# Patient Record
Sex: MALE | Race: WHITE | NOT HISPANIC OR LATINO | Employment: UNEMPLOYED | URBAN - METROPOLITAN AREA
[De-identification: names, ages, dates, MRNs, and addresses within clinical notes are randomized per-mention and may not be internally consistent; named-entity substitution may affect disease eponyms.]

---

## 2016-10-03 LAB — HBA1C MFR BLD HPLC: 7 %

## 2017-09-12 LAB — HBA1C MFR BLD HPLC: 5.7 %

## 2018-04-25 LAB
CREAT ?TM UR-SCNC: 78.3 UMOL/L
HCV AB SER-ACNC: NEGATIVE

## 2019-03-19 ENCOUNTER — APPOINTMENT (EMERGENCY)
Dept: RADIOLOGY | Facility: HOSPITAL | Age: 64
End: 2019-03-19
Payer: MEDICARE

## 2019-03-19 ENCOUNTER — HOSPITAL ENCOUNTER (OUTPATIENT)
Facility: HOSPITAL | Age: 64
Setting detail: OBSERVATION
Discharge: HOME HEALTH | End: 2019-03-20
Attending: EMERGENCY MEDICINE | Admitting: INTERNAL MEDICINE
Payer: MEDICARE

## 2019-03-19 DIAGNOSIS — I87.2 CHRONIC VENOUS STASIS DERMATITIS OF LEFT LOWER EXTREMITY: ICD-10-CM

## 2019-03-19 DIAGNOSIS — M79.89 LEG SWELLING: ICD-10-CM

## 2019-03-19 DIAGNOSIS — L03.90 CELLULITIS: Primary | ICD-10-CM

## 2019-03-19 DIAGNOSIS — E11.9 DIABETES (HCC): ICD-10-CM

## 2019-03-19 PROBLEM — IMO0001 LIVING WILL ON FILE: Status: ACTIVE | Noted: 2018-04-29

## 2019-03-19 PROBLEM — I10 HTN (HYPERTENSION): Status: ACTIVE | Noted: 2019-03-19

## 2019-03-19 PROBLEM — E03.9 HYPOTHYROIDISM: Status: ACTIVE | Noted: 2019-03-19

## 2019-03-19 PROBLEM — Z89.511 S/P BKA (BELOW KNEE AMPUTATION) UNILATERAL, RIGHT (HCC): Status: ACTIVE | Noted: 2017-05-25

## 2019-03-19 PROBLEM — E66.01 MORBID OBESITY WITH BMI OF 40.0-44.9, ADULT (HCC): Status: ACTIVE | Noted: 2019-03-19

## 2019-03-19 PROBLEM — Z87.898 LIVING WILL ON FILE: Status: ACTIVE | Noted: 2018-04-29

## 2019-03-19 PROBLEM — E78.5 HLD (HYPERLIPIDEMIA): Status: ACTIVE | Noted: 2019-03-19

## 2019-03-19 LAB
ALBUMIN SERPL BCP-MCNC: 2.8 G/DL (ref 3.5–5)
ALP SERPL-CCNC: 66 U/L (ref 46–116)
ALT SERPL W P-5'-P-CCNC: 23 U/L (ref 12–78)
ANION GAP SERPL CALCULATED.3IONS-SCNC: 3 MMOL/L (ref 4–13)
AST SERPL W P-5'-P-CCNC: 25 U/L (ref 5–45)
BACTERIA UR QL AUTO: ABNORMAL /HPF
BASOPHILS # BLD AUTO: 0.02 THOUSANDS/ΜL (ref 0–0.1)
BASOPHILS NFR BLD AUTO: 0 % (ref 0–1)
BILIRUB SERPL-MCNC: 0.45 MG/DL (ref 0.2–1)
BILIRUB UR QL STRIP: ABNORMAL
BUN SERPL-MCNC: 15 MG/DL (ref 5–25)
CALCIUM SERPL-MCNC: 8.8 MG/DL (ref 8.3–10.1)
CHLORIDE SERPL-SCNC: 107 MMOL/L (ref 100–108)
CLARITY UR: ABNORMAL
CO2 SERPL-SCNC: 27 MMOL/L (ref 21–32)
COLOR UR: ABNORMAL
CREAT SERPL-MCNC: 0.9 MG/DL (ref 0.6–1.3)
CRP SERPL QL: 73.3 MG/L
EOSINOPHIL # BLD AUTO: 0.07 THOUSAND/ΜL (ref 0–0.61)
EOSINOPHIL NFR BLD AUTO: 1 % (ref 0–6)
ERYTHROCYTE [DISTWIDTH] IN BLOOD BY AUTOMATED COUNT: 17.1 % (ref 11.6–15.1)
ERYTHROCYTE [SEDIMENTATION RATE] IN BLOOD: 81 MM/HOUR (ref 0–10)
GFR SERPL CREATININE-BSD FRML MDRD: 89 ML/MIN/1.73SQ M
GLUCOSE SERPL-MCNC: 71 MG/DL (ref 65–140)
GLUCOSE SERPL-MCNC: 86 MG/DL (ref 65–140)
GLUCOSE UR STRIP-MCNC: NEGATIVE MG/DL
HCT VFR BLD AUTO: 37 % (ref 36.5–49.3)
HGB BLD-MCNC: 11.3 G/DL (ref 12–17)
HGB UR QL STRIP.AUTO: ABNORMAL
IMM GRANULOCYTES # BLD AUTO: 0.02 THOUSAND/UL (ref 0–0.2)
IMM GRANULOCYTES NFR BLD AUTO: 0 % (ref 0–2)
KETONES UR STRIP-MCNC: NEGATIVE MG/DL
LEUKOCYTE ESTERASE UR QL STRIP: ABNORMAL
LYMPHOCYTES # BLD AUTO: 0.81 THOUSANDS/ΜL (ref 0.6–4.47)
LYMPHOCYTES NFR BLD AUTO: 13 % (ref 14–44)
MCH RBC QN AUTO: 25 PG (ref 26.8–34.3)
MCHC RBC AUTO-ENTMCNC: 30.5 G/DL (ref 31.4–37.4)
MCV RBC AUTO: 82 FL (ref 82–98)
MONOCYTES # BLD AUTO: 0.5 THOUSAND/ΜL (ref 0.17–1.22)
MONOCYTES NFR BLD AUTO: 8 % (ref 4–12)
NEUTROPHILS # BLD AUTO: 5.05 THOUSANDS/ΜL (ref 1.85–7.62)
NEUTS SEG NFR BLD AUTO: 78 % (ref 43–75)
NITRITE UR QL STRIP: NEGATIVE
NON-SQ EPI CELLS URNS QL MICRO: ABNORMAL /HPF
NRBC BLD AUTO-RTO: 0 /100 WBCS
PH UR STRIP.AUTO: 6 [PH] (ref 4.5–8)
PLATELET # BLD AUTO: 236 THOUSANDS/UL (ref 149–390)
PLATELET # BLD AUTO: 259 THOUSANDS/UL (ref 149–390)
PMV BLD AUTO: 12.6 FL (ref 8.9–12.7)
PMV BLD AUTO: 12.7 FL (ref 8.9–12.7)
POTASSIUM SERPL-SCNC: 4.3 MMOL/L (ref 3.5–5.3)
PROT SERPL-MCNC: 7.4 G/DL (ref 6.4–8.2)
PROT UR STRIP-MCNC: ABNORMAL MG/DL
RBC # BLD AUTO: 4.52 MILLION/UL (ref 3.88–5.62)
RBC #/AREA URNS AUTO: ABNORMAL /HPF
SODIUM SERPL-SCNC: 137 MMOL/L (ref 136–145)
SP GR UR STRIP.AUTO: 1.02 (ref 1–1.03)
UROBILINOGEN UR QL STRIP.AUTO: 1 E.U./DL
WBC # BLD AUTO: 6.47 THOUSAND/UL (ref 4.31–10.16)
WBC #/AREA URNS AUTO: ABNORMAL /HPF

## 2019-03-19 PROCEDURE — 81003 URINALYSIS AUTO W/O SCOPE: CPT

## 2019-03-19 PROCEDURE — 82948 REAGENT STRIP/BLOOD GLUCOSE: CPT

## 2019-03-19 PROCEDURE — 80053 COMPREHEN METABOLIC PANEL: CPT | Performed by: EMERGENCY MEDICINE

## 2019-03-19 PROCEDURE — 85025 COMPLETE CBC W/AUTO DIFF WBC: CPT | Performed by: EMERGENCY MEDICINE

## 2019-03-19 PROCEDURE — 96365 THER/PROPH/DIAG IV INF INIT: CPT

## 2019-03-19 PROCEDURE — 73630 X-RAY EXAM OF FOOT: CPT

## 2019-03-19 PROCEDURE — 99219 PR INITIAL OBSERVATION CARE/DAY 50 MINUTES: CPT | Performed by: INTERNAL MEDICINE

## 2019-03-19 PROCEDURE — 85049 AUTOMATED PLATELET COUNT: CPT | Performed by: INTERNAL MEDICINE

## 2019-03-19 PROCEDURE — 87040 BLOOD CULTURE FOR BACTERIA: CPT | Performed by: INTERNAL MEDICINE

## 2019-03-19 PROCEDURE — 36415 COLL VENOUS BLD VENIPUNCTURE: CPT | Performed by: EMERGENCY MEDICINE

## 2019-03-19 PROCEDURE — 99285 EMERGENCY DEPT VISIT HI MDM: CPT

## 2019-03-19 PROCEDURE — 86140 C-REACTIVE PROTEIN: CPT | Performed by: EMERGENCY MEDICINE

## 2019-03-19 PROCEDURE — 81001 URINALYSIS AUTO W/SCOPE: CPT

## 2019-03-19 PROCEDURE — 1123F ACP DISCUSS/DSCN MKR DOCD: CPT | Performed by: INTERNAL MEDICINE

## 2019-03-19 PROCEDURE — 85652 RBC SED RATE AUTOMATED: CPT | Performed by: EMERGENCY MEDICINE

## 2019-03-19 RX ORDER — CEFAZOLIN SODIUM 1 G/50ML
1000 SOLUTION INTRAVENOUS EVERY 8 HOURS
Status: DISCONTINUED | OUTPATIENT
Start: 2019-03-19 | End: 2019-03-19

## 2019-03-19 RX ORDER — PANTOPRAZOLE SODIUM 40 MG/1
40 TABLET, DELAYED RELEASE ORAL DAILY
COMMUNITY
End: 2019-04-12 | Stop reason: SDUPTHER

## 2019-03-19 RX ORDER — HEPARIN SODIUM 5000 [USP'U]/ML
5000 INJECTION, SOLUTION INTRAVENOUS; SUBCUTANEOUS EVERY 8 HOURS SCHEDULED
Status: DISCONTINUED | OUTPATIENT
Start: 2019-03-19 | End: 2019-03-19

## 2019-03-19 RX ORDER — HEPARIN SODIUM 5000 [USP'U]/ML
7500 INJECTION, SOLUTION INTRAVENOUS; SUBCUTANEOUS EVERY 8 HOURS SCHEDULED
Status: DISCONTINUED | OUTPATIENT
Start: 2019-03-19 | End: 2019-03-20 | Stop reason: HOSPADM

## 2019-03-19 RX ORDER — LEVOTHYROXINE SODIUM 300 UG/1
300 TABLET ORAL DAILY
COMMUNITY
End: 2019-04-12 | Stop reason: SDUPTHER

## 2019-03-19 RX ORDER — LEVOTHYROXINE SODIUM 0.1 MG/1
300 TABLET ORAL DAILY
Status: DISCONTINUED | OUTPATIENT
Start: 2019-03-20 | End: 2019-03-19

## 2019-03-19 RX ORDER — METOPROLOL TARTRATE 50 MG/1
50 TABLET, FILM COATED ORAL EVERY 12 HOURS SCHEDULED
COMMUNITY
End: 2019-04-09 | Stop reason: SDUPTHER

## 2019-03-19 RX ORDER — LEVOTHYROXINE SODIUM 0.12 MG/1
375 TABLET ORAL
Status: DISCONTINUED | OUTPATIENT
Start: 2019-03-20 | End: 2019-03-20 | Stop reason: HOSPADM

## 2019-03-19 RX ORDER — ASPIRIN 81 MG/1
81 TABLET, CHEWABLE ORAL DAILY
COMMUNITY
End: 2019-04-09 | Stop reason: SDUPTHER

## 2019-03-19 RX ORDER — METOPROLOL TARTRATE 50 MG/1
50 TABLET, FILM COATED ORAL EVERY 12 HOURS SCHEDULED
Status: DISCONTINUED | OUTPATIENT
Start: 2019-03-19 | End: 2019-03-20 | Stop reason: HOSPADM

## 2019-03-19 RX ORDER — LISINOPRIL 10 MG/1
10 TABLET ORAL DAILY
Status: DISCONTINUED | OUTPATIENT
Start: 2019-03-20 | End: 2019-03-20 | Stop reason: HOSPADM

## 2019-03-19 RX ORDER — GABAPENTIN 100 MG/1
100 CAPSULE ORAL
Status: DISCONTINUED | OUTPATIENT
Start: 2019-03-19 | End: 2019-03-20 | Stop reason: HOSPADM

## 2019-03-19 RX ORDER — METFORMIN HYDROCHLORIDE 750 MG/1
750 TABLET, EXTENDED RELEASE ORAL
COMMUNITY
End: 2019-04-12 | Stop reason: SDUPTHER

## 2019-03-19 RX ORDER — LISINOPRIL 10 MG/1
10 TABLET ORAL DAILY
COMMUNITY
End: 2019-04-12 | Stop reason: SDUPTHER

## 2019-03-19 RX ORDER — LEVOTHYROXINE SODIUM 0.07 MG/1
75 TABLET ORAL DAILY
COMMUNITY
End: 2019-04-12 | Stop reason: SDUPTHER

## 2019-03-19 RX ORDER — LEVOTHYROXINE SODIUM 0.07 MG/1
75 TABLET ORAL DAILY
Status: DISCONTINUED | OUTPATIENT
Start: 2019-03-20 | End: 2019-03-19

## 2019-03-19 RX ORDER — PRAVASTATIN SODIUM 10 MG
10 TABLET ORAL
Status: DISCONTINUED | OUTPATIENT
Start: 2019-03-19 | End: 2019-03-20 | Stop reason: HOSPADM

## 2019-03-19 RX ORDER — SIMVASTATIN 10 MG
10 TABLET ORAL
COMMUNITY
End: 2019-04-12 | Stop reason: SDUPTHER

## 2019-03-19 RX ORDER — PANTOPRAZOLE SODIUM 40 MG/1
40 TABLET, DELAYED RELEASE ORAL
Status: DISCONTINUED | OUTPATIENT
Start: 2019-03-20 | End: 2019-03-20 | Stop reason: HOSPADM

## 2019-03-19 RX ORDER — CEFAZOLIN SODIUM 2 G/50ML
2000 SOLUTION INTRAVENOUS EVERY 8 HOURS
Status: DISCONTINUED | OUTPATIENT
Start: 2019-03-19 | End: 2019-03-20 | Stop reason: HOSPADM

## 2019-03-19 RX ORDER — ASPIRIN 81 MG/1
81 TABLET, CHEWABLE ORAL DAILY
Status: DISCONTINUED | OUTPATIENT
Start: 2019-03-20 | End: 2019-03-20 | Stop reason: HOSPADM

## 2019-03-19 RX ADMIN — HEPARIN SODIUM 7500 UNITS: 5000 INJECTION INTRAVENOUS; SUBCUTANEOUS at 22:25

## 2019-03-19 RX ADMIN — METOPROLOL TARTRATE 50 MG: 50 TABLET, FILM COATED ORAL at 22:24

## 2019-03-19 RX ADMIN — PRAVASTATIN SODIUM 10 MG: 10 TABLET ORAL at 22:15

## 2019-03-19 RX ADMIN — METRONIDAZOLE 500 MG: 500 INJECTION, SOLUTION INTRAVENOUS at 23:31

## 2019-03-19 RX ADMIN — Medication 1000 MG: at 15:58

## 2019-03-19 RX ADMIN — GABAPENTIN 100 MG: 100 CAPSULE ORAL at 22:24

## 2019-03-19 RX ADMIN — CEFAZOLIN SODIUM 2000 MG: 2 SOLUTION INTRAVENOUS at 22:24

## 2019-03-19 NOTE — ED ATTENDING ATTESTATION
Traci Rodriguez MD, saw and evaluated the patient  I have discussed the patient with the resident/non-physician practitioner and agree with the resident's/non-physician practitioner's findings, Plan of Care, and MDM as documented in the resident's/non-physician practitioner's note, except where noted  All available labs and Radiology studies were reviewed  I was present for key portions of any procedure(s) performed by the resident/non-physician practitioner and I was immediately available to provide assistance  At this point I agree with the current assessment done in the Emergency Department  I have conducted an independent evaluation of this patient a history and physical is as follows:      Critical Care Time  Procedures     71 yo male with lle edema and redness worsening over the last few days  Pt with more trouble walking due to pain  No fever, no n/v  Pt with hx of right aka  pmh dm, htn, hld  Vss, afebrile, lungs cta, rrr, abodmen soft nontender, left leg with cellulitis   Labs, abx, admission for iv abx, podiatry and vascular consult

## 2019-03-19 NOTE — ED PROVIDER NOTES
History  Chief Complaint   Patient presents with    Leg Swelling     pt presents to ED with LLE edema and redness  pt reports "its been swollen and red for the past few weeks but i thought it would get better on its own"     HPI   47-year-old man with peripheral vascular disease presents for evaluation of left lower extremity swelling and redness  Patient states he has redness at baseline, but is left foot became acutely swollen about a week ago  Patient does have diabetes, thinks he may have neuropathy at baseline  Patient did have right-sided lower leg amputation 2 years ago  Patient is concerned as he only has 1 like  He otherwise denies fevers chills sweats chest pain shortness of breath abdominal pain nausea vomiting  Prior to Admission Medications   Prescriptions Last Dose Informant Patient Reported?  Taking?   aspirin 81 mg chewable tablet 3/19/2019 at Unknown time  Yes Yes   Sig: Chew 81 mg daily   levothyroxine 300 MCG tablet 3/19/2019 at Unknown time  Yes Yes   Sig: Take 300 mcg by mouth daily   levothyroxine 75 mcg tablet 3/19/2019 at Unknown time  Yes Yes   Sig: Take 75 mcg by mouth daily   lisinopril (ZESTRIL) 10 mg tablet 3/19/2019 at Unknown time  Yes Yes   Sig: Take 10 mg by mouth daily   metFORMIN (GLUCOPHAGE-XR) 750 mg 24 hr tablet 3/19/2019 at Unknown time  Yes Yes   Sig: Take 750 mg by mouth daily with breakfast   metoprolol tartrate (LOPRESSOR) 50 mg tablet 3/19/2019 at Unknown time  Yes Yes   Sig: Take 50 mg by mouth every 12 (twelve) hours   pantoprazole (PROTONIX) 40 mg tablet 3/19/2019 at Unknown time  Yes Yes   Sig: Take 40 mg by mouth daily   simvastatin (ZOCOR) 10 mg tablet 3/18/2019 at Unknown time  Yes Yes   Sig: Take 10 mg by mouth daily at bedtime      Facility-Administered Medications: None       Past Medical History:   Diagnosis Date    Diabetes mellitus ([address] )     Disease of thyroid gland     Hyperlipidemia     Hypertension        Past Surgical History:   Procedure Laterality Date    BELOW KNEE LEG AMPUTATION Right        No family history on file  I have reviewed and agree with the history as documented  Social History     Tobacco Use    Smoking status: Not on file   Substance Use Topics    Alcohol use: Not on file    Drug use: Not on file        Review of Systems   Musculoskeletal:        Left lower extremity swelling   Skin: Positive for color change  Physical Exam  ED Triage Vitals   Temperature Pulse Respirations Blood Pressure SpO2   03/19/19 1119 03/19/19 1119 03/19/19 1119 03/19/19 1119 03/19/19 1119   97 9 °F (36 6 °C) 89 16 141/62 98 %      Temp Source Heart Rate Source Patient Position - Orthostatic VS BP Location FiO2 (%)   03/19/19 1119 03/19/19 1119 03/19/19 1543 03/19/19 1543 --   Oral Monitor Lying Right arm       Pain Score       --                    Orthostatic Vital Signs  Vitals:    03/19/19 1119 03/19/19 1543   BP: 141/62 120/61   Pulse: 89 72   Patient Position - Orthostatic VS:  Lying       Physical Exam   Constitutional: He is oriented to person, place, and time  He appears well-developed and well-nourished  No distress  HENT:   Head: Normocephalic and atraumatic  Right Ear: External ear normal    Left Ear: External ear normal    Mouth/Throat: Oropharynx is clear and moist    Eyes: Pupils are equal, round, and reactive to light  Conjunctivae and EOM are normal  Right eye exhibits no discharge  Left eye exhibits no discharge  No scleral icterus  Neck: Normal range of motion  Neck supple  No tracheal deviation present  No thyromegaly present  Cardiovascular: Normal rate, regular rhythm and intact distal pulses  Exam reveals no gallop and no friction rub  No murmur heard  Pulmonary/Chest: Effort normal and breath sounds normal  No stridor  No respiratory distress  He has no wheezes  He has no rales  Abdominal: Soft  Bowel sounds are normal  He exhibits no distension  There is no tenderness  There is no rebound and no guarding  Musculoskeletal: He exhibits edema  He exhibits no tenderness or deformity  Left foot is warm, swollen  Neurological: He is alert and oriented to person, place, and time  No cranial nerve deficit  Skin: Skin is warm and dry  No rash noted  He is not diaphoretic  There is erythema  Psychiatric: He has a normal mood and affect  His behavior is normal  Thought content normal    Nursing note and vitals reviewed  ED Medications  Medications   heparin (porcine) subcutaneous injection 5,000 Units (has no administration in time range)   aspirin chewable tablet 81 mg (has no administration in time range)   lisinopril (ZESTRIL) tablet 10 mg (has no administration in time range)   metoprolol tartrate (LOPRESSOR) tablet 50 mg (has no administration in time range)   pantoprazole (PROTONIX) EC tablet 40 mg (has no administration in time range)   pravastatin (PRAVACHOL) tablet 10 mg (has no administration in time range)   ceFAZolin (ANCEF) IVPB (premix) 2,000 mg (has no administration in time range)   metroNIDAZOLE (FLAGYL) IVPB (premix) 500 mg (has no administration in time range)   levothyroxine tablet 375 mcg (has no administration in time range)   insulin lispro (HumaLOG) 100 units/mL subcutaneous injection 1-6 Units (has no administration in time range)   insulin lispro (HumaLOG) 100 units/mL subcutaneous injection 1-6 Units (has no administration in time range)   ceftriaxone (ROCEPHIN) 1 g/50 mL in dextrose IVPB (0 mg Intravenous Stopped 3/19/19 1709)       Diagnostic Studies  Results Reviewed     Procedure Component Value Units Date/Time    Platelet count [[id number]] Collected:  03/19/19 1758    Lab Status: In process Specimen:  Blood from Arm, Right Updated:  03/19/19 1803    Blood culture [[id number]] Collected:  03/19/19 1758    Lab Status: In process Specimen:  Blood from Arm, Left Updated:  03/19/19 1803    Blood culture [[id number]] Collected:  03/19/19 1758    Lab Status:   In process Specimen: Blood from Arm, Right Updated:  03/19/19 1803    POCT urinalysis dipstick [[id number]]     Lab Status:  No result Specimen:  Urine     Sedimentation rate, automated [[id number]]  (Abnormal) Collected:  03/19/19 1403    Lab Status:  Final result Specimen:  Blood from Arm, Left Updated:  03/19/19 1615     Sed Rate 81 mm/hour     Comprehensive metabolic panel [[id number]]  (Abnormal) Collected:  03/19/19 1403    Lab Status:  Final result Specimen:  Blood from Arm, Left Updated:  03/19/19 1557     Sodium 137 mmol/L      Potassium 4 3 mmol/L      Chloride 107 mmol/L      CO2 27 mmol/L      ANION GAP 3 mmol/L      BUN 15 mg/dL      Creatinine 0 90 mg/dL      Glucose 86 mg/dL      Calcium 8 8 mg/dL      AST 25 U/L      ALT 23 U/L      Alkaline Phosphatase 66 U/L      Total Protein 7 4 g/dL      Albumin 2 8 g/dL      Total Bilirubin 0 45 mg/dL      eGFR 89 ml/min/1 73sq m     Narrative:       National Kidney Disease Education Program recommendations are as follows:  GFR calculation is accurate only with a steady state creatinine  Chronic Kidney disease less than 60 ml/min/1 73 sq  meters  Kidney failure less than 15 ml/min/1 73 sq  meters      C-reactive protein [[id number]]  (Abnormal) Collected:  03/19/19 1403    Lab Status:  Final result Specimen:  Blood from Arm, Left Updated:  03/19/19 1557     CRP 73 3 mg/L     CBC and differential [[id number]]  (Abnormal) Collected:  03/19/19 1403    Lab Status:  Final result Specimen:  Blood from Arm, Left Updated:  03/19/19 1520     WBC 6 47 Thousand/uL      RBC 4 52 Million/uL      Hemoglobin 11 3 g/dL      Hematocrit 37 0 %      MCV 82 fL      MCH 25 0 pg      MCHC 30 5 g/dL      RDW 17 1 %      MPV 12 7 fL      Platelets 240 Thousands/uL      nRBC 0 /100 WBCs      Neutrophils Relative 78 %      Immat GRANS % 0 %      Lymphocytes Relative 13 %      Monocytes Relative 8 %      Eosinophils Relative 1 %      Basophils Relative 0 %      Neutrophils Absolute 5 05 Thousands/µL      Immature Grans Absolute 0 02 Thousand/uL      Lymphocytes Absolute 0 81 Thousands/µL      Monocytes Absolute 0 50 Thousand/µL      Eosinophils Absolute 0 07 Thousand/µL      Basophils Absolute 0 02 Thousands/µL                  XR foot 3+ views LEFT   ED Interpretation by Andrés Horner MD (03/19 9465)            Procedures  Procedures      Phone Consults  ED Phone Contact    ED Course                               MDM  Number of Diagnoses or Management Options  Cellulitis:   Leg swelling:   Diagnosis management comments: 13-year-old man presents with left lower extremity cellulitis  Will get x-ray to look for air in the tissues, basic labs, will start antibiotics and reassess  Likely admission to the hospital       Disposition  Final diagnoses:   Cellulitis   Leg swelling     Time reflects when diagnosis was documented in both MDM as applicable and the Disposition within this note     Time User Action Codes Description Comment    3/19/2019  4:21 PM Delisa Coreas Add [L03 90] Cellulitis     3/19/2019  4:21 PM Delisa Coreas Add [M79 89] Leg swelling       ED Disposition     ED Disposition Condition Date/Time Comment    Admit Stable Tue Mar 19, 2019  4:22 PM SOD obs      Follow-up Information    None         Patient's Medications   Discharge Prescriptions    No medications on file     No discharge procedures on file  ED Provider  Attending physically available and evaluated Gloelizabeth Tiwari I managed the patient along with the ED Attending      Electronically Signed by         Andrés Horner MD  03/19/19 9383

## 2019-03-19 NOTE — PROGRESS NOTES
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  Patient seen and examined  Reviewed H&P per Dr Cisco Warren  Agree with the assessment and plan except unless otherwise noted  Assessment/Plan:   Principal Problem:    Cellulitis  Active Problems:    Controlled type 2 diabetes mellitus with complication, without long-term current use of insulin (HCC)    HTN (hypertension)    HLD (hyperlipidemia)    Hypothyroidism    Morbid obesity with BMI of 50 0-59 9, adult (HCC)    Atrial fibrillation (HCC)     Left lower extremity cellulitis  Differential also includes venous stasis dermatitis  He has pitting edema up to the knee in that leg  Patient does have a history of chronic venous stasis and left lower extremity ulcerations  Has been noncompliance with wound care in the past   Patient reports redness in his left foot is worse  Also, as he has diminished sensation in that leg, has a difficult time feeling pain in that extremity  · Has been noncompliant with wound care in the past  · Will start on Ancef and Flagyl  · Elevate extremity  · Will consult Podiatry  · Glucose control with sliding scale insulin    AFib  · Patient is on a beta-blocker  However, not on a blood thinner due to history of chronic blood loss and iron deficiency anemia  Uncertain etiology, however, does have a documented history of a duodenal ulcer    · Continue beta-blocker  · Monitor hemoglobin and monitor for sign of bleed    Diabetes mellitus  · Correctional insulin  · Takes metformin as an outpatient  · Follow-up A1c    Disposition:  OBSERVATION    Expected LOS: <2 Midnights      [facility] Pierre DO

## 2019-03-19 NOTE — H&P
INTERNAL MEDICINE HISTORY AND PHYSICAL  [address] Team A    NAME: Marietta Haider  AGE: 70 y o  SEX: male  : 1955   MRN: [id number]  ENCOUNTER: [id number]    DATE: 3/19/2019  TIME: 5:55 PM    Primary Care Physician: Eitan Merchant MD  Admitting Provider: Patric Flanagan MD    Chief complaint: My left foot looks more red    History of Present Illness     Marietta Haider is a 70 y o  male with longstanding history of diabetes (A1c 5 9 in 2019), hypertension, hyperlipidemia who presents with erythema and swelling of his left foot  The patient is unsure of the exact time course but states that this has been getting worse for at least the past 2 weeks  He states that his left lower leg is usually always swollen and red (from venostasis dermatitis) but his feet appear more inflamed than usual  Several years ago, the patient had a right BKA for a foot infection but cannot remember the exact details  He denies fevers, chills, or foot pain  Also denies CP, SOB, N/V, diarrhea, dysuria, hematuria, melena  However, patient does complain of tingling and numbness of his left leg from knee down and also of his bilateral fingers  In the ED, the patient was afebrile, without leukocytosis, but had elevated CRP and ESR of 73 and 81, respectively  Xray of the foot was done and pending at the time of my patient evaluation  He was admitted for observation and IV antibiotics  Review of Systems   Review of Systems   Constitutional: Negative for appetite change, chills, fever and unexpected weight change  HENT: Negative for rhinorrhea and sore throat  Respiratory: Negative for cough, shortness of breath and wheezing  Cardiovascular: Positive for leg swelling  Negative for chest pain and palpitations  Gastrointestinal: Negative for abdominal pain, blood in stool, constipation, diarrhea, nausea and vomiting  Genitourinary: Negative for dysuria, frequency, hematuria and urgency     Musculoskeletal: Negative for arthralgias and myalgias  Skin: Positive for color change and wound  Negative for rash  Neurological: Positive for numbness  Negative for dizziness, weakness, light-headedness and headaches  Past Medical History     Past Medical History:   Diagnosis Date    Diabetes mellitus ([address] )     Disease of thyroid gland     Hyperlipidemia     Hypertension        Past Surgical History     Past Surgical History:   Procedure Laterality Date    BELOW KNEE LEG AMPUTATION Right        Social History     Social History     Substance and Sexual Activity   Alcohol Use Not on file     Social History     Substance and Sexual Activity   Drug Use Not on file     Social History     Tobacco Use   Smoking Status Not on file       Family History   No family history on file  Medications Prior to Admission     Prior to Admission medications    Medication Sig Start Date End Date Taking?  Authorizing Provider   aspirin 81 mg chewable tablet Chew 81 mg daily   Yes Historical Provider, MD   levothyroxine 300 MCG tablet Take 300 mcg by mouth daily   Yes Historical Provider, MD   levothyroxine 75 mcg tablet Take 75 mcg by mouth daily   Yes Historical Provider, MD   lisinopril (ZESTRIL) 10 mg tablet Take 10 mg by mouth daily   Yes Historical Provider, MD   metFORMIN (GLUCOPHAGE-XR) 750 mg 24 hr tablet Take 750 mg by mouth daily with breakfast   Yes Historical Provider, MD   metoprolol tartrate (LOPRESSOR) 50 mg tablet Take 50 mg by mouth every 12 (twelve) hours   Yes Historical Provider, MD   pantoprazole (PROTONIX) 40 mg tablet Take 40 mg by mouth daily   Yes Historical Provider, MD   simvastatin (ZOCOR) 10 mg tablet Take 10 mg by mouth daily at bedtime   Yes Historical Provider, MD       Allergies   Allergies no known allergies    Objective     Vitals:    03/19/19 1119 03/19/19 1122 03/19/19 1543   BP: 141/62  120/61   BP Location:   Right arm   Pulse: 89  72   Resp: 16  18   Temp: 97 9 °F (36 6 °C)     TempSrc: Oral     SpO2: 98% 99%   Weight:  (!) 213 kg (470 lb)      Body mass index is 51 63 kg/m²  Intake/Output Summary (Last 24 hours) at 3/19/2019 1755  Last data filed at 3/19/2019 1709  Gross per 24 hour   Intake 50 ml   Output    Net 50 ml     Invasive Devices     Peripheral Intravenous Line            Peripheral IV 03/19/19 Left Forearm less than 1 day                Physical Exam  General Appearance:    Alert, cooperative, no distress, appears stated age  Head:    Normocephalic, without obvious abnormality, atraumatic  Neck:   Supple, symmetrical, trachea midline, no adenopathy  Lungs:     Clear to auscultation bilaterally, respirations unlabored  Heart:    Regular rate and rhythm, S1 and S2 normal, no murmur, rub   or gallop  Abdomen:     Soft, non-tender, bowel sounds active all four quadrants,   MSK: LLE erythema up to left knee, 2+ pitting edema up to left knee, cracked and fissured skin on posterior and medial surfaces of ankle  Neuro: Patient only has sensation to deep pressure in LLE, 5/5 motor    Lab Results: I have personally reviewed pertinent reports      CBC:   Results from last 7 days   Lab Units 03/19/19  1758 03/19/19  1403   WBC Thousand/uL  --  6 47   RBC Million/uL  --  4 52   HEMOGLOBIN g/dL  --  11 3*   HEMATOCRIT %  --  37 0   MCV fL  --  82   MCH pg  --  25 0*   MCHC g/dL  --  30 5*   RDW %  --  17 1*   MPV fL 12 6 12 7   PLATELETS Thousands/uL 259 236   NRBC AUTO /100 WBCs  --  0   NEUTROS PCT %  --  78*   LYMPHS PCT %  --  13*   MONOS PCT %  --  8   EOS PCT %  --  1   BASOS PCT %  --  0   NEUTROS ABS Thousands/µL  --  5 05   LYMPHS ABS Thousands/µL  --  0 81   MONOS ABS Thousand/µL  --  0 50   EOS ABS Thousand/µL  --  0 07   , Chemistry Profile:   Results from last 7 days   Lab Units 03/19/19  1403   POTASSIUM mmol/L 4 3   CHLORIDE mmol/L 107   CO2 mmol/L 27   BUN mg/dL 15   CREATININE mg/dL 0 90   CALCIUM mg/dL 8 8   AST U/L 25   ALT U/L 23   ALK PHOS U/L 66   EGFR ml/min/1 73sq m 89   , Coagulation Studies:   , Cardiac Studies:   , Additional Labs:   , iSTAT CHEM 8:   Results from last 7 days   Lab Units 03/19/19  1403   ANION GAP mmol/L 3*   EGFR ml/min/1 73sq m 89   HEMOGLOBIN g/dL 11 3*   , ABG:   , Toxicology:   , Last A1C/Lipid Panel/Thyroid Panel: No results found for: HGBA1C, TRIG, CHOL, HDL, LDLCALC, [id number], T3FREE, [id number], FREET4    Imaging: I have personally reviewed pertinent films in PACS  No results found  Microbiology: cultures obtained in emergency department include blood cultures x2    Urinalysis:       Invalid input(s): URIBILINOGEN     Urine Micro:        EKG, Pathology, and Other Studies: I have personally reviewed pertinent reports  Medications given in Emergency Department     Medication Administration - last 24 hours from 03/18/2019 1755 to 03/19/2019 1755       Date/Time Order Dose Route Action Action by     03/19/2019 1542 ceftriaxone (ROCEPHIN) 1 g/50 mL in dextrose IVPB 1,000 mg Intravenous Not Given Nilda Wharton RN     03/19/2019 1709 ceftriaxone (ROCEPHIN) 1 g/50 mL in dextrose IVPB 0 mg Intravenous Stopped Josselin Flores RN     03/19/2019 1558 ceftriaxone (ROCEPHIN) 1 g/50 mL in dextrose IVPB 1,000 mg Intravenous New Bag Nilda Whraton RN          Assessment and Plan     1) Left foot cellulitis - Patient has had at least 2 weeks of worsening erythema and swelling of left foot  Has chronic venostasis dermatitis but distal aspect of left foot does appear more erythematous compared to the rest of his LLE   Additionally, patient denies pain in left foot but that is likely due to numbness diabetic neuropathy    - Received 1 dose of Rocephin in ED, continue Ancef + Flagyl IV   - Follow-up blood cultures   - CRP and ESR elevated, follow-up foot x-ray to f/o osteo, may need MRI   - Elevate foot   - Monitor fever curve and WBCs   - Appreciate podiatry recommendations    2) Diabetes Mellitus, type 2 - Patient suprisingly has A1c of 5 9 as of 01/2019 despite severe diabetic neuropathy and history of right BKA for diabetic foot infection  Takes only metformin 750mg QD at home  - Follow-up A1c   - Continue SSI #3   - Monitor blood sugars TIDAC    3) HTN - Controlled,  on admission   - Continue lisinopril 10mg QD, metroprolol 50mg BID    4) HLD - Last lipid panel in 01/2019: , , HDL 35, LDL 68   - Continue pravastatin 10mg QD    5) Hypothyroidism - Last TSH 3 3 in 01/2019   - Continue Synthroid 375mcg QD    6) Paroxysmal Afib - Patient has a history of GI bleed so not on AC   - Continue metroprolol 50mg BID    Code Status: Level 1 - Full Code  VTE Pharmacologic Prophylaxis: Heparin   VTE Mechanical Prophylaxis: sequential compression device  Admission Status: OBSERVATION    Admission Time  I spent 30 minutes admitting the patient  This involved direct patient contact where I performed a full history and physical, reviewing previous records, and reviewing laboratory and other diagnostic studies      Nury Cooley MD  Internal Medicine  PGY-1

## 2019-03-20 VITALS
TEMPERATURE: 98.6 F | BODY MASS INDEX: 51.63 KG/M2 | RESPIRATION RATE: 18 BRPM | WEIGHT: 315 LBS | SYSTOLIC BLOOD PRESSURE: 120 MMHG | HEART RATE: 68 BPM | DIASTOLIC BLOOD PRESSURE: 65 MMHG | OXYGEN SATURATION: 98 %

## 2019-03-20 PROBLEM — I87.2 CHRONIC VENOUS STASIS DERMATITIS OF LEFT LOWER EXTREMITY: Status: ACTIVE | Noted: 2019-03-20

## 2019-03-20 LAB
ANION GAP SERPL CALCULATED.3IONS-SCNC: 5 MMOL/L (ref 4–13)
BASOPHILS # BLD AUTO: 0.02 THOUSANDS/ΜL (ref 0–0.1)
BASOPHILS NFR BLD AUTO: 0 % (ref 0–1)
BUN SERPL-MCNC: 15 MG/DL (ref 5–25)
CALCIUM SERPL-MCNC: 8.8 MG/DL (ref 8.3–10.1)
CHLORIDE SERPL-SCNC: 105 MMOL/L (ref 100–108)
CO2 SERPL-SCNC: 28 MMOL/L (ref 21–32)
CREAT SERPL-MCNC: 0.87 MG/DL (ref 0.6–1.3)
EOSINOPHIL # BLD AUTO: 0.1 THOUSAND/ΜL (ref 0–0.61)
EOSINOPHIL NFR BLD AUTO: 2 % (ref 0–6)
ERYTHROCYTE [DISTWIDTH] IN BLOOD BY AUTOMATED COUNT: 16.9 % (ref 11.6–15.1)
EST. AVERAGE GLUCOSE BLD GHB EST-MCNC: 114 MG/DL
GFR SERPL CREATININE-BSD FRML MDRD: 91 ML/MIN/1.73SQ M
GLUCOSE SERPL-MCNC: 108 MG/DL (ref 65–140)
GLUCOSE SERPL-MCNC: 110 MG/DL (ref 65–140)
GLUCOSE SERPL-MCNC: 99 MG/DL (ref 65–140)
HBA1C MFR BLD: 5.6 % (ref 4.2–6.3)
HCT VFR BLD AUTO: 38.9 % (ref 36.5–49.3)
HGB BLD-MCNC: 11.7 G/DL (ref 12–17)
IMM GRANULOCYTES # BLD AUTO: 0.03 THOUSAND/UL (ref 0–0.2)
IMM GRANULOCYTES NFR BLD AUTO: 1 % (ref 0–2)
LYMPHOCYTES # BLD AUTO: 1.07 THOUSANDS/ΜL (ref 0.6–4.47)
LYMPHOCYTES NFR BLD AUTO: 17 % (ref 14–44)
MAGNESIUM SERPL-MCNC: 2.2 MG/DL (ref 1.6–2.6)
MCH RBC QN AUTO: 24.8 PG (ref 26.8–34.3)
MCHC RBC AUTO-ENTMCNC: 30.1 G/DL (ref 31.4–37.4)
MCV RBC AUTO: 82 FL (ref 82–98)
MONOCYTES # BLD AUTO: 0.48 THOUSAND/ΜL (ref 0.17–1.22)
MONOCYTES NFR BLD AUTO: 8 % (ref 4–12)
NEUTROPHILS # BLD AUTO: 4.6 THOUSANDS/ΜL (ref 1.85–7.62)
NEUTS SEG NFR BLD AUTO: 72 % (ref 43–75)
NRBC BLD AUTO-RTO: 0 /100 WBCS
PHOSPHATE SERPL-MCNC: 3.5 MG/DL (ref 2.3–4.1)
PLATELET # BLD AUTO: 247 THOUSANDS/UL (ref 149–390)
PMV BLD AUTO: 12.7 FL (ref 8.9–12.7)
POTASSIUM SERPL-SCNC: 3.8 MMOL/L (ref 3.5–5.3)
RBC # BLD AUTO: 4.72 MILLION/UL (ref 3.88–5.62)
SODIUM SERPL-SCNC: 138 MMOL/L (ref 136–145)
WBC # BLD AUTO: 6.3 THOUSAND/UL (ref 4.31–10.16)

## 2019-03-20 PROCEDURE — 83036 HEMOGLOBIN GLYCOSYLATED A1C: CPT | Performed by: INTERNAL MEDICINE

## 2019-03-20 PROCEDURE — 83735 ASSAY OF MAGNESIUM: CPT | Performed by: INTERNAL MEDICINE

## 2019-03-20 PROCEDURE — 85025 COMPLETE CBC W/AUTO DIFF WBC: CPT | Performed by: INTERNAL MEDICINE

## 2019-03-20 PROCEDURE — 84100 ASSAY OF PHOSPHORUS: CPT | Performed by: INTERNAL MEDICINE

## 2019-03-20 PROCEDURE — 80048 BASIC METABOLIC PNL TOTAL CA: CPT | Performed by: INTERNAL MEDICINE

## 2019-03-20 PROCEDURE — 99217 PR OBSERVATION CARE DISCHARGE MANAGEMENT: CPT | Performed by: INTERNAL MEDICINE

## 2019-03-20 PROCEDURE — 82948 REAGENT STRIP/BLOOD GLUCOSE: CPT

## 2019-03-20 RX ORDER — AMMONIUM LACTATE 12 G/100G
CREAM TOPICAL 2 TIMES DAILY
Qty: 385 G | Refills: 0 | Status: SHIPPED | OUTPATIENT
Start: 2019-03-20 | End: 2019-04-12 | Stop reason: SDUPTHER

## 2019-03-20 RX ORDER — CEPHALEXIN 500 MG/1
500 CAPSULE ORAL EVERY 6 HOURS SCHEDULED
Qty: 20 CAPSULE | Refills: 0 | Status: SHIPPED | OUTPATIENT
Start: 2019-03-20 | End: 2019-03-25

## 2019-03-20 RX ORDER — AMMONIUM LACTATE 12 G/100G
CREAM TOPICAL 2 TIMES DAILY
Status: DISCONTINUED | OUTPATIENT
Start: 2019-03-20 | End: 2019-03-20 | Stop reason: HOSPADM

## 2019-03-20 RX ADMIN — HEPARIN SODIUM 7500 UNITS: 5000 INJECTION INTRAVENOUS; SUBCUTANEOUS at 05:21

## 2019-03-20 RX ADMIN — ASPIRIN 81 MG 81 MG: 81 TABLET ORAL at 08:39

## 2019-03-20 RX ADMIN — CEFAZOLIN SODIUM 2000 MG: 2 SOLUTION INTRAVENOUS at 13:40

## 2019-03-20 RX ADMIN — LISINOPRIL 10 MG: 10 TABLET ORAL at 08:42

## 2019-03-20 RX ADMIN — METRONIDAZOLE 500 MG: 500 INJECTION, SOLUTION INTRAVENOUS at 06:15

## 2019-03-20 RX ADMIN — CEFAZOLIN SODIUM 2000 MG: 2 SOLUTION INTRAVENOUS at 05:18

## 2019-03-20 RX ADMIN — METRONIDAZOLE 500 MG: 500 INJECTION, SOLUTION INTRAVENOUS at 14:51

## 2019-03-20 RX ADMIN — HEPARIN SODIUM 7500 UNITS: 5000 INJECTION INTRAVENOUS; SUBCUTANEOUS at 13:40

## 2019-03-20 RX ADMIN — LEVOTHYROXINE SODIUM 375 MCG: 125 TABLET ORAL at 05:17

## 2019-03-20 RX ADMIN — METOPROLOL TARTRATE 50 MG: 50 TABLET, FILM COATED ORAL at 08:43

## 2019-03-20 RX ADMIN — PANTOPRAZOLE SODIUM 40 MG: 40 TABLET, DELAYED RELEASE ORAL at 05:17

## 2019-03-20 NOTE — DISCHARGE SUMMARY
[address] Discharge Summary - Medical Grisel Aguirre 70 y o  male MRN: [id number]    [address] [facility] [address] [facility] 6 Room / Bed: [facility] 615/[facility] 013-65 Encounter: [id number]    BRIEF OVERVIEW    Admitting Provider: [place] MD  Discharge Provider: [place] P[place] MD  Primary Care Physician at Discharge: Nereyda Marquis    Discharge To: Home  Facility / Family Member Name: Self  Phone Number: [phone]     Admission Date: 3/19/2019     Discharge Date: 3/20/2019  5:08 PM    Primary Discharge Diagnosis  Principal Problem:    Chronic venous stasis dermatitis of left lower extremity  Active Problems:    Controlled type 2 diabetes mellitus with complication, without long-term current use of insulin (HCC)    HTN (hypertension)    HLD (hyperlipidemia)    Hypothyroidism    Morbid obesity with BMI of 50 0-59 9, adult (HCC)    Atrial fibrillation ([facility] [address] )  Resolved Problems:    * No resolved hospital problems  *      Other Problems Addressed: Wound Care    Consulting Providers: None        Therapeutic Operative Procedures Performed: None    Diagnostic Procedures Performed  None    Discharge Disposition: Home with [address] [place] [address] Way  Discharged With Lines: no    Test Results Pending at Discharge: None    Outpatient Follow-Up  none required  Follow up with consulting providers  none required   Active Issues Requiring Follow-up   none    Code Status: Level 1 - Full Code  Advance Directive and Living Will: <no information>  Power of :    POLST:      Medications   See after visit summary for reconciled discharge medications provided to patient and family  Allergies  No Known Allergies  Discharge Diet: regular diet  Activity restrictions: none    [address]   This is a pt with a NIDDM, h/o R BKA bc of severe chronic venous stasis and PVD, and chronic venous stasis in the LLE  He presented with 2 weeks of worsening LLE swelling and redness   He was admitted for obrevation of possible cellulitis  On further examination, his RLE did not really look infected  He never had a leukocytosis or fevers  Pt has decreased sensation in the LLE so he was unable to comment on pain on palpation  It seems he just had worsening of his chronic venous stasis  Since there was mild worsening of erythema and blistering, so he was discharged with a 5-day course of keflex  We also set him up with wound care at home  Review of Systems   Skin: Positive for color change and wound  Physical Exam   Constitutional: He is oriented to person, place, and time  He appears well-developed  No distress  HENT:   Head: Normocephalic and atraumatic  Eyes: Pupils are equal, round, and reactive to light  Conjunctivae and EOM are normal    Cardiovascular: Normal rate and regular rhythm  Exam reveals distant heart sounds  LLE pulse not palpable   Pulmonary/Chest: Effort normal and breath sounds normal    Abdominal: Soft  Bowel sounds are normal  There is no tenderness  Obese abdomen   Musculoskeletal:   R BKA   Feet:   Left Foot:   Skin Integrity: Positive for skin breakdown, warmth, callus and dry skin  Neurological: He is alert and oriented to person, place, and time  A sensory deficit is present  No cranial nerve deficit  No sensation in LLE up to mid-chin   Skin: Skin is warm  He is not diaphoretic  Psychiatric: He has a normal mood and affect  His speech is normal and behavior is normal      Presenting Problem/History of Present Illness  Principal Problem:    Chronic venous stasis dermatitis of left lower extremity  Active Problems:    Controlled type 2 diabetes mellitus with complication, without long-term current use of insulin (HCC)    HTN (hypertension)    HLD (hyperlipidemia)    Hypothyroidism    Morbid obesity with BMI of 50 0-59 9, adult (HCC)    Atrial fibrillation ([facility] [address] )  Resolved Problems:    * No resolved hospital problems   *    Other Pertinent Test Results: None Discharge Condition: good    Discharge  Statement   I spent 20 minutes minutes discharging the patient  This time was spent on the day of discharge  I had direct contact with the patient on the day of discharge  Additional documentation is required if more than 30 minutes were spent on discharge

## 2019-03-20 NOTE — PLAN OF CARE
Problem: Potential for Falls  Goal: Patient will remain free of falls  Description  INTERVENTIONS:  - Assess patient frequently for physical needs  -  Identify cognitive and physical deficits and behaviors that affect risk of falls    -  [place] fall precautions as indicated by assessment   - Educate patient/family on patient safety including physical limitations  - Instruct patient to call for assistance with activity based on assessment  - Modify environment to reduce risk of injury  - Consider OT/PT consult to assist with strengthening/mobility  Outcome: Progressing     Problem: PAIN - ADULT  Goal: Verbalizes/displays adequate comfort level or baseline comfort level  Description  Interventions:  - Encourage patient to monitor pain and request assistance  - Assess pain using appropriate pain scale  - Administer analgesics based on type and severity of pain and evaluate response  - Implement non-pharmacological measures as appropriate and evaluate response  - Consider cultural and social influences on pain and pain management  - Notify physician/advanced practitioner if interventions unsuccessful or patient reports new pain  Outcome: Progressing     Problem: INFECTION - ADULT  Goal: Absence or prevention of progression during hospitalization  Description  INTERVENTIONS:  - Assess and monitor for signs and symptoms of infection  - Monitor lab/diagnostic results  - Monitor all insertion sites, i e  indwelling lines, tubes, and drains  - Monitor endotracheal (as able) and nasal secretions for changes in amount and color  - [place] appropriate cooling/warming therapies per order  - Administer medications as ordered  - Instruct and encourage patient and family to use good hand hygiene technique  - Identify and instruct in appropriate isolation precautions for identified infection/condition  Outcome: Progressing     Problem: SAFETY ADULT  Goal: Patient will remain free of falls  Description  INTERVENTIONS:  - Assess patient frequently for physical needs  -  Identify cognitive and physical deficits and behaviors that affect risk of falls    -  [place] fall precautions as indicated by assessment   - Educate patient/family on patient safety including physical limitations  - Instruct patient to call for assistance with activity based on assessment  - Modify environment to reduce risk of injury  - Consider OT/PT consult to assist with strengthening/mobility  Outcome: Progressing  Goal: Maintain or return to baseline ADL function  Description  INTERVENTIONS:  -  Assess patient's ability to carry out ADLs; assess patient's baseline for ADL function and identify physical deficits which impact ability to perform ADLs (bathing, care of mouth/teeth, toileting, grooming, dressing, etc )  - Assess/evaluate cause of self-care deficits   - Assess range of motion  - Assess patient's mobility; develop plan if impaired  - Assess patient's need for assistive devices and provide as appropriate  - Encourage maximum independence but intervene and supervise when necessary  ¯ Involve family in performance of ADLs  ¯ Assess for home care needs following discharge   ¯ Request OT consult to assist with ADL evaluation and planning for discharge  ¯ Provide patient education as appropriate  Outcome: Progressing  Goal: Maintain or return mobility status to optimal level  Description  INTERVENTIONS:  - Assess patient's baseline mobility status (ambulation, transfers, stairs, etc )    - Identify cognitive and physical deficits and behaviors that affect mobility  - Identify mobility aids required to assist with transfers and/or ambulation (gait belt, sit-to-stand, lift, walker, cane, etc )  - [place] fall precautions as indicated by assessment  - Record patient progress and toleration of activity level on Mobility SBAR; progress patient to next Phase/Stage  - Instruct patient to call for assistance with activity based on assessment  - Request Rehabilitation consult to assist with strengthening/weightbearing, etc   Outcome: Progressing     Problem: DISCHARGE PLANNING  Goal: Discharge to home or other facility with appropriate resources  Description  INTERVENTIONS:  - Identify barriers to discharge w/patient and caregiver  - Arrange for needed discharge resources and transportation as appropriate  - Identify discharge learning needs (meds, wound care, etc )  - Arrange for interpretive services to assist at discharge as needed  - Refer to Case Management Department for coordinating discharge planning if the patient needs post-hospital services based on physician/advanced practitioner order or complex needs related to functional status, cognitive ability, or social support system  Outcome: Progressing     Problem: Knowledge Deficit  Goal: Patient/family/caregiver demonstrates understanding of disease process, treatment plan, medications, and discharge instructions  Description  Complete learning assessment and assess knowledge base    Interventions:  - Provide teaching at level of understanding  - Provide teaching via preferred learning methods  Outcome: Progressing

## 2019-03-20 NOTE — SOCIAL WORK
CM met with the patient at bedside to review the CM role and discuss possible dc needs  At time of interview pt is AAOx4 lives in a 3rd story apartment with and elevator in [facility]  Pt was IPTA  Pt has DME of grab bars and handicap accessible apartment as it is with the [employer] and is ambulatory with walker   Pt has bathroom with a walk in shower  Pt denies any history of drug/etoh abuse, mental illness or inpatient psych admissions  Pt denies any history of SNF/Rehab or VNA  Pt does have a  POA/LW and a 2nd request was made for a copy  Pt does not drive but states he has family in the area that can assist   CM reviewed observation notice  Pt verbalizes understanding and provided signature  CM provided copy to pt  Preferred Pharmacy: [facility]  Pt requests meds to beds if discharge disposition is to home  Contact: Xiao Esposito (daughter) [phone]  PCP: Dr Armando Flores reviewed d/c planning process including the following: identifying help at home, patient preference for d/c planning needs, Discharge Lounge, [facility] Meds to Bed program, availability of treatment team to discuss questions or concerns patient and/or family may have regarding understanding medications and recognizing signs and symptoms once discharged  CM also encouraged patient to follow up with all recommended appointments after discharge  Patient advised of importance for patient and family to participate in managing patients medical well being

## 2019-03-20 NOTE — UTILIZATION REVIEW
Initial Clinical Review    Admission: Date/Time/Statement: Observation 3/19 @ 1623    Orders Placed This Encounter   Procedures    Place in Observation     Standing Status:   Standing     Number of Occurrences:   1     Order Specific Question:   Admitting Physician     Answer:   Jonah Huynh [[id number]]     Order Specific Question:   Level of Care     Answer:   Med Surg [16]     ED: Date/Time/Mode of Arrival:   ED Arrival Information     Expected Arrival Acuity Means of Arrival Escorted By Service Admission Type    - 3/19/2019 11:14 Urgent Ambulance [facility]) General Medicine Urgent    Arrival Complaint    edema        Chief Complaint:   Chief Complaint   Patient presents with    Leg Swelling     pt presents to ED with LLE edema and redness  pt reports "its been swollen and red for the past few weeks but i thought it would get better on its own"     Assessment/Plan:   70 y o  male to ED with longstanding history of diabetes (A1c 5 9 in 01/2019), hypertension, hyperlipidemia who presents with erythema and swelling of his left foot  The patient is unsure of the exact time course but states that this has been getting worse for at least the past 2 weeks  He states that his left lower leg is usually always swollen and red (from venostasis dermatitis) but his feet appear more inflamed than usual  Several years ago, the patient had a right BKA for a foot infection but cannot remember the exact details  However, patient does complain of tingling and numbness of his left leg from knee down and also of his bilateral fingers  LLE erythema up to left knee, 2+ pitting edema up to left knee, cracked and fissured skin on posterior and medial surfaces of ankle    In the ED, the patient was afebrile, without leukocytosis, but had elevated CRP and ESR of 73 and 81, respectively  Xray of the foot was done and pending at the time of my patient evaluation  He was admitted for observation and IV antibiotics      1) Left foot cellulitis - Patient has had at least 2 weeks of worsening erythema and swelling of left foot  Has chronic venostasis dermatitis but distal aspect of left foot does appear more erythematous compared to the rest of his LLE  Additionally, patient denies pain in left foot but that is likely due to numbness diabetic neuropathy               - Received 1 dose of Rocephin in ED, continue Ancef + Flagyl IV              - Follow-up blood cultures              - CRP and ESR elevated, follow-up foot x-ray to f/o osteo, may need MRI              - Elevate foot              - Monitor fever curve and WBCs              - Appreciate podiatry recommendations     2) Diabetes Mellitus, type 2 - Patient suprisingly has A1c of 5 9 as of 01/2019 despite severe diabetic neuropathy and history of right BKA for diabetic foot infection  Takes only metformin 750mg QD at home                - Follow-up A1c              - Continue SSI #3              - Monitor blood sugars TIDAC     3) HTN - Controlled,  on admission              - Continue lisinopril 10mg QD, metroprolol 50mg BID     4) HLD - Last lipid panel in 01/2019: , , HDL 35, LDL 68              - Continue pravastatin 10mg QD     5) Hypothyroidism - Last TSH 3 3 in 01/2019              - Continue Synthroid 375mcg QD     6) Paroxysmal Afib - Patient has a history of GI bleed so not on AC              - Continue metroprolol 50mg BID    ED Vital Signs:   ED Triage Vitals   Temperature Pulse Respirations Blood Pressure SpO2   03/19/19 1119 03/19/19 1119 03/19/19 1119 03/19/19 1119 03/19/19 1119   97 9 °F (36 6 °C) 89 16 141/62 98 %      Temp Source Heart Rate Source Patient Position - Orthostatic VS BP Location FiO2 (%)   03/19/19 1119 03/19/19 1119 03/19/19 1543 03/19/19 1543 --   Oral Monitor Lying Right arm       Pain Score       03/19/19 1935       No Pain        Wt Readings from Last 1 Encounters:   03/19/19 (!) 213 kg (470 lb)     Vital Signs (abnormal): WNL    Pertinent Labs/Diagnostic Test Results:   Xray Left Foot - pending     C-reactive prot = 73 3    ED Treatment:   Medication Administration from 03/19/2019 1114 to 03/19/2019 2006       Date/Time Order Dose Route Action     03/19/2019 1558 ceftriaxone (ROCEPHIN) 1 g/50 mL in dextrose IVPB 1,000 mg Intravenous New Bag     Past Medical/Surgical History:    Active Ambulatory Problems     Diagnosis Date Noted    Celiac disease 08/30/2012    Coronary artery disease 08/30/2012    History of duodenal ulcer 04/04/2014    Living will on file 04/29/2018    S/P BKA (below knee amputation) unilateral, right ([facility] ) 05/25/2017    Iron deficiency anemia due to chronic blood loss 04/28/2014     Resolved Ambulatory Problems     Diagnosis Date Noted    No Resolved Ambulatory Problems     Past Medical History:   Diagnosis Date    Diabetes mellitus ([place] [address] )     Disease of thyroid gland     Hyperlipidemia     Hypertension      Admitting Diagnosis: Cellulitis [L03 90]  Diabetes ([place] [address] ) [E11 9]  Leg swelling [M79 89]     Age/Sex: 70 y o  male     Admission Orders:  Bld culture x2  Podiatry cons    Scheduled Meds:   Current Facility-Administered Medications:  aspirin 81 mg Oral Daily   cefazolin 2,000 mg Intravenous Q8H   gabapentin 100 mg Oral HS   heparin (porcine) 7,500 Units Subcutaneous Q8H [address]   insulin lispro 1-6 Units Subcutaneous TID AC   insulin lispro 1-6 Units Subcutaneous HS   levothyroxine 375 mcg Oral Early Morning   lisinopril 10 mg Oral Daily   metoprolol tartrate 50 mg Oral Q12H CORY   metroNIDAZOLE 500 mg Intravenous Q8H   pantoprazole 40 mg Oral Early Morning   pravastatin 10 mg Oral Daily With Dinner     Continuous Infusions:    PRN Meds:

## 2019-03-20 NOTE — CONSULTS
Consult - Podiatry   Imtiaz Tinsley 70 y o  male MRN: [id number]  Unit/Bed#: [facility] 615-01 Encounter: [id number]    Assessment/Plan     Assessment:  1  Left lower extremity cellulitis  2  LLE Venous insuffiencey with dorsal foot blister secondary to fluid overload  2  DM 2  3  Hx R BKA    Plan:  - Left lower extremity erythema possibly secondary to venous stasis vs  Cellulitis  Blisters presents on the dorsal forefoot secondary to fluid overload  - Left lower ankle xrays shows no soft tissue emphysema, no suspicion for OM however will wait for final radiologist report and suggestions    - Recommended compression and elevation to the Left lower extremity   - Will order amlactin to be applied BID daily   - Continue antibiotics as per primary service   - Will update attending and follow patient while in house   - Rest of care per primary service     History of Present Illness     HPI:  Imtiaz Tinsley is a 70 y o  male who presents with erythema on the Left lower extremity  Patient states his leg from ankle to tibial tuberosity has always been red however his Left dorsal foot redness is new  Patient states its been few days he has the redness on his foot  Patient was seen in ED yesterday and was started on IV antibiotics  Patient states he wears compression stocking to the left lower extremity  Has history of RLE BKA in 2017  Patient states he had bone infection in his foot and therefore ended up with Right BKA  Patient states he used to follow up at the wound care center in [address] [place] [address]  Patient denies N/V/F/SOB  Inpatient consult to Podiatry     Performed by  Nuzhat Philip DPM     Authorized by Constanza Chino MD       Consent: Verbal consent obtained  Review of Systems   Constitutional: Negative  HENT: Negative  Eyes: Negative  Respiratory: Negative  Cardiovascular: Negative  Gastrointestinal: Negative      Musculoskeletal: Left lower extremity edema     Skin: Left lower extremity with erythema and blisters    Neurological: Negative  Psych: negative  Historical Information   Past Medical History:   Diagnosis Date    Diabetes mellitus ([address] )     Disease of thyroid gland     Hyperlipidemia     Hypertension      Past Surgical History:   Procedure Laterality Date    BELOW KNEE LEG AMPUTATION Right      Social History   Social History     Substance and Sexual Activity   Alcohol Use Not Currently     Social History     Substance and Sexual Activity   Drug Use Not on file     Social History     Tobacco Use   Smoking Status Never Smoker   Smokeless Tobacco Never Used     Family History: History reviewed  No pertinent family history      Meds/Allergies   Medications Prior to Admission   Medication    aspirin 81 mg chewable tablet    levothyroxine 300 MCG tablet    levothyroxine 75 mcg tablet    lisinopril (ZESTRIL) 10 mg tablet    metFORMIN (GLUCOPHAGE-XR) 750 mg 24 hr tablet    metoprolol tartrate (LOPRESSOR) 50 mg tablet    pantoprazole (PROTONIX) 40 mg tablet    simvastatin (ZOCOR) 10 mg tablet     No Known Allergies    Objective   First Vitals:   Blood Pressure: 141/62 (03/19/19 1119)  Pulse: 89 (03/19/19 1119)  Temperature: 97 9 °F (36 6 °C) (03/19/19 1119)  Temp Source: Oral (03/19/19 1119)  Respirations: 16 (03/19/19 1119)  Weight - Scale: (!) 213 kg (470 lb) (03/19/19 1122)  SpO2: 98 % (03/19/19 1119)    Current Vitals:   Blood Pressure: 120/65 (03/20/19 0700)  Pulse: 68 (03/20/19 0700)  Temperature: 98 6 °F (37 °C) (03/20/19 0700)  Temp Source: Oral (03/20/19 0700)  Respirations: 18 (03/20/19 0700)  Weight - Scale: (!) 213 kg (470 lb) (03/19/19 1122)  SpO2: 98 % (03/20/19 0031)        /65 (BP Location: Right arm)   Pulse 68   Temp 98 6 °F (37 °C) (Oral)   Resp 18   Wt (!) 213 kg (470 lb)   SpO2 98%   BMI 51 63 kg/m²      General Appearance:    Alert, cooperative, no distress   Head:    Normocephalic, without obvious abnormality, atraumatic Eyes:    PERRL, conjunctiva/corneas clear, EOM's intact        Nose:   Moist mucous membranes   Neck:   Supple, symmetrical, trachea midline   Back:     Symmetric   Lungs:     Respirations unlabored   Heart:    Regular rate and rhythm, S1 and S2 normal, no murmur, rub   or gallop   Abdomen:     Soft, non-tender   Extremities:   Left lower extremity edema  NO tenderness on the leg or foot possibly secondary to neuropathy  Left lower extremity range of motion within normal limits  Pulses:   Non-palpable pedal pulses  Skin:   Skin is warm to touch  There is erythema noted on the dorsal aspect of the left foot extends from tip of toes to ankle joint  ON the left leg erythema extends from proximal aspect of distal 1/3 leg to tibial tuberosity  Severe xerosis with deep skin fissure present medial, lateral and posterior aspect of left ankle  Small diffuse blisters present on the dorsal aspect of left foot  Left interdigital maceration noted  Onychomycosis noted on the 5 nails of left foot with subungual debris  Neurologic:   Gross sensation is intact  Protective sensation is absent       3/20          Lab Results:   Admission on 03/19/2019   Component Date Value    WBC 03/19/2019 6 47     RBC 03/19/2019 4 52     Hemoglobin 03/19/2019 11 3*    Hematocrit 03/19/2019 37 0     MCV 03/19/2019 82     MCH 03/19/2019 25 0*    MCHC 03/19/2019 30 5*    RDW 03/19/2019 17 1*    MPV 03/19/2019 12 7     Platelets 99/98/2085 236     nRBC 03/19/2019 0     Neutrophils Relative 03/19/2019 78*    Immat GRANS % 03/19/2019 0     Lymphocytes Relative 03/19/2019 13*    Monocytes Relative 03/19/2019 8     Eosinophils Relative 03/19/2019 1     Basophils Relative 03/19/2019 0     Neutrophils Absolute 03/19/2019 5 05     Immature Grans Absolute 03/19/2019 0 02     Lymphocytes Absolute 03/19/2019 0 81     Monocytes Absolute 03/19/2019 0 50     Eosinophils Absolute 03/19/2019 0 07     Basophils Absolute 03/19/2019 0 02     Sodium 03/19/2019 137     Potassium 03/19/2019 4 3     Chloride 03/19/2019 107     CO2 03/19/2019 27     ANION GAP 03/19/2019 3*    BUN 03/19/2019 15     Creatinine 03/19/2019 0 90     Glucose 03/19/2019 86     Calcium 03/19/2019 8 8     AST 03/19/2019 25     ALT 03/19/2019 23     Alkaline Phosphatase 03/19/2019 66     Total Protein 03/19/2019 7 4     Albumin 03/19/2019 2 8*    Total Bilirubin 03/19/2019 0 45     eGFR 03/19/2019 89     Sed Rate 03/19/2019 81*    CRP 03/19/2019 73 3*    Platelets 07/99/1708 259     MPV 03/19/2019 12 6     Color, UA 03/19/2019 April     Clarity, UA 03/19/2019 Turbid     pH, UA 03/19/2019 6 0     Leukocytes, UA 03/19/2019 Small*    Nitrite, UA 03/19/2019 Negative     Protein, UA 03/19/2019 30 (1+)*    Glucose, UA 03/19/2019 Negative     Ketones, UA 03/19/2019 Negative     Urobilinogen, UA 03/19/2019 1 0     Bilirubin, UA 03/19/2019 Interference- unable to analyze*    Blood, UA 03/19/2019 Small*    Specific [place], UA 03/19/2019 1 025     RBC, UA 03/19/2019 4-10*    WBC, UA 03/19/2019 4-10*    Epithelial Cells 03/19/2019 Occasional     Bacteria, UA 03/19/2019 Occasional     POC Glucose 03/19/2019 71     Magnesium 03/20/2019 2 2     Phosphorus 03/20/2019 3 5     WBC 03/20/2019 6 30     RBC 03/20/2019 4 72     Hemoglobin 03/20/2019 11 7*    Hematocrit 03/20/2019 38 9     MCV 03/20/2019 82     MCH 03/20/2019 24 8*    MCHC 03/20/2019 30 1*    RDW 03/20/2019 16 9*    MPV 03/20/2019 12 7     Platelets 87/56/6932 247     nRBC 03/20/2019 0     Neutrophils Relative 03/20/2019 72     Immat GRANS % 03/20/2019 1     Lymphocytes Relative 03/20/2019 17     Monocytes Relative 03/20/2019 8     Eosinophils Relative 03/20/2019 2     Basophils Relative 03/20/2019 0     Neutrophils Absolute 03/20/2019 4 60     Immature Grans Absolute 03/20/2019 0 03     Lymphocytes Absolute 03/20/2019 1 07     Monocytes Absolute 03/20/2019 0 48     Eosinophils Absolute 03/20/2019 0 10     Basophils Absolute 03/20/2019 0 02     Hemoglobin A1C 03/20/2019 5 6     EAG 03/20/2019 114     Sodium 03/20/2019 138     Potassium 03/20/2019 3 8     Chloride 03/20/2019 105     CO2 03/20/2019 28     ANION GAP 03/20/2019 5     BUN 03/20/2019 15     Creatinine 03/20/2019 0 87     Glucose 03/20/2019 99     Calcium 03/20/2019 8 8     eGFR 03/20/2019 91     POC Glucose 03/20/2019 110                    Invalid input(s): LABAEARO            Imaging: I have personally reviewed pertinent films in PACS  EKG, Pathology, and Other Studies: I have personally reviewed pertinent reports        Code Status: Level 1 - Full Code  Advance Directive and Living Will:      Power of :    POLST:

## 2019-03-20 NOTE — PLAN OF CARE
Problem: Potential for Falls  Goal: Patient will remain free of falls  Description  INTERVENTIONS:  - Assess patient frequently for physical needs  -  Identify cognitive and physical deficits and behaviors that affect risk of falls    -  [place] fall precautions as indicated by assessment   - Educate patient/family on patient safety including physical limitations  - Instruct patient to call for assistance with activity based on assessment  - Modify environment to reduce risk of injury  - Consider OT/PT consult to assist with strengthening/mobility  Outcome: Progressing     Problem: PAIN - ADULT  Goal: Verbalizes/displays adequate comfort level or baseline comfort level  Description  Interventions:  - Encourage patient to monitor pain and request assistance  - Assess pain using appropriate pain scale  - Administer analgesics based on type and severity of pain and evaluate response  - Implement non-pharmacological measures as appropriate and evaluate response  - Consider cultural and social influences on pain and pain management  - Notify physician/advanced practitioner if interventions unsuccessful or patient reports new pain  Outcome: Progressing     Problem: INFECTION - ADULT  Goal: Absence or prevention of progression during hospitalization  Description  INTERVENTIONS:  - Assess and monitor for signs and symptoms of infection  - Monitor lab/diagnostic results  - Monitor all insertion sites, i e  indwelling lines, tubes, and drains  - Monitor endotracheal (as able) and nasal secretions for changes in amount and color  - [place] appropriate cooling/warming therapies per order  - Administer medications as ordered  - Instruct and encourage patient and family to use good hand hygiene technique  - Identify and instruct in appropriate isolation precautions for identified infection/condition  Outcome: Progressing     Problem: SAFETY ADULT  Goal: Patient will remain free of falls  Description  INTERVENTIONS:  - Assess patient frequently for physical needs  -  Identify cognitive and physical deficits and behaviors that affect risk of falls    -  [place] fall precautions as indicated by assessment   - Educate patient/family on patient safety including physical limitations  - Instruct patient to call for assistance with activity based on assessment  - Modify environment to reduce risk of injury  - Consider OT/PT consult to assist with strengthening/mobility  Outcome: Progressing  Goal: Maintain or return to baseline ADL function  Description  INTERVENTIONS:  -  Assess patient's ability to carry out ADLs; assess patient's baseline for ADL function and identify physical deficits which impact ability to perform ADLs (bathing, care of mouth/teeth, toileting, grooming, dressing, etc )  - Assess/evaluate cause of self-care deficits   - Assess range of motion  - Assess patient's mobility; develop plan if impaired  - Assess patient's need for assistive devices and provide as appropriate  - Encourage maximum independence but intervene and supervise when necessary  ¯ Involve family in performance of ADLs  ¯ Assess for home care needs following discharge   ¯ Request OT consult to assist with ADL evaluation and planning for discharge  ¯ Provide patient education as appropriate  Outcome: Progressing  Goal: Maintain or return mobility status to optimal level  Description  INTERVENTIONS:  - Assess patient's baseline mobility status (ambulation, transfers, stairs, etc )    - Identify cognitive and physical deficits and behaviors that affect mobility  - Identify mobility aids required to assist with transfers and/or ambulation (gait belt, sit-to-stand, lift, walker, cane, etc )  - [place] fall precautions as indicated by assessment  - Record patient progress and toleration of activity level on Mobility SBAR; progress patient to next Phase/Stage  - Instruct patient to call for assistance with activity based on assessment  - Request Rehabilitation consult to assist with strengthening/weightbearing, etc   Outcome: Progressing     Problem: DISCHARGE PLANNING  Goal: Discharge to home or other facility with appropriate resources  Description  INTERVENTIONS:  - Identify barriers to discharge w/patient and caregiver  - Arrange for needed discharge resources and transportation as appropriate  - Identify discharge learning needs (meds, wound care, etc )  - Arrange for interpretive services to assist at discharge as needed  - Refer to Case Management Department for coordinating discharge planning if the patient needs post-hospital services based on physician/advanced practitioner order or complex needs related to functional status, cognitive ability, or social support system  Outcome: Progressing     Problem: Knowledge Deficit  Goal: Patient/family/caregiver demonstrates understanding of disease process, treatment plan, medications, and discharge instructions  Description  Complete learning assessment and assess knowledge base    Interventions:  - Provide teaching at level of understanding  - Provide teaching via preferred learning methods  Outcome: Progressing

## 2019-03-20 NOTE — SOCIAL WORK
CM spoke to pt as he was requesting [facility] services  Pt is amenable to [facility]  CM placed referral  CM spoke to medical team who will sign the orders

## 2019-03-20 NOTE — PLAN OF CARE
Problem: DISCHARGE PLANNING - CARE MANAGEMENT  Goal: Discharge to post-acute care or home with appropriate resources  Description  INTERVENTIONS:  - Conduct assessment to determine patient/family and health care team treatment goals, and need for post-acute services based on payer coverage, community resources, and patient preferences, and barriers to discharge  - Address psychosocial, clinical, and financial barriers to discharge as identified in assessment in conjunction with the patient/family and health care team  - Arrange appropriate level of post-acute services according to patient's   needs and preference and payer coverage in collaboration with the physician and health care team  - Communicate with and update the patient/family, physician, and health care team regarding progress on the discharge plan  - Arrange appropriate transportation to post-acute venues  - Pt to be evaluated for discharge plan and post acute care  -Pt anticipates discharge to home     Outcome: Progressing

## 2019-03-21 ENCOUNTER — TRANSITIONAL CARE MANAGEMENT (OUTPATIENT)
Dept: FAMILY MEDICINE CLINIC | Facility: CLINIC | Age: 64
End: 2019-03-21

## 2019-03-24 LAB
BACTERIA BLD CULT: NORMAL
BACTERIA BLD CULT: NORMAL

## 2019-03-25 NOTE — PROGRESS NOTES
Reached out to patient  He is still working on transportation to the office   Will check in with patient at the end of the week

## 2019-04-09 ENCOUNTER — TELEPHONE (OUTPATIENT)
Dept: FAMILY MEDICINE CLINIC | Facility: CLINIC | Age: 64
End: 2019-04-09

## 2019-04-09 RX ORDER — ATORVASTATIN CALCIUM 40 MG/1
40 TABLET, FILM COATED ORAL DAILY
COMMUNITY
End: 2019-04-12

## 2019-04-09 RX ORDER — DABIGATRAN ETEXILATE 150 MG/1
1 CAPSULE, COATED PELLETS ORAL 2 TIMES DAILY
COMMUNITY
End: 2019-04-12

## 2019-04-09 RX ORDER — METOPROLOL SUCCINATE 100 MG/1
1 TABLET, EXTENDED RELEASE ORAL 2 TIMES DAILY
COMMUNITY
End: 2019-04-12 | Stop reason: SDUPTHER

## 2019-04-09 RX ORDER — FUROSEMIDE 20 MG/1
20 TABLET ORAL DAILY
COMMUNITY
Start: 2018-02-26 | End: 2019-04-12

## 2019-04-09 RX ORDER — ASPIRIN 81 MG/1
81 TABLET ORAL DAILY
COMMUNITY
Start: 2017-05-25

## 2019-04-09 RX ORDER — CLOTRIMAZOLE AND BETAMETHASONE DIPROPIONATE 10; .64 MG/G; MG/G
1 CREAM TOPICAL EVERY OTHER DAY
COMMUNITY
End: 2020-09-22 | Stop reason: SDUPTHER

## 2019-04-12 ENCOUNTER — OFFICE VISIT (OUTPATIENT)
Dept: FAMILY MEDICINE CLINIC | Facility: CLINIC | Age: 64
End: 2019-04-12
Payer: MEDICARE

## 2019-04-12 VITALS
WEIGHT: 315 LBS | SYSTOLIC BLOOD PRESSURE: 136 MMHG | TEMPERATURE: 96.2 F | BODY MASS INDEX: 38.36 KG/M2 | DIASTOLIC BLOOD PRESSURE: 78 MMHG | HEIGHT: 76 IN | HEART RATE: 95 BPM | RESPIRATION RATE: 18 BRPM | OXYGEN SATURATION: 96 %

## 2019-04-12 DIAGNOSIS — G62.9 NEUROPATHY: ICD-10-CM

## 2019-04-12 DIAGNOSIS — K21.9 GASTROESOPHAGEAL REFLUX DISEASE WITHOUT ESOPHAGITIS: ICD-10-CM

## 2019-04-12 DIAGNOSIS — E78.2 MIXED HYPERLIPIDEMIA: ICD-10-CM

## 2019-04-12 DIAGNOSIS — I10 ESSENTIAL HYPERTENSION: ICD-10-CM

## 2019-04-12 DIAGNOSIS — E03.9 ACQUIRED HYPOTHYROIDISM: ICD-10-CM

## 2019-04-12 DIAGNOSIS — I87.2 CHRONIC VENOUS STASIS DERMATITIS OF LEFT LOWER EXTREMITY: ICD-10-CM

## 2019-04-12 DIAGNOSIS — E11.8 CONTROLLED TYPE 2 DIABETES MELLITUS WITH COMPLICATION, WITHOUT LONG-TERM CURRENT USE OF INSULIN (HCC): Primary | ICD-10-CM

## 2019-04-12 PROCEDURE — 1124F ACP DISCUSS-NO DSCNMKR DOCD: CPT | Performed by: FAMILY MEDICINE

## 2019-04-12 PROCEDURE — 99213 OFFICE O/P EST LOW 20 MIN: CPT | Performed by: FAMILY MEDICINE

## 2019-04-12 RX ORDER — LEVOTHYROXINE SODIUM 0.07 MG/1
75 TABLET ORAL DAILY
Qty: 90 TABLET | Refills: 1 | Status: SHIPPED | OUTPATIENT
Start: 2019-04-12 | End: 2019-11-01 | Stop reason: SDUPTHER

## 2019-04-12 RX ORDER — LEVOTHYROXINE SODIUM 300 UG/1
300 TABLET ORAL DAILY
Qty: 90 TABLET | Refills: 1 | Status: SHIPPED | OUTPATIENT
Start: 2019-04-12 | End: 2019-11-01 | Stop reason: SDUPTHER

## 2019-04-12 RX ORDER — PANTOPRAZOLE SODIUM 40 MG/1
40 TABLET, DELAYED RELEASE ORAL DAILY
Qty: 90 TABLET | Refills: 1 | Status: SHIPPED | OUTPATIENT
Start: 2019-04-12 | End: 2019-11-01 | Stop reason: SDUPTHER

## 2019-04-12 RX ORDER — METOPROLOL SUCCINATE 50 MG/1
50 TABLET, EXTENDED RELEASE ORAL 2 TIMES DAILY
Qty: 180 TABLET | Refills: 1 | Status: SHIPPED | OUTPATIENT
Start: 2019-04-12 | End: 2019-04-17

## 2019-04-12 RX ORDER — SIMVASTATIN 10 MG
10 TABLET ORAL
Qty: 90 TABLET | Refills: 1 | Status: SHIPPED | OUTPATIENT
Start: 2019-04-12 | End: 2019-11-01 | Stop reason: SDUPTHER

## 2019-04-12 RX ORDER — GABAPENTIN 100 MG/1
CAPSULE ORAL
Qty: 270 CAPSULE | Refills: 1 | Status: SHIPPED | OUTPATIENT
Start: 2019-04-12 | End: 2019-11-01 | Stop reason: SDUPTHER

## 2019-04-12 RX ORDER — AMMONIUM LACTATE 12 G/100G
CREAM TOPICAL 2 TIMES DAILY
Qty: 385 G | Refills: 0 | Status: SHIPPED | OUTPATIENT
Start: 2019-04-12 | End: 2019-11-06

## 2019-04-12 RX ORDER — LISINOPRIL 10 MG/1
10 TABLET ORAL DAILY
Qty: 90 TABLET | Refills: 1 | Status: SHIPPED | OUTPATIENT
Start: 2019-04-12 | End: 2019-11-01 | Stop reason: SDUPTHER

## 2019-04-12 RX ORDER — METFORMIN HYDROCHLORIDE 750 MG/1
750 TABLET, EXTENDED RELEASE ORAL
Qty: 90 TABLET | Refills: 1 | Status: SHIPPED | OUTPATIENT
Start: 2019-04-12 | End: 2019-11-01 | Stop reason: SDUPTHER

## 2019-04-16 ENCOUNTER — TELEPHONE (OUTPATIENT)
Dept: FAMILY MEDICINE CLINIC | Facility: CLINIC | Age: 64
End: 2019-04-16

## 2019-04-16 DIAGNOSIS — I10 ESSENTIAL HYPERTENSION: ICD-10-CM

## 2019-04-17 RX ORDER — METOPROLOL TARTRATE 50 MG/1
50 TABLET, FILM COATED ORAL EVERY 12 HOURS
Qty: 180 TABLET | Refills: 1 | Status: SHIPPED | OUTPATIENT
Start: 2019-04-17 | End: 2019-11-01 | Stop reason: SDUPTHER

## 2019-10-19 DIAGNOSIS — G62.9 NEUROPATHY: ICD-10-CM

## 2019-10-19 DIAGNOSIS — I10 ESSENTIAL HYPERTENSION: ICD-10-CM

## 2019-10-19 DIAGNOSIS — E78.2 MIXED HYPERLIPIDEMIA: ICD-10-CM

## 2019-10-19 DIAGNOSIS — E11.8 CONTROLLED TYPE 2 DIABETES MELLITUS WITH COMPLICATION, WITHOUT LONG-TERM CURRENT USE OF INSULIN (HCC): ICD-10-CM

## 2019-10-19 DIAGNOSIS — K21.9 GASTROESOPHAGEAL REFLUX DISEASE WITHOUT ESOPHAGITIS: ICD-10-CM

## 2019-10-19 DIAGNOSIS — E03.9 ACQUIRED HYPOTHYROIDISM: ICD-10-CM

## 2019-10-19 RX ORDER — SIMVASTATIN 10 MG
10 TABLET ORAL
Qty: 90 TABLET | Refills: 0 | Status: CANCELLED | OUTPATIENT
Start: 2019-10-19

## 2019-10-19 RX ORDER — PANTOPRAZOLE SODIUM 40 MG/1
40 TABLET, DELAYED RELEASE ORAL DAILY
Qty: 90 TABLET | Refills: 0 | Status: CANCELLED | OUTPATIENT
Start: 2019-10-19

## 2019-10-19 RX ORDER — METOPROLOL TARTRATE 50 MG/1
50 TABLET, FILM COATED ORAL EVERY 12 HOURS
Qty: 180 TABLET | Refills: 0 | Status: CANCELLED | OUTPATIENT
Start: 2019-10-19

## 2019-10-19 RX ORDER — LISINOPRIL 10 MG/1
10 TABLET ORAL DAILY
Qty: 90 TABLET | Refills: 0 | Status: CANCELLED | OUTPATIENT
Start: 2019-10-19

## 2019-10-19 RX ORDER — LEVOTHYROXINE SODIUM 0.07 MG/1
75 TABLET ORAL DAILY
Qty: 90 TABLET | Refills: 0 | Status: CANCELLED | OUTPATIENT
Start: 2019-10-19

## 2019-10-19 RX ORDER — GABAPENTIN 100 MG/1
CAPSULE ORAL
Qty: 270 CAPSULE | Refills: 0 | Status: CANCELLED | OUTPATIENT
Start: 2019-10-19

## 2019-10-19 RX ORDER — METFORMIN HYDROCHLORIDE 750 MG/1
750 TABLET, EXTENDED RELEASE ORAL
Qty: 90 TABLET | Refills: 0 | Status: CANCELLED | OUTPATIENT
Start: 2019-10-19

## 2019-10-19 RX ORDER — LEVOTHYROXINE SODIUM 300 UG/1
300 TABLET ORAL DAILY
Qty: 90 TABLET | Refills: 0 | Status: CANCELLED | OUTPATIENT
Start: 2019-10-19

## 2019-10-21 NOTE — TELEPHONE ENCOUNTER
Patient overdue for 6 month follow up appointment, 10 2019  Portal message sent to schedule an appointment and refills can be addressed at appointment

## 2019-10-31 ENCOUNTER — TELEPHONE (OUTPATIENT)
Dept: FAMILY MEDICINE CLINIC | Facility: CLINIC | Age: 64
End: 2019-10-31

## 2019-10-31 DIAGNOSIS — E11.8 CONTROLLED TYPE 2 DIABETES MELLITUS WITH COMPLICATION, WITHOUT LONG-TERM CURRENT USE OF INSULIN (HCC): Primary | ICD-10-CM

## 2019-10-31 DIAGNOSIS — E03.9 ACQUIRED HYPOTHYROIDISM: ICD-10-CM

## 2019-10-31 DIAGNOSIS — I48.91 ATRIAL FIBRILLATION, UNSPECIFIED TYPE (HCC): ICD-10-CM

## 2019-10-31 NOTE — TELEPHONE ENCOUNTER
Placed call to patient and he stated it is difficult for him to get around due to his leg  He doesn't feel its necessary for him to come in for a visit that is 5 minutes when he feels fine "can't you just take my word for it" he stated  I let him know we can get him in touch with a home draw and see if  My [place] will be able to give him another option for transportation  I did let him know it is important to be seen at least twice a year  Dr Camacho Aviles please advise which blood work to order

## 2019-10-31 NOTE — TELEPHONE ENCOUNTER
Yash Kyle is calling because he wants a refill on his medications  Per Erin's note he needs an appointment in order to get a refill  He does not want to come into the office for an appointment  He would like to talk to whom ever can get his medication refilled without coming into the office    I told him that Alden Eisenberg is following Dr Mckeon Both orders that he needs to have an appointment to review his medications  He still wants to talk to the person who can refill his meds without an apoointment    Please call him

## 2019-10-31 NOTE — TELEPHONE ENCOUNTER
Please let pt know that I require patients to be seen every six months  He's seen me for several years and has been aware of this (his mother would bring him to my office)    I understand it is difficult for him to get out  He lives in [address] [place] [address]-- does he want us to help him find someone closer that would be easier for him to see? What can we do to make this easier for him? Twice a year is really the minimum he needs to be seen in primary care, plus any specialists

## 2019-10-31 NOTE — TELEPHONE ENCOUNTER
Labs ordered  Agree to check w Ravin Carroll pt needs to be able to see me twice a year as well as cardiology and ophtho  I am concerned that he is not getting the care he needs b/c of financial and transportation barriers  He has an amputation and physical limitations as well

## 2019-11-01 DIAGNOSIS — I10 ESSENTIAL HYPERTENSION: ICD-10-CM

## 2019-11-01 DIAGNOSIS — E78.2 MIXED HYPERLIPIDEMIA: ICD-10-CM

## 2019-11-01 DIAGNOSIS — E03.9 ACQUIRED HYPOTHYROIDISM: ICD-10-CM

## 2019-11-01 DIAGNOSIS — Z71.89 COORDINATION OF COMPLEX CARE: Primary | ICD-10-CM

## 2019-11-01 DIAGNOSIS — G62.9 NEUROPATHY: ICD-10-CM

## 2019-11-01 DIAGNOSIS — E11.8 CONTROLLED TYPE 2 DIABETES MELLITUS WITH COMPLICATION, WITHOUT LONG-TERM CURRENT USE OF INSULIN (HCC): ICD-10-CM

## 2019-11-01 DIAGNOSIS — K21.9 GASTROESOPHAGEAL REFLUX DISEASE WITHOUT ESOPHAGITIS: ICD-10-CM

## 2019-11-01 RX ORDER — METOPROLOL TARTRATE 50 MG/1
50 TABLET, FILM COATED ORAL EVERY 12 HOURS
Qty: 180 TABLET | Refills: 0 | Status: SHIPPED | OUTPATIENT
Start: 2019-11-01 | End: 2020-02-03 | Stop reason: SDUPTHER

## 2019-11-01 RX ORDER — LISINOPRIL 10 MG/1
10 TABLET ORAL DAILY
Qty: 90 TABLET | Refills: 0 | Status: SHIPPED | OUTPATIENT
Start: 2019-11-01 | End: 2020-02-03 | Stop reason: SDUPTHER

## 2019-11-01 RX ORDER — SIMVASTATIN 10 MG
10 TABLET ORAL
Qty: 90 TABLET | Refills: 0 | Status: SHIPPED | OUTPATIENT
Start: 2019-11-01 | End: 2020-02-03 | Stop reason: SDUPTHER

## 2019-11-01 RX ORDER — GABAPENTIN 100 MG/1
CAPSULE ORAL
Qty: 270 CAPSULE | Refills: 0 | Status: SHIPPED | OUTPATIENT
Start: 2019-11-01 | End: 2020-02-03 | Stop reason: SDUPTHER

## 2019-11-01 RX ORDER — METFORMIN HYDROCHLORIDE 750 MG/1
750 TABLET, EXTENDED RELEASE ORAL
Qty: 90 TABLET | Refills: 0 | Status: SHIPPED | OUTPATIENT
Start: 2019-11-01 | End: 2020-02-03 | Stop reason: SDUPTHER

## 2019-11-01 RX ORDER — LEVOTHYROXINE SODIUM 0.07 MG/1
75 TABLET ORAL DAILY
Qty: 90 TABLET | Refills: 0 | Status: SHIPPED | OUTPATIENT
Start: 2019-11-01 | End: 2019-11-15

## 2019-11-01 RX ORDER — PANTOPRAZOLE SODIUM 40 MG/1
40 TABLET, DELAYED RELEASE ORAL DAILY
Qty: 90 TABLET | Refills: 0 | Status: SHIPPED | OUTPATIENT
Start: 2019-11-01 | End: 2020-02-03 | Stop reason: SDUPTHER

## 2019-11-01 RX ORDER — LEVOTHYROXINE SODIUM 300 UG/1
300 TABLET ORAL DAILY
Qty: 90 TABLET | Refills: 0 | Status: SHIPPED | OUTPATIENT
Start: 2019-11-01 | End: 2020-02-03 | Stop reason: SDUPTHER

## 2019-11-01 NOTE — TELEPHONE ENCOUNTER
Patient called and asked to schedule an AWV, he is having trouble getting out of the house so he will call back to reschedule  I also spoke to him about his medication and he said send it to the [facility] because he has a few left and he will get it on time

## 2019-11-01 NOTE — PROGRESS NOTES
Patient has historically lived with his mother  He as lived alone in an apartment for the past year  Patient has 1 daughter who lives near [place]  Patient has completed [employer] application and has tickets to ride  Patient agreed to schedule appointment with Dr Luma Palacios and schedule ride with Georget[employer] Craig  Patient is medically complex and has limited medical literacy  Patient consented to outpatient care management and identified goals  All assessments still needed

## 2019-11-01 NOTE — TELEPHONE ENCOUNTER
Placed call to patient and left non-detailed message for patient to return call  Placed call to give patient number for [address] Draw to get blood work done   Phone number is [phone]

## 2019-11-06 ENCOUNTER — OFFICE VISIT (OUTPATIENT)
Dept: FAMILY MEDICINE CLINIC | Facility: CLINIC | Age: 64
End: 2019-11-06
Payer: MEDICARE

## 2019-11-06 ENCOUNTER — PATIENT OUTREACH (OUTPATIENT)
Dept: FAMILY MEDICINE CLINIC | Facility: CLINIC | Age: 64
End: 2019-11-06

## 2019-11-06 ENCOUNTER — TRANSCRIBE ORDERS (OUTPATIENT)
Dept: LAB | Facility: CLINIC | Age: 64
End: 2019-11-06

## 2019-11-06 ENCOUNTER — APPOINTMENT (OUTPATIENT)
Dept: LAB | Facility: CLINIC | Age: 64
End: 2019-11-06
Payer: MEDICARE

## 2019-11-06 VITALS
TEMPERATURE: 99 F | BODY MASS INDEX: 51 KG/M2 | OXYGEN SATURATION: 95 % | HEIGHT: 76 IN | DIASTOLIC BLOOD PRESSURE: 78 MMHG | RESPIRATION RATE: 18 BRPM | SYSTOLIC BLOOD PRESSURE: 130 MMHG | HEART RATE: 99 BPM

## 2019-11-06 DIAGNOSIS — Z00.00 MEDICARE ANNUAL WELLNESS VISIT, SUBSEQUENT: Primary | ICD-10-CM

## 2019-11-06 DIAGNOSIS — E03.9 ACQUIRED HYPOTHYROIDISM: ICD-10-CM

## 2019-11-06 DIAGNOSIS — L03.119 CELLULITIS AND ABSCESS OF FOOT: ICD-10-CM

## 2019-11-06 DIAGNOSIS — E11.8 CONTROLLED TYPE 2 DIABETES MELLITUS WITH COMPLICATION, WITHOUT LONG-TERM CURRENT USE OF INSULIN (HCC): ICD-10-CM

## 2019-11-06 DIAGNOSIS — Z23 NEED FOR VACCINATION: ICD-10-CM

## 2019-11-06 DIAGNOSIS — I48.91 ATRIAL FIBRILLATION, UNSPECIFIED TYPE (HCC): ICD-10-CM

## 2019-11-06 DIAGNOSIS — L02.619 CELLULITIS AND ABSCESS OF FOOT: ICD-10-CM

## 2019-11-06 LAB
ALBUMIN SERPL BCP-MCNC: 3.5 G/DL (ref 3.5–5)
ALP SERPL-CCNC: 88 U/L (ref 46–116)
ALT SERPL W P-5'-P-CCNC: 22 U/L (ref 12–78)
ANION GAP SERPL CALCULATED.3IONS-SCNC: 9 MMOL/L (ref 4–13)
AST SERPL W P-5'-P-CCNC: 16 U/L (ref 5–45)
BASOPHILS # BLD AUTO: 0.02 THOUSANDS/ΜL (ref 0–0.1)
BASOPHILS NFR BLD AUTO: 0 % (ref 0–1)
BILIRUB SERPL-MCNC: 0.5 MG/DL (ref 0.2–1)
BUN SERPL-MCNC: 8 MG/DL (ref 5–25)
CALCIUM SERPL-MCNC: 8.8 MG/DL (ref 8.3–10.1)
CHLORIDE SERPL-SCNC: 104 MMOL/L (ref 100–108)
CHOLEST SERPL-MCNC: 135 MG/DL (ref 50–200)
CO2 SERPL-SCNC: 27 MMOL/L (ref 21–32)
CREAT SERPL-MCNC: 0.99 MG/DL (ref 0.6–1.3)
EOSINOPHIL # BLD AUTO: 0.09 THOUSAND/ΜL (ref 0–0.61)
EOSINOPHIL NFR BLD AUTO: 1 % (ref 0–6)
ERYTHROCYTE [DISTWIDTH] IN BLOOD BY AUTOMATED COUNT: 15.8 % (ref 11.6–15.1)
EST. AVERAGE GLUCOSE BLD GHB EST-MCNC: 114 MG/DL
GFR SERPL CREATININE-BSD FRML MDRD: 79 ML/MIN/1.73SQ M
GLUCOSE SERPL-MCNC: 83 MG/DL (ref 65–140)
HBA1C MFR BLD: 5.6 % (ref 4.2–6.3)
HCT VFR BLD AUTO: 42 % (ref 36.5–49.3)
HDLC SERPL-MCNC: 36 MG/DL
HGB BLD-MCNC: 12.8 G/DL (ref 12–17)
IMM GRANULOCYTES # BLD AUTO: 0.02 THOUSAND/UL (ref 0–0.2)
IMM GRANULOCYTES NFR BLD AUTO: 0 % (ref 0–2)
LDLC SERPL CALC-MCNC: 74 MG/DL (ref 0–100)
LYMPHOCYTES # BLD AUTO: 1.08 THOUSANDS/ΜL (ref 0.6–4.47)
LYMPHOCYTES NFR BLD AUTO: 12 % (ref 14–44)
MCH RBC QN AUTO: 25.5 PG (ref 26.8–34.3)
MCHC RBC AUTO-ENTMCNC: 30.5 G/DL (ref 31.4–37.4)
MCV RBC AUTO: 84 FL (ref 82–98)
MONOCYTES # BLD AUTO: 0.56 THOUSAND/ΜL (ref 0.17–1.22)
MONOCYTES NFR BLD AUTO: 6 % (ref 4–12)
NEUTROPHILS # BLD AUTO: 7.42 THOUSANDS/ΜL (ref 1.85–7.62)
NEUTS SEG NFR BLD AUTO: 81 % (ref 43–75)
NRBC BLD AUTO-RTO: 0 /100 WBCS
PLATELET # BLD AUTO: 253 THOUSANDS/UL (ref 149–390)
PMV BLD AUTO: 11.9 FL (ref 8.9–12.7)
POTASSIUM SERPL-SCNC: 4.4 MMOL/L (ref 3.5–5.3)
PROT SERPL-MCNC: 8.1 G/DL (ref 6.4–8.2)
RBC # BLD AUTO: 5.01 MILLION/UL (ref 3.88–5.62)
SODIUM SERPL-SCNC: 140 MMOL/L (ref 136–145)
T4 FREE SERPL-MCNC: 1.42 NG/DL (ref 0.76–1.46)
TRIGL SERPL-MCNC: 125 MG/DL
TSH SERPL DL<=0.05 MIU/L-ACNC: 0.12 UIU/ML (ref 0.36–3.74)
WBC # BLD AUTO: 9.19 THOUSAND/UL (ref 4.31–10.16)

## 2019-11-06 PROCEDURE — 36415 COLL VENOUS BLD VENIPUNCTURE: CPT

## 2019-11-06 PROCEDURE — 99213 OFFICE O/P EST LOW 20 MIN: CPT | Performed by: FAMILY MEDICINE

## 2019-11-06 PROCEDURE — 90732 PPSV23 VACC 2 YRS+ SUBQ/IM: CPT | Performed by: FAMILY MEDICINE

## 2019-11-06 PROCEDURE — 85025 COMPLETE CBC W/AUTO DIFF WBC: CPT

## 2019-11-06 PROCEDURE — 84443 ASSAY THYROID STIM HORMONE: CPT

## 2019-11-06 PROCEDURE — G0009 ADMIN PNEUMOCOCCAL VACCINE: HCPCS | Performed by: FAMILY MEDICINE

## 2019-11-06 PROCEDURE — 84439 ASSAY OF FREE THYROXINE: CPT

## 2019-11-06 PROCEDURE — 80053 COMPREHEN METABOLIC PANEL: CPT

## 2019-11-06 PROCEDURE — G0439 PPPS, SUBSEQ VISIT: HCPCS | Performed by: FAMILY MEDICINE

## 2019-11-06 PROCEDURE — 90662 IIV NO PRSV INCREASED AG IM: CPT | Performed by: FAMILY MEDICINE

## 2019-11-06 PROCEDURE — 83036 HEMOGLOBIN GLYCOSYLATED A1C: CPT

## 2019-11-06 PROCEDURE — G0008 ADMIN INFLUENZA VIRUS VAC: HCPCS | Performed by: FAMILY MEDICINE

## 2019-11-06 PROCEDURE — 80061 LIPID PANEL: CPT

## 2019-11-06 RX ORDER — CEPHALEXIN 500 MG/1
CAPSULE ORAL
Qty: 30 CAPSULE | Refills: 0 | Status: SHIPPED | OUTPATIENT
Start: 2019-11-06 | End: 2019-11-16

## 2019-11-06 NOTE — PATIENT INSTRUCTIONS

## 2019-11-06 NOTE — PROGRESS NOTES
Assessment/Plan:       Problem List Items Addressed This Visit     None      Visit Diagnoses     Cellulitis and abscess of foot    - pt w/ hx of lymphedema with recurrent cellulitis  Start keflex  Of note, pt had allergy to cephalosporins listed, however he was on keflex and rec'd IV cephalosporins in the hospital during last hospitalization this year and tolerated without difficulty  He is unaware of any allergy and says he did not have problems w these meds  Consult with wound care nurse to asses wounds and for treatment plan  Consult home care for dressing changes/wound care  Care Manager consulted to help w coordination of care  Pt with limited mobility  Relevant Medications    cephalexin (KEFLEX) 500 mg capsule    Other Relevant Orders    Ambulatory Referral to [address] [place] [address] Dano Calderón    Need for vaccination        Relevant Orders    influenza vaccine, 3792-9452, high-dose, PF 0 5 mL (FLUZONE HIGH-DOSE) (Completed)    PNEUMOCOCCAL POLYSACCHARIDE VACCINE 23-VALENT =>3YO SQ IM (Completed)                   Subjective:     Yariel Campbell is a 75 y o  male here today with chief complaint below:  Chief Complaint   Patient presents with    Medicare Wellness Visit     Medicare annual      - CC above per clinical staff and reviewed  HPI:    Here for medicare wellness  Also with L foot weeping, red, yellow slough present  Doesn't have pain in the foot, but doesn't have feeling in the foot  Has been present a few weeks  Has responded well to keflex in the past     No fevers  No chills or sweats  Feeling well otherwise  No other health concerns  Doesn't monitor blood sugars  Is due for labs for his chronic health conditions  he will go to lab after visit for this  Doesn't feel SOB or have CP  Gets a little winded when he walks longer distances  Tolerating gabapentin 200 mg at night which has been helping w neuropathy    Felt more woozy on the 300 mg dose    The following portions of the patient's history were reviewed and updated as appropriate: allergies, current medications, past family history, past medical history, past social history, past surgical history and problem list     ROS:  Review of Systems   No fever, chills, congestion, chest pain, nausea, vomiting, diarrhea, constipation, blood in stool, urinary concerns, mood changes  Rest of ROS neg except as above  Objective:      /78   Pulse 99   Temp 99 °F (37 2 °C) (Tympanic)   Resp 18   Ht 6' 4" (1 93 m)   SpO2 95%   BMI 51 00 kg/m²   BP Readings from Last 3 Encounters:   11/06/19 130/78   04/12/19 136/78   03/20/19 120/65     Wt Readings from Last 3 Encounters:   04/12/19 (!) 190 kg (419 lb)   03/19/19 (!) 213 kg (470 lb)   04/01/13 (!) 212 kg (466 lb 7 9 oz)               Physical Exam:   Physical Exam   Constitutional: He appears well-developed  obese   HENT:   Head: Normocephalic and atraumatic  Mouth/Throat: Oropharynx is clear and moist    Cardiovascular: Normal rate and regular rhythm  Pulmonary/Chest: Effort normal and breath sounds normal  He has no wheezes  Abdominal: Soft  There is no tenderness  Obese abdomen   Musculoskeletal:   RLE- below the knee amputation  Prosthesis in place  LLE- stasis dermatitis from just distal to knee to ankle  Erythema with clear line of dermacation at foot with weeping, ulceration, warmth and malodor  Toes edematous  Lymphadenopathy:     He has no cervical adenopathy  Psychiatric: He has a normal mood and affect  His behavior is normal    Nursing note and vitals reviewed  BMI Counseling: Body mass index is 51 kg/m²  The BMI is above normal  Nutrition recommendations include moderation in carbohydrate intake

## 2019-11-06 NOTE — PROGRESS NOTES
Assessment and Plan:     Problem List Items Addressed This Visit     None           Preventive health issues were discussed with patient, and age appropriate screening tests were ordered as noted in patient's After Visit Summary  Personalized health advice and appropriate referrals for health education or preventive services given if needed, as noted in patient's After Visit Summary       History of Present Illness:     Patient presents for Medicare Annual Wellness visit    Patient Care Team:  Gulshan Schwab MD as PCP - General (Family Medicine)  Jaden Hebert RN as Lead OP Care Mgr     Problem List:     Patient Active Problem List   Diagnosis    Controlled type 2 diabetes mellitus with complication, without long-term current use of insulin [facility]    HTN (hypertension)    HLD (hyperlipidemia)    Hypothyroidism    Celiac disease    Coronary artery disease    History of duodenal ulcer    Living will on file    Morbid obesity with BMI of 50 0-59 9, adult ([facility] )    S/P BKA (below knee amputation) unilateral, right ([facility]    Atrial fibrillation ([facility]    Iron deficiency anemia due to chronic blood loss    Chronic venous stasis dermatitis of left lower extremity    Gastroesophageal reflux disease without esophagitis      Past Medical and Surgical History:     Past Medical History:   Diagnosis Date    Atrial fibrillation ([facility]     Cellulitis     Diabetes mellitus ([facility]     Disease of thyroid gland     Duodenal ulcer     perforated- ICU stay    High blood pressure     High cholesterol     Hyperlipidemia     Hypertension     Hypothyroidism     Lymphedema      b/l LE- was followed at wound center    Morbid obesity with BMI of 40 0-44 9, adult ([facility]     Peritonitis ([facility] )      Past Surgical History:   Procedure Laterality Date    BELOW KNEE LEG AMPUTATION Right     DIAGNOSTIC LAPAROSCOPY      KNEE ARTHROSCOPY Bilateral     OTHER SURGICAL HISTORY  03/2014     lap repair    OTHER SURGICAL HISTORY      Laparoscopic repair of a perforated duodenal ulcer with Desirae Houser omental    OTHER SURGICAL HISTORY      Placement of drains      Family History:     Family History   Problem Relation Age of Onset    Heart disease Family     Alcohol abuse Family     Heart disease Mother     Hypertension Mother    [facility] Migraines Daughter     Leukemia Brother     Migraines Daughter     Substance Abuse Neg Hx     Mental illness Neg Hx     Depression Neg Hx       Social History:     Social History     Socioeconomic History    Marital status: Single     Spouse name: None    Number of children: None    Years of education: None    Highest education level: None   Occupational History    None   Social Needs    Financial resource strain: None    Food insecurity:     Worry: None     Inability: None    Transportation needs:     Medical: None     Non-medical: None   Tobacco Use    Smoking status: Former Smoker     Packs/day: 1 00     Years: 30 00     Pack years: 30 00     Last attempt to quit: 11/6/2004     Years since quitting: 15 0    Smokeless tobacco: Never Used   Substance and Sexual Activity    Alcohol use: Not Currently    Drug use: Not Currently    Sexual activity: None   Lifestyle    Physical activity:     Days per week: None     Minutes per session: None    Stress: None   Relationships    Social connections:     Talks on phone: None     Gets together: None     Attends [employer] service: None     Active member of club or organization: None     Attends meetings of clubs or organizations: None     Relationship status: None    Intimate partner violence:     Fear of current or ex partner: None     Emotionally abused: None     Physically abused: None     Forced sexual activity: None   Other Topics Concern    None   Social History Narrative    Former smoker: Quit 3/31/2012 - As per Care Everywhere        Medications and Allergies:     Current Outpatient Medications   Medication Sig Dispense Refill    aspirin (ASPIRIN 81) 81 mg EC tablet Take 81 mg by mouth Daily      clotrimazole-betamethasone (LOTRISONE) 1-0 05 % cream Apply 1 application topically every other day      gabapentin (NEURONTIN) 100 mg capsule One at night for 2 weeks, then increase to bid x 2 weeks then tid 270 capsule 0    levothyroxine 300 MCG tablet Take 1 tablet (300 mcg total) by mouth daily In addition to 75 mcg dose 90 tablet 0    levothyroxine 75 mcg tablet Take 1 tablet (75 mcg total) by mouth daily In addition to 300 mcg dose 90 tablet 0    lisinopril (ZESTRIL) 10 mg tablet Take 1 tablet (10 mg total) by mouth daily 90 tablet 0    metFORMIN (GLUCOPHAGE-XR) 750 mg 24 hr tablet Take 1 tablet (750 mg total) by mouth daily with breakfast 90 tablet 0    metoprolol tartrate (LOPRESSOR) 50 mg tablet Take 1 tablet (50 mg total) by mouth every 12 (twelve) hours 180 tablet 0    pantoprazole (PROTONIX) 40 mg tablet Take 1 tablet (40 mg total) by mouth daily 90 tablet 0    simvastatin (ZOCOR) 10 mg tablet Take 1 tablet (10 mg total) by mouth daily at bedtime 90 tablet 0     No current facility-administered medications for this visit  Allergies   Allergen Reactions    Cephalexin     Cephalosporins       Immunizations:     Immunization History   Administered Date(s) Administered    INFLUENZA 10/10/2016, 02/01/2017    Pneumococcal Conjugate 13-Valent 04/30/2018    Tdap 04/12/2006      Health Maintenance:         Topic Date Due    CRC Screening: Colonoscopy  1955    Hepatitis C Screening  Completed         Topic Date Due    HEPATITIS B VACCINES (1 of 3 - Risk 3-dose series) 04/12/1972    DTaP,Tdap,and Td Vaccines (2 - Td) 04/12/2016    Pneumococcal Vaccine: 65+ Years (2 of 2 - PPSV23) 04/30/2019    INFLUENZA VACCINE  07/01/2019      Medicare Health Risk Assessment:     There were no vitals taken for this visit  Mat Borja is here for his Subsequent Wellness visit       Health Risk Assessment:   Patient rates overall health as good  Patient feels that their physical health rating is same  Eyesight was rated as slightly worse  Hearing was rated as same  Patient feels that their emotional and mental health rating is same  Pain experienced in the last 7 days has been none  Patient states that he has experienced no weight loss or gain in last 6 months  Depression Screening:   PHQ-2 Score: 0      Fall Risk Screening: In the past year, patient has experienced: no history of falling in past year      Home Safety:  Patient has trouble with stairs inside or outside of their home  Patient has working smoke alarms and has no working carbon monoxide detector  Home safety hazards include: none  Nutrition:   Current diet is Regular  Medications:   Patient is not currently taking any over-the-counter supplements  Patient is able to manage medications  Activities of Daily Living (ADLs)/Instrumental Activities of Daily Living (IADLs):   Walk and transfer into and out of bed and chair?: Yes  Dress and groom yourself?: Yes    Bathe or shower yourself?: Yes    Feed yourself?  Yes  Do your laundry/housekeeping?: Yes  Manage your money, pay your bills and track your expenses?: Yes  Make your own meals?: Yes    Do your own shopping?: Yes    Previous Hospitalizations:   Any hospitalizations or ED visits within the last 12 months?: Yes    How many hospitalizations have you had in the last year?: 1-2    Hospitalization Comments: Cellulitis LLE    Advance Care Planning:   Living will: Yes    Advanced directive: Yes      Cognitive Screening:   Provider or family/friend/caregiver concerned regarding cognition?: No    PREVENTIVE SCREENINGS      Cardiovascular Screening:    General: Screening Not Indicated and History Lipid Disorder      Diabetes Screening:     General: Screening Not Indicated and History Diabetes      Colorectal Cancer Screening:     General: Patient Declines      Prostate Cancer Screening:    General: Risks and Benefits Discussed      Osteoporosis Screening:    General: Screening Not Indicated      Abdominal Aortic Aneurysm (AAA) Screening:    Risk factors include: age between 73-69 yo and tobacco use        General: Screening Not Indicated and Screening Current      Lung Cancer Screening:     General: Screening Not Indicated      Hepatitis C Screening:    General: Screening Current      Preventive Screening Comments: Had CT scan [facility] 3/17/2014 demonstrating normal caliber abdominal aorta without aneurysm, will review again at next Rock Oden MD

## 2019-11-06 NOTE — PROGRESS NOTES
Patient's shoes and socks removed  Right Foot/Ankle   Right Foot Inspection  Skin Exam:                        Amputation: amputation right foot           Left Foot/Ankle  Left Foot Inspection  Skin Exam: skin intact, dry skin, warmth, erythema, abnormal color and ulcer                         Toe Exam: swelling                   Sensory       Monofilament: absent  Vascular  Capillary refills: elevated  The left DP pulse is 0  The left PT pulse is 0  Assign Risk Category:  Deformity present;  Loss of protective sensation; Weak pulses       Risk: 2

## 2019-11-06 NOTE — PROGRESS NOTES
Patient was scheduled to see PCP but requested to see outpatient care manager prior to PCP visit  Patient has right BKA and currently has open area on left dorsal foot  Patient removed shoe, leg wrap and pressure stocking so that PCP would be able to see the foot wound  Medications reconciled  Assessments completed, patient identified goals  Patient completed lab work after PCP visit  Keflex was prescribed and [facility] for wound care nurse evaluation and treatment also ordered

## 2019-11-08 NOTE — ASSESSMENT & PLAN NOTE
Due for labwork, he will go to lab after visit today and get this done    Adjust thyroid dose as needed pending lab results

## 2019-11-13 ENCOUNTER — PATIENT OUTREACH (OUTPATIENT)
Dept: FAMILY MEDICINE CLINIC | Facility: CLINIC | Age: 64
End: 2019-11-13

## 2019-11-13 NOTE — PROGRESS NOTES
Wound care nurse has been seeing patient  [facility] nurse is seeing patient Mon, Wed, Fri  Wound is being treated with calcium alginate and dressing  Patient reports decreased redness and edema in left foot  Patient states wound is gradually improving

## 2019-11-15 DIAGNOSIS — Z12.11 SCREEN FOR COLON CANCER: Primary | ICD-10-CM

## 2019-11-15 DIAGNOSIS — E03.9 ACQUIRED HYPOTHYROIDISM: Primary | ICD-10-CM

## 2019-11-15 RX ORDER — LEVOTHYROXINE SODIUM 0.03 MG/1
25 TABLET ORAL DAILY
Qty: 90 TABLET | Refills: 0 | Status: SHIPPED | OUTPATIENT
Start: 2019-11-15 | End: 2020-02-03 | Stop reason: SDUPTHER

## 2019-11-27 ENCOUNTER — TELEPHONE (OUTPATIENT)
Dept: FAMILY MEDICINE CLINIC | Facility: CLINIC | Age: 64
End: 2019-11-27

## 2019-11-27 ENCOUNTER — PATIENT OUTREACH (OUTPATIENT)
Dept: FAMILY MEDICINE CLINIC | Facility: CLINIC | Age: 64
End: 2019-11-27

## 2019-11-27 NOTE — PROGRESS NOTES
Nurse called to report that leg wound size is unchanged  No signs of infection  Moderate to large amounts of serous drainage  Patient is no longer able to put on compression stocking due to increased edema  Wound care nurse would like patient to continue with calcium alginate dressing but would like to use ace wraps to apply pressure to dressing  [facility] nurse continue to provide wound care

## 2019-12-09 ENCOUNTER — PATIENT OUTREACH (OUTPATIENT)
Dept: FAMILY MEDICINE CLINIC | Facility: CLINIC | Age: 64
End: 2019-12-09

## 2019-12-09 NOTE — PROGRESS NOTES
Wound care nurse, Adalberto Olivera stated wound is gradually improving  Patient can no longer apply compression stockings due to edema  Ace wraps were started  [facility] provides wound care 3 X weekly  Patient's mother is currently in the hospital due to rectal bleeding, probable hemorrhoids  She developed gout during her hospitalization  Patient requested information on gout  Patient continues to elevate foot as much as possible  Patient takes medication daily as prescribed  Patient has been trying to "eat healthy" in an effort to maintain or lose weight  No new needs identified  Outpatient care management is ongoing

## 2020-01-13 ENCOUNTER — TELEPHONE (OUTPATIENT)
Dept: FAMILY MEDICINE CLINIC | Facility: CLINIC | Age: 65
End: 2020-01-13

## 2020-01-13 DIAGNOSIS — L03.119 CELLULITIS OF LOWER EXTREMITY, UNSPECIFIED LATERALITY: Primary | ICD-10-CM

## 2020-01-13 RX ORDER — CEPHALEXIN 500 MG/1
500 CAPSULE ORAL 4 TIMES DAILY
Qty: 28 CAPSULE | Refills: 0 | Status: SHIPPED | OUTPATIENT
Start: 2020-01-13 | End: 2020-01-20

## 2020-01-13 NOTE — TELEPHONE ENCOUNTER
Let her know that I am starting him back on keflex four times daily x 7 days- sent to the shop [facility] (his daughter picks up his rx's there)

## 2020-01-13 NOTE — TELEPHONE ENCOUNTER
Jackelin Esparza, RN called she is the visiting nurse from home health  She stated that she believes his left foot looks better but the skin around it is still warm and red  She stated patient denies any pain or fever  She would like to know if he could get another round of antibiotics  Please advise, patient was previously on keflex  Jackelin Esparza would like a call back at [phone]

## 2020-01-14 ENCOUNTER — PATIENT OUTREACH (OUTPATIENT)
Dept: FAMILY MEDICINE CLINIC | Facility: CLINIC | Age: 65
End: 2020-01-14

## 2020-01-14 NOTE — PROGRESS NOTES
Patient continues to have erythema on foot  Wound continues to have moderate amount of serous drainage  Patient continues to elevate foot as much as possible  He uses Ace wraps for compression  He is not able to use compression stockings at this time due to edema and thickness of bandage  Patient will start antibiotic this evening  Daughter picked up prescription of Keflex yesterday evening and will deliver it to patients home this evening  Instructions were reviewed  Patient did receive colon screening test  He plans to complete the test later this week  Patient stated he is familiar with instructions  Patient stated that he will discuss wound care center with visiting nurses during their next visit  [facility] at [facility] 3X weekly for wound care

## 2020-01-29 ENCOUNTER — LAB (OUTPATIENT)
Dept: LAB | Age: 65
End: 2020-01-29
Payer: MEDICARE

## 2020-01-29 ENCOUNTER — PATIENT OUTREACH (OUTPATIENT)
Dept: FAMILY MEDICINE CLINIC | Facility: CLINIC | Age: 65
End: 2020-01-29

## 2020-01-29 ENCOUNTER — TRANSCRIBE ORDERS (OUTPATIENT)
Dept: ADMINISTRATIVE | Age: 65
End: 2020-01-29

## 2020-01-29 ENCOUNTER — TELEPHONE (OUTPATIENT)
Dept: FAMILY MEDICINE CLINIC | Facility: CLINIC | Age: 65
End: 2020-01-29

## 2020-01-29 DIAGNOSIS — L03.116 CELLULITIS OF LEFT FOOT: ICD-10-CM

## 2020-01-29 DIAGNOSIS — I87.2 PERIPHERAL VENOUS INSUFFICIENCY: ICD-10-CM

## 2020-01-29 DIAGNOSIS — L97.421 ULCER OF LEFT HEEL, LIMITED TO BREAKDOWN OF SKIN (HCC): Primary | ICD-10-CM

## 2020-01-29 DIAGNOSIS — L97.421 ULCER OF LEFT HEEL, LIMITED TO BREAKDOWN OF SKIN (HCC): ICD-10-CM

## 2020-01-29 PROCEDURE — 87147 CULTURE TYPE IMMUNOLOGIC: CPT

## 2020-01-29 PROCEDURE — 87070 CULTURE OTHR SPECIMN AEROBIC: CPT

## 2020-01-29 PROCEDURE — 87077 CULTURE AEROBIC IDENTIFY: CPT

## 2020-01-29 PROCEDURE — 87205 SMEAR GRAM STAIN: CPT

## 2020-01-29 PROCEDURE — 87186 SC STD MICRODIL/AGAR DIL: CPT

## 2020-01-29 NOTE — TELEPHONE ENCOUNTER
Aliya RN from the A called to verify if she can put an order in for a wound culture for patient's wound on his left foot  He has been on different antibiotics for a few months now and none have been working  Also started a new wound on left chin that she would like to start putting Alginate on  As per Dr Enrike Dailey is okay to put in all orders  Norm Niels stated she will draw blood work patient is over due for (TSH) on Friday when she goes back

## 2020-01-29 NOTE — PROGRESS NOTES
Patient reported that foot wound is gradually improving  [facility] continues to provide in-home wound care  Patient stated that wound culture will be done today as part of wound care  Patient will verify that [facility] is able to draw blood for thyroid testing  Patient stated he will call back or have visiting nurse call if lab slip is needed or if outside agency will have to draw blood

## 2020-01-31 ENCOUNTER — APPOINTMENT (OUTPATIENT)
Dept: LAB | Age: 65
End: 2020-01-31
Payer: MEDICARE

## 2020-01-31 ENCOUNTER — TRANSCRIBE ORDERS (OUTPATIENT)
Dept: ADMINISTRATIVE | Facility: HOSPITAL | Age: 65
End: 2020-01-31

## 2020-01-31 ENCOUNTER — TELEPHONE (OUTPATIENT)
Dept: FAMILY MEDICINE CLINIC | Facility: CLINIC | Age: 65
End: 2020-01-31

## 2020-01-31 DIAGNOSIS — E03.9 HYPOTHYROIDISM, UNSPECIFIED TYPE: Primary | ICD-10-CM

## 2020-01-31 DIAGNOSIS — E03.9 HYPOTHYROIDISM, UNSPECIFIED TYPE: ICD-10-CM

## 2020-01-31 LAB — TSH SERPL DL<=0.05 MIU/L-ACNC: 0.36 UIU/ML (ref 0.36–3.74)

## 2020-01-31 PROCEDURE — 84443 ASSAY THYROID STIM HORMONE: CPT

## 2020-01-31 PROCEDURE — 36415 COLL VENOUS BLD VENIPUNCTURE: CPT

## 2020-01-31 NOTE — RESULT ENCOUNTER NOTE
Pt is sensitive to PCN and Cefazolin  He was recently on Keflex  Will forward to Dr Janet Gonsales for further treatment plan, as I see he has mobility issues but he may require debridement if it is not healing

## 2020-02-01 LAB
BACTERIA WND AEROBE CULT: ABNORMAL
GRAM STN SPEC: ABNORMAL

## 2020-02-03 DIAGNOSIS — G62.9 NEUROPATHY: ICD-10-CM

## 2020-02-03 DIAGNOSIS — E03.9 ACQUIRED HYPOTHYROIDISM: ICD-10-CM

## 2020-02-03 DIAGNOSIS — E11.8 CONTROLLED TYPE 2 DIABETES MELLITUS WITH COMPLICATION, WITHOUT LONG-TERM CURRENT USE OF INSULIN (HCC): ICD-10-CM

## 2020-02-03 DIAGNOSIS — E78.2 MIXED HYPERLIPIDEMIA: ICD-10-CM

## 2020-02-03 DIAGNOSIS — I10 ESSENTIAL HYPERTENSION: ICD-10-CM

## 2020-02-03 DIAGNOSIS — K21.9 GASTROESOPHAGEAL REFLUX DISEASE WITHOUT ESOPHAGITIS: ICD-10-CM

## 2020-02-03 RX ORDER — SIMVASTATIN 10 MG
10 TABLET ORAL
Qty: 90 TABLET | Refills: 1 | Status: SHIPPED | OUTPATIENT
Start: 2020-02-03 | End: 2020-08-07 | Stop reason: SDUPTHER

## 2020-02-03 RX ORDER — LISINOPRIL 10 MG/1
10 TABLET ORAL DAILY
Qty: 90 TABLET | Refills: 1 | Status: SHIPPED | OUTPATIENT
Start: 2020-02-03 | End: 2020-08-07 | Stop reason: SDUPTHER

## 2020-02-03 RX ORDER — PANTOPRAZOLE SODIUM 40 MG/1
40 TABLET, DELAYED RELEASE ORAL DAILY
Qty: 90 TABLET | Refills: 1 | Status: SHIPPED | OUTPATIENT
Start: 2020-02-03 | End: 2020-08-07 | Stop reason: SDUPTHER

## 2020-02-03 RX ORDER — GABAPENTIN 100 MG/1
100 CAPSULE ORAL 3 TIMES DAILY
Qty: 270 CAPSULE | Refills: 1 | Status: SHIPPED | OUTPATIENT
Start: 2020-02-03 | End: 2020-08-07 | Stop reason: SDUPTHER

## 2020-02-03 RX ORDER — METOPROLOL TARTRATE 50 MG/1
50 TABLET, FILM COATED ORAL 2 TIMES DAILY
Qty: 180 TABLET | Refills: 1 | Status: SHIPPED | OUTPATIENT
Start: 2020-02-03 | End: 2020-08-07 | Stop reason: SDUPTHER

## 2020-02-03 RX ORDER — METFORMIN HYDROCHLORIDE 750 MG/1
750 TABLET, EXTENDED RELEASE ORAL
Qty: 90 TABLET | Refills: 1 | Status: SHIPPED | OUTPATIENT
Start: 2020-02-03 | End: 2020-08-07 | Stop reason: SDUPTHER

## 2020-02-03 RX ORDER — LEVOTHYROXINE SODIUM 0.03 MG/1
25 TABLET ORAL DAILY
Qty: 90 TABLET | Refills: 1 | Status: SHIPPED | OUTPATIENT
Start: 2020-02-03 | End: 2020-08-07 | Stop reason: SDUPTHER

## 2020-02-03 RX ORDER — LEVOTHYROXINE SODIUM 300 UG/1
300 TABLET ORAL DAILY
Qty: 90 TABLET | Refills: 1 | Status: SHIPPED | OUTPATIENT
Start: 2020-02-03 | End: 2020-08-07 | Stop reason: SDUPTHER

## 2020-02-03 NOTE — TELEPHONE ENCOUNTER
Medication: all meds filled the same day   Last office visit: 11/6/19  Next office visit:   Labs: 11/6/19  Last refilled: 11/1/19#90x0  Pharmacy: on file

## 2020-02-05 ENCOUNTER — PATIENT OUTREACH (OUTPATIENT)
Dept: FAMILY MEDICINE CLINIC | Facility: CLINIC | Age: 65
End: 2020-02-05

## 2020-02-05 NOTE — PROGRESS NOTES
Information for wound care center provided to patient  He would prefer to make his own appointments  Patient aware that [facility] will be able to provide free transportation to wound care appointment  Patient will call outpatient care manager to schedule transportation

## 2020-02-05 NOTE — PROGRESS NOTES
Call to patient regarding foot wound  Patient went to [facility] to watch his granddaughter play ice hockey  Patient reports feeling well and stated that the foot wound is "pretty much the same"  Patient notified of PCP request for patient to receive wound care from speciality provider at wound care center  Patient stated he is being seen today by [facility] wound care nurse, Guera Zelaya  Patient will have Aliya call outpatient care manager as part of wound care visit        Patient stated he will be able to schedule Kellie rojas

## 2020-02-05 NOTE — PROGRESS NOTES
Voice message left  Farheen Patel is requesting Unna boot  Call to Farheen Patel, she stated that visiting nurses will continue to provide wound care but Farheen Patel sees patient's who are not able to go to wound care center

## 2020-02-06 ENCOUNTER — PATIENT OUTREACH (OUTPATIENT)
Dept: FAMILY MEDICINE CLINIC | Facility: CLINIC | Age: 65
End: 2020-02-06

## 2020-02-06 NOTE — PROGRESS NOTES
Patient called wound care center and scheduled initial visit for 12-Feb-2020 at 1 PM       Patient will need [facility]

## 2020-02-12 ENCOUNTER — APPOINTMENT (OUTPATIENT)
Dept: WOUND CARE | Facility: HOSPITAL | Age: 65
End: 2020-02-12
Payer: MEDICARE

## 2020-02-12 PROCEDURE — 97597 DBRDMT OPN WND 1ST 20 CM/<: CPT

## 2020-02-12 PROCEDURE — 11042 DBRDMT SUBQ TIS 1ST 20SQCM/<: CPT

## 2020-02-12 PROCEDURE — 97598 DBRDMT OPN WND ADDL 20CM/<: CPT

## 2020-02-14 ENCOUNTER — PATIENT OUTREACH (OUTPATIENT)
Dept: FAMILY MEDICINE CLINIC | Facility: CLINIC | Age: 65
End: 2020-02-14

## 2020-02-14 NOTE — PROGRESS NOTES
Patient did go to the wound care center  Foot wound was debrided and wound was redressed and treatment orders were given for [facility] to follow  Follow up is scheduled 26-Feb at 1:15P  Patient will need transportation  Transportation scheduled with [employer]

## 2020-02-26 ENCOUNTER — APPOINTMENT (OUTPATIENT)
Dept: WOUND CARE | Facility: HOSPITAL | Age: 65
End: 2020-02-26
Payer: MEDICARE

## 2020-02-26 PROCEDURE — 97598 DBRDMT OPN WND ADDL 20CM/<: CPT

## 2020-02-26 PROCEDURE — 97597 DBRDMT OPN WND 1ST 20 CM/<: CPT

## 2020-03-04 ENCOUNTER — PATIENT OUTREACH (OUTPATIENT)
Dept: FAMILY MEDICINE CLINIC | Facility: CLINIC | Age: 65
End: 2020-03-04

## 2020-03-04 NOTE — PROGRESS NOTES
Patient states he has decreased serous wound drainage  Drainage is not leaking through the dressing  Wound was debrieded at wound care center  Wound is decreasing in size  Home wound care continues to come 2X week  Patient states he does try to keep foot elevated as much as possible  Patient continues to take all medications as prescribed  He does not have questions regarding wound care or medical management of chronic diseases  He is aware of and attempt to follow MARISOL/2G sodium diet  Next wound care appointment is scheduled 3/11 at 11:00  Transportation scheduled by Jar[employer] through [employer]  Patient aware

## 2020-03-11 ENCOUNTER — APPOINTMENT (OUTPATIENT)
Dept: WOUND CARE | Facility: HOSPITAL | Age: 65
End: 2020-03-11
Payer: MEDICARE

## 2020-03-11 PROCEDURE — 97597 DBRDMT OPN WND 1ST 20 CM/<: CPT

## 2020-03-19 ENCOUNTER — PATIENT OUTREACH (OUTPATIENT)
Dept: FAMILY MEDICINE CLINIC | Facility: CLINIC | Age: 65
End: 2020-03-19

## 2020-03-19 NOTE — PROGRESS NOTES
Patient stated that wound drainage has decreased significantly  Wound care center directed patient to bring ACE wraps and compression stockings to next visit scheduled on 3/25/20 at 1:30 PM       The wound has improved so much that the wound care center is anticipating healing within the next month  Discussion occurred about continued follow up for this wound and prompt reporting of any future wounds once this wound heals  Patient acknowledged that prompt reporting may have reduced the severity of the this wound  Patient requested transportation to next wound care appointment

## 2020-03-25 ENCOUNTER — APPOINTMENT (OUTPATIENT)
Dept: WOUND CARE | Facility: HOSPITAL | Age: 65
End: 2020-03-25
Payer: MEDICARE

## 2020-03-25 PROCEDURE — 97598 DBRDMT OPN WND ADDL 20CM/<: CPT

## 2020-03-25 PROCEDURE — 97597 DBRDMT OPN WND 1ST 20 CM/<: CPT

## 2020-03-26 ENCOUNTER — PATIENT OUTREACH (OUTPATIENT)
Dept: FAMILY MEDICINE CLINIC | Facility: CLINIC | Age: 65
End: 2020-03-26

## 2020-03-26 NOTE — PROGRESS NOTES
Patient called to verify that he went to his visit yesterday  Patient reported that appointment went well  Wound is gradually improving  Patient continues to receive VNA nursing is performing wound care 2X weekly and is going to wound care center every 2 weeks  Patient is aware to contact PCP for any non-urgent medical issues  He is aware to call 911 for any emergent medical issues  Outpatient care management main number was given to patient who verbalized understanding to call that number for any questions/needs/concerns regarding care management  Patient is agrees that his needs and goals for care management have been met

## 2020-03-26 NOTE — PROGRESS NOTES
Message left by patient stating that transportation provided by [facility] was not at patient's home  Call to [facility] to confirm that transportation was scheduled and on the way  Patient confirmed same

## 2020-04-08 ENCOUNTER — APPOINTMENT (OUTPATIENT)
Dept: WOUND CARE | Facility: HOSPITAL | Age: 65
End: 2020-04-08
Payer: MEDICARE

## 2020-04-08 PROCEDURE — 97598 DBRDMT OPN WND ADDL 20CM/<: CPT

## 2020-04-08 PROCEDURE — 97597 DBRDMT OPN WND 1ST 20 CM/<: CPT

## 2020-04-08 PROCEDURE — 99213 OFFICE O/P EST LOW 20 MIN: CPT

## 2020-04-22 ENCOUNTER — APPOINTMENT (OUTPATIENT)
Dept: WOUND CARE | Facility: HOSPITAL | Age: 65
End: 2020-04-22
Payer: MEDICARE

## 2020-04-22 PROCEDURE — 97597 DBRDMT OPN WND 1ST 20 CM/<: CPT

## 2020-04-25 DIAGNOSIS — K21.9 GASTROESOPHAGEAL REFLUX DISEASE WITHOUT ESOPHAGITIS: ICD-10-CM

## 2020-04-25 DIAGNOSIS — E03.9 ACQUIRED HYPOTHYROIDISM: ICD-10-CM

## 2020-04-25 DIAGNOSIS — E78.2 MIXED HYPERLIPIDEMIA: ICD-10-CM

## 2020-04-25 DIAGNOSIS — E11.8 CONTROLLED TYPE 2 DIABETES MELLITUS WITH COMPLICATION, WITHOUT LONG-TERM CURRENT USE OF INSULIN (HCC): ICD-10-CM

## 2020-04-25 DIAGNOSIS — I10 ESSENTIAL HYPERTENSION: ICD-10-CM

## 2020-04-25 DIAGNOSIS — G62.9 NEUROPATHY: ICD-10-CM

## 2020-04-25 RX ORDER — METOPROLOL TARTRATE 50 MG/1
50 TABLET, FILM COATED ORAL 2 TIMES DAILY
Qty: 180 TABLET | Refills: 0 | Status: CANCELLED | OUTPATIENT
Start: 2020-04-25

## 2020-04-25 RX ORDER — PANTOPRAZOLE SODIUM 40 MG/1
40 TABLET, DELAYED RELEASE ORAL DAILY
Qty: 90 TABLET | Refills: 0 | Status: CANCELLED | OUTPATIENT
Start: 2020-04-25

## 2020-04-25 RX ORDER — METFORMIN HYDROCHLORIDE 750 MG/1
750 TABLET, EXTENDED RELEASE ORAL
Qty: 90 TABLET | Refills: 0 | Status: CANCELLED | OUTPATIENT
Start: 2020-04-25

## 2020-04-25 RX ORDER — LISINOPRIL 10 MG/1
10 TABLET ORAL DAILY
Qty: 90 TABLET | Refills: 0 | Status: CANCELLED | OUTPATIENT
Start: 2020-04-25

## 2020-04-25 RX ORDER — GABAPENTIN 100 MG/1
100 CAPSULE ORAL 3 TIMES DAILY
Qty: 270 CAPSULE | Refills: 0 | Status: CANCELLED | OUTPATIENT
Start: 2020-04-25

## 2020-04-25 RX ORDER — LEVOTHYROXINE SODIUM 300 UG/1
300 TABLET ORAL DAILY
Qty: 90 TABLET | Refills: 0 | Status: CANCELLED | OUTPATIENT
Start: 2020-04-25

## 2020-04-25 RX ORDER — SIMVASTATIN 10 MG
10 TABLET ORAL
Qty: 90 TABLET | Refills: 0 | Status: CANCELLED | OUTPATIENT
Start: 2020-04-25 | End: 2020-07-24

## 2020-04-25 RX ORDER — LEVOTHYROXINE SODIUM 0.03 MG/1
25 TABLET ORAL DAILY
Qty: 90 TABLET | Refills: 0 | Status: CANCELLED | OUTPATIENT
Start: 2020-04-25

## 2020-05-06 ENCOUNTER — APPOINTMENT (OUTPATIENT)
Dept: WOUND CARE | Facility: HOSPITAL | Age: 65
End: 2020-05-06
Payer: MEDICARE

## 2020-05-06 PROCEDURE — 97597 DBRDMT OPN WND 1ST 20 CM/<: CPT

## 2020-05-20 ENCOUNTER — APPOINTMENT (OUTPATIENT)
Dept: WOUND CARE | Facility: HOSPITAL | Age: 65
End: 2020-05-20
Payer: MEDICARE

## 2020-05-20 PROCEDURE — 97597 DBRDMT OPN WND 1ST 20 CM/<: CPT

## 2020-05-27 ENCOUNTER — APPOINTMENT (OUTPATIENT)
Dept: WOUND CARE | Facility: HOSPITAL | Age: 65
End: 2020-05-27
Payer: MEDICARE

## 2020-05-27 PROCEDURE — 97597 DBRDMT OPN WND 1ST 20 CM/<: CPT

## 2020-05-27 PROCEDURE — 97598 DBRDMT OPN WND ADDL 20CM/<: CPT

## 2020-06-03 ENCOUNTER — APPOINTMENT (OUTPATIENT)
Dept: WOUND CARE | Facility: HOSPITAL | Age: 65
End: 2020-06-03
Payer: MEDICARE

## 2020-06-03 PROCEDURE — 29581 APPL MULTLAYER CMPRN SYS LEG: CPT

## 2020-06-17 ENCOUNTER — APPOINTMENT (OUTPATIENT)
Dept: WOUND CARE | Facility: HOSPITAL | Age: 65
End: 2020-06-17
Payer: MEDICARE

## 2020-06-17 PROCEDURE — 29580 STRAPPING UNNA BOOT: CPT

## 2020-07-01 ENCOUNTER — APPOINTMENT (OUTPATIENT)
Dept: WOUND CARE | Facility: HOSPITAL | Age: 65
End: 2020-07-01
Payer: MEDICARE

## 2020-07-01 PROCEDURE — 97597 DBRDMT OPN WND 1ST 20 CM/<: CPT

## 2020-07-01 PROCEDURE — 97598 DBRDMT OPN WND ADDL 20CM/<: CPT

## 2020-07-08 ENCOUNTER — APPOINTMENT (OUTPATIENT)
Dept: WOUND CARE | Facility: HOSPITAL | Age: 65
End: 2020-07-08
Payer: MEDICARE

## 2020-07-08 PROCEDURE — 29580 STRAPPING UNNA BOOT: CPT

## 2020-07-08 PROCEDURE — 99213 OFFICE O/P EST LOW 20 MIN: CPT

## 2020-07-14 ENCOUNTER — APPOINTMENT (OUTPATIENT)
Dept: WOUND CARE | Facility: HOSPITAL | Age: 65
End: 2020-07-14
Payer: MEDICARE

## 2020-07-14 PROCEDURE — 29580 STRAPPING UNNA BOOT: CPT

## 2020-07-28 ENCOUNTER — APPOINTMENT (OUTPATIENT)
Dept: WOUND CARE | Facility: HOSPITAL | Age: 65
End: 2020-07-28
Payer: MEDICARE

## 2020-07-28 PROCEDURE — 29580 STRAPPING UNNA BOOT: CPT

## 2020-08-07 DIAGNOSIS — I10 ESSENTIAL HYPERTENSION: ICD-10-CM

## 2020-08-07 DIAGNOSIS — E78.2 MIXED HYPERLIPIDEMIA: ICD-10-CM

## 2020-08-07 DIAGNOSIS — G62.9 NEUROPATHY: ICD-10-CM

## 2020-08-07 DIAGNOSIS — E11.8 CONTROLLED TYPE 2 DIABETES MELLITUS WITH COMPLICATION, WITHOUT LONG-TERM CURRENT USE OF INSULIN (HCC): ICD-10-CM

## 2020-08-07 DIAGNOSIS — E03.9 ACQUIRED HYPOTHYROIDISM: ICD-10-CM

## 2020-08-07 DIAGNOSIS — K21.9 GASTROESOPHAGEAL REFLUX DISEASE WITHOUT ESOPHAGITIS: ICD-10-CM

## 2020-08-10 RX ORDER — METFORMIN HYDROCHLORIDE 750 MG/1
750 TABLET, EXTENDED RELEASE ORAL
Qty: 90 TABLET | Refills: 1 | Status: SHIPPED | OUTPATIENT
Start: 2020-08-10 | End: 2021-01-21 | Stop reason: SDUPTHER

## 2020-08-10 RX ORDER — LEVOTHYROXINE SODIUM 0.03 MG/1
25 TABLET ORAL DAILY
Qty: 90 TABLET | Refills: 1 | Status: SHIPPED | OUTPATIENT
Start: 2020-08-10 | End: 2021-01-21 | Stop reason: SDUPTHER

## 2020-08-10 RX ORDER — GABAPENTIN 100 MG/1
100 CAPSULE ORAL 3 TIMES DAILY
Qty: 270 CAPSULE | Refills: 1 | Status: SHIPPED | OUTPATIENT
Start: 2020-08-10 | End: 2021-01-21 | Stop reason: SDUPTHER

## 2020-08-10 RX ORDER — LEVOTHYROXINE SODIUM 300 UG/1
300 TABLET ORAL DAILY
Qty: 90 TABLET | Refills: 1 | Status: SHIPPED | OUTPATIENT
Start: 2020-08-10 | End: 2021-01-21 | Stop reason: SDUPTHER

## 2020-08-10 RX ORDER — LISINOPRIL 10 MG/1
10 TABLET ORAL DAILY
Qty: 90 TABLET | Refills: 1 | Status: SHIPPED | OUTPATIENT
Start: 2020-08-10 | End: 2021-01-21 | Stop reason: SDUPTHER

## 2020-08-10 RX ORDER — METOPROLOL TARTRATE 50 MG/1
50 TABLET, FILM COATED ORAL 2 TIMES DAILY
Qty: 180 TABLET | Refills: 1 | Status: SHIPPED | OUTPATIENT
Start: 2020-08-10 | End: 2021-01-21 | Stop reason: SDUPTHER

## 2020-08-10 RX ORDER — PANTOPRAZOLE SODIUM 40 MG/1
40 TABLET, DELAYED RELEASE ORAL DAILY
Qty: 90 TABLET | Refills: 1 | Status: SHIPPED | OUTPATIENT
Start: 2020-08-10 | End: 2021-01-21 | Stop reason: SDUPTHER

## 2020-08-10 RX ORDER — SIMVASTATIN 10 MG
10 TABLET ORAL
Qty: 90 TABLET | Refills: 1 | Status: SHIPPED | OUTPATIENT
Start: 2020-08-10 | End: 2021-01-21 | Stop reason: SDUPTHER

## 2020-08-10 NOTE — TELEPHONE ENCOUNTER
Sent rx's  He is due for labs and OV  Can you have home blood draw set up for him? Then OV after the labs if he is able

## 2020-08-10 NOTE — TELEPHONE ENCOUNTER
Medication: All meds  Last office visit:11/6/19  Next office visit:  Labs:1/31/20  Last refilled: all filled 2/3/20#90x1  Pharmacy: on file

## 2020-08-18 ENCOUNTER — OFFICE VISIT (OUTPATIENT)
Dept: WOUND CARE | Facility: HOSPITAL | Age: 65
End: 2020-08-18
Payer: MEDICARE

## 2020-08-18 VITALS
HEART RATE: 83 BPM | SYSTOLIC BLOOD PRESSURE: 179 MMHG | TEMPERATURE: 97.8 F | DIASTOLIC BLOOD PRESSURE: 83 MMHG | RESPIRATION RATE: 20 BRPM

## 2020-08-18 DIAGNOSIS — L97.521 DIABETIC ULCER OF LEFT FOOT ASSOCIATED WITH TYPE 2 DIABETES MELLITUS, LIMITED TO BREAKDOWN OF SKIN, UNSPECIFIED PART OF FOOT (HCC): ICD-10-CM

## 2020-08-18 DIAGNOSIS — E11.621 DIABETIC ULCER OF LEFT FOOT ASSOCIATED WITH TYPE 2 DIABETES MELLITUS, LIMITED TO BREAKDOWN OF SKIN, UNSPECIFIED PART OF FOOT (HCC): ICD-10-CM

## 2020-08-18 DIAGNOSIS — L97.521 ULCER OF TOE OF LEFT FOOT, LIMITED TO BREAKDOWN OF SKIN (HCC): Primary | ICD-10-CM

## 2020-08-18 PROCEDURE — 29580 STRAPPING UNNA BOOT: CPT

## 2020-08-18 NOTE — LETTER
[address] [id number]  Phone#  [phone]  Fax#  [phone]    Patient:  Terry Beard  YOB: 1955  Phone:  [phone]  Date of Visit:  8/18/2020    Orders Placed This Encounter   Procedures    Wound cleansing and dressings     Left foot wound:  Keep dressing clean dry and intact  May remove on dressing change days and take a shower  Cleanse wound with NSS, pat dry  Apply skin prep to periwound  Calcium alginate with silver to any open weeping areas  Cover with Optilock and/or 1/2 ABD prn drainage  Unna and coban from base of toes to base of knee  May use tubifast blue for patient comfort  Change dressing 2x/wk  This was applied at the wound center today       Standing Status:   Future     Standing Expiration Date:   8/18/2021         Electronically signed by Shawna Perez DPM

## 2020-08-18 NOTE — PROGRESS NOTES
Patient ID: Mariya Spaulding is a 77 y o  male Date of Birth 1955     ASSESSMENT   Diagnoses and all orders for this visit:    Ulcer of toe of left foot, limited to breakdown of skin ([address] )    Diabetic ulcer of left foot associated with type 2 diabetes mellitus, limited to breakdown of skin, unspecified part of     PLAN  1  Patient seen and evaluated, evaluation of wound on left foot  2  No debridement was performed  3  Wound was dressed with skin prep covered with  Alginate, Opti lock,  Unna boot wrap  Applied by nursing  Visiting nurses will continue to do this twice a week  4   Patient will continue to use sequential compression pumps twice a day for 1 hour on left lower extremity  5  Discussed proper diabetic foot care, shoe gear and daily foot inspection,  As well as frequent elevation, low-sodium diet, proper protein intake and proper glycemic control  6  Patient understands and agrees with the plan and will follow up in  2 weeks  HPI/Chief Complaint   patient presents today  For follow-up care of left diabetic foot ulceration, visiting nurses have been changing dressings as directed, he states he has been using is sequential compression pump for 1 hour twice a day, states his blood sugars well controlled and has no new complaints  Allergies  Patient has no known allergies  SUBJECTIVE:     The following portions of the patient's history were reviewed and updated as appropriate: allergies, current medications, past family history, past medical history, past social history, past surgical history and problem list     Review of Systems   Constitutional: Negative for activity change, appetite change, chills, fatigue and fever  HENT: Negative for hearing loss  Eyes: Negative for photophobia and visual disturbance  Respiratory: Negative for cough, chest tightness and shortness of breath  Cardiovascular: Negative for chest pain and palpitations     Gastrointestinal: Negative for diarrhea and nausea  Endocrine: Negative for cold intolerance and heat intolerance  Musculoskeletal: Negative for arthralgias and back pain  Skin: Positive for wound  Psychiatric/Behavioral: Negative for agitation, self-injury and suicidal ideas  The patient is not nervous/anxious  OBJECTIVE:       Wound 01/08/20 Foot Left; Other (Comment) (Active)   Date First Assessed: 01/08/20   Primary Wound Type: Diabetic Ulcer  Location: Foot  Wound Location Orientation: (c) Left; Other (Comment)  Wound Description (Comments): Naidu 1      Assessments 8/18/2020 10:36 AM 8/18/2020 10:40 AM   Wound Image      Wound Description  Epithelialization;Pink   Jovita-wound Assessment  Maceration   Wound Length (cm)  4 3 cm   Wound Width (cm)  3 5 cm   Wound Depth (cm)  0 1 cm   Wound Surface Area (cm^2)  15 05 cm^2   Wound Volume (cm^3)  1 51 cm^3   Calculated Wound Volume (cm^3)  1 51 cm^3   Drainage Amount  Small   Drainage Description  Serosanguineous   Non-staged Wound Description  Full thickness   Dressing Status  Intact       Active Orders   Date Order Priority Status Authorizing Provider   08/18/20 1054 Wound cleansing and dressings Routine Active Dakota Reynolds DPM       Inactive Orders   Date Order Priority Status Authorizing Provider   08/18/20 1052 Wound cleansing and dressings Routine Discontinued Dakota Reynolds DPM      Wound 01/08/20 Foot Left; Other (Comment) (Active)   Wound Image   08/18/20 1036   Wound Description Epithelialization;Pink 08/18/20 1040   Jovita-wound Assessment Maceration 08/18/20 1040   Wound Length (cm) 4 3 cm 08/18/20 1040   Wound Width (cm) 3 5 cm 08/18/20 1040   Wound Depth (cm) 0 1 cm 08/18/20 1040   Wound Surface Area (cm^2) 15 05 cm^2 08/18/20 1040   Wound Volume (cm^3) 1 51 cm^3 08/18/20 1040   Calculated Wound Volume (cm^3) 1 51 cm^3 08/18/20 1040   Drainage Amount Small 08/18/20 1040   Drainage Description Serosanguineous 08/18/20 1040   Non-staged Wound Description Full thickness 08/18/20 1040   Dressing Status Intact 08/18/20 1040     Wound 01/08/20 Foot Left; Other (Comment) (Active)       BP (!) 179/83   Pulse 83   Temp 97 8 °F (36 6 °C)   Resp 20     Physical Exam  Constitutional:       Appearance: Normal appearance  He is obese  HENT:      Head: Normocephalic and atraumatic  Right Ear: External ear normal       Left Ear: External ear normal       Nose: Nose normal    Eyes:      Conjunctiva/sclera: Conjunctivae normal    Neck:      Musculoskeletal: Normal range of motion  Cardiovascular:      Pulses: Normal pulses  Pulmonary:      Effort: Pulmonary effort is normal    Musculoskeletal:      Left lower leg: Edema present  Comments: BKA right   Skin:     General: Skin is warm and dry  Capillary Refill: Capillary refill takes less than 2 seconds  Neurological:      Mental Status: He is alert and oriented to person, place, and time  Mental status is at baseline  Sensory: Sensory deficit present  Gait: Gait abnormal       Deep Tendon Reflexes: Reflexes abnormal    Psychiatric:         Mood and Affect: Mood normal          Behavior: Behavior normal            WOUND ORDERS  Orders Placed This Encounter   Procedures    Wound cleansing and dressings     Left foot wound:  Keep dressing clean dry and intact  May remove on dressing change days and take a shower  Cleanse wound with NSS, pat dry  Apply skin prep to periwound  Calcium alginate with silver to any open weeping areas  Cover with Optilock and/or 1/2 ABD prn drainage  Unna and coban from base of toes to base of knee  May use tubifast blue for patient comfort  Change dressing 2x/wk  This was applied at the wound center today       Standing Status:   Future     Standing Expiration Date:   8/18/2021

## 2020-08-18 NOTE — PATIENT INSTRUCTIONS
Orders Placed This Encounter   Procedures    Wound cleansing and dressings     Left foot wound:  Keep dressing clean dry and intact  May remove on dressing change days and take a shower  Cleanse wound with NSS, pat dry  Apply skin prep to periwound  Calcium alginate with silver to any open weeping areas  Cover with gauze and/or 1/2 ABD prn drainage  Unna and coban from base of toes to base of knee  May use tubifast blue for patient comfort  Change dressing 2x/wk  This was applied at the wound center today       Standing Status:   Future     Standing Expiration Date:   8/18/2021

## 2020-09-08 ENCOUNTER — OFFICE VISIT (OUTPATIENT)
Dept: WOUND CARE | Facility: HOSPITAL | Age: 65
End: 2020-09-08
Payer: MEDICARE

## 2020-09-08 VITALS
DIASTOLIC BLOOD PRESSURE: 82 MMHG | HEART RATE: 80 BPM | RESPIRATION RATE: 20 BRPM | TEMPERATURE: 96.1 F | SYSTOLIC BLOOD PRESSURE: 156 MMHG

## 2020-09-08 DIAGNOSIS — I87.312 IDIOPATHIC CHRONIC VENOUS HYPERTENSION OF LEFT LOWER EXTREMITY WITH ULCER (HCC): ICD-10-CM

## 2020-09-08 DIAGNOSIS — L97.521 DIABETIC ULCER OF LEFT FOOT ASSOCIATED WITH TYPE 2 DIABETES MELLITUS, LIMITED TO BREAKDOWN OF SKIN, UNSPECIFIED PART OF FOOT (HCC): Primary | ICD-10-CM

## 2020-09-08 DIAGNOSIS — L97.929 IDIOPATHIC CHRONIC VENOUS HYPERTENSION OF LEFT LOWER EXTREMITY WITH ULCER (HCC): ICD-10-CM

## 2020-09-08 DIAGNOSIS — E11.621 DIABETIC ULCER OF LEFT FOOT ASSOCIATED WITH TYPE 2 DIABETES MELLITUS, LIMITED TO BREAKDOWN OF SKIN, UNSPECIFIED PART OF FOOT (HCC): Primary | ICD-10-CM

## 2020-09-08 DIAGNOSIS — L97.221 NON-PRESSURE CHRONIC ULCER OF LEFT CALF, LIMITED TO BREAKDOWN OF SKIN (HCC): ICD-10-CM

## 2020-09-08 PROCEDURE — 97597 DBRDMT OPN WND 1ST 20 CM/<: CPT | Performed by: PODIATRIST

## 2020-09-08 PROCEDURE — 99213 OFFICE O/P EST LOW 20 MIN: CPT | Performed by: PODIATRIST

## 2020-09-08 NOTE — PATIENT INSTRUCTIONS
Orders Placed This Encounter   Procedures    Wound cleansing and dressings     Left foot wound   Keep dressing clean dry and intact  May remove on dressing change days and take a shower  Cleanse wound with NSS, pat dry  Apply skin prep to periwound  Calcium alginate with silver to any open weeping areas  Cover with 1/2 ABD   Unna and coban from base of toes to base of knee  May use tubifast blue for patient comfort  Change dressing 2x/wk  Left Posterior LE wound  Keep dressing clean dry and intact  May remove on dressing change days and take a shower  Cleanse wound with NSS, pat dry  Apply skin prep to periwound  Calcium alginate with silver to any open weeping areas  Cover with Optilock or equivalent  Unna and coban from base of toes to base of knee  May use tubifast blue for patient comfort  Change dressing 2x/wk      Treatments above were completed today at the [facility]    Continue with [facility] services     Standing Status:   Future     Standing Expiration Date:   9/8/2021

## 2020-09-08 NOTE — PROGRESS NOTES
Patient ID: Yessenia Lange is a 77 y o  male Date of Birth 1955     ASSESSMENT   Diagnoses and all orders for this visit:    Diabetic ulcer of left foot associated with type 2 diabetes mellitus, limited to breakdown of skin, unspecified part of foot ([facility] [address] )  -     Wound cleansing and dressings; Future    Idiopathic chronic venous hypertension of left lower extremity with ulcer ([facility] [address] )    Non-pressure chronic ulcer of left calf, limited to breakdown of skin ([facility] [address] )            PLAN  Debridement   Wound 01/08/20 Foot Left; Other (Comment)    Consent obtained? verbal  Consent given by: patient  Risks discussed? procedural risks discussed  Time out called at 9/8/2020 10:00 AM  Immediately prior to the procedure a time out was called  Performed by: physician  Debridement type: selective  Pain control: lidocaine 4%  Pre-debridement measurements  Length (cm): 1 5  Width (cm): 2 5  Depth (cm): 0 1  Surface Area (cm^2): 3 75  Volume (cm^3): 0 38    Post-debridement measurements  Length (cm): 1 5  Width (cm): 2 5  Depth (cm): 0 1  Percent debrided: 100%  Surface Area (cm^2): 3 75  Area debrided (cm^2): 3 75  Volume (cm^3): 0 38  Devitalized tissue debrided: biofilm and slough  Instrument(s) utilized: blade  Bleeding: none  Hemostasis obtained with: not applicable  Procedural pain (0-10): insensate  Post-procedural pain: insensate   Response to treatment: procedure was tolerated well    Debridement   Consent obtained? verbal  Consent given by: patient  Risks discussed?  procedural risks discussed  Time out called at 9/8/2020 10:00 AM  Immediately prior to the procedure a time out was called  Performed by: physician  Debridement type: selective  Pain control: lidocaine 4%  Pre-debridement measurements  Length (cm): 4 5  Width (cm): 4  Depth (cm): 0 1  Surface Area (cm^2): 18  Volume (cm^3): 1 8    Post-debridement measurements  Length (cm): 4 5  Width (cm): 4  Depth (cm): 0 1  Percent debrided: 50%  Surface Area (cm^2): 18  Area debrided (cm^2): 9  Volume (cm^3): 1 8  Devitalized tissue debrided: biofilm and slough  Instrument(s) utilized: blade  Bleeding: small  Hemostasis obtained with: pressure  Procedural pain (0-10): insensate  Post-procedural pain: insensate   Response to treatment: procedure was tolerated well        Patient seen and evaluated today, wounds were debrided through selective tissues, total less than 20 subcutaneously cm, alginate dry dressing was applied to both, Unna boot was applied  Frequent elevation, low-sodium diet, proper protein intake was reviewed as was proper glycemic control  I also reviewed proper diabetic foot care, shoe gear daily foot inspection  Patient will continue to use sequential compression pump twice a day for 1 hour each time  Visiting nurses will change dressings as directed, he will follow up in 2 weeks  Patient understands and agrees with the plan  HPI/Chief Complaint  Patient presents today for care of left lower extremity diabetic foot wound and new wound on lateral left calf secondary to chronic venous hypertension  Visiting nurses continue change dressings as directed  He has no new complaints  Allergies  Patient has no known allergies  SUBJECTIVE:  The following portions of the patient's history were reviewed and updated as appropriate: allergies, current medications, past family history, past medical history, past social history, past surgical history and problem list     Review of Systems   Constitutional: Negative for activity change, appetite change, chills, fatigue and fever  HENT: Negative for hearing loss  Eyes: Negative for photophobia and visual disturbance  Respiratory: Negative for cough, chest tightness and shortness of breath  Cardiovascular: Negative for chest pain and palpitations  Gastrointestinal: Negative for diarrhea and nausea  Endocrine: Negative for cold intolerance and heat intolerance  Musculoskeletal: Positive for gait problem  Negative for arthralgias and back pain  Skin: Positive for color change and wound  Neurological: Positive for numbness  Psychiatric/Behavioral: Negative  Negative for agitation, self-injury and suicidal ideas  The patient is not nervous/anxious  OBJECTIVE:       Wound 01/08/20 Foot Left; Other (Comment) (Active)   Date First Assessed: 01/08/20   Primary Wound Type: Diabetic Ulcer  Location: Foot  Wound Location Orientation: (c) Left; Other (Comment)  Wound Description (Comments): Naidu 1      Assessments 8/18/2020 10:36 AM 9/8/2020  9:51 AM   Wound Image      Wound Description  Epithelialization;Granulation tissue;Pink   Jovita-wound Assessment  Dry;Fragile   Wound Length (cm)  1 5 cm   Wound Width (cm)  2 5 cm   Wound Depth (cm)  0 1 cm   Wound Surface Area (cm^2)  3 75 cm^2   Wound Volume (cm^3)  0 38 cm^3   Calculated Wound Volume (cm^3)  0 38 cm^3   Change in Wound Size %  74 83   Drainage Amount  Small   Drainage Description  Serosanguineous   Non-staged Wound Description  Full thickness       Active Orders   Date Order Priority Status Authorizing Provider   09/08/20 1005 Wound cleansing and dressings Routine Active Vyas Screen, DPM       Inactive Orders   Date Order Priority Status Authorizing Provider   08/18/20 1054 Wound cleansing and dressings Routine Discontinued Vyas Screen, DPM   08/18/20 1052 Wound cleansing and dressings Routine Discontinued Vyas Screen, DPM       Wound Venous Ulcer Leg Left;Posterior;Proximal (Active)   No Date First Assessed or Time First Assessed found     Primary Wound Type: Venous Ulcer  Location: Leg  Wound Location Orientation: Left;Posterior;Proximal      Assessments 9/8/2020  9:48 AM 9/8/2020  9:50 AM   Wound Image      Wound Description  Epithelialization;Granulation tissue;Pink   Jovita-wound Assessment  Edema;Dry   Wound Length (cm)  4 5 cm   Wound Width (cm)  4 cm   Wound Depth (cm)  0 1 cm   Wound Surface Area (cm^2)  18 cm^2   Wound Volume (cm^3)  1 8 cm^3   Calculated Wound Volume (cm^3)  1 8 cm^3   Drainage Amount  Moderate   Drainage Description  Green   Non-staged Wound Description  Full thickness       Active Orders   Date Order Priority Status Authorizing Provider   09/08/20 1005 Wound cleansing and dressings Routine Active Greg De La Torre DPM      Wound 01/08/20 Foot Left; Other (Comment) (Active)   Wound Image   09/08/20 0946   Wound Description Epithelialization;Granulation tissue;Pink 09/08/20 0951   Jovita-wound Assessment Dry;Fragile 09/08/20 0951   Wound Length (cm) 1 5 cm 09/08/20 0951   Wound Width (cm) 2 5 cm 09/08/20 0951   Wound Depth (cm) 0 1 cm 09/08/20 0951   Wound Surface Area (cm^2) 3 75 cm^2 09/08/20 0951   Wound Volume (cm^3) 0 38 cm^3 09/08/20 0951   Calculated Wound Volume (cm^3) 0 38 cm^3 09/08/20 0951   Change in Wound Size % 74 83 09/08/20 0951   Drainage Amount Small 09/08/20 0951   Drainage Description Serosanguineous 09/08/20 0951   Non-staged Wound Description Full thickness 09/08/20 0951   Dressing Status Intact 08/18/20 1040       Wound Venous Ulcer Leg Left;Posterior;Proximal (Active)   Wound Image   09/08/20 0948   Wound Description Epithelialization;Granulation tissue;Pink 09/08/20 0950   Jovita-wound Assessment Edema;Dry 09/08/20 0950   Wound Length (cm) 4 5 cm 09/08/20 0950   Wound Width (cm) 4 cm 09/08/20 0950   Wound Depth (cm) 0 1 cm 09/08/20 0950   Wound Surface Area (cm^2) 18 cm^2 09/08/20 0950   Wound Volume (cm^3) 1 8 cm^3 09/08/20 0950   Calculated Wound Volume (cm^3) 1 8 cm^3 09/08/20 0950   Drainage Amount Moderate 09/08/20 0950   Drainage Description Green 09/08/20 0950   Non-staged Wound Description Full thickness 09/08/20 0950     Wound 01/08/20 Foot Left; Other (Comment) (Active)       Wound Venous Ulcer Leg Left;Posterior;Proximal (Active)       /82   Pulse 80   Temp (!) 96 1 °F (35 6 °C)   Resp 20     Physical Exam  Constitutional:       Appearance: Normal appearance  He is obese  HENT:      Head: Normocephalic and atraumatic  Right Ear: External ear normal       Left Ear: External ear normal       Nose: Nose normal    Eyes:      Conjunctiva/sclera: Conjunctivae normal    Neck:      Musculoskeletal: Normal range of motion  Cardiovascular:      Pulses: Normal pulses  Dorsalis pedis pulses are 2+ on the left side  Posterior tibial pulses are 2+ on the left side  Pulmonary:      Effort: Pulmonary effort is normal    Musculoskeletal:      Left lower le+ Edema present  Skin:     General: Skin is warm and dry  Comments: Please see detailed wound assessment for left foot diabetic Naidu grade 1 ulceration and left calf ulcer secondary to venous hypertension   Neurological:      Mental Status: He is alert  Mental status is at baseline  Psychiatric:         Mood and Affect: Mood normal          Behavior: Behavior normal            WOUND ORDERS  Orders Placed This Encounter   Procedures    Wound cleansing and dressings     Left foot wound   Keep dressing clean dry and intact  May remove on dressing change days and take a shower  Cleanse wound with NSS, pat dry  Apply skin prep to periwound  Calcium alginate with silver to any open weeping areas  Cover with 1/2 ABD   Unna and coban from base of toes to base of knee  May use tubifast blue for patient comfort  Change dressing 2x/wk  Left Posterior LE wound  Keep dressing clean dry and intact  May remove on dressing change days and take a shower  Cleanse wound with NSS, pat dry  Apply skin prep to periwound  Calcium alginate with silver to any open weeping areas  Cover with Optilock or equivalent  Unna and coban from base of toes to base of knee  May use tubifast blue for patient comfort  Change dressing 2x/wk      Treatments above were completed today at the [facility]    Continue with [address] [place] [address]  services     Standing Status:   Future     Standing Expiration Date:   2021

## 2020-09-22 ENCOUNTER — OFFICE VISIT (OUTPATIENT)
Dept: WOUND CARE | Facility: HOSPITAL | Age: 65
End: 2020-09-22
Payer: MEDICARE

## 2020-09-22 VITALS
HEART RATE: 92 BPM | SYSTOLIC BLOOD PRESSURE: 176 MMHG | TEMPERATURE: 98.1 F | DIASTOLIC BLOOD PRESSURE: 111 MMHG | RESPIRATION RATE: 20 BRPM

## 2020-09-22 DIAGNOSIS — B35.9 DERMATOPHYTOSIS: ICD-10-CM

## 2020-09-22 DIAGNOSIS — E11.621 DIABETIC ULCER OF LEFT FOOT ASSOCIATED WITH TYPE 2 DIABETES MELLITUS, LIMITED TO BREAKDOWN OF SKIN, UNSPECIFIED PART OF FOOT (HCC): Primary | ICD-10-CM

## 2020-09-22 DIAGNOSIS — L97.521 DIABETIC ULCER OF LEFT FOOT ASSOCIATED WITH TYPE 2 DIABETES MELLITUS, LIMITED TO BREAKDOWN OF SKIN, UNSPECIFIED PART OF FOOT (HCC): Primary | ICD-10-CM

## 2020-09-22 PROCEDURE — 99213 OFFICE O/P EST LOW 20 MIN: CPT | Performed by: PODIATRIST

## 2020-09-22 RX ORDER — BETAMETHASONE DIPROPIONATE 0.05 %
OINTMENT (GRAM) TOPICAL ONCE
Status: COMPLETED | OUTPATIENT
Start: 2020-09-22 | End: 2020-09-22

## 2020-09-22 RX ORDER — CLOTRIMAZOLE AND BETAMETHASONE DIPROPIONATE 10; .64 MG/G; MG/G
CREAM TOPICAL 2 TIMES DAILY
Qty: 45 G | Refills: 0 | Status: SHIPPED | OUTPATIENT
Start: 2020-09-22 | End: 2020-12-08 | Stop reason: SDUPTHER

## 2020-09-22 RX ORDER — CLOTRIMAZOLE 1 %
CREAM (GRAM) TOPICAL ONCE
Status: COMPLETED | OUTPATIENT
Start: 2020-09-22 | End: 2020-09-22

## 2020-09-22 RX ADMIN — Medication 1 APPLICATION: at 10:43

## 2020-09-22 NOTE — PROGRESS NOTES
Patient ID: Sarah Nails is a 77 y o  male Date of Birth 1955     ASSESSMENT   Diagnoses and all orders for this visit:    Diabetic ulcer of left foot associated with type 2 diabetes mellitus, limited to breakdown of skin, unspecified part of foot ([address] )  -     Wound cleansing and dressings; Future  -     Wound compression and edema control; Future  -     Wound home care; Future  -     betamethasone dipropionate (DIPROSONE) 0 05 % ointment  -     clotrimazole (LOTRIMIN) 1 % cream    Dermatophytosis  -     clotrimazole-betamethasone (LOTRISONE) 1-0 05 % cream; Apply topically 2 (two) times a day            PLAN  Patient seen and evaluated today, left plantar foot was thoroughly exfoliated with use of dry gauze  At this time we are going to discontinue Unna boot  Today we applied alginate ABD pad to dorsal left foot, Lotrisone to left lateral leg wound/dermatophytosis area, spandagrip to be used for compression daily  Visiting nurses will discontinue use of Unna boots, they will see patient 3 times a week  Prior to arrival of visiting nurses, patient will hang leg in shower/bed toe, use handheld to wash leg, use brush to gently exfoliated plantar aspect of foot, pat leg dry, visiting nurses will apply dressings as directed  Patient is to apply Lotrisone to left lateral leg wound twice a day  He is to continue use of sequential compression pumps twice a day on the left leg for 1 hour each time  Frequent elevation, low-sodium diet, proper protein intake and proper glycemic control was reviewed with patient  He understands and agrees with the plan and will follow up in 1 week  HPI/Chief Complaint  Patient presents today for care of left foot diabetic ulceration, continues to be superficial, he continues tolerate visiting nurses dressing changes well and has no new complaints  Allergies  Patient has no known allergies  SUBJECTIVE:       The following portions of the patient's history were reviewed and updated as appropriate: allergies, current medications, past family history, past medical history, past social history, past surgical history and problem list     Review of Systems   Constitutional: Negative for activity change, appetite change, chills, fatigue and fever  HENT: Negative for hearing loss  Eyes: Negative for photophobia and visual disturbance  Respiratory: Negative for cough, chest tightness and shortness of breath  Cardiovascular: Negative for chest pain and palpitations  Gastrointestinal: Negative for diarrhea and nausea  Endocrine: Negative for cold intolerance and heat intolerance  Musculoskeletal: Positive for gait problem  Negative for arthralgias and back pain  Skin: Positive for color change and wound  Neurological: Positive for numbness  Psychiatric/Behavioral: Negative  Negative for agitation, self-injury and suicidal ideas  The patient is not nervous/anxious  OBJECTIVE:       Wound 01/08/20 Foot Left; Other (Comment) (Active)   Date First Assessed: 01/08/20   Primary Wound Type: Diabetic Ulcer  Location: Foot  Wound Location Orientation: (c) Left; Other (Comment)  Wound Description (Comments):  Naidu 1      Assessments 8/18/2020 10:36 AM 9/22/2020 10:11 AM   Wound Image       Wound Description -- Epithelialization;Granulation tissue;Pink   Jovita-wound Assessment -- Dry;Fragile   Wound Length (cm) -- 4 4 cm   Wound Width (cm) -- 4 cm   Wound Depth (cm) -- 0 1 cm   Wound Surface Area (cm^2) -- 17 6 cm^2   Wound Volume (cm^3) -- 1 76 cm^3   Calculated Wound Volume (cm^3) -- 1 76 cm^3   Change in Wound Size % -- -16 56   Drainage Amount -- Moderate   Drainage Description -- Serosanguineous   Non-staged Wound Description -- Full thickness   Dressing Status -- Intact       Active Orders   Date Order Priority Status Authorizing Provider   09/22/20 1040 Wound cleansing and dressings Routine Active Wilma Christensen DPM   09/22/20 1040 Wound compression and edema control Routine Active Delphine Marin DPM   09/22/20 1040 Wound home care Routine Active Delphine Marin DPM       Inactive Orders   Date Order Priority Status Authorizing Provider   09/08/20 1014 Debridement Routine Completed Delphine Marin DPM   09/08/20 1005 Wound cleansing and dressings Routine Discontinued Delphine Marin DPM   08/18/20 1054 Wound cleansing and dressings Routine Discontinued Delphine Marin DPM   08/18/20 1052 Wound cleansing and dressings Routine Discontinued Delphine Marin DPM       Wound Venous Ulcer Tibial Left;Posterior;Proximal (Active)   No Date First Assessed or Time First Assessed found  Primary Wound Type: Venous Ulcer  Location: Tibial  Wound Location Orientation: Left;Posterior;Proximal      Assessments 9/8/2020  9:48 AM 9/22/2020 10:13 AM   Wound Image       Wound Description -- Epithelialization;Granulation tissue;Pink   Jovita-wound Assessment -- Edema;Dry   Wound Length (cm) -- 6 cm   Wound Width (cm) -- 4 cm   Wound Depth (cm) -- 0 1 cm   Wound Surface Area (cm^2) -- 24 cm^2   Wound Volume (cm^3) -- 2 4 cm^3   Calculated Wound Volume (cm^3) -- 2 4 cm^3   Change in Wound Size % -- -33 33   Drainage Amount -- Moderate   Non-staged Wound Description -- Full thickness   Dressing Status -- Intact       Active Orders   Date Order Priority Status Authorizing Provider   09/22/20 1040 Wound cleansing and dressings Routine Active Delphine Marin DPM   09/22/20 1040 Wound compression and edema control Routine Active Delphine Marin DPM   09/22/20 1040 Wound home care Routine Active Delphine Marin DPM       Inactive Orders   Date Order Priority Status Authorizing Provider   09/08/20 1005 Wound cleansing and dressings Routine Discontinued Delphine Marin DPM      Wound 01/08/20 Foot Left; Other (Comment) (Active)   Wound Image   09/22/20 1011   Wound Description Epithelialization;Granulation tissue;Pink 09/22/20 1011   Jovita-wound Assessment Dry;Fragile 09/22/20 1011   Wound Length (cm) 4 4 cm 09/22/20 1011   Wound Width (cm) 4 cm 09/22/20 1011   Wound Depth (cm) 0 1 cm 09/22/20 1011   Wound Surface Area (cm^2) 17 6 cm^2 09/22/20 1011   Wound Volume (cm^3) 1 76 cm^3 09/22/20 1011   Calculated Wound Volume (cm^3) 1 76 cm^3 09/22/20 1011   Change in Wound Size % -16 56 09/22/20 1011   Drainage Amount Moderate 09/22/20 1011   Drainage Description Serosanguineous 09/22/20 1011   Non-staged Wound Description Full thickness 09/22/20 1011   Dressing Status Intact 09/22/20 1011       Wound Venous Ulcer Tibial Left;Posterior;Proximal (Active)   Wound Image   09/22/20 1013   Wound Description Epithelialization;Granulation tissue;Pink 09/22/20 1013   Jovita-wound Assessment Edema;Dry 09/22/20 1013   Wound Length (cm) 6 cm 09/22/20 1013   Wound Width (cm) 4 cm 09/22/20 1013   Wound Depth (cm) 0 1 cm 09/22/20 1013   Wound Surface Area (cm^2) 24 cm^2 09/22/20 1013   Wound Volume (cm^3) 2 4 cm^3 09/22/20 1013   Calculated Wound Volume (cm^3) 2 4 cm^3 09/22/20 1013   Change in Wound Size % -33 33 09/22/20 1013   Drainage Amount Moderate 09/22/20 1013   Drainage Description Green 09/08/20 0950   Non-staged Wound Description Full thickness 09/22/20 1013   Dressing Status Intact 09/22/20 1013     Wound 01/08/20 Foot Left; Other (Comment) (Active)       Wound Venous Ulcer Tibial Left;Posterior;Proximal (Active)       BP (!) 176/111   Pulse 92   Temp 98 1 °F (36 7 °C)   Resp 20     Physical Exam  Constitutional:       Appearance: Normal appearance  He is obese  HENT:      Head: Normocephalic and atraumatic  Right Ear: External ear normal       Left Ear: External ear normal       Nose: Nose normal    Eyes:      Conjunctiva/sclera: Conjunctivae normal    Neck:      Musculoskeletal: Normal range of motion  Cardiovascular:      Pulses:           Dorsalis pedis pulses are detected w/ Doppler on the left side  Posterior tibial pulses are detected w/ Doppler on the left side     Pulmonary:      Effort: Pulmonary effort is normal    Musculoskeletal:      Right lower le+ Pitting Edema present  Left lower le+ Pitting Edema present  Comments: BKA right with prothesis   Skin:     General: Skin is warm and dry  Capillary Refill: Capillary refill takes less than 2 seconds  Comments: Please see detailed wound assessment for diabetic Naidu grade 1 ulceration dorsal aspect left foot  Dermatophytosis left lateral leg  Neurological:      General: No focal deficit present  Mental Status: He is alert  Mental status is at baseline  He is disoriented  Sensory: Sensory deficit present  Coordination: Coordination abnormal       Gait: Gait abnormal       Deep Tendon Reflexes: Reflexes abnormal    Psychiatric:         Mood and Affect: Mood normal          Behavior: Behavior normal          Thought Content: Thought content normal          Judgment: Judgment normal            WOUND ORDERS  Orders Placed This Encounter   Procedures    Wound cleansing and dressings     Left foot wound  Wash your hands with soap and water  Remove old dressing, discard into plastic bag and place in trash  Cleanse the wound with soap and water prior to applying a clean dressing  Do not use tissue or cotton balls  Do not scrub the wound  Pat dry using gauze  Shower yes - remove dressing and shower prior to VNA  3  Apply Lotrisone to leg  - 2 times daily -  Patient to apply  Apply silver alginate to the wound  Cover with ABD  Secure with roll gauze and tape  Spandigrip G   Change dressing 3 times weekly  This dressing was applied today  Left upper lateral leg - lotrisone only - no dressing to cover  Standing Status:   Future     Standing Expiration Date:   2021    Wound compression and edema control     Elastic Tubular Stocking - Size G    Tubular elastic bandage: Apply from base of toes to behind the knee  Remove prior to nurse coming to shower  Avoid prolonged standing in one place      Elevate leg(s) above the level of the heart when sitting or as much as possible  Standing Status:   Future     Standing Expiration Date:   9/22/2021    Wound home care     Continue VNA 2 times weekly       Standing Status:   Future     Standing Expiration Date:   9/22/2021

## 2020-09-22 NOTE — PATIENT INSTRUCTIONS
Orders Placed This Encounter   Procedures    Wound cleansing and dressings     Left foot wound  Wash your hands with soap and water  Remove old dressing, discard into plastic bag and place in trash  Cleanse the wound with soap and water prior to applying a clean dressing  Do not use tissue or cotton balls  Do not scrub the wound  Pat dry using gauze  Shower yes - remove dressing and shower prior to VNA  3  Apply Lotrisone to leg  - 2 times daily -  Patient to apply  Apply silver alginate to the wound  Cover with ABD  Secure with roll gauze and tape  Spandigrip G   Change dressing 3 times weekly  This dressing was applied today  Left upper lateral leg - lotrisone only - no dressing to cover  Standing Status:   Future     Standing Expiration Date:   9/22/2021    Wound compression and edema control     Elastic Tubular Stocking - Size G    Tubular elastic bandage: Apply from base of toes to behind the knee  Remove prior to nurse coming to shower  Avoid prolonged standing in one place  Elevate leg(s) above the level of the heart when sitting or as much as possible  Standing Status:   Future     Standing Expiration Date:   9/22/2021    Wound home care     Continue VNA 2 times weekly       Standing Status:   Future     Standing Expiration Date:   9/22/2021

## 2020-09-24 NOTE — PROGRESS NOTES
Cast Application    Date/Time: 8/18/2020 2:58 PM  Performed by: Maureen Thomas RN  Authorized by: Peter Dominguez DPM     Consent:     Consent obtained:  Verbal    Consent given by:  Patient    Risks discussed:  Discoloration, numbness and pain    Alternatives discussed:  Delayed treatment  Pre-procedure details:     Sensation:  Normal  Procedure details:     Laterality:  Left    Location:  Leg    Leg:  L lower legCast type:  Unna boot    Supplies:  2 layer wrap  Post-procedure details:     Pain:  Unchanged    Sensation:  Normal    Patient tolerance of procedure:   Tolerated well, no immediate complications

## 2020-09-28 ENCOUNTER — PATIENT MESSAGE (OUTPATIENT)
Dept: FAMILY MEDICINE CLINIC | Facility: CLINIC | Age: 65
End: 2020-09-28

## 2020-09-29 ENCOUNTER — PATIENT MESSAGE (OUTPATIENT)
Dept: FAMILY MEDICINE CLINIC | Facility: CLINIC | Age: 65
End: 2020-09-29

## 2020-09-29 ENCOUNTER — OFFICE VISIT (OUTPATIENT)
Dept: WOUND CARE | Facility: HOSPITAL | Age: 65
End: 2020-09-29
Payer: MEDICARE

## 2020-09-29 VITALS
HEART RATE: 79 BPM | DIASTOLIC BLOOD PRESSURE: 91 MMHG | SYSTOLIC BLOOD PRESSURE: 155 MMHG | RESPIRATION RATE: 20 BRPM | TEMPERATURE: 97.6 F

## 2020-09-29 DIAGNOSIS — L97.909 ULCER OF LOWER EXTREMITY DUE TO DIABETES MELLITUS (HCC): ICD-10-CM

## 2020-09-29 DIAGNOSIS — E11.622 ULCER OF LOWER EXTREMITY DUE TO DIABETES MELLITUS (HCC): ICD-10-CM

## 2020-09-29 DIAGNOSIS — L97.521 DIABETIC ULCER OF LEFT FOOT ASSOCIATED WITH TYPE 2 DIABETES MELLITUS, LIMITED TO BREAKDOWN OF SKIN, UNSPECIFIED PART OF FOOT (HCC): Primary | ICD-10-CM

## 2020-09-29 DIAGNOSIS — E11.621 DIABETIC ULCER OF LEFT FOOT ASSOCIATED WITH TYPE 2 DIABETES MELLITUS, LIMITED TO BREAKDOWN OF SKIN, UNSPECIFIED PART OF FOOT (HCC): Primary | ICD-10-CM

## 2020-09-29 DIAGNOSIS — L97.221 NON-PRESSURE CHRONIC ULCER OF LEFT CALF, LIMITED TO BREAKDOWN OF SKIN (HCC): ICD-10-CM

## 2020-09-29 PROCEDURE — 99212 OFFICE O/P EST SF 10 MIN: CPT | Performed by: PODIATRIST

## 2020-09-29 NOTE — PROGRESS NOTES
Patient ID: Cleo Lora is a 77 y o  male Date of Birth 1955       Chief Complaint   Patient presents with    Follow Up Wound Care Visit     LLE including foot       Allergies:  Patient has no known allergies  Diagnosis:  1  Diabetic ulcer of left foot associated with type 2 diabetes mellitus, limited to breakdown of skin, unspecified part of foot ([facility] [address] )  -     Wound cleansing and dressings; Future  -     Wound compression and edema control; Future  -     Wound home care; Future    2  Non-pressure chronic ulcer of left calf, limited to breakdown of skin ([facility])  -     Wound cleansing and dressings; Future  -     Wound compression and edema control; Future  -     Wound home care; Future    3  [facility]        Assessment & Plan:  1  Diabetic Naidu grade 1 ulceration dorsal aspect left foot, skin prep was applied to this area with compression to left lower extremity  2  Diabetic Naidu grade 1 ulceration left lower extremity   3  Patient to continue to take a shower and properly cleanse left lower extremity prior to visiting nurse arrival, we will continue with Lotrisone, silver alginate to areas of weeping dry dressing and double-layer Tubigrip for compression  Patient is to continue using his sequential compression pumps for 1 hour twice a day  Frequent elevation, low-sodium diet, proper protein intake and proper glycemic control was reviewed with patient  He understands and agrees with the plan and will follow up in 2 weeks  Procedures  - no procedures were performed today    Subjective:   Patient presents today for care of left lower extremity diabetic Naidu grade 1 ulcerations, he continues have visiting nurses change dressings as directed and states his leg is looking and feeling better as now he can get in the shower and wash his leg  He is not have any new complaints      The following portions of the patient's history were reviewed and updated as appropriate: allergies, current medications, past family history, past medical history, past social history, past surgical history, and problem list     Review of Systems   Constitutional: Negative for activity change, appetite change, chills, fatigue and fever  HENT: Negative for hearing loss  Eyes: Negative for photophobia and visual disturbance  Respiratory: Negative for cough, chest tightness and shortness of breath  Cardiovascular: Negative for chest pain and palpitations  Gastrointestinal: Negative for diarrhea and nausea  Endocrine: Negative for cold intolerance and heat intolerance  Musculoskeletal: Negative for arthralgias and back pain  Skin: Positive for color change and wound  Neurological: Positive for numbness  Psychiatric/Behavioral: Negative  Negative for agitation, self-injury and suicidal ideas  The patient is not nervous/anxious  Objective:  /91   Pulse 79   Temp 97 6 °F (36 4 °C)   Resp 20     Physical Exam  Constitutional:       Appearance: Normal appearance  He is obese  HENT:      Head: Normocephalic and atraumatic  Right Ear: External ear normal       Left Ear: External ear normal       Nose: Nose normal    Eyes:      Conjunctiva/sclera: Conjunctivae normal    Neck:      Musculoskeletal: Normal range of motion  Cardiovascular:      Pulses:           Dorsalis pedis pulses are detected w/ Doppler on the right side  Posterior tibial pulses are detected w/ Doppler on the right side  Comments: BKA right  Pulmonary:      Effort: Pulmonary effort is normal    Musculoskeletal:      Left lower le+ Edema present  Comments: BKA right   Skin:     General: Skin is warm and dry  Capillary Refill: Capillary refill takes less than 2 seconds  Comments: See detail wound assessment for diabetic Naidu grade 1 ulcerations left foot and left leg   Neurological:      General: No focal deficit present        Mental Status: He is alert and oriented to person, place, and time  Mental status is at baseline  Sensory: Sensory deficit present  Coordination: Coordination abnormal       Gait: Gait abnormal       Deep Tendon Reflexes: Reflexes abnormal    Psychiatric:         Mood and Affect: Mood normal          Behavior: Behavior normal          Thought Content: Thought content normal          Judgment: Judgment normal          Wound 01/08/20 Foot Left; Other (Comment) (Active)   Wound Image Images linked 09/29/20 1127   Wound Description Epithelialization;Granulation tissue;Pink 09/29/20 1130   Jovita-wound Assessment Dry;Fragile 09/29/20 1130   Wound Length (cm) 2 cm 09/29/20 1130   Wound Width (cm) 2 cm 09/29/20 1130   Wound Depth (cm) 0 cm 09/29/20 1130   Wound Surface Area (cm^2) 4 cm^2 09/29/20 1130   Wound Volume (cm^3) 0 cm^3 09/29/20 1130   Calculated Wound Volume (cm^3) 0 cm^3 09/29/20 1130   Change in Wound Size % 100 09/29/20 1130   Drainage Amount Moderate 09/29/20 1130   Drainage Description Serosanguineous; Serous 09/29/20 1130   Non-staged Wound Description Full thickness 09/29/20 1130   Dressing Status Intact 09/29/20 1130       Wound Venous Ulcer Tibial Left;Posterior;Proximal (Active)   Wound Image Images linked 09/29/20 1127   Wound Description Epithelialization;Granulation tissue;Pink 09/29/20 1131   Jovita-wound Assessment Edema;Dry 09/29/20 1131   Wound Length (cm) 3 cm 09/29/20 1131   Wound Width (cm) 2 cm 09/29/20 1131   Wound Depth (cm) 0 1 cm 09/29/20 1131   Wound Surface Area (cm^2) 6 cm^2 09/29/20 1131   Wound Volume (cm^3) 0 6 cm^3 09/29/20 1131   Calculated Wound Volume (cm^3) 0 6 cm^3 09/29/20 1131   Change in Wound Size % 66 67 09/29/20 1131   Drainage Amount Large 09/29/20 1131   Drainage Description Serous 09/29/20 1131   Non-staged Wound Description Full thickness 09/29/20 1131   Dressing Status Intact 09/29/20 1131                   Wound Instructions:  Orders Placed This Encounter   Procedures    Wound cleansing and dressings     Left foot wound and left leg wound:  Wash your hands with soap and water  Remove old dressing, discard into plastic bag and place in trash  Cleanse the wound with soap and water prior to applying a clean dressing  Do not use tissue or cotton balls  Do not scrub the wound  Pat dry using gauze  Shower yes - remove dressing and shower prior to home health visit  Apply Lotrisone to leg  - 2 times daily -  Patient to apply  Apply silver alginate to the wound  Cover with ABD  Secure with roll gauze and tape  Change dressing 3 times weekly      This dressing was applied today         Standing Status:   Future     Standing Expiration Date:   10/29/2020    Wound compression and edema control     LLE Wound compression and edema control        Elastic Tubular Stocking - Size G     Tubular elastic bandage: Apply from base of toes to behind the knee  Remove prior to nurse coming to shower      Avoid prolonged standing in one place      Elevate leg(s) above the level of the heart when sitting or as much as possible  Standing Status:   Future     Standing Expiration Date:   10/29/2020    Wound home care     Continue home wound care with SL VNA  Standing Status:   Future     Standing Expiration Date:   10/29/2020         Malcolm Morganirmer, DPM, FACFAS    Portions of the record may have been created with voice recognition software  Occasional wrong word or "sound a like" substitutions may have occurred due to the inherent limitations of voice recognition software  Read the chart carefully and recognize, using context, where substitutions have occurred

## 2020-09-29 NOTE — TELEPHONE ENCOUNTER
From: Samuel Rodriguez  To: Tracie Vicente MD  Sent: 9/28/2020 11:53 AM EDT  Subject: Prescription Question    I got a letter from [address] [place] [address] saying that there was a recall of certain Metformin Hydrochloride Extended-Release 500mg tablets and 750mg tablets  Any reason for me to be concerned?     Thanx

## 2020-09-29 NOTE — PATIENT INSTRUCTIONS
Orders Placed This Encounter   Procedures    Wound cleansing and dressings     Left foot wound and left leg wound:  Wash your hands with soap and water  Remove old dressing, discard into plastic bag and place in trash  Cleanse the wound with soap and water prior to applying a clean dressing  Do not use tissue or cotton balls  Do not scrub the wound  Pat dry using gauze  Shower yes - remove dressing and shower prior to home health visit  Apply Lotrisone to leg  - 2 times daily -  Patient to apply  Apply silver alginate to the wound  Cover with ABD  Secure with roll gauze and tape  Change dressing 3 times weekly      This dressing was applied today         Standing Status:   Future     Standing Expiration Date:   10/29/2020    Wound compression and edema control     LLE Wound compression and edema control        Elastic Tubular Stocking - Size G     Tubular elastic bandage: Apply from base of toes to behind the knee  Remove prior to nurse coming to shower      Avoid prolonged standing in one place      Elevate leg(s) above the level of the heart when sitting or as much as possible  Standing Status:   Future     Standing Expiration Date:   10/29/2020    Wound home care     Continue home wound care with [facility] V[facility]       Standing Status:   Future     Standing Expiration Date:   10/29/2020

## 2020-09-30 NOTE — TELEPHONE ENCOUNTER
From: Amanda Santos  To: Louise Lama MD  Sent: 9/29/2020 1:29 PM EDT  Subject: Prescription Question    Rosie Doc! Attached is a copy of the letter  [facility] # for the 750mg tablets is printed on the prescription label  I could not find the Lot # anywhere on the label  Thanx for your help  Latoya Sterling      ----- Message -----   From:Aida Fletcher MD   Sent:9/29/2020 11:03 AM EDT   To:Armando Rice   Subject:RE: Prescription Question    Samir Kruse,   Did the letter from [address] [place] [address] say that the prescription you've been receiving is an affected lot? If so, I can switch you to regular release metformin 500 mg to take twice a day  Let me know,  Patric Fletcher MD      ----- Message -----   From:Armando Erickson   Sent:9/28/2020 11:53 AM EDT   To:Aida Interiano MD   Subject:Prescription Question    I got a letter from [address] [place] [address] saying that there was a recall of certain Metformin Hydrochloride Extended-Release 500mg tablets and 750mg tablets  Any reason for me to be concerned?     Thanx

## 2020-10-13 ENCOUNTER — OFFICE VISIT (OUTPATIENT)
Dept: WOUND CARE | Facility: HOSPITAL | Age: 65
End: 2020-10-13
Payer: MEDICARE

## 2020-10-13 VITALS
SYSTOLIC BLOOD PRESSURE: 162 MMHG | DIASTOLIC BLOOD PRESSURE: 94 MMHG | TEMPERATURE: 97.2 F | HEART RATE: 84 BPM | RESPIRATION RATE: 80 BRPM

## 2020-10-13 DIAGNOSIS — L97.909 ULCER OF LOWER EXTREMITY DUE TO DIABETES MELLITUS (HCC): ICD-10-CM

## 2020-10-13 DIAGNOSIS — E11.621 DIABETIC ULCER OF LEFT FOOT ASSOCIATED WITH TYPE 2 DIABETES MELLITUS, LIMITED TO BREAKDOWN OF SKIN, UNSPECIFIED PART OF FOOT (HCC): Primary | ICD-10-CM

## 2020-10-13 DIAGNOSIS — L97.221 NON-PRESSURE CHRONIC ULCER OF LEFT CALF, LIMITED TO BREAKDOWN OF SKIN (HCC): ICD-10-CM

## 2020-10-13 DIAGNOSIS — L97.521 ULCER OF TOE OF LEFT FOOT, LIMITED TO BREAKDOWN OF SKIN (HCC): ICD-10-CM

## 2020-10-13 DIAGNOSIS — L97.521 DIABETIC ULCER OF LEFT FOOT ASSOCIATED WITH TYPE 2 DIABETES MELLITUS, LIMITED TO BREAKDOWN OF SKIN, UNSPECIFIED PART OF FOOT (HCC): Primary | ICD-10-CM

## 2020-10-13 DIAGNOSIS — E11.622 ULCER OF LOWER EXTREMITY DUE TO DIABETES MELLITUS (HCC): ICD-10-CM

## 2020-10-13 PROCEDURE — 97597 DBRDMT OPN WND 1ST 20 CM/<: CPT | Performed by: PODIATRIST

## 2020-10-27 ENCOUNTER — OFFICE VISIT (OUTPATIENT)
Dept: WOUND CARE | Facility: HOSPITAL | Age: 65
End: 2020-10-27
Payer: MEDICARE

## 2020-10-27 VITALS
RESPIRATION RATE: 18 BRPM | SYSTOLIC BLOOD PRESSURE: 172 MMHG | DIASTOLIC BLOOD PRESSURE: 86 MMHG | HEART RATE: 88 BPM | TEMPERATURE: 95.1 F

## 2020-10-27 DIAGNOSIS — L97.929 IDIOPATHIC CHRONIC VENOUS HYPERTENSION OF LEFT LOWER EXTREMITY WITH ULCER (HCC): ICD-10-CM

## 2020-10-27 DIAGNOSIS — L97.521 DIABETIC ULCER OF LEFT FOOT ASSOCIATED WITH TYPE 2 DIABETES MELLITUS, LIMITED TO BREAKDOWN OF SKIN, UNSPECIFIED PART OF FOOT (HCC): Primary | ICD-10-CM

## 2020-10-27 DIAGNOSIS — L97.221 NON-PRESSURE CHRONIC ULCER OF LEFT CALF, LIMITED TO BREAKDOWN OF SKIN (HCC): ICD-10-CM

## 2020-10-27 DIAGNOSIS — I87.312 IDIOPATHIC CHRONIC VENOUS HYPERTENSION OF LEFT LOWER EXTREMITY WITH ULCER (HCC): ICD-10-CM

## 2020-10-27 DIAGNOSIS — E11.621 DIABETIC ULCER OF LEFT FOOT ASSOCIATED WITH TYPE 2 DIABETES MELLITUS, LIMITED TO BREAKDOWN OF SKIN, UNSPECIFIED PART OF FOOT (HCC): Primary | ICD-10-CM

## 2020-10-27 PROCEDURE — 97598 DBRDMT OPN WND ADDL 20CM/<: CPT | Performed by: PODIATRIST

## 2020-10-27 PROCEDURE — 97597 DBRDMT OPN WND 1ST 20 CM/<: CPT | Performed by: PODIATRIST

## 2020-11-10 ENCOUNTER — HOSPITAL ENCOUNTER (OUTPATIENT)
Dept: NON INVASIVE DIAGNOSTICS | Facility: CLINIC | Age: 65
Discharge: HOME | End: 2020-11-10
Payer: MEDICARE

## 2020-11-10 DIAGNOSIS — L97.221 NON-PRESSURE CHRONIC ULCER OF LEFT CALF, LIMITED TO BREAKDOWN OF SKIN (HCC): ICD-10-CM

## 2020-11-10 DIAGNOSIS — E11.621 DIABETIC ULCER OF LEFT FOOT ASSOCIATED WITH TYPE 2 DIABETES MELLITUS, LIMITED TO BREAKDOWN OF SKIN, UNSPECIFIED PART OF FOOT (HCC): ICD-10-CM

## 2020-11-10 DIAGNOSIS — L97.521 DIABETIC ULCER OF LEFT FOOT ASSOCIATED WITH TYPE 2 DIABETES MELLITUS, LIMITED TO BREAKDOWN OF SKIN, UNSPECIFIED PART OF FOOT (HCC): ICD-10-CM

## 2020-11-10 PROCEDURE — 93971 EXTREMITY STUDY: CPT | Performed by: SURGERY

## 2020-11-10 PROCEDURE — 93971 EXTREMITY STUDY: CPT

## 2020-11-10 PROCEDURE — 93926 LOWER EXTREMITY STUDY: CPT | Performed by: SURGERY

## 2020-11-10 PROCEDURE — 93926 LOWER EXTREMITY STUDY: CPT

## 2020-11-10 PROCEDURE — 93922 UPR/L XTREMITY ART 2 LEVELS: CPT | Performed by: SURGERY

## 2020-11-11 ENCOUNTER — OFFICE VISIT (OUTPATIENT)
Dept: WOUND CARE | Facility: HOSPITAL | Age: 65
End: 2020-11-11
Payer: MEDICARE

## 2020-11-11 VITALS
WEIGHT: 315 LBS | TEMPERATURE: 97.8 F | HEART RATE: 76 BPM | DIASTOLIC BLOOD PRESSURE: 84 MMHG | HEIGHT: 76 IN | RESPIRATION RATE: 20 BRPM | SYSTOLIC BLOOD PRESSURE: 146 MMHG | BODY MASS INDEX: 38.36 KG/M2

## 2020-11-11 DIAGNOSIS — L97.521 DIABETIC ULCER OF LEFT FOOT ASSOCIATED WITH TYPE 2 DIABETES MELLITUS, LIMITED TO BREAKDOWN OF SKIN, UNSPECIFIED PART OF FOOT (HCC): Primary | ICD-10-CM

## 2020-11-11 DIAGNOSIS — L97.221 NON-PRESSURE CHRONIC ULCER OF LEFT CALF, LIMITED TO BREAKDOWN OF SKIN (HCC): ICD-10-CM

## 2020-11-11 DIAGNOSIS — L97.929 IDIOPATHIC CHRONIC VENOUS HYPERTENSION OF LEFT LOWER EXTREMITY WITH ULCER (HCC): ICD-10-CM

## 2020-11-11 DIAGNOSIS — E11.621 DIABETIC ULCER OF LEFT FOOT ASSOCIATED WITH TYPE 2 DIABETES MELLITUS, LIMITED TO BREAKDOWN OF SKIN, UNSPECIFIED PART OF FOOT (HCC): Primary | ICD-10-CM

## 2020-11-11 DIAGNOSIS — I87.312 IDIOPATHIC CHRONIC VENOUS HYPERTENSION OF LEFT LOWER EXTREMITY WITH ULCER (HCC): ICD-10-CM

## 2020-11-11 PROCEDURE — 99213 OFFICE O/P EST LOW 20 MIN: CPT | Performed by: PODIATRIST

## 2020-11-24 ENCOUNTER — OFFICE VISIT (OUTPATIENT)
Dept: WOUND CARE | Facility: HOSPITAL | Age: 65
End: 2020-11-24
Payer: MEDICARE

## 2020-11-24 VITALS
HEART RATE: 64 BPM | SYSTOLIC BLOOD PRESSURE: 150 MMHG | DIASTOLIC BLOOD PRESSURE: 80 MMHG | RESPIRATION RATE: 20 BRPM | TEMPERATURE: 96.4 F

## 2020-11-24 DIAGNOSIS — E11.621 DIABETIC ULCER OF LEFT FOOT ASSOCIATED WITH TYPE 2 DIABETES MELLITUS, LIMITED TO BREAKDOWN OF SKIN, UNSPECIFIED PART OF FOOT (HCC): ICD-10-CM

## 2020-11-24 DIAGNOSIS — L97.521 DIABETIC ULCER OF LEFT FOOT ASSOCIATED WITH TYPE 2 DIABETES MELLITUS, LIMITED TO BREAKDOWN OF SKIN, UNSPECIFIED PART OF FOOT (HCC): ICD-10-CM

## 2020-11-24 DIAGNOSIS — L97.929 IDIOPATHIC CHRONIC VENOUS HYPERTENSION OF LEFT LOWER EXTREMITY WITH ULCER (HCC): ICD-10-CM

## 2020-11-24 DIAGNOSIS — I87.312 IDIOPATHIC CHRONIC VENOUS HYPERTENSION OF LEFT LOWER EXTREMITY WITH ULCER (HCC): ICD-10-CM

## 2020-11-24 DIAGNOSIS — L97.221 NON-PRESSURE CHRONIC ULCER OF LEFT CALF, LIMITED TO BREAKDOWN OF SKIN (HCC): Primary | ICD-10-CM

## 2020-11-24 PROCEDURE — 97597 DBRDMT OPN WND 1ST 20 CM/<: CPT | Performed by: PODIATRIST

## 2020-12-08 ENCOUNTER — OFFICE VISIT (OUTPATIENT)
Dept: WOUND CARE | Facility: HOSPITAL | Age: 65
End: 2020-12-08
Payer: MEDICARE

## 2020-12-08 ENCOUNTER — TELEPHONE (OUTPATIENT)
Dept: WOUND CARE | Facility: HOSPITAL | Age: 65
End: 2020-12-08

## 2020-12-08 VITALS
DIASTOLIC BLOOD PRESSURE: 88 MMHG | HEART RATE: 68 BPM | RESPIRATION RATE: 20 BRPM | TEMPERATURE: 97.5 F | SYSTOLIC BLOOD PRESSURE: 170 MMHG

## 2020-12-08 DIAGNOSIS — R26.89 FUNCTIONAL GAIT ABNORMALITY: ICD-10-CM

## 2020-12-08 DIAGNOSIS — L97.221 NON-PRESSURE CHRONIC ULCER OF LEFT CALF, LIMITED TO BREAKDOWN OF SKIN (HCC): ICD-10-CM

## 2020-12-08 DIAGNOSIS — L97.929 IDIOPATHIC CHRONIC VENOUS HYPERTENSION OF LEFT LOWER EXTREMITY WITH ULCER (HCC): ICD-10-CM

## 2020-12-08 DIAGNOSIS — L97.521 DIABETIC ULCER OF LEFT FOOT ASSOCIATED WITH TYPE 2 DIABETES MELLITUS, LIMITED TO BREAKDOWN OF SKIN, UNSPECIFIED PART OF FOOT (HCC): Primary | ICD-10-CM

## 2020-12-08 DIAGNOSIS — B35.9 DERMATOPHYTOSIS: ICD-10-CM

## 2020-12-08 DIAGNOSIS — I87.312 IDIOPATHIC CHRONIC VENOUS HYPERTENSION OF LEFT LOWER EXTREMITY WITH ULCER (HCC): ICD-10-CM

## 2020-12-08 DIAGNOSIS — E11.621 DIABETIC ULCER OF LEFT FOOT ASSOCIATED WITH TYPE 2 DIABETES MELLITUS, LIMITED TO BREAKDOWN OF SKIN, UNSPECIFIED PART OF FOOT (HCC): Primary | ICD-10-CM

## 2020-12-08 PROCEDURE — 99213 OFFICE O/P EST LOW 20 MIN: CPT | Performed by: PODIATRIST

## 2020-12-08 RX ORDER — CLOTRIMAZOLE AND BETAMETHASONE DIPROPIONATE 10; .64 MG/G; MG/G
CREAM TOPICAL 2 TIMES DAILY
Qty: 90 G | Refills: 0 | Status: SHIPPED | OUTPATIENT
Start: 2020-12-08 | End: 2022-03-16

## 2020-12-10 ENCOUNTER — TRANSCRIBE ORDERS (OUTPATIENT)
Dept: LAB | Facility: CLINIC | Age: 65
End: 2020-12-10

## 2020-12-10 ENCOUNTER — OFFICE VISIT (OUTPATIENT)
Dept: FAMILY MEDICINE CLINIC | Facility: CLINIC | Age: 65
End: 2020-12-10
Payer: MEDICARE

## 2020-12-10 ENCOUNTER — APPOINTMENT (OUTPATIENT)
Dept: LAB | Facility: CLINIC | Age: 65
End: 2020-12-10
Payer: MEDICARE

## 2020-12-10 VITALS
DIASTOLIC BLOOD PRESSURE: 70 MMHG | WEIGHT: 315 LBS | SYSTOLIC BLOOD PRESSURE: 142 MMHG | OXYGEN SATURATION: 98 % | HEART RATE: 85 BPM | RESPIRATION RATE: 18 BRPM | BODY MASS INDEX: 38.36 KG/M2 | HEIGHT: 76 IN | TEMPERATURE: 97.6 F

## 2020-12-10 DIAGNOSIS — Z89.511 S/P BKA (BELOW KNEE AMPUTATION) UNILATERAL, RIGHT (HCC): ICD-10-CM

## 2020-12-10 DIAGNOSIS — E03.9 ACQUIRED HYPOTHYROIDISM: ICD-10-CM

## 2020-12-10 DIAGNOSIS — E66.01 MORBID OBESITY WITH BMI OF 50.0-59.9, ADULT (HCC): ICD-10-CM

## 2020-12-10 DIAGNOSIS — E11.8 CONTROLLED TYPE 2 DIABETES MELLITUS WITH COMPLICATION, WITHOUT LONG-TERM CURRENT USE OF INSULIN (HCC): ICD-10-CM

## 2020-12-10 DIAGNOSIS — Z87.898: ICD-10-CM

## 2020-12-10 DIAGNOSIS — Z00.00 MEDICARE ANNUAL WELLNESS VISIT, SUBSEQUENT: Primary | ICD-10-CM

## 2020-12-10 DIAGNOSIS — E78.2 MIXED HYPERLIPIDEMIA: ICD-10-CM

## 2020-12-10 DIAGNOSIS — E11.8 CONTROLLED TYPE 2 DIABETES MELLITUS WITH COMPLICATION, WITHOUT LONG-TERM CURRENT USE OF INSULIN (HCC): Primary | ICD-10-CM

## 2020-12-10 DIAGNOSIS — I10 ESSENTIAL HYPERTENSION: ICD-10-CM

## 2020-12-10 DIAGNOSIS — K21.9 GASTROESOPHAGEAL REFLUX DISEASE WITHOUT ESOPHAGITIS: ICD-10-CM

## 2020-12-10 DIAGNOSIS — Z13.6 SCREENING FOR AAA (ABDOMINAL AORTIC ANEURYSM): ICD-10-CM

## 2020-12-10 DIAGNOSIS — Z23 ENCOUNTER FOR IMMUNIZATION: ICD-10-CM

## 2020-12-10 LAB
ALBUMIN SERPL BCP-MCNC: 3.8 G/DL (ref 3.5–5)
ALP SERPL-CCNC: 78 U/L (ref 46–116)
ALT SERPL W P-5'-P-CCNC: 20 U/L (ref 12–78)
ANION GAP SERPL CALCULATED.3IONS-SCNC: 6 MMOL/L (ref 4–13)
AST SERPL W P-5'-P-CCNC: 13 U/L (ref 5–45)
BASOPHILS # BLD AUTO: 0.03 THOUSANDS/ΜL (ref 0–0.1)
BASOPHILS NFR BLD AUTO: 0 % (ref 0–1)
BILIRUB SERPL-MCNC: 0.61 MG/DL (ref 0.2–1)
BUN SERPL-MCNC: 13 MG/DL (ref 5–25)
CALCIUM SERPL-MCNC: 9.2 MG/DL (ref 8.3–10.1)
CHLORIDE SERPL-SCNC: 102 MMOL/L (ref 100–108)
CHOLEST SERPL-MCNC: 115 MG/DL (ref 50–200)
CO2 SERPL-SCNC: 28 MMOL/L (ref 21–32)
CREAT SERPL-MCNC: 0.96 MG/DL (ref 0.6–1.3)
EOSINOPHIL # BLD AUTO: 0.1 THOUSAND/ΜL (ref 0–0.61)
EOSINOPHIL NFR BLD AUTO: 1 % (ref 0–6)
ERYTHROCYTE [DISTWIDTH] IN BLOOD BY AUTOMATED COUNT: 16.1 % (ref 11.6–15.1)
EST. AVERAGE GLUCOSE BLD GHB EST-MCNC: 114 MG/DL
GFR SERPL CREATININE-BSD FRML MDRD: 81 ML/MIN/1.73SQ M
GLUCOSE P FAST SERPL-MCNC: 109 MG/DL (ref 65–99)
HBA1C MFR BLD: 5.6 %
HCT VFR BLD AUTO: 43.2 % (ref 36.5–49.3)
HDLC SERPL-MCNC: 37 MG/DL
HGB BLD-MCNC: 13.2 G/DL (ref 12–17)
IMM GRANULOCYTES # BLD AUTO: 0.02 THOUSAND/UL (ref 0–0.2)
IMM GRANULOCYTES NFR BLD AUTO: 0 % (ref 0–2)
LDLC SERPL CALC-MCNC: 63 MG/DL (ref 0–100)
LYMPHOCYTES # BLD AUTO: 0.94 THOUSANDS/ΜL (ref 0.6–4.47)
LYMPHOCYTES NFR BLD AUTO: 11 % (ref 14–44)
MCH RBC QN AUTO: 25.3 PG (ref 26.8–34.3)
MCHC RBC AUTO-ENTMCNC: 30.6 G/DL (ref 31.4–37.4)
MCV RBC AUTO: 83 FL (ref 82–98)
MONOCYTES # BLD AUTO: 0.56 THOUSAND/ΜL (ref 0.17–1.22)
MONOCYTES NFR BLD AUTO: 6 % (ref 4–12)
NEUTROPHILS # BLD AUTO: 7.26 THOUSANDS/ΜL (ref 1.85–7.62)
NEUTS SEG NFR BLD AUTO: 82 % (ref 43–75)
NRBC BLD AUTO-RTO: 0 /100 WBCS
PLATELET # BLD AUTO: 228 THOUSANDS/UL (ref 149–390)
PMV BLD AUTO: 11.4 FL (ref 8.9–12.7)
POTASSIUM SERPL-SCNC: 4.5 MMOL/L (ref 3.5–5.3)
PROT SERPL-MCNC: 8.5 G/DL (ref 6.4–8.2)
RBC # BLD AUTO: 5.21 MILLION/UL (ref 3.88–5.62)
SODIUM SERPL-SCNC: 136 MMOL/L (ref 136–145)
TRIGL SERPL-MCNC: 73 MG/DL
TSH SERPL DL<=0.05 MIU/L-ACNC: 1.31 UIU/ML (ref 0.36–3.74)
WBC # BLD AUTO: 8.91 THOUSAND/UL (ref 4.31–10.16)

## 2020-12-10 PROCEDURE — 36415 COLL VENOUS BLD VENIPUNCTURE: CPT

## 2020-12-10 PROCEDURE — G0439 PPPS, SUBSEQ VISIT: HCPCS | Performed by: FAMILY MEDICINE

## 2020-12-10 PROCEDURE — 80053 COMPREHEN METABOLIC PANEL: CPT

## 2020-12-10 PROCEDURE — 84443 ASSAY THYROID STIM HORMONE: CPT

## 2020-12-10 PROCEDURE — 85025 COMPLETE CBC W/AUTO DIFF WBC: CPT

## 2020-12-10 PROCEDURE — 80061 LIPID PANEL: CPT

## 2020-12-10 PROCEDURE — 83036 HEMOGLOBIN GLYCOSYLATED A1C: CPT

## 2020-12-10 PROCEDURE — 99214 OFFICE O/P EST MOD 30 MIN: CPT | Performed by: FAMILY MEDICINE

## 2020-12-10 RX ORDER — ZOSTER VACCINE RECOMBINANT, ADJUVANTED 50 MCG/0.5
KIT INTRAMUSCULAR
Qty: 1 EACH | Refills: 1 | Status: SHIPPED | OUTPATIENT
Start: 2020-12-10

## 2020-12-15 ENCOUNTER — TELEPHONE (OUTPATIENT)
Dept: WOUND CARE | Facility: HOSPITAL | Age: 65
End: 2020-12-15

## 2020-12-15 ENCOUNTER — OFFICE VISIT (OUTPATIENT)
Dept: WOUND CARE | Facility: HOSPITAL | Age: 65
End: 2020-12-15
Payer: MEDICARE

## 2020-12-15 VITALS
HEART RATE: 64 BPM | SYSTOLIC BLOOD PRESSURE: 138 MMHG | DIASTOLIC BLOOD PRESSURE: 88 MMHG | RESPIRATION RATE: 18 BRPM | TEMPERATURE: 98.1 F

## 2020-12-15 DIAGNOSIS — E11.621 DIABETIC ULCER OF LEFT FOOT ASSOCIATED WITH TYPE 2 DIABETES MELLITUS, LIMITED TO BREAKDOWN OF SKIN, UNSPECIFIED PART OF FOOT (HCC): ICD-10-CM

## 2020-12-15 DIAGNOSIS — B35.9 DERMATOPHYTOSIS: ICD-10-CM

## 2020-12-15 DIAGNOSIS — L97.521 DIABETIC ULCER OF LEFT FOOT ASSOCIATED WITH TYPE 2 DIABETES MELLITUS, LIMITED TO BREAKDOWN OF SKIN, UNSPECIFIED PART OF FOOT (HCC): ICD-10-CM

## 2020-12-15 DIAGNOSIS — L97.929 IDIOPATHIC CHRONIC VENOUS HYPERTENSION OF LEFT LOWER EXTREMITY WITH ULCER (HCC): ICD-10-CM

## 2020-12-15 DIAGNOSIS — L97.221 NON-PRESSURE CHRONIC ULCER OF LEFT CALF, LIMITED TO BREAKDOWN OF SKIN (HCC): Primary | ICD-10-CM

## 2020-12-15 DIAGNOSIS — I87.312 IDIOPATHIC CHRONIC VENOUS HYPERTENSION OF LEFT LOWER EXTREMITY WITH ULCER (HCC): ICD-10-CM

## 2020-12-15 PROCEDURE — 99213 OFFICE O/P EST LOW 20 MIN: CPT | Performed by: PODIATRIST

## 2020-12-28 DIAGNOSIS — R77.9 ELEVATED SERUM PROTEIN LEVEL: Primary | ICD-10-CM

## 2021-01-06 ENCOUNTER — OFFICE VISIT (OUTPATIENT)
Dept: WOUND CARE | Facility: HOSPITAL | Age: 66
End: 2021-01-06
Payer: MEDICARE

## 2021-01-06 VITALS
DIASTOLIC BLOOD PRESSURE: 80 MMHG | SYSTOLIC BLOOD PRESSURE: 126 MMHG | TEMPERATURE: 95.9 F | RESPIRATION RATE: 20 BRPM | HEART RATE: 88 BPM

## 2021-01-06 DIAGNOSIS — L97.929 IDIOPATHIC CHRONIC VENOUS HYPERTENSION OF LEFT LOWER EXTREMITY WITH ULCER (HCC): ICD-10-CM

## 2021-01-06 DIAGNOSIS — I87.312 IDIOPATHIC CHRONIC VENOUS HYPERTENSION OF LEFT LOWER EXTREMITY WITH ULCER (HCC): ICD-10-CM

## 2021-01-06 DIAGNOSIS — E11.622 DIABETES MELLITUS WITH SKIN ULCER (HCC): ICD-10-CM

## 2021-01-06 DIAGNOSIS — E11.621 DIABETIC ULCER OF LEFT FOOT ASSOCIATED WITH TYPE 2 DIABETES MELLITUS, LIMITED TO BREAKDOWN OF SKIN, UNSPECIFIED PART OF FOOT (HCC): Primary | ICD-10-CM

## 2021-01-06 DIAGNOSIS — L97.222 NON-PRESSURE CHRONIC ULCER OF LEFT CALF WITH FAT LAYER EXPOSED (HCC): ICD-10-CM

## 2021-01-06 DIAGNOSIS — L97.521 DIABETIC ULCER OF LEFT FOOT ASSOCIATED WITH TYPE 2 DIABETES MELLITUS, LIMITED TO BREAKDOWN OF SKIN, UNSPECIFIED PART OF FOOT (HCC): Primary | ICD-10-CM

## 2021-01-06 DIAGNOSIS — L98.499 DIABETES MELLITUS WITH SKIN ULCER (HCC): ICD-10-CM

## 2021-01-06 PROCEDURE — 99214 OFFICE O/P EST MOD 30 MIN: CPT | Performed by: PODIATRIST

## 2021-01-06 PROCEDURE — 11042 DBRDMT SUBQ TIS 1ST 20SQCM/<: CPT | Performed by: PODIATRIST

## 2021-01-06 RX ORDER — LIDOCAINE HYDROCHLORIDE 40 MG/ML
5 SOLUTION TOPICAL ONCE
Status: COMPLETED | OUTPATIENT
Start: 2021-01-06 | End: 2021-01-06

## 2021-01-06 RX ADMIN — LIDOCAINE HYDROCHLORIDE 5 ML: 40 SOLUTION TOPICAL at 13:13

## 2021-01-06 NOTE — PROGRESS NOTES
Patient ID: Priyanka Rojas is a 77 y o  male Date of Birth 1955       Chief Complaint   Patient presents with    Follow Up Wound Care Visit     Left Foot and LLE wounds       Allergies:  Patient has no known allergies  Diagnosis:  1  Diabetic ulcer of left foot associated with type 2 diabetes mellitus, limited to breakdown of skin, unspecified part of foot ([facility])  -     lidocaine (XYLOCAINE) 4 % topical solution 5 mL  -     Wound cleansing and dressings; Future    2  Non-pressure chronic ulcer of left calf with fat layer exposed ([facility] )  -     lidocaine (XYLOCAINE) 4 % topical solution 5 mL  -     Wound cleansing and dressings; Future    3  Idiopathic chronic venous hypertension of left lower extremity with ulcer ([facility])  -     Wound cleansing and dressings; Future    4  Diabetes mellitus with skin ulcer ([facility] )       Diagnosis ICD-10-CM Associated Orders   1  Diabetic ulcer of left foot associated with type 2 diabetes mellitus, limited to breakdown of skin, unspecified part of foot ([facility])  E11 621 lidocaine (XYLOCAINE) 4 % topical solution 5 mL    L97 521 Wound cleansing and dressings   2  Non-pressure chronic ulcer of left calf with fat layer exposed ([facility] )  L97 222 lidocaine (XYLOCAINE) 4 % topical solution 5 mL     Wound cleansing and dressings   3  Idiopathic chronic venous hypertension of left lower extremity with ulcer ([facility])  I87 312 Wound cleansing and dressings    L97 929    4  Diabetes mellitus with skin ulcer ([facility] )  E11 622     L98 499         Assessment & Plan:  1  Diabetic Naidu grade 1 ulceration dorsal lateral left foot, wound was not debrided, area was dressed per nursing documentation  2  Left lower extremity venous stasis ulceration, areas of drainage were dressed with alginate dry dressing, no debris was performed    3  New wound, diabetic Naidu grade 2 ulceration proximal lateral left leg, just below the knee, wound was debrided through subcuticular tissues and dressed with alginate dry dressing  3  Visiting nurses will change dressings 3 times a week, we will continue to use SurePress for compression, patient to continue to get in the shower and wash his leg prior to every dressing change, continue to moisturize leg at times of dressing change  Frequent elevation, low-sodium diet, proper protein intake, proper glycemic control, proper use of sequential compression pumps was reviewed  Patient to have visit by lymphedema visiting nurse tomorrow for any further input for prevention/recurrence of these wounds  Patient understands and agrees with the plan and will follow-up in 2 weeks  Debridement   Wound 01/06/21 Diabetic Ulcer Leg Left; Lower; Lateral    Universal Protocol:  Consent: Verbal consent obtained  Risks and benefits: risks, benefits and alternatives were discussed  Consent given by: patient  Time out: Immediately prior to procedure a "time out" was called to verify the correct patient, procedure, equipment, support staff and site/side marked as required    Patient understanding: patient states understanding of the procedure being performed  Patient identity confirmed: verbally with patient      Performed by: physician  Debridement type: surgical  Level of debridement: subcutaneous tissue  Pain control: lidocaine 4%  Pre-debridement measurements  Length (cm): 2  Width (cm): 0 6  Depth (cm): 0 1  Surface Area (cm^2): 1 2  Volume (cm^3): 0 12    Post-debridement measurements  Length (cm): 1 5  Width (cm): 0 5  Depth (cm): 0 3  Percent debrided: 100%  Surface Area (cm^2): 0 75  Area debrided (cm^2): 0 75  Volume (cm^3): 0 23  Tissue and other material debrided: subcutaneous tissue  Devitalized tissue debrided: biofilm, fibrin, necrotic debris, slough and eschar  Instrument(s) utilized: blade  Bleeding: small  Hemostasis obtained with: pressure  Procedural pain (0-10): insensate  Post-procedural pain: insensate   Response to treatment: procedure was tolerated well             Subjective: Presents today for care of left lower extremity venous and diabetic wounds, visiting nurses have been changing dressings as directed, he states lymphedema nurse from visiting nurses coming tomorrow  He continues to use sequential compression pump, states blood sugars well controlled and has no new complaints          The following portions of the patient's history were reviewed and updated as appropriate:   Patient Active Problem List   Diagnosis    Controlled type 2 diabetes mellitus with complication, without long-term current use of insulin ([facility] )    HTN (hypertension)    HLD (hyperlipidemia)    Hypothyroidism    Celiac disease    Coronary artery disease    History of duodenal ulcer    Living will on file    Morbid obesity with BMI of 50 0-59 9, adult ([facility] )    S/P BKA (below knee amputation) unilateral, right (HCC)    Paroxysmal atrial fibrillation (HCC)    Iron deficiency anemia due to chronic blood loss    Chronic venous stasis dermatitis of left lower extremity    Gastroesophageal reflux disease without esophagitis     Past Medical History:   Diagnosis Date    Atrial fibrillation (HCC)     Cellulitis     Diabetes mellitus ([facility] )     Disease of thyroid gland     Duodenal ulcer     perforated- ICU stay    High blood pressure     High cholesterol     Hyperlipidemia     Hypertension     Hypothyroidism     Lymphedema      b/l LE- was followed at wound center    Morbid obesity with BMI of 40 0-44 9, adult ([facility] )     Peritonitis ([facility] )      Past Surgical History:   Procedure Laterality Date    BELOW KNEE LEG AMPUTATION Right     DIAGNOSTIC LAPAROSCOPY      KNEE ARTHROSCOPY Bilateral     OTHER SURGICAL HISTORY  03/2014     lap repair    OTHER SURGICAL HISTORY      Laparoscopic repair of a perforated duodenal ulcer with Milbert Grass omental    OTHER SURGICAL HISTORY      Placement of drains     Social History     Socioeconomic History    Marital status: Single     Spouse name: None    Number of children: None    Years of education: None    Highest education level: None   Occupational History    None   Social Needs    Financial resource strain: None    Food insecurity     Worry: None     Inability: None    Transportation needs     Medical: None     Non-medical: None   Tobacco Use    Smoking status: Former Smoker     Packs/day:  00     Years: 30 00     Pack years: 30 00     Quit date: 2004     Years since quittin 1    Smokeless tobacco: Never Used   Substance and Sexual Activity    Alcohol use: Not Currently    Drug use: Not Currently    Sexual activity: None   Lifestyle    Physical activity     Days per week: None     Minutes per session: None    Stress: None   Relationships    Social connections     Talks on phone: None     Gets together: None     Attends Sikhism service: None     Active member of club or organization: None     Attends meetings of clubs or organizations: None     Relationship status: None    Intimate partner violence     Fear of current or ex partner: None     Emotionally abused: None     Physically abused: None     Forced sexual activity: None   Other Topics Concern    None   Social History Narrative    Former smoker: Quit 3/31/2012 - As per Care Everywhere         Current Outpatient Medications:     aspirin (ASPIRIN 81) 81 mg EC tablet, Take 81 mg by mouth Daily, Disp: , Rfl:     clotrimazole-betamethasone (LOTRISONE) 1-0 05 % cream, Apply topically 2 (two) times a day, Disp: 90 g, Rfl: 0    gabapentin (NEURONTIN) 100 mg capsule, Take 1 capsule (100 mg total) by mouth 3 (three) times a day, Disp: 270 capsule, Rfl: 1    levothyroxine 25 mcg tablet, Take 1 tablet (25 mcg total) by mouth daily In addition to the 300 mcg dose (stop the 75 mcg dose), Disp: 90 tablet, Rfl: 1    levothyroxine 300 MCG tablet, Take 1 tablet (300 mcg total) by mouth daily In addition to 75 mcg dose, Disp: 90 tablet, Rfl: 1    lisinopril (ZESTRIL) 10 mg tablet, Take 1 tablet (10 mg total) by mouth daily, Disp: 90 tablet, Rfl: 1    metFORMIN (GLUCOPHAGE-XR) 750 mg 24 hr tablet, Take 1 tablet (750 mg total) by mouth daily with breakfast, Disp: 90 tablet, Rfl: 1    metoprolol tartrate (LOPRESSOR) 50 mg tablet, Take 1 tablet (50 mg total) by mouth 2 (two) times a day, Disp: 180 tablet, Rfl: 1    Multiple Vitamins-Minerals (MULTIVITAMIN MEN 50+ PO), Take by mouth, Disp: , Rfl:     pantoprazole (PROTONIX) 40 mg tablet, Take 1 tablet (40 mg total) by mouth daily, Disp: 90 tablet, Rfl: 1    simvastatin (ZOCOR) 10 mg tablet, Take 1 tablet (10 mg total) by mouth daily at bedtime, Disp: 90 tablet, Rfl: 1    Zoster Vac Recomb Adjuvanted (Shingrix) 50 MCG/0 5ML SUSR, 0 5mL IM for one dose, followed by 0 5mL IM 2-6 months after first dose, Disp: 1 each, Rfl: 1  No current facility-administered medications for this visit  Family History   Problem Relation Age of Onset    Heart disease Family     Alcohol abuse Family     Heart disease Mother     Hypertension Mother    [facility] Migraines Daughter     Leukemia Brother     Migraines Daughter     Substance Abuse Neg Hx     Mental illness Neg Hx     Depression Neg Hx        Review of Systems   Constitutional: Negative for activity change, appetite change, chills, fatigue and fever  HENT: Negative for hearing loss  Eyes: Negative for photophobia and visual disturbance  Respiratory: Negative for cough, chest tightness and shortness of breath  Cardiovascular: Negative for chest pain and palpitations  Gastrointestinal: Negative for diarrhea and nausea  Endocrine: Negative for cold intolerance and heat intolerance  Musculoskeletal: Positive for gait problem  Negative for arthralgias and back pain  Skin: Positive for color change and wound  Neurological: Positive for numbness  Psychiatric/Behavioral: Negative  Negative for agitation, self-injury and suicidal ideas  The patient is not nervous/anxious            Objective:  /80 Pulse 88   Temp (!) 95 9 °F (35 5 °C)   Resp 20     Physical Exam  Constitutional:       Appearance: Normal appearance  HENT:      Head: Normocephalic and atraumatic  Right Ear: External ear normal       Left Ear: External ear normal       Nose: Nose normal    Eyes:      Conjunctiva/sclera: Conjunctivae normal    Neck:      Musculoskeletal: Normal range of motion  Cardiovascular:      Pulses:           Dorsalis pedis pulses are detected w/ Doppler on the left side  Posterior tibial pulses are detected w/ Doppler on the left side  Pulmonary:      Effort: Pulmonary effort is normal    Musculoskeletal:      Left lower le+ Edema present  Comments: bka right   Skin:     General: Skin is warm and dry  Capillary Refill: Capillary refill takes less than 2 seconds  Comments: See detailed wound assessment   Neurological:      General: No focal deficit present  Mental Status: He is alert and oriented to person, place, and time  Mental status is at baseline  Sensory: Sensory deficit present  Coordination: Coordination abnormal       Gait: Gait abnormal       Deep Tendon Reflexes: Reflexes abnormal    Psychiatric:         Mood and Affect: Mood normal          Behavior: Behavior normal          Thought Content: Thought content normal          Judgment: Judgment normal              Wound 20 Foot Left; Other (Comment) (Active)   Wound Image   21 1304   Wound Description Brown;Eschar 21 1304   Jovita-wound Assessment Edema;Dry 21 1304   Wound Length (cm) 3 cm 21 1304   Wound Width (cm) 4 cm 21 1304   Wound Depth (cm) 0 1 cm 21 1304   Wound Surface Area (cm^2) 12 cm^2 21 1304   Wound Volume (cm^3) 1 2 cm^3 21 1304   Calculated Wound Volume (cm^3) 1 2 cm^3 21 1304   Change in Wound Size % 20 53 21 1304   Drainage Amount Scant 21 1304   Drainage Description Serosanguineous 12/15/20 1124   Non-staged Wound Description Full thickness 01/06/21 1304   Treatments Cleansed 11/11/20 1320   Patient Tolerance Tolerated well 11/11/20 1320   Dressing Status Intact 10/27/20 0937       Wound Venous Ulcer Tibial Left;Posterior;Proximal (Active)   Wound Image   01/06/21 1304   Wound Description Brown 01/06/21 1311   Jovita-wound Assessment Dry;Scaly; Hyperpigmented;Edema 01/06/21 1311   Wound Length (cm) 0 1 cm 01/06/21 1311   Wound Width (cm) 0 1 cm 01/06/21 1311   Wound Depth (cm) 0 1 cm 01/06/21 1311   Wound Surface Area (cm^2) 0 01 cm^2 01/06/21 1311   Wound Volume (cm^3) 0 cm^3 01/06/21 1311   Calculated Wound Volume (cm^3) 0 cm^3 01/06/21 1311   Change in Wound Size % 100 01/06/21 1311   Drainage Amount Scant 01/06/21 1311   Drainage Description Serous 01/06/21 1311   Non-staged Wound Description Full thickness 01/06/21 1311   Dressing Status Intact 11/24/20 1005       Wound 01/06/21 Diabetic Ulcer Leg Left; Lower; Lateral (Active)   Wound Image   01/06/21 1331   Wound Description Eschar;Slough; Yellow;Granulation tissue;Pink 01/06/21 1310   Jovita-wound Assessment Edema 01/06/21 1310   Wound Length (cm) 2 cm 01/06/21 1310   Wound Width (cm) 0 6 cm 01/06/21 1310   Wound Depth (cm) 0 1 cm 01/06/21 1310   Wound Surface Area (cm^2) 1 2 cm^2 01/06/21 1310   Wound Volume (cm^3) 0 12 cm^3 01/06/21 1310   Calculated Wound Volume (cm^3) 0 12 cm^3 01/06/21 1310   Drainage Amount Small 01/06/21 1310   Drainage Description Serosanguineous 01/06/21 1310   Non-staged Wound Description Full thickness 01/06/21 1310                       No results found  Wound Instructions:  Orders Placed This Encounter   Procedures    Wound cleansing and dressings     Left foot wound, left leg wound and Left lateral Proximal leg wounds:  Wash your hands with soap and water  Remove old dressing, discard into plastic bag and place in trash  Cleanse the wound with soap and water prior to applying a clean dressing  Do not use tissue or cotton balls   Do not scrub the wound  Pat dry using gauze  Shower yes - remove dressing and shower prior to home health visit  -Please apply  Moisturizer with each dressing change  Apply skin prep to left foot wound if maceration occurs  Apply alginate to the open wound  Cover with ABD  Secure with roll gauze from toe to knee and tape  Change dressing 3 times a week      This was performed at [facility] today  Surepress    Apply compression wrap to your affected Leg(s) from mid-foot to knee making sure to cover the heel  Apply in the morning and re-wrap as needed during the day if wrap becomes loose  Remove at bedtime and elevate legs or lie down  Avoid prolonged standing in one place  Elevate leg(s) above the level of the heart when sitting or as much as possible  Continue with [facility] services     Standing Status:   Future     Standing Expiration Date:   1/6/2022         Marlinda Kawasaki, DPM, FACFAS    Portions of the record may have been created with voice recognition software  Occasional wrong word or "sound a like" substitutions may have occurred due to the inherent limitations of voice recognition software  Read the chart carefully and recognize, using context, where substitutions have occurred

## 2021-01-06 NOTE — PATIENT INSTRUCTIONS
Orders Placed This Encounter   Procedures    Wound cleansing and dressings     Left foot wound, left leg wound and Left lateral Proximal leg wounds:  Wash your hands with soap and water  Remove old dressing, discard into plastic bag and place in trash  Cleanse the wound with soap and water prior to applying a clean dressing  Do not use tissue or cotton balls  Do not scrub the wound  Pat dry using gauze  Shower yes - remove dressing and shower prior to home health visit  -Please apply  Moisturizer with each dressing change  Apply skin prep to left foot wound if maceration occurs  Apply alginate to the open wound  Cover with ABD  Secure with roll gauze from toe to knee and tape  Change dressing 3 times a week      This was performed at [facility] today  Surepress    Apply compression wrap to your affected Leg(s) from mid-foot to knee making sure to cover the heel  Apply in the morning and re-wrap as needed during the day if wrap becomes loose  Remove at bedtime and elevate legs or lie down  Avoid prolonged standing in one place  Elevate leg(s) above the level of the heart when sitting or as much as possible       [facility]     Standing Status:   Future     Standing Expiration Date:   1/6/2022

## 2021-01-13 ENCOUNTER — TELEPHONE (OUTPATIENT)
Dept: WOUND CARE | Facility: HOSPITAL | Age: 66
End: 2021-01-13

## 2021-01-13 NOTE — TELEPHONE ENCOUNTER
Message from Claudette Everett stating that patient has lymphedema therapy & dressing change 2x/wk with Shaquille Foy and wound care nurse 1x/wk

## 2021-01-20 ENCOUNTER — OFFICE VISIT (OUTPATIENT)
Dept: WOUND CARE | Facility: HOSPITAL | Age: 66
End: 2021-01-20
Payer: MEDICARE

## 2021-01-20 VITALS
RESPIRATION RATE: 20 BRPM | HEART RATE: 80 BPM | DIASTOLIC BLOOD PRESSURE: 90 MMHG | TEMPERATURE: 96.7 F | SYSTOLIC BLOOD PRESSURE: 152 MMHG

## 2021-01-20 DIAGNOSIS — E11.621 DIABETIC ULCER OF LEFT FOOT ASSOCIATED WITH TYPE 2 DIABETES MELLITUS, LIMITED TO BREAKDOWN OF SKIN, UNSPECIFIED PART OF FOOT (HCC): Primary | ICD-10-CM

## 2021-01-20 DIAGNOSIS — I87.312 IDIOPATHIC CHRONIC VENOUS HYPERTENSION OF LEFT LOWER EXTREMITY WITH ULCER (HCC): ICD-10-CM

## 2021-01-20 DIAGNOSIS — L97.521 DIABETIC ULCER OF LEFT FOOT ASSOCIATED WITH TYPE 2 DIABETES MELLITUS, LIMITED TO BREAKDOWN OF SKIN, UNSPECIFIED PART OF FOOT (HCC): Primary | ICD-10-CM

## 2021-01-20 DIAGNOSIS — L98.499 DIABETES MELLITUS WITH SKIN ULCER (HCC): ICD-10-CM

## 2021-01-20 DIAGNOSIS — L97.929 IDIOPATHIC CHRONIC VENOUS HYPERTENSION OF LEFT LOWER EXTREMITY WITH ULCER (HCC): ICD-10-CM

## 2021-01-20 DIAGNOSIS — L97.221 NON-PRESSURE CHRONIC ULCER OF LEFT CALF, LIMITED TO BREAKDOWN OF SKIN (HCC): ICD-10-CM

## 2021-01-20 DIAGNOSIS — E11.622 DIABETES MELLITUS WITH SKIN ULCER (HCC): ICD-10-CM

## 2021-01-20 PROCEDURE — 99213 OFFICE O/P EST LOW 20 MIN: CPT | Performed by: PODIATRIST

## 2021-01-20 NOTE — PATIENT INSTRUCTIONS
Orders Placed This Encounter   Procedures    Wound cleansing and dressings     Left foot wound, left leg wound and Left lateral Proximal leg wounds:  Wash your hands with soap and water  Remove old dressing, discard into plastic bag and place in trash  Cleanse the wound with soap and water prior to applying a clean dressing  Do not use tissue or cotton balls  Do not scrub the wound  Pat dry using gauze  Shower yes - remove dressing and shower prior to home health visit  -Please apply  Moisturizer with each dressing change  Apply skin prep to left foot wound if maceration occurs  Apply alginate to the open wound  Cover with ABD  Secure with roll gauze from toe to knee and tape  Change dressing 3 times a week      This was performed at [facility]ound care center today          Standing Status:   Future     Standing Expiration Date:   1/20/2022    Wound compression and edema control     Surepress     Apply compression wrap to your affected Leg(s) from mid-foot to knee making sure to cover the heel  Apply in the morning and re-wrap as needed during the day if wrap becomes loose   Remove at bedtime and elevate legs or lie down      Avoid prolonged standing in one place      Elevate leg(s) above the level of the heart when sitting or as much as possible          Standing Status:   Future     Standing Expiration Date:   1/20/2022    Wound home care     Continue with [facility]     Standing Status:   Future     Standing Expiration Date:   1/20/2022

## 2021-01-20 NOTE — PROGRESS NOTES
Patient ID: Delvis Olivarez is a 77 y o  male Date of Birth 1955       Chief Complaint   Patient presents with    Follow Up Wound Care Visit     LLE wounds       Allergies:  Patient has no known allergies  Diagnosis:  1  Diabetic ulcer of left foot associated with type 2 diabetes mellitus, limited to breakdown of skin, unspecified part of foot ([facility] )  -     Wound cleansing and dressings; Future  -     Wound compression and edema control; Future  -     Wound home care; Future    2  Idiopathic chronic venous hypertension of left lower extremity with ulcer ([facility] )    3  Diabetes mellitus with skin ulcer ([facility] )    4  Non-pressure chronic ulcer of left calf, limited to breakdown of skin ([facility] )       Diagnosis ICD-10-CM Associated Orders   1  Diabetic ulcer of left foot associated with type 2 diabetes mellitus, limited to breakdown of skin, unspecified part of foot ([facility])  E11 621 Wound cleansing and dressings    L97 521 Wound compression and edema control     Wound home care   2  Idiopathic chronic venous hypertension of left lower extremity with ulcer ([facility] )  I87 [id number]     L97 929    3  Diabetes mellitus with skin ulcer ([facility] )  E11 622     L98 499    4  Non-pressure chronic ulcer of left calf, limited to breakdown of skin ([facility] )  L97 221         Assessment & Plan:  1  Diabetic Naidu grade 1 ulceration dorsal left foot, no debridement was performed  2  Chronic venous stasis ulceration to left lower extremity, no debridement was performed  3  Diabetic Naidu grade 1 ulceration proximal left lower leg just distal to the knee, wound is well epithelialized and healed, no debridement was performed  4  Multiple scant areas of serous drainage to left lower extremity, all areas were dressed with alginate dry dressing, so press for compression  Visiting nurses and lymphedema nurse will continue to do the same  5  Today I had a lengthy discussion with patient regarding proper diabetic foot care, shoe gear, daily foot inspection  Compliance with use of sequential compression pumps for 1 hour twice a day  Frequent elevation, low-sodium diet, proper protein intake and proper glycemic control  I stressed to patient that this is of utmost necessity as he is already a BKA on the right, we need to do everything we can to preserve his left lower limb  He understands and agrees with the plan, he will follow-up with visiting nurses and lymphedema nurses outpatient, he will follow up at [address] [place] [address] in 3 weeks  Procedures - none    Subjective:   Patient presents today for care of left lower extremity diabetic and venous stasis ulcerations, visiting nurses and lymphedema visiting nurse have been visiting with patient, they have increased his compression by use of SurePress  He is tolerating this well, he has also been getting in the shower and washing his leg  He has no new complaints          The following portions of the patient's history were reviewed and updated as appropriate:   Patient Active Problem List   Diagnosis    Controlled type 2 diabetes mellitus with complication, without long-term current use of insulin ([facility] )    HTN (hypertension)    HLD (hyperlipidemia)    Hypothyroidism    Celiac disease    Coronary artery disease    History of duodenal ulcer    Living will on file    Morbid obesity with BMI of 50 0-59 9, adult ([facility] )    S/P BKA (below knee amputation) unilateral, right (HCC)    Paroxysmal atrial fibrillation (HCC)    Iron deficiency anemia due to chronic blood loss    Chronic venous stasis dermatitis of left lower extremity    Gastroesophageal reflux disease without esophagitis     Past Medical History:   Diagnosis Date    Atrial fibrillation (HCC)     Cellulitis     Diabetes mellitus ([facility] )     Disease of thyroid gland     Duodenal ulcer     perforated- ICU stay    High blood pressure     High cholesterol     Hyperlipidemia     Hypertension     Hypothyroidism     Lymphedema      b/l SHELBY- was followed at wound center    Morbid obesity with BMI of 40 0-44 9, adult ([facility]     Peritonitis ([facility] )      Past Surgical History:   Procedure Laterality Date    BELOW KNEE LEG AMPUTATION Right     DIAGNOSTIC LAPAROSCOPY      KNEE ARTHROSCOPY Bilateral     OTHER SURGICAL HISTORY  2014     lap repair    OTHER SURGICAL HISTORY      Laparoscopic repair of a perforated duodenal ulcer with Reatha Loosen omental    OTHER SURGICAL HISTORY      Placement of drains     Social History     Socioeconomic History    Marital status: Single     Spouse name: Not on file    Number of children: Not on file    Years of education: Not on file    Highest education level: Not on file   Occupational History    Not on file   Social Needs    Financial resource strain: Not on file    Food insecurity     Worry: Not on file     Inability: Not on file    Transportation needs     Medical: Not on file     Non-medical: Not on file   Tobacco Use    Smoking status: Former Smoker     Packs/day:      Years:      Pack years:      Quit date: 2004     Years since quittin 2    Smokeless tobacco: Never Used   Substance and Sexual Activity    Alcohol use: Not Currently    Drug use: Not Currently    Sexual activity: Not on file   Lifestyle    Physical activity     Days per week: Not on file     Minutes per session: Not on file    Stress: Not on file   Relationships    Social connections     Talks on phone: Not on file     Gets together: Not on file     Attends Bahai service: Not on file     Active member of club or organization: Not on file     Attends meetings of clubs or organizations: Not on file     Relationship status: Not on file    Intimate partner violence     Fear of current or ex partner: Not on file     Emotionally abused: Not on file     Physically abused: Not on file     Forced sexual activity: Not on file   Other Topics Concern    Not on file   Social History Narrative    Former smoker: Quit 3/31/2012 - As per Care Everywhere         Current Outpatient Medications:     aspirin (ASPIRIN 81) 81 mg EC tablet, Take 81 mg by mouth Daily, Disp: , Rfl:     clotrimazole-betamethasone (LOTRISONE) 1-0 05 % cream, Apply topically 2 (two) times a day, Disp: 90 g, Rfl: 0    gabapentin (NEURONTIN) 100 mg capsule, Take 1 capsule (100 mg total) by mouth 3 (three) times a day, Disp: 270 capsule, Rfl: 1    levothyroxine 25 mcg tablet, Take 1 tablet (25 mcg total) by mouth daily In addition to the 300 mcg dose (stop the 75 mcg dose), Disp: 90 tablet, Rfl: 1    levothyroxine 300 MCG tablet, Take 1 tablet (300 mcg total) by mouth daily In addition to 75 mcg dose, Disp: 90 tablet, Rfl: 1    lisinopril (ZESTRIL) 10 mg tablet, Take 1 tablet (10 mg total) by mouth daily, Disp: 90 tablet, Rfl: 1    metFORMIN (GLUCOPHAGE-XR) 750 mg 24 hr tablet, Take 1 tablet (750 mg total) by mouth daily with breakfast, Disp: 90 tablet, Rfl: 1    metoprolol tartrate (LOPRESSOR) 50 mg tablet, Take 1 tablet (50 mg total) by mouth 2 (two) times a day, Disp: 180 tablet, Rfl: 1    Multiple Vitamins-Minerals (MULTIVITAMIN MEN 50+ PO), Take by mouth, Disp: , Rfl:     pantoprazole (PROTONIX) 40 mg tablet, Take 1 tablet (40 mg total) by mouth daily, Disp: 90 tablet, Rfl: 1    simvastatin (ZOCOR) 10 mg tablet, Take 1 tablet (10 mg total) by mouth daily at bedtime, Disp: 90 tablet, Rfl: 1    Zoster Vac Recomb Adjuvanted (Shingrix) 50 MCG/0 5ML SUSR, 0 5mL IM for one dose, followed by 0 5mL IM 2-6 months after first dose, Disp: 1 each, Rfl: 1  Family History   Problem Relation Age of Onset    Heart disease Family     Alcohol abuse Family     Heart disease Mother     Hypertension Mother    Tammy Splinter Migraines Daughter     Leukemia Brother     Migraines Daughter     Substance Abuse Neg Hx     Mental illness Neg Hx     Depression Neg Hx        Review of Systems   Constitutional: Negative for activity change, appetite change, chills, fatigue and fever    HENT: Negative for hearing loss  Eyes: Negative for photophobia and visual disturbance  Respiratory: Negative for cough, chest tightness and shortness of breath  Cardiovascular: Negative for chest pain and palpitations  Gastrointestinal: Negative for diarrhea and nausea  Endocrine: Negative for cold intolerance and heat intolerance  Musculoskeletal: Positive for gait problem  Negative for arthralgias and back pain  Skin: Positive for color change and wound  Neurological: Positive for numbness  Psychiatric/Behavioral: Negative  Negative for agitation, self-injury and suicidal ideas  The patient is not nervous/anxious  Objective:  /90   Pulse 80   Temp (!) 96 7 °F (35 9 °C)   Resp 20     Physical Exam  Constitutional:       Appearance: Normal appearance  HENT:      Head: Normocephalic and atraumatic  Right Ear: External ear normal       Left Ear: External ear normal       Nose: Nose normal    Eyes:      Conjunctiva/sclera: Conjunctivae normal    Neck:      Musculoskeletal: Normal range of motion  Cardiovascular:      Pulses: Normal pulses  Dorsalis pedis pulses are 2+ on the left side  Posterior tibial pulses are 2+ on the left side  Pulmonary:      Effort: Pulmonary effort is normal    Musculoskeletal:      Right lower le+ Pitting Edema present  Left lower le+ Pitting Edema present  Comments: BKA right   Skin:     General: Skin is warm and dry  Capillary Refill: Capillary refill takes less than 2 seconds  Comments: See detailed wound assessment   Neurological:      General: No focal deficit present  Mental Status: He is alert and oriented to person, place, and time  Mental status is at baseline  Sensory: Sensory deficit present        Coordination: Coordination abnormal       Gait: Gait abnormal       Deep Tendon Reflexes: Reflexes abnormal    Psychiatric:         Mood and Affect: Mood normal  Behavior: Behavior normal          Thought Content: Thought content normal          Judgment: Judgment normal            Wound 01/08/20 Foot Left; Other (Comment) (Active)   Wound Image   01/20/21 1332   Wound Description Epithelialization;Pink 01/20/21 1332   Jovita-wound Assessment Edema;Dry;Pink 01/20/21 1332   Wound Length (cm) 0 1 cm 01/20/21 1332   Wound Width (cm) 0 1 cm 01/20/21 1332   Wound Depth (cm) 0 1 cm 01/20/21 1332   Wound Surface Area (cm^2) 0 01 cm^2 01/20/21 1332   Wound Volume (cm^3) 0 cm^3 01/20/21 1332   Calculated Wound Volume (cm^3) 0 cm^3 01/20/21 1332   Change in Wound Size % 100 01/20/21 1332   Drainage Amount Scant 01/20/21 1332   Drainage Description Serosanguineous 01/20/21 1332   Non-staged Wound Description Full thickness 01/20/21 1332   Treatments Cleansed 11/11/20 1320   Patient Tolerance Tolerated well 11/11/20 1320   Dressing Status Intact 01/20/21 1332       Wound Venous Ulcer Tibial Left;Posterior;Proximal (Active)   Wound Image   01/20/21 1332   Wound Description Epithelialization;Pink 01/20/21 1332   Jovita-wound Assessment Dry;Scaly; Hyperpigmented;Edema 01/20/21 1332   Wound Length (cm) 0 1 cm 01/20/21 1332   Wound Width (cm) 0 1 cm 01/20/21 1332   Wound Depth (cm) 0 1 cm 01/20/21 1332   Wound Surface Area (cm^2) 0 01 cm^2 01/20/21 1332   Wound Volume (cm^3) 0 cm^3 01/20/21 1332   Calculated Wound Volume (cm^3) 0 cm^3 01/20/21 1332   Change in Wound Size % 100 01/20/21 1332   Drainage Amount Scant 01/20/21 1332   Drainage Description Serous 01/20/21 1332   Non-staged Wound Description Full thickness 01/20/21 1332   Dressing Status Intact 01/20/21 1332       Wound 01/06/21 Diabetic Ulcer Leg Left; Lower; Lateral (Active)   Wound Image   01/20/21 1331   Wound Description Granulation tissue;Pink;Dry 01/20/21 1331   Jovita-wound Assessment Edema 01/06/21 1310   Wound Length (cm) 1 5 cm 01/20/21 1331   Wound Width (cm) 0 4 cm 01/20/21 1331   Wound Depth (cm) 0 1 cm 01/20/21 1331   Wound Surface Area (cm^2) 0 6 cm^2 01/20/21 1331   Wound Volume (cm^3) 0 06 cm^3 01/20/21 1331   Calculated Wound Volume (cm^3) 0 06 cm^3 01/20/21 1331   Change in Wound Size % 50 01/20/21 1331   Drainage Amount Scant 01/20/21 1331   Drainage Description Serosanguineous 01/20/21 1331   Non-staged Wound Description Full thickness 01/20/21 1331   Dressing Status Intact 01/20/21 1331                No results found  Wound Instructions:  Orders Placed This Encounter   Procedures    Wound cleansing and dressings     Left foot wound, left leg wound and Left lateral Proximal leg wounds:  Wash your hands with soap and water  Remove old dressing, discard into plastic bag and place in trash  Cleanse the wound with soap and water prior to applying a clean dressing  Do not use tissue or cotton balls  Do not scrub the wound  Pat dry using gauze  Shower yes - remove dressing and shower prior to home health visit  -Please apply  Moisturizer with each dressing change  Apply skin prep to left foot wound if maceration occurs  Apply alginate to the open wound  Cover with ABD  Secure with roll gauze from toe to knee and tape  Change dressing 3 times a week      This was performed at Wound care center today          Standing Status:   Future     Standing Expiration Date:   1/20/2022    Wound compression and edema control     Surepress     Apply compression wrap to your affected Leg(s) from mid-foot to knee making sure to cover the heel  Apply in the morning and re-wrap as needed during the day if wrap becomes loose   Remove at bedtime and elevate legs or lie down      Avoid prolonged standing in one place      Elevate leg(s) above the level of the heart when sitting or as much as possible          Standing Status:   Future     Standing Expiration Date:   1/20/2022    Wound home care     Continue with [facility] services     Standing Status:   Future     Standing Expiration Date:   1/20/2022         Galileo Lehman DPM, FACFAS    Portions of the record may have been created with voice recognition software  Occasional wrong word or "sound a like" substitutions may have occurred due to the inherent limitations of voice recognition software  Read the chart carefully and recognize, using context, where substitutions have occurred

## 2021-01-21 DIAGNOSIS — E03.9 ACQUIRED HYPOTHYROIDISM: ICD-10-CM

## 2021-01-21 DIAGNOSIS — R09.89 CAROTID BRUIT, UNSPECIFIED LATERALITY: Primary | ICD-10-CM

## 2021-01-21 DIAGNOSIS — I10 ESSENTIAL HYPERTENSION: ICD-10-CM

## 2021-01-21 DIAGNOSIS — E11.8 CONTROLLED TYPE 2 DIABETES MELLITUS WITH COMPLICATION, WITHOUT LONG-TERM CURRENT USE OF INSULIN (HCC): ICD-10-CM

## 2021-01-21 DIAGNOSIS — K21.9 GASTROESOPHAGEAL REFLUX DISEASE WITHOUT ESOPHAGITIS: ICD-10-CM

## 2021-01-21 DIAGNOSIS — E78.2 MIXED HYPERLIPIDEMIA: ICD-10-CM

## 2021-01-21 DIAGNOSIS — G62.9 NEUROPATHY: ICD-10-CM

## 2021-01-22 RX ORDER — GABAPENTIN 100 MG/1
100 CAPSULE ORAL 3 TIMES DAILY
Qty: 270 CAPSULE | Refills: 1 | Status: SHIPPED | OUTPATIENT
Start: 2021-01-22 | End: 2021-11-10 | Stop reason: SDUPTHER

## 2021-01-22 RX ORDER — LISINOPRIL 10 MG/1
10 TABLET ORAL DAILY
Qty: 90 TABLET | Refills: 1 | Status: SHIPPED | OUTPATIENT
Start: 2021-01-22 | End: 2021-08-08 | Stop reason: SDUPTHER

## 2021-01-22 RX ORDER — PANTOPRAZOLE SODIUM 40 MG/1
40 TABLET, DELAYED RELEASE ORAL DAILY
Qty: 90 TABLET | Refills: 1 | Status: SHIPPED | OUTPATIENT
Start: 2021-01-22 | End: 2021-08-08 | Stop reason: SDUPTHER

## 2021-01-22 RX ORDER — SIMVASTATIN 10 MG
10 TABLET ORAL
Qty: 90 TABLET | Refills: 1 | Status: SHIPPED | OUTPATIENT
Start: 2021-01-22 | End: 2021-08-08 | Stop reason: SDUPTHER

## 2021-01-22 RX ORDER — METOPROLOL TARTRATE 50 MG/1
50 TABLET, FILM COATED ORAL 2 TIMES DAILY
Qty: 180 TABLET | Refills: 2 | Status: SHIPPED | OUTPATIENT
Start: 2021-01-22 | End: 2021-08-08 | Stop reason: SDUPTHER

## 2021-01-22 RX ORDER — LEVOTHYROXINE SODIUM 0.03 MG/1
25 TABLET ORAL DAILY
Qty: 90 TABLET | Refills: 1 | Status: SHIPPED | OUTPATIENT
Start: 2021-01-22 | End: 2021-08-08 | Stop reason: SDUPTHER

## 2021-01-22 RX ORDER — LEVOTHYROXINE SODIUM 300 UG/1
300 TABLET ORAL DAILY
Qty: 90 TABLET | Refills: 1 | Status: SHIPPED | OUTPATIENT
Start: 2021-01-22 | End: 2021-08-08 | Stop reason: SDUPTHER

## 2021-01-22 RX ORDER — METFORMIN HYDROCHLORIDE 750 MG/1
750 TABLET, EXTENDED RELEASE ORAL
Qty: 90 TABLET | Refills: 1 | Status: SHIPPED | OUTPATIENT
Start: 2021-01-22 | End: 2021-08-08 | Stop reason: SDUPTHER

## 2021-01-22 NOTE — TELEPHONE ENCOUNTER
Sent refills  Will need a follow up visit in June   Also let him know I ordered a carotid ultrasound b/c at his visit last month Dr NUÑEZ noted a carotid bruit

## 2021-02-01 ENCOUNTER — PATIENT MESSAGE (OUTPATIENT)
Dept: FAMILY MEDICINE CLINIC | Facility: CLINIC | Age: 66
End: 2021-02-01

## 2021-02-01 NOTE — TELEPHONE ENCOUNTER
From: Chintan Thompson LPN  To: Mali Small  Sent: 1/27/2021 11:56 AM EST  Subject: Influenza Vaccine     Hello,     We hope you are doing well  While it is important to receive your flu vaccine yearly, our records show that you have not yet received yours for this year  Please call the office at [phone] if you are interested in scheduling a visit to receive the vaccine  If you have had your flu vaccine outside of our office, please reply to this message with the date and location so we can update your medical record  If you have already received your flu vaccine in our office, please disregard this message            We look forward to hearing from you,    Your Medical Team at [address]

## 2021-02-09 ENCOUNTER — TELEPHONE (OUTPATIENT)
Dept: WOUND CARE | Facility: HOSPITAL | Age: 66
End: 2021-02-09

## 2021-02-10 ENCOUNTER — OFFICE VISIT (OUTPATIENT)
Dept: WOUND CARE | Facility: HOSPITAL | Age: 66
End: 2021-02-10
Payer: MEDICARE

## 2021-02-10 VITALS
DIASTOLIC BLOOD PRESSURE: 84 MMHG | HEART RATE: 72 BPM | RESPIRATION RATE: 18 BRPM | TEMPERATURE: 97 F | SYSTOLIC BLOOD PRESSURE: 180 MMHG

## 2021-02-10 DIAGNOSIS — L97.929 IDIOPATHIC CHRONIC VENOUS HYPERTENSION OF LEFT LOWER EXTREMITY WITH ULCER (HCC): ICD-10-CM

## 2021-02-10 DIAGNOSIS — L97.521 DIABETIC ULCER OF LEFT FOOT ASSOCIATED WITH TYPE 2 DIABETES MELLITUS, LIMITED TO BREAKDOWN OF SKIN, UNSPECIFIED PART OF FOOT (HCC): Primary | ICD-10-CM

## 2021-02-10 DIAGNOSIS — I87.312 IDIOPATHIC CHRONIC VENOUS HYPERTENSION OF LEFT LOWER EXTREMITY WITH ULCER (HCC): ICD-10-CM

## 2021-02-10 DIAGNOSIS — L97.221 NON-PRESSURE CHRONIC ULCER OF LEFT CALF, LIMITED TO BREAKDOWN OF SKIN (HCC): ICD-10-CM

## 2021-02-10 DIAGNOSIS — E11.621 DIABETIC ULCER OF LEFT FOOT ASSOCIATED WITH TYPE 2 DIABETES MELLITUS, LIMITED TO BREAKDOWN OF SKIN, UNSPECIFIED PART OF FOOT (HCC): Primary | ICD-10-CM

## 2021-02-10 PROCEDURE — 87077 CULTURE AEROBIC IDENTIFY: CPT | Performed by: PODIATRIST

## 2021-02-10 PROCEDURE — 11045 DBRDMT SUBQ TISS EACH ADDL: CPT | Performed by: PODIATRIST

## 2021-02-10 PROCEDURE — 87070 CULTURE OTHR SPECIMN AEROBIC: CPT | Performed by: PODIATRIST

## 2021-02-10 PROCEDURE — 11042 DBRDMT SUBQ TIS 1ST 20SQCM/<: CPT | Performed by: PODIATRIST

## 2021-02-10 PROCEDURE — 87205 SMEAR GRAM STAIN: CPT | Performed by: PODIATRIST

## 2021-02-10 PROCEDURE — 87186 SC STD MICRODIL/AGAR DIL: CPT | Performed by: PODIATRIST

## 2021-02-10 PROCEDURE — 87147 CULTURE TYPE IMMUNOLOGIC: CPT | Performed by: PODIATRIST

## 2021-02-10 RX ORDER — LEVOFLOXACIN 500 MG/1
500 TABLET, FILM COATED ORAL EVERY 24 HOURS
Qty: 7 TABLET | Refills: 0 | Status: SHIPPED | OUTPATIENT
Start: 2021-02-10 | End: 2021-02-17

## 2021-02-10 NOTE — TELEPHONE ENCOUNTER
Call from [place]; she is concerned that foot wound was almost healed and is now approximately 3x3cm open weeping area  She would like to stop calcium alginate, use calmoseptine with oil emulsion, drawtex or qwick, covered with exudry  I advised I would put a note on the chart for office visit on 2/10 in pm with Dr Michelle Billy

## 2021-02-10 NOTE — PROGRESS NOTES
Patient ID: Alejandro Vega is a 77 y o  male Date of Birth 1955       Chief Complaint   Patient presents with    Follow Up Wound Care Visit     left lower extremity and left foot ulcer       Allergies:  Patient has no known allergies  Diagnosis:  1  Diabetic ulcer of left foot associated with type 2 diabetes mellitus, limited to breakdown of skin, unspecified part of foot ([facility] [place] [facility] )  -     Wound cleansing and dressings; Future  -     Wound culture and Gram stain; Future; Expected date: 02/10/2021  -     levofloxacin (LEVAQUIN) 500 mg tablet; Take 1 tablet (500 mg total) by mouth every 24 hours for 7 days    2  Idiopathic chronic venous hypertension of left lower extremity with ulcer ([facility] [place] [facility] )    3  Non-pressure chronic ulcer of left calf, limited to breakdown of skin ([place] [address] )       Diagnosis ICD-10-CM Associated Orders   1  Diabetic ulcer of left foot associated with type 2 diabetes mellitus, limited to breakdown of skin, unspecified part of foot ([facility])  E11 621 Wound cleansing and dressings    L97 521 Wound culture and Gram stain     levofloxacin (LEVAQUIN) 500 mg tablet   2  Idiopathic chronic venous hypertension of left lower extremity with ulcer ([place] [address] )  I87 312     L97 929    3  Non-pressure chronic ulcer of left calf, limited to breakdown of skin ([place] [address] )  L97 221         Assessment & Plan:  1  Diabetic Naidu grade 2 ulcer dorsal left foot with odor, wound was debrided through sub q tissues, culture was taken and dressed with Bob Small, visiting nurses  Will cleanse wound with Dakin's wash and apply Qwik  3 times a week  2    Patient was prescribed levofloxacin once a day for 7 days for Pseudomonas infection  3    Patient to continue with lymphedema therapy for left lower extremity venous stasis ulcerations, all areas seem to be relatively epithelialized except for a few spots of serous   drainage    4    Patient to continue with frequent elevation, low-sodium diet, proper protein intake, proper glycemic control and use of sequential compression pump  Twice a day on left lower extremity  5    I spent approximately 25  mins  With patient, placing orders,  Education, reviewing medical records, not including procedure  I explained to patient the need for proper wound care, and compliance to minimize risk of limb loss, especially since 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  Patient is understanding & agrees with the plan and will follow up in 1 week  Debridement   Universal Protocol:  Consent: Verbal consent obtained  Risks and benefits: risks, benefits and alternatives were discussed  Consent given by: patient  Time out: Immediately prior to procedure a "time out" was called to verify the correct patient, procedure, equipment, support staff and site/side marked as required  Patient understanding: patient states understanding of the procedure being performed  Patient identity confirmed: verbally with patient      Performed by: physician  Debridement type: surgical  Level of debridement: subcutaneous tissue  Pain control: lidocaine 4%  Pre-debridement measurements  Length (cm): 5 1  Width (cm): 5 7  Depth (cm): 0 1  Surface Area (cm^2): 29 07  Volume (cm^3): 2 91    Post-debridement measurements  Length (cm): 5 1  Width (cm): 5 7  Depth (cm): 0 2  Percent debrided: 100%  Surface Area (cm^2): 29 07  Area debrided (cm^2): 29 07  Volume (cm^3): 5 81  Tissue and other material debrided: subcutaneous tissue  Devitalized tissue debrided: biofilm, fibrin, necrotic debris and slough  Instrument(s) utilized: blade  Bleeding: small  Hemostasis obtained with: pressure  Procedural pain (0-10): insensate  Post-procedural pain: insensate   Response to treatment: procedure was tolerated well               Subjective:     Patient presents today for care of left diabetic foot ulceration, patient states there has been increased drainage and odor from the area, visiting nurses have requested different dressings    He continues to use his pumps and has no new complaints          The following portions of the patient's history were reviewed and updated as appropriate:   Patient Active Problem List   Diagnosis    Controlled type 2 diabetes mellitus with complication, without long-term current use of insulin ([facility] )    HTN (hypertension)    HLD (hyperlipidemia)    Hypothyroidism    Celiac disease    Coronary artery disease    History of duodenal ulcer    Living will on file    Morbid obesity with BMI of 50 0-59 9, adult ([facility] )    S/P BKA (below knee amputation) unilateral, right (HCC)    Paroxysmal atrial fibrillation (HCC)    Iron deficiency anemia due to chronic blood loss    Chronic venous stasis dermatitis of left lower extremity    Gastroesophageal reflux disease without esophagitis    Diabetic ulcer of left foot associated with type 2 diabetes mellitus, limited to breakdown of [facility]    Idiopathic chronic venous hypertension of left lower extremity with ulcer ([place] [address] )    Non-pressure chronic ulcer of left calf, limited to breakdown of skin ([place] [address] )     Past Medical History:   Diagnosis Date    Atrial fibrillation (HCC)     Cellulitis     Diabetes mellitus ([facility] )     Disease of thyroid gland     Duodenal ulcer     perforated- ICU stay    High blood pressure     High cholesterol     Hyperlipidemia     Hypertension     Hypothyroidism     Lymphedema      b/l LE- was followed at wound center    Morbid obesity with BMI of 40 0-44 9, adult ([facility] )     Peritonitis ([place] [address] )      Past Surgical History:   Procedure Laterality Date    BELOW KNEE LEG AMPUTATION Right     DIAGNOSTIC LAPAROSCOPY      KNEE ARTHROSCOPY Bilateral     OTHER SURGICAL HISTORY  03/2014     lap repair    OTHER SURGICAL HISTORY      Laparoscopic repair of a perforated duodenal ulcer with Evi Mariola omental    OTHER SURGICAL HISTORY      Placement of drains     Social History     Socioeconomic History    Marital status: Single     Spouse name: Not on file    Number of children: Not on file    Years of education: Not on file    Highest education level: Not on file   Occupational History    Not on file   Social Needs    Financial resource strain: Not on file    Food insecurity     Worry: Not on file     Inability: Not on file    Transportation needs     Medical: Not on file     Non-medical: Not on file   Tobacco Use    Smoking status: Former Smoker     Packs/day:  00     Years: 30 00     Pack years: 30 00     Quit date: 2004     Years since quittin 2    Smokeless tobacco: Never Used   Substance and Sexual Activity    Alcohol use: Not Currently    Drug use: Not Currently    Sexual activity: Not on file   Lifestyle    Physical activity     Days per week: Not on file     Minutes per session: Not on file    Stress: Not on file   Relationships    Social connections     Talks on phone: Not on file     Gets together: Not on file     Attends Christian service: Not on file     Active member of club or organization: Not on file     Attends meetings of clubs or organizations: Not on file     Relationship status: Not on file    Intimate partner violence     Fear of current or ex partner: Not on file     Emotionally abused: Not on file     Physically abused: Not on file     Forced sexual activity: Not on file   Other Topics Concern    Not on file   Social History Narrative    Former smoker: Quit 3/31/2012 - As per Care Everywhere         Current Outpatient Medications:     aspirin (ASPIRIN 81) 81 mg EC tablet, Take 81 mg by mouth Daily, Disp: , Rfl:     clotrimazole-betamethasone (LOTRISONE) 1-0 05 % cream, Apply topically 2 (two) times a day, Disp: 90 g, Rfl: 0    gabapentin (NEURONTIN) 100 mg capsule, Take 1 capsule (100 mg total) by mouth 3 (three) times a day, Disp: 270 capsule, Rfl: 1    levofloxacin (LEVAQUIN) 500 mg tablet, Take 1 tablet (500 mg total) by mouth every 24 hours for 7 days, Disp: 7 tablet, Rfl: 0    levothyroxine 25 mcg tablet, Take 1 tablet (25 mcg total) by mouth daily In addition to the 300 mcg dose (stop the 75 mcg dose), Disp: 90 tablet, Rfl: 1    levothyroxine 300 MCG tablet, Take 1 tablet (300 mcg total) by mouth daily In addition to 75 mcg dose, Disp: 90 tablet, Rfl: 1    lisinopril (ZESTRIL) 10 mg tablet, Take 1 tablet (10 mg total) by mouth daily, Disp: 90 tablet, Rfl: 1    metFORMIN (GLUCOPHAGE-XR) 750 mg 24 hr tablet, Take 1 tablet (750 mg total) by mouth daily with breakfast, Disp: 90 tablet, Rfl: 1    metoprolol tartrate (LOPRESSOR) 50 mg tablet, Take 1 tablet (50 mg total) by mouth 2 (two) times a day, Disp: 180 tablet, Rfl: 2    Multiple Vitamins-Minerals (MULTIVITAMIN MEN 50+ PO), Take by mouth, Disp: , Rfl:     pantoprazole (PROTONIX) 40 mg tablet, Take 1 tablet (40 mg total) by mouth daily, Disp: 90 tablet, Rfl: 1    simvastatin (ZOCOR) 10 mg tablet, Take 1 tablet (10 mg total) by mouth daily at bedtime, Disp: 90 tablet, Rfl: 1    Zoster Vac Recomb Adjuvanted (Shingrix) 50 MCG/0 5ML SUSR, 0 5mL IM for one dose, followed by 0 5mL IM 2-6 months after first dose, Disp: 1 each, Rfl: 1  Family History   Problem Relation Age of Onset    Heart disease Family     Alcohol abuse Family     Heart disease Mother     Hypertension Mother    Osker Ok Migraines Daughter     Leukemia Brother     Migraines Daughter     Substance Abuse Neg Hx     Mental illness Neg Hx     Depression Neg Hx        Review of Systems   Constitutional: Negative for activity change, appetite change, chills, fatigue and fever  HENT: Negative for hearing loss  Eyes: Negative for photophobia and visual disturbance  Respiratory: Negative for cough, chest tightness and shortness of breath  Cardiovascular: Negative for chest pain and palpitations  Gastrointestinal: Negative for diarrhea and nausea  Endocrine: Negative for cold intolerance and heat intolerance  Musculoskeletal: Positive for gait problem  Negative for arthralgias and back pain     Skin: Positive for color change and wound  Neurological: Positive for numbness  Psychiatric/Behavioral: Negative  Negative for agitation, self-injury and suicidal ideas  The patient is not nervous/anxious  Objective:  BP (!) 180/84   Pulse 72   Temp (!) 97 °F (36 1 °C)   Resp 18     Physical Exam  Constitutional:       Appearance: Normal appearance  He is obese  HENT:      Head: Normocephalic and atraumatic  Right Ear: External ear normal       Left Ear: External ear normal       Nose: Nose normal    Eyes:      Conjunctiva/sclera: Conjunctivae normal    Neck:      Musculoskeletal: Normal range of motion  Cardiovascular:      Pulses:           Dorsalis pedis pulses are detected w/ Doppler on the left side  Posterior tibial pulses are detected w/ Doppler on the left side  Pulmonary:      Effort: Pulmonary effort is normal    Musculoskeletal:      Left lower le+ Edema present  Comments: BKA right   Skin:     General: Skin is warm and dry  Capillary Refill: Capillary refill takes less than 2 seconds  Comments: See detailed wound assessment   Neurological:      General: No focal deficit present  Mental Status: He is alert and oriented to person, place, and time  Mental status is at baseline  Sensory: Sensory deficit present  Coordination: Coordination abnormal       Gait: Gait abnormal       Deep Tendon Reflexes: Reflexes abnormal    Psychiatric:         Mood and Affect: Mood normal          Behavior: Behavior normal          Thought Content: Thought content normal          Judgment: Judgment normal            Wound 20 Foot Left; Other (Comment) (Active)   Wound Image   02/10/21 1347   Wound Description Necrotic 02/10/21 1341   Jovita-wound Assessment Maceration;Edema; Denuded;Erythema 02/10/21 1341   Wound Length (cm) 5 1 cm 02/10/21 1341   Wound Width (cm) 5 7 cm 02/10/21 1341   Wound Depth (cm) 0 1 cm 02/10/21 1341   Wound Surface Area (cm^2) 29 07 cm^2 02/10/21 1341 Wound Volume (cm^3) 2 91 cm^3 02/10/21 1341   Calculated Wound Volume (cm^3) 2 91 cm^3 02/10/21 1341   Change in Wound Size % -92 72 02/10/21 1341   Drainage Amount Moderate 02/10/21 1341   Drainage Description Serosanguineous;Green 02/10/21 1341   Non-staged Wound Description Full thickness 02/10/21 1341   Treatments Cleansed 11/11/20 1320   Dressing Changed Changed 02/10/21 1341   Patient Tolerance Tolerated well 02/10/21 1341   Dressing Status Removed 02/10/21 1341       Wound Venous Ulcer Tibial Left;Posterior;Proximal (Active)   Wound Image   02/10/21 1339   Wound Description Epithelialization;Pink 01/20/21 1332   Jovita-wound Assessment Dry;Scaly; Hyperpigmented;Edema 01/20/21 1332   Wound Length (cm) 0 1 cm 01/20/21 1332   Wound Width (cm) 0 1 cm 01/20/21 1332   Wound Depth (cm) 0 1 cm 01/20/21 1332   Wound Surface Area (cm^2) 0 01 cm^2 01/20/21 1332   Wound Volume (cm^3) 0 cm^3 01/20/21 1332   Calculated Wound Volume (cm^3) 0 cm^3 01/20/21 1332   Change in Wound Size % 100 01/20/21 1332   Drainage Amount Scant 01/20/21 1332   Drainage Description Serous 01/20/21 1332   Non-staged Wound Description Full thickness 01/20/21 1332   Dressing Status Intact 01/20/21 1332       Wound 01/06/21 Diabetic Ulcer Leg Left; Lower; Lateral (Active)   Wound Image   02/10/21 1337   Wound Description Granulation tissue;Pink;Dry 01/20/21 1331   Jovita-wound Assessment Edema 01/06/21 1310   Wound Length (cm) 1 5 cm 01/20/21 1331   Wound Width (cm) 0 4 cm 01/20/21 1331   Wound Depth (cm) 0 1 cm 01/20/21 1331   Wound Surface Area (cm^2) 0 6 cm^2 01/20/21 1331   Wound Volume (cm^3) 0 06 cm^3 01/20/21 1331   Calculated Wound Volume (cm^3) 0 06 cm^3 01/20/21 1331   Change in Wound Size % 50 01/20/21 1331   Drainage Amount Scant 01/20/21 1331   Drainage Description Serosanguineous 01/20/21 1331   Non-staged Wound Description Full thickness 01/20/21 1331   Dressing Status Intact 01/20/21 1331                No results found      Wound Instructions:  Orders Placed This Encounter   Procedures    Wound cleansing and dressings     Wash your hands with soap and water  Remove old dressing, discard into plastic bag and place in trash  Cleanse the wound with dakins prior to applying a clean dressing  Do not use tissue or cotton balls  Do not scrub the wound  Pat dry using gauze  Shower yes on day of dressing changes   Apply calmoseptine to periwound  Apply drawtex or qwik to the left foot wound  Cover with exu dry  Secure with amy and tape   Change dressing 3X/week     Surepress used for compression      Continue VNA for open and draining wounds    This was performed in [facility]     Standing Status:   Future     Standing Expiration Date:   2/10/2022    Wound culture and Gram stain     Standing Status:   Future     Standing Expiration Date:   2/10/2022     Order Specific Question:   Release to patient through Mychart     Answer:   Immediate         Silviano Villegas DPM, FACFAS    Portions of the record may have been created with voice recognition software  Occasional wrong word or "sound a like" substitutions may have occurred due to the inherent limitations of voice recognition software  Read the chart carefully and recognize, using context, where substitutions have occurred

## 2021-02-10 NOTE — PATIENT INSTRUCTIONS
Orders Placed This Encounter   Procedures    Wound cleansing and dressings     Wash your hands with soap and water  Remove old dressing, discard into plastic bag and place in trash  Cleanse the wound with dakins prior to applying a clean dressing  Do not use tissue or cotton balls  Do not scrub the wound  Pat dry using gauze  Shower yes on day of dressing changes   Apply calmoseptine to periwound  Apply drawtex or qwik to the left foot wound    Cover with exu dry  Secure with amy and tape   Change dressing 3X/week     Surepress used for compression      Continue VNA for open and draining wounds    This was performed in [facility]     Standing Status:   Future     Standing Expiration Date:   2/10/2022

## 2021-02-15 LAB
BACTERIA WND AEROBE CULT: ABNORMAL
GRAM STN SPEC: ABNORMAL

## 2021-02-17 ENCOUNTER — OFFICE VISIT (OUTPATIENT)
Dept: WOUND CARE | Facility: HOSPITAL | Age: 66
End: 2021-02-17
Payer: MEDICARE

## 2021-02-17 VITALS
SYSTOLIC BLOOD PRESSURE: 179 MMHG | RESPIRATION RATE: 20 BRPM | HEART RATE: 99 BPM | TEMPERATURE: 96 F | DIASTOLIC BLOOD PRESSURE: 82 MMHG

## 2021-02-17 DIAGNOSIS — E66.01 MORBID OBESITY WITH BMI OF 50.0-59.9, ADULT (HCC): ICD-10-CM

## 2021-02-17 DIAGNOSIS — E11.621 DIABETIC ULCER OF LEFT FOOT ASSOCIATED WITH TYPE 2 DIABETES MELLITUS, LIMITED TO BREAKDOWN OF SKIN, UNSPECIFIED PART OF FOOT (HCC): ICD-10-CM

## 2021-02-17 DIAGNOSIS — L97.521 DIABETIC ULCER OF LEFT FOOT ASSOCIATED WITH TYPE 2 DIABETES MELLITUS, LIMITED TO BREAKDOWN OF SKIN, UNSPECIFIED PART OF FOOT (HCC): ICD-10-CM

## 2021-02-17 DIAGNOSIS — I87.312 IDIOPATHIC CHRONIC VENOUS HYPERTENSION OF LEFT LOWER EXTREMITY WITH ULCER (HCC): Primary | ICD-10-CM

## 2021-02-17 DIAGNOSIS — L97.221 NON-PRESSURE CHRONIC ULCER OF LEFT CALF, LIMITED TO BREAKDOWN OF SKIN (HCC): ICD-10-CM

## 2021-02-17 DIAGNOSIS — L97.929 IDIOPATHIC CHRONIC VENOUS HYPERTENSION OF LEFT LOWER EXTREMITY WITH ULCER (HCC): Primary | ICD-10-CM

## 2021-02-17 PROCEDURE — 99213 OFFICE O/P EST LOW 20 MIN: CPT | Performed by: PREVENTIVE MEDICINE

## 2021-02-17 PROCEDURE — 99214 OFFICE O/P EST MOD 30 MIN: CPT | Performed by: PREVENTIVE MEDICINE

## 2021-02-17 NOTE — PROGRESS NOTES
Patient ID: Flavia Boyd is a 77 y o  male Date of Birth 1955       Chief Complaint   Patient presents with    Follow Up Wound Care Visit     left foot and left leg wounds        Allergies:  Patient has no known allergies  Diagnosis:  1  Idiopathic chronic venous hypertension of left lower extremity with ulcer (HCC)  -     Wound cleansing and dressings; Future  -     Wound home care; Future  -     Wound compression and edema control; Future    2  Diabetic ulcer of left foot associated with type 2 diabetes mellitus, limited to breakdown of skin, unspecified part of foot ([facility] )  -     Wound cleansing and dressings; Future  -     Wound home care; Future  -     Wound compression and edema control; Future    3  Non-pressure chronic ulcer of left calf, limited to breakdown of skin ([facility] )    4  Morbid obesity with BMI of 50 0-59 9, adult ([facility] )       Diagnosis ICD-10-CM Associated Orders   1  Idiopathic chronic venous hypertension of left lower extremity with ulcer (HCC)  I87 [id number] Wound cleansing and dressings    L97 929 Wound home care     Wound compression and edema control   2  Diabetic ulcer of left foot associated with type 2 diabetes mellitus, limited to breakdown of skin, unspecified part of foot ([facility])  E11 621 Wound cleansing and dressings    L97 521 Wound home care     Wound compression and edema control   3  Non-pressure chronic ulcer of left calf, limited to breakdown of skin ([facility] )  L97 221    4  Morbid obesity with BMI of 50 0-59 9, adult ([place] [address] )  E66 01     Z68 43         Assessment & Plan:  See wound orders  Reviewed culture from last week with 1+ proteus and 2+ MSSA, 2+ group B strep  treated with levaquin and dakins  No signs of infection today  D/c dakins and recommend prophase wash, otherwise continue current care  If no improvement or develops signs of infection would treat with cephalexin staph/strep  Subjective:   F/u L DFU and venous ulcer  No new complaints  No pain  No fever   Is elevating leg and tolerating compression      The following portions of the patient's history were reviewed and updated as appropriate:   Patient Active Problem List   Diagnosis    Controlled type 2 diabetes mellitus with complication, without long-term current use of insulin ([facility]    HTN (hypertension)    HLD (hyperlipidemia)    Hypothyroidism    Celiac disease    Coronary artery disease    History of duodenal ulcer    Living will on file    Morbid obesity with BMI of 50 0-59 9, adult ([facility] )    S/P BKA (below knee amputation) unilateral, right (HCC)    Paroxysmal atrial fibrillation (HCC)    Iron deficiency anemia due to chronic blood loss    Chronic venous stasis dermatitis of left lower extremity    Gastroesophageal reflux disease without esophagitis    Diabetic ulcer of left foot associated with type 2 diabetes mellitus, limited to breakdown of skin ([facility] )    Idiopathic chronic venous hypertension of left lower extremity with ulcer ([facility] )    Non-pressure chronic ulcer of left calf, limited to breakdown of skin ([facility] )     Past Medical History:   Diagnosis Date    Atrial fibrillation (HCC)     Cellulitis     Diabetes mellitus ([facility] )     Disease of thyroid gland     Duodenal ulcer     perforated- ICU stay    High blood pressure     High cholesterol     Hyperlipidemia     Hypertension     Hypothyroidism     Lymphedema      b/l LE- was followed at wound center    Morbid obesity with BMI of 40 0-44 9, adult ([facility] )     Peritonitis ([facility] )      Past Surgical History:   Procedure Laterality Date    BELOW KNEE LEG AMPUTATION Right     DIAGNOSTIC LAPAROSCOPY      KNEE ARTHROSCOPY Bilateral     OTHER SURGICAL HISTORY  03/2014     lap repair    OTHER SURGICAL HISTORY      Laparoscopic repair of a perforated duodenal ulcer with Maybelle Ced omental    OTHER SURGICAL HISTORY      Placement of drains     Social History     Socioeconomic History    Marital status: Single     Spouse name: None    Number of children: None    Years of education: None    Highest education level: None   Occupational History    None   Social Needs    Financial resource strain: None    Food insecurity     Worry: None     Inability: None    Transportation needs     Medical: None     Non-medical: None   Tobacco Use    Smoking status: Former Smoker     Packs/day:  00     Years: 30 00     Pack years: 30      Quit date: 2004     Years since quittin 2    Smokeless tobacco: Never Used   Substance and Sexual Activity    Alcohol use: Not Currently    Drug use: Not Currently    Sexual activity: None   Lifestyle    Physical activity     Days per week: None     Minutes per session: None    Stress: None   Relationships    Social connections     Talks on phone: None     Gets together: None     Attends Congregational service: None     Active member of club or organization: None     Attends meetings of clubs or organizations: None     Relationship status: None    Intimate partner violence     Fear of current or ex partner: None     Emotionally abused: None     Physically abused: None     Forced sexual activity: None   Other Topics Concern    None   Social History Narrative    Former smoker: Quit 3/31/2012 - As per Care Everywhere         Current Outpatient Medications:     aspirin (ASPIRIN 81) 81 mg EC tablet, Take 81 mg by mouth Daily, Disp: , Rfl:     clotrimazole-betamethasone (LOTRISONE) 1-0 05 % cream, Apply topically 2 (two) times a day, Disp: 90 g, Rfl: 0    gabapentin (NEURONTIN) 100 mg capsule, Take 1 capsule (100 mg total) by mouth 3 (three) times a day, Disp: 270 capsule, Rfl: 1    levofloxacin (LEVAQUIN) 500 mg tablet, Take 1 tablet (500 mg total) by mouth every 24 hours for 7 days, Disp: 7 tablet, Rfl: 0    levothyroxine 25 mcg tablet, Take 1 tablet (25 mcg total) by mouth daily In addition to the 300 mcg dose (stop the 75 mcg dose), Disp: 90 tablet, Rfl: 1    levothyroxine 300 MCG tablet, Take 1 tablet (300 mcg total) by mouth daily In addition to 75 mcg dose, Disp: 90 tablet, Rfl: 1    lisinopril (ZESTRIL) 10 mg tablet, Take 1 tablet (10 mg total) by mouth daily, Disp: 90 tablet, Rfl: 1    metFORMIN (GLUCOPHAGE-XR) 750 mg 24 hr tablet, Take 1 tablet (750 mg total) by mouth daily with breakfast, Disp: 90 tablet, Rfl: 1    metoprolol tartrate (LOPRESSOR) 50 mg tablet, Take 1 tablet (50 mg total) by mouth 2 (two) times a day, Disp: 180 tablet, Rfl: 2    Multiple Vitamins-Minerals (MULTIVITAMIN MEN 50+ PO), Take by mouth, Disp: , Rfl:     pantoprazole (PROTONIX) 40 mg tablet, Take 1 tablet (40 mg total) by mouth daily, Disp: 90 tablet, Rfl: 1    simvastatin (ZOCOR) 10 mg tablet, Take 1 tablet (10 mg total) by mouth daily at bedtime, Disp: 90 tablet, Rfl: 1    Zoster Vac Recomb Adjuvanted (Shingrix) 50 MCG/0 5ML SUSR, 0 5mL IM for one dose, followed by 0 5mL IM 2-6 months after first dose, Disp: 1 each, Rfl: 1  Family History   Problem Relation Age of Onset    Heart disease Family     Alcohol abuse Family     Heart disease Mother     Hypertension Mother    [de-identified] Migraines Daughter     Leukemia Brother     Migraines Daughter     Substance Abuse Neg Hx     Mental illness Neg Hx     Depression Neg Hx       Review of Systems   Constitutional: Negative  Respiratory: Negative  Cardiovascular: Positive for leg swelling  Skin: Positive for wound  Objective:  BP (!) 179/82   Pulse 99   Temp (!) 96 °F (35 6 °C)   Resp 20     Physical Exam  Vitals signs reviewed  Constitutional:       General: He is not in acute distress  Appearance: He is obese  Cardiovascular:      Rate and Rhythm: Normal rate  Pulmonary:      Effort: Pulmonary effort is normal    Musculoskeletal:      Left lower leg: Edema present  Skin:     Comments: See wound assessment   Neurological:      General: No focal deficit present  Mental Status: He is alert and oriented to person, place, and time        Gait: Gait abnormal  Psychiatric:         Mood and Affect: Mood normal          Behavior: Behavior normal              Wound 01/08/20 Foot Left; Other (Comment) (Active)   Wound Image   02/17/21 1116   Wound Description Beefy red;Epithelialization 02/17/21 1121   Jovita-wound Assessment Dry;Edema; Erythema; Maceration;Scaly 02/17/21 1121   Wound Length (cm) 9 cm 02/17/21 1121   Wound Width (cm) 7 5 cm 02/17/21 1121   Wound Depth (cm) 0 1 cm 02/17/21 1121   Wound Surface Area (cm^2) 67 5 cm^2 02/17/21 1121   Wound Volume (cm^3) 6 75 cm^3 02/17/21 1121   Calculated Wound Volume (cm^3) 6 75 cm^3 02/17/21 1121   Change in Wound Size % -347 02 02/17/21 1121   Drainage Amount Large 02/17/21 1121   Drainage Description Serous; Yellow 02/17/21 1121   Non-staged Wound Description Full thickness 02/17/21 1121   Treatments Cleansed 11/11/20 1320   Dressing Changed Changed 02/10/21 1341   Patient Tolerance Tolerated well 02/10/21 1341   Dressing Status Removed 02/10/21 1341       Wound Venous Ulcer Tibial Left;Posterior;Proximal (Active)   Wound Image   02/17/21 1118   Wound Description Epithelialization 02/17/21 1131   Jovita-wound Assessment Dry;Scaly; Hyperpigmented;Edema; Erythema 02/17/21 1131   Wound Length (cm) 0 cm 02/17/21 1131   Wound Width (cm) 0 cm 02/17/21 1131   Wound Depth (cm) 0 cm 02/17/21 1131   Wound Surface Area (cm^2) 0 cm^2 02/17/21 1131   Wound Volume (cm^3) 0 cm^3 02/17/21 1131   Calculated Wound Volume (cm^3) 0 cm^3 02/17/21 1131   Change in Wound Size % 100 02/17/21 1131   Drainage Amount None 02/17/21 1131   Drainage Description Serous 01/20/21 1332   Non-staged Wound Description Not applicable 40/32/92 3049   Dressing Status Intact 01/20/21 1332       Wound 01/06/21 Diabetic Ulcer Leg Left; Lower; Lateral (Active)   Wound Image   02/17/21 1117   Wound Description Pink;Epithelialization 02/17/21 1132   Jovita-wound Assessment Edema; Erythema;Dry;Scaly 02/17/21 1132   Wound Length (cm) 2 cm 02/17/21 1132   Wound Width (cm) 1 cm 02/17/21 1132   Wound Depth (cm) 0 1 cm 02/17/21 1132   Wound Surface Area (cm^2) 2 cm^2 02/17/21 1132   Wound Volume (cm^3) 0 2 cm^3 02/17/21 1132   Calculated Wound Volume (cm^3) 0 2 cm^3 02/17/21 1132   Change in Wound Size % -66 67 02/17/21 1132   Drainage Amount Scant 02/17/21 1132   Drainage Description Serous 02/17/21 1132   Non-staged Wound Description Full thickness 02/17/21 1132   Dressing Status Intact 01/20/21 1331                         Procedures     Results from last 6 Months   Lab Units 02/10/21  1410   WOUND CULTURE  1+ Growth of Proteus mirabilis*  2+ Growth of Beta Hemolytic Streptococcus Group B*  Few Colonies of Staphylococcus aureus*  2+ Growth of          Wound Instructions:  Orders Placed This Encounter   Procedures    Wound cleansing and dressings     Wound cleansing and dressings       Wash your hands with soap and water  Remove old dressing, discard into plastic bag and place in trash  Cleanse the wound with phrophase prior to applying a clean dressing  Do not use tissue or cotton balls  Do not scrub the wound  Pat dry using gauze  Shower yes on day of dressing changes   Apply calmoseptine to periwound  Apply drawtex or qwik to the left foot wound  Cover with exu dry  Secure with amy and tape   Change dressing 3X/week   This was performed in [facility] at today visit     Standing Status:   Future     Standing Expiration Date:   2/17/2022    Wound home care     Continue vna for dressing changes     Standing Status:   Future     Standing Expiration Date:   2/17/2022    Wound compression and edema control     Surepress    Apply compression wrap to your affected Leg(s) from mid-foot to knee making sure to cover the heel  Apply in the morning and re-wrap as needed during the day if wrap becomes loose  Remove at bedtime and elevate legs or lie down  Avoid prolonged standing in one place  Elevate leg(s) above the level of the heart when sitting or as much as possible       Standing Status:   Future     Standing Expiration Date:   2/17/2022         Mk Mattson DO      Portions of the record may have been created with voice recognition software  Occasional wrong word or "sound a like" substitutions may have occurred due to the inherent limitations of voice recognition software  Read the chart carefully and recognize, using context, where substitutions have occurred

## 2021-02-17 NOTE — PATIENT INSTRUCTIONS
Orders Placed This Encounter   Procedures    Wound cleansing and dressings     Wound cleansing and dressings       Wash your hands with soap and water  Remove old dressing, discard into plastic bag and place in trash  Cleanse the wound with phrophase prior to applying a clean dressing  Do not use tissue or cotton balls  Do not scrub the wound  Pat dry using gauze  Shower yes on day of dressing changes   Apply calmoseptine to periwound  Apply drawtex or qwik to the left foot wound  Cover with exu dry  Secure with amy and tape   Change dressing 3X/week   This was performed in [facility] at today visit     Standing Status:   Future     Standing Expiration Date:   2/17/2022    Wound home care     Continue vna for dressing changes     Standing Status:   Future     Standing Expiration Date:   2/17/2022    Wound compression and edema control     Surepress    Apply compression wrap to your affected Leg(s) from mid-foot to knee making sure to cover the heel  Apply in the morning and re-wrap as needed during the day if wrap becomes loose  Remove at bedtime and elevate legs or lie down  Avoid prolonged standing in one place  Elevate leg(s) above the level of the heart when sitting or as much as possible       Standing Status:   Future     Standing Expiration Date:   2/17/2022

## 2021-03-01 ENCOUNTER — TELEPHONE (OUTPATIENT)
Dept: WOUND CARE | Facility: HOSPITAL | Age: 66
End: 2021-03-01

## 2021-03-01 NOTE — TELEPHONE ENCOUNTER
[facility] however when she d/c'd the oil emulsion it opened back up  Also the foot was becoming very macerated as well as toes; she is using calmoseptine and oil emulsion on the foot with pieces of drawtex between the toes

## 2021-03-03 ENCOUNTER — OFFICE VISIT (OUTPATIENT)
Dept: WOUND CARE | Facility: HOSPITAL | Age: 66
End: 2021-03-03
Payer: MEDICARE

## 2021-03-03 VITALS
DIASTOLIC BLOOD PRESSURE: 80 MMHG | TEMPERATURE: 98 F | HEART RATE: 88 BPM | RESPIRATION RATE: 18 BRPM | SYSTOLIC BLOOD PRESSURE: 130 MMHG

## 2021-03-03 DIAGNOSIS — I87.312 IDIOPATHIC CHRONIC VENOUS HYPERTENSION OF LEFT LOWER EXTREMITY WITH ULCER (HCC): ICD-10-CM

## 2021-03-03 DIAGNOSIS — L97.221 NON-PRESSURE CHRONIC ULCER OF LEFT CALF, LIMITED TO BREAKDOWN OF SKIN (HCC): ICD-10-CM

## 2021-03-03 DIAGNOSIS — L97.929 IDIOPATHIC CHRONIC VENOUS HYPERTENSION OF LEFT LOWER EXTREMITY WITH ULCER (HCC): ICD-10-CM

## 2021-03-03 DIAGNOSIS — E11.42 DIABETIC POLYNEUROPATHY ASSOCIATED WITH TYPE 2 DIABETES MELLITUS (HCC): ICD-10-CM

## 2021-03-03 DIAGNOSIS — E11.621 DIABETIC ULCER OF LEFT FOOT ASSOCIATED WITH TYPE 2 DIABETES MELLITUS, LIMITED TO BREAKDOWN OF SKIN, UNSPECIFIED PART OF FOOT (HCC): Primary | ICD-10-CM

## 2021-03-03 DIAGNOSIS — L97.521 DIABETIC ULCER OF LEFT FOOT ASSOCIATED WITH TYPE 2 DIABETES MELLITUS, LIMITED TO BREAKDOWN OF SKIN, UNSPECIFIED PART OF FOOT (HCC): Primary | ICD-10-CM

## 2021-03-03 PROCEDURE — 99213 OFFICE O/P EST LOW 20 MIN: CPT | Performed by: PODIATRIST

## 2021-03-03 NOTE — PATIENT INSTRUCTIONS
Orders Placed This Encounter   Procedures    Wound cleansing and dressings     Wound cleansing and dressings                                  Wash your hands with soap and water  Remove old dressing, discard into plastic bag and place in trash  Cleanse the wound with phrophase prior to applying a clean dressing  Do not use tissue or cotton balls  Do not scrub the wound  Pat dry using gauze  Shower yes on day of dressing changes   Apply calmoseptine to periwound  Apply drawtex or qwik to the left foot wound  Cover with exu dry  Secure with amy and tape   Change dressing 3X/week   This was performed in [facility] at today visit  Follow up in 3 weeks       Standing Status:   Future     Standing Expiration Date:   3/3/2022

## 2021-03-03 NOTE — PROGRESS NOTES
Patient ID: Radha De Los Santso is a 77 y o  male Date of Birth 1955       Chief Complaint   Patient presents with    Follow Up Wound Care Visit     left foot diabetic ulcer and left lower leg ulcer       Allergies:  Patient has no known allergies  Diagnosis:  1  Diabetic ulcer of left foot associated with type 2 diabetes mellitus, limited to breakdown of skin, unspecified part of foot ([facility] )  -     Wound cleansing and dressings; Future; Expected date: 03/03/2021    2  Idiopathic chronic venous hypertension of left lower extremity with ulcer ([facility])  -     Wound cleansing and dressings; Future; Expected date: 03/03/2021    3  Non-pressure chronic ulcer of left calf, limited to breakdown of skin ([facility])  -     Wound cleansing and dressings; Future; Expected date: 03/03/2021    4  Diabetic polyneuropathy associated with type 2 diabetes mellitus ([facility])  -     Wound cleansing and dressings; Future; Expected date: 03/03/2021       Diagnosis ICD-10-CM Associated Orders   1  Diabetic ulcer of left foot associated with type 2 diabetes mellitus, limited to breakdown of skin, unspecified part of foot ([facility] )  P26 624 Wound cleansing and dressings    L97 521    2  Idiopathic chronic venous hypertension of left lower extremity with ulcer ([facility])  I87 312 Wound cleansing and dressings    L97 929    3  Non-pressure chronic ulcer of left calf, limited to breakdown of skin ([facility])  L97 221 Wound cleansing and dressings   4  Diabetic polyneuropathy associated with type 2 diabetes mellitus ([facility])  E11 42 Wound cleansing and dressings        Assessment & Plan:  1  Diabetic Naidu grade 1 ulcer dorsal left foot with odor, wound was not  Debrided, dressed with [place] Elana, visiting nurses  Will cleanse wound with Dakin's wash and apply Qwik  3 times a week  2    Patient to continue with lymphedema therapy for left lower extremity venous stasis ulcerations, all areas seem to be relatively epithelialized except for a few spots of serous   drainage    3  Patient to continue with frequent elevation, low-sodium diet, proper protein intake, proper glycemic control and use of sequential compression pump  Twice a day on left lower extremity  4    I spent approximately 17  mins  With patient, placing orders,  Education, reviewing medical records, not including procedure  I explained to patient the need for proper wound care, and compliance to minimize risk of limb loss, especially since 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  Patient is understanding & agrees with the plan and will follow up in 1 week  Procedures - none    Subjective:     Patient presents today for care of left lower extremity venous stasis ulcerations and diabetic ulcer on dorsal aspect left foot, he has completed all antibiotics as prescribed, he continues with lymphedema nurse at home as and visiting nurses to change his dressings, he states he is frustrated as  His leg continues to weep, other than that he has no new complaints          The following portions of the patient's history were reviewed and updated as appropriate:   Patient Active Problem List   Diagnosis    Controlled type 2 diabetes mellitus with complication, without long-term current use of insulin ([facility] )    HTN (hypertension)    HLD (hyperlipidemia)    Hypothyroidism    Celiac disease    Coronary artery disease    History of duodenal ulcer    Living will on file    Morbid obesity with BMI of 50 0-59 9, adult ([facility] )    S/P BKA (below knee amputation) unilateral, right (HCC)    Paroxysmal atrial fibrillation (HCC)    Iron deficiency anemia due to chronic blood loss    Chronic venous stasis dermatitis of left lower extremity    Gastroesophageal reflux disease without esophagitis    Diabetic ulcer of left foot associated with type 2 diabetes mellitus, limited to breakdown of skin ([facility] )    Idiopathic chronic venous hypertension of left lower extremity with ulcer ([facility] )    Non-pressure chronic ulcer of left calf, limited to breakdown of skin ([place] [address] )    Diabetic polyneuropathy associated with type 2 diabetes mellitus ([place] [address] )     Past Medical History:   Diagnosis Date    Atrial fibrillation ([place] [address] )     Cellulitis     Diabetes mellitus ([place] [address] )     Disease of thyroid gland     Duodenal ulcer     perforated- ICU stay    High blood pressure     High cholesterol     Hyperlipidemia     Hypertension     Hypothyroidism     Lymphedema      b/l LE- was followed at wound center    Morbid obesity with BMI of 40 0-44 9, adult ([place] [address] )     Peritonitis ([place] [address] )      Past Surgical History:   Procedure Laterality Date    BELOW KNEE LEG AMPUTATION Right     DIAGNOSTIC LAPAROSCOPY      KNEE ARTHROSCOPY Bilateral     OTHER SURGICAL HISTORY  2014     lap repair    OTHER SURGICAL HISTORY      Laparoscopic repair of a perforated duodenal ulcer with [place] Lime omental    OTHER SURGICAL HISTORY      Placement of drains     Social History     Socioeconomic History    Marital status: Single     Spouse name: Not on file    Number of children: Not on file    Years of education: Not on file    Highest education level: Not on file   Occupational History    Not on file   Social Needs    Financial resource strain: Not on file    Food insecurity     Worry: Not on file     Inability: Not on file    Transportation needs     Medical: Not on file     Non-medical: Not on file   Tobacco Use    Smoking status: Former Smoker     Packs/day: 1 00     Years: 30 00     Pack years: 30 00     Quit date: 2004     Years since quittin 3    Smokeless tobacco: Never Used   Substance and Sexual Activity    Alcohol use: Not Currently    Drug use: Not Currently    Sexual activity: Not on file   Lifestyle    Physical activity     Days per week: Not on file     Minutes per session: Not on file    Stress: Not on file   Relationships    Social connections     Talks on phone: Not on file     Gets together: Not on file     Attends Judaism service: Not on file     Active member of club or organization: Not on file     Attends meetings of clubs or organizations: Not on file     Relationship status: Not on file    Intimate partner violence     Fear of current or ex partner: Not on file     Emotionally abused: Not on file     Physically abused: Not on file     Forced sexual activity: Not on file   Other Topics Concern    Not on file   Social History Narrative    Former smoker: Quit 3/31/2012 - As per Care Everywhere         Current Outpatient Medications:     aspirin (ASPIRIN 81) 81 mg EC tablet, Take 81 mg by mouth Daily, Disp: , Rfl:     clotrimazole-betamethasone (LOTRISONE) 1-0 05 % cream, Apply topically 2 (two) times a day, Disp: 90 g, Rfl: 0    gabapentin (NEURONTIN) 100 mg capsule, Take 1 capsule (100 mg total) by mouth 3 (three) times a day, Disp: 270 capsule, Rfl: 1    levothyroxine 25 mcg tablet, Take 1 tablet (25 mcg total) by mouth daily In addition to the 300 mcg dose (stop the 75 mcg dose), Disp: 90 tablet, Rfl: 1    levothyroxine 300 MCG tablet, Take 1 tablet (300 mcg total) by mouth daily In addition to 75 mcg dose, Disp: 90 tablet, Rfl: 1    lisinopril (ZESTRIL) 10 mg tablet, Take 1 tablet (10 mg total) by mouth daily, Disp: 90 tablet, Rfl: 1    metFORMIN (GLUCOPHAGE-XR) 750 mg 24 hr tablet, Take 1 tablet (750 mg total) by mouth daily with breakfast, Disp: 90 tablet, Rfl: 1    metoprolol tartrate (LOPRESSOR) 50 mg tablet, Take 1 tablet (50 mg total) by mouth 2 (two) times a day, Disp: 180 tablet, Rfl: 2    Multiple Vitamins-Minerals (MULTIVITAMIN MEN 50+ PO), Take by mouth, Disp: , Rfl:     pantoprazole (PROTONIX) 40 mg tablet, Take 1 tablet (40 mg total) by mouth daily, Disp: 90 tablet, Rfl: 1    simvastatin (ZOCOR) 10 mg tablet, Take 1 tablet (10 mg total) by mouth daily at bedtime, Disp: 90 tablet, Rfl: 1    Zoster Vac Recomb Adjuvanted (Shingrix) 50 MCG/0 5ML SUSR, 0 5mL IM for one dose, followed by 0 5mL IM 2-6 months after first dose, Disp: 1 each, Rfl: 1  Family History   Problem Relation Age of Onset    Heart disease Family     Alcohol abuse Family     Heart disease Mother     Hypertension Mother    Anam Kimi Migraines Daughter     Leukemia Brother     Migraines Daughter     Substance Abuse Neg Hx     Mental illness Neg Hx     Depression Neg Hx        Review of Systems   Constitutional: Negative for activity change, appetite change, chills, fatigue and fever  HENT: Negative for hearing loss  Eyes: Negative for photophobia and visual disturbance  Respiratory: Negative for cough, chest tightness and shortness of breath  Cardiovascular: Negative for chest pain and palpitations  Gastrointestinal: Negative for diarrhea and nausea  Endocrine: Negative for cold intolerance and heat intolerance  Musculoskeletal: Positive for gait problem  Negative for arthralgias and back pain  Skin: Positive for color change and wound  Neurological: Positive for numbness  Psychiatric/Behavioral: Negative  Negative for agitation, self-injury and suicidal ideas  The patient is not nervous/anxious  Objective:  /80   Pulse 88   Temp 98 °F (36 7 °C)   Resp 18     Physical Exam  Constitutional:       Appearance: Normal appearance  He is obese  HENT:      Head: Normocephalic and atraumatic  Right Ear: External ear normal       Left Ear: External ear normal       Nose: Nose normal    Eyes:      Conjunctiva/sclera: Conjunctivae normal    Neck:      Musculoskeletal: Normal range of motion  Cardiovascular:      Pulses: Normal pulses  Dorsalis pedis pulses are 2+ on the left side  Posterior tibial pulses are 2+ on the left side  Pulmonary:      Effort: Pulmonary effort is normal    Musculoskeletal:      Left lower le+ Edema present  Comments: BKA right   Skin:     General: Skin is warm and dry  Capillary Refill: Capillary refill takes less than 2 seconds        Comments: See detailed wound assessment Neurological:      General: No focal deficit present  Mental Status: He is alert and oriented to person, place, and time  Mental status is at baseline  Sensory: Sensory deficit present  Coordination: Coordination abnormal       Gait: Gait abnormal       Deep Tendon Reflexes: Reflexes abnormal    Psychiatric:         Mood and Affect: Mood normal          Behavior: Behavior normal          Thought Content: Thought content normal          Judgment: Judgment normal            Wound 01/08/20 Foot Left; Other (Comment) (Active)   Wound Image   03/03/21 1402   Wound Description Pink;Epithelialization 03/03/21 1406   Jovita-wound Assessment Dry;Edema; Erythema; Maceration;Scaly 03/03/21 1406   Wound Length (cm) 11 6 cm 03/03/21 1406   Wound Width (cm) 7 2 cm 03/03/21 1406   Wound Depth (cm) 0 1 cm 03/03/21 1406   Wound Surface Area (cm^2) 83 52 cm^2 03/03/21 1406   Wound Volume (cm^3) 8 35 cm^3 03/03/21 1406   Calculated Wound Volume (cm^3) 8 35 cm^3 03/03/21 1406   Change in Wound Size % -452 98 03/03/21 1406   Drainage Amount Large 03/03/21 1406   Drainage Description Serous; Yellow 03/03/21 1406   Non-staged Wound Description Full thickness 03/03/21 1406   Treatments Cleansed 11/11/20 1320   Dressing Changed Changed 02/10/21 1341   Patient Tolerance Tolerated well 02/10/21 1341   Dressing Status Removed 02/10/21 1341       Wound 01/06/21 Diabetic Ulcer Leg Left; Lower; Lateral (Active)   Wound Image   03/03/21 1402   Wound Description Other (Comment);Pink;Epithelialization 03/03/21 1408   Jovita-wound Assessment Edema; Erythema;Dry;Scaly 03/03/21 1408   Wound Length (cm) 0 4 cm 03/03/21 1408   Wound Width (cm) 1 cm 03/03/21 1408   Wound Depth (cm) 0 1 cm 03/03/21 1408   Wound Surface Area (cm^2) 0 4 cm^2 03/03/21 1408   Wound Volume (cm^3) 0 04 cm^3 03/03/21 1408   Calculated Wound Volume (cm^3) 0 04 cm^3 03/03/21 1408   Change in Wound Size % 66 67 03/03/21 1408   Drainage Amount Moderate 03/03/21 1408   Drainage Description Serous; Yellow 03/03/21 1408   Non-staged Wound Description Full thickness 03/03/21 1408   Dressing Status Intact 01/20/21 1331          Results from last 6 Months   Lab Units 02/10/21  1410   WOUND CULTURE  1+ Growth of Proteus mirabilis*  2+ Growth of Beta Hemolytic Streptococcus Group B*  Few Colonies of Staphylococcus aureus*  2+ Growth of        No results found  Wound Instructions:  Orders Placed This Encounter   Procedures    Wound cleansing and dressings     Wound cleansing and dressings                                  Wash your hands with soap and water  Remove old dressing, discard into plastic bag and place in trash  Cleanse the wound with phrophase prior to applying a clean dressing  Do not use tissue or cotton balls  Do not scrub the wound  Pat dry using gauze  Shower yes on day of dressing changes   Apply calmoseptine to periwound  Apply drawtex or qwik to the left foot wound  Cover with exu dry  Secure with amy and tape   Change dressing 3X/week   This was performed in [facility] at today visit  Follow up in 3 weeks  Standing Status:   Future     Standing Expiration Date:   3/3/2022         Lynn Akbar DPM, FACFAS    Portions of the record may have been created with voice recognition software  Occasional wrong word or "sound a like" substitutions may have occurred due to the inherent limitations of voice recognition software  Read the chart carefully and recognize, using context, where substitutions have occurred

## 2021-03-10 DIAGNOSIS — Z23 ENCOUNTER FOR IMMUNIZATION: ICD-10-CM

## 2021-03-21 ENCOUNTER — IMMUNIZATIONS (OUTPATIENT)
Dept: FAMILY MEDICINE CLINIC | Facility: HOSPITAL | Age: 66
End: 2021-03-21

## 2021-03-21 DIAGNOSIS — Z23 ENCOUNTER FOR IMMUNIZATION: Primary | ICD-10-CM

## 2021-03-21 PROCEDURE — 91300 SARS-COV-2 / COVID-19 MRNA VACCINE (PFIZER-BIONTECH) 30 MCG: CPT

## 2021-03-21 PROCEDURE — 0001A SARS-COV-2 / COVID-19 MRNA VACCINE (PFIZER-BIONTECH) 30 MCG: CPT

## 2021-03-31 ENCOUNTER — OFFICE VISIT (OUTPATIENT)
Dept: WOUND CARE | Facility: HOSPITAL | Age: 66
End: 2021-03-31
Payer: MEDICARE

## 2021-03-31 VITALS
TEMPERATURE: 97.7 F | HEART RATE: 95 BPM | SYSTOLIC BLOOD PRESSURE: 181 MMHG | DIASTOLIC BLOOD PRESSURE: 82 MMHG | RESPIRATION RATE: 18 BRPM

## 2021-03-31 DIAGNOSIS — E11.621 DIABETIC ULCER OF LEFT FOOT ASSOCIATED WITH TYPE 2 DIABETES MELLITUS, LIMITED TO BREAKDOWN OF SKIN, UNSPECIFIED PART OF FOOT (HCC): Primary | ICD-10-CM

## 2021-03-31 DIAGNOSIS — L97.521 DIABETIC ULCER OF LEFT FOOT ASSOCIATED WITH TYPE 2 DIABETES MELLITUS, LIMITED TO BREAKDOWN OF SKIN, UNSPECIFIED PART OF FOOT (HCC): Primary | ICD-10-CM

## 2021-03-31 DIAGNOSIS — L97.221 NON-PRESSURE CHRONIC ULCER OF LEFT CALF, LIMITED TO BREAKDOWN OF SKIN (HCC): ICD-10-CM

## 2021-03-31 DIAGNOSIS — L97.929 IDIOPATHIC CHRONIC VENOUS HYPERTENSION OF LEFT LOWER EXTREMITY WITH ULCER (HCC): ICD-10-CM

## 2021-03-31 DIAGNOSIS — E11.42 DIABETIC POLYNEUROPATHY ASSOCIATED WITH TYPE 2 DIABETES MELLITUS (HCC): ICD-10-CM

## 2021-03-31 DIAGNOSIS — I87.312 IDIOPATHIC CHRONIC VENOUS HYPERTENSION OF LEFT LOWER EXTREMITY WITH ULCER (HCC): ICD-10-CM

## 2021-03-31 PROCEDURE — 99213 OFFICE O/P EST LOW 20 MIN: CPT | Performed by: PODIATRIST

## 2021-03-31 NOTE — PROGRESS NOTES
Patient ID: Fina Barrett is a 77 y o  male Date of Birth 1955       Chief Complaint   Patient presents with    Follow Up Wound Care Visit     LLE and Left Foot wounds       Allergies:  Patient has no known allergies  Diagnosis:  1  Diabetic ulcer of left foot associated with type 2 diabetes mellitus, limited to breakdown of skin, unspecified part of foot ([facility] )  -     Wound cleansing and dressings; Future  -     Wound compression and edema control; Future  -     Wound home care; Future    2  Idiopathic chronic venous hypertension of left lower extremity with ulcer ([facility] )    3  Non-pressure chronic ulcer of left calf, limited to breakdown of skin ([facility] )    4  Diabetic polyneuropathy associated with type 2 diabetes mellitus ([facility] )       Diagnosis ICD-10-CM Associated Orders   1  Diabetic ulcer of left foot associated with type 2 diabetes mellitus, limited to breakdown of skin, unspecified part of foot ([facility])  E11 621 Wound cleansing and dressings    L97 521 Wound compression and edema control     Wound home care   2  Idiopathic chronic venous hypertension of left lower extremity with ulcer ([facility] )  I87 312     L97 929    3  Non-pressure chronic ulcer of left calf, limited to breakdown of skin ([facility] )  L97 221    4  Diabetic polyneuropathy associated with type 2 diabetes mellitus ([facility])  E11 42         Assessment & Plan:  1  Patient seen and evaluated today, left foot with copious denudation on the dorsal aspect of his foot diabetic Naidu grade 1 ulceration  No debridement was performed, wound was dressed with dry check, Opti Lock dry dressing and surepress  Visiting nurses will to change this dressing daily for 1 week and then 3 times a week the following week  2   Chronic venous stasis ulceration left lower extremity is now well epithelialized and healed, discontinue dressings yet continue compression with   SurePress    3    Frequent elevation, low-sodium diet, proper protein intake, proper glycemic control, compliance with use of sequential compression pump and lymphedema therapy was reviewed with patient  He understands and agrees with the plan  4    Total time spent with patient was 24 minutes for evaluation management, reviewing of medical history, documentation of today's visit and placing orders  Patient will follow-up in 2 weeks  Procedures  -none     Subjective:     Patient presents today for care of left foot diabetic ulceration and left lower extremity venous stasis ulceration  Visiting nurses have been changing dressings as directed as well as lymphedema therapy nurses  He states he was doing well and then all of a sudden he has a lot more irritation and new openings on his left foot  He states he has not changed anything in his diet or medications  He states he elevates and has been using his pumps as directed          The following portions of the patient's history were reviewed and updated as appropriate:   Patient Active Problem List   Diagnosis    Controlled type 2 diabetes mellitus with complication, without long-term current use of insulin ([facility] )    HTN (hypertension)    HLD (hyperlipidemia)    Hypothyroidism    Celiac disease    Coronary artery disease    History of duodenal ulcer    Living will on file    Morbid obesity with BMI of 50 0-59 9, adult ([facility] )    S/P BKA (below knee amputation) unilateral, right (HCC)    Paroxysmal atrial fibrillation (HCC)    Iron deficiency anemia due to chronic blood loss    Chronic venous stasis dermatitis of left lower extremity    Gastroesophageal reflux disease without esophagitis    Diabetic ulcer of left foot associated with type 2 diabetes mellitus, limited to breakdown of skin ([address] )    Idiopathic chronic venous hypertension of left lower extremity with ulcer ([address] )    Non-pressure chronic ulcer of left calf, limited to breakdown of skin ([address] )    Diabetic polyneuropathy associated with type 2 diabetes mellitus ([address] )     Past Medical History:   Diagnosis Date    Atrial fibrillation ([facility] )     Cellulitis     Diabetes mellitus ([facility] )     Disease of thyroid gland     Duodenal ulcer     perforated- ICU stay    High blood pressure     High cholesterol     Hyperlipidemia     Hypertension     Hypothyroidism     Lymphedema      b/l LE- was followed at wound center    Morbid obesity with BMI of 40 0-44 9, adult ([facility] )     Peritonitis ([facility] )      Past Surgical History:   Procedure Laterality Date    BELOW KNEE LEG AMPUTATION Right     DIAGNOSTIC LAPAROSCOPY      KNEE ARTHROSCOPY Bilateral     OTHER SURGICAL HISTORY  2014     lap repair    OTHER SURGICAL HISTORY      Laparoscopic repair of a perforated duodenal ulcer with Gabriel Roper omental    OTHER SURGICAL HISTORY      Placement of drains     Social History     Socioeconomic History    Marital status: Single     Spouse name: None    Number of children: None    Years of education: None    Highest education level: None   Occupational History    None   Social Needs    Financial resource strain: None    Food insecurity     Worry: None     Inability: None    Transportation needs     Medical: None     Non-medical: None   Tobacco Use    Smoking status: Former Smoker     Packs/day: 1 00     Years: 30 00     Pack years: 30 00     Quit date: 2004     Years since quittin 4    Smokeless tobacco: Never Used   Substance and Sexual Activity    Alcohol use: Not Currently    Drug use: Not Currently    Sexual activity: None   Lifestyle    Physical activity     Days per week: None     Minutes per session: None    Stress: None   Relationships    Social connections     Talks on phone: None     Gets together: None     Attends Faith service: None     Active member of club or organization: None     Attends meetings of clubs or organizations: None     Relationship status: None    Intimate partner violence     Fear of current or ex partner: None     Emotionally abused: None Physically abused: None     Forced sexual activity: None   Other Topics Concern    None   Social History Narrative    Former smoker: Quit 3/31/2012 - As per Care Everywhere         Current Outpatient Medications:     aspirin (ASPIRIN 81) 81 mg EC tablet, Take 81 mg by mouth Daily, Disp: , Rfl:     clotrimazole-betamethasone (LOTRISONE) 1-0 05 % cream, Apply topically 2 (two) times a day, Disp: 90 g, Rfl: 0    gabapentin (NEURONTIN) 100 mg capsule, Take 1 capsule (100 mg total) by mouth 3 (three) times a day, Disp: 270 capsule, Rfl: 1    levothyroxine 25 mcg tablet, Take 1 tablet (25 mcg total) by mouth daily In addition to the 300 mcg dose (stop the 75 mcg dose), Disp: 90 tablet, Rfl: 1    levothyroxine 300 MCG tablet, Take 1 tablet (300 mcg total) by mouth daily In addition to 75 mcg dose, Disp: 90 tablet, Rfl: 1    lisinopril (ZESTRIL) 10 mg tablet, Take 1 tablet (10 mg total) by mouth daily, Disp: 90 tablet, Rfl: 1    metFORMIN (GLUCOPHAGE-XR) 750 mg 24 hr tablet, Take 1 tablet (750 mg total) by mouth daily with breakfast, Disp: 90 tablet, Rfl: 1    metoprolol tartrate (LOPRESSOR) 50 mg tablet, Take 1 tablet (50 mg total) by mouth 2 (two) times a day, Disp: 180 tablet, Rfl: 2    Multiple Vitamins-Minerals (MULTIVITAMIN MEN 50+ PO), Take by mouth, Disp: , Rfl:     pantoprazole (PROTONIX) 40 mg tablet, Take 1 tablet (40 mg total) by mouth daily, Disp: 90 tablet, Rfl: 1    simvastatin (ZOCOR) 10 mg tablet, Take 1 tablet (10 mg total) by mouth daily at bedtime, Disp: 90 tablet, Rfl: 1    Zoster Vac Recomb Adjuvanted (Shingrix) 50 MCG/0 5ML SUSR, 0 5mL IM for one dose, followed by 0 5mL IM 2-6 months after first dose, Disp: 1 each, Rfl: 1  Family History   Problem Relation Age of Onset    Heart disease Family     Alcohol abuse Family     Heart disease Mother     Hypertension Mother     Migraines Daughter     Leukemia Brother     Migraines Daughter     Substance Abuse Neg Hx     Mental illness Neg Hx     Depression Neg Hx        Review of Systems   Constitutional: Negative for activity change, appetite change, chills, fatigue and fever  HENT: Negative for hearing loss  Eyes: Negative for photophobia and visual disturbance  Respiratory: Negative for cough, chest tightness and shortness of breath  Cardiovascular: Negative for chest pain and palpitations  Gastrointestinal: Negative for diarrhea and nausea  Endocrine: Negative for cold intolerance and heat intolerance  Musculoskeletal: Positive for gait problem  Negative for arthralgias and back pain  Skin: Positive for color change and wound  Neurological: Positive for numbness  Psychiatric/Behavioral: Negative  Negative for agitation, self-injury and suicidal ideas  The patient is not nervous/anxious  Objective:  BP (!) 181/82   Pulse 95   Temp 97 7 °F (36 5 °C)   Resp 18     Physical Exam  Constitutional:       Appearance: Normal appearance  He is obese  HENT:      Head: Normocephalic and atraumatic  Right Ear: External ear normal       Left Ear: External ear normal       Nose: Nose normal    Eyes:      Conjunctiva/sclera: Conjunctivae normal    Neck:      Musculoskeletal: Normal range of motion  Cardiovascular:      Pulses: Normal pulses  Dorsalis pedis pulses are 2+ on the left side  Posterior tibial pulses are 2+ on the left side  Pulmonary:      Effort: Pulmonary effort is normal    Musculoskeletal:      Right lower le+ Pitting Edema present  Left lower le+ Pitting Edema present  Comments: BKA right   Skin:     General: Skin is warm and dry  Capillary Refill: Capillary refill takes less than 2 seconds  Comments: See detailed wound assessment   Neurological:      General: No focal deficit present  Mental Status: He is alert and oriented to person, place, and time  Mental status is at baseline  Sensory: Sensory deficit present        Gait: Gait abnormal  Deep Tendon Reflexes: Reflexes abnormal    Psychiatric:         Mood and Affect: Mood normal          Behavior: Behavior normal          Thought Content: Thought content normal          Judgment: Judgment normal            Wound 01/08/20 Foot Left; Other (Comment) (Active)   Wound Image   03/31/21 1328   Wound Description Epithelialization;Granulation tissue;Pink 03/31/21 1328   Jovita-wound Assessment Maceration;Edema; Erythema;Fragile 03/31/21 1328   Wound Length (cm) 17 cm 03/31/21 1328   Wound Width (cm) 10 cm 03/31/21 1328   Wound Depth (cm) 0 1 cm 03/31/21 1328   Wound Surface Area (cm^2) 170 cm^2 03/31/21 1328   Wound Volume (cm^3) 17 cm^3 03/31/21 1328   Calculated Wound Volume (cm^3) 17 cm^3 03/31/21 1328   Change in Wound Size % -1025 83 03/31/21 1328   Drainage Amount Large 03/31/21 1328   Drainage Description Serosanguineous 03/31/21 1328   Non-staged Wound Description Full thickness 03/31/21 1328   Treatments Cleansed 11/11/20 1320   Dressing Changed Changed 02/10/21 1341   Patient Tolerance Tolerated well 02/10/21 1341   Dressing Status Removed 02/10/21 1341       Wound 01/06/21 Diabetic Ulcer Leg Left; Lower; Lateral (Active)   Wound Image   03/31/21 1326   Wound Description Epithelialization 03/31/21 1329   Jovita-wound Assessment Intact;Dry;Edema 03/31/21 1329   Wound Length (cm) 0 cm 03/31/21 1329   Wound Width (cm) 0 cm 03/31/21 1329   Wound Depth (cm) 0 cm 03/31/21 1329   Wound Surface Area (cm^2) 0 cm^2 03/31/21 1329   Wound Volume (cm^3) 0 cm^3 03/31/21 1329   Calculated Wound Volume (cm^3) 0 cm^3 03/31/21 1329   Change in Wound Size % 100 03/31/21 1329   Drainage Amount None 03/31/21 1329   Drainage Description Serous; Yellow 03/03/21 1408   Non-staged Wound Description Not applicable 42/38/58 0842   Dressing Status Intact 01/20/21 1331          Results from last 6 Months   Lab Units 02/10/21  1410   WOUND CULTURE  1+ Growth of Proteus mirabilis*  2+ Growth of Beta Hemolytic Streptococcus Group B* Few Colonies of Staphylococcus aureus*  2+ Growth of        No results found  Wound Instructions:  Orders Placed This Encounter   Procedures    Wound cleansing and dressings     Left Foot wound:       Wash your hands with soap and water  Remove old dressing, discard into plastic bag and place in trash  Cleanse the wound with phrophase prior to applying a clean dressing  Do not use tissue or cotton balls  Shower yes on day of dressing changes   Apply calmoseptine to periwound  Apply drawtex or qwik to the left foot wound  Cover with exu dry  Secure with amy and tape   Change dressing daily x 1 week then return to 3 x weekly visits   This was performed in [facility] at today visit      Follow up in 2 weeks  Standing Status:   Future     Standing Expiration Date:   3/31/2022    Wound compression and edema control     Surepress to the LLE    Apply compression wrap to your affected Leg(s) from mid-foot to knee making sure to cover the heel  Apply in the morning and re-wrap as needed during the day if wrap becomes loose  Remove at bedtime and elevate legs or lie down  Avoid prolonged standing in one place  Elevate leg(s) above the level of the heart when sitting or as much as possible  Standing Status:   Future     Standing Expiration Date:   3/31/2022    Wound home care     [facility]---dressing changes daily x 1 week then return to 3 x weekly dressing changes     Standing Status:   Future     Standing Expiration Date:   3/31/2022         Anola Jetty, DPM, FACFAS    Portions of the record may have been created with voice recognition software  Occasional wrong word or "sound a like" substitutions may have occurred due to the inherent limitations of voice recognition software  Read the chart carefully and recognize, using context, where substitutions have occurred

## 2021-03-31 NOTE — PATIENT INSTRUCTIONS
Orders Placed This Encounter   Procedures    Wound cleansing and dressings     Left Foot wound:       Wash your hands with soap and water  Remove old dressing, discard into plastic bag and place in trash  Cleanse the wound with phrophase prior to applying a clean dressing  Do not use tissue or cotton balls  Shower yes on day of dressing changes   Apply calmoseptine to periwound  Apply drawtex or qwik to the left foot wound  Cover with exu dry  Secure with amy and tape   Change dressing daily x 1 week then return to 3 x weekly visits   This was performed in [facility] at today visit      Follow up in 2 weeks  Standing Status:   Future     Standing Expiration Date:   3/31/2022    Wound compression and edema control     Surepress to the LLE    Apply compression wrap to your affected Leg(s) from mid-foot to knee making sure to cover the heel  Apply in the morning and re-wrap as needed during the day if wrap becomes loose  Remove at bedtime and elevate legs or lie down  Avoid prolonged standing in one place  Elevate leg(s) above the level of the heart when sitting or as much as possible       Standing Status:   Future     Standing Expiration Date:   3/31/2022    Wound home care     [facility]---dressing changes daily x 1 week then return to 3 x weekly dressing changes     Standing Status:   Future     Standing Expiration Date:   3/31/2022

## 2021-04-09 ENCOUNTER — HOSPITAL ENCOUNTER (INPATIENT)
Facility: HOSPITAL | Age: 66
LOS: 5 days | Discharge: HOME HEALTH | DRG: 872 | End: 2021-04-14
Attending: EMERGENCY MEDICINE | Admitting: INTERNAL MEDICINE
Payer: MEDICARE

## 2021-04-09 ENCOUNTER — APPOINTMENT (EMERGENCY)
Dept: RADIOLOGY | Facility: HOSPITAL | Age: 66
DRG: 872 | End: 2021-04-09
Payer: MEDICARE

## 2021-04-09 DIAGNOSIS — A41.9 SEPSIS DUE TO CELLULITIS (HCC): ICD-10-CM

## 2021-04-09 DIAGNOSIS — L03.116 LEFT LEG CELLULITIS: Primary | ICD-10-CM

## 2021-04-09 DIAGNOSIS — E11.621 DIABETIC ULCER OF LEFT FOOT ASSOCIATED WITH TYPE 2 DIABETES MELLITUS, LIMITED TO BREAKDOWN OF SKIN, UNSPECIFIED PART OF FOOT (HCC): ICD-10-CM

## 2021-04-09 DIAGNOSIS — I87.2 CHRONIC VENOUS STASIS DERMATITIS OF LEFT LOWER EXTREMITY: ICD-10-CM

## 2021-04-09 DIAGNOSIS — L97.521 DIABETIC ULCER OF LEFT FOOT ASSOCIATED WITH TYPE 2 DIABETES MELLITUS, LIMITED TO BREAKDOWN OF SKIN, UNSPECIFIED PART OF FOOT (HCC): ICD-10-CM

## 2021-04-09 DIAGNOSIS — L03.90 SEPSIS DUE TO CELLULITIS (HCC): ICD-10-CM

## 2021-04-09 LAB
ANION GAP SERPL CALCULATED.3IONS-SCNC: 7 MMOL/L (ref 4–13)
ANISOCYTOSIS BLD QL SMEAR: PRESENT
BACTERIA UR QL AUTO: ABNORMAL /HPF
BASOPHILS # BLD MANUAL: 0 THOUSAND/UL (ref 0–0.1)
BASOPHILS NFR MAR MANUAL: 0 % (ref 0–1)
BILIRUB UR QL STRIP: NEGATIVE
BUN SERPL-MCNC: 16 MG/DL (ref 5–25)
CALCIUM SERPL-MCNC: 8.9 MG/DL (ref 8.3–10.1)
CHLORIDE SERPL-SCNC: 98 MMOL/L (ref 100–108)
CLARITY UR: ABNORMAL
CO2 SERPL-SCNC: 27 MMOL/L (ref 21–32)
COLOR UR: YELLOW
CREAT SERPL-MCNC: 1.1 MG/DL (ref 0.6–1.3)
EOSINOPHIL # BLD MANUAL: 0 THOUSAND/UL (ref 0–0.4)
EOSINOPHIL NFR BLD MANUAL: 0 % (ref 0–6)
ERYTHROCYTE [DISTWIDTH] IN BLOOD BY AUTOMATED COUNT: 17.5 % (ref 11.6–15.1)
GFR SERPL CREATININE-BSD FRML MDRD: 69 ML/MIN/1.73SQ M
GLUCOSE SERPL-MCNC: 108 MG/DL (ref 65–140)
GLUCOSE SERPL-MCNC: 89 MG/DL (ref 65–140)
GLUCOSE UR STRIP-MCNC: NEGATIVE MG/DL
HCT VFR BLD AUTO: 36 % (ref 36.5–49.3)
HGB BLD-MCNC: 11.4 G/DL (ref 12–17)
HGB UR QL STRIP.AUTO: ABNORMAL
HYALINE CASTS #/AREA URNS LPF: ABNORMAL /LPF
KETONES UR STRIP-MCNC: NEGATIVE MG/DL
LACTATE SERPL-SCNC: 1.6 MMOL/L (ref 0.5–2)
LEUKOCYTE ESTERASE UR QL STRIP: NEGATIVE
LYMPHOCYTES # BLD AUTO: 0.16 THOUSAND/UL (ref 0.6–4.47)
LYMPHOCYTES # BLD AUTO: 1 % (ref 14–44)
MCH RBC QN AUTO: 26.1 PG (ref 26.8–34.3)
MCHC RBC AUTO-ENTMCNC: 31.7 G/DL (ref 31.4–37.4)
MCV RBC AUTO: 83 FL (ref 82–98)
MONOCYTES # BLD AUTO: 0 THOUSAND/UL (ref 0–1.22)
MONOCYTES NFR BLD: 0 % (ref 4–12)
NEUTROPHILS # BLD MANUAL: 15.94 THOUSAND/UL (ref 1.85–7.62)
NEUTS SEG NFR BLD AUTO: 97 % (ref 43–75)
NITRITE UR QL STRIP: NEGATIVE
NON-SQ EPI CELLS URNS QL MICRO: ABNORMAL /HPF
NRBC BLD AUTO-RTO: 0 /100 WBCS
PH UR STRIP.AUTO: 6.5 [PH]
PLATELET # BLD AUTO: 150 THOUSANDS/UL (ref 149–390)
PLATELET BLD QL SMEAR: ADEQUATE
PMV BLD AUTO: 12.1 FL (ref 8.9–12.7)
POIKILOCYTOSIS BLD QL SMEAR: PRESENT
POLYCHROMASIA BLD QL SMEAR: PRESENT
POTASSIUM SERPL-SCNC: 3.7 MMOL/L (ref 3.5–5.3)
PROCALCITONIN SERPL-MCNC: 4.73 NG/ML
PROT UR STRIP-MCNC: NEGATIVE MG/DL
RBC # BLD AUTO: 4.36 MILLION/UL (ref 3.88–5.62)
RBC #/AREA URNS AUTO: ABNORMAL /HPF
RBC MORPH BLD: PRESENT
SODIUM SERPL-SCNC: 132 MMOL/L (ref 136–145)
SP GR UR STRIP.AUTO: 1.01 (ref 1–1.03)
TROPONIN I SERPL-MCNC: <0.02 NG/ML
UROBILINOGEN UR QL STRIP.AUTO: 1 E.U./DL
VARIANT LYMPHS # BLD AUTO: 2 %
WBC # BLD AUTO: 16.43 THOUSAND/UL (ref 4.31–10.16)
WBC #/AREA URNS AUTO: ABNORMAL /HPF

## 2021-04-09 PROCEDURE — 99285 EMERGENCY DEPT VISIT HI MDM: CPT | Performed by: EMERGENCY MEDICINE

## 2021-04-09 PROCEDURE — 99285 EMERGENCY DEPT VISIT HI MDM: CPT

## 2021-04-09 PROCEDURE — 93005 ELECTROCARDIOGRAM TRACING: CPT

## 2021-04-09 PROCEDURE — 36415 COLL VENOUS BLD VENIPUNCTURE: CPT | Performed by: EMERGENCY MEDICINE

## 2021-04-09 PROCEDURE — 85027 COMPLETE CBC AUTOMATED: CPT | Performed by: EMERGENCY MEDICINE

## 2021-04-09 PROCEDURE — 85007 BL SMEAR W/DIFF WBC COUNT: CPT | Performed by: EMERGENCY MEDICINE

## 2021-04-09 PROCEDURE — 96365 THER/PROPH/DIAG IV INF INIT: CPT

## 2021-04-09 PROCEDURE — 96367 TX/PROPH/DG ADDL SEQ IV INF: CPT

## 2021-04-09 PROCEDURE — 84145 PROCALCITONIN (PCT): CPT | Performed by: EMERGENCY MEDICINE

## 2021-04-09 PROCEDURE — 83605 ASSAY OF LACTIC ACID: CPT | Performed by: EMERGENCY MEDICINE

## 2021-04-09 PROCEDURE — 96368 THER/DIAG CONCURRENT INF: CPT

## 2021-04-09 PROCEDURE — 71045 X-RAY EXAM CHEST 1 VIEW: CPT

## 2021-04-09 PROCEDURE — 81001 URINALYSIS AUTO W/SCOPE: CPT | Performed by: INTERNAL MEDICINE

## 2021-04-09 PROCEDURE — 96366 THER/PROPH/DIAG IV INF ADDON: CPT

## 2021-04-09 PROCEDURE — 1123F ACP DISCUSS/DSCN MKR DOCD: CPT | Performed by: EMERGENCY MEDICINE

## 2021-04-09 PROCEDURE — 80048 BASIC METABOLIC PNL TOTAL CA: CPT | Performed by: EMERGENCY MEDICINE

## 2021-04-09 PROCEDURE — 84484 ASSAY OF TROPONIN QUANT: CPT | Performed by: EMERGENCY MEDICINE

## 2021-04-09 PROCEDURE — 87081 CULTURE SCREEN ONLY: CPT | Performed by: INTERNAL MEDICINE

## 2021-04-09 PROCEDURE — 87040 BLOOD CULTURE FOR BACTERIA: CPT | Performed by: EMERGENCY MEDICINE

## 2021-04-09 PROCEDURE — 99223 1ST HOSP IP/OBS HIGH 75: CPT | Performed by: INTERNAL MEDICINE

## 2021-04-09 PROCEDURE — 82948 REAGENT STRIP/BLOOD GLUCOSE: CPT

## 2021-04-09 RX ORDER — SODIUM CHLORIDE, SODIUM GLUCONATE, SODIUM ACETATE, POTASSIUM CHLORIDE, MAGNESIUM CHLORIDE, SODIUM PHOSPHATE, DIBASIC, AND POTASSIUM PHOSPHATE .53; .5; .37; .037; .03; .012; .00082 G/100ML; G/100ML; G/100ML; G/100ML; G/100ML; G/100ML; G/100ML
1000 INJECTION, SOLUTION INTRAVENOUS ONCE
Status: COMPLETED | OUTPATIENT
Start: 2021-04-09 | End: 2021-04-09

## 2021-04-09 RX ORDER — DOCUSATE SODIUM 100 MG/1
100 CAPSULE, LIQUID FILLED ORAL 2 TIMES DAILY PRN
Status: DISCONTINUED | OUTPATIENT
Start: 2021-04-09 | End: 2021-04-14 | Stop reason: HOSPADM

## 2021-04-09 RX ORDER — CLOTRIMAZOLE AND BETAMETHASONE DIPROPIONATE 10; .64 MG/G; MG/G
CREAM TOPICAL 2 TIMES DAILY
Status: DISCONTINUED | OUTPATIENT
Start: 2021-04-09 | End: 2021-04-14 | Stop reason: HOSPADM

## 2021-04-09 RX ORDER — ONDANSETRON 2 MG/ML
4 INJECTION INTRAMUSCULAR; INTRAVENOUS EVERY 6 HOURS PRN
Status: DISCONTINUED | OUTPATIENT
Start: 2021-04-09 | End: 2021-04-14 | Stop reason: HOSPADM

## 2021-04-09 RX ORDER — PANTOPRAZOLE SODIUM 40 MG/1
40 TABLET, DELAYED RELEASE ORAL
Status: DISCONTINUED | OUTPATIENT
Start: 2021-04-10 | End: 2021-04-14 | Stop reason: HOSPADM

## 2021-04-09 RX ORDER — GABAPENTIN 100 MG/1
100 CAPSULE ORAL 3 TIMES DAILY
Status: DISCONTINUED | OUTPATIENT
Start: 2021-04-09 | End: 2021-04-14 | Stop reason: HOSPADM

## 2021-04-09 RX ORDER — PRAVASTATIN SODIUM 20 MG
20 TABLET ORAL
Status: DISCONTINUED | OUTPATIENT
Start: 2021-04-10 | End: 2021-04-14 | Stop reason: HOSPADM

## 2021-04-09 RX ORDER — ACETAMINOPHEN 325 MG/1
650 TABLET ORAL EVERY 6 HOURS PRN
Status: DISCONTINUED | OUTPATIENT
Start: 2021-04-09 | End: 2021-04-14 | Stop reason: HOSPADM

## 2021-04-09 RX ORDER — ASPIRIN 81 MG/1
81 TABLET ORAL DAILY
Status: DISCONTINUED | OUTPATIENT
Start: 2021-04-10 | End: 2021-04-14 | Stop reason: HOSPADM

## 2021-04-09 RX ORDER — MAGNESIUM HYDROXIDE/ALUMINUM HYDROXICE/SIMETHICONE 120; 1200; 1200 MG/30ML; MG/30ML; MG/30ML
30 SUSPENSION ORAL EVERY 6 HOURS PRN
Status: DISCONTINUED | OUTPATIENT
Start: 2021-04-09 | End: 2021-04-14 | Stop reason: HOSPADM

## 2021-04-09 RX ORDER — LISINOPRIL 10 MG/1
10 TABLET ORAL DAILY
Status: DISCONTINUED | OUTPATIENT
Start: 2021-04-10 | End: 2021-04-14 | Stop reason: HOSPADM

## 2021-04-09 RX ORDER — METOPROLOL TARTRATE 50 MG/1
50 TABLET, FILM COATED ORAL 2 TIMES DAILY
Status: DISCONTINUED | OUTPATIENT
Start: 2021-04-09 | End: 2021-04-14 | Stop reason: HOSPADM

## 2021-04-09 RX ADMIN — SODIUM CHLORIDE, SODIUM GLUCONATE, SODIUM ACETATE, POTASSIUM CHLORIDE, MAGNESIUM CHLORIDE, SODIUM PHOSPHATE, DIBASIC, AND POTASSIUM PHOSPHATE 1000 ML: .53; .5; .37; .037; .03; .012; .00082 INJECTION, SOLUTION INTRAVENOUS at 18:55

## 2021-04-09 RX ADMIN — CEFTRIAXONE SODIUM 1000 MG: 10 INJECTION, POWDER, FOR SOLUTION INTRAVENOUS at 18:32

## 2021-04-09 RX ADMIN — METOPROLOL TARTRATE 50 MG: 50 TABLET, FILM COATED ORAL at 22:20

## 2021-04-09 RX ADMIN — VANCOMYCIN HYDROCHLORIDE 2000 MG: 1 INJECTION, POWDER, LYOPHILIZED, FOR SOLUTION INTRAVENOUS at 18:59

## 2021-04-09 RX ADMIN — GABAPENTIN 100 MG: 100 CAPSULE ORAL at 22:20

## 2021-04-09 RX ADMIN — ENOXAPARIN SODIUM 40 MG: 40 INJECTION SUBCUTANEOUS at 22:20

## 2021-04-09 NOTE — SEPSIS NOTE
Sepsis Note   Erin Fisher 77 y o  male MRN: [id number]  Unit/Bed#: QCD Encounter: [id number]      qSOFA     [address] Name 04/09/21 1830 04/09/21 1717             Altered mental status GCS < 15  --  --       Respiratory Rate > / =22  0  0       Systolic BP < / =777  0  0       Q Sofa Score  0  0           Initial Sepsis Screening     Row Name 04/09/21 3524                Is the patient's history suggestive of a new or worsening infection? (!) Yes (Proceed)  -HB        Suspected source of infection  wound infection;soft tissue  -HB        Are two or more of the following signs & symptoms of infection both present and new to the patient? (!) Yes (Proceed)  -HB        Indicate SIRS criteria  Tachycardia > 90 bpm;Leukocytosis (WBC > 87066 IJL)  -HB        If the answer is yes to both questions, suspicion of sepsis is present  --        If severe sepsis is present AND tissue hypoperfusion perists in the hour after fluid resuscitation or lactate > 4, the patient meets criteria for SEPTIC SHOCK  --        Are any of the following organ dysfunction criteria present within 6 hours of suspected infection and SIRS criteria that are NOT considered to be chronic conditions?   No  -HB        Organ dysfunction  --        Date of presentation of severe sepsis  --        Time of presentation of severe sepsis  --        Tissue hypoperfusion persists in the hour after crystalloid fluid administration, evidenced, by either:  --        Was hypotension present within one hour of the conclusion of crystalloid fluid administration?  --        Date of presentation of septic shock  --        Time of presentation of septic shock  --          User Key  (r) = Recorded By, (t) = Taken By, (c) = Cosigned By    [address] Name Provider Type    HB Daniel Person DO Physician               Default [facility] (last 720 hours)      Sepsis [address] Name 04/09/21 1925                   Repeat Volume Status and Tissue Perfusion Assessment Performed    Repeat Volume Status and Tissue Perfusion Assessment Performed  Yes  -HB           Volume Status and Tissue Perfusion Post Fluid Resuscitation * Must Document All *    Vital Signs Reviewed (HR, RR, BP, T)  Yes  -HB        Shock Index Reviewed  Yes  -HB        Arterial Oxygen Saturation Reviewed (POx, SaO2 or SpO2)  Yes (comment %)  -HB        Cardio  (!) Normal S1/S2; Tachycardia  -HB        Pulmonary  Normal effort  -HB        Capillary Refill  Brisk  -HB        Peripheral Pulses  Radial  -HB        Peripheral Pulse  +2  -HB        Skin  Warm;Dry  -HB        Urine output assessed  None  -HB           *OR*   Intensive Monitoring- Must Document One of the Following Four *:    Vital Signs Reviewed  --        * Central Venous Pressure (CVP or RAP)  --        * Central Venous Oxygen (SVO2, ScvO2 or Oxygen saturation via central catheter)  --        * Bedside Cardiovascular US in IVC diameter and % collapse  --        * Passive Leg Raise OR Crystalloid Challenge  Crystalloid fluid challenge completed  -HB        Crystalloid fluid challenge completed  500mL in 15 minutes  -          User Key  (r) = Recorded By, (t) = Taken By, (c) = Cosigned By    Initials Name Provider Type     Zina Reeves DO Physician

## 2021-04-10 LAB
ALBUMIN SERPL BCP-MCNC: 2.9 G/DL (ref 3.5–5)
ALP SERPL-CCNC: 75 U/L (ref 46–116)
ALT SERPL W P-5'-P-CCNC: 16 U/L (ref 12–78)
ANION GAP SERPL CALCULATED.3IONS-SCNC: 3 MMOL/L (ref 4–13)
AST SERPL W P-5'-P-CCNC: 13 U/L (ref 5–45)
BILIRUB SERPL-MCNC: 0.61 MG/DL (ref 0.2–1)
BUN SERPL-MCNC: 14 MG/DL (ref 5–25)
CALCIUM ALBUM COR SERPL-MCNC: 9.7 MG/DL (ref 8.3–10.1)
CALCIUM SERPL-MCNC: 8.8 MG/DL (ref 8.3–10.1)
CHLORIDE SERPL-SCNC: 105 MMOL/L (ref 100–108)
CO2 SERPL-SCNC: 28 MMOL/L (ref 21–32)
CREAT SERPL-MCNC: 1.02 MG/DL (ref 0.6–1.3)
ERYTHROCYTE [DISTWIDTH] IN BLOOD BY AUTOMATED COUNT: 17.7 % (ref 11.6–15.1)
EST. AVERAGE GLUCOSE BLD GHB EST-MCNC: 114 MG/DL
GFR SERPL CREATININE-BSD FRML MDRD: 76 ML/MIN/1.73SQ M
GLUCOSE SERPL-MCNC: 101 MG/DL (ref 65–140)
GLUCOSE SERPL-MCNC: 109 MG/DL (ref 65–140)
GLUCOSE SERPL-MCNC: 110 MG/DL (ref 65–140)
GLUCOSE SERPL-MCNC: 89 MG/DL (ref 65–140)
GLUCOSE SERPL-MCNC: 89 MG/DL (ref 65–140)
HBA1C MFR BLD: 5.6 %
HCT VFR BLD AUTO: 38.1 % (ref 36.5–49.3)
HGB BLD-MCNC: 11.4 G/DL (ref 12–17)
MAGNESIUM SERPL-MCNC: 2.1 MG/DL (ref 1.6–2.6)
MCH RBC QN AUTO: 25.1 PG (ref 26.8–34.3)
MCHC RBC AUTO-ENTMCNC: 29.9 G/DL (ref 31.4–37.4)
MCV RBC AUTO: 84 FL (ref 82–98)
NRBC BLD AUTO-RTO: 0 /100 WBCS
PHOSPHATE SERPL-MCNC: 2.6 MG/DL (ref 2.3–4.1)
PLATELET # BLD AUTO: 135 THOUSANDS/UL (ref 149–390)
PMV BLD AUTO: 11.3 FL (ref 8.9–12.7)
POTASSIUM SERPL-SCNC: 3.7 MMOL/L (ref 3.5–5.3)
PROCALCITONIN SERPL-MCNC: 3.72 NG/ML
PROT SERPL-MCNC: 8 G/DL (ref 6.4–8.2)
RBC # BLD AUTO: 4.55 MILLION/UL (ref 3.88–5.62)
SODIUM SERPL-SCNC: 136 MMOL/L (ref 136–145)
TSH SERPL DL<=0.05 MIU/L-ACNC: 0.83 UIU/ML (ref 0.36–3.74)
WBC # BLD AUTO: 10.17 THOUSAND/UL (ref 4.31–10.16)

## 2021-04-10 PROCEDURE — 85027 COMPLETE CBC AUTOMATED: CPT | Performed by: INTERNAL MEDICINE

## 2021-04-10 PROCEDURE — 80053 COMPREHEN METABOLIC PANEL: CPT | Performed by: INTERNAL MEDICINE

## 2021-04-10 PROCEDURE — 84443 ASSAY THYROID STIM HORMONE: CPT | Performed by: INTERNAL MEDICINE

## 2021-04-10 PROCEDURE — 99232 SBSQ HOSP IP/OBS MODERATE 35: CPT | Performed by: FAMILY MEDICINE

## 2021-04-10 PROCEDURE — 82948 REAGENT STRIP/BLOOD GLUCOSE: CPT

## 2021-04-10 PROCEDURE — 84100 ASSAY OF PHOSPHORUS: CPT | Performed by: INTERNAL MEDICINE

## 2021-04-10 PROCEDURE — 83036 HEMOGLOBIN GLYCOSYLATED A1C: CPT | Performed by: INTERNAL MEDICINE

## 2021-04-10 PROCEDURE — 83735 ASSAY OF MAGNESIUM: CPT | Performed by: INTERNAL MEDICINE

## 2021-04-10 PROCEDURE — 84145 PROCALCITONIN (PCT): CPT | Performed by: INTERNAL MEDICINE

## 2021-04-10 RX ADMIN — METOPROLOL TARTRATE 50 MG: 50 TABLET, FILM COATED ORAL at 08:31

## 2021-04-10 RX ADMIN — ENOXAPARIN SODIUM 40 MG: 40 INJECTION SUBCUTANEOUS at 22:24

## 2021-04-10 RX ADMIN — LISINOPRIL 10 MG: 10 TABLET ORAL at 08:32

## 2021-04-10 RX ADMIN — CLOTRIMAZOLE AND BETAMETHASONE DIPROPIONATE: 10; .64 CREAM TOPICAL at 18:02

## 2021-04-10 RX ADMIN — LEVOTHYROXINE SODIUM 325 MCG: 125 TABLET ORAL at 08:30

## 2021-04-10 RX ADMIN — PANTOPRAZOLE SODIUM 40 MG: 40 TABLET, DELAYED RELEASE ORAL at 07:01

## 2021-04-10 RX ADMIN — CEFTRIAXONE SODIUM 2000 MG: 10 INJECTION, POWDER, FOR SOLUTION INTRAVENOUS at 18:08

## 2021-04-10 RX ADMIN — GABAPENTIN 100 MG: 100 CAPSULE ORAL at 16:34

## 2021-04-10 RX ADMIN — METOPROLOL TARTRATE 50 MG: 50 TABLET, FILM COATED ORAL at 18:01

## 2021-04-10 RX ADMIN — ASPIRIN 81 MG: 81 TABLET, COATED ORAL at 08:30

## 2021-04-10 RX ADMIN — CLOTRIMAZOLE AND BETAMETHASONE DIPROPIONATE: 10; .64 CREAM TOPICAL at 08:32

## 2021-04-10 RX ADMIN — ENOXAPARIN SODIUM 40 MG: 40 INJECTION SUBCUTANEOUS at 08:30

## 2021-04-10 RX ADMIN — GABAPENTIN 100 MG: 100 CAPSULE ORAL at 22:24

## 2021-04-10 RX ADMIN — GABAPENTIN 100 MG: 100 CAPSULE ORAL at 08:30

## 2021-04-10 RX ADMIN — Medication 1 TABLET: at 08:31

## 2021-04-10 RX ADMIN — PRAVASTATIN SODIUM 20 MG: 20 TABLET ORAL at 16:33

## 2021-04-10 NOTE — PLAN OF CARE
Problem: Potential for Falls  Goal: Patient will remain free of falls  Description: INTERVENTIONS:  - Assess patient frequently for physical needs  -  Identify cognitive and physical deficits and behaviors that affect risk of falls    -  [place] fall precautions as indicated by assessment   - Educate patient/family on patient safety including physical limitations  - Instruct patient to call for assistance with activity based on assessment  - Modify environment to reduce risk of injury  - Consider OT/PT consult to assist with strengthening/mobility  Outcome: Progressing     Problem: PAIN - ADULT  Goal: Verbalizes/displays adequate comfort level or baseline comfort level  Description: Interventions:  - Encourage patient to monitor pain and request assistance  - Assess pain using appropriate pain scale  - Administer analgesics based on type and severity of pain and evaluate response  - Implement non-pharmacological measures as appropriate and evaluate response  - Consider cultural and social influences on pain and pain management  - Notify physician/advanced practitioner if interventions unsuccessful or patient reports new pain  Outcome: Progressing     Problem: INFECTION - ADULT  Goal: Absence or prevention of progression during hospitalization  Description: INTERVENTIONS:  - Assess and monitor for signs and symptoms of infection  - Monitor lab/diagnostic results  - Monitor all insertion sites, i e  indwelling lines, tubes, and drains  - Monitor endotracheal if appropriate and nasal secretions for changes in amount and color  - [place] appropriate cooling/warming therapies per order  - Administer medications as ordered  - Instruct and encourage patient and family to use good hand hygiene technique  - Identify and instruct in appropriate isolation precautions for identified infection/condition  Outcome: Progressing  Goal: Absence of fever/infection during neutropenic period  Description: INTERVENTIONS:  - Monitor WBC    Outcome: Progressing     Problem: SAFETY ADULT  Goal: Patient will remain free of falls  Description: INTERVENTIONS:  - Assess patient frequently for physical needs  -  Identify cognitive and physical deficits and behaviors that affect risk of falls    -  [place] fall precautions as indicated by assessment   - Educate patient/family on patient safety including physical limitations  - Instruct patient to call for assistance with activity based on assessment  - Modify environment to reduce risk of injury  - Consider OT/PT consult to assist with strengthening/mobility  Outcome: Progressing  Goal: Maintain or return to baseline ADL function  Description: INTERVENTIONS:  -  Assess patient's ability to carry out ADLs; assess patient's baseline for ADL function and identify physical deficits which impact ability to perform ADLs (bathing, care of mouth/teeth, toileting, grooming, dressing, etc )  - Assess/evaluate cause of self-care deficits   - Assess range of motion  - Assess patient's mobility; develop plan if impaired  - Assess patient's need for assistive devices and provide as appropriate  - Encourage maximum independence but intervene and supervise when necessary  - Involve family in performance of ADLs  - Assess for home care needs following discharge   - Consider OT consult to assist with ADL evaluation and planning for discharge  - Provide patient education as appropriate  Outcome: Progressing  Goal: Maintain or return mobility status to optimal level  Description: INTERVENTIONS:  - Assess patient's baseline mobility status (ambulation, transfers, stairs, etc )    - Identify cognitive and physical deficits and behaviors that affect mobility  - Identify mobility aids required to assist with transfers and/or ambulation (gait belt, sit-to-stand, lift, walker, cane, etc )  - [place] fall precautions as indicated by assessment  - Record patient progress and toleration of activity level on Mobility SBAR; progress patient to next Phase/Stage  - Instruct patient to call for assistance with activity based on assessment  - Consider rehabilitation consult to assist with strengthening/weightbearing, etc   Outcome: Progressing     Problem: DISCHARGE PLANNING  Goal: Discharge to home or other facility with appropriate resources  Description: INTERVENTIONS:  - Identify barriers to discharge w/patient and caregiver  - Arrange for needed discharge resources and transportation as appropriate  - Identify discharge learning needs (meds, wound care, etc )  - Arrange for interpretive services to assist at discharge as needed  - Refer to Case Management Department for coordinating discharge planning if the patient needs post-hospital services based on physician/advanced practitioner order or complex needs related to functional status, cognitive ability, or social support system  Outcome: Progressing     Problem: Knowledge Deficit  Goal: Patient/family/caregiver demonstrates understanding of disease process, treatment plan, medications, and discharge instructions  Description: Complete learning assessment and assess knowledge base    Interventions:  - Provide teaching at level of understanding  - Provide teaching via preferred learning methods  Outcome: Progressing

## 2021-04-10 NOTE — ASSESSMENT & PLAN NOTE
Lab Results   Component Value Date    HGBA1C 5 6 04/10/2021   Put on hold metformin  Hemoglobin A1c  Accu-Cheks prior to meals and bedtime  Insulin sliding scale per protocol    (Algorithm 5 per weight )

## 2021-04-10 NOTE — PLAN OF CARE
Problem: Potential for Falls  Goal: Patient will remain free of falls  Description: INTERVENTIONS:  - Assess patient frequently for physical needs  -  Identify cognitive and physical deficits and behaviors that affect risk of falls    -  [place] fall precautions as indicated by assessment   - Educate patient/family on patient safety including physical limitations  - Instruct patient to call for assistance with activity based on assessment  - Modify environment to reduce risk of injury  - Consider OT/PT consult to assist with strengthening/mobility  Outcome: Progressing     Problem: PAIN - ADULT  Goal: Verbalizes/displays adequate comfort level or baseline comfort level  Description: Interventions:  - Encourage patient to monitor pain and request assistance  - Assess pain using appropriate pain scale  - Administer analgesics based on type and severity of pain and evaluate response  - Implement non-pharmacological measures as appropriate and evaluate response  - Consider cultural and social influences on pain and pain management  - Notify physician/advanced practitioner if interventions unsuccessful or patient reports new pain  Outcome: Progressing     Problem: INFECTION - ADULT  Goal: Absence or prevention of progression during hospitalization  Description: INTERVENTIONS:  - Assess and monitor for signs and symptoms of infection  - Monitor lab/diagnostic results  - Monitor all insertion sites, i e  indwelling lines, tubes, and drains  - Monitor endotracheal if appropriate and nasal secretions for changes in amount and color  - [place] appropriate cooling/warming therapies per order  - Administer medications as ordered  - Instruct and encourage patient and family to use good hand hygiene technique  - Identify and instruct in appropriate isolation precautions for identified infection/condition  Outcome: Progressing  Goal: Absence of fever/infection during neutropenic period  Description: INTERVENTIONS:  - Monitor WBC    Outcome: Progressing     Problem: SAFETY ADULT  Goal: Patient will remain free of falls  Description: INTERVENTIONS:  - Assess patient frequently for physical needs  -  Identify cognitive and physical deficits and behaviors that affect risk of falls    -  [place] fall precautions as indicated by assessment   - Educate patient/family on patient safety including physical limitations  - Instruct patient to call for assistance with activity based on assessment  - Modify environment to reduce risk of injury  - Consider OT/PT consult to assist with strengthening/mobility  Outcome: Progressing  Goal: Maintain or return to baseline ADL function  Description: INTERVENTIONS:  -  Assess patient's ability to carry out ADLs; assess patient's baseline for ADL function and identify physical deficits which impact ability to perform ADLs (bathing, care of mouth/teeth, toileting, grooming, dressing, etc )  - Assess/evaluate cause of self-care deficits   - Assess range of motion  - Assess patient's mobility; develop plan if impaired  - Assess patient's need for assistive devices and provide as appropriate  - Encourage maximum independence but intervene and supervise when necessary  - Involve family in performance of ADLs  - Assess for home care needs following discharge   - Consider OT consult to assist with ADL evaluation and planning for discharge  - Provide patient education as appropriate  Outcome: Progressing  Goal: Maintain or return mobility status to optimal level  Description: INTERVENTIONS:  - Assess patient's baseline mobility status (ambulation, transfers, stairs, etc )    - Identify cognitive and physical deficits and behaviors that affect mobility  - Identify mobility aids required to assist with transfers and/or ambulation (gait belt, sit-to-stand, lift, walker, cane, etc )  - [place] fall precautions as indicated by assessment  - Record patient progress and toleration of activity level on Mobility SBAR; progress patient to next Phase/Stage  - Instruct patient to call for assistance with activity based on assessment  - Consider rehabilitation consult to assist with strengthening/weightbearing, etc   Outcome: Progressing     Problem: DISCHARGE PLANNING  Goal: Discharge to home or other facility with appropriate resources  Description: INTERVENTIONS:  - Identify barriers to discharge w/patient and caregiver  - Arrange for needed discharge resources and transportation as appropriate  - Identify discharge learning needs (meds, wound care, etc )  - Arrange for interpretive services to assist at discharge as needed  - Refer to Case Management Department for coordinating discharge planning if the patient needs post-hospital services based on physician/advanced practitioner order or complex needs related to functional status, cognitive ability, or social support system  Outcome: Progressing     Problem: Knowledge Deficit  Goal: Patient/family/caregiver demonstrates understanding of disease process, treatment plan, medications, and discharge instructions  Description: Complete learning assessment and assess knowledge base    Interventions:  - Provide teaching at level of understanding  - Provide teaching via preferred learning methods  Outcome: Progressing

## 2021-04-10 NOTE — ED PROVIDER NOTES
History  Chief Complaint   Patient presents with    Leg Pain     pt c/o left leg redness, pain, and swelling since early this morning  hx diabetes, sees wound care regularly  The patient is a 47-year-old male with past medical history of type 2 diabetes, right BKA, paroxysmal atrial fibrillation, hypertension, hypercholesterolemia, lymphedema and venous stasis ulcers following with Wound Care, who presents the ED for evaluation of left leg erythema  He states a visiting nurse was evaluating his left leg today, and changing his dressing, and was noted he had increasing erythema spreading above the knee and up the left thigh  The nurse contacted the patient's wound care physician, and there was concern for infection, patient was sent to the ED for evaluation  The patient denies any fever chills, but was told by the nurse that he had a temperature elevation of 99 8 F orally  He denies any leg pain  He states the left lower leg appears as if it always does, but the erythema of his thigh is new  Denies any chest pain, no new shortness of breath, does endorse fatigue and generalized weakness over the course of today that has been worsening  No urinary symptoms, no abdominal pain, no nausea or vomiting  History provided by:  Patient   used: No    Leg Pain  Associated symptoms: fatigue    Associated symptoms: no fever        Prior to Admission Medications   Prescriptions Last Dose Informant Patient Reported? Taking?    Multiple Vitamins-Minerals (MULTIVITAMIN MEN 50+ PO)  Self Yes No   Sig: Take by mouth   Zoster Vac Recomb Adjuvanted (Shingrix) 50 MCG/0 5ML SUSR   No No   Si 5mL IM for one dose, followed by 0 5mL IM 2-6 months after first dose   aspirin (ASPIRIN 81) 81 mg EC tablet  Self Yes No   Sig: Take 81 mg by mouth Daily   clotrimazole-betamethasone (LOTRISONE) 1-0 05 % cream  Self No No   Sig: Apply topically 2 (two) times a day   gabapentin (NEURONTIN) 100 mg capsule   No No Sig: Take 1 capsule (100 mg total) by mouth 3 (three) times a day   levothyroxine 25 mcg tablet   No No   Sig: Take 1 tablet (25 mcg total) by mouth daily In addition to the 300 mcg dose (stop the 75 mcg dose)   levothyroxine 300 MCG tablet   No No   Sig: Take 1 tablet (300 mcg total) by mouth daily In addition to 75 mcg dose   lisinopril (ZESTRIL) 10 mg tablet   No No   Sig: Take 1 tablet (10 mg total) by mouth daily   metFORMIN (GLUCOPHAGE-XR) 750 mg 24 hr tablet   No No   Sig: Take 1 tablet (750 mg total) by mouth daily with breakfast   metoprolol tartrate (LOPRESSOR) 50 mg tablet   No No   Sig: Take 1 tablet (50 mg total) by mouth 2 (two) times a day   pantoprazole (PROTONIX) 40 mg tablet   No No   Sig: Take 1 tablet (40 mg total) by mouth daily   simvastatin (ZOCOR) 10 mg tablet   No No   Sig: Take 1 tablet (10 mg total) by mouth daily at bedtime      Facility-Administered Medications: None       Past Medical History:   Diagnosis Date    Atrial fibrillation (HCC)     Cellulitis     Diabetes mellitus ([facility] [address] )     Disease of thyroid gland     Duodenal ulcer     perforated- ICU stay    High blood pressure     High cholesterol     Hyperlipidemia     Hypertension     Hypothyroidism     Lymphedema      b/l LE- was followed at wound center    Morbid obesity with BMI of 40 0-44 9, adult (HCC)     Peritonitis (HCC)        Past Surgical History:   Procedure Laterality Date    BELOW KNEE LEG AMPUTATION Right     DIAGNOSTIC LAPAROSCOPY      KNEE ARTHROSCOPY Bilateral     OTHER SURGICAL HISTORY  03/2014     lap repair    OTHER SURGICAL HISTORY      Laparoscopic repair of a perforated duodenal ulcer with Inna Lockett omental    OTHER SURGICAL HISTORY      Placement of drains       Family History   Problem Relation Age of Onset    Heart disease Family     Alcohol abuse Family     Heart disease Mother     Hypertension Mother     Migraines Daughter     Leukemia Brother     Migraines Daughter     Substance Abuse Neg Hx     Mental illness Neg Hx     Depression Neg Hx      I have reviewed and agree with the history as documented  E-Cigarette/Vaping     E-Cigarette/Vaping Substances     Social History     Tobacco Use    Smoking status: Former Smoker     Packs/day: 1 00     Years: 30 00     Pack years: 30 00     Quit date: 2004     Years since quittin 4    Smokeless tobacco: Never Used   Substance Use Topics    Alcohol use: Not Currently    Drug use: Not Currently        Review of Systems   Constitutional: Positive for fatigue  Negative for chills and fever  HENT: Negative  Eyes: Negative  Respiratory: Negative  Negative for cough and shortness of breath  Cardiovascular: Negative  Negative for chest pain  Gastrointestinal: Negative  Negative for nausea and vomiting  Endocrine: Negative  Genitourinary: Negative  Musculoskeletal: Negative  Skin: Positive for color change and wound  Allergic/Immunologic: Negative  Neurological: Negative  Hematological: Negative  Psychiatric/Behavioral: Negative  Physical Exam  ED Triage Vitals [21 1717]   Temperature Pulse Respirations Blood Pressure SpO2   99 3 °F (37 4 °C) (!) 111 21 146/69 97 %      Temp Source Heart Rate Source Patient Position - Orthostatic VS BP Location FiO2 (%)   Oral Monitor Sitting Left arm --      Pain Score       No Pain             Orthostatic Vital Signs  Vitals:    21 1717 21 1830 21 1930 21 2156   BP: 146/69 121/53 142/67 129/60   Pulse: (!) 111 102 105 100   Patient Position - Orthostatic VS: Sitting Lying Lying        Physical Exam  Constitutional:       Appearance: He is ill-appearing  HENT:      Head: Normocephalic and atraumatic  Right Ear: External ear normal       Left Ear: External ear normal       Nose: Nose normal    Eyes:      General: No scleral icterus  Extraocular Movements: Extraocular movements intact        Conjunctiva/sclera: Conjunctivae normal    Neck:      Musculoskeletal: Normal range of motion and neck supple  Cardiovascular:      Rate and Rhythm: Tachycardia present  Rhythm irregular  Pulses: Normal pulses  Heart sounds: No murmur  Pulmonary:      Effort: Pulmonary effort is normal       Breath sounds: Normal breath sounds  Abdominal:      General: Abdomen is flat  There is no distension  Palpations: Abdomen is soft  Tenderness: There is no abdominal tenderness  There is no guarding  Musculoskeletal:         General: No tenderness  Left lower leg: Edema present  Comments: Right BKA   Skin:     General: Skin is warm and dry  Capillary Refill: Capillary refill takes less than 2 seconds  Comments: Left lower leg is edematous, erythematous/violaceous hue from the foot up the knee  He has blanching erythema spreading beyond this up the medial thigh, with petechiae  The left thigh is warm to touch, but is nontender, no crepitance, no open wounds of the thigh  Neurological:      General: No focal deficit present  Mental Status: He is alert and oriented to person, place, and time  GCS: GCS eye subscore is 4  GCS verbal subscore is 5  GCS motor subscore is 6  Cranial Nerves: Cranial nerves are intact  Sensory: Sensation is intact  Motor: Motor function is intact  Psychiatric:         Mood and Affect: Mood normal          Behavior: Behavior normal          Thought Content:  Thought content normal          Judgment: Judgment normal          ED Medications  Medications   aspirin (ECOTRIN LOW STRENGTH) EC tablet 81 mg (has no administration in time range)   clotrimazole-betamethasone (LOTRISONE) 0-4 81 % cream (0 application Topical Hold 4/10/21 0026)   gabapentin (NEURONTIN) capsule 100 mg (100 mg Oral Given 4/9/21 2220)   levothyroxine tablet 325 mcg (has no administration in time range)   lisinopril (ZESTRIL) tablet 10 mg (has no administration in time range)   metoprolol tartrate (LOPRESSOR) tablet 50 mg (50 mg Oral Given 4/9/21 2220)   multivitamin-minerals (CENTRUM) tablet 1 tablet (has no administration in time range)   pantoprazole (PROTONIX) EC tablet 40 mg (has no administration in time range)   pravastatin (PRAVACHOL) tablet 20 mg (has no administration in time range)   cefTRIAXone (ROCEPHIN) 2,000 mg in dextrose 5 % 50 mL IVPB (has no administration in time range)   acetaminophen (TYLENOL) tablet 650 mg (has no administration in time range)   docusate sodium (COLACE) capsule 100 mg (has no administration in time range)   ondansetron (ZOFRAN) injection 4 mg (has no administration in time range)   aluminum-magnesium hydroxide-simethicone (MYLANTA) oral suspension 30 mL (has no administration in time range)   insulin lispro (HumaLOG) 100 units/mL subcutaneous injection 4-20 Units (has no administration in time range)   insulin lispro (HumaLOG) 100 units/mL subcutaneous injection 1-5 Units (1 Units Subcutaneous Not Given 4/9/21 2217)   enoxaparin (LOVENOX) subcutaneous injection 40 mg (40 mg Subcutaneous Given 4/9/21 2220)   vancomycin (VANCOCIN) 2,000 mg in sodium chloride 0 9 % 500 mL IVPB (2,000 mg Intravenous New Bag 4/9/21 1859)   multi-electrolyte (ISOLYTE-S PH 7 4) bolus 1,000 mL (0 mL Intravenous Stopped 4/9/21 2105)       Diagnostic Studies  Results Reviewed     Procedure Component Value Units Date/Time    Blood culture #1 [[id number]] Collected: 04/09/21 1821    Lab Status: Preliminary result Specimen: Blood from Arm, Left Updated: 04/09/21 2201     Blood Culture Received in Microbiology Lab  Culture in Progress  Blood culture #2 [[id number]] Collected: 04/09/21 1821    Lab Status: Preliminary result Specimen: Blood from Arm, Left Updated: 04/09/21 2201     Blood Culture Received in Microbiology Lab  Culture in Progress      Troponin I [[id number]]  (Normal) Collected: 04/09/21 1933    Lab Status: Final result Specimen: Blood from Arm, Left Updated: 04/09/21 2002 Troponin I <0 02 ng/mL     CBC and differential [[id number]]  (Abnormal) Collected: 04/09/21 1821    Lab Status: Final result Specimen: Blood from Arm, Left Updated: 04/09/21 1943     WBC 16 43 Thousand/uL      RBC 4 36 Million/uL      Hemoglobin 11 4 g/dL      Hematocrit 36 0 %      MCV 83 fL      MCH 26 1 pg      MCHC 31 7 g/dL      RDW 17 5 %      MPV 12 1 fL      Platelets 501 Thousands/uL      nRBC 0 /100 WBCs     Narrative: This is an appended report  These results have been appended to a previously verified report  Manual Differential(PHLEBS Do Not Order) [[id number]]  (Abnormal) Collected: 04/09/21 1821    Lab Status: Final result Specimen: Blood from Arm, Left Updated: 04/09/21 1943     Segmented % 97 %      Lymphocytes % 1 %      Monocytes % 0 %      Eosinophils, % 0 %      Basophils % 0 %      Atypical Lymphocytes % 2 %      Absolute Neutrophils 15 94 Thousand/uL      Lymphocytes Absolute 0 16 Thousand/uL      Monocytes Absolute 0 00 Thousand/uL      Eosinophils Absolute 0 00 Thousand/uL      Basophils Absolute 0 00 Thousand/uL      Total Counted --     RBC Morphology Present     Anisocytosis Present     Poikilocytes Present     Polychromasia Present     Platelet Estimate Adequate    Procalcitonin with AM Reflex [[id number]]  (Abnormal) Collected: 04/09/21 1821    Lab Status: Final result Specimen: Blood from Arm, Left Updated: 04/09/21 1911     Procalcitonin 4 73 ng/ml     Procalcitonin Reflex [[id number]]     Lab Status: No result Specimen: Blood     Lactic acid [[id number]]  (Normal) Collected: 04/09/21 1821    Lab Status: Final result Specimen: Blood from Arm, Left Updated: 04/09/21 1855     LACTIC ACID 1 6 mmol/L     Narrative:      Result may be elevated if tourniquet was used during collection      Basic metabolic panel [[id number]]  (Abnormal) Collected: 04/09/21 1821    Lab Status: Final result Specimen: Blood from Arm, Left Updated: 04/09/21 1850     Sodium 132 mmol/L      Potassium 3 7 mmol/L Chloride 98 mmol/L      CO2 27 mmol/L      ANION GAP 7 mmol/L      BUN 16 mg/dL      Creatinine 1 10 mg/dL      Glucose 108 mg/dL      Calcium 8 9 mg/dL      eGFR 69 ml/min/1 73sq m     Narrative:      [place] guidelines for Chronic Kidney Disease (CKD):     Stage 1 with normal or high GFR (GFR > 90 mL/min/1 73 square meters)    Stage 2 Mild CKD (GFR = 60-89 mL/min/1 73 square meters)    Stage 3A Moderate CKD (GFR = 45-59 mL/min/1 73 square meters)    Stage 3B Moderate CKD (GFR = 30-44 mL/min/1 73 square meters)    Stage 4 Severe CKD (GFR = 15-29 mL/min/1 73 square meters)    Stage 5 End Stage CKD (GFR <15 mL/min/1 73 square meters)  Note: GFR calculation is accurate only with a steady state creatinine                 XR chest 1 view portable    (Results Pending)         Procedures  Procedures      ED Course  ED Course as of Apr 10 0052   Fri Apr 09, 2021   1939 Procedure Note: EKG  Date/Time: 04/09/21 7:40 PM   Interpreted by: Tarik Silva DO  Indications / Diagnosis: sob  ECG reviewed by me, the ED Physician: yes   The EKG demonstrates:  Rhythm: afib  Intervals: normal intervals  Axis: left axis  QRS/Blocks: normal QRS  ST Changes: No acute ST Changes, no STD/STACIE  Identification of Seniors at Risk      Most Recent Value   (ISAR) Identification of Seniors at Risk   Before the illness or injury that brought you to the Emergency, did you need someone to help you on a regular basis? 1 Filed at: 04/09/2021 1719   In the last 24 hours, have you needed more help than usual?  0 Filed at: 04/09/2021 1719   Have you been hospitalized for one or more nights during the past 6 months? 0 Filed at: 04/09/2021 1719   In general, do you see well?  0 Filed at: 04/09/2021 1719   In general, do you have serious problems with your memory? 0 Filed at: 04/09/2021 1719   Do you take more than three different medications every day?   1 Filed at: 04/09/2021 1719   ISAR Score  2 Filed at: 04/09/2021 1717                Initial Sepsis Screening     [facility] Name 04/09/21 2541                Is the patient's history suggestive of a new or worsening infection? (!) Yes (Proceed)  -HB        Suspected source of infection  wound infection;soft tissue  -HB        Are two or more of the following signs & symptoms of infection both present and new to the patient? (!) Yes (Proceed)  -HB        Indicate SIRS criteria  Tachycardia > 90 bpm;Leukocytosis (WBC > 58982 IJL)  -HB        If the answer is yes to both questions, suspicion of sepsis is present  --        If severe sepsis is present AND tissue hypoperfusion perists in the hour after fluid resuscitation or lactate > 4, the patient meets criteria for SEPTIC SHOCK  --        Are any of the following organ dysfunction criteria present within 6 hours of suspected infection and SIRS criteria that are NOT considered to be chronic conditions?   No  -HB        Organ dysfunction  --        Date of presentation of severe sepsis  --        Time of presentation of severe sepsis  --        Tissue hypoperfusion persists in the hour after crystalloid fluid administration, evidenced, by either:  --        Was hypotension present within one hour of the conclusion of crystalloid fluid administration?  --        Date of presentation of septic shock  --        Time of presentation of septic shock  --          User Key  (r) = Recorded By, (t) = Taken By, (c) = Cosigned By    [address] Name Provider Type    MARILEE Person DO Physician           Default Flowsheet Data (last 720 hours)      Sepsis [facility] Name 04/09/21 1925                   Repeat Volume Status and Tissue Perfusion Assessment Performed    Repeat Volume Status and Tissue Perfusion Assessment Performed  Yes  -HB           Volume Status and Tissue Perfusion Post Fluid Resuscitation * Must Document All *    Vital Signs Reviewed (HR, RR, BP, T)  Yes  -HB        Shock Index Reviewed  Yes  -HB Arterial Oxygen Saturation Reviewed (POx, SaO2 or SpO2)  Yes (comment %)  -HB        Cardio  (!) Normal S1/S2; Tachycardia  -HB        Pulmonary  Normal effort  -HB        Capillary Refill  Brisk  -HB        Peripheral Pulses  Radial  -HB        Peripheral Pulse  +2  -HB        Skin  Warm;Dry  -HB        Urine output assessed  None  -HB           *OR*   Intensive Monitoring- Must Document One of the Following Four *:    Vital Signs Reviewed  --        * Central Venous Pressure (CVP or RAP)  --        * Central Venous Oxygen (SVO2, ScvO2 or Oxygen saturation via central catheter)  --        * Bedside Cardiovascular US in IVC diameter and % collapse  --        * Passive Leg Raise OR Crystalloid Challenge  Crystalloid fluid challenge completed  -HB        Crystalloid fluid challenge completed  500mL in 15 minutes  -HB          User Key  (r) = Recorded By, (t) = Taken By, (c) = Cosigned By    Initials Name Provider Type    MARILEE Gibson, DO Physician                  MDM  Number of Diagnoses or Management Options  Left leg cellulitis: new and requires workup  Sepsis due to cellulitis [facility]): new and requires workup  Diagnosis management comments: This is a 78-year-old male with multiple comorbid conditions presenting with left leg erythema, concern for cellulitis  He is tachycardic, he does appear ill  Will obtain infectious workup, cardiac workup, initiate treatment with antibiotics  Labs reveal leukocytosis, elevated procalcitonin    Admit to Medicine for sepsis due to cellulitis       Amount and/or Complexity of Data Reviewed  Clinical lab tests: ordered and reviewed  Tests in the radiology section of CPT®: ordered and reviewed  Review and summarize past medical records: yes  Independent visualization of images, tracings, or specimens: yes        Disposition  Final diagnoses:   Left leg cellulitis   Sepsis due to cellulitis [facility])     Time reflects when diagnosis was documented in both MDM as applicable and the Disposition within this note     Time User Action Codes Description Comment    4/9/2021  9:21 PM Danielleclare Gordons Add [U12 [id number]] Left leg cellulitis     4/9/2021  9:24 PM Danielle Kathy Add [L03 [id number],  A41 9] Sepsis due to cellulitis [facility])       ED Disposition     ED Disposition Condition Date/Time Comment    Admit Stable Fri Apr 9, 2021  9:21 PM Case was discussed with lisa and the patient's admission status was agreed to be Admission Status: inpatient status to the service of Dr Tristin Sy           Follow-up Information    None         Current Discharge Medication List      CONTINUE these medications which have NOT CHANGED    Details   aspirin (ASPIRIN 81) 81 mg EC tablet Take 81 mg by mouth Daily      clotrimazole-betamethasone (LOTRISONE) 1-0 05 % cream Apply topically 2 (two) times a day  Qty: 90 g, Refills: 0    Associated Diagnoses: Dermatophytosis      gabapentin (NEURONTIN) 100 mg capsule Take 1 capsule (100 mg total) by mouth 3 (three) times a day  Qty: 270 capsule, Refills: 1    Associated Diagnoses: Neuropathy      !! levothyroxine 25 mcg tablet Take 1 tablet (25 mcg total) by mouth daily In addition to the 300 mcg dose (stop the 75 mcg dose)  Qty: 90 tablet, Refills: 1    Associated Diagnoses: Acquired hypothyroidism      !! levothyroxine 300 MCG tablet Take 1 tablet (300 mcg total) by mouth daily In addition to 75 mcg dose  Qty: 90 tablet, Refills: 1    Associated Diagnoses: Acquired hypothyroidism      lisinopril (ZESTRIL) 10 mg tablet Take 1 tablet (10 mg total) by mouth daily  Qty: 90 tablet, Refills: 1    Associated Diagnoses: Essential hypertension      metFORMIN (GLUCOPHAGE-XR) 750 mg 24 hr tablet Take 1 tablet (750 mg total) by mouth daily with breakfast  Qty: 90 tablet, Refills: 1    Associated Diagnoses: Controlled type 2 diabetes mellitus with complication, without long-term current use of insulin (HCC)      metoprolol tartrate (LOPRESSOR) 50 mg tablet Take 1 tablet (50 mg total) by mouth 2 (two) times a day  Qty: 180 tablet, Refills: 2    Associated Diagnoses: Essential hypertension      Multiple Vitamins-Minerals (MULTIVITAMIN MEN 50+ PO) Take by mouth      pantoprazole (PROTONIX) 40 mg tablet Take 1 tablet (40 mg total) by mouth daily  Qty: 90 tablet, Refills: 1    Associated Diagnoses: Gastroesophageal reflux disease without esophagitis      simvastatin (ZOCOR) 10 mg tablet Take 1 tablet (10 mg total) by mouth daily at bedtime  Qty: 90 tablet, Refills: 1    Associated Diagnoses: Mixed hyperlipidemia      Zoster Vac Recomb Adjuvanted (Shingrix) 50 MCG/0 5ML SUSR 0 5mL IM for one dose, followed by 0 5mL IM 2-6 months after first dose  Qty: 1 each, Refills: 1    Associated Diagnoses: Encounter for immunization       !! - Potential duplicate medications found  Please discuss with provider  No discharge procedures on file  PDMP Review     None           ED Provider  Attending physically available and evaluated Chelo Steinberg I managed the patient along with the ED Attending      Electronically Signed by         Hodan Weber DO  04/10/21 1514

## 2021-04-10 NOTE — H&P
[address] [facility] 1955, 77 y o  male MRN: [id number]  Unit/Bed#: [facility] Encounter: [id number]  Primary Care Provider: Nereyda Marquis MD   Date and time admitted to hospital: 4/9/2021  5:12 PM    * Left leg cellulitis  Assessment & Plan  Patient started on ceftriaxone with vancomycin  Will continue with ceftriaxone 2000 mg daily starting tomorrow 8:00 a m  Hold vancomycin  Procalcitonin tomorrow morning  MRSA nasal screen  Blood cultures pending  Paroxysmal atrial fibrillation (HCC)  Assessment & Plan  Continue metoprolol  Hypothyroidism  Assessment & Plan  Continue levothyroxine 325n mcg daily   (patient not on 75 in addition to 300  But is 25 in addition to 300 )  TSH  HLD (hyperlipidemia)  Assessment & Plan  On simvastatin 10 mg/Pravachol 20 mg daily  HTN (hypertension)  Assessment & Plan  Continue Zestril 10 mg daily  Metoprolol 50 mg twice daily  Controlled type 2 diabetes mellitus with complication, without long-term current use of insulin [facility])  Assessment & Plan    Lab Results   Component Value Date    HGBA1C 5 6 12/10/2020   Put on hold metformin  Hemoglobin A1c  Accu-Cheks prior to meals and bedtime  Insulin sliding scale per protocol  (Algorithm 5 per weight )          VTE Prophylaxis: Enoxaparin (Lovenox)  / sequential compression device   Code Status: Prior full Code as discussed  POLST: There is no POLST form on file for this patient (pre-hospital)    Anticipated Length of Stay:  Patient will be admitted on an Inpatient basis with an anticipated length of stay of  greater than 2 midnights  Justification for Hospital Stay: Please see detailed plans noted above      Chief Complaint:     Left leg redness no pain with note of mild fever  History of Present Illness:  Grisel Aguirre is a 77 y o  male who has history of chronic leg edema 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 and maintaining his left leg as of the moment with note of chronic edema and currently with intense redness as well as warmth since yesterday morning  Patient felt feverish without any chills and he said that his fever was up to approximately 99° F  Here in the emergency room it is 99 3  Aside from the left leg, he denies any diarrhea or cough or cold  He denies any to dyspnea  No abdominal pain  No nausea  Patient's procalcitonin is elevated as well as white count  Patient therefore placed in hospital for treatment of said cellulitis initially started with ceftriaxone and vancomycin  Currently, patient is feeling comfortable  He denies any active pain  He also denies any discomfort  Review of Systems:    Constitutional:  Denies fever or chills   Eyes:  Denies change in visual acuity   HENT:  Denies nasal congestion or sore throat   Respiratory:  Denies cough or shortness of breath   Cardiovascular:  Denies chest pain or edema   GI:  Denies abdominal pain, nausea, vomiting, bloody stools or diarrhea   :  Denies dysuria   Musculoskeletal:  Denies back pain or joint pain   Integument:  Erythematous rash which is contiguous and ascending on the left leg as noted    Neurologic:  Denies headache, focal weakness or sensory changes   Endocrine:  Denies polyuria or polydipsia   Lymphatic:  Denies swollen glands   Psychiatric:  Denies depression or anxiety     Past Medical and Surgical History:   Past Medical History:   Diagnosis Date    [facility] [address] [facility]     Cellulitis     Diabetes mellitus ([facility] [address] [facility]     Disease of thyroid gland     Duodenal ulcer     perforated- ICU stay    High blood pressure     High cholesterol     Hyperlipidemia     Hypertension     Hypothyroidism     Lymphedema      b/l LE- was followed at wound center    Morbid obesity with BMI of 40 0-44 9, adult ([facility] [address] )     Peritonitis ([facility] [address] )      Past Surgical History:   Procedure Laterality Date    BELOW KNEE LEG AMPUTATION Right     DIAGNOSTIC LAPAROSCOPY      KNEE ARTHROSCOPY Bilateral     OTHER SURGICAL HISTORY  03/2014     lap repair    OTHER SURGICAL HISTORY      Laparoscopic repair of a perforated duodenal ulcer with Bill Nathaniel omental    OTHER SURGICAL HISTORY      Placement of drains       Meds/Allergies:  (Not in a hospital admission)    aspirin (ASPIRIN 81) 81 mg EC tablet Take 81 mg by mouth Daily Jian Mccloud MD Reordered   Ordered as: aspirin (ECOTRIN LOW STRENGTH) EC tablet 81 mg - 81 mg, Oral, Daily, First dose on Sat 4/10/21 at 0900 Do Not Crush - Enteric coated  clotrimazole-betamethasone (LOTRISONE) 1-0 05 % cream Apply topically 2 (two) times a day Jian Mccloud MD Reordered   Ordered as: clotrimazole-betamethasone (LOTRISONE) 1-0 05 % cream - Topical, 2 times daily, First dose on Fri 4/9/21 at 2045 Apply the smallest amount that will cover affected area  For external use only  What is the location for this topical application? skin folds   gabapentin (NEURONTIN) 100 mg capsule Take 1 capsule (100 mg total) by mouth 3 (three) times a day Jian Mccloud MD Reordered   Ordered as: gabapentin (NEURONTIN) capsule 100 mg - 100 mg, Oral, 3 times daily, First dose on Fri 4/9/21 at [address] [place] [address] MED    levothyroxine 300 MCG tablet Take 1 tablet (300 mcg total) by mouth daily In addition to 75 mcg dose Jian Mccloud MD Reordered   Ordered as: levothyroxine tablet 325 mcg - 325 mcg, Oral, Daily, First dose on Sat 4/10/21 at 0900 Administer in the morning on an empty stomach, at least 30 to 60 minutes before food  Tablets may be crushed and suspended in 5-10mls of water for administration  LOOK ALIKE SOUND ALIKE MED      lisinopril (ZESTRIL) 10 mg tablet Take 1 tablet (10 mg total) by mouth daily Jian Mccloud MD Reordered   Ordered as: lisinopril (ZESTRIL) tablet 10 mg - 10 mg, Oral, Daily, First dose on Sat 4/10/21 at 0900 LOOK ALIKE SOUND ALIKE MED Hold for systolic blood pressure less than (mmHg): 110   metFORMIN (GLUCOPHAGE-XR) 750 mg 24 hr tablet Take 1 tablet (750 mg total) by mouth daily with breakfast Yvette Dominguez MD Not Ordered   metoprolol tartrate (LOPRESSOR) 50 mg tablet Take 1 tablet (50 mg total) by mouth 2 (two) times a day Yvette Dominguez MD Reordered   Ordered as: metoprolol tartrate (LOPRESSOR) tablet 50 mg - 50 mg, Oral, 2 times daily, First dose on Fri 4/9/21 at 2045 Hold for heart rate less than 50 beats per minute  LOOK ALIKE SOUND ALIKE MED Hold for systolic blood pressure less than (mmHg): 110   Multiple Vitamins-Minerals (MULTIVITAMIN MEN 50+ PO) Take by mouth Yvette Dominguez MD Reordered   Ordered as: multivitamin-minerals (CENTRUM) tablet 1 tablet - 1 tablet, Oral, Daily, First dose on Sat 4/10/21 at 0900 **DISPOSE IN 8 GALLON BLACK CONTAINER**    pantoprazole (PROTONIX) 40 mg tablet Take 1 tablet (40 mg total) by mouth daily Yvette Dominguez MD Reordered   Ordered as: pantoprazole (PROTONIX) EC tablet 40 mg - 40 mg, Oral, Daily, First dose on Sat 4/10/21 at 0900 Swallow whole; do not crush, chew or split   LOOK ALIKE SOUND ALIKE MED    simvastatin (ZOCOR) 10 mg tablet Take 1 tablet (10 mg total) by mouth daily at bedtime Yvette Dominguez MD Reordered   Ordered as: pravastatin (PRAVACHOL) tablet 20 mg - 20 mg, Oral, Daily with dinner, First dose on Sat 4/10/21 at 1630   Zoster Vac Recomb Adjuvanted (Shingrix) 50 MCG/0 5ML SUSR 0 5mL IM for one dose, followed by 0 5mL IM 2-6 months after first dose Yvette Dominguez MD Not Ordered         Allergies: No Known Allergies  History:  Marital Status: Single   Occupation:  Used to be a   Patient Pre-hospital Living Situation:  Currently at home  Patient Pre-hospital Level of Mobility:  Ambulatory with artificial leg on the right lower extremity  Patient Pre-hospital Diet Restrictions:  Diabetic and cardiac  Substance Use History:   Social History     Substance and Sexual Activity   Alcohol Use Not Currently     Social History     Tobacco Use   Smoking Status Former Smoker    Packs/day: 1 00  Years: 30 00    Pack years: 30 00    Quit date: 2004    Years since quittin 4   Smokeless Tobacco Never Used     Social History     Substance and Sexual Activity   Drug Use Not Currently       Family History:  Family History   Problem Relation Age of Onset    Heart disease Family     Alcohol abuse Family     Heart disease Mother     Hypertension Mother     Migraines Daughter     Leukemia Brother     Migraines Daughter     Substance Abuse Neg Hx     Mental illness Neg Hx     Depression Neg Hx        Physical Exam:     Vitals:   Blood Pressure: 142/67 (21)  Pulse: 105 (21)  Temperature: 99 3 °F (37 4 °C) (21)  Temp Source: Oral (21)  Respirations: 20 (21)  Weight - Scale: (!) 191 kg (420 lb) (21)  SpO2: 97 % (21)    Constitutional:  Well developed, well nourished, no acute distress, non-toxic appearance ; obese habitus  Eyes:  PERRL, conjunctiva normal   HENT:  Atraumatic, external ears normal, nose normal, oropharynx moist, no pharyngeal exudates  Neck- normal range of motion, no tenderness, supple   Respiratory:  No respiratory distress, normal breath sounds, no rales, no wheezing   Cardiovascular:  Normal rate, normal rhythm, no murmurs, no gallops, no rubs   GI:  Soft, nondistended, normal bowel sounds, nontender, no organomegaly, no mass, no rebound, no guarding   :  No costovertebral angle tenderness   Musculoskeletal:  No edema, no tenderness, note of BKA on the right lower extremity with artificial leg  Back- no tenderness  Integument:  Well hydrated, no rash edema ptosis left lower leg up to the level of above the knee, with very mild weeping of the interdigital areas  Lymphatic:  No lymphadenopathy noted   Neurologic:  Alert &awake, communicative, CN 2-12 normal, normal motor function, normal sensory function, no focal deficits noted   Psychiatric:  Speech and behavior appropriate       Lab Results:  I have personally reviewed pertinent reports  Results from last 7 days   Lab Units 04/09/21  1821   WBC Thousand/uL 16 43*   HEMOGLOBIN g/dL 11 4*   HEMATOCRIT % 36 0*   PLATELETS Thousands/uL 150   LYMPHO PCT % 1*   MONO PCT % 0*   EOS PCT % 0     Results from last 7 days   Lab Units 04/09/21  1821   POTASSIUM mmol/L 3 7   CHLORIDE mmol/L 98*   CO2 mmol/L 27   BUN mg/dL 16   CREATININE mg/dL 1 10   CALCIUM mg/dL 8 9               Imaging: I have personally reviewed pertinent reports  Chest x-ray is clear with no acute infiltrates  No results found  ** Please Note: Dragon 360 Dictation voice to text software was used in the creation of this document   **

## 2021-04-10 NOTE — ASSESSMENT & PLAN NOTE
Continue levothyroxine 325n mcg daily   (patient not on 75 in addition to 300  But is 25 in addition to 300 )  TSH

## 2021-04-10 NOTE — ASSESSMENT & PLAN NOTE
Patient started on ceftriaxone with vancomycin  Will continue with ceftriaxone 2000 mg daily starting and Hold vancomycin  Procalcitonin is improving 4 73 which improved to 3 72  MRSA nasal screen  Blood cultures pending    Nursing to do dressing changes, cellulitis is demarcated with marking pen and today 4/10 seems to be receding from line slightly in mid thigh area

## 2021-04-10 NOTE — PROGRESS NOTES
[address] [facility]  Progress Note - Grisel Meth 1955, 77 y o  male MRN: [id number]  Unit/Bed#: [facility] 324-01 Encounter: [id number]  Primary Care Provider: Nereyda Marquis MD   Date and time admitted to hospital: 4/9/2021  5:12 PM    * Left leg cellulitis  Assessment & Plan  Patient started on ceftriaxone with vancomycin  Will continue with ceftriaxone 2000 mg daily starting and Hold vancomycin  Procalcitonin is improving 4 73 which improved to 3 72  MRSA nasal screen  Blood cultures pending  Nursing to do dressing changes, cellulitis is demarcated with marking pen and today 4/10 seems to be receding from line slightly in mid thigh area    Paroxysmal atrial fibrillation (HCC)  Assessment & Plan  Continue metoprolol  Hypothyroidism  Assessment & Plan  Continue levothyroxine 325 mcg daily   (patient not on 75 in addition to 300  But is 25 in addition to 300 )  TSH: 0 829    HLD (hyperlipidemia)  Assessment & Plan  On simvastatin 10 mg/Pravachol 20 mg daily  HTN (hypertension)  Assessment & Plan  Continue Zestril 10 mg daily  Metoprolol 50 mg twice daily  Controlled type 2 diabetes mellitus with complication, without long-term current use of insulin [facility])  Assessment & Plan    Lab Results   Component Value Date    HGBA1C 5 6 04/10/2021   Put on hold metformin  Hemoglobin A1c  Accu-Cheks prior to meals and bedtime  Insulin sliding scale per protocol  (Algorithm 5 per weight )      VTE Pharmacologic Prophylaxis:   Pharmacologic: Enoxaparin (Lovenox)  Mechanical VTE Prophylaxis in Place: Yes on Right LE, Left LE wrapped     Patient Centered Rounds: I have performed bedside rounds with nursing staff today  Discussions with Specialists or Other Care Team Provider:  None    Education and Discussions with Family / Patient:  Patient declined calling family    Time Spent for Care: 30 minutes    More than 50% of total time spent on counseling and coordination of care as described above  Current Length of Stay: 1 day(s)    Current Patient Status: Inpatient   Certification Statement: The patient will continue to require additional inpatient hospital stay due to Left lower extremity cellulitis requiring IV antibiotics    Discharge Plan:  Discharge home when medically stable    Code Status: Level 1 - Full Code      Subjective:   Patient seen examined  He reports that his leg is starting to improve but still feels very warm  He denies any systemic symptoms such as fever  He is tolerating his diet  He is urinating without issue  And having normal bowel movements  Objective:     Vitals:   Temp (24hrs), Av 9 °F (37 2 °C), Min:98 3 °F (36 8 °C), Max:99 7 °F (37 6 °C)    Temp:  [98 3 °F (36 8 °C)-99 7 °F (37 6 °C)] 98 3 °F (36 8 °C)  HR:  [] 81  Resp:  [20-21] 21  BP: (121-146)/(53-69) 131/67  SpO2:  [96 %-97 %] 97 %  Body mass index is 47 31 kg/m²  Input and Output Summary (last 24 hours): Intake/Output Summary (Last 24 hours) at 4/10/2021 1057  Last data filed at 4/10/2021 0930  Gross per 24 hour   Intake 180 ml   Output 600 ml   Net -420 ml       Physical Exam:     Physical Exam  Constitutional:       Appearance: Normal appearance  He is not ill-appearing  HENT:      Head: Normocephalic and atraumatic  Eyes:      General:         Right eye: No discharge  Left eye: No discharge  Conjunctiva/sclera: Conjunctivae normal    Neck:      Musculoskeletal: Neck supple  Cardiovascular:      Rate and Rhythm: Normal rate and regular rhythm  Pulses: Normal pulses  Heart sounds: No murmur  Pulmonary:      Effort: Pulmonary effort is normal  No respiratory distress  Breath sounds: Normal breath sounds  No wheezing or rales  Abdominal:      General: Bowel sounds are normal  There is no distension  Palpations: Abdomen is soft  Tenderness: There is no abdominal tenderness  There is no guarding     Musculoskeletal:      Comments: Right BKA, left lower extremity was wrapped in which nursing to cough at bedside, cellulitis from foot to mid thigh which is marked with a marking pen and seems to have about a 1 in receding from the marked area  His dressing appeared to be wrapped very tightly and will keep it off for couple hours   Skin:     Findings: Rash present  Neurological:      General: No focal deficit present  Mental Status: He is alert and oriented to person, place, and time  Psychiatric:         Mood and Affect: Mood normal          Behavior: Behavior normal            Additional Data:     Labs:    Results from last 7 days   Lab Units 04/10/21  0722 04/09/21  1821   WBC Thousand/uL 10 17* 16 43*   HEMOGLOBIN g/dL 11 4* 11 4*   HEMATOCRIT % 38 1 36 0*   PLATELETS Thousands/uL 135* 150   LYMPHO PCT %  --  1*   MONO PCT %  --  0*   EOS PCT %  --  0     Results from last 7 days   Lab Units 04/10/21  0722   SODIUM mmol/L 136   POTASSIUM mmol/L 3 7   CHLORIDE mmol/L 105   CO2 mmol/L 28   BUN mg/dL 14   CREATININE mg/dL 1 02   ANION GAP mmol/L 3*   CALCIUM mg/dL 8 8   ALBUMIN g/dL 2 9*   TOTAL BILIRUBIN mg/dL 0 61   ALK PHOS U/L 75   ALT U/L 16   AST U/L 13   GLUCOSE RANDOM mg/dL 109         Results from last 7 days   Lab Units 04/10/21  0659 04/09/21  2217   POC GLUCOSE mg/dl 101 89     Results from last 7 days   Lab Units 04/10/21  0725   HEMOGLOBIN A1C % 5 6     Results from last 7 days   Lab Units 04/10/21  0728 04/09/21  1821   LACTIC ACID mmol/L  --  1 6   PROCALCITONIN ng/ml 3 72* 4 73*           * I Have Reviewed All Lab Data Listed Above  * Additional Pertinent Lab Tests Reviewed: All Labs Within Last 24 Hours Reviewed    Imaging:    Imaging Reports Reviewed Today Include:  None  Imaging Personally Reviewed by Myself Includes:  None    Recent Cultures (last 7 days):     Results from last 7 days   Lab Units 04/09/21  1821   BLOOD CULTURE  Received in Microbiology Lab  Culture in Progress  Received in Microbiology Lab  Culture in Progress  Last 24 Hours Medication List:   Current Facility-Administered Medications   Medication Dose Route Frequency Provider Last Rate    acetaminophen  650 mg Oral Q6H PRN Angela Median, MD      aluminum-magnesium hydroxide-simethicone  30 mL Oral Q6H PRN Angela Median, MD      aspirin  81 mg Oral Daily Angela Median, MD      cefTRIAXone  2,000 mg Intravenous Q24H Angela Median, MD      clotrimazole-betamethasone   Topical BID Angela Median, MD      docusate sodium  100 mg Oral BID PRN Angela Median, MD      enoxaparin  40 mg Subcutaneous Q12H [address] Angela Median, MD      gabapentin  100 mg Oral TID Angela Median, MD      insulin lispro  1-5 Units Subcutaneous HS Angela Median, MD      insulin lispro  4-20 Units Subcutaneous TID [facility] Angela Median, MD      levothyroxine  325 mcg Oral Daily Angela Median, MD      lisinopril  10 mg Oral Daily Angela Median, MD      metoprolol tartrate  50 mg Oral BID Angela Median, MD      multivitamin-minerals  1 tablet Oral Daily Angela Median, MD      ondansetron  4 mg Intravenous Q6H PRN Angela Median, MD      pantoprazole  40 mg Oral Early Morning Angela Median, MD      pravastatin  20 mg Oral Daily With Bel Brooks MD          Today, Patient Was Seen By: Alexi Ramos DO    ** Please Note: Dictation voice to text software may have been used in the creation of this document   **

## 2021-04-10 NOTE — ASSESSMENT & PLAN NOTE
Lab Results   Component Value Date    HGBA1C 5 6 12/10/2020   Put on hold metformin  Hemoglobin A1c  Accu-Cheks prior to meals and bedtime  Insulin sliding scale per protocol    (Algorithm 5 per weight )

## 2021-04-10 NOTE — CASE MANAGEMENT
Cm spoke to pt via unit phone, introduced self, role and discharged process  Pt presented as alert during conversation  Pt lives alone on 3rd floor aptmt, no step to enter, elevator accessible  Pt reported being indep with ADLS PTA  Pt own DME RW and cane  [facility]  Pt denies any inpt rehab, MH or substance abuse  Pt reported disable and don't drives  POA is children unsure who listed first  Pt PCP is Dr Jo Arana and preferred pharmacy is [facility] in [place]  Per pt, dtr to transport upon discharged  CM reviewed d/c planning process including the following: identifying help at home, patient preference for d/c planning needs, Discharge Lounge, [facility] Meds to Bed program, availability of treatment team to discuss questions or concerns patient and/or family may have regarding understanding medications and recognizing signs and symptoms once discharged  CM also encouraged patient to follow up with all recommended appointments after discharge  Patient advised of importance for patient and family to participate in managing patients medical well being

## 2021-04-10 NOTE — ASSESSMENT & PLAN NOTE
Continue levothyroxine 325 mcg daily   (patient not on 75 in addition to 300   But is 25 in addition to 300 )  TSH: 0 829

## 2021-04-10 NOTE — ASSESSMENT & PLAN NOTE
Patient started on ceftriaxone with vancomycin  Will continue with ceftriaxone 2000 mg daily starting tomorrow 8:00 a m  Hold vancomycin  Procalcitonin tomorrow morning  MRSA nasal screen  Blood cultures pending

## 2021-04-11 ENCOUNTER — IMMUNIZATIONS (INPATIENT)
Dept: FAMILY MEDICINE CLINIC | Facility: HOSPITAL | Age: 66
DRG: 872 | End: 2021-04-11
Payer: MEDICARE

## 2021-04-11 DIAGNOSIS — Z23 ENCOUNTER FOR IMMUNIZATION: Primary | ICD-10-CM

## 2021-04-11 LAB
ANION GAP SERPL CALCULATED.3IONS-SCNC: 1 MMOL/L (ref 4–13)
BASOPHILS # BLD AUTO: 0.01 THOUSANDS/ΜL (ref 0–0.1)
BASOPHILS NFR BLD AUTO: 0 % (ref 0–1)
BUN SERPL-MCNC: 13 MG/DL (ref 5–25)
CALCIUM SERPL-MCNC: 9 MG/DL (ref 8.3–10.1)
CHLORIDE SERPL-SCNC: 107 MMOL/L (ref 100–108)
CO2 SERPL-SCNC: 29 MMOL/L (ref 21–32)
CREAT SERPL-MCNC: 0.9 MG/DL (ref 0.6–1.3)
EOSINOPHIL # BLD AUTO: 0.13 THOUSAND/ΜL (ref 0–0.61)
EOSINOPHIL NFR BLD AUTO: 3 % (ref 0–6)
ERYTHROCYTE [DISTWIDTH] IN BLOOD BY AUTOMATED COUNT: 17.4 % (ref 11.6–15.1)
GFR SERPL CREATININE-BSD FRML MDRD: 88 ML/MIN/1.73SQ M
GLUCOSE SERPL-MCNC: 100 MG/DL (ref 65–140)
GLUCOSE SERPL-MCNC: 100 MG/DL (ref 65–140)
GLUCOSE SERPL-MCNC: 135 MG/DL (ref 65–140)
GLUCOSE SERPL-MCNC: 96 MG/DL (ref 65–140)
GLUCOSE SERPL-MCNC: 99 MG/DL (ref 65–140)
HCT VFR BLD AUTO: 39.2 % (ref 36.5–49.3)
HGB BLD-MCNC: 11.5 G/DL (ref 12–17)
IMM GRANULOCYTES # BLD AUTO: 0.01 THOUSAND/UL (ref 0–0.2)
IMM GRANULOCYTES NFR BLD AUTO: 0 % (ref 0–2)
LYMPHOCYTES # BLD AUTO: 0.63 THOUSANDS/ΜL (ref 0.6–4.47)
LYMPHOCYTES NFR BLD AUTO: 12 % (ref 14–44)
MCH RBC QN AUTO: 25.1 PG (ref 26.8–34.3)
MCHC RBC AUTO-ENTMCNC: 29.3 G/DL (ref 31.4–37.4)
MCV RBC AUTO: 85 FL (ref 82–98)
MONOCYTES # BLD AUTO: 0.53 THOUSAND/ΜL (ref 0.17–1.22)
MONOCYTES NFR BLD AUTO: 10 % (ref 4–12)
MRSA NOSE QL CULT: NORMAL
NEUTROPHILS # BLD AUTO: 3.79 THOUSANDS/ΜL (ref 1.85–7.62)
NEUTS SEG NFR BLD AUTO: 75 % (ref 43–75)
NRBC BLD AUTO-RTO: 0 /100 WBCS
PLATELET # BLD AUTO: 156 THOUSANDS/UL (ref 149–390)
PMV BLD AUTO: 12.5 FL (ref 8.9–12.7)
POTASSIUM SERPL-SCNC: 4 MMOL/L (ref 3.5–5.3)
PROCALCITONIN SERPL-MCNC: 2.2 NG/ML
RBC # BLD AUTO: 4.59 MILLION/UL (ref 3.88–5.62)
SODIUM SERPL-SCNC: 137 MMOL/L (ref 136–145)
WBC # BLD AUTO: 5.1 THOUSAND/UL (ref 4.31–10.16)

## 2021-04-11 PROCEDURE — 84145 PROCALCITONIN (PCT): CPT | Performed by: FAMILY MEDICINE

## 2021-04-11 PROCEDURE — 85025 COMPLETE CBC W/AUTO DIFF WBC: CPT | Performed by: FAMILY MEDICINE

## 2021-04-11 PROCEDURE — 82948 REAGENT STRIP/BLOOD GLUCOSE: CPT

## 2021-04-11 PROCEDURE — 91300 SARS-COV-2 / COVID-19 MRNA VACCINE (PFIZER-BIONTECH) 30 MCG: CPT

## 2021-04-11 PROCEDURE — 0002A SARS-COV-2 / COVID-19 MRNA VACCINE (PFIZER-BIONTECH) 30 MCG: CPT

## 2021-04-11 PROCEDURE — 99232 SBSQ HOSP IP/OBS MODERATE 35: CPT | Performed by: PHYSICIAN ASSISTANT

## 2021-04-11 PROCEDURE — 80048 BASIC METABOLIC PNL TOTAL CA: CPT | Performed by: FAMILY MEDICINE

## 2021-04-11 RX ADMIN — GABAPENTIN 100 MG: 100 CAPSULE ORAL at 21:22

## 2021-04-11 RX ADMIN — LISINOPRIL 10 MG: 10 TABLET ORAL at 08:12

## 2021-04-11 RX ADMIN — Medication 1 TABLET: at 08:12

## 2021-04-11 RX ADMIN — LEVOTHYROXINE SODIUM 325 MCG: 125 TABLET ORAL at 08:11

## 2021-04-11 RX ADMIN — ASPIRIN 81 MG: 81 TABLET, COATED ORAL at 08:10

## 2021-04-11 RX ADMIN — ENOXAPARIN SODIUM 40 MG: 40 INJECTION SUBCUTANEOUS at 21:22

## 2021-04-11 RX ADMIN — PANTOPRAZOLE SODIUM 40 MG: 40 TABLET, DELAYED RELEASE ORAL at 06:11

## 2021-04-11 RX ADMIN — PRAVASTATIN SODIUM 20 MG: 20 TABLET ORAL at 17:01

## 2021-04-11 RX ADMIN — CEFTRIAXONE SODIUM 2000 MG: 10 INJECTION, POWDER, FOR SOLUTION INTRAVENOUS at 17:01

## 2021-04-11 RX ADMIN — CLOTRIMAZOLE AND BETAMETHASONE DIPROPIONATE: 10; .64 CREAM TOPICAL at 17:02

## 2021-04-11 RX ADMIN — ENOXAPARIN SODIUM 40 MG: 40 INJECTION SUBCUTANEOUS at 08:09

## 2021-04-11 RX ADMIN — GABAPENTIN 100 MG: 100 CAPSULE ORAL at 08:11

## 2021-04-11 RX ADMIN — GABAPENTIN 100 MG: 100 CAPSULE ORAL at 17:01

## 2021-04-11 RX ADMIN — CLOTRIMAZOLE AND BETAMETHASONE DIPROPIONATE: 10; .64 CREAM TOPICAL at 08:12

## 2021-04-11 RX ADMIN — METOPROLOL TARTRATE 50 MG: 50 TABLET, FILM COATED ORAL at 08:12

## 2021-04-11 RX ADMIN — METOPROLOL TARTRATE 50 MG: 50 TABLET, FILM COATED ORAL at 17:01

## 2021-04-11 NOTE — ASSESSMENT & PLAN NOTE
· Continue levothyroxine 325 mcg daily   (patient not on 75 in addition to 300   But is 25 in addition to 300 )  · TSH: 0 829

## 2021-04-11 NOTE — ASSESSMENT & PLAN NOTE
· POA, cellulitis superimposed on chronic venous stasis    · Ceftriaxone and vancomycin started on admission   Vanco subsequently discontinued with clinical improvement   · Currently on IV ceftriaxone, day 4 Rx, de-escalate to IV Ancef and anticipate transition to PO antibiotics in 24-48 hours   · Trend procalcitonin: 4 73 -> 3 72 -> 2 20 -> 1 23 today   · MRSA nasal screen negative  · Blood cultures without growth x 48 hours  · Podiatry consult for wound care; patient follows with Dr Ervin Mcgrath

## 2021-04-11 NOTE — ASSESSMENT & PLAN NOTE
· POA, cellulitis superimposed on chronic venous stasis    · Started on ceftriaxone with vancomycin   Continue with ceftriaxone 2000 mg daily  · Improvement /receding of cellulitis with despite stopping Vanco; procalcitonin is improving   · MRSA nasal screen pending  · Blood cultures without growth x 24 hours  · Nursing complaining dressing changes

## 2021-04-11 NOTE — PROGRESS NOTES
[address] [facility]  Progress Note - Terry Beard 1955, 77 y o  male MRN: [id number]  Unit/Bed#: [facility] 324-01 Encounter: [id number]  Primary Care Provider: Ana Donis MD   Date and time admitted to hospital: 4/9/2021  5:12 PM    * Left leg cellulitis  Assessment & Plan  · POA, cellulitis superimposed on chronic venous stasis    · Started on ceftriaxone with vancomycin  Continue with ceftriaxone 2000 mg daily  · Improvement /receding of cellulitis with despite stopping Vanco; procalcitonin is improving   · MRSA nasal screen pending  · Blood cultures without growth x 24 hours  · Nursing completing dressing changes of chronic left foot wound    Paroxysmal atrial fibrillation (HCC)  Assessment & Plan  · Continue metoprolol; rate controlled    Hypothyroidism  Assessment & Plan  · Continue levothyroxine 325 mcg daily   (patient not on 75 in addition to 300  But is 25 in addition to 300 )  · TSH: 0 829    HLD (hyperlipidemia)  Assessment & Plan  · On simvastatin 10 mg/Pravachol 20 mg daily  HTN (hypertension)  Assessment & Plan  · Continue Zestril 10 mg daily  · Continue Metoprolol 50 mg twice daily  Controlled type 2 diabetes mellitus with complication, without long-term current use of insulin [facility])  Assessment & Plan    Lab Results   Component Value Date    HGBA1C 5 6 04/10/2021     · Hold metformin  · Hemoglobin A1c 5 6  · Accu-Cheks prior to meals and bedtime  · Insulin sliding scale per protocol  (Algorithm 5 per weight )      VTE Pharmacologic Prophylaxis:   Pharmacologic: Enoxaparin (Lovenox)  Mechanical VTE Prophylaxis in Place: Yes    Patient Centered Rounds: I have performed bedside rounds with nursing staff today  Discussions with Specialists or Other Care Team Provider:  none    Education and Discussions with Family / Patient:  Daughter updated via phone    Time Spent for Care: 45 minutes    More than 50% of total time spent on counseling and coordination of care as described above  Current Length of Stay: 2 day(s)    Current Patient Status: Inpatient   Certification Statement: The patient will continue to require additional inpatient hospital stay due to Cellulitis requiring additional IV antibiotic therapy    Discharge Plan:  Hopeful transition to p o  antibiotics tomorrow if symptoms continue to improve    Code Status: Level 1 - Full Code      Subjective:    Patient is doing well  He is very pleased that his cellulitis is improving  He has a good appetite  Recent urination  Recent bowel movement without constipation or diarrhea  No discomfort in left lower extremity  Objective:     Vitals:   Temp (24hrs), Av 2 °F (36 8 °C), Min:98 °F (36 7 °C), Max:98 3 °F (36 8 °C)    Temp:  [98 °F (36 7 °C)-98 3 °F (36 8 °C)] 98 °F (36 7 °C)  HR:  [67-91] 67  Resp:  [18-20] 19  BP: (128-134)/(60-84) 134/64  SpO2:  [94 %-98 %] 98 %  Body mass index is 44 8 kg/m²  Input and Output Summary (last 24 hours): Intake/Output Summary (Last 24 hours) at 2021 1144  Last data filed at 2021 0800  Gross per 24 hour   Intake 530 ml   Output 625 ml   Net -95 ml       Physical Exam:     Physical Exam  Constitutional:       General: He is not in acute distress  Appearance: He is obese  He is not ill-appearing  HENT:      Head: Normocephalic and atraumatic  Nose: Nose normal       Mouth/Throat:      Mouth: Mucous membranes are moist       Pharynx: Oropharynx is clear  Eyes:      Extraocular Movements: Extraocular movements intact  Conjunctiva/sclera: Conjunctivae normal    Neck:      Musculoskeletal: Normal range of motion and neck supple  Cardiovascular:      Rate and Rhythm: Normal rate  Comments: Irregular rate, irregular rhythm  Pulmonary:      Effort: Pulmonary effort is normal       Breath sounds: Normal breath sounds  Comments: Coarse but clear  Abdominal:      General: Abdomen is flat   Bowel sounds are normal  There is no distension  Palpations: Abdomen is soft  Comments: Obese abdomen   Musculoskeletal: Normal range of motion  General: Deformity (Right BKA) present  Skin:     Comments: Chronic wound on left foot which is dressed -dressing clean dry and intact   Neurological:      Mental Status: He is alert  Additional Data:     Labs:    Results from last 7 days   Lab Units 04/11/21  0628   WBC Thousand/uL 5 10   HEMOGLOBIN g/dL 11 5*   HEMATOCRIT % 39 2   PLATELETS Thousands/uL 156   NEUTROS PCT % 75   LYMPHS PCT % 12*   MONOS PCT % 10   EOS PCT % 3     Results from last 7 days   Lab Units 04/11/21  0628 04/10/21  0722   SODIUM mmol/L 137 136   POTASSIUM mmol/L 4 0 3 7   CHLORIDE mmol/L 107 105   CO2 mmol/L 29 28   BUN mg/dL 13 14   CREATININE mg/dL 0 90 1 02   ANION GAP mmol/L 1* 3*   CALCIUM mg/dL 9 0 8 8   ALBUMIN g/dL  --  2 9*   TOTAL BILIRUBIN mg/dL  --  0 61   ALK PHOS U/L  --  75   ALT U/L  --  16   AST U/L  --  13   GLUCOSE RANDOM mg/dL 100 109         Results from last 7 days   Lab Units 04/11/21  1101 04/11/21  0703 04/10/21  2114 04/10/21  1626 04/10/21  1145 04/10/21  0659 04/09/21  2217   POC GLUCOSE mg/dl 135 96 110 89 89 101 89     Results from last 7 days   Lab Units 04/10/21  0725   HEMOGLOBIN A1C % 5 6     Results from last 7 days   Lab Units 04/11/21  0628 04/10/21  0728 04/09/21  1821   LACTIC ACID mmol/L  --   --  1 6   PROCALCITONIN ng/ml 2 20* 3 72* 4 73*     * I Have Reviewed All Lab Data Listed Above  * Additional Pertinent Lab Tests Reviewed: Nancy 66 Admission Reviewed    Imaging:     Imaging Reports Reviewed Today Include: Chest x-ray  Imaging Personally Reviewed by Myself Includes:   Portable chest x-ray    Recent Cultures (last 7 days):     Results from last 7 days   Lab Units 04/09/21  1821   BLOOD CULTURE  No Growth at 24 hrs  No Growth at 24 hrs         Last 24 Hours Medication List:   Current Facility-Administered Medications   Medication Dose Route Frequency Provider Last Rate    acetaminophen  650 mg Oral Q6H PRN Oscar Cui MD      aluminum-magnesium hydroxide-simethicone  30 mL Oral Q6H PRN Oscar Cui MD      aspirin  81 mg Oral Daily Oscar Cui MD      cefTRIAXone  2,000 mg Intravenous Q24H Oscar Cui MD Stopped (04/10/21 8656)    clotrimazole-betamethasone   Topical BID Oscar Cui MD      docusate sodium  100 mg Oral BID PRN Oscar Cui MD      enoxaparin  40 mg Subcutaneous Q12H [facility] Oscar Cui MD      gabapentin  100 mg Oral TID Oscar Cui MD      insulin lispro  1-5 Units Subcutaneous HS Oscar Cui MD      insulin lispro  4-20 Units Subcutaneous TID [facility] Oscar Cui MD      levothyroxine  325 mcg Oral Daily Oscar Cui MD      lisinopril  10 mg Oral Daily Oscar Cui MD      metoprolol tartrate  50 mg Oral BID Oscar Cui MD      multivitamin-minerals  1 tablet Oral Daily Oscar Cui MD      ondansetron  4 mg Intravenous Q6H PRN Oscar Cui MD      pantoprazole  40 mg Oral Early Morning Oscar Cui MD      pravastatin  20 mg Oral Daily With Haroldo Rodríguez MD          Today, Patient Was Seen By: Sammy Michael PA-C    ** Please Note: Dictation voice to text software may have been used in the creation of this document   **

## 2021-04-11 NOTE — ASSESSMENT & PLAN NOTE
Lab Results   Component Value Date    HGBA1C 5 6 04/10/2021     · Hold metformin  · Hemoglobin A1c 5 6  · Accu-Cheks prior to meals and bedtime  · Insulin sliding scale per protocol    (Algorithm 5 per weight )

## 2021-04-11 NOTE — PLAN OF CARE
Problem: Potential for Falls  Goal: Patient will remain free of falls  Description: INTERVENTIONS:  - Assess patient frequently for physical needs  -  Identify cognitive and physical deficits and behaviors that affect risk of falls    -  [place] fall precautions as indicated by assessment   - Educate patient/family on patient safety including physical limitations  - Instruct patient to call for assistance with activity based on assessment  - Modify environment to reduce risk of injury  - Consider OT/PT consult to assist with strengthening/mobility  Outcome: Progressing     Problem: PAIN - ADULT  Goal: Verbalizes/displays adequate comfort level or baseline comfort level  Description: Interventions:  - Encourage patient to monitor pain and request assistance  - Assess pain using appropriate pain scale  - Administer analgesics based on type and severity of pain and evaluate response  - Implement non-pharmacological measures as appropriate and evaluate response  - Consider cultural and social influences on pain and pain management  - Notify physician/advanced practitioner if interventions unsuccessful or patient reports new pain  Outcome: Progressing     Problem: INFECTION - ADULT  Goal: Absence or prevention of progression during hospitalization  Description: INTERVENTIONS:  - Assess and monitor for signs and symptoms of infection  - Monitor lab/diagnostic results  - Monitor all insertion sites, i e  indwelling lines, tubes, and drains  - Monitor endotracheal if appropriate and nasal secretions for changes in amount and color  - [place] appropriate cooling/warming therapies per order  - Administer medications as ordered  - Instruct and encourage patient and family to use good hand hygiene technique  - Identify and instruct in appropriate isolation precautions for identified infection/condition  Outcome: Progressing  Goal: Absence of fever/infection during neutropenic period  Description: INTERVENTIONS:  - Monitor WBC    Outcome: Progressing     Problem: SAFETY ADULT  Goal: Patient will remain free of falls  Description: INTERVENTIONS:  - Assess patient frequently for physical needs  -  Identify cognitive and physical deficits and behaviors that affect risk of falls    -  [place] fall precautions as indicated by assessment   - Educate patient/family on patient safety including physical limitations  - Instruct patient to call for assistance with activity based on assessment  - Modify environment to reduce risk of injury  - Consider OT/PT consult to assist with strengthening/mobility  Outcome: Progressing  Goal: Maintain or return to baseline ADL function  Description: INTERVENTIONS:  -  Assess patient's ability to carry out ADLs; assess patient's baseline for ADL function and identify physical deficits which impact ability to perform ADLs (bathing, care of mouth/teeth, toileting, grooming, dressing, etc )  - Assess/evaluate cause of self-care deficits   - Assess range of motion  - Assess patient's mobility; develop plan if impaired  - Assess patient's need for assistive devices and provide as appropriate  - Encourage maximum independence but intervene and supervise when necessary  - Involve family in performance of ADLs  - Assess for home care needs following discharge   - Consider OT consult to assist with ADL evaluation and planning for discharge  - Provide patient education as appropriate  Outcome: Progressing  Goal: Maintain or return mobility status to optimal level  Description: INTERVENTIONS:  - Assess patient's baseline mobility status (ambulation, transfers, stairs, etc )    - Identify cognitive and physical deficits and behaviors that affect mobility  - Identify mobility aids required to assist with transfers and/or ambulation (gait belt, sit-to-stand, lift, walker, cane, etc )  - [place] fall precautions as indicated by assessment  - Record patient progress and toleration of activity level on Mobility SBAR; progress patient to next Phase/Stage  - Instruct patient to call for assistance with activity based on assessment  - Consider rehabilitation consult to assist with strengthening/weightbearing, etc   Outcome: Progressing     Problem: DISCHARGE PLANNING  Goal: Discharge to home or other facility with appropriate resources  Description: INTERVENTIONS:  - Identify barriers to discharge w/patient and caregiver  - Arrange for needed discharge resources and transportation as appropriate  - Identify discharge learning needs (meds, wound care, etc )  - Arrange for interpretive services to assist at discharge as needed  - Refer to Case Management Department for coordinating discharge planning if the patient needs post-hospital services based on physician/advanced practitioner order or complex needs related to functional status, cognitive ability, or social support system  Outcome: Progressing     Problem: Knowledge Deficit  Goal: Patient/family/caregiver demonstrates understanding of disease process, treatment plan, medications, and discharge instructions  Description: Complete learning assessment and assess knowledge base    Interventions:  - Provide teaching at level of understanding  - Provide teaching via preferred learning methods  Outcome: Progressing

## 2021-04-12 LAB
ANION GAP SERPL CALCULATED.3IONS-SCNC: 4 MMOL/L (ref 4–13)
ATRIAL RATE: 123 BPM
BUN SERPL-MCNC: 12 MG/DL (ref 5–25)
CALCIUM SERPL-MCNC: 8.7 MG/DL (ref 8.3–10.1)
CHLORIDE SERPL-SCNC: 104 MMOL/L (ref 100–108)
CO2 SERPL-SCNC: 29 MMOL/L (ref 21–32)
CREAT SERPL-MCNC: 0.86 MG/DL (ref 0.6–1.3)
ERYTHROCYTE [DISTWIDTH] IN BLOOD BY AUTOMATED COUNT: 17.1 % (ref 11.6–15.1)
GFR SERPL CREATININE-BSD FRML MDRD: 89 ML/MIN/1.73SQ M
GLUCOSE SERPL-MCNC: 85 MG/DL (ref 65–140)
GLUCOSE SERPL-MCNC: 86 MG/DL (ref 65–140)
GLUCOSE SERPL-MCNC: 93 MG/DL (ref 65–140)
GLUCOSE SERPL-MCNC: 96 MG/DL (ref 65–140)
GLUCOSE SERPL-MCNC: 96 MG/DL (ref 65–140)
HCT VFR BLD AUTO: 38.2 % (ref 36.5–49.3)
HGB BLD-MCNC: 11.5 G/DL (ref 12–17)
MCH RBC QN AUTO: 25.6 PG (ref 26.8–34.3)
MCHC RBC AUTO-ENTMCNC: 30.1 G/DL (ref 31.4–37.4)
MCV RBC AUTO: 85 FL (ref 82–98)
P AXIS: 0 DEGREES
PLATELET # BLD AUTO: 167 THOUSANDS/UL (ref 149–390)
PMV BLD AUTO: 12.2 FL (ref 8.9–12.7)
POTASSIUM SERPL-SCNC: 3.8 MMOL/L (ref 3.5–5.3)
PROCALCITONIN SERPL-MCNC: 1.23 NG/ML
QRS AXIS: -54 DEGREES
QRSD INTERVAL: 112 MS
QT INTERVAL: 342 MS
QTC INTERVAL: 432 MS
RBC # BLD AUTO: 4.49 MILLION/UL (ref 3.88–5.62)
SODIUM SERPL-SCNC: 137 MMOL/L (ref 136–145)
T WAVE AXIS: 16 DEGREES
VENTRICULAR RATE: 96 BPM
WBC # BLD AUTO: 5.2 THOUSAND/UL (ref 4.31–10.16)

## 2021-04-12 PROCEDURE — 99232 SBSQ HOSP IP/OBS MODERATE 35: CPT | Performed by: PHYSICIAN ASSISTANT

## 2021-04-12 PROCEDURE — 93010 ELECTROCARDIOGRAM REPORT: CPT | Performed by: INTERNAL MEDICINE

## 2021-04-12 PROCEDURE — 84145 PROCALCITONIN (PCT): CPT | Performed by: FAMILY MEDICINE

## 2021-04-12 PROCEDURE — 82948 REAGENT STRIP/BLOOD GLUCOSE: CPT

## 2021-04-12 PROCEDURE — 85027 COMPLETE CBC AUTOMATED: CPT | Performed by: PHYSICIAN ASSISTANT

## 2021-04-12 PROCEDURE — 80048 BASIC METABOLIC PNL TOTAL CA: CPT | Performed by: PHYSICIAN ASSISTANT

## 2021-04-12 RX ORDER — CEFAZOLIN SODIUM 2 G/50ML
2000 SOLUTION INTRAVENOUS EVERY 8 HOURS
Status: DISCONTINUED | OUTPATIENT
Start: 2021-04-12 | End: 2021-04-14 | Stop reason: HOSPADM

## 2021-04-12 RX ADMIN — ASPIRIN 81 MG: 81 TABLET, COATED ORAL at 08:56

## 2021-04-12 RX ADMIN — METOPROLOL TARTRATE 50 MG: 50 TABLET, FILM COATED ORAL at 08:56

## 2021-04-12 RX ADMIN — CEFAZOLIN SODIUM 2000 MG: 2 SOLUTION INTRAVENOUS at 10:25

## 2021-04-12 RX ADMIN — GABAPENTIN 100 MG: 100 CAPSULE ORAL at 16:35

## 2021-04-12 RX ADMIN — PRAVASTATIN SODIUM 20 MG: 20 TABLET ORAL at 16:35

## 2021-04-12 RX ADMIN — CLOTRIMAZOLE AND BETAMETHASONE DIPROPIONATE: 10; .64 CREAM TOPICAL at 17:41

## 2021-04-12 RX ADMIN — LEVOTHYROXINE SODIUM 325 MCG: 125 TABLET ORAL at 08:55

## 2021-04-12 RX ADMIN — PANTOPRAZOLE SODIUM 40 MG: 40 TABLET, DELAYED RELEASE ORAL at 05:01

## 2021-04-12 RX ADMIN — GABAPENTIN 100 MG: 100 CAPSULE ORAL at 08:55

## 2021-04-12 RX ADMIN — GABAPENTIN 100 MG: 100 CAPSULE ORAL at 22:05

## 2021-04-12 RX ADMIN — METOPROLOL TARTRATE 50 MG: 50 TABLET, FILM COATED ORAL at 17:40

## 2021-04-12 RX ADMIN — CLOTRIMAZOLE AND BETAMETHASONE DIPROPIONATE: 10; .64 CREAM TOPICAL at 08:57

## 2021-04-12 RX ADMIN — CEFAZOLIN SODIUM 2000 MG: 2 SOLUTION INTRAVENOUS at 17:39

## 2021-04-12 RX ADMIN — ENOXAPARIN SODIUM 40 MG: 40 INJECTION SUBCUTANEOUS at 08:56

## 2021-04-12 RX ADMIN — ENOXAPARIN SODIUM 40 MG: 40 INJECTION SUBCUTANEOUS at 22:05

## 2021-04-12 RX ADMIN — LISINOPRIL 10 MG: 10 TABLET ORAL at 08:56

## 2021-04-12 RX ADMIN — Medication 1 TABLET: at 08:56

## 2021-04-12 NOTE — PROGRESS NOTES
[address] [facility]  Progress Note - Nyasia Palafox 1955, 74 y o  male MRN: [id number]  Unit/Bed#: [facility] 324-01 Encounter: [id number]  Primary Care Provider: Shilpa Carballo MD   Date and time admitted to hospital: 4/9/2021  5:12 PM    * Left leg cellulitis  Assessment & Plan  · POA, cellulitis superimposed on chronic venous stasis    · Ceftriaxone and vancomycin started on admission  Vanco subsequently discontinued with clinical improvement   · Currently on IV ceftriaxone, day 4 Rx, de-escalate to IV Ancef and anticipate transition to PO antibiotics in 24-48 hours   · Trend procalcitonin: 4 73 -> 3 72 -> 2 20 -> 1 23 today   · MRSA nasal screen negative  · Blood cultures without growth x 48 hours  · Podiatry consult for wound care; patient follows with Dr Pooja Kern     Diabetic ulcer of left foot associated with type 2 diabetes mellitus, limited to breakdown [facility] ([facility] )  Assessment & Plan  · Appreciate Podiatry consult and recommendations     Chronic venous stasis dermatitis of left lower extremity  Assessment & Plan  · Follows with Dr Pooja Kern for wound care as outpatient     Paroxysmal atrial fibrillation ([facility] )  Assessment & Plan  · Continue metoprolol; rate controlled  · Not on [facility] as outpatient     HTN (hypertension)  Assessment & Plan  · BP acceptable, monitor routinely   · Continue home medical management: Zestril 10 mg daily, metoprolol 50 mg twice daily    Controlled type 2 diabetes mellitus with complication, without long-term current use of insulin ([facility] )  Assessment & Plan    Lab Results   Component Value Date    HGBA1C 5 6 04/10/2021     · Hold metformin  · Hemoglobin A1c 5 6  · Accu-Cheks prior to meals and bedtime  · Insulin sliding scale per protocol  (Algorithm 5 per weight )    Hypothyroidism  Assessment & Plan  · Continue levothyroxine 325 mcg daily   (patient not on 75 in addition to 300   But is 25 in addition to 300 )  · TSH: 0 829    S/P BKA (below knee amputation) unilateral, right (HCC)  Assessment & Plan  · Noted     HLD (hyperlipidemia)  Assessment & Plan  · On simvastatin 10 mg/Pravachol 20 mg daily  VTE Pharmacologic Prophylaxis:   Pharmacologic: Enoxaparin (Lovenox)  Mechanical VTE Prophylaxis in Place: No    Patient Centered Rounds: I have performed bedside rounds with nursing staff today  Discussions with Specialists or Other Care Team Provider: primary RN, wound care RN, case management, notified podiatry of consult     Education and Discussions with Family / Patient: patient, declined call to family and states he is updating his daughter     Time Spent for Care: 20 minutes  More than 50% of total time spent on counseling and coordination of care as described above  Current Length of Stay: 3 day(s)    Current Patient Status: Inpatient   Certification Statement: The patient will continue to require additional inpatient hospital stay due to cellulitis, podiatry evaluation    Discharge Plan: anticipate discharge home in 24-48 hours    Code Status: Level 1 - Full Code    Subjective:   Patient offers no complaints today  States redness is improving but he has more of an "itcyh rash" now  Wound care at bedside and recommending Podiatry evaluation for left foot ulcer  Patient agreeable to podiatry evaluation today and likely discharge home tomorrow if continues to improve  Objective:     Vitals:   Temp (24hrs), Av 5 °F (36 4 °C), Min:97 2 °F (36 2 °C), Max:97 9 °F (36 6 °C)    Temp:  [97 2 °F (36 2 °C)-97 9 °F (36 6 °C)] 97 4 °F (36 3 °C)  HR:  [63-79] 77  Resp:  [18-20] 18  BP: (133-161)/(79-82) 133/81  SpO2:  [96 %-98 %] 96 %  Body mass index is 47 56 kg/m²  Input and Output Summary (last 24 hours): Intake/Output Summary (Last 24 hours) at 2021 0943  Last data filed at 2021 0901  Gross per 24 hour   Intake 1940 ml   Output 1600 ml   Net 340 ml       Physical Exam:     Physical Exam  Vitals signs and nursing note reviewed  Exam conducted with a chaperone present  Constitutional:       General: He is not in acute distress  Appearance: He is obese  Cardiovascular:      Rate and Rhythm: Normal rate and regular rhythm  Pulmonary:      Effort: No respiratory distress  Breath sounds: No wheezing or rales  Abdominal:      General: Abdomen is flat  There is no distension  Palpations: Abdomen is soft  Tenderness: There is no abdominal tenderness  Musculoskeletal:         General: Deformity (r/p R BKA) present  Skin:     General: Skin is warm  Findings: Erythema and rash present  Comments: Distal LLE erythema although appears improved compared to line of demarcation   Warm to palpation up to line of demarcation   LLE foot dressing c/d/i   Neurological:      Mental Status: He is alert and oriented to person, place, and time  Mental status is at baseline  Additional Data:     Labs:    Results from last 7 days   Lab Units 04/12/21  0439 04/11/21  0628   WBC Thousand/uL 5 20 5 10   HEMOGLOBIN g/dL 11 5* 11 5*   HEMATOCRIT % 38 2 39 2   PLATELETS Thousands/uL 167 156   NEUTROS PCT %  --  75   LYMPHS PCT %  --  12*   MONOS PCT %  --  10   EOS PCT %  --  3     Results from last 7 days   Lab Units 04/12/21  0439  04/10/21  0722   SODIUM mmol/L 137   < > 136   POTASSIUM mmol/L 3 8   < > 3 7   CHLORIDE mmol/L 104   < > 105   CO2 mmol/L 29   < > 28   BUN mg/dL 12   < > 14   CREATININE mg/dL 0 86   < > 1 02   ANION GAP mmol/L 4   < > 3*   CALCIUM mg/dL 8 7   < > 8 8   ALBUMIN g/dL  --   --  2 9*   TOTAL BILIRUBIN mg/dL  --   --  0 61   ALK PHOS U/L  --   --  75   ALT U/L  --   --  16   AST U/L  --   --  13   GLUCOSE RANDOM mg/dL 96   < > 109    < > = values in this interval not displayed           Results from last 7 days   Lab Units 04/12/21  0627 04/11/21  2059 04/11/21  1626 04/11/21  1101 04/11/21  0703 04/10/21  2114 04/10/21  1626 04/10/21  1145 04/10/21  0659 04/09/21  2217   POC GLUCOSE mg/dl 86 100 99 135 96 110 89 89 101 89     Results from last 7 days   Lab Units 04/10/21  0725   HEMOGLOBIN A1C % 5 6     Results from last 7 days   Lab Units 04/12/21  0439 04/11/21  0628 04/10/21  0728 04/09/21  1821   LACTIC ACID mmol/L  --   --   --  1 6   PROCALCITONIN ng/ml 1 23* 2 20* 3 72* 4 73*           * I Have Reviewed All Lab Data Listed Above  * Additional Pertinent Lab Tests Reviewed: All Labs Within Last 24 Hours Reviewed    Imaging:    Imaging Reports Reviewed Today Include: none  Imaging Personally Reviewed by Myself Includes:  none    Recent Cultures (last 7 days):     Results from last 7 days   Lab Units 04/09/21  1821   BLOOD CULTURE  No Growth at 48 hrs  No Growth at 48 hrs         Last 24 Hours Medication List:   Current Facility-Administered Medications   Medication Dose Route Frequency Provider Last Rate    acetaminophen  650 mg Oral Q6H PRN Booker Galvez MD      aluminum-magnesium hydroxide-simethicone  30 mL Oral Q6H PRN Booker Galvez MD      aspirin  81 mg Oral Daily Booker Galvez MD      cefazolin  2,000 mg Intravenous Q8H Liv Thomas PA-C      clotrimazole-betamethasone   Topical BID Booker Galvez MD      docusate sodium  100 mg Oral BID PRN Booker Galvez MD      enoxaparin  40 mg Subcutaneous Q12H [address] Booker Galvez MD      gabapentin  100 mg Oral TID Booker Galvez MD      insulin lispro  1-5 Units Subcutaneous HS Booker Galvez MD      insulin lispro  4-20 Units Subcutaneous TID [facility] Booker Galvez MD      levothyroxine  325 mcg Oral Daily Booker Galvez MD      lisinopril  10 mg Oral Daily Booker Galvez MD      metoprolol tartrate  50 mg Oral BID Booker Galvez MD      multivitamin-minerals  1 tablet Oral Daily Booker Galvez MD      ondansetron  4 mg Intravenous Q6H PRN Booker Galvez MD      pantoprazole  40 mg Oral Early Morning Booker Galvez MD      pravastatin  20 mg Oral Daily With Ronn Mathew MD          Today, Patient Was Seen By: Chon Garcia PA-C    ** Please Note: Dictation voice to text software may have been used in the creation of this document   **

## 2021-04-12 NOTE — PLAN OF CARE
Problem: Potential for Falls  Goal: Patient will remain free of falls  Description: INTERVENTIONS:  - Assess patient frequently for physical needs  -  Identify cognitive and physical deficits and behaviors that affect risk of falls    -  [place] fall precautions as indicated by assessment   - Educate patient/family on patient safety including physical limitations  - Instruct patient to call for assistance with activity based on assessment  - Modify environment to reduce risk of injury  - Consider OT/PT consult to assist with strengthening/mobility  Outcome: Progressing     Problem: PAIN - ADULT  Goal: Verbalizes/displays adequate comfort level or baseline comfort level  Description: Interventions:  - Encourage patient to monitor pain and request assistance  - Assess pain using appropriate pain scale  - Administer analgesics based on type and severity of pain and evaluate response  - Implement non-pharmacological measures as appropriate and evaluate response  - Consider cultural and social influences on pain and pain management  - Notify physician/advanced practitioner if interventions unsuccessful or patient reports new pain  Outcome: Progressing     Problem: INFECTION - ADULT  Goal: Absence or prevention of progression during hospitalization  Description: INTERVENTIONS:  - Assess and monitor for signs and symptoms of infection  - Monitor lab/diagnostic results  - Monitor all insertion sites, i e  indwelling lines, tubes, and drains  - Monitor endotracheal if appropriate and nasal secretions for changes in amount and color  - [place] appropriate cooling/warming therapies per order  - Administer medications as ordered  - Instruct and encourage patient and family to use good hand hygiene technique  - Identify and instruct in appropriate isolation precautions for identified infection/condition  Outcome: Progressing  Goal: Absence of fever/infection during neutropenic period  Description: INTERVENTIONS:  - Monitor WBC    Outcome: Progressing     Problem: SAFETY ADULT  Goal: Patient will remain free of falls  Description: INTERVENTIONS:  - Assess patient frequently for physical needs  -  Identify cognitive and physical deficits and behaviors that affect risk of falls    -  [place] fall precautions as indicated by assessment   - Educate patient/family on patient safety including physical limitations  - Instruct patient to call for assistance with activity based on assessment  - Modify environment to reduce risk of injury  - Consider OT/PT consult to assist with strengthening/mobility  Outcome: Progressing  Goal: Maintain or return to baseline ADL function  Description: INTERVENTIONS:  -  Assess patient's ability to carry out ADLs; assess patient's baseline for ADL function and identify physical deficits which impact ability to perform ADLs (bathing, care of mouth/teeth, toileting, grooming, dressing, etc )  - Assess/evaluate cause of self-care deficits   - Assess range of motion  - Assess patient's mobility; develop plan if impaired  - Assess patient's need for assistive devices and provide as appropriate  - Encourage maximum independence but intervene and supervise when necessary  - Involve family in performance of ADLs  - Assess for home care needs following discharge   - Consider OT consult to assist with ADL evaluation and planning for discharge  - Provide patient education as appropriate  Outcome: Progressing  Goal: Maintain or return mobility status to optimal level  Description: INTERVENTIONS:  - Assess patient's baseline mobility status (ambulation, transfers, stairs, etc )    - Identify cognitive and physical deficits and behaviors that affect mobility  - Identify mobility aids required to assist with transfers and/or ambulation (gait belt, sit-to-stand, lift, walker, cane, etc )  - [place] fall precautions as indicated by assessment  - Record patient progress and toleration of activity level on Mobility SBAR; progress patient to next Phase/Stage  - Instruct patient to call for assistance with activity based on assessment  - Consider rehabilitation consult to assist with strengthening/weightbearing, etc   Outcome: Progressing     Problem: DISCHARGE PLANNING  Goal: Discharge to home or other facility with appropriate resources  Description: INTERVENTIONS:  - Identify barriers to discharge w/patient and caregiver  - Arrange for needed discharge resources and transportation as appropriate  - Identify discharge learning needs (meds, wound care, etc )  - Arrange for interpretive services to assist at discharge as needed  - Refer to Case Management Department for coordinating discharge planning if the patient needs post-hospital services based on physician/advanced practitioner order or complex needs related to functional status, cognitive ability, or social support system  Outcome: Progressing     Problem: Knowledge Deficit  Goal: Patient/family/caregiver demonstrates understanding of disease process, treatment plan, medications, and discharge instructions  Description: Complete learning assessment and assess knowledge base    Interventions:  - Provide teaching at level of understanding  - Provide teaching via preferred learning methods  Outcome: Progressing

## 2021-04-12 NOTE — DISCHARGE INSTR - OTHER ORDERS
1  Apply hydraguard to b/l heels, buttocks and sacrum BID and PRN for prevention and protection  2  Apply skin nourishing cream the entire skin daily for moisture  3  Turn and reposition patient every  2 hours   4  Elevate heels off of bed with pillows to offload pressure   5  Apply EHOB waffle cushion to chair when OOB, if able  6  Care to lower extremity as per podiatry   7   Follow-up with Dr Fly Solomon at the [facility] as an outpatient

## 2021-04-12 NOTE — PLAN OF CARE
Problem: Potential for Falls  Goal: Patient will remain free of falls  Description: INTERVENTIONS:  - Assess patient frequently for physical needs  -  Identify cognitive and physical deficits and behaviors that affect risk of falls    -  [place] fall precautions as indicated by assessment   - Educate patient/family on patient safety including physical limitations  - Instruct patient to call for assistance with activity based on assessment  - Modify environment to reduce risk of injury  - Consider OT/PT consult to assist with strengthening/mobility  4/12/2021 0749 by Dora Ganser, RN  Outcome: Progressing  4/12/2021 0748 by Dora Ganser, RN  Outcome: Progressing     Problem: PAIN - ADULT  Goal: Verbalizes/displays adequate comfort level or baseline comfort level  Description: Interventions:  - Encourage patient to monitor pain and request assistance  - Assess pain using appropriate pain scale  - Administer analgesics based on type and severity of pain and evaluate response  - Implement non-pharmacological measures as appropriate and evaluate response  - Consider cultural and social influences on pain and pain management  - Notify physician/advanced practitioner if interventions unsuccessful or patient reports new pain  4/12/2021 0749 by Dora Ganser, RN  Outcome: Progressing  4/12/2021 0748 by Dora Ganser, RN  Outcome: Progressing     Problem: INFECTION - ADULT  Goal: Absence or prevention of progression during hospitalization  Description: INTERVENTIONS:  - Assess and monitor for signs and symptoms of infection  - Monitor lab/diagnostic results  - Monitor all insertion sites, i e  indwelling lines, tubes, and drains  - Monitor endotracheal if appropriate and nasal secretions for changes in amount and color  - [place] appropriate cooling/warming therapies per order  - Administer medications as ordered  - Instruct and encourage patient and family to use good hand hygiene technique  - Identify and instruct in appropriate isolation precautions for identified infection/condition  4/12/2021 0749 by Fransico Goldberg, RN  Outcome: Progressing  4/12/2021 0748 by Fransico Goldberg, RN  Outcome: Progressing  Goal: Absence of fever/infection during neutropenic period  Description: INTERVENTIONS:  - Monitor WBC    4/12/2021 0749 by Fransico Goldberg, RN  Outcome: Progressing  4/12/2021 0748 by Fransico Goldberg, RN  Outcome: Progressing     Problem: SAFETY ADULT  Goal: Patient will remain free of falls  Description: INTERVENTIONS:  - Assess patient frequently for physical needs  -  Identify cognitive and physical deficits and behaviors that affect risk of falls    -  [place] fall precautions as indicated by assessment   - Educate patient/family on patient safety including physical limitations  - Instruct patient to call for assistance with activity based on assessment  - Modify environment to reduce risk of injury  - Consider OT/PT consult to assist with strengthening/mobility  4/12/2021 0749 by Fransico Goldberg, RN  Outcome: Progressing  4/12/2021 0748 by Fransico Goldberg, RN  Outcome: Progressing  Goal: Maintain or return to baseline ADL function  Description: INTERVENTIONS:  -  Assess patient's ability to carry out ADLs; assess patient's baseline for ADL function and identify physical deficits which impact ability to perform ADLs (bathing, care of mouth/teeth, toileting, grooming, dressing, etc )  - Assess/evaluate cause of self-care deficits   - Assess range of motion  - Assess patient's mobility; develop plan if impaired  - Assess patient's need for assistive devices and provide as appropriate  - Encourage maximum independence but intervene and supervise when necessary  - Involve family in performance of ADLs  - Assess for home care needs following discharge   - Consider OT consult to assist with ADL evaluation and planning for discharge  - Provide patient education as appropriate  4/12/2021 0749 by Fransico Goldberg, RN  Outcome: Progressing  4/12/2021 0748 by Av Jones RN  Outcome: Progressing  Goal: Maintain or return mobility status to optimal level  Description: INTERVENTIONS:  - Assess patient's baseline mobility status (ambulation, transfers, stairs, etc )    - Identify cognitive and physical deficits and behaviors that affect mobility  - Identify mobility aids required to assist with transfers and/or ambulation (gait belt, sit-to-stand, lift, walker, cane, etc )  - [place] fall precautions as indicated by assessment  - Record patient progress and toleration of activity level on Mobility SBAR; progress patient to next Phase/Stage  - Instruct patient to call for assistance with activity based on assessment  - Consider rehabilitation consult to assist with strengthening/weightbearing, etc   4/12/2021 0749 by Av Jones RN  Outcome: Progressing  4/12/2021 0748 by Av Jones RN  Outcome: Progressing     Problem: DISCHARGE PLANNING  Goal: Discharge to home or other facility with appropriate resources  Description: INTERVENTIONS:  - Identify barriers to discharge w/patient and caregiver  - Arrange for needed discharge resources and transportation as appropriate  - Identify discharge learning needs (meds, wound care, etc )  - Arrange for interpretive services to assist at discharge as needed  - Refer to Case Management Department for coordinating discharge planning if the patient needs post-hospital services based on physician/advanced practitioner order or complex needs related to functional status, cognitive ability, or social support system  4/12/2021 0749 by Av Jones RN  Outcome: Progressing  4/12/2021 0748 by Av Jones RN  Outcome: Progressing     Problem: Knowledge Deficit  Goal: Patient/family/caregiver demonstrates understanding of disease process, treatment plan, medications, and discharge instructions  Description: Complete learning assessment and assess knowledge base   Interventions:  - Provide teaching at level of understanding  - Provide teaching via preferred learning methods  4/12/2021 0749 by Tyler Nicholas RN  Outcome: Progressing  4/12/2021 0748 by Tyler Nicholas RN  Outcome: Progressing

## 2021-04-12 NOTE — CONSULTS
[address] [facility]iatry - Consultation    Patient Information:   Erin Fisher 74 y o  male MRN: [id number]  Unit/Bed[facility] 324-01 Encounter: [id number]  PCP: Ani Tavera MD  Date of Admission:  4/9/2021  Date of Consultation: 04/12/21  Requesting Physician: Cornelio Sloan MD      ASSESSMENT:    Erin Fisher is a 74 y o  male with:    1  Left lower extremity cellulitis  2  Venous stasis ulceration, left lower extremity  3  Left lower extremity edema  4  Type 2 diabetes mellitus  5  Lymphedema    PLAN:    · Podiatry will gladly follow along provide local wound care on the floors  · Nursing instructions in place for changes daily while drainage is heavy  Apply Maxorb, DSD, ABD pad, Kerlix, Ace to left lower extremity  · Continue antibiotics per primary team   Patient currently on Ancef 2 g q 8h   · Elevate extremity while nonambulatory for edema control  · Rest of care per primary team   · Will discuss this plan with my attending and update as needed  SUBJECTIVE    History of Present Illness:    Erin Fisher is a 74 y o  male with past medical history of HLD, AFib, chronic venous insufficiency with ulcerations, right BKA, hypothyroidism, HTN  who presents with left lower extremity erythema with venous ulceration  Patient follows with Dr Abelardo Ludwig for wound care in the outpatient setting  Patient states that left lower extremity recently became erythematous and edematous  Patient was being evaluated by a home nurse approximately 3 days ago who noticed increasing erythema and edema  Patient was advised to present to the emergency department for further evaluation due to concern for infection  Review of Systems:    Constitutional: Negative  HENT: Negative  Eyes: Negative  Respiratory: Negative  Cardiovascular: Negative  Gastrointestinal: Negative  Musculoskeletal:  Right BKA   Skin:  Left leg erythema, edema, wounds   Neurological:  Negative   Psych: Negative       Past Medical and Surgical History:     Past Medical History:   Diagnosis Date    Atrial fibrillation ([facility] )     Cellulitis     Diabetes mellitus ([facility] )     Disease of thyroid gland     Duodenal ulcer     perforated- ICU stay    High blood pressure     High cholesterol     Hyperlipidemia     Hypertension     Hypothyroidism     Lymphedema      b/l LE- was followed at wound center    Morbid obesity with BMI of 40 0-44 9, adult ([facility] )     Peritonitis ([facility] )        Past Surgical History:   Procedure Laterality Date    BELOW KNEE LEG AMPUTATION Right     DIAGNOSTIC LAPAROSCOPY      KNEE ARTHROSCOPY Bilateral     OTHER SURGICAL HISTORY  2014     lap repair    OTHER SURGICAL HISTORY      Laparoscopic repair of a perforated duodenal ulcer with [place] omental    OTHER SURGICAL HISTORY      Placement of drains       Meds/Allergies:    Medications Prior to Admission   Medication    aspirin (ASPIRIN 81) 81 mg EC tablet    clotrimazole-betamethasone (LOTRISONE) 1-0 05 % cream    gabapentin (NEURONTIN) 100 mg capsule    levothyroxine 25 mcg tablet    levothyroxine 300 MCG tablet    lisinopril (ZESTRIL) 10 mg tablet    metFORMIN (GLUCOPHAGE-XR) 750 mg 24 hr tablet    metoprolol tartrate (LOPRESSOR) 50 mg tablet    Multiple Vitamins-Minerals (MULTIVITAMIN MEN 50+ PO)    pantoprazole (PROTONIX) 40 mg tablet    simvastatin (ZOCOR) 10 mg tablet    Zoster Vac Recomb Adjuvanted (Shingrix) 50 MCG/0 5ML SUSR       No Known Allergies    Social History:     Marital Status: Single    Substance Use History:   Social History     Substance and Sexual Activity   Alcohol Use Not Currently     Social History     Tobacco Use   Smoking Status Former Smoker    Packs/day: 1 00    Years: 30 00    Pack years: 30 00    Quit date: 2004    Years since quittin 4   Smokeless Tobacco Never Used     Social History     Substance and Sexual Activity   Drug Use Not Currently       Family History:    Family History   Problem Relation Age of Onset    Heart disease Family     Alcohol abuse Family     Heart disease Mother     Hypertension Mother     Migraines Daughter     Leukemia Brother     Migraines Daughter     Substance Abuse Neg Hx     Mental illness Neg Hx     Depression Neg Hx          OBJECTIVE:    Vitals:   Blood Pressure: 133/81 (04/12/21 0707)  Pulse: 77 (04/12/21 0707)  Temperature: (!) 97 4 °F (36 3 °C) (04/12/21 0707)  Temp Source: Oral (04/12/21 0707)  Respirations: 18 (04/12/21 0707)  Height: 6' 7" (200 7 cm) (04/09/21 2152)  Weight - Scale: (!) 191 kg (422 lb 2 9 oz) (04/12/21 0442)  SpO2: 96 % (04/12/21 0707)    Physical Exam:     General Appearance: Alert, cooperative, no distress  HEENT: Head normocephalic, atraumatic, without obvious abnormality  Heart: Normal rate and rhythm  Lungs: Non-labored breathing  No respiratory distress  Abdomen: Without distension  Psychiatric: AAOx3  Lower Extremity:  Vascular:   Pedal pulse of the left lower extremity are present  Capillary fill time of left lower extremity WNL  Skin temp of the left lower extremity warmth  Pedal hair is absent  Musculoskeletal:  Right BKA noted  MMT of the left lower extremity 5/5 with decreased ROM of the ankle and 1st MPJ  Dermatological:  Left lower extremity is edematous and erythematous  Temperature is warm to touch  Weeping of the left lower extremity at the dorsum of the foot and lateral left leg    LE Wound Exam:   Wound (1) located lateral left leg, measures approximately 0 5 cm x 1 cm x 0 2 cm  without sinus tracking or undermining  Wound bed appears fibrotic with serosanguineous drainage  The wound base is 20% red/granular, 80% yellow/fibrous, 0% black/necrotic  Deepest tissue noted in base is subq  [place] is not noticed  Wound edge appears Attached  [place]-wound skin appears intact  Probe to bone is negative   Signs of infection are present at this time  Neurological:  Gross sensation is intact   Protective sensation is diminished  Clinical Images 04/12/21:      Lateral left leg    Additional Data:     Lab Results: I have personally reviewed pertinent labs including:    Results from last 7 days   Lab Units 04/12/21  0439 04/11/21  0628   WBC Thousand/uL 5 20 5 10   HEMOGLOBIN g/dL 11 5* 11 5*   HEMATOCRIT % 38 2 39 2   PLATELETS Thousands/uL 167 156   NEUTROS PCT %  --  75   LYMPHS PCT %  --  12*   MONOS PCT %  --  10   EOS PCT %  --  3     Results from last 7 days   Lab Units 04/12/21  0439  04/10/21  0722   POTASSIUM mmol/L 3 8   < > 3 7   CHLORIDE mmol/L 104   < > 105   CO2 mmol/L 29   < > 28   BUN mg/dL 12   < > 14   CREATININE mg/dL 0 86   < > 1 02   CALCIUM mg/dL 8 7   < > 8 8   ALK PHOS U/L  --   --  75   ALT U/L  --   --  16   AST U/L  --   --  13    < > = values in this interval not displayed  Cultures: I have personally reviewed pertinent cultures including:    Results from last 7 days   Lab Units 04/09/21  1821   BLOOD CULTURE  No Growth at 48 hrs  No Growth at 48 hrs  Imaging: I have personally reviewed pertinent films in PACS  EKG, Pathology, and Other Studies: I have personally reviewed pertinent reports  ** Please Note: Portions of the record may have been created with voice recognition software  Occasional wrong word or "sound a like" substitutions may have occurred due to the inherent limitations of voice recognition software  Read the chart carefully and recognize, using context, where substitutions have occurred   **

## 2021-04-12 NOTE — WOUND OSTOMY CARE
Progress Note - Wound   Chelo Steinberg 74 y o  male MRN: [id number]  Unit/Bed#: [facility] 324-01 Encounter: [id number]      Assessment:  Wound care consulted for assessment of lower extremity wound  Patient admitted with left leg cellulitis  Patient with history of a-fib, hypothyrodism, HLD, HTN, and DM  Patient is alert and oriented, x 4 and self report he is continent  Ambulatory with rolling walker  Will recommend a general preventative skin care plan  Patient reports he follows with Dr Sheela Stark at the [address] [place] [address] care [place] for his L foot wounds- dressing is clean dry and intact  LLE with redness, that appears to be fading from the skin marker outline  Patient denies having any other wounds or skin issues  No other issues documented or reported by the primary RN  SLIM AP at the bedside, made aware of patients request to see Dr Sheela Stark while in the hospital      Plan:   1  Apply hydraguard to b/l heels, buttocks and sacrum BID and PRN for prevention and protection  2  Apply skin nourishing cream the entire skin daily for moisture  3  Turn and reposition patient every  2 hours   4  Elevate heels off of bed with pillows to offload pressure   5  Apply EHOB waffle cushion to chair when OOB, if able  6  Care to lower extremity as per podiatry   7  Follow-up with Dr Sheela Stark at the [facility] as an outpatient     Objective:    Vitals: Blood pressure 133/81, pulse 77, temperature (!) 97 4 °F (36 3 °C), temperature source Oral, resp  rate 18, height 6' 7" (2 007 m), weight (!) 191 kg (422 lb 2 9 oz), SpO2 96 %  ,Body mass index is 47 56 kg/m²  Wound care will sign off, please re-consult if needed    Enrike Baxter RN BSN [facility]

## 2021-04-12 NOTE — ASSESSMENT & PLAN NOTE
· BP acceptable, monitor routinely   · Continue home medical management: Zestril 10 mg daily, metoprolol 50 mg twice daily

## 2021-04-12 NOTE — ASSESSMENT & PLAN NOTE
· Continue metoprolol; rate controlled  · Not on [facility] as outpatient

## 2021-04-13 LAB
GLUCOSE SERPL-MCNC: 113 MG/DL (ref 65–140)
GLUCOSE SERPL-MCNC: 80 MG/DL (ref 65–140)
GLUCOSE SERPL-MCNC: 93 MG/DL (ref 65–140)
GLUCOSE SERPL-MCNC: 94 MG/DL (ref 65–140)

## 2021-04-13 PROCEDURE — 99232 SBSQ HOSP IP/OBS MODERATE 35: CPT | Performed by: PHYSICIAN ASSISTANT

## 2021-04-13 PROCEDURE — 82948 REAGENT STRIP/BLOOD GLUCOSE: CPT

## 2021-04-13 RX ORDER — HYDROCHLOROTHIAZIDE 25 MG/1
25 TABLET ORAL DAILY
Status: CANCELLED | OUTPATIENT
Start: 2021-04-13

## 2021-04-13 RX ADMIN — CEFAZOLIN SODIUM 2000 MG: 2 SOLUTION INTRAVENOUS at 09:05

## 2021-04-13 RX ADMIN — LISINOPRIL 10 MG: 10 TABLET ORAL at 09:04

## 2021-04-13 RX ADMIN — ASPIRIN 81 MG: 81 TABLET, COATED ORAL at 09:04

## 2021-04-13 RX ADMIN — CLOTRIMAZOLE AND BETAMETHASONE DIPROPIONATE: 10; .64 CREAM TOPICAL at 09:13

## 2021-04-13 RX ADMIN — CLOTRIMAZOLE AND BETAMETHASONE DIPROPIONATE: 10; .64 CREAM TOPICAL at 17:48

## 2021-04-13 RX ADMIN — PRAVASTATIN SODIUM 20 MG: 20 TABLET ORAL at 17:47

## 2021-04-13 RX ADMIN — GABAPENTIN 100 MG: 100 CAPSULE ORAL at 21:01

## 2021-04-13 RX ADMIN — LEVOTHYROXINE SODIUM 325 MCG: 125 TABLET ORAL at 09:04

## 2021-04-13 RX ADMIN — GABAPENTIN 100 MG: 100 CAPSULE ORAL at 09:04

## 2021-04-13 RX ADMIN — Medication 1 TABLET: at 09:05

## 2021-04-13 RX ADMIN — METOPROLOL TARTRATE 50 MG: 50 TABLET, FILM COATED ORAL at 09:05

## 2021-04-13 RX ADMIN — CEFAZOLIN SODIUM 2000 MG: 2 SOLUTION INTRAVENOUS at 02:05

## 2021-04-13 RX ADMIN — CEFAZOLIN SODIUM 2000 MG: 2 SOLUTION INTRAVENOUS at 17:44

## 2021-04-13 RX ADMIN — METOPROLOL TARTRATE 50 MG: 50 TABLET, FILM COATED ORAL at 17:47

## 2021-04-13 RX ADMIN — PANTOPRAZOLE SODIUM 40 MG: 40 TABLET, DELAYED RELEASE ORAL at 05:08

## 2021-04-13 RX ADMIN — GABAPENTIN 100 MG: 100 CAPSULE ORAL at 17:47

## 2021-04-13 RX ADMIN — ENOXAPARIN SODIUM 40 MG: 40 INJECTION SUBCUTANEOUS at 21:01

## 2021-04-13 RX ADMIN — ENOXAPARIN SODIUM 40 MG: 40 INJECTION SUBCUTANEOUS at 09:05

## 2021-04-13 NOTE — PLAN OF CARE
Problem: Potential for Falls  Goal: Patient will remain free of falls  Description: INTERVENTIONS:  - Assess patient frequently for physical needs  -  Identify cognitive and physical deficits and behaviors that affect risk of falls    -  [place] fall precautions as indicated by assessment   - Educate patient/family on patient safety including physical limitations  - Instruct patient to call for assistance with activity based on assessment  - Modify environment to reduce risk of injury  - Consider OT/PT consult to assist with strengthening/mobility  Outcome: Progressing     Problem: PAIN - ADULT  Goal: Verbalizes/displays adequate comfort level or baseline comfort level  Description: Interventions:  - Encourage patient to monitor pain and request assistance  - Assess pain using appropriate pain scale  - Administer analgesics based on type and severity of pain and evaluate response  - Implement non-pharmacological measures as appropriate and evaluate response  - Consider cultural and social influences on pain and pain management  - Notify physician/advanced practitioner if interventions unsuccessful or patient reports new pain  Outcome: Progressing     Problem: INFECTION - ADULT  Goal: Absence or prevention of progression during hospitalization  Description: INTERVENTIONS:  - Assess and monitor for signs and symptoms of infection  - Monitor lab/diagnostic results  - Monitor all insertion sites, i e  indwelling lines, tubes, and drains  - Monitor endotracheal if appropriate and nasal secretions for changes in amount and color  - [place] appropriate cooling/warming therapies per order  - Administer medications as ordered  - Instruct and encourage patient and family to use good hand hygiene technique  - Identify and instruct in appropriate isolation precautions for identified infection/condition  Outcome: Progressing  Goal: Absence of fever/infection during neutropenic period  Description: INTERVENTIONS:  - Monitor WBC    Outcome: Progressing     Problem: SAFETY ADULT  Goal: Patient will remain free of falls  Description: INTERVENTIONS:  - Assess patient frequently for physical needs  -  Identify cognitive and physical deficits and behaviors that affect risk of falls    -  [place] fall precautions as indicated by assessment   - Educate patient/family on patient safety including physical limitations  - Instruct patient to call for assistance with activity based on assessment  - Modify environment to reduce risk of injury  - Consider OT/PT consult to assist with strengthening/mobility  Outcome: Progressing  Goal: Maintain or return to baseline ADL function  Description: INTERVENTIONS:  -  Assess patient's ability to carry out ADLs; assess patient's baseline for ADL function and identify physical deficits which impact ability to perform ADLs (bathing, care of mouth/teeth, toileting, grooming, dressing, etc )  - Assess/evaluate cause of self-care deficits   - Assess range of motion  - Assess patient's mobility; develop plan if impaired  - Assess patient's need for assistive devices and provide as appropriate  - Encourage maximum independence but intervene and supervise when necessary  - Involve family in performance of ADLs  - Assess for home care needs following discharge   - Consider OT consult to assist with ADL evaluation and planning for discharge  - Provide patient education as appropriate  Outcome: Progressing  Goal: Maintain or return mobility status to optimal level  Description: INTERVENTIONS:  - Assess patient's baseline mobility status (ambulation, transfers, stairs, etc )    - Identify cognitive and physical deficits and behaviors that affect mobility  - Identify mobility aids required to assist with transfers and/or ambulation (gait belt, sit-to-stand, lift, walker, cane, etc )  - [place] fall precautions as indicated by assessment  - Record patient progress and toleration of activity level on Mobility SBAR; progress patient to next Phase/Stage  - Instruct patient to call for assistance with activity based on assessment  - Consider rehabilitation consult to assist with strengthening/weightbearing, etc   Outcome: Progressing     Problem: DISCHARGE PLANNING  Goal: Discharge to home or other facility with appropriate resources  Description: INTERVENTIONS:  - Identify barriers to discharge w/patient and caregiver  - Arrange for needed discharge resources and transportation as appropriate  - Identify discharge learning needs (meds, wound care, etc )  - Arrange for interpretive services to assist at discharge as needed  - Refer to Case Management Department for coordinating discharge planning if the patient needs post-hospital services based on physician/advanced practitioner order or complex needs related to functional status, cognitive ability, or social support system  Outcome: Progressing     Problem: Knowledge Deficit  Goal: Patient/family/caregiver demonstrates understanding of disease process, treatment plan, medications, and discharge instructions  Description: Complete learning assessment and assess knowledge base    Interventions:  - Provide teaching at level of understanding  - Provide teaching via preferred learning methods  Outcome: Progressing

## 2021-04-13 NOTE — PROGRESS NOTES
[facility]  Progress Note - Meagan Mckinney 1955, 74 y o  male MRN: [id number]  Unit/Bed#: [facility] 920-01 Encounter: [id number]  Primary Care Provider: Jillian Larsen MD   Date and time admitted to hospital: 4/9/2021  5:12 PM    * Left leg cellulitis  Assessment & Plan  · POA, cellulitis superimposed on chronic venous stasis    · Ceftriaxone and vancomycin started on admission  Vanco subsequently discontinued with clinical improvement   · Initially on IV ceftriaxone and subsequently de-escelated to IV Ancef (day 5 Rx), anticipate transition to PO antibiotics in 24-48 hours   · Trend procalcitonin: 4 73 -> 3 72 -> 2 20 -> 1 23 today  · MRSA nasal screen negative  · Blood cultures without growth x 72 hours  · Podiatry consulted for wound care; patient follows with Dr Reji Askew   · Podiatry to re-evaluate wound tomorrow 4/14     Diabetic ulcer of left foot associated with type 2 diabetes mellitus, limited to breakdown of skin ([address] )  Assessment & Plan  · Appreciate Podiatry consult and recommendations     Chronic venous stasis dermatitis of left lower extremity  Assessment & Plan  · Follows with Dr Reji Askew for wound care as outpatient     Paroxysmal atrial fibrillation ([address] )  Assessment & Plan  · Continue metoprolol; rate controlled  · Not on [facility] as outpatient     HTN (hypertension)  Assessment & Plan  · BP acceptable, monitor routinely   · Continue home medical management: lisinopril 10 mg daily, metoprolol 50 mg twice daily    Controlled type 2 diabetes mellitus with complication, without long-term current use of insulin ([facility])  Assessment & Plan    Lab Results   Component Value Date    HGBA1C 5 6 04/10/2021     · Hold metformin  · Hemoglobin A1c 5 6  · Accu-Cheks prior to meals and bedtime  · Insulin sliding scale per protocol  (Algorithm 5 per weight )    Hypothyroidism  Assessment & Plan  · Continue levothyroxine 325 mcg daily   (patient not on 75 in addition to 300   But is 22 in addition to 300 )  · TSH: 0 829    S/P BKA (below knee amputation) unilateral, right (HCC)  Assessment & Plan  · Noted     HLD (hyperlipidemia)  Assessment & Plan  · On simvastatin 10 mg/Pravachol 20 mg daily  VTE Pharmacologic Prophylaxis:   Pharmacologic: Enoxaparin (Lovenox)  Mechanical VTE Prophylaxis in Place: No    Patient Centered Rounds: I have performed bedside rounds with nursing staff today  Discussions with Specialists or Other Care Team Provider: primary RN    Education and Discussions with Family / Patient: patient, declined call to family and states he is keeping his daughters updated     Time Spent for Care: 15 minutes  More than 50% of total time spent on counseling and coordination of care as described above  Current Length of Stay: 4 day(s)    Current Patient Status: Inpatient   Certification Statement: The patient will continue to require additional inpatient hospital stay due to LLE cellulitis and wound care     Discharge Plan: anticipate discharge home tomorrow if cleared by Podiatry     Code Status: Level 1 - Full Code    Subjective:   Patient offers no complaints  Denies pain  Objective:     Vitals:   Temp (24hrs), Av 9 °F (36 6 °C), Min:97 8 °F (36 6 °C), Max:98 1 °F (36 7 °C)    Temp:  [97 8 °F (36 6 °C)-98 1 °F (36 7 °C)] 98 1 °F (36 7 °C)  HR:  [63-78] 66  Resp:  [14-18] 14  BP: (139-171)/() 158/97  SpO2:  [94 %-99 %] 95 %  Body mass index is 43 39 kg/m²  Input and Output Summary (last 24 hours): Intake/Output Summary (Last 24 hours) at 2021 1418  Last data filed at 2021 1052  Gross per 24 hour   Intake 180 ml   Output 2500 ml   Net -2320 ml       Physical Exam:     Physical Exam  Vitals signs and nursing note reviewed  Exam conducted with a chaperone present  Constitutional:       General: He is not in acute distress  Appearance: He is morbidly obese  Cardiovascular:      Rate and Rhythm: Normal rate and regular rhythm        Pulses: Normal pulses  Heart sounds: No murmur  Pulmonary:      Effort: Pulmonary effort is normal  No respiratory distress  Breath sounds: No wheezing or rales  Abdominal:      General: Abdomen is flat  There is no distension  Palpations: Abdomen is soft  Tenderness: There is no abdominal tenderness  Musculoskeletal:         General: Deformity (s/p R BKA) present  Comments: LLE wrapped in knee-high ACE bandage   Skin:     General: Skin is warm and dry  Coloration: Skin is not pale  Findings: No erythema  Neurological:      Mental Status: He is alert and oriented to person, place, and time  Mental status is at baseline  Additional Data:     Labs:    Results from last 7 days   Lab Units 04/12/21  0439 04/11/21  0628   WBC Thousand/uL 5 20 5 10   HEMOGLOBIN g/dL 11 5* 11 5*   HEMATOCRIT % 38 2 39 2   PLATELETS Thousands/uL 167 156   NEUTROS PCT %  --  75   LYMPHS PCT %  --  12*   MONOS PCT %  --  10   EOS PCT %  --  3     Results from last 7 days   Lab Units 04/12/21  0439  04/10/21  0722   SODIUM mmol/L 137   < > 136   POTASSIUM mmol/L 3 8   < > 3 7   CHLORIDE mmol/L 104   < > 105   CO2 mmol/L 29   < > 28   BUN mg/dL 12   < > 14   CREATININE mg/dL 0 86   < > 1 02   ANION GAP mmol/L 4   < > 3*   CALCIUM mg/dL 8 7   < > 8 8   ALBUMIN g/dL  --   --  2 9*   TOTAL BILIRUBIN mg/dL  --   --  0 61   ALK PHOS U/L  --   --  75   ALT U/L  --   --  16   AST U/L  --   --  13   GLUCOSE RANDOM mg/dL 96   < > 109    < > = values in this interval not displayed           Results from last 7 days   Lab Units 04/13/21  1128 04/13/21  0754 04/12/21  2046 04/12/21  1631 04/12/21  1059 04/12/21  0627 04/11/21  2059 04/11/21  1626 04/11/21  1101 04/11/21  0703 04/10/21  2114 04/10/21  1626   POC GLUCOSE mg/dl 93 94 93 96 85 86 100 99 135 96 110 89     Results from last 7 days   Lab Units 04/10/21  0725   HEMOGLOBIN A1C % 5 6     Results from last 7 days   Lab Units 04/12/21  0439 04/11/21  5812 04/10/21  0728 04/09/21  1821   LACTIC ACID mmol/L  --   --   --  1 6   PROCALCITONIN ng/ml 1 23* 2 20* 3 72* 4 73*           * I Have Reviewed All Lab Data Listed Above  * Additional Pertinent Lab Tests Reviewed: All Labs Within Last 24 Hours Reviewed    Imaging:    Imaging Reports Reviewed Today Include: none  Imaging Personally Reviewed by Myself Includes:  none    Recent Cultures (last 7 days):     Results from last 7 days   Lab Units 04/09/21  1821   BLOOD CULTURE  No Growth at 72 hrs  No Growth at 72 hrs  Last 24 Hours Medication List:   Current Facility-Administered Medications   Medication Dose Route Frequency Provider Last Rate    acetaminophen  650 mg Oral Q6H PRN Genie Montes MD      aluminum-magnesium hydroxide-simethicone  30 mL Oral Q6H PRN Genie Montes MD      aspirin  81 mg Oral Daily Genie Montes MD      cefazolin  2,000 mg Intravenous Q8H Liv Thomas PA-C 2,000 mg (04/13/21 0905)    clotrimazole-betamethasone   Topical BID Genie Montes MD      docusate sodium  100 mg Oral BID PRN Genie Montes MD      enoxaparin  40 mg Subcutaneous Q12H [facility] Genie Montes MD      gabapentin  100 mg Oral TID Genie Montes MD      insulin lispro  1-5 Units Subcutaneous HS Genie Montes MD      insulin lispro  4-20 Units Subcutaneous TID [facility] Genie Montes MD      levothyroxine  325 mcg Oral Daily Genie Montes MD      lisinopril  10 mg Oral Daily Genie Montes MD      metoprolol tartrate  50 mg Oral BID Genie Montes MD      multivitamin-minerals  1 tablet Oral Daily Genie Montes MD      ondansetron  4 mg Intravenous Q6H PRN Genie Montes MD      pantoprazole  40 mg Oral Early Morning Genie Montes MD      pravastatin  20 mg Oral Daily With Venu Pike MD          Today, Patient Was Seen By: Paul Coyne PA-C    ** Please Note: Dictation voice to text software may have been used in the creation of this document   **

## 2021-04-13 NOTE — ASSESSMENT & PLAN NOTE
· POA, cellulitis superimposed on chronic venous stasis    · Ceftriaxone and vancomycin started on admission   Vanco subsequently discontinued with clinical improvement   · Initially on IV ceftriaxone and subsequently de-escelated to IV Ancef (day 5 Rx), anticipate transition to PO antibiotics in 24-48 hours   · Trend procalcitonin: 4 73 -> 3 72 -> 2 20 -> 1 23 today  · MRSA nasal screen negative  · Blood cultures without growth x 72 hours  · Podiatry consulted for wound care; patient follows with Dr April Sharpe   · Podiatry to re-evaluate wound tomorrow 4/14

## 2021-04-13 NOTE — ASSESSMENT & PLAN NOTE
· BP acceptable, monitor routinely   · Continue home medical management: lisinopril 10 mg daily, metoprolol 50 mg twice daily

## 2021-04-13 NOTE — CASE MANAGEMENT
CM met with pt to discuss dcp  Pt requesting referral to [facility] for resumption of care  A post acute care recommendation was made by your care team for [facility]  Discussed Freedom of Choice with patient  Choice is to return/continue services  Referral made via [facility]

## 2021-04-14 VITALS
HEIGHT: 78 IN | WEIGHT: 315 LBS | TEMPERATURE: 97.7 F | HEART RATE: 58 BPM | BODY MASS INDEX: 36.45 KG/M2 | SYSTOLIC BLOOD PRESSURE: 146 MMHG | DIASTOLIC BLOOD PRESSURE: 74 MMHG | OXYGEN SATURATION: 98 % | RESPIRATION RATE: 18 BRPM

## 2021-04-14 LAB
BACTERIA BLD CULT: NORMAL
BACTERIA BLD CULT: NORMAL
GLUCOSE SERPL-MCNC: 84 MG/DL (ref 65–140)
GLUCOSE SERPL-MCNC: 96 MG/DL (ref 65–140)

## 2021-04-14 PROCEDURE — 82948 REAGENT STRIP/BLOOD GLUCOSE: CPT

## 2021-04-14 PROCEDURE — 99239 HOSP IP/OBS DSCHRG MGMT >30: CPT | Performed by: INTERNAL MEDICINE

## 2021-04-14 RX ORDER — CEPHALEXIN 500 MG/1
500 CAPSULE ORAL EVERY 6 HOURS SCHEDULED
Qty: 14 CAPSULE | Refills: 0 | Status: SHIPPED | OUTPATIENT
Start: 2021-04-14 | End: 2021-04-18

## 2021-04-14 RX ADMIN — PANTOPRAZOLE SODIUM 40 MG: 40 TABLET, DELAYED RELEASE ORAL at 05:41

## 2021-04-14 RX ADMIN — ENOXAPARIN SODIUM 40 MG: 40 INJECTION SUBCUTANEOUS at 08:48

## 2021-04-14 RX ADMIN — ASPIRIN 81 MG: 81 TABLET, COATED ORAL at 08:48

## 2021-04-14 RX ADMIN — Medication 1 TABLET: at 08:48

## 2021-04-14 RX ADMIN — LISINOPRIL 10 MG: 10 TABLET ORAL at 08:48

## 2021-04-14 RX ADMIN — LEVOTHYROXINE SODIUM 325 MCG: 125 TABLET ORAL at 08:48

## 2021-04-14 RX ADMIN — CEFAZOLIN SODIUM 2000 MG: 2 SOLUTION INTRAVENOUS at 08:49

## 2021-04-14 RX ADMIN — CEFAZOLIN SODIUM 2000 MG: 2 SOLUTION INTRAVENOUS at 01:37

## 2021-04-14 RX ADMIN — DOCUSATE SODIUM 100 MG: 100 CAPSULE, LIQUID FILLED ORAL at 08:52

## 2021-04-14 RX ADMIN — METOPROLOL TARTRATE 50 MG: 50 TABLET, FILM COATED ORAL at 08:48

## 2021-04-14 RX ADMIN — CLOTRIMAZOLE AND BETAMETHASONE DIPROPIONATE: 10; .64 CREAM TOPICAL at 08:48

## 2021-04-14 RX ADMIN — GABAPENTIN 100 MG: 100 CAPSULE ORAL at 08:48

## 2021-04-14 NOTE — DISCHARGE SUMMARY
Discharge Summary - [address] [facility]  Patient: Alessandro Prince 74 y o  male   MRN: [id number]  PCP: Rj Black MD  [facility] Encounter: [id number]            Discharging Physician / Practitioner: Marjorie Souza MD  PCP: Rj Black MD  Admission Date:   Admission Orders (From admission, onward)     Ordered        04/09/21 2045  Inpatient Admission  Once                   Discharge Date: 04/14/21      Reason for Admission:  Left leg erythema      Discharge Diagnoses:     Principal Problem:    Left leg cellulitis    Sepsis on admission    Active Problems:    Diabetes mellitus type 2 with neuropathy    Essential hypertension    Hyperlipidemia    Hypothyroidism    History of right BKA    Paroxysmal atrial fibrillation    Peptic ulcer disease    Chronic venous stasis dermatitis of left lower extremity    Morbid obesity    Resolved Problems: Thrombocytopenia      Consultations During Hospital Stay:  [facility] Course:     Alessandro Prince is a 74 y o  male patient who originally presented to the hospital on 4/9/2021 due to complaints of left leg redness/swelling and mild low-grade fevers at home  He was initiated on broad-spectrum antibiotics in the ED for a suspected left leg cellulitis  He also met sepsis criteria on admission with leukocytosis and tachycardia both of which resolved  Blood cultures from 4/9 remained negative at time of discharge  His procalcitonin progressively trended down words  His antibiotics were tailored to IV Ancef which will be further de-escalated to oral Keflex to complete course at home  His home medications for chronic issues including diabetes mellitus with neuropathy, hypertension, hypothyroidism, hyperlipidemia, peptic ulcer disease, and paroxysmal atrial fibrillation, will be continued  He is morbidly obese with a BMI 44 01 for which lifestyle/diet modifications have been counseled on    He will follow-up in the outpatient setting with podiatry who did evaluate him while hospitalized  He will also follow up with his PCP as instructed  The patient understands and agrees to plan  Condition at Discharge: fair       Discharge Day Visit / Exam:     Vitals: Blood Pressure: 146/74 (04/14/21 0744)  Pulse: 58 (04/14/21 0744)  Temperature: 97 7 °F (36 5 °C) (04/14/21 0744)  Temp Source: Oral (04/12/21 1500)  Respirations: 18 (04/14/21 0744)  Height: 6' 7" (200 7 cm) (04/09/21 2152)  Weight - Scale: (!) 177 kg (390 lb 10 5 oz) (04/14/21 0550)  SpO2: 98 % (04/14/21 0744)      Physical exam - I had a face-to-face encounter with the patient on day of discharge  Discussion with Patient and/or Family:  The patient has been advised to return to the ER immediately if any symptoms recur or worsen  Discharge instructions/Information to Patient and/or Family:   See after visit summary for information provided to patient and/or family  Provisions for Follow-Up Care:  See after visit summary for information related to follow-up care and any pertinent home health orders  Disposition:   Home with home health services      Discharge Medications:  See after visit summary for reconciled discharge medications provided to patient and/or family  Discharge Statement:  I spent 38 minutes discharging the patient  This time was spent on the day of discharge  I had direct contact with the patient on the day of discharge  Greater than 50% of the total time was spent examining patient, answering all patient questions, arranging and discussing plan of care with patient as well as directly providing post-discharge instructions  Additional time then spent on discharge activities  ** Please Note: This note is constructed using a voice recognition dictation system  An occasional wrong word/phrase or sound-a-like substitution may have been picked up by dictation device due to the inherent limitations of voice recognition software  Read the chart carefully and recognize, using reasonable context, where substitutions may have occurred  **

## 2021-04-14 NOTE — PLAN OF CARE
Problem: Potential for Falls  Goal: Patient will remain free of falls  Description: INTERVENTIONS:  - Assess patient frequently for physical needs  -  Identify cognitive and physical deficits and behaviors that affect risk of falls    -  [place] fall precautions as indicated by assessment   - Educate patient/family on patient safety including physical limitations  - Instruct patient to call for assistance with activity based on assessment  - Modify environment to reduce risk of injury  - Consider OT/PT consult to assist with strengthening/mobility  Outcome: Progressing     Problem: PAIN - ADULT  Goal: Verbalizes/displays adequate comfort level or baseline comfort level  Description: Interventions:  - Encourage patient to monitor pain and request assistance  - Assess pain using appropriate pain scale  - Administer analgesics based on type and severity of pain and evaluate response  - Implement non-pharmacological measures as appropriate and evaluate response  - Consider cultural and social influences on pain and pain management  - Notify physician/advanced practitioner if interventions unsuccessful or patient reports new pain  Outcome: Progressing     Problem: INFECTION - ADULT  Goal: Absence or prevention of progression during hospitalization  Description: INTERVENTIONS:  - Assess and monitor for signs and symptoms of infection  - Monitor lab/diagnostic results  - Monitor all insertion sites, i e  indwelling lines, tubes, and drains  - Monitor endotracheal if appropriate and nasal secretions for changes in amount and color  - [place] appropriate cooling/warming therapies per order  - Administer medications as ordered  - Instruct and encourage patient and family to use good hand hygiene technique  - Identify and instruct in appropriate isolation precautions for identified infection/condition  Outcome: Progressing  Goal: Absence of fever/infection during neutropenic period  Description: INTERVENTIONS:  - Monitor WBC    Outcome: Progressing     Problem: SAFETY ADULT  Goal: Patient will remain free of falls  Description: INTERVENTIONS:  - Assess patient frequently for physical needs  -  Identify cognitive and physical deficits and behaviors that affect risk of falls    -  [place] fall precautions as indicated by assessment   - Educate patient/family on patient safety including physical limitations  - Instruct patient to call for assistance with activity based on assessment  - Modify environment to reduce risk of injury  - Consider OT/PT consult to assist with strengthening/mobility  Outcome: Progressing  Goal: Maintain or return to baseline ADL function  Description: INTERVENTIONS:  -  Assess patient's ability to carry out ADLs; assess patient's baseline for ADL function and identify physical deficits which impact ability to perform ADLs (bathing, care of mouth/teeth, toileting, grooming, dressing, etc )  - Assess/evaluate cause of self-care deficits   - Assess range of motion  - Assess patient's mobility; develop plan if impaired  - Assess patient's need for assistive devices and provide as appropriate  - Encourage maximum independence but intervene and supervise when necessary  - Involve family in performance of ADLs  - Assess for home care needs following discharge   - Consider OT consult to assist with ADL evaluation and planning for discharge  - Provide patient education as appropriate  Outcome: Progressing  Goal: Maintain or return mobility status to optimal level  Description: INTERVENTIONS:  - Assess patient's baseline mobility status (ambulation, transfers, stairs, etc )    - Identify cognitive and physical deficits and behaviors that affect mobility  - Identify mobility aids required to assist with transfers and/or ambulation (gait belt, sit-to-stand, lift, walker, cane, etc )  - [place] fall precautions as indicated by assessment  - Record patient progress and toleration of activity level on Mobility SBAR; progress patient to next Phase/Stage  - Instruct patient to call for assistance with activity based on assessment  - Consider rehabilitation consult to assist with strengthening/weightbearing, etc   Outcome: Progressing     Problem: DISCHARGE PLANNING  Goal: Discharge to home or other facility with appropriate resources  Description: INTERVENTIONS:  - Identify barriers to discharge w/patient and caregiver  - Arrange for needed discharge resources and transportation as appropriate  - Identify discharge learning needs (meds, wound care, etc )  - Arrange for interpretive services to assist at discharge as needed  - Refer to Case Management Department for coordinating discharge planning if the patient needs post-hospital services based on physician/advanced practitioner order or complex needs related to functional status, cognitive ability, or social support system  Outcome: Progressing     Problem: Knowledge Deficit  Goal: Patient/family/caregiver demonstrates understanding of disease process, treatment plan, medications, and discharge instructions  Description: Complete learning assessment and assess knowledge base    Interventions:  - Provide teaching at level of understanding  - Provide teaching via preferred learning methods  Outcome: Progressing

## 2021-04-14 NOTE — DISCHARGE INSTRUCTIONS
Discharge Instructions - Podiatry    Weight Bearing Status: Weight bearing as tolerated                     Pain: Continue analgesics as directed    Follow-up appointment instructions: Please make an appointment within one week of discharge with Dr Abelardo Ludwig  Contact sooner if any increase in pain, or signs of infection occur    Wound Care: Leave dressings clean, dry, and intact between professional dressing changes    Nursing Instructions: Please apply Maxsorb to LLE wound as well as between toes  Then cover with Gauze and secure with Kerlix and ACE wrap from toes to knees  Please change dressing every day

## 2021-04-14 NOTE — PROGRESS NOTES
Podiatry - Progress Note  Patient: Mariya Spaulding 74 y o  male   MRN: [id number]  PCP: Dominga Whittaker MD  [facility] Encounter: [id number]  Date Of Visit: 21    ASSESSMENT:    Mariya Spaulding is a 74 y o  male with:    1  Left lower extremity cellulitis  2  Venous stasis ulceration, left lower extremity  3  Left lower extremity edema  4  Type 2 diabetes mellitus  5  Lymphedema      PLAN:    · Left LE cellulitis and drainage improved  Patient is stable for d/c from podiatry standpoint  · Local wound care consisting of Maxorb, DSD, ACE  · Patient will need outpatient wound care follow up with Dr Mary Elizalde  · Elevation on green foam wedges or pillows when non-ambulatory  · Rest of care per primary team      Weightbearing status: WBAT to LLE  SUBJECTIVE:     The patient was seen, evaluated, and assessed at bedside today  The patient was awake, alert, and in no acute distress  No acute events overnight  The patient reports no complaints  Patient denies N/V/F/chills/SOB/CP  OBJECTIVE:     Vitals:   /74   Pulse 58   Temp 97 7 °F (36 5 °C)   Resp 18   Ht 6' 7" (2 007 m)   Wt (!) 177 kg (390 lb 10 5 oz)   SpO2 98%   BMI 44 01 kg/m²     Temp (24hrs), Av 7 °F (36 5 °C), Min:97 6 °F (36 4 °C), Max:97 9 °F (36 6 °C)      Physical Exam:     General: Alert, cooperative and no distress  Lungs: Non labored breathing  Abdomen: Soft, non-tender  Lower extremity exam:  Cardiovascular status at baseline  Neurological status at baseline  Musculoskeletal status at baseline  No calf tenderness noted      Lower extremity wound(s) as noted below:  Wound #: 1  Location: Lateral left leg  Length 0 5 cm: Width 1 cm: Depth 0 2 cm:   Deepest Tissue Noted in Base: SubQ  Probe to Bone: No  Peripheral Skin Description: Attached  Granulation: 20% Fibrotic Tissue: 80% Necrotic Tissue: 0%   Drainage Amount: moderate, serosanguinous  Signs of Infection: No    Clinical Images 04/14/21:                  Additional Data:     Labs:    Results from last 7 days   Lab Units 04/12/21  0439 04/11/21  0628   WBC Thousand/uL 5 20 5 10   HEMOGLOBIN g/dL 11 5* 11 5*   HEMATOCRIT % 38 2 39 2   PLATELETS Thousands/uL 167 156   NEUTROS PCT %  --  75   LYMPHS PCT %  --  12*   MONOS PCT %  --  10   EOS PCT %  --  3     Results from last 7 days   Lab Units 04/12/21  0439  04/10/21  0722   POTASSIUM mmol/L 3 8   < > 3 7   CHLORIDE mmol/L 104   < > 105   CO2 mmol/L 29   < > 28   BUN mg/dL 12   < > 14   CREATININE mg/dL 0 86   < > 1 02   CALCIUM mg/dL 8 7   < > 8 8   ALK PHOS U/L  --   --  75   ALT U/L  --   --  16   AST U/L  --   --  13    < > = values in this interval not displayed  * I Have Reviewed All Lab Data Listed Above  Recent Cultures (last 7 days):     Results from last 7 days   Lab Units 04/09/21  1821   BLOOD CULTURE  No Growth After 4 Days  No Growth After 4 Days  Imaging: I have personally reviewed pertinent films in PACS  EKG, Pathology, and Other Studies: I have personally reviewed pertinent reports  ** Please Note: Portions of the record may have been created with voice recognition software  Occasional wrong word or "sound a like" substitutions may have occurred due to the inherent limitations of voice recognition software  Read the chart carefully and recognize, using context, where substitutions have occurred   **

## 2021-04-15 ENCOUNTER — TRANSITIONAL CARE MANAGEMENT (OUTPATIENT)
Dept: FAMILY MEDICINE CLINIC | Facility: CLINIC | Age: 66
End: 2021-04-15

## 2021-04-20 ENCOUNTER — OFFICE VISIT (OUTPATIENT)
Dept: WOUND CARE | Facility: HOSPITAL | Age: 66
End: 2021-04-20
Payer: MEDICARE

## 2021-04-20 VITALS
RESPIRATION RATE: 20 BRPM | HEART RATE: 76 BPM | TEMPERATURE: 97.7 F | SYSTOLIC BLOOD PRESSURE: 134 MMHG | DIASTOLIC BLOOD PRESSURE: 74 MMHG

## 2021-04-20 DIAGNOSIS — E11.621 DIABETIC ULCER OF LEFT FOOT ASSOCIATED WITH TYPE 2 DIABETES MELLITUS, LIMITED TO BREAKDOWN OF SKIN, UNSPECIFIED PART OF FOOT (HCC): Primary | ICD-10-CM

## 2021-04-20 DIAGNOSIS — L97.521 DIABETIC ULCER OF LEFT FOOT ASSOCIATED WITH TYPE 2 DIABETES MELLITUS, LIMITED TO BREAKDOWN OF SKIN, UNSPECIFIED PART OF FOOT (HCC): Primary | ICD-10-CM

## 2021-04-20 DIAGNOSIS — L97.929 IDIOPATHIC CHRONIC VENOUS HYPERTENSION OF LEFT LOWER EXTREMITY WITH ULCER (HCC): ICD-10-CM

## 2021-04-20 DIAGNOSIS — I87.312 IDIOPATHIC CHRONIC VENOUS HYPERTENSION OF LEFT LOWER EXTREMITY WITH ULCER (HCC): ICD-10-CM

## 2021-04-20 PROCEDURE — 97597 DBRDMT OPN WND 1ST 20 CM/<: CPT | Performed by: PODIATRIST

## 2021-04-20 NOTE — PROGRESS NOTES
Patient ID: Luanne Ramos is a 74 y o  male Date of Birth 1955       Chief Complaint   Patient presents with    Follow Up Wound Care Visit     LLE wounds       Allergies:  Patient has no known allergies  Diagnosis:  1  Diabetic ulcer of left foot associated with type 2 diabetes mellitus, limited to breakdown of skin, unspecified part of foot ([address] )  -     Wound cleansing and dressings; Future  -     Wound compression and edema control; Future  -     Wound home care; Future    2  Idiopathic chronic venous hypertension of left lower extremity with ulcer (HCC)  -     Wound cleansing and dressings; Future  -     Wound compression and edema control; Future  -     Wound home care; Future       Diagnosis ICD-10-CM Associated Orders   1  Diabetic ulcer of left foot associated with type 2 diabetes mellitus, limited to breakdown of skin, unspecified part of foot (HCC)  E11 621 Wound cleansing and dressings    L97 521 Wound compression and edema control     Wound home care   2  Idiopathic chronic venous hypertension of left lower extremity with ulcer (HCC)  I87 312 Wound cleansing and dressings    L97 929 Wound compression and edema control     Wound home care        Assessment & Plan:  1  Diabetic Naidu grade 2 ulceration dorsal aspect left foot, no debridement was performed, Calmoseptine alginate dry dressing was applied, visiting nurses will do the same 3 times a week  2     Venous stasis ulceration left lower extremity, wound was debrided through selective tissues dressed with alginate dry dressing    3   SurePress for compression, continue with  Visiting nurses and lymphedema specialist   Frequent elevation, low-sodium diet, proper protein intake, proper glycemic control, complete all antibiotics as prescribed from discharge from hospital, use sequential compression pumps for 1 hour twice a day was all reviewed with patient, I personally reviewed his medical records from hospital stay for cellulitis of left lower extremity, education patient, documentation of his visit total time was 23 minutes  4    Patient understands and agrees with the plan and will follow-up in 2 weeks  Debridement   Wound 04/20/21 Venous Ulcer Leg Left    Universal Protocol:  Consent: Verbal consent obtained  Risks and benefits: risks, benefits and alternatives were discussed  Consent given by: patient  Time out: Immediately prior to procedure a "time out" was called to verify the correct patient, procedure, equipment, support staff and site/side marked as required  Patient understanding: patient states understanding of the procedure being performed  Patient identity confirmed: verbally with patient      Performed by: physician  Debridement type: selective  Pain control: lidocaine 4%  Pre-debridement measurements  Length (cm): 0 5  Width (cm): 0 5  Depth (cm): 0 1  Surface Area (cm^2): 0 25  Volume (cm^3): 0 03    Post-debridement measurements  Length (cm): 0 5  Width (cm): 0 5  Depth (cm): 0 1  Percent debrided: 100%  Surface Area (cm^2): 0 25  Area debrided (cm^2): 0 25  Volume (cm^3): 0 03  Devitalized tissue debrided: biofilm, callus and slough  Instrument(s) utilized: blade  Bleeding: small  Hemostasis obtained with: pressure  Procedural pain (0-10): insensate  Post-procedural pain: insensate   Response to treatment: procedure was tolerated well               Subjective:     Patient presents today for care of left foot diabetic ulceration,  Left leg venous stasis ulceration, he was recently hospitalized for cellulitis of left lower extremity, discharged last week, visiting nurses have continued changes dressings as directed and he is here for follow-up  He states his blood sugars are well controlled and he has been using his sequential compression pumps and has no new complaints          The following portions of the patient's history were reviewed and updated as appropriate:   Patient Active Problem List   Diagnosis    Controlled type 2 diabetes mellitus with complication, without long-term current use of insulin ([place] [address] )    HTN (hypertension)    HLD (hyperlipidemia)    Hypothyroidism    Celiac disease    Coronary artery disease    History of duodenal ulcer    Living will on file    Morbid obesity with BMI of 50 0-59 9, adult ([place] [address] )    S/P BKA (below knee amputation) unilateral, right (HCC)    Paroxysmal atrial fibrillation (HCC)    Iron deficiency anemia due to chronic blood loss    Chronic venous stasis dermatitis of left lower extremity    Gastroesophageal reflux disease without esophagitis    Diabetic ulcer of left foot associated with type 2 diabetes mellitus, limited to breakdown of skin ([facility])    Idiopathic chronic venous hypertension of left lower extremity with ulcer ([facility])    Non-pressure chronic ulcer of left calf, limited to breakdown of skin ([place] [address] )    Diabetic polyneuropathy associated with type 2 diabetes mellitus ([place] [address] )    Left leg cellulitis     Past Medical History:   Diagnosis Date    Atrial fibrillation (HCC)     Cellulitis     Diabetes mellitus ([place] [address] )     Disease of thyroid gland     Duodenal ulcer     perforated- ICU stay    High blood pressure     High cholesterol     Hyperlipidemia     Hypertension     Hypothyroidism     Lymphedema      b/l LE- was followed at wound center    Morbid obesity with BMI of 40 0-44 9, adult ([place] [address] )     Peritonitis ([place] [address] )      Past Surgical History:   Procedure Laterality Date    BELOW KNEE LEG AMPUTATION Right     DIAGNOSTIC LAPAROSCOPY      KNEE ARTHROSCOPY Bilateral     OTHER SURGICAL HISTORY  03/2014     lap repair    OTHER SURGICAL HISTORY      Laparoscopic repair of a perforated duodenal ulcer with Sammy Aus omental    OTHER SURGICAL HISTORY      Placement of drains     Social History     Socioeconomic History    Marital status: Single     Spouse name: None    Number of children: None    Years of education: None    Highest education level: None   Occupational History    None   Social Needs    Financial resource strain: None    Food insecurity     Worry: None     Inability: None    Transportation needs     Medical: None     Non-medical: None   Tobacco Use    Smoking status: Former Smoker     Packs/day: 1 00     Years: 30 00     Pack years: 30 00     Quit date: 2004     Years since quittin 4    Smokeless tobacco: Never Used   Substance and Sexual Activity    Alcohol use: Not Currently    Drug use: Not Currently    Sexual activity: None   Lifestyle    Physical activity     Days per week: None     Minutes per session: None    Stress: None   Relationships    Social connections     Talks on phone: None     Gets together: None     Attends Zoroastrian service: None     Active member of club or organization: None     Attends meetings of clubs or organizations: None     Relationship status: None    Intimate partner violence     Fear of current or ex partner: None     Emotionally abused: None     Physically abused: None     Forced sexual activity: None   Other Topics Concern    None   Social History Narrative    Former smoker: Quit 3/31/2012 - As per Care Everywhere         Current Outpatient Medications:     aspirin (ASPIRIN 81) 81 mg EC tablet, Take 81 mg by mouth Daily, Disp: , Rfl:     clotrimazole-betamethasone (LOTRISONE) 1-0 05 % cream, Apply topically 2 (two) times a day, Disp: 90 g, Rfl: 0    gabapentin (NEURONTIN) 100 mg capsule, Take 1 capsule (100 mg total) by mouth 3 (three) times a day, Disp: 270 capsule, Rfl: 1    levothyroxine 25 mcg tablet, Take 1 tablet (25 mcg total) by mouth daily In addition to the 300 mcg dose (stop the 75 mcg dose), Disp: 90 tablet, Rfl: 1    levothyroxine 300 MCG tablet, Take 1 tablet (300 mcg total) by mouth daily In addition to 75 mcg dose, Disp: 90 tablet, Rfl: 1    lisinopril (ZESTRIL) 10 mg tablet, Take 1 tablet (10 mg total) by mouth daily, Disp: 90 tablet, Rfl: 1    metFORMIN (GLUCOPHAGE-XR) 750 mg 24 hr tablet, Take 1 tablet (750 mg total) by mouth daily with breakfast, Disp: 90 tablet, Rfl: 1    metoprolol tartrate (LOPRESSOR) 50 mg tablet, Take 1 tablet (50 mg total) by mouth 2 (two) times a day, Disp: 180 tablet, Rfl: 2    Multiple Vitamins-Minerals (MULTIVITAMIN MEN 50+ PO), Take by mouth, Disp: , Rfl:     pantoprazole (PROTONIX) 40 mg tablet, Take 1 tablet (40 mg total) by mouth daily, Disp: 90 tablet, Rfl: 1    simvastatin (ZOCOR) 10 mg tablet, Take 1 tablet (10 mg total) by mouth daily at bedtime, Disp: 90 tablet, Rfl: 1    Zoster Vac Recomb Adjuvanted (Shingrix) 50 MCG/0 5ML SUSR, 0 5mL IM for one dose, followed by 0 5mL IM 2-6 months after first dose, Disp: 1 each, Rfl: 1  Family History   Problem Relation Age of Onset    Heart disease Family     Alcohol abuse Family     Heart disease Mother     Hypertension Mother    Neeru Pall Migraines Daughter     Leukemia Brother     Migraines Daughter     Substance Abuse Neg Hx     Mental illness Neg Hx     Depression Neg Hx        Review of Systems   Constitutional: Negative for activity change, appetite change, chills, fatigue and fever  HENT: Negative for hearing loss  Eyes: Negative for photophobia and visual disturbance  Respiratory: Negative for cough, chest tightness and shortness of breath  Cardiovascular: Negative for chest pain and palpitations  Gastrointestinal: Negative for diarrhea and nausea  Endocrine: Negative for cold intolerance and heat intolerance  Musculoskeletal: Positive for gait problem  Negative for arthralgias and back pain  Skin: Positive for color change and wound  Neurological: Positive for numbness  Psychiatric/Behavioral: Negative  Negative for agitation, self-injury and suicidal ideas  The patient is not nervous/anxious  Objective:  /74   Pulse 76   Temp 97 7 °F (36 5 °C)   Resp 20     Physical Exam  Constitutional:       Appearance: Normal appearance  He is obese     HENT:      Head: Normocephalic and atraumatic  Right Ear: External ear normal       Left Ear: External ear normal       Nose: Nose normal    Eyes:      Conjunctiva/sclera: Conjunctivae normal    Neck:      Musculoskeletal: Normal range of motion  Cardiovascular:      Pulses: Normal pulses  Dorsalis pedis pulses are 2+ on the left side  Posterior tibial pulses are 2+ on the left side  Pulmonary:      Effort: Pulmonary effort is normal    Musculoskeletal:      Left lower le+ Edema present  Comments: BKA right   Skin:     General: Skin is warm and dry  Capillary Refill: Capillary refill takes less than 2 seconds  Comments: See detailed wound assessment     Neurological:      General: No focal deficit present  Mental Status: He is alert and oriented to person, place, and time  Mental status is at baseline  Sensory: Sensory deficit present  Coordination: Coordination abnormal       Gait: Gait abnormal       Deep Tendon Reflexes: Reflexes abnormal    Psychiatric:         Mood and Affect: Mood normal          Behavior: Behavior normal          Thought Content: Thought content normal          Judgment: Judgment normal                Wound 20 Foot Left; Other (Comment) (Active)   Wound Image     21 1119   Wound Description Epithelialization;Granulation tissue;Pink 21 1124   Jovita-wound Assessment Maceration;Edema; Erythema;Fragile 21 1124   Wound Length (cm) 15 cm 21 1124   Wound Width (cm) 9 cm 21 1124   Wound Depth (cm) 0 1 cm 21 1124   Wound Surface Area (cm^2) 135 cm^2 21 1124   Wound Volume (cm^3) 13 5 cm^3 21 1124   Calculated Wound Volume (cm^3) 13 5 cm^3 21 1124   Change in Wound Size % -794 04 21 1124   Drainage Amount Large 21 1124   Drainage Description Serous 21 1124   Non-staged Wound Description Full thickness 21 1328   Treatments Cleansed 20 1320   Dressing Changed Changed 02/10/21 1341   Patient Tolerance Tolerated well 02/10/21 1341   Dressing Status Leaking (Comment) 04/20/21 1124       Wound 04/20/21 Venous Ulcer Leg Left (Active)   Wound Description Beefy red;Slough 04/20/21 1201   Jovita-wound Assessment Edema; Erythema 04/20/21 1201   Wound Length (cm) 0 5 cm 04/20/21 1201   Wound Width (cm) 0 5 cm 04/20/21 1201   Wound Depth (cm) 0 1 cm 04/20/21 1201   Wound Surface Area (cm^2) 0 25 cm^2 04/20/21 1201   Wound Volume (cm^3) 0 03 cm^3 04/20/21 1201   Calculated Wound Volume (cm^3) 0 03 cm^3 04/20/21 1201   Drainage Amount Small 04/20/21 1201   Drainage Description Serosanguineous 04/20/21 1201   Non-staged Wound Description Full thickness 04/20/21 1201   Dressing Status Intact 04/20/21 1201                          Results from last 6 Months   Lab Units 02/10/21  1410   WOUND CULTURE  1+ Growth of Proteus mirabilis*  2+ Growth of Beta Hemolytic Streptococcus Group B*  Few Colonies of Staphylococcus aureus*  2+ Growth of        Xr Chest 1 View Portable    Result Date: 4/10/2021  Narrative: CHEST INDICATION:   sob  Left leg redness, pain and swelling since early this morning  History of diabetes  COMPARISON:  Chest radiographs October 22, 2004 EXAM PERFORMED/VIEWS:  XR CHEST PORTABLE  AP semierect Images: 2 FINDINGS: Heart shadow appears unremarkable  Atherosclerotic vascular calcifications are noted  The lungs are clear  No pneumothorax or pleural effusion  Osseous structures appear within normal limits for patient age  Impression: No acute cardiopulmonary disease  Workstation performed: [id number]       Wound Instructions:  Orders Placed This Encounter   Procedures    Wound cleansing and dressings     Wound cleansing and dressing Left Foot wound:       May shower on dressing change days  Apply calmoseptine to periwound  Apply moisturizer to intact skin on leg  Apply drawtex or equivalent to the wounds    Cover with exu dry or optilock and/or ABDs  Secure with amy and tape Wound care 3x/wk - if there is excesssive strikethrough drainage then increase frequency to EOD or daily  This was performed in [facility] at today visit         Standing Status:   Future     Standing Expiration Date:   4/20/2022    Wound compression and edema control     Wound compression and edema control  Surepress to the LLE or compression as per lymphedema specialist     Apply compression wrap to your affected Leg(s) from mid-foot to knee making sure to cover the heel  Apply in the morning and re-wrap as needed during the day if wrap becomes loose  Remove at bedtime and elevate legs or lie down      Avoid prolonged standing in one place      Elevate leg(s) above the level of the heart when sitting or as much as possible  This was applied today            Standing Status:   Future     Standing Expiration Date:   4/20/2022    Wound home care     [facility]     Standing Status:   Future     Standing Expiration Date:   4/20/2022    Debridement     This order was created via procedure documentation         Abimbola Barrett, KIKO, FACFAS    Portions of the record may have been created with voice recognition software  Occasional wrong word or "sound a like" substitutions may have occurred due to the inherent limitations of voice recognition software  Read the chart carefully and recognize, using context, where substitutions have occurred

## 2021-04-20 NOTE — PATIENT INSTRUCTIONS
Orders Placed This Encounter   Procedures    Wound cleansing and dressings     Wound cleansing and dressing Left Foot wound:       May shower on dressing change days  Apply calmoseptine to periwound  Apply moisturizer to intact skin on leg  Apply drawtex or equivalent to the wounds  Cover with exu dry or optilock and/or ABDs  Secure with amy and tape   Wound care 3x/wk - if there is excesssive strikethrough drainage then increase frequency to EOD or daily  This was performed in [facility] at today visit         Standing Status:   Future     Standing Expiration Date:   4/20/2022    Wound compression and edema control     Wound compression and edema control  Surepress to the LLE or compression as per lymphedema specialist     Apply compression wrap to your affected Leg(s) from mid-foot to knee making sure to cover the heel  Apply in the morning and re-wrap as needed during the day if wrap becomes loose  Remove at bedtime and elevate legs or lie down      Avoid prolonged standing in one place      Elevate leg(s) above the level of the heart when sitting or as much as possible      This was applied today            Standing Status:   Future     Standing Expiration Date:   4/20/2022    Wound home care     [facility]     Standing Status:   Future     Standing Expiration Date:   4/20/2022

## 2021-05-03 NOTE — PROGRESS NOTES
Please let pt know that he is due for a routine follow up for his diabetes and chronic health conditions  Ok to schedule in the next 2-3 months

## 2021-05-05 ENCOUNTER — OFFICE VISIT (OUTPATIENT)
Dept: WOUND CARE | Facility: HOSPITAL | Age: 66
End: 2021-05-05
Payer: MEDICARE

## 2021-05-05 VITALS
DIASTOLIC BLOOD PRESSURE: 84 MMHG | HEART RATE: 72 BPM | RESPIRATION RATE: 18 BRPM | SYSTOLIC BLOOD PRESSURE: 128 MMHG | TEMPERATURE: 97.3 F

## 2021-05-05 DIAGNOSIS — I87.312 IDIOPATHIC CHRONIC VENOUS HYPERTENSION OF LEFT LOWER EXTREMITY WITH ULCER (HCC): ICD-10-CM

## 2021-05-05 DIAGNOSIS — L97.221 NON-PRESSURE CHRONIC ULCER OF LEFT CALF, LIMITED TO BREAKDOWN OF SKIN (HCC): ICD-10-CM

## 2021-05-05 DIAGNOSIS — E11.621 DIABETIC ULCER OF LEFT FOOT ASSOCIATED WITH TYPE 2 DIABETES MELLITUS, LIMITED TO BREAKDOWN OF SKIN, UNSPECIFIED PART OF FOOT (HCC): Primary | ICD-10-CM

## 2021-05-05 DIAGNOSIS — L97.521 DIABETIC ULCER OF LEFT FOOT ASSOCIATED WITH TYPE 2 DIABETES MELLITUS, LIMITED TO BREAKDOWN OF SKIN, UNSPECIFIED PART OF FOOT (HCC): Primary | ICD-10-CM

## 2021-05-05 DIAGNOSIS — E11.42 DIABETIC POLYNEUROPATHY ASSOCIATED WITH TYPE 2 DIABETES MELLITUS (HCC): ICD-10-CM

## 2021-05-05 DIAGNOSIS — L97.929 IDIOPATHIC CHRONIC VENOUS HYPERTENSION OF LEFT LOWER EXTREMITY WITH ULCER (HCC): ICD-10-CM

## 2021-05-05 PROCEDURE — 99212 OFFICE O/P EST SF 10 MIN: CPT | Performed by: PODIATRIST

## 2021-05-05 NOTE — PROGRESS NOTES
Patient ID: Amanda Santos is a 74 y o  male Date of Birth 1955       Chief Complaint   Patient presents with    Follow Up Wound Care Visit     left lower extremity wound       Allergies:  Patient has no known allergies  Diagnosis:  1  Diabetic ulcer of left foot associated with type 2 diabetes mellitus, limited to breakdown of skin, unspecified part of foot ([facility] )  -     Wound cleansing and dressings; Future  -     Wound compression and edema control; Future  -     Wound home care; Future    2  Idiopathic chronic venous hypertension of left lower extremity with ulcer ([facility] )    3  Non-pressure chronic ulcer of left calf, limited to breakdown of skin ([facility] )    4  Diabetic polyneuropathy associated with type 2 diabetes mellitus ([place] [address] )       Diagnosis ICD-10-CM Associated Orders   1  Diabetic ulcer of left foot associated with type 2 diabetes mellitus, limited to breakdown of skin, unspecified part of foot ([facility])  E11 621 Wound cleansing and dressings    L97 521 Wound compression and edema control     Wound home care   2  Idiopathic chronic venous hypertension of left lower extremity with ulcer ([place] [address] )  I87 312     L97 929    3  Non-pressure chronic ulcer of left calf, limited to breakdown of skin ([place] [address] )  L97 221    4  Diabetic polyneuropathy associated with type 2 diabetes mellitus ([facility])  E11 42         Assessment & Plan:  1  Diabetic Naidu grade 1 ulceration dorsal aspect left foot, no debridement was performed, patient with fragile epithelium although continues with copious drainage,  Continue alginate, quick, dry dressing, alginate between toes, Calmoseptine to periwound  Continue sequential  Compression pump 1 hour twice a day  2    Chronic venous stasis ulceration left lower extremity, this area is well epithelialized and healed, discontinue dressings, continue with SurePress compression per lymphedema therapy recommendations    3    Frequent elevation, low-sodium diet, proper protein intake, proper glycemic control, use of sequential compression pumps was reviewed with patient, he understands and agrees with the plan and will follow-up in 4 weeks  Procedures - none    Subjective:     Patient presents today for care of left foot and leg venous stasis and diabetic ulcerations, visiting nurses and lymphedema nurses continue to change dressings as directed, he continues to use sequential compression pumps twice a day for 1 hour  He has completed all antibiotics from his hospital discharge for cellulitis  He has no new complaints          The following portions of the patient's history were reviewed and updated as appropriate:   Patient Active Problem List   Diagnosis    Controlled type 2 diabetes mellitus with complication, without long-term current use of insulin ([facility] )    HTN (hypertension)    HLD (hyperlipidemia)    Hypothyroidism    Celiac disease    Coronary artery disease    History of duodenal ulcer    Living will on file    Morbid obesity with BMI of 50 0-59 9, adult ([facility] )    S/P BKA (below knee amputation) unilateral, right (HCC)    Paroxysmal atrial fibrillation ([facility])    Iron deficiency anemia due to chronic blood loss    Chronic venous stasis dermatitis of left lower extremity    Gastroesophageal reflux disease without esophagitis    Diabetic ulcer of left foot associated with type 2 diabetes mellitus, limited to breakdown of skin ([facility] )    Idiopathic chronic venous hypertension of left lower extremity with ulcer ([facility])    Non-pressure chronic ulcer of left calf, limited to breakdown of skin ([facility] )    Diabetic polyneuropathy associated with type 2 diabetes mellitus ([facility] )    Left leg cellulitis     Past Medical History:   Diagnosis Date    Atrial fibrillation (HCC)     Cellulitis     Diabetes mellitus ([facility] )     Disease of thyroid gland     Duodenal ulcer     perforated- ICU stay    High blood pressure     High cholesterol     Hyperlipidemia     Hypertension     Hypothyroidism     Lymphedema      b/l LE- was followed at wound center    Morbid obesity with BMI of 40 0-44 9, adult ([facility] )     Peritonitis ([facility] )      Past Surgical History:   Procedure Laterality Date    BELOW KNEE LEG AMPUTATION Right     DIAGNOSTIC LAPAROSCOPY      KNEE ARTHROSCOPY Bilateral     OTHER SURGICAL HISTORY  2014     lap repair    OTHER SURGICAL HISTORY      Laparoscopic repair of a perforated duodenal ulcer with Kina Bacca omental    OTHER SURGICAL HISTORY      Placement of drains     Social History     Socioeconomic History    Marital status: Single     Spouse name: None    Number of children: None    Years of education: None    Highest education level: None   Occupational History    None   Social Needs    Financial resource strain: None    Food insecurity     Worry: None     Inability: None    Transportation needs     Medical: None     Non-medical: None   Tobacco Use    Smoking status: Former Smoker     Packs/day:      Years: 30      Pack years: 30      Quit date: 2004     Years since quittin 5    Smokeless tobacco: Never Used   Substance and Sexual Activity    Alcohol use: Not Currently    Drug use: Not Currently    Sexual activity: None   Lifestyle    Physical activity     Days per week: None     Minutes per session: None    Stress: None   Relationships    Social connections     Talks on phone: None     Gets together: None     Attends Baptism service: None     Active member of club or organization: None     Attends meetings of clubs or organizations: None     Relationship status: None    Intimate partner violence     Fear of current or ex partner: None     Emotionally abused: None     Physically abused: None     Forced sexual activity: None   Other Topics Concern    None   Social History Narrative    Former smoker: Quit 3/31/2012 - As per Care Everywhere         Current Outpatient Medications:     aspirin (ASPIRIN 81) 81 mg EC tablet, Take 81 mg by mouth Daily, Disp: , Rfl:     clotrimazole-betamethasone (LOTRISONE) 1-0 05 % cream, Apply topically 2 (two) times a day, Disp: 90 g, Rfl: 0    gabapentin (NEURONTIN) 100 mg capsule, Take 1 capsule (100 mg total) by mouth 3 (three) times a day, Disp: 270 capsule, Rfl: 1    levothyroxine 25 mcg tablet, Take 1 tablet (25 mcg total) by mouth daily In addition to the 300 mcg dose (stop the 75 mcg dose), Disp: 90 tablet, Rfl: 1    levothyroxine 300 MCG tablet, Take 1 tablet (300 mcg total) by mouth daily In addition to 75 mcg dose, Disp: 90 tablet, Rfl: 1    lisinopril (ZESTRIL) 10 mg tablet, Take 1 tablet (10 mg total) by mouth daily, Disp: 90 tablet, Rfl: 1    metFORMIN (GLUCOPHAGE-XR) 750 mg 24 hr tablet, Take 1 tablet (750 mg total) by mouth daily with breakfast, Disp: 90 tablet, Rfl: 1    metoprolol tartrate (LOPRESSOR) 50 mg tablet, Take 1 tablet (50 mg total) by mouth 2 (two) times a day, Disp: 180 tablet, Rfl: 2    Multiple Vitamins-Minerals (MULTIVITAMIN MEN 50+ PO), Take by mouth, Disp: , Rfl:     pantoprazole (PROTONIX) 40 mg tablet, Take 1 tablet (40 mg total) by mouth daily, Disp: 90 tablet, Rfl: 1    simvastatin (ZOCOR) 10 mg tablet, Take 1 tablet (10 mg total) by mouth daily at bedtime, Disp: 90 tablet, Rfl: 1    Zoster Vac Recomb Adjuvanted (Shingrix) 50 MCG/0 5ML SUSR, 0 5mL IM for one dose, followed by 0 5mL IM 2-6 months after first dose, Disp: 1 each, Rfl: 1  Family History   Problem Relation Age of Onset    Heart disease Family     Alcohol abuse Family     Heart disease Mother     Hypertension Mother    Tesha Courser Migraines Daughter     Leukemia Brother     Migraines Daughter     Substance Abuse Neg Hx     Mental illness Neg Hx     Depression Neg Hx        Review of Systems   Constitutional: Negative for activity change, appetite change, chills, fatigue and fever  HENT: Negative for hearing loss  Eyes: Negative for photophobia and visual disturbance     Respiratory: Negative for cough, chest tightness and shortness of breath  Cardiovascular: Negative for chest pain and palpitations  Gastrointestinal: Negative for diarrhea and nausea  Endocrine: Negative for cold intolerance and heat intolerance  Musculoskeletal: Positive for gait problem  Negative for arthralgias and back pain  Skin: Positive for color change and wound  Neurological: Positive for numbness  Psychiatric/Behavioral: Negative  Negative for agitation, self-injury and suicidal ideas  The patient is not nervous/anxious  Objective:  /84   Pulse 72   Temp (!) 97 3 °F (36 3 °C)   Resp 18     Physical Exam  Constitutional:       Appearance: Normal appearance  He is obese  HENT:      Head: Normocephalic and atraumatic  Right Ear: External ear normal       Left Ear: External ear normal       Nose: Nose normal    Eyes:      Conjunctiva/sclera: Conjunctivae normal    Neck:      Musculoskeletal: Normal range of motion  Cardiovascular:      Pulses: Normal pulses  Dorsalis pedis pulses are 2+ on the left side  Posterior tibial pulses are 2+ on the left side  Pulmonary:      Effort: Pulmonary effort is normal    Musculoskeletal:      Left lower le+ Edema present  Comments: BKA right   Skin:     General: Skin is warm and dry  Capillary Refill: Capillary refill takes less than 2 seconds  Comments: See detailed wound assesment   Neurological:      General: No focal deficit present  Mental Status: He is alert and oriented to person, place, and time  Mental status is at baseline  Sensory: Sensory deficit present  Coordination: Coordination abnormal       Gait: Gait abnormal       Deep Tendon Reflexes: Reflexes abnormal    Psychiatric:         Mood and Affect: Mood normal          Behavior: Behavior normal          Thought Content: Thought content normal          Judgment: Judgment normal            Wound 20 Foot Left; Other (Comment) (Active)   Wound Image   21 1311 Wound Description Pink; Beefy red;Epithelialization 05/05/21 1312   Jovita-wound Assessment Edema;Pink 05/05/21 1312   Wound Length (cm) 12 2 cm 05/05/21 1312   Wound Width (cm) 9 5 cm 05/05/21 1312   Wound Depth (cm) 0 1 cm 05/05/21 1312   Wound Surface Area (cm^2) 115 9 cm^2 05/05/21 1312   Wound Volume (cm^3) 11 59 cm^3 05/05/21 1312   Calculated Wound Volume (cm^3) 11 59 cm^3 05/05/21 1312   Change in Wound Size % -667 55 05/05/21 1312   Drainage Amount Large 05/05/21 1312   Drainage Description Serosanguineous 05/05/21 1312   Non-staged Wound Description Full thickness 05/05/21 1312   Treatments Cleansed 11/11/20 1320   Dressing Changed Changed 02/10/21 1341   Patient Tolerance Tolerated well 02/10/21 1341   Dressing Status Intact 05/05/21 1312       Wound 04/20/21 Venous Ulcer Leg Left (Active)   Wound Image   05/05/21 1310   Wound Description Epithelialization 05/05/21 1318   Jovita-wound Assessment Edema; Erythema 04/20/21 1201   Wound Length (cm) 0 cm 05/05/21 1318   Wound Width (cm) 0 cm 05/05/21 1318   Wound Depth (cm) 0 cm 05/05/21 1318   Wound Surface Area (cm^2) 0 cm^2 05/05/21 1318   Wound Volume (cm^3) 0 cm^3 05/05/21 1318   Calculated Wound Volume (cm^3) 0 cm^3 05/05/21 1318   Change in Wound Size % 100 05/05/21 1318   Drainage Amount Small 04/20/21 1201   Drainage Description Serosanguineous 04/20/21 1201   Non-staged Wound Description Full thickness 04/20/21 1201   Dressing Status Intact 04/20/21 1201          Results from last 6 Months   Lab Units 02/10/21  1410   WOUND CULTURE  1+ Growth of Proteus mirabilis*  2+ Growth of Beta Hemolytic Streptococcus Group B*  Few Colonies of Staphylococcus aureus*  2+ Growth of        Xr Chest 1 View Portable    Result Date: 4/10/2021  Narrative: CHEST INDICATION:   sob  Left leg redness, pain and swelling since early this morning  History of diabetes   COMPARISON:  Chest radiographs October 22, 2004 EXAM PERFORMED/VIEWS:  XR CHEST PORTABLE  AP semierect Images: 2 FINDINGS: Heart shadow appears unremarkable  Atherosclerotic vascular calcifications are noted  The lungs are clear  No pneumothorax or pleural effusion  Osseous structures appear within normal limits for patient age  Impression: No acute cardiopulmonary disease  Workstation performed: [id number]       Wound Instructions:  Orders Placed This Encounter   Procedures    Wound cleansing and dressings     Wound cleansing and dressing Left Foot wound:       May shower on dressing change days  Apply calmoseptine to periwound  Apply moisturizer to intact skin on leg  Apply drawtex or equivalent (qwick) to the wounds  Apply alginate between toes  Cover with exu dry or optilock and/or ABDs  Secure with amy and tape   Wound care 3x/wk - if there is excesssive strikethrough drainage then increase frequency to every other day or daily       This was performed in [address] at today visit                           Standing Status:   Future     Standing Expiration Date:   5/5/2022    Wound compression and edema control     Wound compression and edema control  Surepress to the LLE or compression as per lymphedema specialist     Apply compression wrap to your affected Leg(s) from mid-foot to knee making sure to cover the heel  Apply in the morning and re-wrap as needed during the day if wrap becomes loose  Remove at bedtime and elevate legs or lie down      Avoid prolonged standing in one place      Elevate leg(s) above the level of the heart when sitting or as much as possible  Standing Status:   Future     Standing Expiration Date:   5/5/2022    Wound home care     Wound home care        Continue with [facility] for home care     Standing Status:   Future     Standing Expiration Date:   5/5/2022         Ana Fernando, KIKO, FACFAS    Portions of the record may have been created with voice recognition software   Occasional wrong word or "sound a like" substitutions may have occurred due to the inherent limitations of voice recognition software  Read the chart carefully and recognize, using context, where substitutions have occurred

## 2021-05-05 NOTE — PATIENT INSTRUCTIONS
Orders Placed This Encounter   Procedures    Wound cleansing and dressings     Wound cleansing and dressing Left Foot wound:       May shower on dressing change days  Apply calmoseptine to periwound  Apply moisturizer to intact skin on leg  Apply drawtex or equivalent (qwick) to the wounds  Apply alginate between toes  Cover with exu dry or optilock and/or ABDs  Secure with amy and tape   Wound care 3x/wk - if there is excesssive strikethrough drainage then increase frequency to every other day or daily       This was performed in [address] at today visit                           Standing Status:   Future     Standing Expiration Date:   5/5/2022    Wound compression and edema control     Wound compression and edema control  Surepress to the LLE or compression as per lymphedema specialist     Apply compression wrap to your affected Leg(s) from mid-foot to knee making sure to cover the heel  Apply in the morning and re-wrap as needed during the day if wrap becomes loose  Remove at bedtime and elevate legs or lie down      Avoid prolonged standing in one place      Elevate leg(s) above the level of the heart when sitting or as much as possible       Standing Status:   Future     Standing Expiration Date:   5/5/2022    Wound home care     Wound home care        Continue with [facility] for home care     Standing Status:   Future     Standing Expiration Date:   5/5/2022

## 2021-06-02 ENCOUNTER — OFFICE VISIT (OUTPATIENT)
Dept: WOUND CARE | Facility: HOSPITAL | Age: 66
End: 2021-06-02
Payer: MEDICARE

## 2021-06-02 VITALS
TEMPERATURE: 98.3 F | HEART RATE: 72 BPM | RESPIRATION RATE: 22 BRPM | DIASTOLIC BLOOD PRESSURE: 89 MMHG | SYSTOLIC BLOOD PRESSURE: 138 MMHG

## 2021-06-02 DIAGNOSIS — E11.621 DIABETIC ULCER OF LEFT FOOT ASSOCIATED WITH TYPE 2 DIABETES MELLITUS, LIMITED TO BREAKDOWN OF SKIN, UNSPECIFIED PART OF FOOT (HCC): Primary | ICD-10-CM

## 2021-06-02 DIAGNOSIS — L97.521 DIABETIC ULCER OF LEFT FOOT ASSOCIATED WITH TYPE 2 DIABETES MELLITUS, LIMITED TO BREAKDOWN OF SKIN, UNSPECIFIED PART OF FOOT (HCC): Primary | ICD-10-CM

## 2021-06-02 DIAGNOSIS — L97.929 IDIOPATHIC CHRONIC VENOUS HYPERTENSION OF LEFT LOWER EXTREMITY WITH ULCER (HCC): ICD-10-CM

## 2021-06-02 DIAGNOSIS — I87.312 IDIOPATHIC CHRONIC VENOUS HYPERTENSION OF LEFT LOWER EXTREMITY WITH ULCER (HCC): ICD-10-CM

## 2021-06-02 DIAGNOSIS — L97.221 NON-PRESSURE CHRONIC ULCER OF LEFT CALF, LIMITED TO BREAKDOWN OF SKIN (HCC): ICD-10-CM

## 2021-06-02 DIAGNOSIS — E11.42 DIABETIC POLYNEUROPATHY ASSOCIATED WITH TYPE 2 DIABETES MELLITUS (HCC): ICD-10-CM

## 2021-06-02 PROCEDURE — 99212 OFFICE O/P EST SF 10 MIN: CPT | Performed by: PODIATRIST

## 2021-06-02 NOTE — PATIENT INSTRUCTIONS
Orders Placed This Encounter   Procedures    Wound cleansing and dressings     Wound cleansing and dressing Left Foot wound:       May shower on dressing change days  Apply calmoseptine to periwound  Apply moisturizer to intact skin on leg  Apply drawtex or equivalent (qwick) to the wounds  Paint Betadine between toes then Apply alginate between toes  Cover with exu dry or optilock and/or ABDs  Secure with amy and tape   Wound care 3x/wk - if there is excesssive strikethrough drainage then increase frequency to every other day or daily       This was performed in [address] at today visit                                  Standing Status:   Future     Standing Expiration Date:   6/2/2022    Wound compression and edema control     Surepress to the LLE or compression as per lymphedema specialist     Apply compression wrap to your affected Leg(s) from mid-foot to knee making sure to cover the heel  Apply in the morning and re-wrap as needed during the day if wrap becomes loose  Remove at bedtime and elevate legs or lie down      Avoid prolonged standing in one place      Elevate leg(s) above the level of the heart when sitting or as much as possible        Please get in bed 2-3 x daily and elevate your legs     Standing Status:   Future     Standing Expiration Date:   6/2/2022    Wound miscellaneous orders     Limit your fluid intake and stay away from carbonated beverages     Standing Status:   Future     Standing Expiration Date:   6/2/2022

## 2021-06-02 NOTE — PROGRESS NOTES
Patient ID: Nyasia Palafox is a 74 y o  male Date of Birth 1955       Chief Complaint   Patient presents with    Follow Up Wound Care Visit     Left Foot wound       Allergies:  Patient has no known allergies  Diagnosis:  1  Diabetic ulcer of left foot associated with type 2 diabetes mellitus, limited to breakdown of skin, unspecified part of foot ([facility] )  -     Wound cleansing and dressings; Future  -     Wound compression and edema control; Future  -     Wound miscellaneous orders; Future    2  Idiopathic chronic venous hypertension of left lower extremity with ulcer ([facility] )    3  Non-pressure chronic ulcer of left calf, limited to breakdown of skin ([facility] )    4  Diabetic polyneuropathy associated with type 2 diabetes mellitus ([facility] )       Diagnosis ICD-10-CM Associated Orders   1  Diabetic ulcer of left foot associated with type 2 diabetes mellitus, limited to breakdown of skin, unspecified part of foot ([facility])  E11 621 Wound cleansing and dressings    L97 521 Wound compression and edema control     Wound miscellaneous orders   2  Idiopathic chronic venous hypertension of left lower extremity with ulcer ([facility] )  I87 312     L97 929    3  Non-pressure chronic ulcer of left calf, limited to breakdown of skin ([facility] )  L97 221    4  Diabetic polyneuropathy associated with type 2 diabetes mellitus ([facility])  E11 42         Assessment & Plan:  1  Diabetic Naidu grade 1 ulceration dorsal and plantar aspect left foot, no debridement was performed, wound was dressed with Calmoseptine to the periwound, alginate to the open areas, Betadine paint interdigitally, alginate interdigitally, dry dressing, compression  2    Venous stasis ulceration on left lower extremity is well epithelialized, continue with compression    3    Today I spent 25 minutes educating patient with low-sodium food, frequent elevation, I have advised him to get in his bed for 30 minutes a day morning, afternoon and evening and elevate above the level of his heart   I have also discussed fluid restrictions  As patient is consuming a significant amount of water throughout the day  Proper glycemic control, proper protein intake was also reviewed  Patient to continue to use sequential compression pump as left lower extremity twice a day for 1 hour  Patient to continue with lymphedema visiting nurses  Patient understands and agrees with the plan and will follow-up in 3 weeks  Procedures - none    Subjective:     Patient presents today for care of left foot diabetic ulceration, left lower extremity venous stasis ulceration, he states he has visiting nurses and lymphedema nurses and he still has copious drainage from his leg  Although he has no new complaints          The following portions of the patient's history were reviewed and updated as appropriate:   Patient Active Problem List   Diagnosis    Controlled type 2 diabetes mellitus with complication, without long-term current use of insulin ([facility] )    HTN (hypertension)    HLD (hyperlipidemia)    Hypothyroidism    Celiac disease    Coronary artery disease    History of duodenal ulcer    Living will on file    Morbid obesity with BMI of 50 0-59 9, adult ([facility] )    S/P BKA (below knee amputation) unilateral, right (HCC)    Paroxysmal atrial fibrillation ([facility])    Iron deficiency anemia due to chronic blood loss    Chronic venous stasis dermatitis of left lower extremity    Gastroesophageal reflux disease without esophagitis    Diabetic ulcer of left foot associated with type 2 diabetes mellitus, limited to breakdown of skin ([facility] )    Idiopathic chronic venous hypertension of left lower extremity with ulcer ([facility])    Non-pressure chronic ulcer of left calf, limited to breakdown of skin ([facility] )    Diabetic polyneuropathy associated with type 2 diabetes mellitus ([facility] )    Left leg cellulitis     Past Medical History:   Diagnosis Date    [facility]     Cellulitis     Diabetes mellitus ([facility] )     Disease of thyroid gland     Duodenal ulcer     perforated- ICU stay    High blood pressure     High cholesterol     Hyperlipidemia     Hypertension     Hypothyroidism     Lymphedema      b/l LE- was followed at wound center    Morbid obesity with BMI of 40 0-44 9, adult ([facility] )     Peritonitis ([facility] )      Past Surgical History:   Procedure Laterality Date    BELOW KNEE LEG AMPUTATION Right     DIAGNOSTIC LAPAROSCOPY      KNEE ARTHROSCOPY Bilateral     OTHER SURGICAL HISTORY  2014     lap repair    OTHER SURGICAL HISTORY      Laparoscopic repair of a perforated duodenal ulcer with [place] omental    OTHER SURGICAL HISTORY      Placement of drains     Social History     Socioeconomic History    Marital status: Single     Spouse name: None    Number of children: None    Years of education: None    Highest education level: None   Occupational History    None   Social Needs    Financial resource strain: None    Food insecurity     Worry: None     Inability: None    Transportation needs     Medical: None     Non-medical: None   Tobacco Use    Smoking status: Former Smoker     Packs/day:      Years: 30      Pack years: 30      Quit date: 2004     Years since quittin 5    Smokeless tobacco: Never Used   Substance and Sexual Activity    Alcohol use: Not Currently    Drug use: Not Currently    Sexual activity: None   Lifestyle    Physical activity     Days per week: None     Minutes per session: None    Stress: None   Relationships    Social connections     Talks on phone: None     Gets together: None     Attends [employer] service: None     Active member of club or organization: None     Attends meetings of clubs or organizations: None     Relationship status: None    Intimate partner violence     Fear of current or ex partner: None     Emotionally abused: None     Physically abused: None     Forced sexual activity: None   Other Topics Concern    None   Social History Narrative    Former smoker: Quit 3/31/2012 - As per Care Everywhere         Current Outpatient Medications:     aspirin (ASPIRIN 81) 81 mg EC tablet, Take 81 mg by mouth Daily, Disp: , Rfl:     clotrimazole-betamethasone (LOTRISONE) 1-0 05 % cream, Apply topically 2 (two) times a day, Disp: 90 g, Rfl: 0    gabapentin (NEURONTIN) 100 mg capsule, Take 1 capsule (100 mg total) by mouth 3 (three) times a day, Disp: 270 capsule, Rfl: 1    levothyroxine 25 mcg tablet, Take 1 tablet (25 mcg total) by mouth daily In addition to the 300 mcg dose (stop the 75 mcg dose), Disp: 90 tablet, Rfl: 1    levothyroxine 300 MCG tablet, Take 1 tablet (300 mcg total) by mouth daily In addition to 75 mcg dose, Disp: 90 tablet, Rfl: 1    lisinopril (ZESTRIL) 10 mg tablet, Take 1 tablet (10 mg total) by mouth daily, Disp: 90 tablet, Rfl: 1    metFORMIN (GLUCOPHAGE-XR) 750 mg 24 hr tablet, Take 1 tablet (750 mg total) by mouth daily with breakfast, Disp: 90 tablet, Rfl: 1    metoprolol tartrate (LOPRESSOR) 50 mg tablet, Take 1 tablet (50 mg total) by mouth 2 (two) times a day, Disp: 180 tablet, Rfl: 2    Multiple Vitamins-Minerals (MULTIVITAMIN MEN 50+ PO), Take by mouth, Disp: , Rfl:     pantoprazole (PROTONIX) 40 mg tablet, Take 1 tablet (40 mg total) by mouth daily, Disp: 90 tablet, Rfl: 1    simvastatin (ZOCOR) 10 mg tablet, Take 1 tablet (10 mg total) by mouth daily at bedtime, Disp: 90 tablet, Rfl: 1    Zoster Vac Recomb Adjuvanted (Shingrix) 50 MCG/0 5ML SUSR, 0 5mL IM for one dose, followed by 0 5mL IM 2-6 months after first dose, Disp: 1 each, Rfl: 1  Family History   Problem Relation Age of Onset    Heart disease Family     Alcohol abuse Family     Heart disease Mother     Hypertension Mother    [facility] Migraines Daughter     Leukemia Brother     Migraines Daughter     Substance Abuse Neg Hx     Mental illness Neg Hx     Depression Neg Hx        Review of Systems   Constitutional: Negative for activity change, appetite change, chills, fatigue and fever  HENT: Negative for hearing loss  Eyes: Negative for photophobia and visual disturbance  Respiratory: Negative for cough, chest tightness and shortness of breath  Cardiovascular: Negative for chest pain and palpitations  Gastrointestinal: Negative for diarrhea and nausea  Endocrine: Negative for cold intolerance and heat intolerance  Musculoskeletal: Positive for gait problem  Negative for arthralgias and back pain  Skin: Positive for color change and wound  Neurological: Positive for numbness  Psychiatric/Behavioral: Negative  Negative for agitation, self-injury and suicidal ideas  The patient is not nervous/anxious  Objective:  /89   Pulse 72   Temp 98 3 °F (36 8 °C)   Resp 22     Physical Exam  Constitutional:       Appearance: Normal appearance  He is obese  HENT:      Head: Normocephalic and atraumatic  Right Ear: External ear normal       Left Ear: External ear normal       Nose: Nose normal    Eyes:      Conjunctiva/sclera: Conjunctivae normal    Neck:      Musculoskeletal: Normal range of motion  Cardiovascular:      Pulses:           Dorsalis pedis pulses are 1+ on the left side  Posterior tibial pulses are 1+ on the left side  Pulmonary:      Effort: Pulmonary effort is normal    Musculoskeletal:      Left lower le+ Edema present  Comments: BKA right   Skin:     General: Skin is warm and dry  Capillary Refill: Capillary refill takes less than 2 seconds  Comments: See detailed wound assessment   Neurological:      General: No focal deficit present  Mental Status: He is alert and oriented to person, place, and time  Mental status is at baseline  Sensory: Sensory deficit present        Coordination: Coordination abnormal       Gait: Gait abnormal       Deep Tendon Reflexes: Reflexes abnormal    Psychiatric:         Mood and Affect: Mood normal          Behavior: Behavior normal  Thought Content: Thought content normal          Judgment: Judgment normal            Wound 01/08/20 Foot Left; Other (Comment) (Active)   Wound Image    06/02/21 1331   Wound Description Granulation tissue;Pink;Beefy red;Epithelialization 06/02/21 1331   Jovita-wound Assessment Edema;Pink;Fragile; Maceration 06/02/21 1331   Wound Length (cm) 15 5 cm 06/02/21 1331   Wound Width (cm) 10 5 cm 06/02/21 1331   Wound Depth (cm) 0 1 cm 06/02/21 1331   Wound Surface Area (cm^2) 162 75 cm^2 06/02/21 1331   Wound Volume (cm^3) 16 28 cm^3 06/02/21 1331   Calculated Wound Volume (cm^3) 16 28 cm^3 06/02/21 1331   Change in Wound Size % -978 15 06/02/21 1331   Drainage Amount Large 06/02/21 1331   Drainage Description Serosanguineous; Yellow 06/02/21 1331   Non-staged Wound Description Full thickness 06/02/21 1331   Treatments Cleansed 11/11/20 1320   Dressing Changed Changed 02/10/21 1341   Patient Tolerance Tolerated well 02/10/21 1341   Dressing Status Intact 05/05/21 1312          Results from last 6 Months   Lab Units 02/10/21  1410   WOUND CULTURE  1+ Growth of Proteus mirabilis*  2+ Growth of Beta Hemolytic Streptococcus Group B*  Few Colonies of Staphylococcus aureus*  2+ Growth of        No results found  Wound Instructions:  Orders Placed This Encounter   Procedures    Wound cleansing and dressings     Wound cleansing and dressing Left Foot wound:       May shower on dressing change days  Apply calmoseptine to periwound  Apply moisturizer to intact skin on leg  Apply drawtex or equivalent (qwick) to the wounds  Paint Betadine between toes then Apply alginate between toes    Cover with exu dry or optilock and/or ABDs  Secure with amy and tape   Wound care 3x/wk - if there is excesssive strikethrough drainage then increase frequency to every other day or daily       This was performed in [address] at today visit                                  Standing Status:   Future     Standing Expiration Date: 6/2/2022    Wound compression and edema control     Surepress to the LLE or compression as per lymphedema specialist     Apply compression wrap to your affected Leg(s) from mid-foot to knee making sure to cover the heel  Apply in the morning and re-wrap as needed during the day if wrap becomes loose  Remove at bedtime and elevate legs or lie down      Avoid prolonged standing in one place      Elevate leg(s) above the level of the heart when sitting or as much as possible  Please get in bed 2-3 x daily and elevate your legs     Standing Status:   Future     Standing Expiration Date:   6/2/2022    Wound miscellaneous orders     Limit your fluid intake and stay away from carbonated beverages     Standing Status:   Future     Standing Expiration Date:   6/2/2022         Peter Dominguez DPM, FACFAS    Portions of the record may have been created with voice recognition software  Occasional wrong word or "sound a like" substitutions may have occurred due to the inherent limitations of voice recognition software  Read the chart carefully and recognize, using context, where substitutions have occurred

## 2021-06-08 ENCOUNTER — APPOINTMENT (EMERGENCY)
Dept: RADIOLOGY | Facility: HOSPITAL | Age: 66
DRG: 872 | End: 2021-06-08
Payer: MEDICARE

## 2021-06-08 ENCOUNTER — APPOINTMENT (INPATIENT)
Dept: RADIOLOGY | Facility: HOSPITAL | Age: 66
DRG: 872 | End: 2021-06-08
Payer: MEDICARE

## 2021-06-08 ENCOUNTER — HOSPITAL ENCOUNTER (INPATIENT)
Facility: HOSPITAL | Age: 66
LOS: 6 days | Discharge: HOME HEALTH | DRG: 872 | End: 2021-06-14
Attending: EMERGENCY MEDICINE | Admitting: INTERNAL MEDICINE
Payer: MEDICARE

## 2021-06-08 DIAGNOSIS — R42 LIGHTHEADEDNESS: ICD-10-CM

## 2021-06-08 DIAGNOSIS — L03.116 CELLULITIS OF LEFT LOWER EXTREMITY: ICD-10-CM

## 2021-06-08 DIAGNOSIS — E86.0 DEHYDRATION: ICD-10-CM

## 2021-06-08 DIAGNOSIS — R26.2 AMBULATORY DYSFUNCTION: ICD-10-CM

## 2021-06-08 DIAGNOSIS — A41.9 SEPSIS (HCC): ICD-10-CM

## 2021-06-08 DIAGNOSIS — R55 SYNCOPE AND COLLAPSE: Primary | ICD-10-CM
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
## 2021-06-08 LAB
ALBUMIN SERPL BCP-MCNC: 3.2 G/DL (ref 3.5–5)
ALP SERPL-CCNC: 62 U/L (ref 46–116)
ALT SERPL W P-5'-P-CCNC: 14 U/L (ref 12–78)
ANION GAP SERPL CALCULATED.3IONS-SCNC: 7 MMOL/L (ref 4–13)
APTT PPP: 32 SECONDS (ref 23–37)
AST SERPL W P-5'-P-CCNC: 9 U/L (ref 5–45)
ATRIAL RATE: 103 BPM
BASOPHILS # BLD MANUAL: 0 THOUSAND/UL (ref 0–0.1)
BASOPHILS NFR MAR MANUAL: 0 % (ref 0–1)
BILIRUB SERPL-MCNC: 1.01 MG/DL (ref 0.2–1)
BUN SERPL-MCNC: 13 MG/DL (ref 5–25)
CALCIUM ALBUM COR SERPL-MCNC: 9.7 MG/DL (ref 8.3–10.1)
CALCIUM SERPL-MCNC: 9.1 MG/DL (ref 8.3–10.1)
CHLORIDE SERPL-SCNC: 101 MMOL/L (ref 100–108)
CK SERPL-CCNC: 48 U/L (ref 39–308)
CO2 SERPL-SCNC: 26 MMOL/L (ref 21–32)
CREAT SERPL-MCNC: 1.15 MG/DL (ref 0.6–1.3)
EOSINOPHIL # BLD MANUAL: 0 THOUSAND/UL (ref 0–0.4)
EOSINOPHIL NFR BLD MANUAL: 0 % (ref 0–6)
ERYTHROCYTE [DISTWIDTH] IN BLOOD BY AUTOMATED COUNT: 16.6 % (ref 11.6–15.1)
GFR SERPL CREATININE-BSD FRML MDRD: 65 ML/MIN/1.73SQ M
GLUCOSE SERPL-MCNC: 119 MG/DL (ref 65–140)
GLUCOSE SERPL-MCNC: 123 MG/DL (ref 65–140)
GLUCOSE SERPL-MCNC: 131 MG/DL (ref 65–140)
HCT VFR BLD AUTO: 33.3 % (ref 36.5–49.3)
HGB BLD-MCNC: 10.2 G/DL (ref 12–17)
INR PPP: 1.35 (ref 0.84–1.19)
LACTATE SERPL-SCNC: 1.9 MMOL/L (ref 0.5–2)
LACTATE SERPL-SCNC: 3.2 MMOL/L (ref 0.5–2)
LYMPHOCYTES # BLD AUTO: 0.67 THOUSAND/UL (ref 0.6–4.47)
LYMPHOCYTES # BLD AUTO: 3 % (ref 14–44)
MCH RBC QN AUTO: 26 PG (ref 26.8–34.3)
MCHC RBC AUTO-ENTMCNC: 30.6 G/DL (ref 31.4–37.4)
MCV RBC AUTO: 85 FL (ref 82–98)
MONOCYTES # BLD AUTO: 0.22 THOUSAND/UL (ref 0–1.22)
MONOCYTES NFR BLD: 1 % (ref 4–12)
NEUTROPHILS # BLD MANUAL: 21.33 THOUSAND/UL (ref 1.85–7.62)
NEUTS BAND NFR BLD MANUAL: 14 % (ref 0–8)
NEUTS SEG NFR BLD AUTO: 82 % (ref 43–75)
NRBC BLD AUTO-RTO: 0 /100 WBCS
NT-PROBNP SERPL-MCNC: 2268 PG/ML
P AXIS: 98 DEGREES
PLATELET # BLD AUTO: 189 THOUSANDS/UL (ref 149–390)
PLATELET BLD QL SMEAR: ADEQUATE
PMV BLD AUTO: 12 FL (ref 8.9–12.7)
POTASSIUM SERPL-SCNC: 3.8 MMOL/L (ref 3.5–5.3)
PROCALCITONIN SERPL-MCNC: 0.97 NG/ML
PROT SERPL-MCNC: 7.8 G/DL (ref 6.4–8.2)
PROTHROMBIN TIME: 16.7 SECONDS (ref 11.6–14.5)
QRS AXIS: -51 DEGREES
QRSD INTERVAL: 108 MS
QT INTERVAL: 350 MS
QTC INTERVAL: 458 MS
RBC # BLD AUTO: 3.93 MILLION/UL (ref 3.88–5.62)
RBC MORPH BLD: NORMAL
SODIUM SERPL-SCNC: 134 MMOL/L (ref 136–145)
T WAVE AXIS: 7 DEGREES
TOTAL CELLS COUNTED SPEC: 100
TROPONIN I SERPL-MCNC: <0.02 NG/ML
VENTRICULAR RATE: 103 BPM
WBC # BLD AUTO: 22.22 THOUSAND/UL (ref 4.31–10.16)

## 2021-06-08 PROCEDURE — 74177 CT ABD & PELVIS W/CONTRAST: CPT

## 2021-06-08 PROCEDURE — 80053 COMPREHEN METABOLIC PANEL: CPT | Performed by: EMERGENCY MEDICINE

## 2021-06-08 PROCEDURE — 96366 THER/PROPH/DIAG IV INF ADDON: CPT

## 2021-06-08 PROCEDURE — 85730 THROMBOPLASTIN TIME PARTIAL: CPT | Performed by: EMERGENCY MEDICINE

## 2021-06-08 PROCEDURE — 93005 ELECTROCARDIOGRAM TRACING: CPT

## 2021-06-08 PROCEDURE — 83880 ASSAY OF NATRIURETIC PEPTIDE: CPT | Performed by: EMERGENCY MEDICINE

## 2021-06-08 PROCEDURE — 83605 ASSAY OF LACTIC ACID: CPT | Performed by: EMERGENCY MEDICINE

## 2021-06-08 PROCEDURE — 99285 EMERGENCY DEPT VISIT HI MDM: CPT | Performed by: EMERGENCY MEDICINE

## 2021-06-08 PROCEDURE — 70450 CT HEAD/BRAIN W/O DYE: CPT

## 2021-06-08 PROCEDURE — 96367 TX/PROPH/DG ADDL SEQ IV INF: CPT

## 2021-06-08 PROCEDURE — 73630 X-RAY EXAM OF FOOT: CPT

## 2021-06-08 PROCEDURE — 99285 EMERGENCY DEPT VISIT HI MDM: CPT

## 2021-06-08 PROCEDURE — 85007 BL SMEAR W/DIFF WBC COUNT: CPT | Performed by: EMERGENCY MEDICINE

## 2021-06-08 PROCEDURE — 71260 CT THORAX DX C+: CPT

## 2021-06-08 PROCEDURE — 72125 CT NECK SPINE W/O DYE: CPT

## 2021-06-08 PROCEDURE — 85027 COMPLETE CBC AUTOMATED: CPT | Performed by: EMERGENCY MEDICINE

## 2021-06-08 PROCEDURE — 85610 PROTHROMBIN TIME: CPT | Performed by: EMERGENCY MEDICINE

## 2021-06-08 PROCEDURE — 99223 1ST HOSP IP/OBS HIGH 75: CPT | Performed by: INTERNAL MEDICINE

## 2021-06-08 PROCEDURE — 82948 REAGENT STRIP/BLOOD GLUCOSE: CPT

## 2021-06-08 PROCEDURE — 96365 THER/PROPH/DIAG IV INF INIT: CPT

## 2021-06-08 PROCEDURE — 93010 ELECTROCARDIOGRAM REPORT: CPT | Performed by: INTERNAL MEDICINE

## 2021-06-08 PROCEDURE — 87040 BLOOD CULTURE FOR BACTERIA: CPT | Performed by: EMERGENCY MEDICINE

## 2021-06-08 PROCEDURE — 84484 ASSAY OF TROPONIN QUANT: CPT | Performed by: EMERGENCY MEDICINE

## 2021-06-08 PROCEDURE — 82550 ASSAY OF CK (CPK): CPT | Performed by: EMERGENCY MEDICINE

## 2021-06-08 PROCEDURE — G1004 CDSM NDSC: HCPCS

## 2021-06-08 PROCEDURE — 84145 PROCALCITONIN (PCT): CPT | Performed by: EMERGENCY MEDICINE

## 2021-06-08 PROCEDURE — 36415 COLL VENOUS BLD VENIPUNCTURE: CPT | Performed by: EMERGENCY MEDICINE

## 2021-06-08 RX ORDER — LEVOTHYROXINE SODIUM 0.03 MG/1
25 TABLET ORAL DAILY
Status: DISCONTINUED | OUTPATIENT
Start: 2021-06-09 | End: 2021-06-14 | Stop reason: HOSPADM

## 2021-06-08 RX ORDER — LISINOPRIL 10 MG/1
10 TABLET ORAL DAILY
Status: DISCONTINUED | OUTPATIENT
Start: 2021-06-09 | End: 2021-06-14 | Stop reason: HOSPADM

## 2021-06-08 RX ORDER — ONDANSETRON 2 MG/ML
4 INJECTION INTRAMUSCULAR; INTRAVENOUS EVERY 6 HOURS PRN
Status: DISCONTINUED | OUTPATIENT
Start: 2021-06-08 | End: 2021-06-14 | Stop reason: HOSPADM

## 2021-06-08 RX ORDER — ACETAMINOPHEN 325 MG/1
975 TABLET ORAL ONCE
Status: COMPLETED | OUTPATIENT
Start: 2021-06-08 | End: 2021-06-08

## 2021-06-08 RX ORDER — PRAVASTATIN SODIUM 20 MG
20 TABLET ORAL
Status: DISCONTINUED | OUTPATIENT
Start: 2021-06-09 | End: 2021-06-14 | Stop reason: HOSPADM

## 2021-06-08 RX ORDER — CALCIUM CARBONATE 200(500)MG
1000 TABLET,CHEWABLE ORAL DAILY PRN
Status: DISCONTINUED | OUTPATIENT
Start: 2021-06-08 | End: 2021-06-14 | Stop reason: HOSPADM

## 2021-06-08 RX ORDER — METOPROLOL TARTRATE 50 MG/1
50 TABLET, FILM COATED ORAL 2 TIMES DAILY
Status: DISCONTINUED | OUTPATIENT
Start: 2021-06-08 | End: 2021-06-14 | Stop reason: HOSPADM

## 2021-06-08 RX ORDER — ACETAMINOPHEN 325 MG/1
650 TABLET ORAL EVERY 6 HOURS PRN
Status: DISCONTINUED | OUTPATIENT
Start: 2021-06-08 | End: 2021-06-14 | Stop reason: HOSPADM

## 2021-06-08 RX ORDER — ASPIRIN 81 MG/1
81 TABLET ORAL DAILY
Status: DISCONTINUED | OUTPATIENT
Start: 2021-06-09 | End: 2021-06-14 | Stop reason: HOSPADM

## 2021-06-08 RX ORDER — GABAPENTIN 100 MG/1
100 CAPSULE ORAL 3 TIMES DAILY
Status: DISCONTINUED | OUTPATIENT
Start: 2021-06-08 | End: 2021-06-14 | Stop reason: HOSPADM

## 2021-06-08 RX ORDER — DOCUSATE SODIUM 100 MG/1
100 CAPSULE, LIQUID FILLED ORAL 2 TIMES DAILY
Status: DISCONTINUED | OUTPATIENT
Start: 2021-06-08 | End: 2021-06-14 | Stop reason: HOSPADM

## 2021-06-08 RX ORDER — SENNOSIDES 8.6 MG
2 TABLET ORAL DAILY PRN
Status: DISCONTINUED | OUTPATIENT
Start: 2021-06-08 | End: 2021-06-14 | Stop reason: HOSPADM

## 2021-06-08 RX ORDER — PANTOPRAZOLE SODIUM 40 MG/1
40 TABLET, DELAYED RELEASE ORAL DAILY
Status: DISCONTINUED | OUTPATIENT
Start: 2021-06-09 | End: 2021-06-14 | Stop reason: HOSPADM

## 2021-06-08 RX ADMIN — IOHEXOL 100 ML: 350 INJECTION, SOLUTION INTRAVENOUS at 18:10

## 2021-06-08 RX ADMIN — SODIUM CHLORIDE 1000 ML: 0.9 INJECTION, SOLUTION INTRAVENOUS at 17:18

## 2021-06-08 RX ADMIN — ACETAMINOPHEN 975 MG: 325 TABLET ORAL at 17:35

## 2021-06-08 RX ADMIN — CEFEPIME HYDROCHLORIDE 2000 MG: 2 INJECTION, POWDER, FOR SOLUTION INTRAVENOUS at 16:16

## 2021-06-08 RX ADMIN — VANCOMYCIN HYDROCHLORIDE 2000 MG: 10 INJECTION, POWDER, LYOPHILIZED, FOR SOLUTION INTRAVENOUS at 17:32

## 2021-06-08 RX ADMIN — DOCUSATE SODIUM 100 MG: 100 CAPSULE ORAL at 22:26

## 2021-06-08 RX ADMIN — IOHEXOL 100 ML: 350 INJECTION, SOLUTION INTRAVENOUS at 18:24

## 2021-06-08 RX ADMIN — GABAPENTIN 100 MG: 100 CAPSULE ORAL at 22:26

## 2021-06-08 NOTE — SEPSIS NOTE
Sepsis Note   Nicole Barry 74 y o  male MRN: [id number]  Unit/Bed#: ED 16 Encounter: [id number]      qSOFA     Row Name 06/08/21 1630 06/08/21 1538             Altered mental status GCS < 15  --  --       Respiratory Rate > / =22  0  0       Systolic BP < / =516  0  0       Q Sofa Score  0  0           Initial Sepsis Screening     Row Name 06/08/21 1615                Is the patient's history suggestive of a new or worsening infection? (!) Yes (Proceed)  -CE        Suspected source of infection  wound infection  -CE        Are two or more of the following signs & symptoms of infection both present and new to the patient? (!) Yes (Proceed)  -CE        Indicate SIRS criteria  Leukocytosis (WBC > 93722 IJL); Tachycardia > 90 bpm  -CE        If the answer is yes to both questions, suspicion of sepsis is present  --        If severe sepsis is present AND tissue hypoperfusion perists in the hour after fluid resuscitation or lactate > 4, the patient meets criteria for SEPTIC SHOCK  --        Are any of the following organ dysfunction criteria present within 6 hours of suspected infection and SIRS criteria that are NOT considered to be chronic conditions? (!) Yes  -CE        Organ dysfunction  Lactate > 2 0 mmol/L  -CE        Date of presentation of severe sepsis  06/08/21  -CE        Time of presentation of severe sepsis  1615  -CE        Tissue hypoperfusion persists in the hour after crystalloid fluid administration, evidenced, by either:  --        Was hypotension present within one hour of the conclusion of crystalloid fluid administration?   No  -CE        Date of presentation of septic shock  --        Time of presentation of septic shock  --          User Key  (r) = Recorded By, (t) = Taken By, (c) = Cosigned By    [address] Name Provider Vijay Hatch MD Resident

## 2021-06-08 NOTE — ED PROVIDER NOTES
History  Chief Complaint   Patient presents with    Syncope     Patient reports that he felt dizzy when he got up from his recliner today and went down to the ground; denies head strike or LOC at this time     77-year-old male history of diabetes, hypertension, AFib on aspirin presenting after possible syncopal event  Patient reports he was sitting in a power recliner and was assisted to standing by the chair  Upon standing, he had acute onset significant lightheadedness  He reports that he does not think that he passed out or lost consciousness but reports that the next thing he realized he was falling forward  Attempted to brace himself but did fall flat on the ground  Reports that he was on the ground unable to get up for approximately 3 hours before reaching a phone and calling 911  Reports some mild lightheadedness but denies any other complaints other than his chronic low back pain that he reports is not worse than usual   Patient also reports chronic left lower extremity wound on the dorsum of his left foot that is being managed by wound management  Patient reports last visit was yesterday  Since that time, there has been significant spread of left lower extremity warmth and erythema crossing his knee  Denies any fevers or chills  Denies any prodromal symptoms other than lightheadedness prior to the fall  Denies any additional complaints at this time  Denies any headache, vision changes, chest pain, shortness of breath, abdominal pain, nausea/vomiting, fever/chills, or any bladder or bowel changes  Prior to Admission Medications   Prescriptions Last Dose Informant Patient Reported? Taking?    Multiple Vitamins-Minerals (MULTIVITAMIN MEN 50+ PO)  Self Yes No   Sig: Take by mouth   Zoster Vac Recomb Adjuvanted (Shingrix) 50 MCG/0 5ML SUSR   No No   Si 5mL IM for one dose, followed by 0 5mL IM 2-6 months after first dose   aspirin (ASPIRIN 81) 81 mg EC tablet  Self Yes No   Sig: Take 81 mg by mouth Daily   clotrimazole-betamethasone (LOTRISONE) 1-0 05 % cream  Self No No   Sig: Apply topically 2 (two) times a day   gabapentin (NEURONTIN) 100 mg capsule   No No   Sig: Take 1 capsule (100 mg total) by mouth 3 (three) times a day   levothyroxine 25 mcg tablet   No No   Sig: Take 1 tablet (25 mcg total) by mouth daily In addition to the 300 mcg dose (stop the 75 mcg dose)   levothyroxine 300 MCG tablet   No No   Sig: Take 1 tablet (300 mcg total) by mouth daily In addition to 75 mcg dose   lisinopril (ZESTRIL) 10 mg tablet   No No   Sig: Take 1 tablet (10 mg total) by mouth daily   metFORMIN (GLUCOPHAGE-XR) 750 mg 24 hr tablet   No No   Sig: Take 1 tablet (750 mg total) by mouth daily with breakfast   metoprolol tartrate (LOPRESSOR) 50 mg tablet   No No   Sig: Take 1 tablet (50 mg total) by mouth 2 (two) times a day   pantoprazole (PROTONIX) 40 mg tablet   No No   Sig: Take 1 tablet (40 mg total) by mouth daily   simvastatin (ZOCOR) 10 mg tablet   No No   Sig: Take 1 tablet (10 mg total) by mouth daily at bedtime      Facility-Administered Medications: None       Past Medical History:   Diagnosis Date    Atrial fibrillation (HCC)     Cellulitis     Diabetes mellitus ([facility] [address] )     Disease of thyroid gland     Duodenal ulcer     perforated- ICU stay    High blood pressure     High cholesterol     Hyperlipidemia     Hypertension     Hypothyroidism     Lymphedema      b/l LE- was followed at wound center    Morbid obesity with BMI of 40 0-44 9, adult (HCC)     Peritonitis (HCC)        Past Surgical History:   Procedure Laterality Date    BELOW KNEE LEG AMPUTATION Right     DIAGNOSTIC LAPAROSCOPY      KNEE ARTHROSCOPY Bilateral     OTHER SURGICAL HISTORY  03/2014     lap repair    OTHER SURGICAL HISTORY      Laparoscopic repair of a perforated duodenal ulcer with Sammy Aus omental    OTHER SURGICAL HISTORY      Placement of drains       Family History   Problem Relation Age of Onset    Heart disease Family     Alcohol abuse Family     Heart disease Mother     Hypertension Mother    Nadine Talamantes Migraines Daughter     Leukemia Brother     Migraines Daughter     Substance Abuse Neg Hx     Mental illness Neg Hx     Depression Neg Hx      I have reviewed and agree with the history as documented  E-Cigarette/Vaping     E-Cigarette/Vaping Substances     Social History     Tobacco Use    Smoking status: Former Smoker     Packs/day: 1 00     Years: 30 00     Pack years: 30 00     Quit date: 2004     Years since quittin 6    Smokeless tobacco: Never Used   Substance Use Topics    Alcohol use: Not Currently    Drug use: Not Currently        Review of Systems   Constitutional: Negative for appetite change, chills, diaphoresis, fever and unexpected weight change  HENT: Negative for congestion and rhinorrhea  Eyes: Negative for photophobia and visual disturbance  Respiratory: Negative for cough, chest tightness and shortness of breath  Cardiovascular: Negative for chest pain, palpitations and leg swelling  Gastrointestinal: Negative for abdominal distention, abdominal pain, blood in stool, constipation, diarrhea, nausea and vomiting  Genitourinary: Negative for dysuria and hematuria  Musculoskeletal: Negative for back pain, joint swelling, neck pain and neck stiffness  Skin: Positive for color change, rash and wound  Negative for pallor  Neurological: Positive for syncope and light-headedness  Negative for dizziness, weakness and headaches  Psychiatric/Behavioral: Negative for agitation  All other systems reviewed and are negative        Physical Exam  ED Triage Vitals [21 1538]   Temperature Pulse Respirations Blood Pressure SpO2   98 2 °F (36 8 °C) 100 16 129/70 97 %      Temp Source Heart Rate Source Patient Position - Orthostatic VS BP Location FiO2 (%)   Oral Monitor Lying Right arm --      Pain Score       No Pain             Orthostatic Vital Signs  Vitals: 06/09/21 0809 06/09/21 1020 06/09/21 1258 06/09/21 1302   BP: 142/65 140/62 119/69 127/69   Pulse:  72 76 86   Patient Position - Orthostatic VS:  Sitting         Physical Exam  Vitals signs and nursing note reviewed  Constitutional:       General: He is not in acute distress  Appearance: Normal appearance  He is well-developed  He is not ill-appearing, toxic-appearing or diaphoretic  HENT:      Head: Normocephalic and atraumatic  Nose: Nose normal  No congestion or rhinorrhea  Mouth/Throat:      Mouth: Mucous membranes are moist       Pharynx: Oropharynx is clear  No oropharyngeal exudate or posterior oropharyngeal erythema  Eyes:      General: No scleral icterus  Right eye: No discharge  Left eye: No discharge  Extraocular Movements: Extraocular movements intact  Conjunctiva/sclera: Conjunctivae normal       Pupils: Pupils are equal, round, and reactive to light  Neck:      Musculoskeletal: Normal range of motion and neck supple  No neck rigidity or muscular tenderness  Vascular: No JVD  Trachea: No tracheal deviation  Cardiovascular:      Rate and Rhythm: Regular rhythm  Tachycardia present  Heart sounds: Normal heart sounds  No murmur  No friction rub  No gallop  Comments: Tachy to low 100s with regular rhythm  Pulmonary:      Effort: Pulmonary effort is normal  No respiratory distress  Breath sounds: Normal breath sounds  No stridor  No wheezing or rales  Comments: Bilateral decreased breath sounds  Chest:      Chest wall: No tenderness  Abdominal:      General: Bowel sounds are normal  There is no distension  Palpations: Abdomen is soft  Tenderness: There is no abdominal tenderness  There is no right CVA tenderness, left CVA tenderness, guarding or rebound  Comments: Soft, nontender  Normal bowel sounds throughout   Musculoskeletal: Normal range of motion  General: Tenderness present   No swelling, deformity or signs of injury  Right lower leg: No edema  Left lower leg: No edema  Comments: Midline lumbar tenderness palpation  No additional midline tenderness neck or back  No external signs of head trauma or tenderness palpation  Well-appearing right BKA  Remainder of full body head to toe trauma assessment including but not limited to palpation, visual assessment, range of motion of all extremities was otherwise unremarkable  Lymphadenopathy:      Cervical: No cervical adenopathy  Skin:     General: Skin is warm and dry  Coloration: Skin is not pale  Findings: Erythema and lesion present  No rash  Comments: Relatively superficial left dorsal foot lesion with surrounding warmth and erythema extending up to proximal thigh  Nontender  Neurological:      General: No focal deficit present  Mental Status: He is alert and oriented to person, place, and time  Mental status is at baseline  Cranial Nerves: No cranial nerve deficit  Sensory: No sensory deficit  Motor: No weakness or abnormal muscle tone  Coordination: Coordination normal       Gait: Gait normal       Comments: A&Ox3 to person, place, and time  CN 2-12 intact  Strength 5/5 throughout  Sensation grossly intact  Cerebellar exam including gait intact  Psychiatric:         Behavior: Behavior normal          Thought Content:  Thought content normal          ED Medications  Medications   aspirin (ECOTRIN LOW STRENGTH) EC tablet 81 mg (81 mg Oral Given 6/9/21 0811)   gabapentin (NEURONTIN) capsule 100 mg (100 mg Oral Given 6/9/21 0811)   levothyroxine tablet 25 mcg (25 mcg Oral Given 6/9/21 0731)   levothyroxine tablet 300 mcg (300 mcg Oral Given 6/9/21 0730)   lisinopril (ZESTRIL) tablet 10 mg (10 mg Oral Given 6/9/21 0811)   metoprolol tartrate (LOPRESSOR) tablet 50 mg (50 mg Oral Given 6/9/21 0810)   multivitamin-minerals (CENTRUM) tablet 1 tablet (1 tablet Oral Given 6/9/21 0811) pantoprazole (PROTONIX) EC tablet 40 mg (40 mg Oral Given 6/9/21 0730)   pravastatin (PRAVACHOL) tablet 20 mg (has no administration in time range)   acetaminophen (TYLENOL) tablet 650 mg (has no administration in time range)   docusate sodium (COLACE) capsule 100 mg (100 mg Oral Given 6/9/21 0811)   senna (SENOKOT) tablet 17 2 mg (has no administration in time range)   ondansetron (ZOFRAN) injection 4 mg (has no administration in time range)   calcium carbonate (TUMS) chewable tablet 1,000 mg (has no administration in time range)   enoxaparin (LOVENOX) subcutaneous injection 40 mg (40 mg Subcutaneous Not Given 6/9/21 0920)   cefepime (MAXIPIME) 2 g/50 mL dextrose IVPB (0 mg Intravenous Stopped 6/9/21 0845)   insulin lispro (HumaLOG) 100 units/mL subcutaneous injection 1-6 Units (1 Units Subcutaneous Not Given 6/9/21 0706)   insulin lispro (HumaLOG) 100 units/mL subcutaneous injection 1-6 Units (0 Units Subcutaneous Hold 6/8/21 2228)   vancomycin (VANCOCIN) 1,250 mg in sodium chloride 0 9 % 250 mL IVPB (0 mg Intravenous Stopped 6/9/21 1022)   vancomycin (VANCOCIN) 2,000 mg in sodium chloride 0 9 % 500 mL IVPB (0 mg Intravenous Stopped 6/8/21 2134)   cefepime (MAXIPIME) 2 g/50 mL dextrose IVPB (0 mg Intravenous Stopped 6/8/21 1732)   sodium chloride 0 9 % bolus 1,000 mL (0 mL Intravenous Stopped 6/8/21 2134)   acetaminophen (TYLENOL) tablet 975 mg (975 mg Oral Given 6/8/21 1735)   iohexol (OMNIPAQUE) 350 MG/ML injection (SINGLE-DOSE) 100 mL (100 mL Intravenous Given 6/8/21 1810)   iohexol (OMNIPAQUE) 350 MG/ML injection (MULTI-DOSE) 100 mL (100 mL Intravenous Given 6/8/21 1824)       Diagnostic Studies  Results Reviewed     Procedure Component Value Units Date/Time    Urine Microscopic [[id number]]  (Abnormal) Collected: 06/09/21 0731    Lab Status: Final result Specimen: Urine, Clean Catch Updated: 06/09/21 0757     RBC, UA 20-30 /hpf      WBC, UA 30-50 /hpf      Epithelial Cells None Seen /hpf      Bacteria, UA None Seen /hpf      Hyaline Casts, UA 5-10 /lpf     Urine culture [[id number]] Collected: 06/09/21 0731    Lab Status: In process Specimen: Urine, Clean Catch Updated: 06/09/21 0757    UA w Reflex to Microscopic w Reflex to Culture [[id number]]  (Abnormal) Collected: 06/09/21 0731    Lab Status: Final result Specimen: Urine, Clean Catch Updated: 06/09/21 0754     Color, UA Dk Yellow     Clarity, UA Clear     Specific Gravity, UA >1 045     pH, UA 6 5     Leukocytes, UA Small     Nitrite, UA Negative     Protein, UA 30 (1+) mg/dl      Glucose, UA Negative mg/dl      Ketones, UA Trace mg/dl      Urobilinogen, UA 2 0 E U /dl      Bilirubin, UA Interference- unable to analyze     Blood, UA Small    Fingerstick Glucose (POCT) [[id number]]  (Normal) Collected: 06/09/21 0705    Lab Status: Final result Updated: 06/09/21 0705     POC Glucose 88 mg/dl     Fingerstick Glucose (POCT) [[id number]]  (Normal) Collected: 06/08/21 2227    Lab Status: Final result Updated: 06/08/21 2228     POC Glucose 131 mg/dl     Blood culture #1 [[id number]] Collected: 06/08/21 1608    Lab Status: Preliminary result Specimen: Blood from Arm, Left Updated: 06/08/21 2001     Blood Culture Received in Microbiology Lab  Culture in Progress  Blood culture #2 [[id number]] Collected: 06/08/21 1611    Lab Status: Preliminary result Specimen: Blood from Arm, Right Updated: 06/08/21 2001     Blood Culture Received in Microbiology Lab  Culture in Progress  Lactic acid 2 Hours [[id number]]  (Normal) Collected: 06/08/21 1734    Lab Status: Final result Specimen: Blood from Arm, Left Updated: 06/08/21 1811     LACTIC ACID 1 9 mmol/L     Narrative:      Result may be elevated if tourniquet was used during collection      Procalcitonin with AM Reflex [[id number]]  (Abnormal) Collected: 06/08/21 1608    Lab Status: Final result Specimen: Blood from Arm, Left Updated: 06/08/21 1700     Procalcitonin 0 97 ng/ml     Procalcitonin Reflex [[id number]]     Lab Status: No result Specimen: Blood     Lactic acid [[id number]]  (Abnormal) Collected: 06/08/21 1608    Lab Status: Final result Specimen: Blood from Arm, Left Updated: 06/08/21 1658     LACTIC ACID 3 2 mmol/L     Narrative:      Result may be elevated if tourniquet was used during collection  CBC and differential [[id number]]  (Abnormal) Collected: 06/08/21 1608    Lab Status: Final result Specimen: Blood from Arm, Left Updated: 06/08/21 1650     WBC 22 22 Thousand/uL      RBC 3 93 Million/uL      Hemoglobin 10 2 g/dL      Hematocrit 33 3 %      MCV 85 fL      MCH 26 0 pg      MCHC 30 6 g/dL      RDW 16 6 %      MPV 12 0 fL      Platelets 289 Thousands/uL      nRBC 0 /100 WBCs     Narrative: This is an appended report  These results have been appended to a previously verified report      Manual Differential(PHLEBS Do Not Order) [[id number]]  (Abnormal) Collected: 06/08/21 1608    Lab Status: Final result Specimen: Blood from Arm, Left Updated: 06/08/21 1650     Segmented % 82 %      Bands % 14 %      Lymphocytes % 3 %      Monocytes % 1 %      Eosinophils, % 0 %      Basophils % 0 %      Absolute Neutrophils 21 33 Thousand/uL      Lymphocytes Absolute 0 67 Thousand/uL      Monocytes Absolute 0 22 Thousand/uL      Eosinophils Absolute 0 00 Thousand/uL      Basophils Absolute 0 00 Thousand/uL      Total Counted 100     RBC Morphology Normal     Platelet Estimate Adequate    NT-BNP PRO [[id number]]  (Abnormal) Collected: 06/08/21 1608    Lab Status: Final result Specimen: Blood from Arm, Left Updated: 06/08/21 1643     NT-proBNP 2,268 pg/mL     Comprehensive metabolic panel [[id number]]  (Abnormal) Collected: 06/08/21 1608    Lab Status: Final result Specimen: Blood from Arm, Left Updated: 06/08/21 1642     Sodium 134 mmol/L      Potassium 3 8 mmol/L      Chloride 101 mmol/L      CO2 26 mmol/L      ANION GAP 7 mmol/L      BUN 13 mg/dL      Creatinine 1 15 mg/dL      Glucose 123 mg/dL      Calcium 9 1 mg/dL      Corrected Calcium 9 7 mg/dL      AST 9 U/L      ALT 14 U/L      Alkaline Phosphatase 62 U/L      Total Protein 7 8 g/dL      Albumin 3 2 g/dL      Total Bilirubin 1 01 mg/dL      eGFR 65 ml/min/1 73sq m     Narrative:      [place] guidelines for Chronic Kidney Disease (CKD):     Stage 1 with normal or high GFR (GFR > 90 mL/min/1 73 square meters)    Stage 2 Mild CKD (GFR = 60-89 mL/min/1 73 square meters)    Stage 3A Moderate CKD (GFR = 45-59 mL/min/1 73 square meters)    Stage 3B Moderate CKD (GFR = 30-44 mL/min/1 73 square meters)    Stage 4 Severe CKD (GFR = 15-29 mL/min/1 73 square meters)    Stage 5 End Stage CKD (GFR <15 mL/min/1 73 square meters)  Note: GFR calculation is accurate only with a steady state creatinine    CK (with reflex to MB) [[id number]]  (Normal) Collected: 06/08/21 1608    Lab Status: Final result Specimen: Blood from Arm, Left Updated: 06/08/21 1642     Total CK 48 U/L     Protime-INR [[id number]]  (Abnormal) Collected: 06/08/21 1608    Lab Status: Final result Specimen: Blood from Arm, Left Updated: 06/08/21 1642     Protime 16 7 seconds      INR 1 35    APTT [[id number]]  (Normal) Collected: 06/08/21 1608    Lab Status: Final result Specimen: Blood from Arm, Left Updated: 06/08/21 1642     PTT 32 seconds     Troponin I [[id number]]  (Normal) Collected: 06/08/21 1608    Lab Status: Final result Specimen: Blood from Arm, Left Updated: 06/08/21 1641     Troponin I <0 02 ng/mL     Fingerstick Glucose (POCT) [[id number]]  (Normal) Collected: 06/08/21 1616    Lab Status: Final result Updated: 06/08/21 1617     POC Glucose 119 mg/dl                  CT head without contrast   Final Result by Kelli Carbajal MD (06/08 1919)      No acute intracranial abnormality  Workstation performed: [id number]         CT spine cervical without contrast   Final Result by Kelli Carbajal MD (06/08 1922)      No cervical spine fracture or traumatic malalignment  Workstation performed: [id number]         CT chest abdomen pelvis w contrast   Final Result by Kojo Lynn MD (06/08 1937)         1  No acute injury within the chest, abdomen, or pelvis  2   Mildly enlarged left inguinal external iliac and prominent right inguinal lymph nodes  A CT report from outside hospital dated 3/12/2014 reports prominent inguinal lymph nodes  If images are provided for comparison and document stability, no    further workup indicated  Otherwise, a follow-up CT in 3 months to ensure stability recommended  Workstation performed: [id number]         XR foot 3+ vw left    (Results Pending)         Procedures  ECG 12 Lead Documentation Only    Date/Time: 6/8/2021 4:11 PM  Performed by: Cristian Medina MD  Authorized by: Cristian Medina MD     Indications / Diagnosis:  Sepsis  ECG reviewed by me, the ED Provider: yes    Patient location:  ED  Previous ECG:     Previous ECG:  Compared to current    Comparison ECG info: Tachy    Similarity:  Changes noted    Comparison to cardiac monitor: Yes    Interpretation:     Interpretation: abnormal    Quality:     Tracing quality:  Limited by artifact  Rate:     ECG rate:  103    ECG rate assessment: tachycardic    Rhythm:     Rhythm: sinus tachycardia    Ectopy:     Ectopy: none    QRS:     QRS axis:  Left    QRS intervals: Wide  Conduction:     Conduction: abnormal      Abnormal conduction: incomplete RBBB    ST segments:     ST segments:  Non-specific  T waves:     T waves: non-specific            ED Course               Identification of Seniors at Risk      Most Recent Value   (ISAR) Identification of Seniors at Risk   Before the illness or injury that brought you to the Emergency, did you need someone to help you on a regular basis? 1 Filed at: 06/08/2021 1541   In the last 24 hours, have you needed more help than usual?  0 Filed at: 06/08/2021 1541   Have you been hospitalized for one or more nights during the past 6 months? 1 Filed at: 06/08/2021 1541   In general, do you see well?  0 Filed at: 06/08/2021 1541   In general, do you have serious problems with your memory? 0 Filed at: 06/08/2021 1541   Do you take more than three different medications every day? 1 Filed at: 06/08/2021 1541   ISAR Score  3 Filed at: 06/08/2021 1541                Initial Sepsis Screening     Row Name 06/08/21 1615                Is the patient's history suggestive of a new or worsening infection? (!) Yes (Proceed)  -CE        Suspected source of infection  wound infection  -CE        Are two or more of the following signs & symptoms of infection both present and new to the patient? (!) Yes (Proceed)  -CE        Indicate SIRS criteria  Leukocytosis (WBC > 70172 IJL); Tachycardia > 90 bpm  -CE        If the answer is yes to both questions, suspicion of sepsis is present  --        If severe sepsis is present AND tissue hypoperfusion perists in the hour after fluid resuscitation or lactate > 4, the patient meets criteria for SEPTIC SHOCK  --        Are any of the following organ dysfunction criteria present within 6 hours of suspected infection and SIRS criteria that are NOT considered to be chronic conditions? (!) Yes  -CE        Organ dysfunction  Lactate > 2 0 mmol/L  -CE        Date of presentation of severe sepsis  06/08/21  -CE        Time of presentation of severe sepsis  1615  -CE        Tissue hypoperfusion persists in the hour after crystalloid fluid administration, evidenced, by either:  --        Was hypotension present within one hour of the conclusion of crystalloid fluid administration?   No  -CE        Date of presentation of septic shock  --        Time of presentation of septic shock  --          User Key  (r) = Recorded By, (t) = Taken By, (c) = Cosigned By    [address]  Name Provider Steve Lozano MD Resident           Default Flowsheet Data (last 720 hours)      Sepsis Reassess     [address] Name 06/08/21 [id number] Repeat Volume Status and Tissue Perfusion Assessment Performed    Repeat Volume Status and Tissue Perfusion Assessment Performed  Yes  -CE           Volume Status and Tissue Perfusion Post Fluid Resuscitation * Must Document All *    Vital Signs Reviewed (HR, RR, BP, T)  Yes  -CE        Shock Index Reviewed  Yes  -CE        Arterial Oxygen Saturation Reviewed (POx, SaO2 or SpO2)  Yes (comment %)  -CE        Cardio  Normal S1/S2; Regular rate and rhythm  -CE        Pulmonary  Normal effort  -CE        Capillary Refill  Brisk  -CE        Peripheral Pulses  Radial;Dorsalis Pedis; Posterior Tibialis  -CE        Peripheral Pulse  +2  -CE        Dorsalis Pedis  +2  -CE        Posterior Tibialis  +2  -CE        Skin  Warm;Dry  -CE        Urine output assessed  Adequate  -CE           *OR*   Intensive Monitoring- Must Document One of the Following Four *:    Vital Signs Reviewed  --        * Central Venous Pressure (CVP or RAP)  --        * Central Venous Oxygen (SVO2, ScvO2 or Oxygen saturation via central catheter)  --        * Bedside Cardiovascular US in IVC diameter and % collapse  --        * Passive Leg Raise OR Crystalloid Challenge  --          User Key  (r) = Recorded By, (t) = Taken By, (c) = Cosigned By    Initials Name Provider Yumiko Bishop MD Resident        SBIRT 22yo+      Most Recent Value   SBIRT (23 yo +)   In order to provide better care to our patients, we are screening all of our patients for alcohol and drug use  Would it be okay to ask you these screening questions? No Filed at: 06/09/2021 5496                [facility]  Number of Diagnoses or Management Options  Diagnosis management comments: 58-year-old male history of diabetes, hypertension, AFib on aspirin presenting after possible syncopal event  Patient does not report loss of consciousness but story concerning for syncopal event  Plan for syncopal evaluation  Given fall on aspirin and midline back tenderness, plan for CT scans    Left lower extremity warmth and erythema with known wound concerning for cellulitis  Sepsis evaluation given tachycardia and white count with likely source left lower extremity cellulitis  IV cefepime and vanc  CT head, neck, chest abdomen pelvis  Patient declining pain medication at this time  Reassess  Labs notable for white count 85527 and lactate of 3 2   1 L IV fluids corrected lactate to 1 8  CT scans no acute process  Patient informed of the abdominal lymphadenopathy and acknowledged understanding and need for follow-up  Admitted for IV antibiotics given cellulitis and ambulatory dysfunction and further syncopal evaluation         Amount and/or Complexity of Data Reviewed  Clinical lab tests: reviewed and ordered  Tests in the radiology section of CPT®: ordered and reviewed  Tests in the medicine section of CPT®: reviewed and ordered  Review and summarize past medical records: yes  Independent visualization of images, tracings, or specimens: yes    Risk of Complications, Morbidity, and/or Mortality  Presenting problems: high  Diagnostic procedures: high  Management options: high        Disposition  Final diagnoses:   Cellulitis of left lower extremity   Syncope and collapse   Lightheadedness   Dehydration   Ambulatory dysfunction     Time reflects when diagnosis was documented in both MDM as applicable and the Disposition within this note     Time User Action Codes Description Comment    6/8/2021  8:14 PM Jad Pathak Add [H25 180] Cellulitis of left lower extremity     6/8/2021  8:14 PM Tammi Tarore, [address] [place] [address] [U79 215] Cellulitis of left lower extremity     6/9/2021  1:14 PM Gertrude Tenorio Add [R55] Syncope and collapse     6/9/2021  1:14 PM Erne, [address] [V04 968] Cellulitis of left lower extremity     6/9/2021  1:14 PM Erne, [address] [R55] Syncope and collapse     6/9/2021  1:14 PM Gertrude Tenorio Add [R42] Lightheadedness     6/9/2021  1:14 PM Gertrude Tenorio Add [E86 0] Dehydration     6/9/2021  1:14 PM Mukesh Albarran Modify [T24 916] Cellulitis of left lower extremity     6/9/2021  1:14 PM Mukesh Albarran Add [R26 2] Ambulatory dysfunction       ED Disposition     ED Disposition Condition Date/Time Comment    Admit Stable [place] Jun 8, 2021 11:36 PM Case was discussed with Dr Nenita Sanderson and the patient's admission status was agreed to be Admission Status: inpatient status to the service of Dr Dr Nenita Sanderson           Follow-up Information     Follow up With Specialties Details Why Contact Info Additional [address] Schedule an appointment as soon as possible for a visit in 1 week(s) Dr Shaggy LyonsmanjitwallyWinslow [facility] Cent[facility]aldem[facility]oph[facility] [address], [facility] [address]          Current Discharge Medication List      CONTINUE these medications which have NOT CHANGED    Details   aspirin (ASPIRIN 81) 81 mg EC tablet Take 81 mg by mouth Daily      clotrimazole-betamethasone (LOTRISONE) 1-0 05 % cream Apply topically 2 (two) times a day  Qty: 90 g, Refills: 0    Associated Diagnoses: Dermatophytosis      gabapentin (NEURONTIN) 100 mg capsule Take 1 capsule (100 mg total) by mouth 3 (three) times a day  Qty: 270 capsule, Refills: 1    Associated Diagnoses: Neuropathy      !! levothyroxine 25 mcg tablet Take 1 tablet (25 mcg total) by mouth daily In addition to the 300 mcg dose (stop the 75 mcg dose)  Qty: 90 tablet, Refills: 1    Associated Diagnoses: Acquired hypothyroidism      !! levothyroxine 300 MCG tablet Take 1 tablet (300 mcg total) by mouth daily In addition to 75 mcg dose  Qty: 90 tablet, Refills: 1    Associated Diagnoses: Acquired hypothyroidism      lisinopril (ZESTRIL) 10 mg tablet Take 1 tablet (10 mg total) by mouth daily  Qty: 90 tablet, Refills: 1    Associated Diagnoses: Essential hypertension      metFORMIN (GLUCOPHAGE-XR) 750 mg 24 hr tablet Take 1 tablet (750 mg total) by mouth daily with breakfast  Qty: 90 tablet, Refills: 1    Associated Diagnoses: Controlled type 2 diabetes mellitus with complication, without long-term current use of insulin (HCC)      metoprolol tartrate (LOPRESSOR) 50 mg tablet Take 1 tablet (50 mg total) by mouth 2 (two) times a day  Qty: 180 tablet, Refills: 2    Associated Diagnoses: Essential hypertension      Multiple Vitamins-Minerals (MULTIVITAMIN MEN 50+ PO) Take by mouth      pantoprazole (PROTONIX) 40 mg tablet Take 1 tablet (40 mg total) by mouth daily  Qty: 90 tablet, Refills: 1    Associated Diagnoses: Gastroesophageal reflux disease without esophagitis      simvastatin (ZOCOR) 10 mg tablet Take 1 tablet (10 mg total) by mouth daily at bedtime  Qty: 90 tablet, Refills: 1    Associated Diagnoses: Mixed hyperlipidemia      Zoster Vac Recomb Adjuvanted (Shingrix) 50 MCG/0 5ML SUSR 0 5mL IM for one dose, followed by 0 5mL IM 2-6 months after first dose  Qty: 1 each, Refills: 1    Associated Diagnoses: Encounter for immunization       !! - Potential duplicate medications found  Please discuss with provider  No discharge procedures on file  PDMP Review     None           ED Provider  Attending physically available and evaluated Nadine Frazier I managed the patient along with the ED Attending      Electronically Signed by         Mk Forte MD  06/09/21 [id number]

## 2021-06-08 NOTE — ED ATTENDING ATTESTATION
6/8/2021  INakul MD, saw and evaluated the patient  I have discussed the patient with the resident/non-physician practitioner and agree with the resident's/non-physician practitioner's findings, Plan of Care, and MDM as documented in the resident's/non-physician practitioner's note, except where noted  All available labs and Radiology studies were reviewed  I was present for key portions of any procedure(s) performed by the resident/non-physician practitioner and I was immediately available to provide assistance  At this point I agree with the current assessment done in the Emergency Department  I have conducted an independent evaluation of this patient a history and physical is as follows:    ED Course     75-year-old male, history previous cellulitis in the left lower extremity, history of BKA right lower extremity, history of diabetes, presenting to the emergency department for evaluation of near syncope that resulted in a fall  Patient states that he had just gotten up to a standing position when he started to feel lightheaded and weak and got himself down to his knees  Patient was unable to get off the ground and so EMS was contacted  Patient denies any fever  Patient is noted to have erythema of his LEs lower extremity which he states has been progressive over the past day  He states that he had a visiting nurse change the dressings on his left foot 1 day ago  Ten systems reviewed and negative except as noted in the history of present illness  The patient is resting comfortably on a stretcher in no acute respiratory distress  The patient appears nontoxic  HEENT reveals dry mucous membranes  Head is normocephalic and atraumatic  Conjunctiva and sclera are normal  Neck is nontender and supple with full range of motion to flexion, extension, lateral rotation  No meningismus appreciated  No masses are appreciated   Lungs are clear to auscultation bilaterally without any wheezes, rales or rhonchi  Heart is regular rate and rhythm without any murmurs, rubs or gallops  Abdomen is soft and nontender without any rebound or guarding  BKA on the right, left lower extremity with erythema from the foot to the left proximal thigh with associated warmth and is well demarcated consistent with cellulitis  Patient is awake, alert, and oriented x3  The patient has normal interaction  Motor is 5 out of 5  Assessment and plan:  Patient is septic secondary to cellulitis of the left lower extremity  Labs Reviewed   CBC AND DIFFERENTIAL - Abnormal       Result Value Ref Range Status    WBC 22 22 (*) 4 31 - 10 16 Thousand/uL Final    RBC 3 93  3 88 - 5 62 Million/uL Final    Hemoglobin 10 2 (*) 12 0 - 17 0 g/dL Final    Hematocrit 33 3 (*) 36 5 - 49 3 % Final    MCV 85  82 - 98 fL Final    MCH 26 0 (*) 26 8 - 34 3 pg Final    MCHC 30 6 (*) 31 4 - 37 4 g/dL Final    RDW 16 6 (*) 11 6 - 15 1 % Final    MPV 12 0  8 9 - 12 7 fL Final    Platelets 833  867 - 390 Thousands/uL Final    nRBC 0  /100 WBCs Final    Comment: This is an appended report  These results have been appended to a previously preliminary verified report  Narrative: This is an appended report  These results have been appended to a previously verified report  COMPREHENSIVE METABOLIC PANEL - Abnormal    Sodium 134 (*) 136 - 145 mmol/L Final    Potassium 3 8  3 5 - 5 3 mmol/L Final    Chloride 101  100 - 108 mmol/L Final    CO2 26  21 - 32 mmol/L Final    ANION GAP 7  4 - 13 mmol/L Final    BUN 13  5 - 25 mg/dL Final    Creatinine 1 15  0 60 - 1 30 mg/dL Final    Comment: Standardized to IDMS reference method    Glucose 123  65 - 140 mg/dL Final    Comment: If the patient is fasting, the ADA then defines impaired fasting glucose as > 100 mg/dL and diabetes as > or equal to 123 mg/dL  Specimen collection should occur prior to Sulfasalazine administration due to the potential for falsely depressed results   Specimen collection should occur prior to Sulfapyridine administration due to the potential for falsely elevated results  Calcium 9 1  8 3 - 10 1 mg/dL Final    Corrected Calcium 9 7  8 3 - 10 1 mg/dL Final    AST 9  5 - 45 U/L Final    Comment: Specimen collection should occur prior to Sulfasalazine administration due to the potential for falsely depressed results  ALT 14  12 - 78 U/L Final    Comment: Specimen collection should occur prior to Sulfasalazine and/or Sulfapyridine administration due to the potential for falsely depressed results  Alkaline Phosphatase 62  46 - 116 U/L Final    Total Protein 7 8  6 4 - 8 2 g/dL Final    Albumin 3 2 (*) 3 5 - 5 0 g/dL Final    Total Bilirubin 1 01 (*) 0 20 - 1 00 mg/dL Final    Comment: Use of this assay is not recommended for patients undergoing treatment with eltrombopag due to the potential for falsely elevated results  eGFR 65  ml/min/1 73sq m Final    Narrative:     [place] guidelines for Chronic Kidney Disease (CKD):     Stage 1 with normal or high GFR (GFR > 90 mL/min/1 73 square meters)    Stage 2 Mild CKD (GFR = 60-89 mL/min/1 73 square meters)    Stage 3A Moderate CKD (GFR = 45-59 mL/min/1 73 square meters)    Stage 3B Moderate CKD (GFR = 30-44 mL/min/1 73 square meters)    Stage 4 Severe CKD (GFR = 15-29 mL/min/1 73 square meters)    Stage 5 End Stage CKD (GFR <15 mL/min/1 73 square meters)  Note: GFR calculation is accurate only with a steady state creatinine   LACTIC ACID, PLASMA - Abnormal    LACTIC ACID 3 2 (*) 0 5 - 2 0 mmol/L Final    Narrative:     Result may be elevated if tourniquet was used during collection     PROCALCITONIN TEST - Abnormal    Procalcitonin 0 97 (*) <=0 25 ng/ml Final    Comment: Suspected Lower Respiratory Tract Infection (LRTI):  - LESS than or EQUAL to 0 25 ng/mL:   low likelihood for bacterial LRTI; antibiotics DISCOURAGED   - GREATER than 0 25 ng/mL:   increased likelihood for bacterial LRTI; antibiotics ENCOURAGED  Suspected Sepsis:  - Strongly consider initiating antibiotics in ALL UNSTABLE patients  - LESS than or EQUAL to 0 5 ng/mL:   low likelihood for bacterial sepsis; antibiotics DISCOURAGED   - GREATER than 0 5 ng/mL:   increased likelihood for bacterial sepsis; antibiotics ENCOURAGED   - GREATER than 2 ng/mL:   high risk for severe sepsis / septic shock; antibiotics strongly ENCOURAGED  Decisions on antibiotic use should not be based solely on Procalcitonin (PCT) levels  If PCT is low but uncertainty exists with stopping antibiotics, repeat PCT in 6-24 hours to confirm the low level  If antibiotics are administered (regardless if initial PCT was high or low), repeat PCT every 1-2 days to consider early antibiotic cessation (when GREATER than 80% decrease from the peak OR when PCT drops below designated cutoffs, whichever comes first), so long as the infection is NOT one that typically requires prolonged treatment durations (e g , bone/joint infections, endocarditis, Staph  aureus bacteremia)      Situations of FALSE-POSITIVE Procalcitonin values:  1) Newborns < 67 hours old  2) Massive stress from severe trauma / burns, major surgery, acute pancreatitis, cardiogenic / hemorrhagic shock, sickle cell crisis, or other organ perfusion abnormalities  3) Malaria and some Candidal infections  4) Treatment with agents that stimulate cytokines (e g , OKT3, anti-lymphocyte globulins, alemtuzumab, IL-2, granulocyte transfusion [NOT GCSFs])  5) Chronic renal disease causes elevated baseline levels (consider GREATER than 0 75 ng/mL as an abnormal cut-off); initiating HD/CRRT may cause transient decreases  6) Paraneoplastic syndromes from medullary thyroid or SCLC, some forms of vasculitis, and acute fadry-vm-nsgd disease    Situations of FALSE-NEGATIVE Procalcitonin values:  1) Too early in clinical course for PCT to have reached its peak (may repeat in 6-24 hours to confirm low level)  2) Localized infection WITHOUT systemic (SIRS / sepsis) response (e g , an abscess, osteomyelitis, cystitis)  3) Mycobacteria (e g , Tuberculosis, MAC)  4) Cystic fibrosis exacerbations     PROTIME-INR - Abnormal    Protime 16 7 (*) 11 6 - 14 5 seconds Final    INR 1 35 (*) 0 84 - 1 19 Final   NT-BNP PRO (BRAIN NATRIURETIC PEPTIDE) - Abnormal    NT-proBNP 2,268 (*) <125 pg/mL Final   MANUAL DIFFERENTIAL(PHLEBS DO NOT ORDER) - Abnormal    Segmented % 82 (*) 43 - 75 % Final    Bands % 14 (*) 0 - 8 % Final    Lymphocytes % 3 (*) 14 - 44 % Final    Monocytes % 1 (*) 4 - 12 % Final    Eosinophils, % 0  0 - 6 % Final    Basophils % 0  0 - 1 % Final    Absolute Neutrophils 21 33 (*) 1 85 - 7 62 Thousand/uL Final    Lymphocytes Absolute 0 67  0 60 - 4 47 Thousand/uL Final    Monocytes Absolute 0 22  0 00 - 1 22 Thousand/uL Final    Eosinophils Absolute 0 00  0 00 - 0 40 Thousand/uL Final    Basophils Absolute 0 00  0 00 - 0 10 Thousand/uL Final    Total Counted 100   Final    RBC Morphology Normal   Final    Platelet Estimate Adequate  Adequate Final   APTT - Normal    PTT 32  23 - 37 seconds Final    Comment: Therapeutic Heparin Range =  60-90 seconds   TROPONIN I - Normal    Troponin I <0 02  <=0 04 ng/mL Final    Comment: Siemens Chemistry analyzer 99% cutoff is > 0 04 ng/mL in network labs     o cTnI 99% cutoff is useful only when applied to patients in the clinical setting of myocardial ischemia   o cTnI 99% cutoff should be interpreted in the context of clinical history, ECG findings and possibly cardiac imaging to establish correct diagnosis  o cTnI 99% cutoff may be suggestive but clearly not indicative of a coronary event without the clinical setting of myocardial ischemia  CK - Normal    Total CK 48  39 - 308 U/L Final   LACTIC ACID 2 HOUR - Normal    LACTIC ACID 1 9  0 5 - 2 0 mmol/L Final    Narrative:     Result may be elevated if tourniquet was used during collection     POCT GLUCOSE - Normal    POC Glucose 119  65 - 140 mg/dl Final   BLOOD CULTURE    Blood Culture Received in Microbiology Lab  Culture in Progress  Preliminary   BLOOD CULTURE    Blood Culture Received in Microbiology Lab  Culture in Progress  Preliminary   UA W REFLEX TO MICROSCOPIC WITH REFLEX TO CULTURE   PROCALCITONIN REFLEX       CT head without contrast   Final Result      No acute intracranial abnormality  Workstation performed: [id number]         CT spine cervical without contrast   Final Result      No cervical spine fracture or traumatic malalignment  Workstation performed: [id number]         CT chest abdomen pelvis w contrast   Final Result         1  No acute injury within the chest, abdomen, or pelvis  2   Mildly enlarged left inguinal external iliac and prominent right inguinal lymph nodes  A CT report from outside hospital dated 3/12/2014 reports prominent inguinal lymph nodes  If images are provided for comparison and document stability, no    further workup indicated  Otherwise, a follow-up CT in 3 months to ensure stability recommended        Workstation performed: [id number]                 Critical Care Time  Procedures

## 2021-06-09 LAB
BACTERIA UR QL AUTO: ABNORMAL /HPF
BILIRUB UR QL STRIP: ABNORMAL
CLARITY UR: CLEAR
COLOR UR: ABNORMAL
GLUCOSE SERPL-MCNC: 103 MG/DL (ref 65–140)
GLUCOSE SERPL-MCNC: 108 MG/DL (ref 65–140)
GLUCOSE SERPL-MCNC: 88 MG/DL (ref 65–140)
GLUCOSE SERPL-MCNC: 92 MG/DL (ref 65–140)
GLUCOSE UR STRIP-MCNC: NEGATIVE MG/DL
HGB UR QL STRIP.AUTO: ABNORMAL
HYALINE CASTS #/AREA URNS LPF: ABNORMAL /LPF
KETONES UR STRIP-MCNC: ABNORMAL MG/DL
LEUKOCYTE ESTERASE UR QL STRIP: ABNORMAL
NITRITE UR QL STRIP: NEGATIVE
NON-SQ EPI CELLS URNS QL MICRO: ABNORMAL /HPF
PH UR STRIP.AUTO: 6.5 [PH]
PROT UR STRIP-MCNC: ABNORMAL MG/DL
RBC #/AREA URNS AUTO: ABNORMAL /HPF
SP GR UR STRIP.AUTO: >1.045 (ref 1–1.03)
UROBILINOGEN UR QL STRIP.AUTO: 2 E.U./DL
WBC #/AREA URNS AUTO: ABNORMAL /HPF

## 2021-06-09 PROCEDURE — 81001 URINALYSIS AUTO W/SCOPE: CPT | Performed by: EMERGENCY MEDICINE

## 2021-06-09 PROCEDURE — 87086 URINE CULTURE/COLONY COUNT: CPT | Performed by: EMERGENCY MEDICINE

## 2021-06-09 PROCEDURE — 99232 SBSQ HOSP IP/OBS MODERATE 35: CPT | Performed by: PHYSICIAN ASSISTANT

## 2021-06-09 PROCEDURE — 87186 SC STD MICRODIL/AGAR DIL: CPT | Performed by: EMERGENCY MEDICINE

## 2021-06-09 PROCEDURE — 82948 REAGENT STRIP/BLOOD GLUCOSE: CPT

## 2021-06-09 PROCEDURE — 87077 CULTURE AEROBIC IDENTIFY: CPT | Performed by: EMERGENCY MEDICINE

## 2021-06-09 RX ADMIN — DOCUSATE SODIUM 100 MG: 100 CAPSULE ORAL at 08:11

## 2021-06-09 RX ADMIN — Medication 1 TABLET: at 08:11

## 2021-06-09 RX ADMIN — GABAPENTIN 100 MG: 100 CAPSULE ORAL at 21:29

## 2021-06-09 RX ADMIN — CEFEPIME HYDROCHLORIDE 2000 MG: 2 INJECTION, POWDER, FOR SOLUTION INTRAVENOUS at 20:07

## 2021-06-09 RX ADMIN — DOCUSATE SODIUM 100 MG: 100 CAPSULE ORAL at 17:43

## 2021-06-09 RX ADMIN — VANCOMYCIN HYDROCHLORIDE 1250 MG: 10 INJECTION, POWDER, LYOPHILIZED, FOR SOLUTION INTRAVENOUS at 21:29

## 2021-06-09 RX ADMIN — LEVOTHYROXINE SODIUM 300 MCG: 100 TABLET ORAL at 07:30

## 2021-06-09 RX ADMIN — ENOXAPARIN SODIUM 40 MG: 40 INJECTION SUBCUTANEOUS at 21:29

## 2021-06-09 RX ADMIN — PRAVASTATIN SODIUM 20 MG: 20 TABLET ORAL at 17:44

## 2021-06-09 RX ADMIN — METOPROLOL TARTRATE 50 MG: 50 TABLET, FILM COATED ORAL at 08:10

## 2021-06-09 RX ADMIN — ASPIRIN 81 MG: 81 TABLET, COATED ORAL at 08:11

## 2021-06-09 RX ADMIN — LEVOTHYROXINE SODIUM 25 MCG: 25 TABLET ORAL at 07:31

## 2021-06-09 RX ADMIN — VANCOMYCIN HYDROCHLORIDE 1250 MG: 10 INJECTION, POWDER, LYOPHILIZED, FOR SOLUTION INTRAVENOUS at 08:45

## 2021-06-09 RX ADMIN — PANTOPRAZOLE SODIUM 40 MG: 40 TABLET, DELAYED RELEASE ORAL at 07:30

## 2021-06-09 RX ADMIN — CEFEPIME HYDROCHLORIDE 2000 MG: 2 INJECTION, POWDER, FOR SOLUTION INTRAVENOUS at 07:30

## 2021-06-09 RX ADMIN — METOPROLOL TARTRATE 50 MG: 50 TABLET, FILM COATED ORAL at 17:43

## 2021-06-09 RX ADMIN — GABAPENTIN 100 MG: 100 CAPSULE ORAL at 17:44

## 2021-06-09 RX ADMIN — ENOXAPARIN SODIUM 40 MG: 40 INJECTION SUBCUTANEOUS at 08:11

## 2021-06-09 RX ADMIN — LISINOPRIL 10 MG: 10 TABLET ORAL at 08:11

## 2021-06-09 RX ADMIN — GABAPENTIN 100 MG: 100 CAPSULE ORAL at 08:11

## 2021-06-09 NOTE — ASSESSMENT & PLAN NOTE
· Weakness and lightheadedness likely due to hypotension from sepsis - BP stable here   · S/p IV fluids  · Orthostatics  · PT/OT

## 2021-06-09 NOTE — ASSESSMENT & PLAN NOTE
Lab Results   Component Value Date    HGBA1C 5 6 04/10/2021       Recent Labs     06/08/21  1616 06/08/21  2227 06/09/21  0705   POCGLU 119 131 88       Blood Sugar Average: Last 72 hrs:  (P) 155 [id number]     · Hold oral diabetic agents  · Diabetic diet  · Fingersticks q i d   With sliding scale coverage  · Consider basal bolus insulin when oral agents have worn off

## 2021-06-09 NOTE — PLAN OF CARE
Problem: Potential for Falls  Goal: Patient will remain free of falls  Description: INTERVENTIONS:  - Assess patient frequently for physical needs  -  Identify cognitive and physical deficits and behaviors that affect risk of falls    -  Nora fall precautions as indicated by assessment   - Educate patient/family on patient safety including physical limitations  - Instruct patient to call for assistance with activity based on assessment  - Modify environment to reduce risk of injury  - Consider OT/PT consult to assist with strengthening/mobility  Outcome: Progressing     Problem: PAIN - ADULT  Goal: Verbalizes/displays adequate comfort level or baseline comfort level  Description: Interventions:  - Encourage patient to monitor pain and request assistance  - Assess pain using appropriate pain scale  - Administer analgesics based on type and severity of pain and evaluate response  - Implement non-pharmacological measures as appropriate and evaluate response  - Consider cultural and social influences on pain and pain management  - Notify physician/advanced practitioner if interventions unsuccessful or patient reports new pain  Outcome: Progressing     Problem: INFECTION - ADULT  Goal: Absence or prevention of progression during hospitalization  Description: INTERVENTIONS:  - Assess and monitor for signs and symptoms of infection  - Monitor lab/diagnostic results  - Monitor all insertion sites, i e  indwelling lines, tubes, and drains  - Monitor endotracheal if appropriate and nasal secretions for changes in amount and color  - Nora appropriate cooling/warming therapies per order  - Administer medications as ordered  - Instruct and encourage patient and family to use good hand hygiene technique  - Identify and instruct in appropriate isolation precautions for identified infection/condition  Outcome: Progressing  Goal: Absence of fever/infection during neutropenic period  Description: INTERVENTIONS:  - Monitor WBC    Outcome: Progressing     Problem: SAFETY ADULT  Goal: Patient will remain free of falls  Description: INTERVENTIONS:  - Assess patient frequently for physical needs  -  Identify cognitive and physical deficits and behaviors that affect risk of falls    -  Elma fall precautions as indicated by assessment   - Educate patient/family on patient safety including physical limitations  - Instruct patient to call for assistance with activity based on assessment  - Modify environment to reduce risk of injury  - Consider OT/PT consult to assist with strengthening/mobility  Outcome: Progressing  Goal: Maintain or return to baseline ADL function  Description: INTERVENTIONS:  -  Assess patient's ability to carry out ADLs; assess patient's baseline for ADL function and identify physical deficits which impact ability to perform ADLs (bathing, care of mouth/teeth, toileting, grooming, dressing, etc )  - Assess/evaluate cause of self-care deficits   - Assess range of motion  - Assess patient's mobility; develop plan if impaired  - Assess patient's need for assistive devices and provide as appropriate  - Encourage maximum independence but intervene and supervise when necessary  - Involve family in performance of ADLs  - Assess for home care needs following discharge   - Consider OT consult to assist with ADL evaluation and planning for discharge  - Provide patient education as appropriate  Outcome: Progressing     Problem: METABOLIC, FLUID AND ELECTROLYTES - ADULT  Goal: Electrolytes maintained within normal limits  Description: INTERVENTIONS:  - Monitor labs and assess patient for signs and symptoms of electrolyte imbalances  - Administer electrolyte replacement as ordered  - Monitor response to electrolyte replacements, including repeat lab results as appropriate  - Instruct patient on fluid and nutrition as appropriate  Outcome: Progressing  Goal: Fluid balance maintained  Description: INTERVENTIONS:  - Monitor labs   - Monitor I/O and WT  - Instruct patient on fluid and nutrition as appropriate  - Assess for signs & symptoms of volume excess or deficit  Outcome: Progressing  Goal: Glucose maintained within target range  Description: INTERVENTIONS:  - Monitor Blood Glucose as ordered  - Assess for signs and symptoms of hyperglycemia and hypoglycemia  - Administer ordered medications to maintain glucose within target range  - Assess nutritional intake and initiate nutrition service referral as needed  Outcome: Progressing     Problem: SKIN/TISSUE INTEGRITY - ADULT  Goal: Skin integrity remains intact  Description: INTERVENTIONS  - Identify patients at risk for skin breakdown  - Assess and monitor skin integrity  - Assess and monitor nutrition and hydration status  - Monitor labs (i e  albumin)  - Assess for incontinence   - Turn and reposition patient  - Assist with mobility/ambulation  - Relieve pressure over bony prominences  - Avoid friction and shearing  - Provide appropriate hygiene as needed including keeping skin clean and dry  - Evaluate need for skin moisturizer/barrier cream  - Collaborate with interdisciplinary team (i e  Nutrition, Rehabilitation, etc )   - Patient/family teaching  Outcome: Progressing  Goal: Incision(s), wounds(s) or drain site(s) healing without S/S of infection  Description: INTERVENTIONS  - Assess and document risk factors for skin impairment   - Assess and document dressing, incision, wound bed, drain sites and surrounding tissue  - Consider nutrition services referral as needed  - Oral mucous membranes remain intact  - Provide patient/ family education  Outcome: Progressing     Problem: MUSCULOSKELETAL - ADULT  Goal: Maintain or return mobility to safest level of function  Description: INTERVENTIONS:  - Assess patient's ability to carry out ADLs; assess patient's baseline for ADL function and identify physical deficits which impact ability to perform ADLs (bathing, care of mouth/teeth, toileting, grooming, dressing, etc )  - Assess/evaluate cause of self-care deficits   - Assess range of motion  - Assess patient's mobility  - Assess patient's need for assistive devices and provide as appropriate  - Encourage maximum independence but intervene and supervise when necessary  - Involve family in performance of ADLs  - Assess for home care needs following discharge   - Consider OT consult to assist with ADL evaluation and planning for discharge  - Provide patient education as appropriate  Outcome: Progressing

## 2021-06-09 NOTE — QUICK NOTE
Wound check note:     Dressings removed at bedside, LLE cellulitis remains unchanged since the evening of 6/8/2021  New dressings applied to the left foot and leg consisting of betadine paint to interdigital spaces, maxorb rope to interdigital spaces, DSD, ACE for compression from MTPJ to tibial tuberosity      Wound #: 1  Location: left foot  Length 15 5cm: Width 10 5cm: Depth 0 1cm:   Deepest Tissue Noted in Base: Dermis  Probe to Bone: No  Peripheral Skin Description: Attached  Granulation: 100% Fibrotic Tissue: 0% Necrotic Tissue: 0%   Drainage Amount: minimal, serous  Signs of Infection: No

## 2021-06-09 NOTE — ASSESSMENT & PLAN NOTE
Lab Results   Component Value Date    HGBA1C 5 6 04/10/2021       Recent Labs     06/08/21  1616   POCGLU 119       Blood Sugar Average: Last 72 hrs:  (P) 119   Hold oral diabetic agents  Diabetic diet  Fingersticks q i d   With sliding scale coverage  Consider basal bolus insulin when oral agents have worn off

## 2021-06-09 NOTE — ASSESSMENT & PLAN NOTE
· Weakness and lightheadedness likely due to hypotension from sepsis  · IV fluid bolus as per orders  · PT/OT evaluation

## 2021-06-09 NOTE — H&P
[address] 1955, 74 y o  male MRN: [id number]  Unit/Bed#: ED 16 Encounter: [id number]  Primary Care Provider: Michael Dumont MD   Date and time admitted to hospital: 6/8/2021  3:33 PM    * [address]  · Weakness and lightheadedness likely due to hypotension from sepsis  · IV fluid bolus as per orders  · PT/OT evaluation  Sepsis ([address] )  Assessment & Plan  · Due to cellulitis of left lower extremity  · Prior cultures are positive or for the strep, Proteus, Acinetobacter pansensitive  · Due to his rehospitalization will place on broad-spectrum antibiotics to cover for g negatives and resistant organisms  · Continue IV cefepime and vancomycin  · Consult pharmacy for vancomycin dosing assistance  · Consult podiatry for wound care of left lower extremity  · Elevate left lower extremity  · Await blood cultures drawn in ED  · Monitor on level to step-down for stability    Cellulitis of left lower extremity  Assessment & Plan  · As above    Controlled type 2 diabetes mellitus with complication, without long-term current use of insulin [facility])  Assessment & Plan  Lab Results   Component Value Date    HGBA1C 5 6 04/10/2021       Recent Labs     06/08/21  1616   POCGLU 119       Blood Sugar Average: Last 72 hrs:  (P) 119   Hold oral diabetic agents  Diabetic diet  Fingersticks q i d  With sliding scale coverage  Consider basal bolus insulin when oral agents have worn off    Diabetic polyneuropathy associated with type 2 diabetes mellitus (HCC)  Assessment & Plan  · Continue gabapentin at home dose    Gastroesophageal reflux disease without esophagitis  Assessment & Plan  · Continue Protonix    Morbid obesity with BMI of 50 0-59 9, adult [facility])  Assessment & Plan  · Supportive care    · Weight loss counseling when stable    Paroxysmal atrial fibrillation (HCC)  Assessment & Plan  · Does not appear to be on any anticoagulation besides aspirin at this time  · Rates are controlled, Continue metoprolol          History of Present Illness     HPI:  See Anderson is a 74 y o  male who presents with fall  Patient states that this morning he sustained a fall at home and was too weak to get off the floor  Called EMS who assisted him and brought into the ED for evaluation  Will notes that he has developed erythema of the left lower extremity which began today  Denies any fever, chills, sweats  He does report pain in his left lower extremity with this year  Denies any wounds it was found to have cellulitis of nearly the whole left lower extremity and sepsis  He was started on sepsis protocol  Review of Systems   All other systems reviewed and are negative        Historical Information   Past Medical History:   Diagnosis Date    Atrial fibrillation [facility]     Cellulitis     Diabetes mellitus ([facility] )     Disease of thyroid gland     Duodenal ulcer     perforated- ICU stay    High blood pressure     High cholesterol     Hyperlipidemia     Hypertension     Hypothyroidism     Lymphedema      b/l LE- was followed at wound center    Morbid obesity with BMI of 40 0-44 9, adult ([facility] )     Peritonitis ([facility] )      Past Surgical History:   Procedure Laterality Date    BELOW KNEE LEG AMPUTATION Right     DIAGNOSTIC LAPAROSCOPY      KNEE ARTHROSCOPY Bilateral     OTHER SURGICAL HISTORY  2014     lap repair    OTHER SURGICAL HISTORY      Laparoscopic repair of a perforated duodenal ulcer with Bill Nathaniel omental    OTHER SURGICAL HISTORY      Placement of drains     Social History   Social History     Substance and Sexual Activity   Alcohol Use Not Currently     Social History     Substance and Sexual Activity   Drug Use Not Currently     Social History     Tobacco Use   Smoking Status Former Smoker    Packs/day: 1 00    Years: 30 00    Pack years: 30 00    Quit date: 2004    Years since quittin 5   Smokeless Tobacco Never Used     Family History: non-contributory    Meds/Allergies   all medications and allergies reviewed  No Known Allergies    Objective   Vitals: Blood pressure 140/61, pulse 100, temperature 98 2 °F (36 8 °C), temperature source Oral, resp  rate 20, height 6' 7" (2 007 m), weight (!) 177 kg (390 lb), SpO2 97 %  No intake or output data in the 24 hours ending 06/08/21 2022    Invasive Devices     Peripheral Intravenous Line            Peripheral IV 06/08/21 Left;Ventral (anterior) Forearm less than 1 day                Physical Exam  Constitutional:       General: He is not in acute distress  Appearance: He is obese  He is not toxic-appearing  HENT:      Head: Normocephalic and atraumatic  Nose: Nose normal       Mouth/Throat:      Mouth: Mucous membranes are moist       Pharynx: Oropharynx is clear  Eyes:      Extraocular Movements: Extraocular movements intact  Cardiovascular:      Rate and Rhythm: Normal rate and regular rhythm  Pulmonary:      Effort: Pulmonary effort is normal    Musculoskeletal:      Left lower leg: Edema present  Comments: Right lower extremity AKA  Left lower extremity is edematous, erythematous, erythema is extending to the groin  There is maceration between the toes   Neurological:      Mental Status: He is oriented to person, place, and time  Mental status is at baseline  Psychiatric:         Mood and Affect: Mood normal          Behavior: Behavior normal          Lab Results: I have personally reviewed pertinent reports  Imaging: I have personally reviewed pertinent reports  EKG, Pathology, and Other Studies: I have personally reviewed pertinent reports  Code Status: Prior  Advance Directive and Living Will:      Power of :    POLST:      Counseling / Coordination of Care  Total floor / unit time spent today 40 minutes  Greater than 50% of total time was spent with the patient and / or family counseling and / or coordination of care    A description of the counseling / coordination of care:  Discussed plan of care with the patient the bedside

## 2021-06-09 NOTE — PROGRESS NOTES
Vancomycin IV Pharmacy-to-Dose Consultation    See Acosta is a 74 y o  male who is currently receiving Vancomycin IV with management by the Pharmacy Consult service  Relevant clinical data and objective history reviewed:  Temp Readings from Last 3 Encounters:   06/08/21 98 2 °F (36 8 °C) (Oral)   06/02/21 98 3 °F (36 8 °C)   05/05/21 (!) 97 3 °F (36 3 °C)     /61 (BP Location: Right arm)   Pulse 100   Temp 98 2 °F (36 8 °C) (Oral)   Resp 20   Ht 6' 7" (2 007 m)   Wt (!) 177 kg (390 lb)   SpO2 97%   BMI 43 94 kg/m²     No intake/output data recorded  Lab Results   Component Value Date/Time    BUN 13 06/08/2021 04:08 PM    WBC 22 22 (H) 06/08/2021 04:08 PM    HGB 10 2 (L) 06/08/2021 04:08 PM    HCT 33 3 (L) 06/08/2021 04:08 PM    MCV 85 06/08/2021 04:08 PM     06/08/2021 04:08 PM     Creatinine   Date Value Ref Range Status   06/08/2021 1 15 0 60 - 1 30 mg/dL Final     Comment:     Standardized to IDMS reference method   04/12/2021 0 86 0 60 - 1 30 mg/dL Final     Comment:     Standardized to IDMS reference method         Vancomycin Assessment:  Indication: skin-soft tissue infection    Renal Function: Scr 1 15 mg dL; CrCL 98 3 mL/min  Potential Nephrotoxicity Factors:  Medications: IV contrast  Patient-Factors: Advanced age  Days of Therapy: 1  Current Dose: 2000 mg q12h (first dose 6/8 @ 1730)  Goal Trough: 15-20 (appropriate for most indications)   Goal AUC(24h): 400-600        Vancomycin Plan:  New Dosing: continue to 2000 mg q12h (next dose: 6/9 @ 0800 )   Next Level: 6/10 @ 0730  Renal Function Monitoring: BMP and I/O      Pharmacy will continue to follow closely for s/sx of nephrotoxicity, infusion reactions and appropriateness of therapy  BMP and CBC will be ordered per protocol  We will continue to follow the patient's culture results and clinical progress daily       Jazmine Haney RPh

## 2021-06-09 NOTE — PROGRESS NOTES
Vancomycin IV Pharmacy-to-Dose Consultation    Luanne Ramos is a 74 y o  male who is currently receiving Vancomycin IV with management by the Pharmacy Consult service  Relevant clinical data and objective / subjective history reviewed  Vancomycin Assessment:  Indication: skin-soft tissue infection      Renal Function: Scr 1 15(6/8/21)  Potential Nephrotoxicity Factors:  Medications: na  Patient-Factors: elderly, hypotension  Days of Therapy: 2  Current Dose: 2000 mg q12h (dose prior to level: ; last dose: )  Goal Trough: 15-20 (appropriate for most indications)   Goal AUC(24h): 400-600  Last Level:   Pharmacokinetic Analysis:       Vancomycin Plan:  New Dosing: change to 1250mg q12h (6/9/21 at 9am) pt got a dose of 2000mg on 6/8 at ! 532  Predicted Trough / AUC: 17 7  Next Level: on 6/10/21 @8:30am  Renal Function Monitoring: ordered labs      Pharmacy will continue to follow closely for s/sx of nephrotoxicity, infusion reactions and appropriateness of therapy  BMP and CBC will be ordered per protocol  We will continue to follow the patients culture results and clinical progress daily  Bahman ALCAZAR  Ph

## 2021-06-09 NOTE — ASSESSMENT & PLAN NOTE
· POA, e/b leukocytosis and tachycardia  Lactic acid 3 2, now normalized following fluids  · Due to cellulitis of left lower extremity  · Prior cultures are positive for strep, Proteus, Acinetobacter pansensitive  · Due to his rehospitalization will place on broad-spectrum antibiotics to cover for g negatives and resistant organisms  · Continue IV cefepime and vancomycin  · Consult pharmacy for vancomycin dosing assistance  · Consult podiatry for wound care of left lower extremity  · Elevate left lower extremity  · Await blood cultures drawn in ED

## 2021-06-09 NOTE — ASSESSMENT & PLAN NOTE
· Does not appear to be on any anticoagulation besides aspirin at this time    · Rates are controlled, continue metoprolol

## 2021-06-09 NOTE — CONSULTS
Podiatry - Consultation    Patient Information:   Ashley Romero 74 y o  male MRN: [id number]  Unit/Bed#: ED 16 Encounter: [id number]  PCP: Glory Berg MD  Date of Admission:  6/8/2021  Date of Consultation: 06/08/21  Requesting Physician: Wing Gleason MD      ASSESSMENT:    Ashley Romero is a 74 y o  male with:    1  Left foot diabetic wound limited to break down of skin - decker 1  2  Cellulites left lower extremity  3  T2DM (A1c:5 6% 4/10/21 ) with neuropathy  4  Lymphedema    PLAN:    · Local wound care consisting of betadine paint maxorb to left lower extremity web spaces, dsd, ACE compression  Wound care instructions placed  Will see pt again tomorrow  · F/u left foot xray  · Vss, afebrile, WBC 22 22  · Continue antibiotic per primary team   · Elevation on green foam wedges or pillows when non-ambulatory  · Rest of care per primary team   · Will discuss this plan with my attending and update as needed  Weightbearing status: as tolerated    SUBJECTIVE:    History of Present Illness:    Ashley Romero is a 74 y o  male who is originally admitted 6/8/2021 due to sepsis  Patient has a past medical history of T2DM with neuropathy, lymphedema, chronic venous insufficiency with venous ulceration, right BKA, hypothyroidism, hypertension  Who presents with left lower extremity superficial ulceration and erythema extending proximally  Pt reports fall at home; pt states that he was too weak to get off the floor  Pt was last seen in Wound care 6/2/21 by Dr Fredo Dixon  Pt reports that erythema to lower extremity has developed today  Pt states that "pink color" to leg is baseline; however, today he noticed erythema to his left thigh  Pt denies pain to LLE  Pt was last seen by [facility] yesterday  Pt reports neuropathy to LLE  Pt denies nausea, vomiting, chest pain, chills at this time  Review of Systems:    Constitutional: Negative  HENT: Negative  Eyes: Negative  Respiratory: Negative      Cardiovascular: Negative  Gastrointestinal: Negative  Musculoskeletal: RLE BKA  Skin:LLE superficial wound   Neurological: neuropathy  Psych: Negative       Past Medical and Surgical History:     Past Medical History:   Diagnosis Date    Atrial fibrillation ([facility] )     Cellulitis     Diabetes mellitus ([facility] )     Disease of thyroid gland     Duodenal ulcer     perforated- ICU stay    High blood pressure     High cholesterol     Hyperlipidemia     Hypertension     Hypothyroidism     Lymphedema      b/l LE- was followed at wound center    Morbid obesity with BMI of 40 0-44 9, adult ([facility] )     Peritonitis ([facility] )        Past Surgical History:   Procedure Laterality Date    BELOW KNEE LEG AMPUTATION Right     DIAGNOSTIC LAPAROSCOPY      KNEE ARTHROSCOPY Bilateral     OTHER SURGICAL HISTORY  2014     lap repair    OTHER SURGICAL HISTORY      Laparoscopic repair of a perforated duodenal ulcer with Sterling Knee omental    OTHER SURGICAL HISTORY      Placement of drains       Meds/Allergies:    (Not in a hospital admission)      No Known Allergies    Social History:     Marital Status: Single    Substance Use History:   Social History     Substance and Sexual Activity   Alcohol Use Not Currently     Social History     Tobacco Use   Smoking Status Former Smoker    Packs/day: 1 00    Years: 30 00    Pack years: 30 00    Quit date: 2004    Years since quittin 5   Smokeless Tobacco Never Used     Social History     Substance and Sexual Activity   Drug Use Not Currently       Family History:    Family History   Problem Relation Age of Onset    Heart disease Family     Alcohol abuse Family     Heart disease Mother     Hypertension Mother    Mandie Yale Migraines Daughter     Leukemia Brother     Migraines Daughter     Substance Abuse Neg Hx     Mental illness Neg Hx     Depression Neg Hx          OBJECTIVE:    Vitals:   Blood Pressure: 140/61 (21 1630)  Pulse: 100 (21 1630)  Temperature: 98 2 °F (36 8 °C) (06/08/21 1538)  Temp Source: Oral (06/08/21 1538)  Respirations: 20 (06/08/21 1630)  Height: 6' 7" (200 7 cm) (06/08/21 1538)  Weight - Scale: (!) 177 kg (390 lb) (06/08/21 1538)  SpO2: 97 % (06/08/21 1630)    Physical Exam:    General Appearance: Alert, cooperative, no distress  HEENT: Head normocephalic, atraumatic, without obvious abnormality  Heart: Normal rate and rhythm  Lungs: Non-labored breathing  No respiratory distress  Abdomen: Without distension  Psychiatric: AAOx3  Lower Extremity:  Vascular:   Left DP and PT pulses are present  CRT < 3 seconds at the digits  +2/4 edema noted at LLE lower extremities  Pedal hair is absent  Skin temperature is warm LLE  Musculoskeletal:  MMT is 5/5 in all muscle compartments LLE  ROM at the 1st MPJ and ankle joint are decreased LLE with the leg extended  No Pain on palpation of LLE  Right BKA  Dermatological:  Lower extremity wound(s) as noted below:    Wound #: 1  Location: left foot   Length 15 5cm: Width 10 5cm: Depth 0 1cm:   Deepest Tissue Noted in Base: dermis  Probe to Bone: No  Peripheral Skin Description: Attached erythema   Granulation: 100% Fibrotic Tissue: 0% Necrotic Tissue: 0%   Drainage Amount: minimal to none    No crepitus, no fluctuance, no purulence, no pain  Interdigital maceration noted  No interspace wound noted at this time  No new wound noted to E since last seen  in wound care (6/2/21)  Erythema extending proximally to left thigh  Neurological:  Gross sensation is intact  Protective sensation is diminished  Patient Reports numbness and/or paresthesias      Clinical Images 06/08/21:              Additional data:     Lab Results: I have personally reviewed pertinent labs including:    Results from last 7 days   Lab Units 06/08/21  1608   WBC Thousand/uL 22 22*   HEMOGLOBIN g/dL 10 2*   HEMATOCRIT % 33 3*   PLATELETS Thousands/uL 189   LYMPHO PCT % 3*   MONO PCT % 1*   EOS PCT % 0     Results from last 7 days   Lab Units 06/08/21  1608   POTASSIUM mmol/L 3 8   CHLORIDE mmol/L 101   CO2 mmol/L 26   BUN mg/dL 13   CREATININE mg/dL 1 15   CALCIUM mg/dL 9 1   ALK PHOS U/L 62   ALT U/L 14   AST U/L 9     Results from last 7 days   Lab Units 06/08/21  1608   INR  1 35*       Cultures: I have personally reviewed pertinent cultures including:    Results from last 7 days   Lab Units 06/08/21  1611 06/08/21  1608   BLOOD CULTURE  Received in Microbiology Lab  Culture in Progress  Received in Microbiology Lab  Culture in Progress  Imaging: I have personally reviewed pertinent reports in PACS  EKG, Pathology, and Other Studies: I have personally reviewed pertinent reports  ** Please Note: Portions of the record may have been created with voice recognition software  Occasional wrong word or "sound a like" substitutions may have occurred due to the inherent limitations of voice recognition software  Read the chart carefully and recognize, using context, where substitutions have occurred   **

## 2021-06-09 NOTE — PROGRESS NOTES
[facility]  Progress Note - See Acosta 1955, 74 y o  male MRN: [id number]  Unit/Bed#: ED 16 Encounter: [id number]  Primary Care Provider: Sara Way MD   Date and time admitted to hospital: 6/8/2021  3:33 PM    * [address]  · Weakness and lightheadedness likely due to hypotension from sepsis - BP stable here   · S/p IV fluids  · Orthostatics  · PT/OT    Sepsis ([address] )  Assessment & Plan  · POA, e/b leukocytosis and tachycardia  Lactic acid 3 2, now normalized following fluids  · Due to cellulitis of left lower extremity  · Prior cultures are positive for strep, Proteus, Acinetobacter pansensitive  · Due to his rehospitalization will place on broad-spectrum antibiotics to cover for g negatives and resistant organisms  · Continue IV cefepime and vancomycin  · Consult pharmacy for vancomycin dosing assistance  · Consult podiatry for wound care of left lower extremity  · Elevate left lower extremity  · Await blood cultures drawn in ED  Cellulitis of left lower extremity  Assessment & Plan  · As above    Diabetic polyneuropathy associated with type 2 diabetes mellitus (HCC)  Assessment & Plan  · Continue gabapentin at home dose    Gastroesophageal reflux disease without esophagitis  Assessment & Plan  · Continue Protonix    Paroxysmal atrial fibrillation (HCC)  Assessment & Plan  · Does not appear to be on any anticoagulation besides aspirin at this time  · Rates are controlled, continue metoprolol      Morbid obesity with BMI of 50 0-59 9, adult [facility])  Assessment & Plan  · Supportive care    · Weight loss counseling when stable    Controlled type 2 diabetes mellitus with complication, without long-term current use of insulin [facility])  Assessment & Plan  Lab Results   Component Value Date    HGBA1C 5 6 04/10/2021       Recent Labs     06/08/21  1616 06/08/21  2227 06/09/21  0705   POCGLU 119 131 88       Blood Sugar Average: Last 72 hrs:  (P) 450 [id number]     · Hold oral diabetic agents  · Diabetic diet  · Fingersticks q i d  With sliding scale coverage  · Consider basal bolus insulin when oral agents have worn off          VTE Pharmacologic Prophylaxis:   Moderate Risk (Score 3-4) - Pharmacological DVT Prophylaxis Ordered: enoxaparin (Lovenox)  Patient Centered Rounds: I performed bedside rounds with nursing staff today  Discussions with Specialists or Other Care Team Provider: RN    Education and Discussions with Family / Patient: Patient declined call to   Time Spent for Care: 30 minutes  More than 50% of total time spent on counseling and coordination of care as described above  Current Length of Stay: 1 day(s)  Current Patient Status: Inpatient   Certification Statement: The patient will continue to require additional inpatient hospital stay due to sepsis on IV abx  Discharge Plan: Anticipate discharge in 48-72 hrs to discharge location to be determined pending rehab evaluations  Code Status: Level 1 - Full Code    Subjective: The patient reports he is tolerating antibiotic well  Redness of leg seems no worse, but no better than yesterday     Objective:     Vitals:   Temp (24hrs), Av 2 °F (36 8 °C), Min:98 2 °F (36 8 °C), Max:98 2 °F (36 8 °C)    Temp:  [98 2 °F (36 8 °C)] 98 2 °F (36 8 °C)  HR:  [] 72  Resp:  [16-22] 20  BP: (102-142)/(53-70) 140/62  SpO2:  [97 %-99 %] 99 %  Body mass index is 43 94 kg/m²  Input and Output Summary (last 24 hours): Intake/Output Summary (Last 24 hours) at 2021 1103  Last data filed at 2021 1022  Gross per 24 hour   Intake 1750 ml   Output 925 ml   Net 825 ml       Physical Exam:   Physical Exam  Vitals signs reviewed  Constitutional:       General: He is not in acute distress  Appearance: He is not toxic-appearing  HENT:      Head: Normocephalic and atraumatic  Eyes:      General: No scleral icterus       Extraocular Movements: Extraocular movements intact  Neck:      Musculoskeletal: Normal range of motion  Cardiovascular:      Rate and Rhythm: Normal rate and regular rhythm  Pulmonary:      Effort: Pulmonary effort is normal       Breath sounds: Normal breath sounds  Abdominal:      General: Bowel sounds are normal  There is no distension  Palpations: Abdomen is soft  Musculoskeletal:         General: Deformity (s/p R BKA) present  Comments: LLE wrapped  Skin:     Findings: Erythema (significant erythema extending up to thigh LLE, remainder of LLE wrapped) present  Neurological:      General: No focal deficit present  Mental Status: He is alert and oriented to person, place, and time  Psychiatric:         Mood and Affect: Mood normal          Behavior: Behavior normal          Thought Content:  Thought content normal          Additional Data:     Labs:  Results from last 7 days   Lab Units 06/08/21  1608   WBC Thousand/uL 22 22*   HEMOGLOBIN g/dL 10 2*   HEMATOCRIT % 33 3*   PLATELETS Thousands/uL 189   BANDS PCT % 14*   LYMPHO PCT % 3*   MONO PCT % 1*   EOS PCT % 0     Results from last 7 days   Lab Units 06/08/21  1608   SODIUM mmol/L 134*   POTASSIUM mmol/L 3 8   CHLORIDE mmol/L 101   CO2 mmol/L 26   BUN mg/dL 13   CREATININE mg/dL 1 15   ANION GAP mmol/L 7   CALCIUM mg/dL 9 1   ALBUMIN g/dL 3 2*   TOTAL BILIRUBIN mg/dL 1 01*   ALK PHOS U/L 62   ALT U/L 14   AST U/L 9   GLUCOSE RANDOM mg/dL 123     Results from last 7 days   Lab Units 06/08/21  1608   INR  1 35*     Results from last 7 days   Lab Units 06/09/21  0705 06/08/21  2227 06/08/21  1616   POC GLUCOSE mg/dl 88 131 119         Results from last 7 days   Lab Units 06/08/21  1734 06/08/21  1608   LACTIC ACID mmol/L 1 9 3 2*   PROCALCITONIN ng/ml  --  0 97*       Lines/Drains:  Invasive Devices     Peripheral Intravenous Line            Peripheral IV 06/08/21 Left;Ventral (anterior) Forearm less than 1 day                      Imaging: No pertinent imaging reviewed  Recent Cultures (last 7 days):   Results from last 7 days   Lab Units 06/08/21  1611 06/08/21  1608   BLOOD CULTURE  Received in Microbiology Lab  Culture in Progress  Received in Microbiology Lab  Culture in Progress  Last 24 Hours Medication List:   Current Facility-Administered Medications   Medication Dose Route Frequency Provider Last Rate    acetaminophen  650 mg Oral Q6H PRN Mikayla Zhou MD      aspirin  81 mg Oral Daily Mikayla Zhou MD      calcium carbonate  1,000 mg Oral Daily PRN Mikayla Zhou MD      cefepime  2,000 mg Intravenous Q12H Mikayla Zhou MD Stopped (06/09/21 0845)    docusate sodium  100 mg Oral BID Mikayla Zhou MD      enoxaparin  40 mg Subcutaneous Q12H Mikayla Zhou MD      gabapentin  100 mg Oral TID Mikayla Zhou MD      insulin lispro  1-6 Units Subcutaneous TID AC Mikayla Zhou MD      insulin lispro  1-6 Units Subcutaneous HS Mikayla Zhou MD      levothyroxine  25 mcg Oral Daily Mikayla Zhou MD      levothyroxine  300 mcg Oral Daily Mikayla Zhou MD      lisinopril  10 mg Oral Daily Mikayla Zhou MD      metoprolol tartrate  50 mg Oral BID Mikayla Zhou MD      multivitamin-minerals  1 tablet Oral Daily Mikayla Zhou MD      ondansetron  4 mg Intravenous Q6H PRN Mikayla Zhou MD      pantoprazole  40 mg Oral Daily Mikayla Zhou MD      pravastatin  20 mg Oral Daily With Wes Guevara MD      senna  2 tablet Oral Daily PRN Mikayla Zhou MD      vancomycin  1,250 mg Intravenous Q12H Mikayla Zhou MD Stopped (06/09/21 1022)        Today, Patient Was Seen By: Sushila Marley PA-C    **Please Note: This note may have been constructed using a voice recognition system  **

## 2021-06-10 PROBLEM — R82.90 ABNORMAL URINALYSIS: Status: ACTIVE | Noted: 2021-06-10

## 2021-06-10 LAB
ANION GAP SERPL CALCULATED.3IONS-SCNC: 6 MMOL/L (ref 4–13)
BASOPHILS # BLD AUTO: 0.01 THOUSANDS/ΜL (ref 0–0.1)
BASOPHILS NFR BLD AUTO: 0 % (ref 0–1)
BUN SERPL-MCNC: 14 MG/DL (ref 5–25)
CALCIUM SERPL-MCNC: 9.2 MG/DL (ref 8.3–10.1)
CHLORIDE SERPL-SCNC: 107 MMOL/L (ref 100–108)
CO2 SERPL-SCNC: 26 MMOL/L (ref 21–32)
CREAT SERPL-MCNC: 0.92 MG/DL (ref 0.6–1.3)
EOSINOPHIL # BLD AUTO: 0.1 THOUSAND/ΜL (ref 0–0.61)
EOSINOPHIL NFR BLD AUTO: 2 % (ref 0–6)
ERYTHROCYTE [DISTWIDTH] IN BLOOD BY AUTOMATED COUNT: 16.9 % (ref 11.6–15.1)
GFR SERPL CREATININE-BSD FRML MDRD: 85 ML/MIN/1.73SQ M
GLUCOSE SERPL-MCNC: 100 MG/DL (ref 65–140)
GLUCOSE SERPL-MCNC: 105 MG/DL (ref 65–140)
GLUCOSE SERPL-MCNC: 107 MG/DL (ref 65–140)
GLUCOSE SERPL-MCNC: 112 MG/DL (ref 65–140)
GLUCOSE SERPL-MCNC: 131 MG/DL (ref 65–140)
HCT VFR BLD AUTO: 38.3 % (ref 36.5–49.3)
HGB BLD-MCNC: 11.7 G/DL (ref 12–17)
IMM GRANULOCYTES # BLD AUTO: 0.02 THOUSAND/UL (ref 0–0.2)
IMM GRANULOCYTES NFR BLD AUTO: 0 % (ref 0–2)
LYMPHOCYTES # BLD AUTO: 0.6 THOUSANDS/ΜL (ref 0.6–4.47)
LYMPHOCYTES NFR BLD AUTO: 9 % (ref 14–44)
MCH RBC QN AUTO: 26 PG (ref 26.8–34.3)
MCHC RBC AUTO-ENTMCNC: 30.5 G/DL (ref 31.4–37.4)
MCV RBC AUTO: 85 FL (ref 82–98)
MONOCYTES # BLD AUTO: 0.46 THOUSAND/ΜL (ref 0.17–1.22)
MONOCYTES NFR BLD AUTO: 7 % (ref 4–12)
NEUTROPHILS # BLD AUTO: 5.42 THOUSANDS/ΜL (ref 1.85–7.62)
NEUTS SEG NFR BLD AUTO: 82 % (ref 43–75)
NRBC BLD AUTO-RTO: 0 /100 WBCS
PLATELET # BLD AUTO: 193 THOUSANDS/UL (ref 149–390)
PMV BLD AUTO: 11.5 FL (ref 8.9–12.7)
POTASSIUM SERPL-SCNC: 3.6 MMOL/L (ref 3.5–5.3)
PROCALCITONIN SERPL-MCNC: 0.74 NG/ML
RBC # BLD AUTO: 4.5 MILLION/UL (ref 3.88–5.62)
SODIUM SERPL-SCNC: 139 MMOL/L (ref 136–145)
VANCOMYCIN TROUGH SERPL-MCNC: 10.9 UG/ML (ref 10–20)
WBC # BLD AUTO: 6.61 THOUSAND/UL (ref 4.31–10.16)

## 2021-06-10 PROCEDURE — 80202 ASSAY OF VANCOMYCIN: CPT | Performed by: INTERNAL MEDICINE

## 2021-06-10 PROCEDURE — 82948 REAGENT STRIP/BLOOD GLUCOSE: CPT

## 2021-06-10 PROCEDURE — 80048 BASIC METABOLIC PNL TOTAL CA: CPT | Performed by: INTERNAL MEDICINE

## 2021-06-10 PROCEDURE — 84145 PROCALCITONIN (PCT): CPT | Performed by: EMERGENCY MEDICINE

## 2021-06-10 PROCEDURE — 97163 PT EVAL HIGH COMPLEX 45 MIN: CPT

## 2021-06-10 PROCEDURE — 99232 SBSQ HOSP IP/OBS MODERATE 35: CPT | Performed by: PHYSICIAN ASSISTANT

## 2021-06-10 PROCEDURE — 85025 COMPLETE CBC W/AUTO DIFF WBC: CPT | Performed by: PHYSICIAN ASSISTANT

## 2021-06-10 PROCEDURE — 99223 1ST HOSP IP/OBS HIGH 75: CPT | Performed by: INTERNAL MEDICINE

## 2021-06-10 RX ORDER — FLUCONAZOLE 200 MG/1
200 TABLET ORAL DAILY
Status: DISCONTINUED | OUTPATIENT
Start: 2021-06-10 | End: 2021-06-14 | Stop reason: HOSPADM

## 2021-06-10 RX ORDER — FLUCONAZOLE 200 MG/1
200 TABLET ORAL DAILY
Status: DISCONTINUED | OUTPATIENT
Start: 2021-06-11 | End: 2021-06-10

## 2021-06-10 RX ORDER — CEFAZOLIN SODIUM 2 G/50ML
2000 SOLUTION INTRAVENOUS EVERY 8 HOURS
Status: DISCONTINUED | OUTPATIENT
Start: 2021-06-10 | End: 2021-06-14

## 2021-06-10 RX ADMIN — METOPROLOL TARTRATE 50 MG: 50 TABLET, FILM COATED ORAL at 09:03

## 2021-06-10 RX ADMIN — FLUCONAZOLE 200 MG: 200 TABLET ORAL at 20:07

## 2021-06-10 RX ADMIN — LEVOTHYROXINE SODIUM 25 MCG: 25 TABLET ORAL at 05:15

## 2021-06-10 RX ADMIN — CEFAZOLIN SODIUM 2000 MG: 2 SOLUTION INTRAVENOUS at 13:17

## 2021-06-10 RX ADMIN — CEFEPIME HYDROCHLORIDE 2000 MG: 2 INJECTION, POWDER, FOR SOLUTION INTRAVENOUS at 09:03

## 2021-06-10 RX ADMIN — ASPIRIN 81 MG: 81 TABLET, COATED ORAL at 09:03

## 2021-06-10 RX ADMIN — VANCOMYCIN HYDROCHLORIDE 1250 MG: 10 INJECTION, POWDER, LYOPHILIZED, FOR SOLUTION INTRAVENOUS at 10:31

## 2021-06-10 RX ADMIN — ENOXAPARIN SODIUM 40 MG: 40 INJECTION SUBCUTANEOUS at 21:28

## 2021-06-10 RX ADMIN — DOCUSATE SODIUM 100 MG: 100 CAPSULE ORAL at 18:03

## 2021-06-10 RX ADMIN — Medication 1 TABLET: at 09:03

## 2021-06-10 RX ADMIN — GABAPENTIN 100 MG: 100 CAPSULE ORAL at 18:03

## 2021-06-10 RX ADMIN — CEFAZOLIN SODIUM 2000 MG: 2 SOLUTION INTRAVENOUS at 20:08

## 2021-06-10 RX ADMIN — LEVOTHYROXINE SODIUM 300 MCG: 100 TABLET ORAL at 05:15

## 2021-06-10 RX ADMIN — GABAPENTIN 100 MG: 100 CAPSULE ORAL at 09:03

## 2021-06-10 RX ADMIN — GABAPENTIN 100 MG: 100 CAPSULE ORAL at 21:28

## 2021-06-10 RX ADMIN — PRAVASTATIN SODIUM 20 MG: 20 TABLET ORAL at 18:03

## 2021-06-10 RX ADMIN — LISINOPRIL 10 MG: 10 TABLET ORAL at 09:03

## 2021-06-10 RX ADMIN — PANTOPRAZOLE SODIUM 40 MG: 40 TABLET, DELAYED RELEASE ORAL at 05:15

## 2021-06-10 RX ADMIN — METOPROLOL TARTRATE 50 MG: 50 TABLET, FILM COATED ORAL at 18:03

## 2021-06-10 RX ADMIN — ENOXAPARIN SODIUM 40 MG: 40 INJECTION SUBCUTANEOUS at 11:19

## 2021-06-10 RX ADMIN — DOCUSATE SODIUM 100 MG: 100 CAPSULE ORAL at 09:02

## 2021-06-10 NOTE — PLAN OF CARE
Problem: PHYSICAL THERAPY ADULT  Goal: Performs mobility at highest level of function for planned discharge setting  See evaluation for individualized goals  Description: Treatment/Interventions: Functional transfer training, LE strengthening/ROM, Therapeutic exercise, Endurance training, Bed mobility, Gait training          See flowsheet documentation for full assessment, interventions and recommendations  Note: Prognosis: Good  Problem List: Decreased strength, Decreased endurance, Impaired balance, Decreased mobility  Assessment: Pt is a 73 yo male admitted to [place] [address] on 6/8/2021 s/p syncope  Dx: fall, sepsis, abnormal urinalysis, cellulitis of L LE, diabetic polyneuropathy, DM  Per podiatry patient is WBAT  Two patient identifiers were used to confirm  Pt lives in a one story apartment with ramp entrance and elevator access  Pt was I for ADL's and mobility prior  Pt used a RW or two walking sticks for mobility prior  Pt lives alone  Pt does not drive but uses public transportation  Pt's impairments include reduced mobility, high risk of falling, poor activity tolerance, gait abnormalitites  These impairments limit the ability of the patient to perform mobility without increased assistance, return to PLOF and participate in everyday life activities  Pt would benefit from continued skilled therapy while in the hospital to improve overall mobility and work towards a safe d/c  Recommend discharge to home with home PT  At the end of the session the patient was left in seated position with call bell and phone within reach  The patient's AM-PAC Basic Mobility Inpatient Short Form Raw Score is 19, Standardized Score is 42 48  A standardized score less than 42 9 suggests the patient may benefit from discharge to post-acute rehabilitation services  Please also refer to the recommendation of the Physical Therapist for safe discharge planning  Anticipate the patient will transition home with home PT     Barriers to Discharge: Decreased caregiver support        PT Discharge Recommendation: Home with home health rehabilitation     PT - OK to Discharge: No    See flowsheet documentation for full assessment

## 2021-06-10 NOTE — PROGRESS NOTES
Podiatry - Progress Note  Patient: Lainey Alexis 74 y o  male   MRN: [id number]  PCP: Irma Tanner MD  Unit/Bed#: [facility] 823-01 Encounter: [id number]  Date Of Visit: 06/10/21    ASSESSMENT:    Lainey Alexis is a 74 y o  male with:    1  Left foot diabetic wound limited to break down of skin - decker 1  2  Cellulites left lower extremity  3  T2DM (A1c:5 6% 4/10/21 ) with neuropathy  4  Lymphedema    PLAN:    · Local wound care consisting of maxorb to left lower extremity webspaces, DSD, ACE for compression  · Left foot xray reviewed: no signs of osteomyelitis or STACIE  · WBC WNL today, continue IV abx per primary team  If possible, would recommend outpatient suppressing/prophylactic antibiotics secondary to patient's recurrent bouts of cellulitis  · Elevation on green foam wedges or pillows when non-ambulatory  · Rest of care per primary team      Weightbearing status: WBAT    SUBJECTIVE:     The patient was seen, evaluated, and assessed at bedside today  The patient was awake, alert, and in no acute distress  No acute events overnight  The patient reports no pain to his left leg today  Patient denies N/V/F/chills/SOB/CP  OBJECTIVE:     Vitals:   /79   Pulse 75   Temp 98 3 °F (36 8 °C)   Resp 18   Ht 6' 7" (2 007 m)   Wt (!) 177 kg (390 lb)   SpO2 98%   BMI 43 94 kg/m²     Temp (24hrs), Av 5 °F (36 9 °C), Min:98 3 °F (36 8 °C), Max:98 7 °F (37 1 °C)      Physical Exam:     General: Alert, cooperative and no distress  Lungs: Non labored breathing  Abdomen: Soft, non-tender  Lower extremity exam:  Cardiovascular status at baseline  Neurological status at baseline  Musculoskeletal status at baseline  No calf tenderness noted       Lower extremity wound(s) as noted below:  Wound #: 1  Location: left foot  Length 15 0cm: Width 1 0cm: Depth 0 1cm:   Deepest Tissue Noted in Base: dermis  Probe to Bone: No  Peripheral Skin Description: Attached  Granulation: 100% Fibrotic Tissue: 0% Necrotic Tissue: 0%   Drainage Amount: none  Signs of Infection: Yes - Cellulitis to LLE, improving daily    Clinical Images 06/10/21:              Additional Data:     Labs:    Results from last 7 days   Lab Units 06/10/21  0508   WBC Thousand/uL 6 61   HEMOGLOBIN g/dL 11 7*   HEMATOCRIT % 38 3   PLATELETS Thousands/uL 193   NEUTROS PCT % 82*   LYMPHS PCT % 9*   MONOS PCT % 7   EOS PCT % 2     Results from last 7 days   Lab Units 06/10/21  0509 06/08/21  1608   POTASSIUM mmol/L 3 6 3 8   CHLORIDE mmol/L 107 101   CO2 mmol/L 26 26   BUN mg/dL 14 13   CREATININE mg/dL 0 92 1 15   CALCIUM mg/dL 9 2 9 1   ALK PHOS U/L  --  62   ALT U/L  --  14   AST U/L  --  9     Results from last 7 days   Lab Units 06/08/21  1608   INR  1 35*       * I Have Reviewed All Lab Data Listed Above  Recent Cultures (last 7 days):     Results from last 7 days   Lab Units 06/09/21  0731 06/08/21  1611 06/08/21  1608   BLOOD CULTURE   --  No Growth at 24 hrs  No Growth at 24 hrs  URINE CULTURE  Culture results to follow  --   --            Imaging: I have personally reviewed pertinent films in PACS  EKG, Pathology, and Other Studies: I have personally reviewed pertinent reports  ** Please Note: Portions of the record may have been created with voice recognition software  Occasional wrong word or "sound a like" substitutions may have occurred due to the inherent limitations of voice recognition software  Read the chart carefully and recognize, using context, where substitutions have occurred   **

## 2021-06-10 NOTE — CONSULTS
Vancomycin IV Pharmacy-to-Dose Consultation    Roro Torre is a 74 y o  male who was receiving Vancomycin IV with management by the Pharmacy Consult service for treatment of skin-soft tissue infection    The patients Vancomycin therapy has been discontinued  Thank you for allowing us to take part in this patient's care  Pharmacy will sign-off now; please call or re-consult if there are any questions      Zion Liu  Staff Pharmacist

## 2021-06-10 NOTE — ASSESSMENT & PLAN NOTE
· POA, e/b leukocytosis and tachycardia  Lactic acid 3 2, now normalized following fluids  · Due to cellulitis of left lower extremity  · Prior cultures are positive for strep, Proteus, Acinetobacter pansensitive  · Due to his rehospitalization will place on broad-spectrum antibiotics to cover for g negatives and resistant organisms - initially on IV cefepime and vanco  · Significant improvement in exam and WBCs, will de-escalate to IV Ancef today   · Consult podiatry for wound care of left lower extremity  · Elevate left lower extremity    · BC x 2 negative at 24 hrs

## 2021-06-10 NOTE — PHYSICAL THERAPY NOTE
Physical Therapy Evaluation note     Patient Name: Beryle Manger    CCJYN'Z Date: 6/10/2021     Problem List  Principal Problem:    Fall  Active Problems:    Controlled type 2 diabetes mellitus with complication, without long-term current use of insulin ([facility] [address] )    Morbid obesity with BMI of 50 0-59 9, adult ([facility])    Paroxysmal atrial fibrillation ([facility])    Gastroesophageal reflux disease without esophagitis    Diabetic polyneuropathy associated with type 2 diabetes mellitus ([facility])    Cellulitis of left lower extremity    Sepsis ([facility] [address] [facility] )    Abnormal urinalysis       Past Medical History  Past Medical History:   Diagnosis Date    Atrial fibrillation ([facility]     Cellulitis     Diabetes mellitus ([facility] [address]     Disease of thyroid gland     Duodenal ulcer     perforated- ICU stay    High blood pressure     High cholesterol     Hyperlipidemia     Hypertension     Hypothyroidism     Lymphedema      b/l LE- was followed at wound center    Morbid obesity with BMI of 40 0-44 9, adult ([facility] [address] )     Peritonitis ([facility] [address] [facility] )         Past Surgical History  Past Surgical History:   Procedure Laterality Date    BELOW KNEE LEG AMPUTATION Right     DIAGNOSTIC LAPAROSCOPY      KNEE ARTHROSCOPY Bilateral     OTHER SURGICAL HISTORY  03/2014     lap repair    OTHER SURGICAL HISTORY      Laparoscopic repair of a perforated duodenal ulcer with Harrie Apo omental    OTHER SURGICAL HISTORY      Placement of drains         06/10/21 1048   PT Last Visit   PT Visit Date 06/10/21   Note Type   Note type Evaluation   Pain Assessment   Pain Assessment Tool Pain Assessment not indicated - pt denies pain   Pain Score No Pain   Home Living   Type of [address] [facility] One level   Additional Comments Pt lives in a one story apartment with ramp entrance  Pt also has a elevator to the 3rd floor where his apartment is located  Pt was I for ADL's and mobility prior   Pt orders groceries online and has them delivered  Pt does not drive  Pt uses a RW or two walking sticks to ambulate  Prior Function   Level of [place] Independent with ADLs and functional mobility   Lives With Alone   Receives Help From Family   ADL Assistance Independent   IADLs Independent   Comments does not drive   Restrictions/Precautions   Weight Bearing Precautions Per Order Yes   LLE Weight Bearing Per Order WBAT   Braces or Orthoses   (prosthetic on R LE )   Other Precautions Fall Risk;Multiple lines   General   Family/Caregiver Present No   Cognition   Overall Cognitive Status WFL   Arousal/Participation Alert   Orientation Level Oriented X4   Memory Within functional limits   Following Commands Follows all commands and directions without difficulty   LLE Assessment   LLE Assessment WNL   Light Touch   LLE Light Touch Impaired   Bed Mobility   Additional Comments pt sitting edge of bed at the begining of the session   Transfers   Sit to Stand 5  Supervision   Stand to Sit 5  Supervision   Ambulation/Elevation   Gait pattern Excessively slow; Step to; Foward flexed; Shuffling  (LOBx1 requiring min a t orecover )   Gait Assistance 4  Minimal assist   Additional items Assist x 1   Assistive Device Rolling walker   Distance 50ftx1   Balance   Static Sitting Good   Dynamic Sitting Fair +   Static Standing Fair   Dynamic Standing Fair   Ambulatory Fair -   Endurance Deficit   Endurance Deficit Yes   Activity Tolerance   Activity Tolerance Patient limited by fatigue   Medical Staff Made Aware restorative aide   Nurse Made Aware nurse approved therapy session   Assessment   Prognosis Good   Problem List Decreased strength;Decreased endurance; Impaired balance;Decreased mobility   Assessment Pt is a 73 yo male admitted to [facility] on 6/8/2021 s/p syncope  Dx: fall, sepsis, abnormal urinalysis, cellulitis of L LE, diabetic polyneuropathy, DM  Per podiatry patient is WBAT  Two patient identifiers were used to confirm   Pt lives in a one story apartment with ramp entrance and elevator access  Pt was I for ADL's and mobility prior  Pt used a RW or two walking sticks for mobility prior  Pt lives alone  Pt does not drive but uses public transportation  Pt's impairments include reduced mobility, high risk of falling, poor activity tolerance, gait abnormalitites  These impairments limit the ability of the patient to perform mobility without increased assistance, return to PLOF and participate in everyday life activities  Pt would benefit from continued skilled therapy while in the hospital to improve overall mobility and work towards a safe d/c  Recommend discharge to home with home PT  At the end of the session the patient was left in seated position with call bell and phone within reach  The patient's AM-PAC Basic Mobility Inpatient Short Form Raw Score is 19, Standardized Score is 42 48  A standardized score less than 42 9 suggests the patient may benefit from discharge to post-acute rehabilitation services  Please also refer to the recommendation of the Physical Therapist for safe discharge planning  Anticipate the patient will transition home with home PT  PT able to don R prosthetic without assistance  Barriers to Discharge Decreased caregiver support   Goals   STG Expiration Date 06/24/21   Short Term Goal #1 STG 1: Pt will perform transfers at a MI level to return to baseline of function  STG 2: Pt will ambulate 150ft with RW at a MI level to reduce the level of assistance needed upon d/c home  STG 3: Pt will perform bed mobility at a I to safety return to PLOF  Plan   Treatment/Interventions Functional transfer training;LE strengthening/ROM; Therapeutic exercise; Endurance training;Bed mobility;Gait training   PT Frequency Other (Comment)  (3-5xwk)   Recommendation   PT Discharge Recommendation Home with home health rehabilitation   PT - OK to Discharge No   Additional Comments need to ambulate further    AM-PAC Basic Mobility Inpatient   Turning in Bed Without Bedrails 3   Lying on Back to Sitting on Edge of Flat Bed 3   Moving Bed to Chair 3   Standing Up From Chair 4   Walk in Room 3   Climb 3-5 Stairs 3   Basic Mobility Inpatient Raw Score 19   Basic Mobility Standardized Score 42 48   Zion Encarnacion, PT, DPT

## 2021-06-10 NOTE — CASE MANAGEMENT
LOS 1 day  Bundle No  30 day readmission : no     Met with patient and explained CM program and role  Pt is 74year old male who lives in the [facility] in [place] and lives alone  Pt has easy acces to building via a wheelchair ramp and use of elevator to access his apartment  Pt has walker and cane at home and uses primarily his walker with history of RT AKA and prosthesis  Pt relies on transportation services to get to appointments such as [facility] or [employer]  Pt has limited family support from his daughter and can arrange for delivery of items from the internet to the home  Pt uses [facility] for pharmacy services  Pt is current with [facility] for nursing for wound care to the left foot  Pt denies history of mental illness  Pt is currently retired  Pt has had an inpatient stay at [facility] in 2017 for amputation of RT AKA  Pt will be picked up by his daughter Rochelle Amezcua upon discharge  CM reviewed d/c planning process including the following: identifying help at home, patient preference for d/c planning needs, Discharge Lounge, [facility] Meds to Bed program, availability of treatment team to discuss questions or concerns patient and/or family may have regarding understanding medications and recognizing signs and symptoms once discharged  CM also encouraged patient to follow up with all recommended appointments after discharge  Patient advised of importance for patient and family to participate in managing patients medical well being  A post acute care recommendation was made by your care team for [facility]  Discussed [place] [facility] with patient  Choice is to return/continue services

## 2021-06-10 NOTE — CONSULTS
Consultation - Infectious Disease   Nadine Frazier 74 y o  male MRN: [id number]  Unit/Bed#: [facility] 823-01 Encounter: [id number]      IMPRESSION & RECOMMENDATIONS:   1  Recurrent left leg cellulitis  He has had cellulitis of the left leg requiring hospitalization  He states he has had cellulitis in the leg since he was about 14years old  He denies fevers prior to admission  Negative blood cultures from admission  Admitted with WBC of 64354 and lactate of 3 2 now normalized  Started on vanc/cefepime on admission and now transitioned to ancef  No wound to culture  - continue Ancef and monitor ability to tolerate  Would anticipate the need for longer course of antibiotics on discharge   - will begin course of oral fluconazole 200 mg qdaily   - continue to follow blood culture   - continue to monitor labs and vitals   - abnormal urine leukocytes with reflex culture showing e faecalis >100,000 denies symptoms of burning or pain with urination   - wound care per podiatry       2  Type 2 diabetes mellitus- wellcontrolled with last A1c 5 6 %  3  Right BKA  Occurred in 2017 due to lymphedema and PAD  Have discussed the above management plan in detail with the primary service    Extensive review of the medical records in epic including review of the notes, radiographs, and laboratory results     HISTORY OF PRESENT ILLNESS:  Reason for Consult: long term antibiotics  HPI: Nadine Frazier is a 74y o  year old male with PMH of well controlled T2DM, morbid obesity, lymphedema, a fib, left foot wounds, recurrent left leg cellulitis  He presented to the ED 6/8 with worsening left leg redness and swelling  He denies having fever or chills prior to this time  He was seen by home visiting nurse and advised to go into the hospital for evaluation  He states he has gotten this intermittently since he was about 14years old  He was most recently admitted in 4/2021 for cellulitis of this left leg   He received IV ancef and was transitioned to keflex and discharged with 8 5 days total antibiotic therapy  He was previously admitted 3/2019 and discharged with a total of 6 days of antibiotic therapy with keflex  He was given 7 day course of levaquin in 2021 for recurrent left leg cellulitis  He denies fevers or chills now or prior to hospitalization, He denies shortness of breath, pain or burning with urination, nausea or vomiting  REVIEW OF SYSTEMS:  A complete review of systems is negative other than that noted in the HPI  PAST MEDICAL HISTORY:  Past Medical History:   Diagnosis Date    Atrial fibrillation ([facility] )     Cellulitis     Diabetes mellitus ([facility] )     Disease of thyroid gland     Duodenal ulcer     perforated- ICU stay    High blood pressure     High cholesterol     Hyperlipidemia     Hypertension     Hypothyroidism     Lymphedema      b/l LE- was followed at wound center    Morbid obesity with BMI of 40 0-44 9, adult ([facility] )     Peritonitis ([facility] )      Past Surgical History:   Procedure Laterality Date    BELOW KNEE LEG AMPUTATION Right     DIAGNOSTIC LAPAROSCOPY      KNEE ARTHROSCOPY Bilateral     OTHER SURGICAL HISTORY  2014     lap repair    OTHER SURGICAL HISTORY      Laparoscopic repair of a perforated duodenal ulcer with Oral Notch omental    OTHER SURGICAL HISTORY      Placement of drains       FAMILY HISTORY:  Non-contributory    SOCIAL HISTORY:  Social History   Social History     Substance and Sexual Activity   Alcohol Use Not Currently     Social History     Substance and Sexual Activity   Drug Use Not Currently     Social History     Tobacco Use   Smoking Status Former Smoker    Packs/day: 1 00    Years: 30 00    Pack years: 30 00    Quit date: 2004    Years since quittin 6   Smokeless Tobacco Never Used       ALLERGIES:  No Known Allergies    MEDICATIONS:  All current active medications have been reviewed        PHYSICAL EXAM:  Temp:  [98 2 °F (36 8 °C)-98 7 °F (37 1 °C)] 98 2 °F (36 8 °C)  HR:  [72-75] 72  Resp:  [18-22] 18  BP: (131-138)/(55-79) 138/75  SpO2:  [98 %] 98 %  Temp (24hrs), Av 4 °F (36 9 °C), Min:98 2 °F (36 8 °C), Max:98 7 °F (37 1 °C)  Current: Temperature: 98 2 °F (36 8 °C)    Intake/Output Summary (Last 24 hours) at 6/10/2021 1442  Last data filed at 6/10/2021 1300  Gross per 24 hour   Intake 600 ml   Output 2225 ml   Net -1625 ml       General Appearance:  Appearing well, nontoxic, and in no distress   Head:  Normocephalic, without obvious abnormality, atraumatic   Eyes:  Conjunctiva pink and sclera anicteric, both eyes   Nose: Nares normal, mucosa normal, no drainage   Throat: Oropharynx moist without lesions   Neck: Supple, symmetrical, no adenopathy, no tenderness/mass/nodules   Back:   Symmetric, no curvature, ROM normal, no CVA tenderness   Lungs:   Clear to auscultation bilaterally, respirations unlabored   Chest Wall:  No tenderness or deformity   Heart:  RRR; no murmur, rub or gallop   Abdomen:   Soft, non-tender, non-distended, positive bowel sounds    Extremities: No cyanosis, clubbing or edema   Skin: No rashes or lesions  Erythema with pink color appearance with no warmth to the touch  Lymph nodes: Cervical, supraclavicular nodes normal  Lymphadenopathy left external inguinal   Neurologic: Alert and oriented times 3, extremity strength 4/5  Sensory neuropathy of the left foot is noted  LABS, IMAGING, & OTHER STUDIES:  Lab Results:  I have personally reviewed pertinent labs    Results from last 7 days   Lab Units 06/10/21  0508 21  1608   WBC Thousand/uL 6 61 22 22*   HEMOGLOBIN g/dL 11 7* 10 2*   PLATELETS Thousands/uL 193 189     Results from last 7 days   Lab Units 06/10/21  0509 21  1608   SODIUM mmol/L 139 134*   POTASSIUM mmol/L 3 6 3 8   CHLORIDE mmol/L 107 101   CO2 mmol/L 26 26   BUN mg/dL 14 13   CREATININE mg/dL 0 92 1 15   EGFR ml/min/1 73sq m 85 65   CALCIUM mg/dL 9 2 9 1   AST U/L  --  9   ALT U/L  --  14   ALK PHOS U/L --  62     Results from last 7 days   Lab Units 06/09/21  0731 06/08/21  1611 06/08/21  1608   BLOOD CULTURE   --  No Growth at 24 hrs  No Growth at 24 hrs  URINE CULTURE  >100,000 cfu/ml Enterococcus faecalis*  --   --      Results from last 7 days   Lab Units 06/10/21  0913 06/08/21  1608   PROCALCITONIN ng/ml 0 74* 0 97*                   Imaging Studies:   I have personally reviewed pertinent imaging study reports and images in PACS  Radiographs reviewed    Other Studies:   I have personally reviewed pertinent reports  Previous wound cultures, blood cultures and arterial studies

## 2021-06-10 NOTE — ASSESSMENT & PLAN NOTE
Lab Results   Component Value Date    HGBA1C 5 6 04/10/2021       Recent Labs     06/09/21  1639 06/09/21  2109 06/10/21  0732 06/10/21  1057   POCGLU 108 103 105 131       Blood Sugar Average: Last 72 hrs:  (P) 109 625     · Very well controlled   · Hold oral diabetic agents - resume on d/c  · Can give regular diet given excellent control

## 2021-06-10 NOTE — PROGRESS NOTES
[address] [facility]  Progress Note - Erin Fisher 1955, 74 y o  male MRN: [id number]  Unit/Bed#: [facility] 823-01 Encounter: [id number]  Primary Care Provider: Ani Tavera MD   Date and time admitted to hospital: 6/8/2021  3:33 PM    * [address]  · Weakness and lightheadedness likely due to hypotension from sepsis - BP stable here   · S/p IV fluids  · Orthostatics  · PT/OT    Sepsis ([address] )  Assessment & Plan  · POA, e/b leukocytosis and tachycardia  Lactic acid 3 2, now normalized following fluids  · Due to cellulitis of left lower extremity  · Prior cultures are positive for strep, Proteus, Acinetobacter pansensitive  · Due to his rehospitalization will place on broad-spectrum antibiotics to cover for g negatives and resistant organisms - initially on IV cefepime and vanco  · Significant improvement in exam and WBCs, will de-escalate to IV Ancef today   · Consult podiatry for wound care of left lower extremity  · Elevate left lower extremity  · BC x 2 negative at 24 hrs     Abnormal urinalysis  Assessment & Plan  · UA with 30-50 WBC, small leukocytes, RBCs and blood  · Urine culture pending     Cellulitis of left lower extremity  Assessment & Plan  · As above    Diabetic polyneuropathy associated with type 2 diabetes mellitus (HCC)  Assessment & Plan  · Continue gabapentin at home dose    Gastroesophageal reflux disease without esophagitis  Assessment & Plan  · Continue Protonix    Paroxysmal atrial fibrillation (HCC)  Assessment & Plan  · Does not appear to be on any anticoagulation besides aspirin at this time  · Rates are controlled, continue metoprolol    Morbid obesity with BMI of 50 0-59 9, adult [facility])  Assessment & Plan  · Supportive care    · Weight loss counseling when stable    Controlled type 2 diabetes mellitus with complication, without long-term current use of insulin [facility])  Assessment & Plan  Lab Results   Component Value Date    HGBA1C 5 6 04/10/2021       Recent Labs     21  1639 21  2109 06/10/21  0732 06/10/21  1057   POCGLU 108 103 105 131       Blood Sugar Average: Last 72 hrs:  (P) 109 625     · Very well controlled   · Hold oral diabetic agents - resume on d/c  · Can give regular diet given excellent control         VTE Pharmacologic Prophylaxis:   Moderate Risk (Score 3-4) - Pharmacological DVT Prophylaxis Ordered: enoxaparin (Lovenox)  Patient Centered Rounds: I performed bedside rounds with nursing staff today  Discussions with Specialists or Other Care Team Provider: eloy MILNER d/w CM    Education and Discussions with Family / Patient: Patient declined call to   Time Spent for Care: 20 minutes  More than 50% of total time spent on counseling and coordination of care as described above  Current Length of Stay: 2 day(s)  Current Patient Status: Inpatient   Certification Statement: The patient will continue to require additional inpatient hospital stay due to IV abx  Discharge Plan: Anticipate discharge in 24-48 hrs to discharge location to be determined pending rehab evaluations  Code Status: Level 1 - Full Code    Subjective: The patient has no acute complaints, states his LLE redness if much better  Tolerating antibiotics  Objective:     Vitals:   Temp (24hrs), Av 5 °F (36 9 °C), Min:98 3 °F (36 8 °C), Max:98 7 °F (37 1 °C)    Temp:  [98 3 °F (36 8 °C)-98 7 °F (37 1 °C)] 98 3 °F (36 8 °C)  HR:  [75-86] 75  Resp:  [16-22] 18  BP: (119-132)/(55-79) 131/79  SpO2:  [96 %-98 %] 98 %  Body mass index is 43 94 kg/m²  Input and Output Summary (last 24 hours): Intake/Output Summary (Last 24 hours) at 6/10/2021 1159  Last data filed at 6/10/2021 0415  Gross per 24 hour   Intake --   Output 2225 ml   Net -2225 ml       Physical Exam:   Physical Exam  Vitals signs reviewed  Constitutional:       General: He is not in acute distress  Appearance: He is not toxic-appearing     HENT:      Head: Normocephalic and atraumatic  Eyes:      General: No scleral icterus  Extraocular Movements: Extraocular movements intact  Neck:      Musculoskeletal: Normal range of motion  Cardiovascular:      Rate and Rhythm: Normal rate  Pulmonary:      Effort: Pulmonary effort is normal       Breath sounds: Normal breath sounds  Abdominal:      General: Bowel sounds are normal  There is no distension  Palpations: Abdomen is soft  Musculoskeletal:         General: Deformity (s/p R BKA) present  Skin:     Findings: Erythema (LLE erythema, significantly improved, receding from marker outline) present  Neurological:      General: No focal deficit present  Mental Status: He is alert and oriented to person, place, and time  Psychiatric:         Mood and Affect: Mood normal          Behavior: Behavior normal          Thought Content:  Thought content normal          Additional Data:     Labs:  Results from last 7 days   Lab Units 06/10/21  0508 06/08/21  1608   WBC Thousand/uL 6 61 22 22*   HEMOGLOBIN g/dL 11 7* 10 2*   HEMATOCRIT % 38 3 33 3*   PLATELETS Thousands/uL 193 189   BANDS PCT %  --  14*   NEUTROS PCT % 82*  --    LYMPHS PCT % 9*  --    LYMPHO PCT %  --  3*   MONOS PCT % 7  --    MONO PCT %  --  1*   EOS PCT % 2 0     Results from last 7 days   Lab Units 06/10/21  0509 06/08/21  1608   SODIUM mmol/L 139 134*   POTASSIUM mmol/L 3 6 3 8   CHLORIDE mmol/L 107 101   CO2 mmol/L 26 26   BUN mg/dL 14 13   CREATININE mg/dL 0 92 1 15   ANION GAP mmol/L 6 7   CALCIUM mg/dL 9 2 9 1   ALBUMIN g/dL  --  3 2*   TOTAL BILIRUBIN mg/dL  --  1 01*   ALK PHOS U/L  --  62   ALT U/L  --  14   AST U/L  --  9   GLUCOSE RANDOM mg/dL 107 123     Results from last 7 days   Lab Units 06/08/21  1608   INR  1 35*     Results from last 7 days   Lab Units 06/10/21  1057 06/10/21  0732 06/09/21  2109 06/09/21  1639 06/09/21  1253 06/09/21  0705 06/08/21  2227 06/08/21  1616   POC GLUCOSE mg/dl 131 105 103 108 92 88 131 119 Results from last 7 days   Lab Units 06/10/21  0913 06/08/21  1734 06/08/21  1608   LACTIC ACID mmol/L  --  1 9 3 2*   PROCALCITONIN ng/ml 0 74*  --  0 97*       Lines/Drains:  Invasive Devices     Peripheral Intravenous Line            Peripheral IV 06/10/21 Dorsal (posterior); Right Forearm less than 1 day                      Imaging: No pertinent imaging reviewed  Recent Cultures (last 7 days):   Results from last 7 days   Lab Units 06/09/21  0731 06/08/21  1611 06/08/21  1608   BLOOD CULTURE   --  No Growth at 24 hrs  No Growth at 24 hrs  URINE CULTURE  Culture results to follow    --   --        Last 24 Hours Medication List:   Current Facility-Administered Medications   Medication Dose Route Frequency Provider Last Rate    acetaminophen  650 mg Oral Q6H PRN Miguel Palacios MD      aspirin  81 mg Oral Daily Miugel Palacios MD      calcium carbonate  1,000 mg Oral Daily PRN Miguel Palacios MD      cefazolin  2,000 mg Intravenous Q8H Rebecca Pedraza PA-C      docusate sodium  100 mg Oral BID Miguel Palacios MD      enoxaparin  40 mg Subcutaneous Q12H Miguel Palacios MD      gabapentin  100 mg Oral TID Miguel Palacios MD      insulin lispro  1-6 Units Subcutaneous TID AC Miguel Palacios MD      insulin lispro  1-6 Units Subcutaneous HS Miguel Palacios MD     [employer] levothyroxine  25 mcg Oral Daily Miguel Palacios MD      levothyroxine  300 mcg Oral Daily Miguel Palacios MD      lisinopril  10 mg Oral Daily Miguel Palacios MD      metoprolol tartrate  50 mg Oral BID Miguel Palacios MD      multivitamin-minerals  1 tablet Oral Daily Miguel Palacios MD      ondansetron  4 mg Intravenous Q6H PRN Miguel Palacios MD      pantoprazole  40 mg Oral Daily Miguel Palacios MD      pravastatin  20 mg Oral Daily With Maximino Hernandez MD      senna  2 tablet Oral Daily PRN Miguel Palacios MD          Today, Patient Was Seen By: Ravin Don PA-C    **Please Note: This note may have been constructed using a voice recognition system  **

## 2021-06-11 LAB
ERYTHROCYTE [DISTWIDTH] IN BLOOD BY AUTOMATED COUNT: 16.5 % (ref 11.6–15.1)
GLUCOSE SERPL-MCNC: 100 MG/DL (ref 65–140)
GLUCOSE SERPL-MCNC: 104 MG/DL (ref 65–140)
GLUCOSE SERPL-MCNC: 113 MG/DL (ref 65–140)
GLUCOSE SERPL-MCNC: 99 MG/DL (ref 65–140)
HCT VFR BLD AUTO: 36.7 % (ref 36.5–49.3)
HGB BLD-MCNC: 11.1 G/DL (ref 12–17)
MCH RBC QN AUTO: 25.8 PG (ref 26.8–34.3)
MCHC RBC AUTO-ENTMCNC: 30.2 G/DL (ref 31.4–37.4)
MCV RBC AUTO: 85 FL (ref 82–98)
PLATELET # BLD AUTO: 173 THOUSANDS/UL (ref 149–390)
PMV BLD AUTO: 11.9 FL (ref 8.9–12.7)
RBC # BLD AUTO: 4.31 MILLION/UL (ref 3.88–5.62)
WBC # BLD AUTO: 4.08 THOUSAND/UL (ref 4.31–10.16)

## 2021-06-11 PROCEDURE — 82948 REAGENT STRIP/BLOOD GLUCOSE: CPT

## 2021-06-11 PROCEDURE — 99233 SBSQ HOSP IP/OBS HIGH 50: CPT | Performed by: FAMILY MEDICINE

## 2021-06-11 PROCEDURE — 85027 COMPLETE CBC AUTOMATED: CPT | Performed by: PHYSICIAN ASSISTANT

## 2021-06-11 PROCEDURE — 97166 OT EVAL MOD COMPLEX 45 MIN: CPT

## 2021-06-11 PROCEDURE — 99233 SBSQ HOSP IP/OBS HIGH 50: CPT | Performed by: INTERNAL MEDICINE

## 2021-06-11 RX ADMIN — PANTOPRAZOLE SODIUM 40 MG: 40 TABLET, DELAYED RELEASE ORAL at 04:50

## 2021-06-11 RX ADMIN — ENOXAPARIN SODIUM 40 MG: 40 INJECTION SUBCUTANEOUS at 22:05

## 2021-06-11 RX ADMIN — GABAPENTIN 100 MG: 100 CAPSULE ORAL at 18:51

## 2021-06-11 RX ADMIN — CEFAZOLIN SODIUM 2000 MG: 2 SOLUTION INTRAVENOUS at 04:49

## 2021-06-11 RX ADMIN — METOPROLOL TARTRATE 50 MG: 50 TABLET, FILM COATED ORAL at 18:51

## 2021-06-11 RX ADMIN — LEVOTHYROXINE SODIUM 300 MCG: 100 TABLET ORAL at 04:49

## 2021-06-11 RX ADMIN — Medication 1 TABLET: at 10:41

## 2021-06-11 RX ADMIN — DOCUSATE SODIUM 100 MG: 100 CAPSULE ORAL at 10:41

## 2021-06-11 RX ADMIN — FLUCONAZOLE 200 MG: 200 TABLET ORAL at 10:42

## 2021-06-11 RX ADMIN — ASPIRIN 81 MG: 81 TABLET, COATED ORAL at 10:40

## 2021-06-11 RX ADMIN — LISINOPRIL 10 MG: 10 TABLET ORAL at 10:41

## 2021-06-11 RX ADMIN — DOCUSATE SODIUM 100 MG: 100 CAPSULE ORAL at 18:51

## 2021-06-11 RX ADMIN — PRAVASTATIN SODIUM 20 MG: 20 TABLET ORAL at 18:51

## 2021-06-11 RX ADMIN — CEFAZOLIN SODIUM 2000 MG: 2 SOLUTION INTRAVENOUS at 20:34

## 2021-06-11 RX ADMIN — ENOXAPARIN SODIUM 40 MG: 40 INJECTION SUBCUTANEOUS at 10:40

## 2021-06-11 RX ADMIN — GABAPENTIN 100 MG: 100 CAPSULE ORAL at 22:05

## 2021-06-11 RX ADMIN — GABAPENTIN 100 MG: 100 CAPSULE ORAL at 10:40

## 2021-06-11 RX ADMIN — CEFAZOLIN SODIUM 2000 MG: 2 SOLUTION INTRAVENOUS at 12:32

## 2021-06-11 RX ADMIN — METOPROLOL TARTRATE 50 MG: 50 TABLET, FILM COATED ORAL at 10:40

## 2021-06-11 RX ADMIN — LEVOTHYROXINE SODIUM 25 MCG: 25 TABLET ORAL at 04:50

## 2021-06-11 NOTE — ASSESSMENT & PLAN NOTE
· POA, e/b leukocytosis and tachycardia  Lactic acid 3 2, now normalized following fluids  · Due to cellulitis of left lower extremity  · Prior cultures are positive for strep, Proteus, Acinetobacter pansensitive  · Due to his rehospitalization will place on broad-spectrum antibiotics to cover for g negatives and resistant organisms - initially on IV cefepime and vanco, deescalated to IV Ancef  · Podiatry consult appreciated  · Elevate left lower extremity    · BC x 2 negative at 48 hrs

## 2021-06-11 NOTE — ASSESSMENT & PLAN NOTE
Lab Results   Component Value Date    HGBA1C 5 6 04/10/2021       Recent Labs     06/10/21  1057 06/10/21  1557 06/10/21  2059 06/11/21  0751   POCGLU 131 100 112 104       Blood Sugar Average: Last 72 hrs:  (P) 859 [id number]     · Very well controlled   · Hold oral diabetic agents - resume on d/c  · Can give regular diet given excellent control

## 2021-06-11 NOTE — ASSESSMENT & PLAN NOTE
· Weakness and lightheadedness likely due to hypotension from sepsis - BP stable here   · S/p IV fluids  · Orthostatics  · PT/OT- recommends home with VNA

## 2021-06-11 NOTE — PROGRESS NOTES
[address] [facility]  Progress Note - Grisel Meth 1955, 74 y o  male MRN: [id number]  Unit/Bed#: [facility] 823-01 Encounter: [id number]  Primary Care Provider: Glory Berg MD   Date and time admitted to hospital: 6/8/2021  3:33 PM    * Fall  Assessment & Plan  · Weakness and lightheadedness likely due to hypotension from sepsis - BP stable here   · S/p IV fluids  · Orthostatics  · PT/OT- recommends home with VNA    Sepsis ([address] )  Assessment & Plan  · POA, e/b leukocytosis and tachycardia  Lactic acid 3 2, now normalized following fluids  · Due to cellulitis of left lower extremity  · Prior cultures are positive for strep, Proteus, Acinetobacter pansensitive  · Due to his rehospitalization will place on broad-spectrum antibiotics to cover for g negatives and resistant organisms - initially on IV cefepime and vanco, deescalated to IV Ancef  · Podiatry consult appreciated  · Elevate left lower extremity  · BC x 2 negative at 48 hrs     Abnormal urinalysis  Assessment & Plan  · UA with 30-50 WBC, small leukocytes, RBCs and blood  · Urine culture pending     Cellulitis of left lower extremity  Assessment & Plan  · As above    Diabetic polyneuropathy associated with type 2 diabetes mellitus (HCC)  Assessment & Plan  · Continue gabapentin at home dose    Paroxysmal atrial fibrillation (HCC)  Assessment & Plan  · Does not appear to be on any anticoagulation besides aspirin at this time  · Rates are controlled, continue metoprolol    Morbid obesity with BMI of 50 0-59 9, adult [facility])  Assessment & Plan  · Supportive care    · Weight loss counseling when stable    Controlled type 2 diabetes mellitus with complication, without long-term current use of insulin [facility])  Assessment & Plan  Lab Results   Component Value Date    HGBA1C 5 6 04/10/2021       Recent Labs     06/10/21  1057 06/10/21  1557 06/10/21  2059 06/11/21  0751   POCGLU 131 100 112 104       Blood Sugar Average: Last 72 hrs:  (P) 052 [id number]     · Very well controlled   · Hold oral diabetic agents - resume on d/c  · Can give regular diet given excellent control       VTE Pharmacologic Prophylaxis:   Pharmacologic: Enoxaparin (Lovenox)  Mechanical VTE Prophylaxis in Place: Yes    Patient Centered Rounds: I have performed bedside rounds with nursing staff today  Current Length of Stay: 3 day(s)    Current Patient Status: Inpatient   Certification Statement: The patient will continue to require additional inpatient hospital stay due to IV antibiotic    Discharge Plan:  Pending progress, will be discharged home with home health aide on discharge    Code Status: Level 1 - Full Code      Subjective:   Patient pleasant, sitting up  Denies any significant pain  Denies any fever, chills or dizziness  Objective:     Vitals:   Temp (24hrs), Av 1 °F (36 7 °C), Min:97 6 °F (36 4 °C), Max:98 4 °F (36 9 °C)    Temp:  [97 6 °F (36 4 °C)-98 4 °F (36 9 °C)] 97 6 °F (36 4 °C)  HR:  [63-74] 63  Resp:  [16-18] 18  BP: (132-145)/(75-90) 145/90  SpO2:  [96 %-98 %] 98 %  Body mass index is 43 94 kg/m²  Input and Output Summary (last 24 hours): Intake/Output Summary (Last 24 hours) at 2021 1016  Last data filed at 2021 0756  Gross per 24 hour   Intake 720 ml   Output 1150 ml   Net -430 ml       Physical Exam:     Physical Exam  Vitals signs and nursing note reviewed  Constitutional:       General: He is not in acute distress  Appearance: Normal appearance  He is obese  HENT:      Head: Normocephalic and atraumatic  Right Ear: External ear normal       Left Ear: External ear normal       Nose: Nose normal    Eyes:      Extraocular Movements: Extraocular movements intact  Neck:      Musculoskeletal: Normal range of motion     Pulmonary:      Effort: Pulmonary effort is normal    Musculoskeletal:      Comments: Right AKA noted, left lower extremity her dressing clean dry intact   Neurological:      Mental Status: He is alert  Mental status is at baseline  Psychiatric:         Mood and Affect: Mood normal            Additional Data:     Labs:    Results from last 7 days   Lab Units 06/11/21  0828 06/10/21  0508 06/08/21  1608   WBC Thousand/uL 4 08* 6 61 22 22*   HEMOGLOBIN g/dL 11 1* 11 7* 10 2*   HEMATOCRIT % 36 7 38 3 33 3*   PLATELETS Thousands/uL 173 193 189   BANDS PCT %  --   --  14*   NEUTROS PCT %  --  82*  --    LYMPHS PCT %  --  9*  --    LYMPHO PCT %  --   --  3*   MONOS PCT %  --  7  --    MONO PCT %  --   --  1*   EOS PCT %  --  2 0     Results from last 7 days   Lab Units 06/10/21  0509 06/08/21  1608   SODIUM mmol/L 139 134*   POTASSIUM mmol/L 3 6 3 8   CHLORIDE mmol/L 107 101   CO2 mmol/L 26 26   BUN mg/dL 14 13   CREATININE mg/dL 0 92 1 15   ANION GAP mmol/L 6 7   CALCIUM mg/dL 9 2 9 1   ALBUMIN g/dL  --  3 2*   TOTAL BILIRUBIN mg/dL  --  1 01*   ALK PHOS U/L  --  62   ALT U/L  --  14   AST U/L  --  9   GLUCOSE RANDOM mg/dL 107 123     Results from last 7 days   Lab Units 06/08/21  1608   INR  1 35*     Results from last 7 days   Lab Units 06/11/21  0751 06/10/21  2059 06/10/21  1557 06/10/21  1057 06/10/21  0732 06/09/21  2109 06/09/21  1639 06/09/21  1253 06/09/21  0705 06/08/21  2227 06/08/21  1616   POC GLUCOSE mg/dl 104 112 100 131 105 103 108 92 88 131 119         Results from last 7 days   Lab Units 06/10/21  0913 06/08/21  1734 06/08/21  1608   LACTIC ACID mmol/L  --  1 9 3 2*   PROCALCITONIN ng/ml 0 74*  --  0 97*              Recent Cultures (last 7 days):     Results from last 7 days   Lab Units 06/09/21  0731 06/08/21  1611 06/08/21  1608   BLOOD CULTURE   --  No Growth at 48 hrs  No Growth at 48 hrs     URINE CULTURE  >100,000 cfu/ml Enterococcus faecalis*  --   --        Last 24 Hours Medication List:   Current Facility-Administered Medications   Medication Dose Route Frequency Provider Last Rate    acetaminophen  650 mg Oral Q6H PRN Charu Mariscal MD      aspirin  81 mg Oral Daily Angela Gupta MD      calcium carbonate  1,000 mg Oral Daily PRN Angela Gupta MD      cefazolin  2,000 mg Intravenous Q8H Rebecca Pedraza PA-C 2,000 mg (06/11/21 7609)    docusate sodium  100 mg Oral BID Angela Gupta MD      enoxaparin  40 mg Subcutaneous Q12H Angela Gupta MD      fluconazole  200 mg Oral Daily Jessie Villegas MD      gabapentin  100 mg Oral TID Angela Gupta MD      insulin lispro  1-6 Units Subcutaneous TID AC Angela Gupta MD      insulin lispro  1-6 Units Subcutaneous HS Angela Gupta MD      levothyroxine  25 mcg Oral Daily Angela Gupta MD      levothyroxine  300 mcg Oral Daily Angela Gupta MD      lisinopril  10 mg Oral Daily Angela Gupta MD      metoprolol tartrate  50 mg Oral BID Angela Gupta MD      multivitamin-minerals  1 tablet Oral Daily Angela Gupta MD      ondansetron  4 mg Intravenous Q6H PRN Angela Gupta MD      pantoprazole  40 mg Oral Daily Angela Gupta MD      pravastatin  20 mg Oral Daily With Magdalena Do MD      senna  2 tablet Oral Daily PRN Angela Gupta MD          Today, Patient Was Seen By: Khushboo Mc MD    ** Please Note: Dictation voice to text software may have been used in the creation of this document   **

## 2021-06-11 NOTE — PROGRESS NOTES
Progress Note - Infectious Disease   Oralia Baugh 74 y o  male MRN: [id number]  Unit/Bed#: [facility] 823-01 Encounter: [id number]      Impression/Plan:  1  Recurrent left leg cellulitis  He has had cellulitis of the left leg requiring hospitalization  He states he has had cellulitis in the leg since he was about 14years old  He denies fevers prior to admission  Negative blood cultures from admission  Admitted with WBC of 656[address] and lactate of 3 2 now normalized  Started on vanc/cefepime on admission and now transitioned to ancef  No wound to culture  - continue Ancef and monitor ability to tolerate  Would anticipate the need for longer course of po antibiotics on discharge such as 14 days but no long term suppressive antibiotics at this time              - will begin course of oral fluconazole 200 mg qdaily   - continue to encourage hygiene modifications to stratify risk               - continue to follow blood culture              - continue to monitor labs and vitals              - abnormal urine leukocytes with reflex culture showing e faecalis >100,000  denies symptoms of burning or pain with urination              - wound care per podiatry                   2  Type 2 diabetes mellitus- wellcontrolled with last A1c 5 6 %       3  Right BKA  Occurred in 2017 due to lymphedema and PAD  4  Tinea pedis  Refractory to topical medications  Long standing maceration and open wounds  - will trial po therapy fluconazole 200mg daily  Antibiotics:  Ancef day 2    Vancomycin 2 days- discontinued  Cefepime 2 days- discontinued    Subjective:  Patient has no fever, chills, sweats; no nausea, vomiting, diarrhea; no cough, shortness of breath; no pain  No new symptoms      Objective:  Vitals:  Temp:  [97 6 °F (36 4 °C)-98 4 °F (36 9 °C)] 97 6 °F (36 4 °C)  HR:  [63-74] 63  Resp:  [16-18] 18  BP: (132-145)/(75-90) 145/90  SpO2:  [96 %-98 %] 98 %  Temp (24hrs), Av 1 °F (36 7 °C), Min:97 6 °F (36 4 °C), Max:98 4 °F (36 9 °C)  Current: Temperature: 97 6 °F (36 4 °C)    Physical Exam:   General Appearance:  Alert, interactive, nontoxic, no acute distress  Throat: Oropharynx moist without lesions  Lungs:   Clear to auscultation bilaterally; no wheezes, rhonchi or rales; respirations unlabored   Heart:  RRR; no murmur, rub or gallop   Abdomen:   Soft, non-tender, non-distended, positive bowel sounds  Extremities: No clubbing, cyanosis or edema   Skin: No new rashes or lesions  Left leg erythema improving with slight tenderness  Labs, Imaging, & Other studies:   All pertinent labs and imaging studies were personally reviewed  Results from last 7 days   Lab Units 06/10/21  0508 06/08/21  1608   WBC Thousand/uL 6 61 22 22*   HEMOGLOBIN g/dL 11 7* 10 2*   PLATELETS Thousands/uL 193 189     Results from last 7 days   Lab Units 06/10/21  0509 06/08/21  1608   SODIUM mmol/L 139 134*   POTASSIUM mmol/L 3 6 3 8   CHLORIDE mmol/L 107 101   CO2 mmol/L 26 26   BUN mg/dL 14 13   CREATININE mg/dL 0 92 1 15   EGFR ml/min/1 73sq m 85 65   CALCIUM mg/dL 9 2 9 1   AST U/L  --  9   ALT U/L  --  14   ALK PHOS U/L  --  62     Results from last 7 days   Lab Units 06/09/21  0731 06/08/21  1611 06/08/21  1608   BLOOD CULTURE   --  No Growth at 48 hrs  No Growth at 48 hrs     URINE CULTURE  >100,000 cfu/ml Enterococcus faecalis*  --   --      Results from last 7 days   Lab Units 06/10/21  0913 06/08/21  1608   PROCALCITONIN ng/ml 0 74* 0 97*

## 2021-06-11 NOTE — DISCHARGE INSTRUCTIONS
Discharge Instructions - Podiatry    Weight Bearing Status: Weight bearing as tolerated                    Follow-up appointment instructions: Wound care 6/23/21with Dr April Sharpe  Contact sooner if any increase in pain, or signs of infection occur    Wound Care: Leave dressings clean, dry, and intact between professional dressing changes    Nursing Instructions: Please apply Maxsorb between toes  Then cover with Gauze and secure with Kerlix and tape  Please change dressing every day or every other day

## 2021-06-11 NOTE — OCCUPATIONAL THERAPY NOTE
Occupational Therapy Evaluation     Patient Name: Mariya Spaulding  Today's Date: 6/11/2021  Problem List  Principal Problem:    Fall  Active Problems:    Controlled type 2 diabetes mellitus with complication, without long-term current use of insulin ([address] )    Morbid obesity with BMI of 50 0-59 9, adult (HCC)    Paroxysmal atrial fibrillation (HCC)    Diabetic polyneuropathy associated with type 2 diabetes mellitus ([address] )    Cellulitis of left lower extremity    Sepsis ([address] )    Abnormal urinalysis    Past Medical History  Past Medical History:   Diagnosis Date    Atrial fibrillation ([address] )     Cellulitis     Diabetes mellitus ([address] )     Disease of thyroid gland     Duodenal ulcer     perforated- ICU stay    High blood pressure     High cholesterol     Hyperlipidemia     Hypertension     Hypothyroidism     Lymphedema      b/l LE- was followed at wound center    Morbid obesity with BMI of 40 0-44 9, adult ([address] )     Peritonitis ([address] )      Past Surgical History  Past Surgical History:   Procedure Laterality Date    BELOW KNEE LEG AMPUTATION Right     DIAGNOSTIC LAPAROSCOPY      KNEE ARTHROSCOPY Bilateral     OTHER SURGICAL HISTORY  03/2014     lap repair    OTHER SURGICAL HISTORY      Laparoscopic repair of a perforated duodenal ulcer with Lit Fine omental    OTHER SURGICAL HISTORY      Placement of drains        06/11/21 1110   OT Last Visit   OT Visit Date 06/11/21   Note Type   Note type Evaluation   Restrictions/Precautions   Weight Bearing Precautions Per Order Yes   LLE Weight Bearing Per Order WBAT   Braces or Orthoses Prosthesis  (R LE prosthesis, L LE ace wrapped)   Other Precautions Fall Risk;WBS  (R BKA)   Pain Assessment   Pain Assessment Tool 0-10   Pain Score No Pain   Home Living   Type of Home Apartment   Home Layout One level   Bathroom Shower/Tub Tub/shower unit   Bathroom Toilet Standard   Bathroom Equipment Tub transfer bench   Home Equipment Walker  (electric stand up recliner, 2 walking sticks)   Additional Comments Pt reports he resides alone in 3rd floor apartment with ramp and elevator access  Local family can assist if necessary  Prior Function   Level of [place] Independent with ADLs and functional mobility   Lives With Alone   Receives Help From Family  [facility] wound care nurses)   ADL Assistance Independent   IADLs Needs assistance  (assist with laundry in basement, otherwise I  Food delivere)   Falls in the last 6 months 1 to 4  (1 controlled fall 2* presyncope  Unable to get up for 3 hr)   Vocational Retired   Lifestyle   Autonomy He reports being completely I with ADLs, requires assist with laundry 2* L LE wounds but otherwise I with IADLs, and I with functional mobility with prosthetic and with use of rw vs 2 walking sticks   Reciprocal Relationships supportive daughter lives locally   Service to Others retired   Intrinsic Gratification mostly sedentary for the past year 2* LE wounds   Psychosocial   Psychosocial (WDL) WDL   ADL   Eating Assistance 7  Independent   Grooming Assistance 7  Independent   UB [address] 6  Modified Independent   LB [address] 5  Supervision/Setup   [address] 6  Modified independent   [address] 5  Supervision/Setup   LB Dressing Deficit Don/doff L sock  (R prosthesis)   Toileting Assistance  6  Modified independent   Bed Mobility   Additional Comments Pt seated in recliner upon therapist entry and bed mobility not assessed at this time   Transfers   Sit to Stand 5  Supervision   Additional items Increased time required   Stand to Sit 6  Modified independent   Additional Comments Use of rw, no LOB   Functional Mobility   Functional Mobility 5  Supervision   Additional Comments Use of rw and R LE prosthesis    requires increased time but no major LOB   Balance   Static Sitting Good   Dynamic Sitting Fair +   Static Standing Fair   Dynamic Standing Fair -   Ambulatory Fair -   Activity Tolerance   Activity Tolerance Patient limited by fatigue   Medical Staff Made Aware Spoke with CM   Nurse Made Aware Pt cleared by RN for OT evaluation and OOB mobility   RUE Assessment   RUE Assessment WFL   LUE Assessment   LUE Assessment WFL   Sensation   Additional Comments H/o diabetic neuropathy in all extremities  Reports mild difficulty with clothing fasteners, but generally doesn't wear many   Cognition   Overall Cognitive Status WFL   Arousal/Participation Responsive; Alert; Cooperative   Attention Within functional limits   Orientation Level Oriented X4   Memory Within functional limits   Following Commands Follows all commands and directions without difficulty   Comments Pt is very pleasant, cooperative and receptive to recommendations  No overt cognitive deficits noted at this time   Assessment   Prognosis Good   Assessment Pt is a 74year old male seen for initial OT evaluation s/p admission to hospitals 6/8/21 with weakness, lightheadness and controlled fall  Etiology thought to be hypotension 2* sepsis  Additional active problems include: h/o R BKA, L LE cellulitis (WBAT), abnormal UA, diabetic polyneuropathy, paroxysmal atrial fibrillation, morbid obesity  Pt resides alone in a 3rd floor apartment with ramp and elevator access  He reports being completely I with ADLs, requires assist with laundry 2* L LE wounds but otherwise I with IADLs, and I with functional mobility with prosthetic and with use of rw vs 2 walking sticks  Pt has a local daughter that can provide support as needed  Pt is currently functioning at supervision-Ti level with ADLs and functional mobility  Would benefit from increased assist with IADLs and medical alert button  No further acute OT needs, and OT orders to be d/c at this time  Recommend pt continue to participate in self care and mobility during hospital admission to prevent functional decline  Please re-consult OT if functional concerns arise     Goals   Patient Goals to be able to go home and get stronger   Recommendation   OT Discharge Recommendation No rehabilitation needs  (inc family support)   Equipment Recommended   (medical alert)   OT - OK to Discharge Yes   AM-PAC Daily Activity Inpatient   Lower Body Dressing 3   Bathing 3   Toileting 4   Upper Body Dressing 4   Grooming 4   Eating 4   Daily Activity Raw Score 22   Daily Activity Standardized Score (Calc for Raw Score >=11) 47  [address] [facility]   Following a Speech/Presentation 4   Understanding Ordinary Conversation 4   Taking Medications 4   Remembering Where Things Are Placed or Put Away 4   Remembering List of 4-5 Errands 4   Taking Care of Complicated Tasks 4   Applied Cognition Raw Score 24   Applied Cognition Standardized Score 62 21     Chikis Dillard, MOT, OTR/L, CSRS

## 2021-06-11 NOTE — PROGRESS NOTES
Podiatry - Progress Note  Patient: Maci Garcia 74 y o  male   MRN: [id number]  PCP: Raquel Marte MD  [facility] Encounter: [id number]  Date Of Visit: 06/10/21    ASSESSMENT:    Maci Garcia is a 74 y o  male with:    1  Left foot diabetic wound limited to break down of skin - decker 1  2  Cellulites left lower extremity  3  T2DM (A1c:5 6% 4/10/21 ) with neuropathy  4  Lymphedema    PLAN:    · Local wound care consisting of maxorb to left lower extremity web space, dsd,ACE for compression  · Continue antibiotic therapy per primary team  · Elevation on green foam wedges or pillows when non-ambulatory  · Rest of care per primary team      Weightbearing status: as tolerated    SUBJECTIVE:     The patient was seen, evaluated, and assessed at bedside today  The patient was awake, alert, and in no acute distress  No acute events overnight  The patient reports no pain to RLE  Patient denies N/V/F/chills/SOB/CP  OBJECTIVE:     Vitals:   /75   Pulse 72   Temp 98 2 °F (36 8 °C) (Oral)   Resp 18   Ht 6' 7" (2 007 m)   Wt (!) 177 kg (390 lb)   SpO2 98%   BMI 43 94 kg/m²     Temp (24hrs), Av 3 °F (36 8 °C), Min:98 2 °F (36 8 °C), Max:98 5 °F (36 9 °C)      Physical Exam:     General: Alert, cooperative and no distress  Lungs: Non labored breathing  Abdomen: Soft, non-tender  Lower extremity exam:  Cardiovascular status at baseline  Neurological status at baseline  Musculoskeletal status at baseline  No calf tenderness noted       Lower extremity wound(s) as noted below:  Wound #: 1  Location: left foot  Length 15cm: Width 1cm: Depth 0 1cm:   Deepest Tissue Noted in Base: dermis  Probe to Bone: No  Peripheral Skin Description: Attached  Drainage Amount: minimal  Signs of Infection: Yes- cellulites to LLE improving    No pain on LLE examination    Clinical Images 06/10/21:            Additional Data:     Labs:    Results from last 7 days   Lab Units 06/10/21  0508   WBC Thousand/uL 6 61 HEMOGLOBIN g/dL 11 7*   HEMATOCRIT % 38 3   PLATELETS Thousands/uL 193   NEUTROS PCT % 82*   LYMPHS PCT % 9*   MONOS PCT % 7   EOS PCT % 2     Results from last 7 days   Lab Units 06/10/21  0509 06/08/21  1608   POTASSIUM mmol/L 3 6 3 8   CHLORIDE mmol/L 107 101   CO2 mmol/L 26 26   BUN mg/dL 14 13   CREATININE mg/dL 0 92 1 15   CALCIUM mg/dL 9 2 9 1   ALK PHOS U/L  --  62   ALT U/L  --  14   AST U/L  --  9     Results from last 7 days   Lab Units 06/08/21  1608   INR  1 35*       * I Have Reviewed All Lab Data Listed Above  Recent Cultures (last 7 days):     Results from last 7 days   Lab Units 06/09/21  0731 06/08/21  1611 06/08/21  1608   BLOOD CULTURE   --  No Growth at 48 hrs  No Growth at 48 hrs  URINE CULTURE  >100,000 cfu/ml Enterococcus faecalis*  --   --            Imaging: I have personally reviewed pertinent films in PACS  EKG, Pathology, and Other Studies: I have personally reviewed pertinent reports  ** Please Note: Portions of the record may have been created with voice recognition software  Occasional wrong word or "sound a like" substitutions may have occurred due to the inherent limitations of voice recognition software  Read the chart carefully and recognize, using context, where substitutions have occurred   **

## 2021-06-12 LAB
BACTERIA UR CULT: ABNORMAL
GLUCOSE SERPL-MCNC: 103 MG/DL (ref 65–140)
GLUCOSE SERPL-MCNC: 110 MG/DL (ref 65–140)
GLUCOSE SERPL-MCNC: 115 MG/DL (ref 65–140)
GLUCOSE SERPL-MCNC: 131 MG/DL (ref 65–140)

## 2021-06-12 PROCEDURE — 99233 SBSQ HOSP IP/OBS HIGH 50: CPT | Performed by: INTERNAL MEDICINE

## 2021-06-12 PROCEDURE — 82948 REAGENT STRIP/BLOOD GLUCOSE: CPT

## 2021-06-12 PROCEDURE — 99232 SBSQ HOSP IP/OBS MODERATE 35: CPT | Performed by: FAMILY MEDICINE

## 2021-06-12 RX ADMIN — FLUCONAZOLE 200 MG: 200 TABLET ORAL at 09:26

## 2021-06-12 RX ADMIN — LEVOTHYROXINE SODIUM 300 MCG: 100 TABLET ORAL at 05:00

## 2021-06-12 RX ADMIN — DOCUSATE SODIUM 100 MG: 100 CAPSULE ORAL at 17:59

## 2021-06-12 RX ADMIN — METOPROLOL TARTRATE 50 MG: 50 TABLET, FILM COATED ORAL at 17:59

## 2021-06-12 RX ADMIN — DOCUSATE SODIUM 100 MG: 100 CAPSULE ORAL at 09:25

## 2021-06-12 RX ADMIN — LEVOTHYROXINE SODIUM 25 MCG: 25 TABLET ORAL at 05:00

## 2021-06-12 RX ADMIN — ENOXAPARIN SODIUM 40 MG: 40 INJECTION SUBCUTANEOUS at 21:27

## 2021-06-12 RX ADMIN — GABAPENTIN 100 MG: 100 CAPSULE ORAL at 09:25

## 2021-06-12 RX ADMIN — CEFAZOLIN SODIUM 2000 MG: 2 SOLUTION INTRAVENOUS at 04:59

## 2021-06-12 RX ADMIN — METOPROLOL TARTRATE 50 MG: 50 TABLET, FILM COATED ORAL at 09:25

## 2021-06-12 RX ADMIN — CEFAZOLIN SODIUM 2000 MG: 2 SOLUTION INTRAVENOUS at 19:58

## 2021-06-12 RX ADMIN — GABAPENTIN 100 MG: 100 CAPSULE ORAL at 17:59

## 2021-06-12 RX ADMIN — PRAVASTATIN SODIUM 20 MG: 20 TABLET ORAL at 17:59

## 2021-06-12 RX ADMIN — GABAPENTIN 100 MG: 100 CAPSULE ORAL at 21:01

## 2021-06-12 RX ADMIN — Medication 1 TABLET: at 09:26

## 2021-06-12 RX ADMIN — ASPIRIN 81 MG: 81 TABLET, COATED ORAL at 09:26

## 2021-06-12 RX ADMIN — PANTOPRAZOLE SODIUM 40 MG: 40 TABLET, DELAYED RELEASE ORAL at 05:00

## 2021-06-12 RX ADMIN — CEFAZOLIN SODIUM 2000 MG: 2 SOLUTION INTRAVENOUS at 11:39

## 2021-06-12 RX ADMIN — ENOXAPARIN SODIUM 40 MG: 40 INJECTION SUBCUTANEOUS at 09:26

## 2021-06-12 RX ADMIN — LISINOPRIL 10 MG: 10 TABLET ORAL at 09:26

## 2021-06-12 NOTE — PROGRESS NOTES
Progress Note - Infectious Disease   Alice Flower 74 y o  male MRN: [id number]  Unit/Bed#: [facility] 823-01 Encounter: [id number]      Impression/Plan:  1  Recurrent left leg cellulitis-in the setting of chronic lymphedema and tinea pedis that is quite chronic and severe   Suspect the recurrent bouts her related to tinea pedis that is continuing despite topical therapy as well as the chronic lymphedema and obesity   His diabetes is actually well controlled   This is very likely streptococcal infection   He seems to be tolerating the antibiotics well and is clinically improved   He is not systemically ill  Improving cellulitis but  -continue cefazolin for now at current dose  -aggressive elevation of the left lower extremity to improve edema control  -treatment of tinea pedis as below  -serial exams  -if patient continues to have recurrent bouts of cellulitis despite adequate treatment for the tinea pedis and edema control, could consider suppressive antibiotics in the future but not until at least 3 bouts of cellulitis requiring admission have occurred within a 6-12 month period    -weight loss may also contribute to decreasing the risk of recurrent infection  -anticipate transition to oral antibiotics on 6/14/2021     2  Tinea pedis-quite extensive and refractory to topical therapy   Foot appears quite macerated  -continue fluconazole through at least 6/16/2021 to complete 7 days total  -continue topical therapy  -local wound care     3  Diabetes mellitus-type 2  Well controlled       Discussed the above management plan with the primary service    Antibiotics:  Cefazolin 3  Fluconazole 3  Antibiotics 5    Subjective:  Patient has no fever, chills, sweats; no nausea, vomiting, diarrhea; no cough, shortness of breath; no pain  No new symptoms      Objective:  Vitals:  Temp:  [97 5 °F (36 4 °C)-98 4 °F (36 9 °C)] 97 5 °F (36 4 °C)  HR:  [57-75] 74  Resp:  [16] 16  BP: (141-156)/(78-90) 156/78  SpO2:  [95 %-98 %] 98 %  Temp (24hrs), Av 8 °F (36 6 °C), Min:97 5 °F (36 4 °C), Max:98 4 °F (36 9 °C)  Current: Temperature: 97 5 °F (36 4 °C)    Physical Exam:   General Appearance:  Alert, interactive, nontoxic, no acute distress  Throat: Oropharynx moist without lesions  Lungs:   Clear to auscultation bilaterally; no wheezes, rhonchi or rales; respirations unlabored   Heart:  RRR; no murmur, rub or gallop   Abdomen:   Soft, non-tender, non-distended, positive bowel sounds  Extremities: No clubbing, cyanosis  Left leg heavily dressed with a dry dressing in place  Fading thigh erythema  Skin: No new rashes or lesions  No draining wounds noted  Labs, Imaging, & Other studies:   All pertinent labs and imaging studies were personally reviewed  Results from last 7 days   Lab Units 21  0828 06/10/21  0508 21  1608   WBC Thousand/uL 4 08* 6 61 22 22*   HEMOGLOBIN g/dL 11 1* 11 7* 10 2*   PLATELETS Thousands/uL 173 193 189     Results from last 7 days   Lab Units 06/10/21  0509 21  1608   SODIUM mmol/L 139 134*   POTASSIUM mmol/L 3 6 3 8   CHLORIDE mmol/L 107 101   CO2 mmol/L 26 26   BUN mg/dL 14 13   CREATININE mg/dL 0 92 1 15   EGFR ml/min/1 73sq m 85 65   CALCIUM mg/dL 9 2 9 1   AST U/L  --  9   ALT U/L  --  14   ALK PHOS U/L  --  62     Results from last 7 days   Lab Units 21  0731 21  1611 21  1608   BLOOD CULTURE   --  No Growth at 72 hrs  No Growth at 72 hrs     URINE CULTURE  >100,000 cfu/ml Enterococcus faecalis*  --   --      Results from last 7 days   Lab Units 06/10/21  0913 21  1608   PROCALCITONIN ng/ml 0 74* 0 97*

## 2021-06-12 NOTE — PROGRESS NOTES
[address] [facility]  Progress Note - Grisel Meth 1955, 74 y o  male MRN: [id number]  Unit/Bed#: [facility] 823-01 Encounter: [id number]  Primary Care Provider: Nereyda Marquis MD   Date and time admitted to hospital: 6/8/2021  3:33 PM    * Fall  Assessment & Plan  · Weakness and lightheadedness likely due to hypotension from sepsis - BP stable here   · S/p IV fluids  · Orthostatics  · PT/OT- recommends home with VNA    Sepsis ([address] )  Assessment & Plan  · POA, e/b leukocytosis and tachycardia  Lactic acid 3 2, now normalized following fluids  · Due to cellulitis of left lower extremity  · Prior cultures are positive for strep, Proteus, Acinetobacter pansensitive  · Due to his rehospitalization will place on broad-spectrum antibiotics to cover for g negatives and resistant organisms - initially on IV cefepime and vanco, deescalated to IV Ancef  · Discussed with ID, likely switch to oral antibiotic on Monday  · Podiatry consult appreciated  · Elevate left lower extremity  · BC x 2 negative at 48 hrs     Cellulitis of left lower extremity  Assessment & Plan  · As above    Diabetic polyneuropathy associated with type 2 diabetes mellitus (HCC)  Assessment & Plan  · Continue gabapentin at home dose    Paroxysmal atrial fibrillation (HCC)  Assessment & Plan  · Does not appear to be on any anticoagulation besides aspirin at this time  · Rates are controlled, continue metoprolol    Morbid obesity with BMI of 50 0-59 9, adult [facility])  Assessment & Plan  · Supportive care    · Weight loss counseling when stable    Controlled type 2 diabetes mellitus with complication, without long-term current use of insulin [facility])  Assessment & Plan  Lab Results   Component Value Date    HGBA1C 5 6 04/10/2021       Recent Labs     06/11/21  1553 06/11/21  2039 06/12/21  0640 06/12/21  1057   POCGLU 113 99 115 103       Blood Sugar Average: Last 72 hrs:  (P) 868 [id number]     · Very well controlled · Hold oral diabetic agents - resume on d/c  · Can give regular diet given excellent control            VTE Pharmacologic Prophylaxis:   Pharmacologic: Enoxaparin (Lovenox)  Mechanical VTE Prophylaxis in Place: Yes    Patient Centered Rounds: I have performed bedside rounds with nursing staff today  Current Length of Stay: 4 day(s)    Current Patient Status: Inpatient   Certification Statement: The patient will continue to require additional inpatient hospital stay due to IV antibiotic    Discharge Plan:  Pending progress    Code Status: Level 1 - Full Code      Subjective:   No acute overnight events, no fever or chills  Objective:     Vitals:   Temp (24hrs), Av 8 °F (36 6 °C), Min:97 5 °F (36 4 °C), Max:98 4 °F (36 9 °C)    Temp:  [97 5 °F (36 4 °C)-98 4 °F (36 9 °C)] 97 5 °F (36 4 °C)  HR:  [57-75] 65  Resp:  [15-16] 15  BP: (141-156)/(73-90) 155/73  SpO2:  [95 %-98 %] 96 %  Body mass index is 43 94 kg/m²  Input and Output Summary (last 24 hours): Intake/Output Summary (Last 24 hours) at 2021 1458  Last data filed at 2021 1300  Gross per 24 hour   Intake 1260 ml   Output 3000 ml   Net -1740 ml       Physical Exam:     Physical Exam  Vitals signs and nursing note reviewed  Constitutional:       General: He is not in acute distress  Appearance: Normal appearance  He is obese  HENT:      Head: Normocephalic and atraumatic  Right Ear: External ear normal       Left Ear: External ear normal       Nose: Nose normal    Eyes:      Extraocular Movements: Extraocular movements intact  Neck:      Musculoskeletal: Normal range of motion  Pulmonary:      Effort: Pulmonary effort is normal    Neurological:      Mental Status: He is alert  Mental status is at baseline     Psychiatric:         Mood and Affect: Mood normal             Labs:    Results from last 7 days   Lab Units 21  0828 06/10/21  0508 21  1608   WBC Thousand/uL 4 08* 6 61 22 22*   HEMOGLOBIN g/dL 11 1* 11 7* 10 2*   HEMATOCRIT % 36 7 38 3 33 3*   PLATELETS Thousands/uL 173 193 189   BANDS PCT %  --   --  14*   NEUTROS PCT %  --  82*  --    LYMPHS PCT %  --  9*  --    LYMPHO PCT %  --   --  3*   MONOS PCT %  --  7  --    MONO PCT %  --   --  1*   EOS PCT %  --  2 0     Results from last 7 days   Lab Units 06/10/21  0509 06/08/21  1608   SODIUM mmol/L 139 134*   POTASSIUM mmol/L 3 6 3 8   CHLORIDE mmol/L 107 101   CO2 mmol/L 26 26   BUN mg/dL 14 13   CREATININE mg/dL 0 92 1 15   ANION GAP mmol/L 6 7   CALCIUM mg/dL 9 2 9 1   ALBUMIN g/dL  --  3 2*   TOTAL BILIRUBIN mg/dL  --  1 01*   ALK PHOS U/L  --  62   ALT U/L  --  14   AST U/L  --  9   GLUCOSE RANDOM mg/dL 107 123     Results from last 7 days   Lab Units 06/08/21  1608   INR  1 35*     Results from last 7 days   Lab Units 06/12/21  1057 06/12/21  0640 06/11/21  2039 06/11/21  1553 06/11/21  1119 06/11/21  0751 06/10/21  2059 06/10/21  1557 06/10/21  1057 06/10/21  0732 06/09/21  2109 06/09/21  1639   POC GLUCOSE mg/dl 103 115 99 113 100 104 112 100 131 105 103 108         Results from last 7 days   Lab Units 06/10/21  0913 06/08/21  1734 06/08/21  1608   LACTIC ACID mmol/L  --  1 9 3 2*   PROCALCITONIN ng/ml 0 74*  --  0 97*              Recent Cultures (last 7 days):     Results from last 7 days   Lab Units 06/09/21  0731 06/08/21  1611 06/08/21  1608   BLOOD CULTURE   --  No Growth at 72 hrs  No Growth at 72 hrs     URINE CULTURE  >100,000 cfu/ml Enterococcus faecalis*  --   --        Last 24 Hours Medication List:   Current Facility-Administered Medications   Medication Dose Route Frequency Provider Last Rate    acetaminophen  650 mg Oral Q6H PRN Ovidio Adair MD      aspirin  81 mg Oral Daily Ovidio Adair MD      calcium carbonate  1,000 mg Oral Daily PRN Ovidio Adair MD      cefazolin  2,000 mg Intravenous Q8H Rebecca Pedraza PA-C 2,000 mg (06/12/21 1139)    docusate sodium  100 mg Oral BID Ovidio Adair MD      enoxaparin  40 mg Subcutaneous Q12H Stanford Lees MD      fluconazole  200 mg Oral Daily Araceli Burk MD      gabapentin  100 mg Oral TID Stanford Lees MD      insulin lispro  1-6 Units Subcutaneous TID AC Stanford Lees MD      insulin lispro  1-6 Units Subcutaneous HS Stanford Lees MD      levothyroxine  25 mcg Oral Daily Stanford Lees MD      levothyroxine  300 mcg Oral Daily Stanford Lees MD      lisinopril  10 mg Oral Daily Stanford Lees MD      metoprolol tartrate  50 mg Oral BID Stanford Lees MD      multivitamin-minerals  1 tablet Oral Daily Stanford Lees MD      ondansetron  4 mg Intravenous Q6H PRN Stanford Lees MD      pantoprazole  40 mg Oral Daily Stanford Lees MD      pravastatin  20 mg Oral Daily With Carolyn Aragon MD      senna  2 tablet Oral Daily PRN Stanford Lees MD          Today, Patient Was Seen By: Soco Wise MD    ** Please Note: Dictation voice to text software may have been used in the creation of this document   **

## 2021-06-12 NOTE — ASSESSMENT & PLAN NOTE
· POA, e/b leukocytosis and tachycardia  Lactic acid 3 2, now normalized following fluids  · Due to cellulitis of left lower extremity  · Prior cultures are positive for strep, Proteus, Acinetobacter pansensitive  · Due to his rehospitalization will place on broad-spectrum antibiotics to cover for g negatives and resistant organisms - initially on IV cefepime and vanco, deescalated to IV Ancef  · Discussed with ID, likely switch to oral antibiotic on Monday  · Podiatry consult appreciated  · Elevate left lower extremity    · BC x 2 negative at 48 hrs

## 2021-06-12 NOTE — PLAN OF CARE
Problem: Potential for Falls  Goal: Patient will remain free of falls  Description: INTERVENTIONS:  - Assess patient frequently for physical needs  -  Identify cognitive and physical deficits and behaviors that affect risk of falls    -  [place] fall precautions as indicated by assessment   - Educate patient/family on patient safety including physical limitations  - Instruct patient to call for assistance with activity based on assessment  - Modify environment to reduce risk of injury  - Consider OT/PT consult to assist with strengthening/mobility  Outcome: Progressing     Problem: PAIN - ADULT  Goal: Verbalizes/displays adequate comfort level or baseline comfort level  Description: Interventions:  - Encourage patient to monitor pain and request assistance  - Assess pain using appropriate pain scale  - Administer analgesics based on type and severity of pain and evaluate response  - Implement non-pharmacological measures as appropriate and evaluate response  - Consider cultural and social influences on pain and pain management  - Notify physician/advanced practitioner if interventions unsuccessful or patient reports new pain  Outcome: Progressing     Problem: INFECTION - ADULT  Goal: Absence or prevention of progression during hospitalization  Description: INTERVENTIONS:  - Assess and monitor for signs and symptoms of infection  - Monitor lab/diagnostic results  - Monitor all insertion sites, i e  indwelling lines, tubes, and drains  - Monitor endotracheal if appropriate and nasal secretions for changes in amount and color  - [place] appropriate cooling/warming therapies per order  - Administer medications as ordered  - Instruct and encourage patient and family to use good hand hygiene technique  - Identify and instruct in appropriate isolation precautions for identified infection/condition  Outcome: Progressing  Goal: Absence of fever/infection during neutropenic period  Description: INTERVENTIONS:  - Monitor WBC    Outcome: Progressing     Problem: SAFETY ADULT  Goal: Patient will remain free of falls  Description: INTERVENTIONS:  - Assess patient frequently for physical needs  -  Identify cognitive and physical deficits and behaviors that affect risk of falls    -  [place] fall precautions as indicated by assessment   - Educate patient/family on patient safety including physical limitations  - Instruct patient to call for assistance with activity based on assessment  - Modify environment to reduce risk of injury  - Consider OT/PT consult to assist with strengthening/mobility  Outcome: Progressing  Goal: Maintain or return to baseline ADL function  Description: INTERVENTIONS:  -  Assess patient's ability to carry out ADLs; assess patient's baseline for ADL function and identify physical deficits which impact ability to perform ADLs (bathing, care of mouth/teeth, toileting, grooming, dressing, etc )  - Assess/evaluate cause of self-care deficits   - Assess range of motion  - Assess patient's mobility; develop plan if impaired  - Assess patient's need for assistive devices and provide as appropriate  - Encourage maximum independence but intervene and supervise when necessary  - Involve family in performance of ADLs  - Assess for home care needs following discharge   - Consider OT consult to assist with ADL evaluation and planning for discharge  - Provide patient education as appropriate  Outcome: Progressing     Problem: METABOLIC, FLUID AND ELECTROLYTES - ADULT  Goal: Electrolytes maintained within normal limits  Description: INTERVENTIONS:  - Monitor labs and assess patient for signs and symptoms of electrolyte imbalances  - Administer electrolyte replacement as ordered  - Monitor response to electrolyte replacements, including repeat lab results as appropriate  - Instruct patient on fluid and nutrition as appropriate  Outcome: Progressing  Goal: Fluid balance maintained  Description: INTERVENTIONS:  - Monitor labs   - Monitor I/O and WT  - Instruct patient on fluid and nutrition as appropriate  - Assess for signs & symptoms of volume excess or deficit  Outcome: Progressing  Goal: Glucose maintained within target range  Description: INTERVENTIONS:  - Monitor Blood Glucose as ordered  - Assess for signs and symptoms of hyperglycemia and hypoglycemia  - Administer ordered medications to maintain glucose within target range  - Assess nutritional intake and initiate nutrition service referral as needed  Outcome: Progressing     Problem: SKIN/TISSUE INTEGRITY - ADULT  Goal: Skin integrity remains intact  Description: INTERVENTIONS  - Identify patients at risk for skin breakdown  - Assess and monitor skin integrity  - Assess and monitor nutrition and hydration status  - Monitor labs (i e  albumin)  - Assess for incontinence   - Turn and reposition patient  - Assist with mobility/ambulation  - Relieve pressure over bony prominences  - Avoid friction and shearing  - Provide appropriate hygiene as needed including keeping skin clean and dry  - Evaluate need for skin moisturizer/barrier cream  - Collaborate with interdisciplinary team (i e  Nutrition, Rehabilitation, etc )   - Patient/family teaching  Outcome: Progressing  Goal: Incision(s), wounds(s) or drain site(s) healing without S/S of infection  Description: INTERVENTIONS  - Assess and document risk factors for skin impairment   - Assess and document dressing, incision, wound bed, drain sites and surrounding tissue  - Consider nutrition services referral as needed  - Oral mucous membranes remain intact  - Provide patient/ family education  Outcome: Progressing     Problem: MUSCULOSKELETAL - ADULT  Goal: Maintain or return mobility to safest level of function  Description: INTERVENTIONS:  - Assess patient's ability to carry out ADLs; assess patient's baseline for ADL function and identify physical deficits which impact ability to perform ADLs (bathing, care of mouth/teeth, toileting, grooming, dressing, etc )  - Assess/evaluate cause of self-care deficits   - Assess range of motion  - Assess patient's mobility  - Assess patient's need for assistive devices and provide as appropriate  - Encourage maximum independence but intervene and supervise when necessary  - Involve family in performance of ADLs  - Assess for home care needs following discharge   - Consider OT consult to assist with ADL evaluation and planning for discharge  - Provide patient education as appropriate  Outcome: Progressing

## 2021-06-12 NOTE — ASSESSMENT & PLAN NOTE
Lab Results   Component Value Date    HGBA1C 5 6 04/10/2021       Recent Labs     06/11/21  1553 06/11/21 2039 06/12/21  0640 06/12/21  1057   POCGLU 113 99 115 103       Blood Sugar Average: Last 72 hrs:  (P) 968 [id number]     · Very well controlled   · Hold oral diabetic agents - resume on d/c  · Can give regular diet given excellent control

## 2021-06-13 LAB
ANION GAP SERPL CALCULATED.3IONS-SCNC: 2 MMOL/L (ref 4–13)
BACTERIA BLD CULT: NORMAL
BACTERIA BLD CULT: NORMAL
BUN SERPL-MCNC: 8 MG/DL (ref 5–25)
CALCIUM SERPL-MCNC: 9 MG/DL (ref 8.3–10.1)
CHLORIDE SERPL-SCNC: 105 MMOL/L (ref 100–108)
CO2 SERPL-SCNC: 31 MMOL/L (ref 21–32)
CREAT SERPL-MCNC: 0.82 MG/DL (ref 0.6–1.3)
ERYTHROCYTE [DISTWIDTH] IN BLOOD BY AUTOMATED COUNT: 16.2 % (ref 11.6–15.1)
GFR SERPL CREATININE-BSD FRML MDRD: 91 ML/MIN/1.73SQ M
GLUCOSE SERPL-MCNC: 118 MG/DL (ref 65–140)
GLUCOSE SERPL-MCNC: 120 MG/DL (ref 65–140)
GLUCOSE SERPL-MCNC: 83 MG/DL (ref 65–140)
GLUCOSE SERPL-MCNC: 92 MG/DL (ref 65–140)
GLUCOSE SERPL-MCNC: 98 MG/DL (ref 65–140)
HCT VFR BLD AUTO: 35 % (ref 36.5–49.3)
HGB BLD-MCNC: 10.6 G/DL (ref 12–17)
MCH RBC QN AUTO: 26 PG (ref 26.8–34.3)
MCHC RBC AUTO-ENTMCNC: 30.3 G/DL (ref 31.4–37.4)
MCV RBC AUTO: 86 FL (ref 82–98)
PLATELET # BLD AUTO: 210 THOUSANDS/UL (ref 149–390)
PMV BLD AUTO: 12.6 FL (ref 8.9–12.7)
POTASSIUM SERPL-SCNC: 3.7 MMOL/L (ref 3.5–5.3)
RBC # BLD AUTO: 4.08 MILLION/UL (ref 3.88–5.62)
SODIUM SERPL-SCNC: 138 MMOL/L (ref 136–145)
WBC # BLD AUTO: 5.06 THOUSAND/UL (ref 4.31–10.16)

## 2021-06-13 PROCEDURE — 99232 SBSQ HOSP IP/OBS MODERATE 35: CPT | Performed by: INTERNAL MEDICINE

## 2021-06-13 PROCEDURE — 80048 BASIC METABOLIC PNL TOTAL CA: CPT | Performed by: FAMILY MEDICINE

## 2021-06-13 PROCEDURE — 82948 REAGENT STRIP/BLOOD GLUCOSE: CPT

## 2021-06-13 PROCEDURE — 85027 COMPLETE CBC AUTOMATED: CPT | Performed by: FAMILY MEDICINE

## 2021-06-13 PROCEDURE — 99232 SBSQ HOSP IP/OBS MODERATE 35: CPT | Performed by: FAMILY MEDICINE

## 2021-06-13 RX ADMIN — DOCUSATE SODIUM 100 MG: 100 CAPSULE ORAL at 08:23

## 2021-06-13 RX ADMIN — GABAPENTIN 100 MG: 100 CAPSULE ORAL at 21:05

## 2021-06-13 RX ADMIN — LISINOPRIL 10 MG: 10 TABLET ORAL at 08:23

## 2021-06-13 RX ADMIN — PRAVASTATIN SODIUM 20 MG: 20 TABLET ORAL at 16:49

## 2021-06-13 RX ADMIN — CEFAZOLIN SODIUM 2000 MG: 2 SOLUTION INTRAVENOUS at 11:19

## 2021-06-13 RX ADMIN — SENNOSIDES 17.2 MG: 8.6 TABLET, FILM COATED ORAL at 16:55

## 2021-06-13 RX ADMIN — LEVOTHYROXINE SODIUM 25 MCG: 25 TABLET ORAL at 05:41

## 2021-06-13 RX ADMIN — CEFAZOLIN SODIUM 2000 MG: 2 SOLUTION INTRAVENOUS at 19:56

## 2021-06-13 RX ADMIN — Medication 1 TABLET: at 08:23

## 2021-06-13 RX ADMIN — ENOXAPARIN SODIUM 40 MG: 40 INJECTION SUBCUTANEOUS at 11:19

## 2021-06-13 RX ADMIN — CEFAZOLIN SODIUM 2000 MG: 2 SOLUTION INTRAVENOUS at 04:31

## 2021-06-13 RX ADMIN — GABAPENTIN 100 MG: 100 CAPSULE ORAL at 16:49

## 2021-06-13 RX ADMIN — DOCUSATE SODIUM 100 MG: 100 CAPSULE ORAL at 16:49

## 2021-06-13 RX ADMIN — ENOXAPARIN SODIUM 40 MG: 40 INJECTION SUBCUTANEOUS at 21:15

## 2021-06-13 RX ADMIN — LEVOTHYROXINE SODIUM 300 MCG: 100 TABLET ORAL at 05:41

## 2021-06-13 RX ADMIN — ASPIRIN 81 MG: 81 TABLET, COATED ORAL at 08:23

## 2021-06-13 RX ADMIN — PANTOPRAZOLE SODIUM 40 MG: 40 TABLET, DELAYED RELEASE ORAL at 05:41

## 2021-06-13 RX ADMIN — METOPROLOL TARTRATE 50 MG: 50 TABLET, FILM COATED ORAL at 08:23

## 2021-06-13 RX ADMIN — FLUCONAZOLE 200 MG: 200 TABLET ORAL at 08:23

## 2021-06-13 RX ADMIN — METOPROLOL TARTRATE 50 MG: 50 TABLET, FILM COATED ORAL at 16:49

## 2021-06-13 RX ADMIN — GABAPENTIN 100 MG: 100 CAPSULE ORAL at 08:23

## 2021-06-13 NOTE — ASSESSMENT & PLAN NOTE
Lab Results   Component Value Date    HGBA1C 5 6 04/10/2021       Recent Labs     06/12/21  1601 06/12/21  2100 06/13/21  0718 06/13/21  1101   POCGLU 110 131 98 83       Blood Sugar Average: Last 72 hrs:  (P) 610 [id number]     · Very well controlled   · Hold oral diabetic agents - resume on d/c  · Can give regular diet given excellent control

## 2021-06-13 NOTE — PROGRESS NOTES
Progress Note - Infectious Disease   Vencor Hospitalos 74 y o  male MRN: [id number]  Unit/Bed#: [facility] 823-01 Encounter: [id number]      Impression/Plan:  1  Recurrent left leg cellulitis-in the setting of chronic lymphedema and tinea pedis that is quite chronic and severe   Suspect the recurrent bouts her related to tinea pedis that is continuing despite topical therapy as well as the chronic lymphedema and obesity   His diabetes is actually well controlled   This is very likely streptococcal infection   He seems to be tolerating the antibiotics well and is clinically improved   He is not systemically ill   Improving cellulitis but  -continue cefazolin for now at current dose  -aggressive elevation of the left lower extremity to improve edema control  -treatment of tinea pedis as below  -serial exams  -if patient continues to have recurrent bouts of cellulitis despite adequate treatment for the tinea pedis and edema control, could consider suppressive antibiotics in the future but not until at least 3 bouts of cellulitis requiring admission have occurred within a 6-12 month period    -weight loss may also contribute to decreasing the risk of recurrent infection  -anticipate transition to oral antibiotics tomorrow  -plan 14 days total of antibiotics through 6/21/2021 due the extensive the recurrent nature of this infection     2  Tinea pedis-quite extensive and refractory to topical therapy   Foot appears quite macerated  -continue fluconazole through at least 6/16/2021 to complete 7 days total  -continue topical therapy  -local wound care     3  Diabetes mellitus-type 2  Well controlled       Antibiotics:  Cefazolin 4  Fluconazole 4  Antibiotics 6     Subjective:  Patient has no fever, chills, sweats; no nausea, vomiting, diarrhea; no cough, shortness of breath; no pain  No new symptoms  He is anxious to go home      Objective:  Vitals:  Temp:  [97 5 °F (36 4 °C)-97 9 °F (36 6 °C)] 97 9 °F (36 6 °C)  HR:  [54-65] 57  Resp:  [14-18] 14  BP: (155-161)/(72-93) 161/93  SpO2:  [95 %-98 %] 98 %  Temp (24hrs), Av 7 °F (36 5 °C), Min:97 5 °F (36 4 °C), Max:97 9 °F (36 6 °C)  Current: Temperature: 97 9 °F (36 6 °C)    Physical Exam:   General Appearance:  Alert, interactive, nontoxic, no acute distress  Throat: Oropharynx moist without lesions  Lungs:   Clear to auscultation bilaterally; no wheezes, rhonchi or rales; respirations unlabored   Heart:  RRR; no murmur, rub or gallop   Abdomen:   Soft, non-tender, non-distended, positive bowel sounds  Extremities: No clubbing, cyanosis  Left thigh erythema stable  Skin: No new rashes or lesions  No draining wounds noted  Labs, Imaging, & Other studies:   All pertinent labs and imaging studies were personally reviewed  Results from last 7 days   Lab Units 21  0438 21  0828 06/10/21  0508   WBC Thousand/uL 5 06 4 08* 6 61   HEMOGLOBIN g/dL 10 6* 11 1* 11 7*   PLATELETS Thousands/uL 210 173 193     Results from last 7 days   Lab Units 21  0438 06/10/21  0509 21  1608   SODIUM mmol/L 138 139 134*   POTASSIUM mmol/L 3 7 3 6 3 8   CHLORIDE mmol/L 105 107 101   CO2 mmol/L 31 26 26   BUN mg/dL 8 14 13   CREATININE mg/dL 0 82 0 92 1 15   EGFR ml/min/1 73sq m 91 85 65   CALCIUM mg/dL 9 0 9 2 9 1   AST U/L  --   --  9   ALT U/L  --   --  14   ALK PHOS U/L  --   --  62     Results from last 7 days   Lab Units 21  0731 21  1611 21  1608   BLOOD CULTURE   --  No Growth After 4 Days  No Growth After 4 Days     URINE CULTURE  >100,000 cfu/ml Enterococcus faecalis*  --   --      Results from last 7 days   Lab Units 06/10/21  0913 21  1608   PROCALCITONIN ng/ml 0 74* 0 97*

## 2021-06-13 NOTE — PLAN OF CARE
Problem: Potential for Falls  Goal: Patient will remain free of falls  Description: INTERVENTIONS:  - Assess patient frequently for physical needs  -  Identify cognitive and physical deficits and behaviors that affect risk of falls    -  [place] fall precautions as indicated by assessment   - Educate patient/family on patient safety including physical limitations  - Instruct patient to call for assistance with activity based on assessment  - Modify environment to reduce risk of injury  - Consider OT/PT consult to assist with strengthening/mobility  Outcome: Progressing     Problem: PAIN - ADULT  Goal: Verbalizes/displays adequate comfort level or baseline comfort level  Description: Interventions:  - Encourage patient to monitor pain and request assistance  - Assess pain using appropriate pain scale  - Administer analgesics based on type and severity of pain and evaluate response  - Implement non-pharmacological measures as appropriate and evaluate response  - Consider cultural and social influences on pain and pain management  - Notify physician/advanced practitioner if interventions unsuccessful or patient reports new pain  Outcome: Progressing     Problem: INFECTION - ADULT  Goal: Absence or prevention of progression during hospitalization  Description: INTERVENTIONS:  - Assess and monitor for signs and symptoms of infection  - Monitor lab/diagnostic results  - Monitor all insertion sites, i e  indwelling lines, tubes, and drains  - Monitor endotracheal if appropriate and nasal secretions for changes in amount and color  - [place] appropriate cooling/warming therapies per order  - Administer medications as ordered  - Instruct and encourage patient and family to use good hand hygiene technique  - Identify and instruct in appropriate isolation precautions for identified infection/condition  Outcome: Progressing     Problem: SAFETY ADULT  Goal: Patient will remain free of falls  Description: INTERVENTIONS:  - Assess patient frequently for physical needs  -  Identify cognitive and physical deficits and behaviors that affect risk of falls    -  [place] fall precautions as indicated by assessment   - Educate patient/family on patient safety including physical limitations  - Instruct patient to call for assistance with activity based on assessment  - Modify environment to reduce risk of injury  - Consider OT/PT consult to assist with strengthening/mobility  Outcome: Progressing  Goal: Maintain or return to baseline ADL function  Description: INTERVENTIONS:  -  Assess patient's ability to carry out ADLs; assess patient's baseline for ADL function and identify physical deficits which impact ability to perform ADLs (bathing, care of mouth/teeth, toileting, grooming, dressing, etc )  - Assess/evaluate cause of self-care deficits   - Assess range of motion  - Assess patient's mobility; develop plan if impaired  - Assess patient's need for assistive devices and provide as appropriate  - Encourage maximum independence but intervene and supervise when necessary  - Involve family in performance of ADLs  - Assess for home care needs following discharge   - Consider OT consult to assist with ADL evaluation and planning for discharge  - Provide patient education as appropriate  Outcome: Progressing     Problem: METABOLIC, FLUID AND ELECTROLYTES - ADULT  Goal: Electrolytes maintained within normal limits  Description: INTERVENTIONS:  - Monitor labs and assess patient for signs and symptoms of electrolyte imbalances  - Administer electrolyte replacement as ordered  - Monitor response to electrolyte replacements, including repeat lab results as appropriate  - Instruct patient on fluid and nutrition as appropriate  Outcome: Progressing  Goal: Fluid balance maintained  Description: INTERVENTIONS:  - Monitor labs   - Monitor I/O and WT  - Instruct patient on fluid and nutrition as appropriate  - Assess for signs & symptoms of volume excess or deficit  Outcome: Progressing  Goal: Glucose maintained within target range  Description: INTERVENTIONS:  - Monitor Blood Glucose as ordered  - Assess for signs and symptoms of hyperglycemia and hypoglycemia  - Administer ordered medications to maintain glucose within target range  - Assess nutritional intake and initiate nutrition service referral as needed  Outcome: Progressing     Problem: SKIN/TISSUE INTEGRITY - ADULT  Goal: Skin integrity remains intact  Description: INTERVENTIONS  - Identify patients at risk for skin breakdown  - Assess and monitor skin integrity  - Assess and monitor nutrition and hydration status  - Monitor labs (i e  albumin)  - Assess for incontinence   - Turn and reposition patient  - Assist with mobility/ambulation  - Relieve pressure over bony prominences  - Avoid friction and shearing  - Provide appropriate hygiene as needed including keeping skin clean and dry  - Evaluate need for skin moisturizer/barrier cream  - Collaborate with interdisciplinary team (i e  Nutrition, Rehabilitation, etc )   - Patient/family teaching  Outcome: Progressing  Goal: Incision(s), wounds(s) or drain site(s) healing without S/S of infection  Description: INTERVENTIONS  - Assess and document risk factors for skin impairment   - Assess and document dressing, incision, wound bed, drain sites and surrounding tissue  - Consider nutrition services referral as needed  - Oral mucous membranes remain intact  - Provide patient/ family education  Outcome: Progressing     Problem: MUSCULOSKELETAL - ADULT  Goal: Maintain or return mobility to safest level of function  Description: INTERVENTIONS:  - Assess patient's ability to carry out ADLs; assess patient's baseline for ADL function and identify physical deficits which impact ability to perform ADLs (bathing, care of mouth/teeth, toileting, grooming, dressing, etc )  - Assess/evaluate cause of self-care deficits   - Assess range of motion  - Assess patient's mobility  - Assess patient's need for assistive devices and provide as appropriate  - Encourage maximum independence but intervene and supervise when necessary  - Involve family in performance of ADLs  - Assess for home care needs following discharge   - Consider OT consult to assist with ADL evaluation and planning for discharge  - Provide patient education as appropriate  Outcome: Progressing

## 2021-06-13 NOTE — PROGRESS NOTES
[address] [facility]  Progress Note - Mariya Spaulding 1955, 74 y o  male MRN: [id number]  [facility] Encounter: [id number]  Primary Care Provider: Dominga Whittaker MD   Date and time admitted to hospital: 6/8/2021  3:33 PM    * Fall  Assessment & Plan  · Weakness and lightheadedness likely due to hypotension from sepsis - BP stable here   · S/p IV fluids  · Orthostatics  · PT/OT- recommends home with VNA    Sepsis ([address] )  Assessment & Plan  · POA, e/b leukocytosis and tachycardia  Lactic acid 3 2, now normalized following fluids  · Due to cellulitis of left lower extremity  · Prior cultures are positive for strep, Proteus, Acinetobacter pansensitive  · Due to his rehospitalization will place on broad-spectrum antibiotics to cover for g negatives and resistant organisms - initially on IV cefepime and vanco, deescalated to IV Ancef  · Discussed with ID, likely switch to oral antibiotic on Monday  · Podiatry consult appreciated  · Elevate left lower extremity  · BC x 2 negative at 48 hrs     Abnormal urinalysis  Assessment & Plan  · UA with 30-50 WBC, small leukocytes, RBCs and blood  · Urine culture pending     Cellulitis of left lower extremity  Assessment & Plan  · As above    Diabetic polyneuropathy associated with type 2 diabetes mellitus (HCC)  Assessment & Plan  · Continue gabapentin at home dose    Paroxysmal atrial fibrillation (HCC)  Assessment & Plan  · Does not appear to be on any anticoagulation besides aspirin at this time  · Rates are controlled, continue metoprolol    Morbid obesity with BMI of 50 0-59 9, adult [facility])  Assessment & Plan  · Supportive care    · Weight loss counseling when stable    Controlled type 2 diabetes mellitus with complication, without long-term current use of insulin [facility])  Assessment & Plan  Lab Results   Component Value Date    HGBA1C 5 6 04/10/2021       Recent Labs     06/12/21  1601 06/12/21  2100 06/13/21  0718 06/13/21  1101 POCGLU 110 131 98 83       Blood Sugar Average: Last 72 hrs:  (P) 209 [id number]     · Very well controlled   · Hold oral diabetic agents - resume on d/c  · Can give regular diet given excellent control       VTE Pharmacologic Prophylaxis:   Pharmacologic: Enoxaparin (Lovenox)  Mechanical VTE Prophylaxis in Place: Yes    Patient Centered Rounds: I have performed bedside rounds with nursing staff today  Current Length of Stay: 5 day(s)    Current Patient Status: Inpatient   Certification Statement: The patient will continue to require additional inpatient hospital stay due to pending progress    Discharge Plan: pending progress    Code Status: Level 1 - Full Code      Subjective:   paitent examined at bedside  Denies any fever, chills, intractable pain    Objective:     Vitals:   Temp (24hrs), Av 7 °F (36 5 °C), Min:97 5 °F (36 4 °C), Max:97 9 °F (36 6 °C)    Temp:  [97 5 °F (36 4 °C)-97 9 °F (36 6 °C)] 97 9 °F (36 6 °C)  HR:  [54-65] 57  Resp:  [14-18] 14  BP: (155-161)/(72-93) 161/93  SpO2:  [95 %-98 %] 98 %  Body mass index is 43 94 kg/m²  Input and Output Summary (last 24 hours): Intake/Output Summary (Last 24 hours) at 2021 1151  Last data filed at 2021 0501  Gross per 24 hour   Intake 640 ml   Output 1975 ml   Net -1335 ml       Physical Exam:     Physical Exam  Vitals and nursing note reviewed  Constitutional:       General: He is not in acute distress  Appearance: Normal appearance  HENT:      Head: Normocephalic and atraumatic  Right Ear: External ear normal       Left Ear: External ear normal       Nose: Nose normal    Eyes:      Extraocular Movements: Extraocular movements intact  Pulmonary:      Effort: Pulmonary effort is normal    Musculoskeletal:      Cervical back: Normal range of motion  Neurological:      Mental Status: He is alert  Mental status is at baseline     Psychiatric:         Mood and Affect: Mood normal           Additional Data: Labs:    Results from last 7 days   Lab Units 06/13/21  0438 06/10/21  0508 06/08/21  1608   WBC Thousand/uL 5 06 6 61 22 22*   HEMOGLOBIN g/dL 10 6* 11 7* 10 2*   HEMATOCRIT % 35 0* 38 3 33 3*   PLATELETS Thousands/uL 210 193 189   BANDS PCT %  --   --  14*   NEUTROS PCT %  --  82*  --    LYMPHS PCT %  --  9*  --    LYMPHO PCT %  --   --  3*   MONOS PCT %  --  7  --    MONO PCT %  --   --  1*   EOS PCT %  --  2 0     Results from last 7 days   Lab Units 06/13/21  0438 06/08/21  1608   SODIUM mmol/L 138 134*   POTASSIUM mmol/L 3 7 3 8   CHLORIDE mmol/L 105 101   CO2 mmol/L 31 26   BUN mg/dL 8 13   CREATININE mg/dL 0 82 1 15   ANION GAP mmol/L 2* 7   CALCIUM mg/dL 9 0 9 1   ALBUMIN g/dL  --  3 2*   TOTAL BILIRUBIN mg/dL  --  1 01*   ALK PHOS U/L  --  62   ALT U/L  --  14   AST U/L  --  9   GLUCOSE RANDOM mg/dL 92 123     Results from last 7 days   Lab Units 06/08/21  1608   INR  1 35*     Results from last 7 days   Lab Units 06/13/21  1101 06/13/21  0718 06/12/21  2100 06/12/21  1601 06/12/21  1057 06/12/21  0640 06/11/21  2039 06/11/21  1553 06/11/21  1119 06/11/21  0751 06/10/21  2059 06/10/21  1557   POC GLUCOSE mg/dl 83 98 131 110 103 115 99 113 100 104 112 100         Results from last 7 days   Lab Units 06/10/21  0913 06/08/21  1734 06/08/21  1608   LACTIC ACID mmol/L  --  1 9 3 2*   PROCALCITONIN ng/ml 0 74*  --  0 97*            Recent Cultures (last 7 days):     Results from last 7 days   Lab Units 06/09/21  0731 06/08/21  1611 06/08/21  1608   BLOOD CULTURE   --  No Growth After 4 Days  No Growth After 4 Days     URINE CULTURE  >100,000 cfu/ml Enterococcus faecalis*  --   --        Last 24 Hours Medication List:   Current Facility-Administered Medications   Medication Dose Route Frequency Provider Last Rate    acetaminophen  650 mg Oral Q6H PRN Jo Ann Vora MD      aspirin  81 mg Oral Daily Jo Ann Vora MD      calcium carbonate  1,000 mg Oral Daily PRN Jo Ann Vora MD      cefazolin  2,000 mg Intravenous Q8H Rebecca Pedraza PA-C 2,000 mg (06/13/21 1119)    docusate sodium  100 mg Oral BID Dannie Jose MD      enoxaparin  40 mg Subcutaneous Q12H Dannie Jose MD      fluconazole  200 mg Oral Daily Wood Bolden MD      gabapentin  100 mg Oral TID Dannie Jose MD      insulin lispro  1-6 Units Subcutaneous TID AC Dannie Jose MD      insulin lispro  1-6 Units Subcutaneous HS Dannie Jose MD      levothyroxine  25 mcg Oral Daily Dannie Jose, MD      levothyroxine  300 mcg Oral Daily Dannie Jose, MD      lisinopril  10 mg Oral Daily Dannie Jose, MD      metoprolol tartrate  50 mg Oral BID Dannie Jose, MD      multivitamin-minerals  1 tablet Oral Daily Dannie Jose, MD      ondansetron  4 mg Intravenous Q6H PRN Dannie Jose, MD      pantoprazole  40 mg Oral Daily Dannie Jose, MD      pravastatin  20 mg Oral Daily With Leon Combs MD      senna  2 tablet Oral Daily PRN Dannie Jose, MD          Today, Patient Was Seen By: Tia Currie MD    ** Please Note: Dictation voice to text software may have been used in the creation of this document   **

## 2021-06-14 ENCOUNTER — TRANSITIONAL CARE MANAGEMENT (OUTPATIENT)
Dept: FAMILY MEDICINE CLINIC | Facility: CLINIC | Age: 66
End: 2021-06-14

## 2021-06-14 VITALS
SYSTOLIC BLOOD PRESSURE: 165 MMHG | HEART RATE: 57 BPM | HEIGHT: 78 IN | BODY MASS INDEX: 36.45 KG/M2 | DIASTOLIC BLOOD PRESSURE: 91 MMHG | OXYGEN SATURATION: 96 % | TEMPERATURE: 97.7 F | RESPIRATION RATE: 18 BRPM | WEIGHT: 315 LBS

## 2021-06-14 LAB
ANION GAP SERPL CALCULATED.3IONS-SCNC: 4 MMOL/L (ref 4–13)
BUN SERPL-MCNC: 8 MG/DL (ref 5–25)
CALCIUM SERPL-MCNC: 9 MG/DL (ref 8.3–10.1)
CHLORIDE SERPL-SCNC: 104 MMOL/L (ref 100–108)
CO2 SERPL-SCNC: 31 MMOL/L (ref 21–32)
CREAT SERPL-MCNC: 0.85 MG/DL (ref 0.6–1.3)
ERYTHROCYTE [DISTWIDTH] IN BLOOD BY AUTOMATED COUNT: 16 % (ref 11.6–15.1)
GFR SERPL CREATININE-BSD FRML MDRD: 90 ML/MIN/1.73SQ M
GLUCOSE SERPL-MCNC: 108 MG/DL (ref 65–140)
GLUCOSE SERPL-MCNC: 108 MG/DL (ref 65–140)
GLUCOSE SERPL-MCNC: 77 MG/DL (ref 65–140)
HCT VFR BLD AUTO: 36 % (ref 36.5–49.3)
HGB BLD-MCNC: 10.7 G/DL (ref 12–17)
MCH RBC QN AUTO: 25.3 PG (ref 26.8–34.3)
MCHC RBC AUTO-ENTMCNC: 29.7 G/DL (ref 31.4–37.4)
MCV RBC AUTO: 85 FL (ref 82–98)
PLATELET # BLD AUTO: 200 THOUSANDS/UL (ref 149–390)
PMV BLD AUTO: 12.1 FL (ref 8.9–12.7)
POTASSIUM SERPL-SCNC: 4.1 MMOL/L (ref 3.5–5.3)
RBC # BLD AUTO: 4.23 MILLION/UL (ref 3.88–5.62)
SODIUM SERPL-SCNC: 139 MMOL/L (ref 136–145)
WBC # BLD AUTO: 4.97 THOUSAND/UL (ref 4.31–10.16)

## 2021-06-14 PROCEDURE — 80048 BASIC METABOLIC PNL TOTAL CA: CPT | Performed by: FAMILY MEDICINE

## 2021-06-14 PROCEDURE — 85027 COMPLETE CBC AUTOMATED: CPT | Performed by: FAMILY MEDICINE

## 2021-06-14 PROCEDURE — 82948 REAGENT STRIP/BLOOD GLUCOSE: CPT

## 2021-06-14 PROCEDURE — 99239 HOSP IP/OBS DSCHRG MGMT >30: CPT | Performed by: FAMILY MEDICINE

## 2021-06-14 PROCEDURE — 99232 SBSQ HOSP IP/OBS MODERATE 35: CPT | Performed by: INTERNAL MEDICINE

## 2021-06-14 RX ORDER — CEPHALEXIN 500 MG/1
500 CAPSULE ORAL EVERY 6 HOURS SCHEDULED
Qty: 28 CAPSULE | Refills: 0 | Status: SHIPPED | OUTPATIENT
Start: 2021-06-14 | End: 2021-06-21

## 2021-06-14 RX ORDER — FLUCONAZOLE 200 MG/1
200 TABLET ORAL DAILY
Qty: 2 TABLET | Refills: 0 | Status: SHIPPED | OUTPATIENT
Start: 2021-06-15 | End: 2021-06-17

## 2021-06-14 RX ORDER — CEPHALEXIN 500 MG/1
500 CAPSULE ORAL EVERY 6 HOURS SCHEDULED
Status: DISCONTINUED | OUTPATIENT
Start: 2021-06-14 | End: 2021-06-14 | Stop reason: HOSPADM

## 2021-06-14 RX ORDER — FLUCONAZOLE 200 MG/1
200 TABLET ORAL DAILY
Qty: 1 TABLET | Refills: 0 | Status: SHIPPED | OUTPATIENT
Start: 2021-06-15 | End: 2021-06-14

## 2021-06-14 RX ADMIN — DOCUSATE SODIUM 100 MG: 100 CAPSULE ORAL at 09:44

## 2021-06-14 RX ADMIN — FLUCONAZOLE 200 MG: 200 TABLET ORAL at 09:45

## 2021-06-14 RX ADMIN — LISINOPRIL 10 MG: 10 TABLET ORAL at 09:44

## 2021-06-14 RX ADMIN — METOPROLOL TARTRATE 50 MG: 50 TABLET, FILM COATED ORAL at 09:44

## 2021-06-14 RX ADMIN — PANTOPRAZOLE SODIUM 40 MG: 40 TABLET, DELAYED RELEASE ORAL at 05:31

## 2021-06-14 RX ADMIN — Medication 1 TABLET: at 09:43

## 2021-06-14 RX ADMIN — LEVOTHYROXINE SODIUM 25 MCG: 25 TABLET ORAL at 05:31

## 2021-06-14 RX ADMIN — ENOXAPARIN SODIUM 40 MG: 40 INJECTION SUBCUTANEOUS at 09:44

## 2021-06-14 RX ADMIN — CEFAZOLIN SODIUM 2000 MG: 2 SOLUTION INTRAVENOUS at 12:09

## 2021-06-14 RX ADMIN — LEVOTHYROXINE SODIUM 300 MCG: 100 TABLET ORAL at 05:31

## 2021-06-14 RX ADMIN — ASPIRIN 81 MG: 81 TABLET, COATED ORAL at 09:44

## 2021-06-14 RX ADMIN — GABAPENTIN 100 MG: 100 CAPSULE ORAL at 09:43

## 2021-06-14 RX ADMIN — CEFAZOLIN SODIUM 2000 MG: 2 SOLUTION INTRAVENOUS at 04:49

## 2021-06-14 NOTE — ASSESSMENT & PLAN NOTE
· X-ray of the foot shows no evidence of osteomyelitis  · Podiatry consult appreciated  · Initially received Vanco and cefepime, switched over to Ancef IV    Now will transition to Keflex on discharge   · Fluconazole until 6/16 for tinea cruris

## 2021-06-14 NOTE — ASSESSMENT & PLAN NOTE
· Weakness and lightheadedness likely due to hypotension from sepsis - BP stable here   · CT HEAD AND CT CERVICAL SPINE NEGATIVE ON ADMISSION  · Improved with fluid and abx  · PT/OT- recommends home with VNA

## 2021-06-14 NOTE — DISCHARGE SUMMARY
[address]  Discharge- Alessadnro Anon 1955, 74 y o  male MRN: [id number]  Unit/Bed#: [facility] 823-01 Encounter: [id number]  Primary Care Provider: Rj Black MD   Date and time admitted to hospital: 6/8/2021  3:33 PM    * [address]  · Weakness and lightheadedness likely due to hypotension from sepsis - BP stable here   · CT HEAD AND CT CERVICAL SPINE NEGATIVE ON ADMISSION  · Improved with fluid and abx  · PT/OT- recommends home with VNA    Sepsis ([address] )  Assessment & Plan  · POA, e/b leukocytosis and tachycardia  Lactic acid 3 2, now normalized following fluids  · Due to cellulitis of left lower extremity  · Prior cultures are positive for strep, Proteus, Acinetobacter pansensitive  · Due to his rehospitalization will place on broad-spectrum antibiotics to cover for g negatives and resistant organisms - initially on IV cefepime and vanco, deescalated to IV Ancef  · Discussed with IDheidi to switch to keflex till 6/21 and okay to discharge  · Podiatry consult appreciated- okay to discharge home with f/u with wound care  · Elevate left lower extremity  · BC x 2 negative at 48 hrs     Abnormal urinalysis  Assessment & Plan  · UA with 30-50 WBC, small leukocytes, RBCs and blood  · Urine culture Entercoccus faecalis - pan susceptible  · Received IV ancef, now will be discharged on keflex    Cellulitis of left lower extremity  Assessment & Plan  · X-ray of the foot shows no evidence of osteomyelitis  · Podiatry consult appreciated  · Initially received Vanco and cefepime, switched over to Ancef IV  Now will transition to Keflex on discharge   · Fluconazole until 6/16 for tinea cruris    Diabetic polyneuropathy associated with type 2 diabetes mellitus (HCC)  Assessment & Plan  · Continue gabapentin at home dose    Paroxysmal atrial fibrillation (HCC)  Assessment & Plan  · Does not appear to be on any anticoagulation besides aspirin at this time    · Rates are controlled, continue metoprolol    Morbid obesity with BMI of 50 0-59 9, adult [facility])  Assessment & Plan  · Supportive care  · Weight loss counseling when stable    Controlled type 2 diabetes mellitus with complication, without long-term current use of insulin [facility])  Assessment & Plan  Lab Results   Component Value Date    HGBA1C 5 6 04/10/2021       Recent Labs     06/13/21  1616 06/13/21  2057 06/14/21  0726 06/14/21  1106   POCGLU 120 118 108 77       Blood Sugar Average: Last 72 hrs:  (P) 105 [id number]     · Very well controlled   · Hold oral diabetic agents - resume on d/c           Discharging Physician / Practitioner: Angus Page MD  PCP: Jailene Murrieta MD  Admission Date:   Admission Orders (From admission, onward)     Ordered        06/08/21 2012  Inpatient Admission  Once                   Discharge Date: 06/14/21    Medical Problems     Resolved Problems  Date Reviewed: 6/14/2021    None                Consultations During Hospital Stay:  PODIATRY  INFECTIOUS DISEASE      Procedures Performed:   · LOCAL WOUND CARE     Significant Findings / Test Results:   CT chest abdomen pelvis: IMPRESSION:  1  No acute injury within the chest, abdomen, or pelvis      2   Mildly enlarged left inguinal external iliac and prominent right inguinal lymph nodes  A CT report from outside hospital dated 3/12/2014 reports prominent inguinal lymph nodes  If images are provided for comparison and document stability, no   further workup indicated  Otherwise, a follow-up CT in 3 months to ensure stability recommended  ·    XR Foot: IMPRESSION:     No acute osseous abnormality    No radiographic evidence of osteomyelitis        Incidental Findings:   · none     Test Results Pending at Discharge (will require follow up):   · none     Outpatient Tests Requested:  · nnone    Complications:  none    Reason for Admission: fall, sepsis    Hospital Course:     Hector Rizo is a 74 y o  male patient who originally presented to the hospital on 6/8/2021 due to fall secondary to fatigue  Patient has history of type 2 diabetes with polyneuropathy 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   Patient was found to have sepsis secondary to left lower extremity cellulitis and poor wound healing  Patient was the treated with aggressive IV hydration and broad-spectrum IV antibiotics with cefepime and vancomycin  Podiatry and Infectious Disease was consulted  Antibiotic was switched to IV Ancef and patient was treated with local wound care by Podiatry  Patient continued to improve throughout the hospital stay  Currently patient is cleared by Podiatry and Infectious Disease to be discharged home on Keflex and fluconazole  Patient will be discharged home with VNA  Please see above list of diagnoses and related plan for additional information  Condition at Discharge: good     Discharge Day Visit / Exam:     Subjective:  Patient feels well, denies any fever, chills or pain  No nausea or vomiting  Vitals: Blood Pressure: 165/91 (06/14/21 0705)  Pulse: 57 (06/13/21 2148)  Temperature: 97 7 °F (36 5 °C) (06/14/21 0705)  Temp Source: Oral (06/11/21 0756)  Respirations: 18 (06/14/21 0705)  Height: 6' 7" (200 7 cm) (06/08/21 1538)  Weight - Scale: (!) 177 kg (390 lb) (06/08/21 1538)  SpO2: 96 % (06/13/21 2148)  Exam:   Physical Exam  Vitals and nursing note reviewed  Constitutional:       General: He is not in acute distress  Appearance: Normal appearance  HENT:      Head: Normocephalic and atraumatic  Right Ear: External ear normal       Left Ear: External ear normal       Nose: Nose normal    Eyes:      Extraocular Movements: Extraocular movements intact  Pulmonary:      Effort: Pulmonary effort is normal    Musculoskeletal:      Cervical back: Normal range of motion  Comments: Right BKA, left lower extremity wrapped in dressing   Neurological:      Mental Status: He is alert  Mental status is at baseline  Psychiatric:         Mood and Affect: Mood normal            Discharge instructions/Information to patient and family:   See after visit summary for information provided to patient and family  Provisions for Follow-Up Care:  See after visit summary for information related to follow-up care and any pertinent home health orders  Disposition:     Home with VNA Services (Reminder: Complete face to face encounter)        Planned Readmission: no     Discharge Statement:  I spent 45 minutes discharging the patient  This time was spent on the day of discharge  I had direct contact with the patient on the day of discharge  Greater than 50% of the total time was spent examining patient, answering all patient questions, arranging and discussing plan of care with patient as well as directly providing post-discharge instructions  Additional time then spent on discharge activities  Discharge Medications:  See after visit summary for reconciled discharge medications provided to patient and family        ** Please Note: This note has been constructed using a voice recognition system **

## 2021-06-14 NOTE — ASSESSMENT & PLAN NOTE
· POA, e/b leukocytosis and tachycardia  Lactic acid 3 2, now normalized following fluids  · Due to cellulitis of left lower extremity  · Prior cultures are positive for strep, Proteus, Acinetobacter pansensitive  · Due to his rehospitalization will place on broad-spectrum antibiotics to cover for g negatives and resistant organisms - initially on IV cefepime and vanco, deescalated to IV Ancef  · Discussed with ID, okay to switch to keflex till 6/21 and okay to discharge  · Podiatry consult appreciated- okay to discharge home with f/u with wound care  · Elevate left lower extremity    · BC x 2 negative at 48 hrs

## 2021-06-14 NOTE — ASSESSMENT & PLAN NOTE
· UA with 30-50 WBC, small leukocytes, RBCs and blood  · Urine culture Entercoccus faecalis - pan susceptible     · Received IV ancef, now will be discharged on keflex

## 2021-06-14 NOTE — PROGRESS NOTES
Podiatry - Progress Note  Patient: Meagan Mckinney 74 y o  male   MRN: [id number]  PCP: Jillian Larsen MD  Unit/Bed#: [facility] 823-01 Encounter: [id number]  Date Of Visit: 21    ASSESSMENT:    Meagan Mckinney is a 74 y o  male with:    1  Left foot diabetic wound limited to break down of skin - decker 1  2  Cellulites left lower extremity - improved  3  T2DM (A1c:5 6% 4/10/21 ) with neuropathy  4  Lymphedema      PLAN:    · Continue local wound care to left lower extremity  Cellulitis of lower extremity markedly improved  · Encourage elevation and light Ace wrap for edema control  · Continue IV cefazolin per Infectious Disease with transition to oral antibiotic soon  Continue antibiotics through 2021 per Infectious Disease  · Continue fluconazole to 2021 per Infectious Disease to treat tinea pedis  · Weight-bearing as tolerated  · Rest of medical care per primary team       SUBJECTIVE:     The patient was seen, evaluated, and assessed at bedside today  The patient was awake, alert, and in no acute distress  No acute events overnight  The patient reports improvement in swelling and redness of left lower extremity  Patient denies N/V/F/chills/SOB/CP  OBJECTIVE:     Vitals:   BP (!) 171/76   Pulse 57   Temp 98 3 °F (36 8 °C)   Resp 15   Ht 6' 7" (2 007 m)   Wt (!) 177 kg (390 lb)   SpO2 96%   BMI 43 94 kg/m²     Temp (24hrs), Av °F (36 7 °C), Min:97 9 °F (36 6 °C), Max:98 3 °F (36 8 °C)      Physical Exam:     General:  Alert, cooperative, and in no distress  Lower extremity exam:  Cardiovascular status at baseline  Neurological status at baseline  Musculoskeletal status at baseline  No calf tenderness noted  Erythema and edema of left lower extremity appears improved from previous examinations  Hyperkeratotic and sloughing skin noted  No interdigital maceration noted at this time    Lower extremity wound(s) as noted below:    Wound 1 located left foot, measures approximately 2 cm x 1 0 cm x 0 1 cm  without sinus tracking or undermining  Wound bed appears granular and fibrotic with slight Serous exudate  Deepest tissue noted Subcutaneous  Malodor is not noticed  Wound edge appears Attached  Jovita-wound skin appears intact  Probe to bone is,  negative   Signs of infection are not present at this time  Clinical Images 06/14/21:    Left foot      Additional Data:     Labs:    Results from last 7 days   Lab Units 06/13/21  0438 06/10/21  0508   WBC Thousand/uL 5 06 6 61   HEMOGLOBIN g/dL 10 6* 11 7*   HEMATOCRIT % 35 0* 38 3   PLATELETS Thousands/uL 210 193   NEUTROS PCT %  --  82*   LYMPHS PCT %  --  9*   MONOS PCT %  --  7   EOS PCT %  --  2     Results from last 7 days   Lab Units 06/13/21  0438 06/08/21  1608   POTASSIUM mmol/L 3 7 3 8   CHLORIDE mmol/L 105 101   CO2 mmol/L 31 26   BUN mg/dL 8 13   CREATININE mg/dL 0 82 1 15   CALCIUM mg/dL 9 0 9 1   ALK PHOS U/L  --  62   ALT U/L  --  14   AST U/L  --  9     Results from last 7 days   Lab Units 06/08/21  1608   INR  1 35*       * I Have Reviewed All Lab Data Listed Above  Recent Cultures (last 7 days):     Results from last 7 days   Lab Units 06/09/21  0731 06/08/21  1611 06/08/21  1608   BLOOD CULTURE   --  No Growth After 5 Days  No Growth After 5 Days  URINE CULTURE  >100,000 cfu/ml Enterococcus faecalis*  --   --            Imaging: I have personally reviewed pertinent films in PACS  EKG, Pathology, and Other Studies: I have personally reviewed pertinent reports  ** Please Note: Portions of the record may have been created with voice recognition software  Occasional wrong word or "sound a like" substitutions may have occurred due to the inherent limitations of voice recognition software  Read the chart carefully and recognize, using context, where substitutions have occurred   **

## 2021-06-14 NOTE — PROGRESS NOTES
Progress Note - Infectious Disease   Vena Rachel 74 y o  male MRN: [id number]  Unit/Bed#: [facility] 823-01 Encounter: [id number]      Impression/Plan:  1  Recurrent left leg cellulitis  He has had cellulitis of the left leg requiring hospitalization  He states he has had cellulitis in the leg since he was about 14years old  He denies fevers prior to admission   Negative blood cultures from admission  Admitted with WBC of 86246 and lactate of 3 2 now normalized  Started on vanc/cefepime on admission and now transitioned to ancef  No wound to culture               - continue Ancef with transition to po keflex 500 mg q6hr and continue total antibiotic therapy through 2021              - will begin course of oral fluconazole 200 mg qdaily              - continue to encourage hygiene modifications to stratify risk               - continue to follow blood culture              - continue to monitor labs and vitals              - wound care per podiatry                   2  Type 2 diabetes mellitus- wellcontrolled with last A1c 5 6 %       3  Right BKA  Occurred in 2017 due to lymphedema and PAD       4  Tinea pedis  Refractory to topical medications  Long standing maceration and open wounds  - continue fluconazole through 2021 for 7 day total course   - continue local wound care per podiatry       Antibiotics:  Cefazolin 5  Fluconazole 5  Antibiotics 7    Subjective:  Patient has no fever, chills, sweats; no nausea, vomiting, diarrhea; no cough, shortness of breath; no pain  No new symptoms  Objective:  Vitals:  Temp:  [97 7 °F (36 5 °C)-98 3 °F (36 8 °C)] 97 7 °F (36 5 °C)  HR:  [57-63] 57  Resp:  [15-20] 18  BP: (163-171)/(76-91) 165/91  SpO2:  [96 %-97 %] 96 %  Temp (24hrs), Av °F (36 7 °C), Min:97 7 °F (36 5 °C), Max:98 3 °F (36 8 °C)  Current: Temperature: 97 7 °F (36 5 °C)    Physical Exam:   General Appearance:  Alert, interactive, nontoxic, no acute distress     Throat: Oropharynx moist without lesions  Lungs:   Clear to auscultation bilaterally; no wheezes, rhonchi or rales; respirations unlabored   Heart:  RRR; no murmur, rub or gallop   Abdomen:   Soft, non-tender, non-distended, positive bowel sounds  Extremities: No clubbing, cyanosis or edema   Skin: No new rashes or lesions  left leg dressing left clean dry and intact       Labs, Imaging, & Other studies:   All pertinent labs and imaging studies were personally reviewed  Results from last 7 days   Lab Units 06/14/21  0609 06/13/21  0438 06/11/21  0828   WBC Thousand/uL 4 97 5 06 4 08*   HEMOGLOBIN g/dL 10 7* 10 6* 11 1*   PLATELETS Thousands/uL 200 210 173     Results from last 7 days   Lab Units 06/14/21  0609 06/13/21  0438 06/10/21  0509 06/08/21  1608   SODIUM mmol/L 139 138 139 134*   POTASSIUM mmol/L 4 1 3 7 3 6 3 8   CHLORIDE mmol/L 104 105 107 101   CO2 mmol/L 31 31 26 26   BUN mg/dL 8 8 14 13   CREATININE mg/dL 0 85 0 82 0 92 1 15   EGFR ml/min/1 73sq m 90 91 85 65   CALCIUM mg/dL 9 0 9 0 9 2 9 1   AST U/L  --   --   --  9   ALT U/L  --   --   --  14   ALK PHOS U/L  --   --   --  62     Results from last 7 days   Lab Units 06/09/21  0731 06/08/21  1611 06/08/21  1608   BLOOD CULTURE   --  No Growth After 5 Days  No Growth After 5 Days     URINE CULTURE  >100,000 cfu/ml Enterococcus faecalis*  --   --      Results from last 7 days   Lab Units 06/10/21  0913 06/08/21  1608   PROCALCITONIN ng/ml 0 74* 0 97*

## 2021-06-14 NOTE — ASSESSMENT & PLAN NOTE
Lab Results   Component Value Date    HGBA1C 5 6 04/10/2021       Recent Labs     06/13/21  1616 06/13/21 2057 06/14/21  0726 06/14/21  1106   POCGLU 120 118 108 77       Blood Sugar Average: Last 72 hrs:  (P) 105 [id number]     · Very well controlled   · Hold oral diabetic agents - resume on d/c

## 2021-06-23 ENCOUNTER — OFFICE VISIT (OUTPATIENT)
Dept: WOUND CARE | Facility: HOSPITAL | Age: 66
End: 2021-06-23
Payer: MEDICARE

## 2021-06-23 VITALS
DIASTOLIC BLOOD PRESSURE: 76 MMHG | HEART RATE: 94 BPM | RESPIRATION RATE: 18 BRPM | TEMPERATURE: 98.7 F | SYSTOLIC BLOOD PRESSURE: 152 MMHG

## 2021-06-23 DIAGNOSIS — L97.521 DIABETIC ULCER OF LEFT FOOT ASSOCIATED WITH TYPE 2 DIABETES MELLITUS, LIMITED TO BREAKDOWN OF SKIN, UNSPECIFIED PART OF FOOT (HCC): Primary | ICD-10-CM

## 2021-06-23 DIAGNOSIS — E11.42 DIABETIC POLYNEUROPATHY ASSOCIATED WITH TYPE 2 DIABETES MELLITUS (HCC): ICD-10-CM

## 2021-06-23 DIAGNOSIS — E11.621 DIABETIC ULCER OF LEFT FOOT ASSOCIATED WITH TYPE 2 DIABETES MELLITUS, LIMITED TO BREAKDOWN OF SKIN, UNSPECIFIED PART OF FOOT (HCC): Primary | ICD-10-CM

## 2021-06-23 PROCEDURE — 99213 OFFICE O/P EST LOW 20 MIN: CPT | Performed by: PODIATRIST

## 2021-06-23 NOTE — PATIENT INSTRUCTIONS
Orders Placed This Encounter   Procedures    Wound cleansing and dressings     Wound cleansing and dressings      Left Foot wound:       May shower on dressing change days  Apply calmoseptine to periwound  Apply moisturizer to intact skin on leg  Apply drawtex or equivalent (qwick) to the wounds  Paint Betadine between toes then Apply alginate between toes  Cover with exu dry or optilock and/or ABDs  Secure with amy and tape   Wound care 3x/wk - if there is excesssive strikethrough drainage then increase frequency to every other day or daily  Apply optilock or ABD to left anterior leg if weeping       This was performed in [address] at today visit                                        Wound compression and edema control      Surepress or circaid to the LLE or compression as per lymphedema specialist     Apply compression wrap to your affected Leg(s) from mid-foot to knee making sure to cover the heel  Apply in the morning and re-wrap as needed during the day if wrap becomes loose   Remove at bedtime and elevate legs or lie down      Avoid prolonged standing in one place      Elevate leg(s) above the level of the heart when sitting or as much as possible         Please get in bed 2-3 x daily and elevate your legs     Wound miscellaneous orders      Limit your fluid intake and stay away from carbonated beverages     Standing Status:   Future     Standing Expiration Date:   6/23/2022

## 2021-06-23 NOTE — PROGRESS NOTES
Patient ID: Alice Flower is a 74 y o  male Date of Birth 1955       Chief Complaint   Patient presents with    Follow Up Wound Care Visit     left leg and foot wounds       Allergies:  Patient has no known allergies  Diagnosis:  1  Diabetic ulcer of left foot associated with type 2 diabetes mellitus, limited to breakdown of skin, unspecified part of foot ([facility] )  -     Wound cleansing and dressings; Future    2  Diabetic polyneuropathy associated with type 2 diabetes mellitus (HCC)  -     Wound cleansing and dressings; Future       Diagnosis ICD-10-CM Associated Orders   1  Diabetic ulcer of left foot associated with type 2 diabetes mellitus, limited to breakdown of skin, unspecified part of foot ([facility] )  W33 [id number] Wound cleansing and dressings    L97 521    2  Diabetic polyneuropathy associated with type 2 diabetes mellitus (HCC)  E11 42 Wound cleansing and dressings        Assessment & Plan:  1  Patient presents today for care of diabetic Naidu grade 1 ulceration dorsal aspect left foot complicated by chronic lymphedema and venous stasis, of note he has a small weeping area on the left lower leg, this was not opened as a wound as he has these areas that come and go  No debridement was performed today, evaluation of foot shows increased drainage and slight increased redness, patient completed antibiotics on Monday  Foot was cleansed and dressing applied, he will continue with lymphedema nurses and visiting nurses  2    Patient will return in 2 weeks to see Infectious Disease for consideration of suppression antibiotics as he has chronic recurrent cellulitis, he has had 2 within the last 3 months requiring hospitalization  In discussion with patient he states he does not do anything differently at home in terms of his diet or fluid intake    He states occasionally his leg will be in the dependent position  More often when he is at home that in the hospital, he states he will make a conscious effort to try to minimize this  3    Today I reviewed frequent elevation, low-sodium diet, proper protein intake and proper glycemic control, patient understands and agrees with the plan and will follow up with Dr Carlin Diaz  From Infectious Disease at wound management and I will see him in 4 weeks  Procedures - none    Subjective:     Patient presents today for care of left foot diabetic ulceration complicated by chronic venous stasis and lymphedema  Patient was recently hospitalized for cellulitis, discharged home on oral antibiotics which he completed on Friday  He continues with visiting nurses and lymphedema nurses in home and has no new complaints          The following portions of the patient's history were reviewed and updated as appropriate:   Patient Active Problem List   Diagnosis    Controlled type 2 diabetes mellitus with complication, without long-term current use of insulin ([facility] )    HTN (hypertension)    HLD (hyperlipidemia)    Hypothyroidism    Celiac disease    Coronary artery disease    History of duodenal ulcer    Living will on file    Morbid obesity with BMI of 50 0-59 9, adult ([facility] )    S/P BKA (below knee amputation) unilateral, right (HCC)    Paroxysmal atrial fibrillation (HCC)    Iron deficiency anemia due to chronic blood loss    Chronic venous stasis dermatitis of left lower extremity    Gastroesophageal reflux disease without esophagitis    Diabetic ulcer of left foot associated with type 2 diabetes mellitus, limited to breakdown of skin ([facility] )    Idiopathic chronic venous hypertension of left lower extremity with ulcer (HCC)    Non-pressure chronic ulcer of left calf, limited to breakdown of skin ([facility] )    Diabetic polyneuropathy associated with type 2 diabetes mellitus ([facility] )    Cellulitis of left lower extremity    Fall    Sepsis ([facility] )    Abnormal urinalysis     Past Medical History:   Diagnosis Date    Atrial fibrillation ([facility]     Cellulitis     Diabetes mellitus ([facility] )     Disease of thyroid gland     Duodenal ulcer     perforated- ICU stay    High blood pressure     High cholesterol     Hyperlipidemia     Hypertension     Hypothyroidism     Lymphedema      b/l LE- was followed at wound center    Morbid obesity with BMI of 40 0-44 9, adult ([facility] )     Peritonitis ([facility] )      Past Surgical History:   Procedure Laterality Date    BELOW KNEE LEG AMPUTATION Right     DIAGNOSTIC LAPAROSCOPY      KNEE ARTHROSCOPY Bilateral     OTHER SURGICAL HISTORY  2014     lap repair    OTHER SURGICAL HISTORY      Laparoscopic repair of a perforated duodenal ulcer with Sherral Cool omental    OTHER SURGICAL HISTORY      Placement of drains     Social History     Socioeconomic History    Marital status: Single     Spouse name: None    Number of children: None    Years of education: None    Highest education level: None   Occupational History    None   Tobacco Use    Smoking status: Former Smoker     Packs/day:  00     Years: 30 00     Pack years: 30      Quit date: 2004     Years since quittin 6    Smokeless tobacco: Never Used   Substance and Sexual Activity    Alcohol use: Not Currently    Drug use: Not Currently    Sexual activity: None   Other Topics Concern    None   Social History Narrative    Former smoker: Quit 3/31/2012 - As per Care Everywhere      Social Determinants of Health     Financial Resource Strain:     Difficulty of Paying Living Expenses:    Food Insecurity:     Worried About Running Out of Food in the Last Year:     Sarika of Food in the Last Year:    Transportation Needs:     Lack of Transportation (Medical):      Lack of Transportation (Non-Medical):    Physical Activity:     Days of Exercise per Week:     Minutes of Exercise per Session:    Stress:     Feeling of Stress :    Social Connections:     Frequency of Communication with Friends and Family:     Frequency of Social Gatherings with Friends and Family:     Attends Buddhist Services:     Active Member of Clubs or Organizations:     Attends Club or Organization Meetings:     Marital Status:    Intimate Partner Violence:     Fear of Current or Ex-Partner:     Emotionally Abused:     Physically Abused:     Sexually Abused:         Current Outpatient Medications:     aspirin (ASPIRIN 81) 81 mg EC tablet, Take 81 mg by mouth Daily, Disp: , Rfl:     clotrimazole-betamethasone (LOTRISONE) 1-0 05 % cream, Apply topically 2 (two) times a day, Disp: 90 g, Rfl: 0    gabapentin (NEURONTIN) 100 mg capsule, Take 1 capsule (100 mg total) by mouth 3 (three) times a day, Disp: 270 capsule, Rfl: 1    levothyroxine 25 mcg tablet, Take 1 tablet (25 mcg total) by mouth daily In addition to the 300 mcg dose (stop the 75 mcg dose), Disp: 90 tablet, Rfl: 1    levothyroxine 300 MCG tablet, Take 1 tablet (300 mcg total) by mouth daily In addition to 75 mcg dose, Disp: 90 tablet, Rfl: 1    lisinopril (ZESTRIL) 10 mg tablet, Take 1 tablet (10 mg total) by mouth daily, Disp: 90 tablet, Rfl: 1    metFORMIN (GLUCOPHAGE-XR) 750 mg 24 hr tablet, Take 1 tablet (750 mg total) by mouth daily with breakfast, Disp: 90 tablet, Rfl: 1    metoprolol tartrate (LOPRESSOR) 50 mg tablet, Take 1 tablet (50 mg total) by mouth 2 (two) times a day, Disp: 180 tablet, Rfl: 2    Multiple Vitamins-Minerals (MULTIVITAMIN MEN 50+ PO), Take by mouth, Disp: , Rfl:     pantoprazole (PROTONIX) 40 mg tablet, Take 1 tablet (40 mg total) by mouth daily, Disp: 90 tablet, Rfl: 1    simvastatin (ZOCOR) 10 mg tablet, Take 1 tablet (10 mg total) by mouth daily at bedtime, Disp: 90 tablet, Rfl: 1    Zoster Vac Recomb Adjuvanted (Shingrix) 50 MCG/0 5ML SUSR, 0 5mL IM for one dose, followed by 0 5mL IM 2-6 months after first dose, Disp: 1 each, Rfl: 1  Family History   Problem Relation Age of Onset    Heart disease Family     Alcohol abuse Family     Heart disease Mother     Hypertension Mother     Migraines Daughter     Leukemia Brother     Migraines Daughter     Substance Abuse Neg Hx     Mental illness Neg Hx     Depression Neg Hx        Review of Systems   Constitutional: Negative for activity change, appetite change, chills, fatigue and fever  HENT: Negative for hearing loss  Eyes: Negative for photophobia and visual disturbance  Respiratory: Negative for cough, chest tightness and shortness of breath  Cardiovascular: Negative for chest pain and palpitations  Gastrointestinal: Negative for diarrhea and nausea  Endocrine: Negative for cold intolerance and heat intolerance  Musculoskeletal: Positive for gait problem  Negative for arthralgias and back pain  Skin: Positive for color change and wound  Neurological: Positive for numbness  Psychiatric/Behavioral: Negative  Negative for agitation, self-injury and suicidal ideas  The patient is not nervous/anxious  Objective:  /76   Pulse 94   Temp 98 7 °F (37 1 °C)   Resp 18     Physical Exam  Constitutional:       Appearance: Normal appearance  He is obese  HENT:      Head: Normocephalic and atraumatic  Right Ear: External ear normal       Left Ear: External ear normal       Nose: Nose normal    Eyes:      Conjunctiva/sclera: Conjunctivae normal    Cardiovascular:      Pulses:           Dorsalis pedis pulses are detected w/ Doppler on the left side  Posterior tibial pulses are detected w/ Doppler on the left side  Pulmonary:      Effort: Pulmonary effort is normal    Musculoskeletal:      Cervical back: Normal range of motion  Left lower le+ Edema present  Comments: BKA right   Skin:     General: Skin is warm and dry  Capillary Refill: Capillary refill takes less than 2 seconds  Comments: See detailed wound assessment   Neurological:      General: No focal deficit present  Mental Status: He is alert and oriented to person, place, and time  Mental status is at baseline        Sensory: Sensory deficit present  Coordination: Coordination abnormal       Gait: Gait abnormal       Deep Tendon Reflexes: Reflexes abnormal    Psychiatric:         Mood and Affect: Mood normal          Behavior: Behavior normal          Thought Content: Thought content normal          Judgment: Judgment normal            Wound 01/08/20 Foot Left; Other (Comment) (Active)   Wound Image   06/23/21 1328   Wound Description Granulation tissue;Pink;Beefy red;Epithelialization 06/23/21 1328   Jovita-wound Assessment Edema;Pink;Fragile; Maceration 06/23/21 1328   Wound Length (cm) 12 cm 06/23/21 1328   Wound Width (cm) 11 cm 06/23/21 1328   Wound Depth (cm) 0 1 cm 06/23/21 1328   Wound Surface Area (cm^2) 132 cm^2 06/23/21 1328   Wound Volume (cm^3) 13 2 cm^3 06/23/21 1328   Calculated Wound Volume (cm^3) 13 2 cm^3 06/23/21 1328   Change in Wound Size % -774 17 06/23/21 1328   Drainage Amount Large 06/23/21 1328   Drainage Description Serosanguineous; Yellow; Foul smelling 06/23/21 1328   Non-staged Wound Description Full thickness 06/23/21 1328   Treatments Cleansed 11/11/20 1320   Dressing Changed Changed 02/10/21 1341   Patient Tolerance Tolerated well 02/10/21 1341   Dressing Status Intact 05/05/21 1312          Results from last 6 Months   Lab Units 02/10/21  1410   WOUND CULTURE  1+ Growth of Proteus mirabilis*  2+ Growth of Beta Hemolytic Streptococcus Group B*  Few Colonies of Staphylococcus aureus*  2+ Growth of        XR foot 3+ vw left    Result Date: 6/9/2021  Narrative: LEFT FOOT INDICATION:   cellulites with left foot wound limited to break down of skin  COMPARISON:  Left foot plain films from 3/19/2019  VIEWS:  XR FOOT 3+ VW LEFT FINDINGS: There is no acute fracture or dislocation  No bony erosions or periosteal reaction to suggest osteomyelitis  There are 2nd through 5th toe hammertoe deformities  No lytic or blastic osseous lesion  Soft tissues are unremarkable  Impression: No acute osseous abnormality    No radiographic evidence of osteomyelitis  Workstation performed: [id number]     CT head without contrast    Result Date: 6/8/2021  Narrative: CT BRAIN - WITHOUT CONTRAST INDICATION:   fall, possible syncope  COMPARISON:  None  TECHNIQUE:  CT examination of the brain was performed  In addition to axial images, sagittal and coronal 2D reformatted images were created and submitted for interpretation  Radiation dose length product (DLP) for this visit:  1147 84 mGy-cm   This examination, like all CT scans performed in the [facility], was performed utilizing techniques to minimize radiation dose exposure, including the use of iterative reconstruction and automated exposure control  IMAGE QUALITY:  Diagnostic  FINDINGS: PARENCHYMA:  No intracranial mass, mass effect or midline shift  No CT signs of acute infarction  No acute parenchymal hemorrhage  VENTRICLES AND EXTRA-AXIAL SPACES:  Normal for the patient's age  VISUALIZED ORBITS AND PARANASAL SINUSES:  No acute abnormality involving the orbits  Mild scattered sinus mucosal thickening is noted  No fluid levels are seen  CALVARIUM AND EXTRACRANIAL SOFT TISSUES:  Normal      Impression: No acute intracranial abnormality  Workstation performed: [id number]     CT spine cervical without contrast    Result Date: 6/8/2021  Narrative: CT CERVICAL SPINE - WITHOUT CONTRAST INDICATION:   fall, possible syncope  COMPARISON:  None  TECHNIQUE:  CT examination of the cervical spine was performed without intravenous contrast   Contiguous axial images were obtained  Sagittal and coronal reconstructions were performed  Radiation dose length product (DLP) for this visit:  536 01 mGy-cm   This examination, like all CT scans performed in the [facility], was performed utilizing techniques to minimize radiation dose exposure, including the use of iterative  reconstruction and automated exposure control  IMAGE QUALITY:  Diagnostic   FINDINGS: ALIGNMENT:  Normal alignment of the cervical spine  No subluxation  VERTEBRAL BODIES:  No fracture  DEGENERATIVE CHANGES:  Mild multilevel cervical degenerative changes are noted without critical central canal stenosis  PREVERTEBRAL AND PARASPINAL SOFT TISSUES:  Unremarkable  THORACIC INLET:  Normal      Impression: No cervical spine fracture or traumatic malalignment  Workstation performed: [id number]     CT chest abdomen pelvis w contrast    Result Date: 6/8/2021  Narrative: CT CHEST, ABDOMEN AND PELVIS WITH IV CONTRAST INDICATION:   fall, possible syncopal event  COMPARISON:  None  TECHNIQUE: CT examination of the chest, abdomen and pelvis was performed  Axial, sagittal, and coronal 2D reformatted images were created from the source data and submitted for interpretation  Radiation dose length product (DLP) for this visit:  3349 65 mGy-cm   This examination, like all CT scans performed in the [facility], was performed utilizing techniques to minimize radiation dose exposure, including the use of iterative reconstruction and automated exposure control  IV Contrast:  100 mL of iohexol (OMNIPAQUE) Enteric Contrast: Enteric contrast was administered  FINDINGS: CHEST LUNGS:  Single bleb is present within the right middle lobe  No focal consolidations or tracheal lesions  PLEURA:  Unremarkable  HEART/GREAT VESSELS:  Unremarkable for patient's age  MEDIASTINUM AND TERI:  Unremarkable  CHEST WALL AND LOWER NECK:   Unremarkable  ABDOMEN LIVER/BILIARY TREE:  Unremarkable  GALLBLADDER:  No calcified gallstones  No pericholecystic inflammatory change  SPLEEN:  Unremarkable  PANCREAS:  Unremarkable  ADRENAL GLANDS:  Unremarkable  KIDNEYS/URETERS:  Unremarkable  No hydronephrosis  STOMACH AND BOWEL:  Unremarkable  APPENDIX:  No findings to suggest appendicitis  ABDOMINOPELVIC CAVITY:  No ascites  No pneumoperitoneum  No lymphadenopathy  VESSELS:  Unremarkable for patient's age   PELVIS REPRODUCTIVE ORGANS:  Unremarkable for patient's age  URINARY BLADDER:  Unremarkable  ABDOMINAL WALL/INGUINAL REGIONS:  Mildly enlarged left inguinal and external iliac lymph nodes are present  For reference, an enlarged inguinal lymph node measures 1 7 cm in short axis (series 9 image 135)  Prominent right inguinal lymph nodes are also  present  OSSEOUS STRUCTURES:  No acute fracture or destructive osseous lesion  Impression: 1  No acute injury within the chest, abdomen, or pelvis  2   Mildly enlarged left inguinal external iliac and prominent right inguinal lymph nodes  A CT report from outside hospital dated 3/12/2014 reports prominent inguinal lymph nodes  If images are provided for comparison and document stability, no further workup indicated  Otherwise, a follow-up CT in 3 months to ensure stability recommended  Workstation performed: [id number]       Wound Instructions:  Orders Placed This Encounter   Procedures    Wound cleansing and dressings     Wound cleansing and dressings      Left Foot wound:       May shower on dressing change days  Apply calmoseptine to periwound  Apply moisturizer to intact skin on leg  Apply drawtex or equivalent (qwick) to the wounds  Paint Betadine between toes then Apply alginate between toes  Cover with exu dry or optilock and/or ABDs  Secure with amy and tape   Wound care 3x/wk - if there is excesssive strikethrough drainage then increase frequency to every other day or daily  Apply optilock or ABD to left anterior leg if weeping       This was performed in [address] at today visit                                        Wound compression and edema control      Surepress or circaid to the LLE or compression as per lymphedema specialist     Apply compression wrap to your affected Leg(s) from mid-foot to knee making sure to cover the heel  Apply in the morning and re-wrap as needed during the day if wrap becomes loose   Remove at bedtime and elevate legs or lie down      Avoid prolonged standing in one place      Elevate leg(s) above the level of the heart when sitting or as much as possible         Please get in bed 2-3 x daily and elevate your legs     Wound miscellaneous orders      Limit your fluid intake and stay away from carbonated beverages     Standing Status:   Future     Standing Expiration Date:   6/23/2022         Vijay Whelan, KIKO, FACFAS    Portions of the record may have been created with voice recognition software  Occasional wrong word or "sound a like" substitutions may have occurred due to the inherent limitations of voice recognition software  Read the chart carefully and recognize, using context, where substitutions have occurred

## 2021-07-06 ENCOUNTER — OFFICE VISIT (OUTPATIENT)
Dept: WOUND CARE | Facility: HOSPITAL | Age: 66
End: 2021-07-06
Payer: MEDICARE

## 2021-07-06 VITALS
TEMPERATURE: 70 F | SYSTOLIC BLOOD PRESSURE: 172 MMHG | HEART RATE: 73 BPM | RESPIRATION RATE: 22 BRPM | DIASTOLIC BLOOD PRESSURE: 106 MMHG

## 2021-07-06 DIAGNOSIS — E11.621 DIABETIC ULCER OF LEFT FOOT ASSOCIATED WITH TYPE 2 DIABETES MELLITUS, LIMITED TO BREAKDOWN OF SKIN, UNSPECIFIED PART OF FOOT (HCC): ICD-10-CM

## 2021-07-06 DIAGNOSIS — L98.499 DIABETES MELLITUS WITH SKIN ULCER (HCC): ICD-10-CM

## 2021-07-06 DIAGNOSIS — L03.119 RECURRENT CELLULITIS OF LOWER EXTREMITY: Primary | ICD-10-CM

## 2021-07-06 DIAGNOSIS — L97.521 DIABETIC ULCER OF LEFT FOOT ASSOCIATED WITH TYPE 2 DIABETES MELLITUS, LIMITED TO BREAKDOWN OF SKIN, UNSPECIFIED PART OF FOOT (HCC): ICD-10-CM

## 2021-07-06 DIAGNOSIS — I89.0 LYMPHEDEMA: ICD-10-CM

## 2021-07-06 DIAGNOSIS — E11.622 DIABETES MELLITUS WITH SKIN ULCER (HCC): ICD-10-CM

## 2021-07-06 PROCEDURE — 99212 OFFICE O/P EST SF 10 MIN: CPT | Performed by: INTERNAL MEDICINE

## 2021-07-06 PROCEDURE — 99215 OFFICE O/P EST HI 40 MIN: CPT | Performed by: INTERNAL MEDICINE

## 2021-07-06 RX ORDER — CEFADROXIL 500 MG/1
500 CAPSULE ORAL EVERY 12 HOURS SCHEDULED
Qty: 60 CAPSULE | Refills: 1 | Status: SHIPPED | OUTPATIENT
Start: 2021-07-06 | End: 2021-08-31

## 2021-07-06 NOTE — PATIENT INSTRUCTIONS
Orders Placed This Encounter   Procedures    Wound cleansing and dressings     Wound cleansing and dressings                          Left Foot wound:       May shower on dressing change days  Apply calmoseptine to periwound  Apply moisturizer to intact skin on leg  Apply drawtex or equivalent (qwick) to the wounds  Paint Betadine between toes then Apply alginate between toes  Cover with exu dry or optilock and/or ABDs  Secure with amy and tape   Wound care 3x/wk - if there is excesssive strikethrough drainage then increase frequency to every other day or daily  This was applied today            Standing Status:   Future     Standing Expiration Date:   7/6/2022    Wound compression and edema control      Wound compression and edema control                          Surepress or circaid to the LLE or compression as per lymphedema specialist     Apply compression wrap to your affected Leg(s) from mid-foot to knee making sure to cover the heel  Apply in the morning and re-wrap as needed during the day if wrap becomes loose  Remove at bedtime and elevate legs or lie down      Avoid prolonged standing in one place      Elevate leg(s) above the level of the heart when sitting or as much as possible         Please get in bed 2-3 x daily and elevate your legs        Standing Status:   Future     Standing Expiration Date:   7/6/2022    Wound home care     [facility]     Standing Status:   Future     Standing Expiration Date:   7/6/2022    Wound miscellaneous orders     Antibiotic will be sent to [address] [place] [address] per discussion with Dr Tito Fang  Take as prescribed  Call Dr Tito Fang with any issues regarding the antibiotic  See Dr Tito Fang back in one month at the wound center       Standing Status:   Future     Standing Expiration Date:   7/6/2022

## 2021-07-06 NOTE — PROGRESS NOTES
Consultation - Infectious Disease   Jose Boateng 74 y o  male MRN: [id number]  Unit/Bed#:  Encounter: [id number]      IMPRESSION & RECOMMENDATIONS:   Impression/Recommendations: This is a 74 y o  male, with multiple medical problems including DM with neuropathy, lymphedema and chronic recurrent left foot ulcers, has nonhealing left distal foot ulcer with recurrent cellulitis  1  Nonhealing left distal foot ulcer with recurrent cellulitis  Patient had similar problem with left lower leg, with subsequently resolved with antibiotic courses and local wound care  On examination today, there are extensive chronic changes, without obvious active cellulitis  However, given recurrent/persistent cellulitis of distal foot, it is reasonable to put patient on a somewhat prolonged antibiotic course to help wound healing  I discussed with the patient in great detail  Risk of lifelong suppression probably outweighs benefit  However, it is worthwhile to try suppressive antibiotic for a finite period time, perhaps 2-3 months, until ulcer heals  We can reassess periodically  If there is still no ulcer healing after approximately 4 weeks, I would not pursue further antibiotic suppression  We may have to consider vascular workup at that time  I also discussed with the patient in great detail regarding the need to keep leg elevated to control edema  We also discussed side effects of long-term antibiotic including risk of C difficile colitis  Wound culture on 02/21 had growth of MSSA, GBS and Proteus  Trial of cefadroxil  Reassess in 4 weeks  Aggressive leg elevation to control edema  Continue local ulcer care  2  Venous stasis left foot and leg ulcers  Podiatry follow-up and ulcer care  3  Lymphedema  Patient counseled regarding the need to keep leg elevated more aggressively to control edema  Aggressive leg elevation at home  Continue compression stocking as needed  4  DM with neuropathy      5  Status post right BKA in 2017  Outpatient records reviewed in detail  Discussed patient in detail regarding the above plan  Follow-up with me in 4 weeks  Thank you for this consultation  HISTORY OF PRESENT ILLNESS:  Reason for Consult:  Recurrent left foot cellulitis  HPI: Ju Interiano is a 74 y o  male, with multiple medical problems including DM with neuropathy, lymphedema and chronic recurrent left leg/foot ulcers, referred to us for evaluation of recurrent left foot cellulitis  Over the past few months, patient to follow-up ulcerations of the whole left foot and legs  He has been a few antibiotic courses for cellulitis  Together with aggressive wound care, leg ulcers have resolved  However, patient has persistent ulceration in distal foot, with recurrent cellulitis  Patient has no pain in his foot due to his neuropathy  At present, there is no purulence  Patient have a longstanding history of lymphedema  He uses compression stocking  He is not always compliant with leg elevation, sometimes sitting with leg dangling down  Patient is status post right BKA in 2017 for nonhealing right foot ulcer and infection  Patient lives at home by himself  REVIEW OF SYSTEMS:  A complete system-based review of systems was done  Except for what is noted in HPI above, ROS is otherwise negative      PAST MEDICAL HISTORY:  Past Medical History:   Diagnosis Date    Atrial fibrillation ([address] )     Cellulitis     Diabetes mellitus [facility]     Disease of thyroid gland     Duodenal ulcer     perforated- ICU stay    High blood pressure     High cholesterol     Hyperlipidemia     Hypertension     Hypothyroidism     Lymphedema      b/l LE- was followed at wound center    Morbid obesity with BMI of 40 0-44 9, adult ([address] )     Peritonitis ([facility] )      Past Surgical History:   Procedure Laterality Date    BELOW KNEE LEG AMPUTATION Right     DIAGNOSTIC LAPAROSCOPY      KNEE ARTHROSCOPY Bilateral     OTHER SURGICAL HISTORY  2014     lap repair    OTHER SURGICAL HISTORY      Laparoscopic repair of a perforated duodenal ulcer with Yamile Basset omental    OTHER SURGICAL HISTORY      Placement of drains     Problem list reviewed  FAMILY HISTORY:  Non-contributory    SOCIAL HISTORY:  Social History     Substance and Sexual Activity   Alcohol Use Not Currently     Social History     Substance and Sexual Activity   Drug Use Not Currently     Social History     Tobacco Use   Smoking Status Former Smoker    Packs/day: 1 00    Years: 30 00    Pack years: 30 00    Quit date: 2004    Years since quittin 6   Smokeless Tobacco Never Used       ALLERGIES:  No Known Allergies    MEDICATIONS:  All current active medications have been reviewed  Patient is currently not on any antibiotic  PHYSICAL EXAM:  Vitals:  Temperature: (!) 70 °F (21 1 °C)     Physical Exam:  General:  Morbidly obese, not acutely ill, in no acute distress  Awake, alert and oriented x 3  Eyes:  Conjunctive clear with no hemorrhages or effusions  Oropharynx:  No ulcers, no lesions, pharynx benign, no tonsillitis  Neck:  Supple, no lymphadenopathy, no mass, nontender  Lungs:  Expansion symmetric, no rales, no wheezing, no accessory muscle use  Cardiac:  Regular rate and rhythm, normal S1, normal S2, no murmurs  Abdomen:  Soft, nondistended, non-tender, no HSM  Extremities:  2+ leg edema  Chronic superficial distal left foot ulcer, with no purulence, erythema/warmth or tenderness  Extensive chronic changes  Healed ulcers in lower leg  Skin:  No rashes, no ulcers  Neurological:  Moves all four extremities spontaneously, sensation grossly intact    LABS, IMAGING, & OTHER STUDIES:  Lab Results:  I have personally reviewed pertinent labs  Imaging Studies:   I have personally reviewed pertinent imaging study reports and images in PACS    left foot x-ray reviewed personally  No osteomyelitis      EKG, Pathology, and Other Studies:   I have personally reviewed pertinent reports

## 2021-07-21 ENCOUNTER — OFFICE VISIT (OUTPATIENT)
Dept: WOUND CARE | Facility: HOSPITAL | Age: 66
End: 2021-07-21
Payer: MEDICARE

## 2021-07-21 VITALS
RESPIRATION RATE: 20 BRPM | TEMPERATURE: 96.2 F | DIASTOLIC BLOOD PRESSURE: 93 MMHG | HEART RATE: 95 BPM | SYSTOLIC BLOOD PRESSURE: 190 MMHG

## 2021-07-21 DIAGNOSIS — I89.0 LYMPHEDEMA: ICD-10-CM

## 2021-07-21 DIAGNOSIS — E11.621 DIABETIC ULCER OF LEFT FOOT ASSOCIATED WITH TYPE 2 DIABETES MELLITUS, LIMITED TO BREAKDOWN OF SKIN, UNSPECIFIED PART OF FOOT (HCC): Primary | ICD-10-CM

## 2021-07-21 DIAGNOSIS — L98.499 DIABETES MELLITUS WITH SKIN ULCER (HCC): ICD-10-CM

## 2021-07-21 DIAGNOSIS — L97.521 DIABETIC ULCER OF LEFT FOOT ASSOCIATED WITH TYPE 2 DIABETES MELLITUS, LIMITED TO BREAKDOWN OF SKIN, UNSPECIFIED PART OF FOOT (HCC): Primary | ICD-10-CM

## 2021-07-21 DIAGNOSIS — L97.929 IDIOPATHIC CHRONIC VENOUS HYPERTENSION OF LEFT LOWER EXTREMITY WITH ULCER (HCC): ICD-10-CM

## 2021-07-21 DIAGNOSIS — E11.622 DIABETES MELLITUS WITH SKIN ULCER (HCC): ICD-10-CM

## 2021-07-21 DIAGNOSIS — I87.312 IDIOPATHIC CHRONIC VENOUS HYPERTENSION OF LEFT LOWER EXTREMITY WITH ULCER (HCC): ICD-10-CM

## 2021-07-21 DIAGNOSIS — E11.42 DIABETIC POLYNEUROPATHY ASSOCIATED WITH TYPE 2 DIABETES MELLITUS (HCC): ICD-10-CM

## 2021-07-21 PROCEDURE — 99212 OFFICE O/P EST SF 10 MIN: CPT | Performed by: PODIATRIST

## 2021-07-21 NOTE — PROGRESS NOTES
Patient ID: Tana Pittman is a 74 y o  male Date of Birth 1955       Chief Complaint   Patient presents with    Follow Up Wound Care Visit     left leg wound        Allergies:  Patient has no known allergies  Diagnosis:  1  Diabetic ulcer of left foot associated with type 2 diabetes mellitus, limited to breakdown of skin, unspecified part of foot ([place] [address] )  -     Wound cleansing and dressings; Future  -     Wound compression and edema control; Future    2  Lymphedema    3  Diabetes mellitus with skin ulcer ([facility] )    4  Diabetic polyneuropathy associated with type 2 diabetes mellitus ([place] [address] )    5  Idiopathic chronic venous hypertension of left lower extremity with ulcer ([place] [address] )       Diagnosis ICD-10-CM Associated Orders   1  Diabetic ulcer of left foot associated with type 2 diabetes mellitus, limited to breakdown of skin, unspecified part of foot ([facility])  E11 621 Wound cleansing and dressings    L97 521 Wound compression and edema control   2  Lymphedema  I89 0    3  Diabetes mellitus with skin ulcer ([place] [address] )  E11 622     L98 499    4  Diabetic polyneuropathy associated with type 2 diabetes mellitus ([facility])  E11 42    5  Idiopathic chronic venous hypertension of left lower extremity with ulcer ([place] [address] )  I87 312     L97 929         Assessment & Plan:  1  Diabetic Naidu grade 1 ulceration dorsal aspect left foot, continue Calmoseptine, alginate, OptiLock dry dressing  To be changed 3 times a week  2  Lower extremity venous stasis ulceration complicated by lymphedema, alginate to all open areas, Lotrisone to periwound, he has this at home, visiting nurses will do the same 3 times a week  3  Continue with lymphedema wraps, frequent elevation, low-sodium diet, proper protein intake, proper glycemic control, complete all antibiotics as prescribed by Infectious Disease and follow-up with Infectious Disease as appointed  I will see patient back in 4 weeks  He understands and agrees with the plan    4  Total time spent reviewing patient's medical records, consultation with Infectious Disease, evaluation management, documentation of this visit was 25 minutes  Procedures - none    Subjective:   Patient presents today for care of left foot diabetic ulceration and left lower extremity venous stasis ulceration, continues with visiting nurses and lymphedema specialist at home, he has seen Infectious Disease and has been put on long-term oral antibiotics, he was given a 30 day prescription and 1 refill, he is to follow up with them in early August   Patient states he feels better and his leg looks better and he has no new complaints          The following portions of the patient's history were reviewed and updated as appropriate:   Patient Active Problem List   Diagnosis    Controlled type 2 diabetes mellitus with complication, without long-term current use of insulin ([facility] )    HTN (hypertension)    HLD (hyperlipidemia)    Hypothyroidism    Celiac disease    Coronary artery disease    History of duodenal ulcer    Living will on file    Morbid obesity with BMI of 50 0-59 9, adult ([facility] )    S/P BKA (below knee amputation) unilateral, right (HCC)    Paroxysmal atrial fibrillation ([facility])    Iron deficiency anemia due to chronic blood loss    Chronic venous stasis dermatitis of left lower extremity    Gastroesophageal reflux disease without esophagitis    Diabetic ulcer of left foot associated with type 2 diabetes mellitus, limited to breakdown of skin ([address] )    Idiopathic chronic venous hypertension of left lower extremity with ulcer ([facility])    Non-pressure chronic ulcer of left calf, limited to breakdown of skin ([address] )    Diabetic polyneuropathy associated with type 2 diabetes mellitus ([address] )    Cellulitis of left lower extremity    Fall    Sepsis ([facility] )    Abnormal urinalysis     Past Medical History:   Diagnosis Date    Atrial fibrillation (HCC)     Cellulitis     Diabetes mellitus ([facility] )     Disease of thyroid gland     Duodenal ulcer     perforated- ICU stay    High blood pressure     High cholesterol     Hyperlipidemia     Hypertension     Hypothyroidism     Lymphedema      b/l LE- was followed at wound center    Morbid obesity with BMI of 40 0-44 9, adult ([facility]     Peritonitis ([facility]      Past Surgical History:   Procedure Laterality Date    BELOW KNEE LEG AMPUTATION Right     DIAGNOSTIC LAPAROSCOPY      KNEE ARTHROSCOPY Bilateral     OTHER SURGICAL HISTORY  2014     lap repair    OTHER SURGICAL HISTORY      Laparoscopic repair of a perforated duodenal ulcer with Srinath Retort omental    OTHER SURGICAL HISTORY      Placement of drains     Social History     Socioeconomic History    Marital status: Single     Spouse name: None    Number of children: None    Years of education: None    Highest education level: None   Occupational History    None   Tobacco Use    Smoking status: Former Smoker     Packs/day: 1 00     Years: 30 00     Pack years: 30 00     Quit date: 2004     Years since quittin 7    Smokeless tobacco: Never Used   Substance and Sexual Activity    Alcohol use: Not Currently    Drug use: Not Currently    Sexual activity: None   Other Topics Concern    None   Social History Narrative    Former smoker: Quit 3/31/2012 - As per Care Everywhere      Social Determinants of Health     Financial Resource Strain:     Difficulty of Paying Living Expenses:    Food Insecurity:     Worried About Running Out of Food in the Last Year:     Sarika of Food in the Last Year:    Transportation Needs:     Lack of Transportation (Medical):      Lack of Transportation (Non-Medical):    Physical Activity:     Days of Exercise per Week:     Minutes of Exercise per Session:    Stress:     Feeling of Stress :    Social Connections:     Frequency of Communication with Friends and Family:     Frequency of Social Gatherings with Friends and Family:     Attends [employer] Services:     Active Member of Clubs or Organizations:     Attends Club or Organization Meetings:     Marital Status:    Intimate Partner Violence:     Fear of Current or Ex-Partner:     Emotionally Abused:     Physically Abused:     Sexually Abused:         Current Outpatient Medications:     aspirin (ASPIRIN 81) 81 mg EC tablet, Take 81 mg by mouth Daily, Disp: , Rfl:     cefadroxil (DURICEF) 500 mg capsule, Take 1 capsule (500 mg total) by mouth every 12 (twelve) hours, Disp: 60 capsule, Rfl: 1    clotrimazole-betamethasone (LOTRISONE) 1-0 05 % cream, Apply topically 2 (two) times a day, Disp: 90 g, Rfl: 0    gabapentin (NEURONTIN) 100 mg capsule, Take 1 capsule (100 mg total) by mouth 3 (three) times a day, Disp: 270 capsule, Rfl: 1    levothyroxine 25 mcg tablet, Take 1 tablet (25 mcg total) by mouth daily In addition to the 300 mcg dose (stop the 75 mcg dose), Disp: 90 tablet, Rfl: 1    levothyroxine 300 MCG tablet, Take 1 tablet (300 mcg total) by mouth daily In addition to 75 mcg dose, Disp: 90 tablet, Rfl: 1    lisinopril (ZESTRIL) 10 mg tablet, Take 1 tablet (10 mg total) by mouth daily, Disp: 90 tablet, Rfl: 1    metFORMIN (GLUCOPHAGE-XR) 750 mg 24 hr tablet, Take 1 tablet (750 mg total) by mouth daily with breakfast, Disp: 90 tablet, Rfl: 1    metoprolol tartrate (LOPRESSOR) 50 mg tablet, Take 1 tablet (50 mg total) by mouth 2 (two) times a day, Disp: 180 tablet, Rfl: 2    Multiple Vitamins-Minerals (MULTIVITAMIN MEN 50+ PO), Take by mouth, Disp: , Rfl:     pantoprazole (PROTONIX) 40 mg tablet, Take 1 tablet (40 mg total) by mouth daily, Disp: 90 tablet, Rfl: 1    simvastatin (ZOCOR) 10 mg tablet, Take 1 tablet (10 mg total) by mouth daily at bedtime, Disp: 90 tablet, Rfl: 1    Zoster Vac Recomb Adjuvanted (Shingrix) 50 MCG/0 5ML SUSR, 0 5mL IM for one dose, followed by 0 5mL IM 2-6 months after first dose, Disp: 1 each, Rfl: 1  Family History   Problem Relation Age of Onset    Heart disease Family     Alcohol abuse Family  Heart disease Mother     Hypertension Mother    [employer] Daughter     Leukemia Brother     Migraines Daughter     Substance Abuse Neg Hx     Mental illness Neg Hx     Depression Neg Hx        Review of Systems   Constitutional: Negative for activity change, appetite change, chills, fatigue and fever  HENT: Negative for hearing loss  Eyes: Negative for photophobia and visual disturbance  Respiratory: Negative for cough, chest tightness and shortness of breath  Cardiovascular: Negative for chest pain and palpitations  Gastrointestinal: Negative for diarrhea and nausea  Endocrine: Negative for cold intolerance and heat intolerance  Musculoskeletal: Positive for gait problem  Negative for arthralgias and back pain  Skin: Positive for color change and wound  Neurological: Positive for numbness  Psychiatric/Behavioral: Negative  Negative for agitation, self-injury and suicidal ideas  The patient is not nervous/anxious  Objective:  BP (!) 190/93   Pulse 95   Temp (!) 96 2 °F (35 7 °C)   Resp 20     Physical Exam  Constitutional:       Appearance: Normal appearance  He is obese  HENT:      Head: Normocephalic and atraumatic  Right Ear: External ear normal       Left Ear: External ear normal       Nose: Nose normal    Eyes:      Conjunctiva/sclera: Conjunctivae normal    Cardiovascular:      Pulses: Normal pulses  Dorsalis pedis pulses are 2+ on the left side  Posterior tibial pulses are 2+ on the left side  Pulmonary:      Effort: Pulmonary effort is normal    Musculoskeletal:      Cervical back: Normal range of motion  Left lower le+ Edema present  Comments: BKA right   Skin:     General: Skin is warm and dry  Capillary Refill: Capillary refill takes less than 2 seconds  Comments: See detailed wound assessment   Neurological:      General: No focal deficit present        Mental Status: He is alert and oriented to person, place, and time  Mental status is at baseline  Sensory: Sensory deficit present  Coordination: Coordination abnormal       Gait: Gait abnormal       Deep Tendon Reflexes: Reflexes abnormal    Psychiatric:         Mood and Affect: Mood normal          Behavior: Behavior normal          Thought Content: Thought content normal          Judgment: Judgment normal            Wound 01/08/20 Foot Left; Other (Comment) (Active)   Wound Image   07/21/21 1314   Wound Description Granulation tissue;Pink;Beefy red;Epithelialization 07/21/21 1316   Jovita-wound Assessment Edema;Pink;Fragile; Maceration 07/21/21 1316   Wound Length (cm) 12 cm 07/21/21 1316   Wound Width (cm) 10 cm 07/21/21 1316   Wound Depth (cm) 0 1 cm 07/21/21 1316   Wound Surface Area (cm^2) 120 cm^2 07/21/21 1316   Wound Volume (cm^3) 12 cm^3 07/21/21 1316   Calculated Wound Volume (cm^3) 12 cm^3 07/21/21 1316   Change in Wound Size % -694 7 07/21/21 1316   Drainage Amount Large 07/21/21 1316   Drainage Description Serosanguineous; Yellow 07/21/21 1316   Non-staged Wound Description Full thickness 07/21/21 1316   Treatments Cleansed 11/11/20 1320   Dressing Changed Changed 02/10/21 1341   Patient Tolerance Tolerated well 02/10/21 1341   Dressing Status Intact; Leaking (Comment) 07/06/21 1329          Results from last 6 Months   Lab Units 02/10/21  1410   WOUND CULTURE  1+ Growth of Proteus mirabilis*  2+ Growth of Beta Hemolytic Streptococcus Group B*  Few Colonies of Staphylococcus aureus*  2+ Growth of        No results found  Wound Instructions:  Orders Placed This Encounter   Procedures    Wound cleansing and dressings     Wound cleansing and dressings                          Left Foot wound:       May shower on dressing change days  Apply lotrisone to leg   Apply calmoseptine to periwound  Apply drawtex or equivalent (qwick) to the wounds  Paint Betadine between toes then Apply alginate between toes    Cover with exu dry or optilock and/or ABDs  Secure with amy and tape   Wound care 3x/wk - if there is excesssive strikethrough drainage then increase frequency to every other day or daily      Apply optilock or ABD to left anterior leg if weeping       This was performed in [address] at today visit                                             Standing Status:   Future     Standing Expiration Date:   7/21/2022    Wound compression and edema control     Wound compression and edema control                          Surepress or circaid to the LLE or compression as per lymphedema specialist     Apply compression wrap to your affected Leg(s) from mid-foot to knee making sure to cover the heel  Apply in the morning and re-wrap as needed during the day if wrap becomes loose  Remove at bedtime and elevate legs or lie down      Avoid prolonged standing in one place      Elevate leg(s) above the level of the heart when sitting or as much as possible         Please get in bed 2-3 x daily and elevate your legs     Wound miscellaneous orders                          Limit your fluid intake and stay away from carbonated beverages     Standing Status:   Future     Standing Expiration Date:   7/21/2022         Analia Smith, KIKO, FACFAS    Portions of the record may have been created with voice recognition software  Occasional wrong word or "sound a like" substitutions may have occurred due to the inherent limitations of voice recognition software  Read the chart carefully and recognize, using context, where substitutions have occurred

## 2021-07-21 NOTE — PATIENT INSTRUCTIONS
Orders Placed This Encounter   Procedures    Wound cleansing and dressings     Wound cleansing and dressings                          Left Foot wound:       May shower on dressing change days  Apply lotrisone to leg   Apply calmoseptine to periwound  Apply drawtex or equivalent (qwick) to the wounds  Paint Betadine between toes then Apply alginate between toes  Cover with exu dry or optilock and/or ABDs  Secure with amy and tape   Wound care 3x/wk - if there is excesssive strikethrough drainage then increase frequency to every other day or daily      Apply optilock or ABD to left anterior leg if weeping       This was performed in [address] at today visit                                             Standing Status:   Future     Standing Expiration Date:   7/21/2022    Wound compression and edema control     Wound compression and edema control                          Surepress or circaid to the LLE or compression as per lymphedema specialist     Apply compression wrap to your affected Leg(s) from mid-foot to knee making sure to cover the heel  Apply in the morning and re-wrap as needed during the day if wrap becomes loose   Remove at bedtime and elevate legs or lie down      Avoid prolonged standing in one place      Elevate leg(s) above the level of the heart when sitting or as much as possible         Please get in bed 2-3 x daily and elevate your legs     Wound miscellaneous orders                          Limit your fluid intake and stay away from carbonated beverages     Standing Status:   Future     Standing Expiration Date:   7/21/2022

## 2021-08-08 DIAGNOSIS — E11.8 CONTROLLED TYPE 2 DIABETES MELLITUS WITH COMPLICATION, WITHOUT LONG-TERM CURRENT USE OF INSULIN (HCC): ICD-10-CM

## 2021-08-08 DIAGNOSIS — I10 ESSENTIAL HYPERTENSION: ICD-10-CM

## 2021-08-08 DIAGNOSIS — K21.9 GASTROESOPHAGEAL REFLUX DISEASE WITHOUT ESOPHAGITIS: ICD-10-CM

## 2021-08-08 DIAGNOSIS — E03.9 ACQUIRED HYPOTHYROIDISM: ICD-10-CM

## 2021-08-08 DIAGNOSIS — E78.2 MIXED HYPERLIPIDEMIA: ICD-10-CM

## 2021-08-09 ENCOUNTER — OFFICE VISIT (OUTPATIENT)
Dept: INFECTIOUS DISEASES | Facility: CLINIC | Age: 66
End: 2021-08-09
Payer: MEDICARE

## 2021-08-09 VITALS
HEART RATE: 103 BPM | TEMPERATURE: 96.2 F | DIASTOLIC BLOOD PRESSURE: 85 MMHG | RESPIRATION RATE: 17 BRPM | HEIGHT: 78 IN | SYSTOLIC BLOOD PRESSURE: 165 MMHG | WEIGHT: 315 LBS | BODY MASS INDEX: 36.45 KG/M2

## 2021-08-09 DIAGNOSIS — L03.116 CELLULITIS OF LEFT LOWER EXTREMITY: Primary | ICD-10-CM

## 2021-08-09 DIAGNOSIS — E11.8 CONTROLLED TYPE 2 DIABETES MELLITUS WITH COMPLICATION, WITHOUT LONG-TERM CURRENT USE OF INSULIN (HCC): ICD-10-CM

## 2021-08-09 DIAGNOSIS — I87.2 CHRONIC VENOUS STASIS DERMATITIS OF LEFT LOWER EXTREMITY: ICD-10-CM

## 2021-08-09 DIAGNOSIS — L97.221 NON-PRESSURE CHRONIC ULCER OF LEFT CALF, LIMITED TO BREAKDOWN OF SKIN (HCC): ICD-10-CM

## 2021-08-09 PROCEDURE — 99213 OFFICE O/P EST LOW 20 MIN: CPT | Performed by: INTERNAL MEDICINE

## 2021-08-09 RX ORDER — METFORMIN HYDROCHLORIDE 750 MG/1
750 TABLET, EXTENDED RELEASE ORAL
Qty: 90 TABLET | Refills: 0 | Status: SHIPPED | OUTPATIENT
Start: 2021-08-09 | End: 2021-11-10 | Stop reason: SDUPTHER

## 2021-08-09 RX ORDER — LEVOTHYROXINE SODIUM 0.03 MG/1
25 TABLET ORAL DAILY
Qty: 90 TABLET | Refills: 0 | Status: SHIPPED | OUTPATIENT
Start: 2021-08-09 | End: 2021-11-10 | Stop reason: SDUPTHER

## 2021-08-09 RX ORDER — LEVOTHYROXINE SODIUM 300 UG/1
300 TABLET ORAL DAILY
Qty: 90 TABLET | Refills: 0 | Status: SHIPPED | OUTPATIENT
Start: 2021-08-09 | End: 2021-11-10 | Stop reason: SDUPTHER

## 2021-08-09 RX ORDER — LISINOPRIL 10 MG/1
10 TABLET ORAL DAILY
Qty: 90 TABLET | Refills: 0 | Status: SHIPPED | OUTPATIENT
Start: 2021-08-09 | End: 2021-11-10 | Stop reason: SDUPTHER

## 2021-08-09 RX ORDER — SIMVASTATIN 10 MG
10 TABLET ORAL
Qty: 90 TABLET | Refills: 0 | Status: SHIPPED | OUTPATIENT
Start: 2021-08-09 | End: 2021-11-10 | Stop reason: SDUPTHER

## 2021-08-09 RX ORDER — PANTOPRAZOLE SODIUM 40 MG/1
40 TABLET, DELAYED RELEASE ORAL DAILY
Qty: 90 TABLET | Refills: 0 | Status: SHIPPED | OUTPATIENT
Start: 2021-08-09 | End: 2021-11-10 | Stop reason: SDUPTHER

## 2021-08-09 RX ORDER — METOPROLOL TARTRATE 50 MG/1
50 TABLET, FILM COATED ORAL 2 TIMES DAILY
Qty: 180 TABLET | Refills: 0 | Status: SHIPPED | OUTPATIENT
Start: 2021-08-09 | End: 2021-11-10 | Stop reason: SDUPTHER

## 2021-08-09 NOTE — TELEPHONE ENCOUNTER
Medication refill requested: levothyroxine 25 mcg tablet   levothyroxine 300 MCG tablet   lisinopril (ZESTRIL) 10 mg tablet   metFORMIN (GLUCOPHAGE-XR) 750 mg 24 hr tablet     metoprolol tartrate (LOPRESSOR) 50 mg tablet     pantoprazole (PROTONIX) 40 mg tablet     simvastatin (ZOCOR) 10 mg tablet       Last office visit: 4/12/19  Next office visit: none- attempted to reach pt to schedule  Last refilled: 1/22/21  Pharmacy:   [facility], [address]  Phone: [phone] Fax: [address] [place] [address]  Phone: [phone] Fax: Marleen Novak  [address] [facility]  Phone: [phone] Fax: [phone]      Pended: #90/ 3

## 2021-08-09 NOTE — TELEPHONE ENCOUNTER
Sent prescriptions to [facility]ody, changed to 90day/0 refills as he is overdue for appointment  Please schedule appointment for routine follow up in the next three months  Let him know we are mailing him a lab slip    We can arrange for home draw if needed

## 2021-08-09 NOTE — PROGRESS NOTES
Progress Note - Infectious Disease   Christina Felton 74 y o  male MRN: [id number]  Unit/Bed#:  Encounter: [id number]      Impression/Recommendations:  1  Nonhealing left distal foot ulcer with recurrent cellulitis  Patient had similar problem with left lower leg, with subsequently resolved with antibiotic courses and local wound care  Wound culture on 02/21 had growth of MSSA, GBS and Proteus  Decision was made to treat patient with an empiric course of antibiotic  Per discussion with Dr Pedro Leyva from Podiatry, during patient's recent wound center visit, foot ulcers were improving  Clearly, in this patient, foot ulcer nonhealing is multifactorial   As long as ulcers continue to improve on antibiotic and patient is tolerating antibiotic well, will continue it  Once ulcers reach a plateau, which is probably a sign of no further active infection, we should discontinue antibiotic at that time  Continue cefadroxil  Aggressive leg elevation to control edema  Continue local ulcer care  Once ulcers reach a plateau, recommend discontinuation of antibiotic      2  Venous stasis left foot and leg ulcers  Podiatry follow-up and ulcer care      3  Lymphedema  Patient counseled regarding the need to keep leg elevated more aggressively to control edema  Aggressive leg elevation at home  Continue compression stocking as needed      4  DM with neuropathy      5  Status post right BKA in 2017       Discussed patient in detail regarding the above plan  Follow-up with me prn  Antibiotics:  Duricef x 4 weeks    Subjective:  Patient feels well  No pain in left foot or leg  Temperature stays down  No chills  He is tolerating antibiotic well  No nausea, vomiting or diarrhea  Objective:  Vitals:   Temperature: (!) 96 2 °F (35 7 °C)    Physical Exam:     General: Awake, alert, cooperative, no distress  Neck:  Supple  No mass  No lymphadenopathy     Lungs: Expansion symmetric, no rales, no wheezing, respirations unlabored  Heart:  Regular rate and rhythm, S1 and S2 normal, no murmur  Abdomen: Soft, nondistended, non-tender, bowel sounds active all four quadrants, no masses, no organomegaly  Extremities: Stable leg edema  Left foot and leg with dressing in place  Dressing is dry  Nontender  Skin:  No rash  Neuro: Moves all extremities  Invasive Devices     None                 Labs studies:   I have personally reviewed pertinent labs  Imaging Studies:   I have personally reviewed pertinent imaging study reports and images in PACS  EKG, Pathology, and Other Studies:   I have personally reviewed pertinent reports

## 2021-08-17 ENCOUNTER — TELEPHONE (OUTPATIENT)
Dept: WOUND CARE | Facility: HOSPITAL | Age: 66
End: 2021-08-17

## 2021-08-17 NOTE — TELEPHONE ENCOUNTER
Margo Trotter called from [facility] to inform [facility] that OT will not be seeing Chasity Dharmesh this week

## 2021-08-18 ENCOUNTER — OFFICE VISIT (OUTPATIENT)
Dept: WOUND CARE | Facility: HOSPITAL | Age: 66
End: 2021-08-18
Payer: MEDICARE

## 2021-08-18 VITALS
HEART RATE: 66 BPM | TEMPERATURE: 97.6 F | RESPIRATION RATE: 18 BRPM | DIASTOLIC BLOOD PRESSURE: 72 MMHG | SYSTOLIC BLOOD PRESSURE: 160 MMHG

## 2021-08-18 DIAGNOSIS — L97.929 IDIOPATHIC CHRONIC VENOUS HYPERTENSION OF LEFT LOWER EXTREMITY WITH ULCER (HCC): ICD-10-CM

## 2021-08-18 DIAGNOSIS — L97.521 DIABETIC ULCER OF LEFT FOOT ASSOCIATED WITH TYPE 2 DIABETES MELLITUS, LIMITED TO BREAKDOWN OF SKIN, UNSPECIFIED PART OF FOOT (HCC): Primary | ICD-10-CM

## 2021-08-18 DIAGNOSIS — I87.312 IDIOPATHIC CHRONIC VENOUS HYPERTENSION OF LEFT LOWER EXTREMITY WITH ULCER (HCC): ICD-10-CM

## 2021-08-18 DIAGNOSIS — L03.119 RECURRENT CELLULITIS OF LOWER EXTREMITY: ICD-10-CM

## 2021-08-18 DIAGNOSIS — I89.0 LYMPHEDEMA: ICD-10-CM

## 2021-08-18 DIAGNOSIS — E11.42 DIABETIC POLYNEUROPATHY ASSOCIATED WITH TYPE 2 DIABETES MELLITUS (HCC): ICD-10-CM

## 2021-08-18 DIAGNOSIS — E11.621 DIABETIC ULCER OF LEFT FOOT ASSOCIATED WITH TYPE 2 DIABETES MELLITUS, LIMITED TO BREAKDOWN OF SKIN, UNSPECIFIED PART OF FOOT (HCC): Primary | ICD-10-CM

## 2021-08-18 PROCEDURE — 99212 OFFICE O/P EST SF 10 MIN: CPT | Performed by: PODIATRIST

## 2021-08-18 NOTE — PATIENT INSTRUCTIONS
Orders Placed This Encounter   Procedures    Wound cleansing and dressings     Wound cleansing and dressings                          Left Foot wound:       May shower on dressing change days        Apply lotrisone to leg   Apply calmoseptine to periwound  Apply drawtex or equivalent (qwick) to the wounds  Paint Betadine between toes then Apply alginate between toes  Cover with exu dry or optilock and/or ABDs  Secure with amy and tape   Wound care 3x/wk - if there is excesssive strikethrough drainage then increase frequency to every other day or daily      Apply optilock or ABD to left anterior leg if weeping       This was performed in [address] at today visit                   Standing Status:   Future     Standing Expiration Date:   8/18/2022    Wound compression and edema control      Wound compression and edema control      Wound compression and edema control                          Surepress or circaid to the LLE or compression as per lymphedema specialist     Apply compression wrap to your affected Leg(s) from mid-foot to knee making sure to cover the heel  Apply in the morning and re-wrap as needed during the day if wrap becomes loose  Remove at bedtime and elevate legs or lie down      Avoid prolonged standing in one place      Elevate leg(s) above the level of the heart when sitting or as much as possible       Standing Status:   Future     Standing Expiration Date:   8/18/2022    Wound miscellaneous orders           Please get in bed 2-3 x daily and elevate your legs     Wound miscellaneous orders                          Limit your fluid intake and stay away from carbonated beverages     Standing Status:   Future     Standing Expiration Date:   8/18/2022

## 2021-08-18 NOTE — PROGRESS NOTES
Patient ID: Raghu Donald is a 74 y o  male Date of Birth 1955       Chief Complaint   Patient presents with    Follow Up Wound Care Visit     left foot wound        Allergies:  Patient has no known allergies  Diagnosis:  1  Diabetic ulcer of left foot associated with type 2 diabetes mellitus, limited to breakdown of skin, unspecified part of foot ([facility] )  -     Wound cleansing and dressings; Future  -     Wound compression and edema control; Future  -     Wound miscellaneous orders; Future    2  Lymphedema    3  Diabetic polyneuropathy associated with type 2 diabetes mellitus ([facility] )    4  Idiopathic chronic venous hypertension of left lower extremity with ulcer ([facility] )    5  Recurrent cellulitis of lower extremity       Diagnosis ICD-10-CM Associated Orders   1  Diabetic ulcer of left foot associated with type 2 diabetes mellitus, limited to breakdown of skin, unspecified part of foot ([facility])  E11 621 Wound cleansing and dressings    L97 521 Wound compression and edema control     Wound miscellaneous orders   2  Lymphedema  I89 0    3  Diabetic polyneuropathy associated with type 2 diabetes mellitus ([facility])  E11 42    4  Idiopathic chronic venous hypertension of left lower extremity with ulcer ([facility] )  I87 312     L97 929    5  Recurrent cellulitis of lower extremity  L03 119         Assessment & Plan:  1  Diabetic Naidu grade 1 ulceration dorsal aspect left foot, chronic venous stasis hypertension ulceration with venous stasis dermatitis left lower extremity,  No debridement was performed, Infectious Disease was contacted has they wanted to visualized patient's wounds, a video was sent via Tiger text to Dr Misha Thomas,  We both agree there is significant improvement  Of patient's wounds with suppression antibiotic therapy will continue for 1 more month, patient instructed to call Infectious Disease office for refill    In the meantime we will continue with alginate OptiLock dry dressing, compression and lymphedema therapy as well as sequential compression pumps, frequent elevation, low-sodium diet, proper protein intake and proper glycemic control  Patient is understanding agree with the plan and he will follow up in 4 weeks  Procedures - none    Subjective:     Patient presents today for care of left foot diabetic and left lower extremity venous stasis ulcerations, he continues with suppression antibiotic therapy with Infectious Disease, he states he has enough until September 4th, he saw ID last week and they would like to visualize his wounds today and asked for me to contact them  Patient does not have any new complaints          The following portions of the patient's history were reviewed and updated as appropriate:   Patient Active Problem List   Diagnosis    Controlled type 2 diabetes mellitus with complication, without long-term current use of insulin ([facility] )    HTN (hypertension)    HLD (hyperlipidemia)    Hypothyroidism    Celiac disease    Coronary artery disease    History of duodenal ulcer    Living will on file    Morbid obesity with BMI of 50 0-59 9, adult ([facility] )    S/P BKA (below knee amputation) unilateral, right (HCC)    Paroxysmal atrial fibrillation (HCC)    Iron deficiency anemia due to chronic blood loss    Chronic venous stasis dermatitis of left lower extremity    Gastroesophageal reflux disease without esophagitis    Diabetic ulcer of left foot associated with type 2 diabetes mellitus, limited to breakdown of skin ([address] )    Idiopathic chronic venous hypertension of left lower extremity with ulcer ([facility])    Non-pressure chronic ulcer of left calf, limited to breakdown of skin ([address] )    Diabetic polyneuropathy associated with type 2 diabetes mellitus ([address] )    Cellulitis of left lower extremity    Fall    Sepsis ([facility] )    Abnormal urinalysis     Past Medical History:   Diagnosis Date    Atrial fibrillation (HCC)     Cellulitis     Diabetes mellitus ([facility] )     Disease of thyroid gland     Duodenal ulcer     perforated- ICU stay    High blood pressure     High cholesterol     Hyperlipidemia     Hypertension     Hypothyroidism     Lymphedema      b/l LE- was followed at wound center    Morbid obesity with BMI of 40 0-44 9, adult (HCC)     Peritonitis ([facility] )      Past Surgical History:   Procedure Laterality Date    BELOW KNEE LEG AMPUTATION Right     DIAGNOSTIC LAPAROSCOPY      KNEE ARTHROSCOPY Bilateral     OTHER SURGICAL HISTORY  2014     lap repair    OTHER SURGICAL HISTORY      Laparoscopic repair of a perforated duodenal ulcer with [place]ola Lathe omental    OTHER SURGICAL HISTORY      Placement of drains     Social History     Socioeconomic History    Marital status: Single     Spouse name: None    Number of children: None    Years of education: None    Highest education level: None   Occupational History    None   Tobacco Use    Smoking status: Former Smoker     Packs/day: 1 00     Years: 30 00     Pack years: 30 00     Quit date: 2004     Years since quittin 7    Smokeless tobacco: Never Used   Substance and Sexual Activity    Alcohol use: Not Currently    Drug use: Not Currently    Sexual activity: None   Other Topics Concern    None   Social History Narrative    Former smoker: Quit 3/31/2012 - As per Care Everywhere      Social Determinants of Health     Financial Resource Strain:     Difficulty of Paying Living Expenses:    Food Insecurity:     Worried About Running Out of Food in the Last Year:     Sarika of Food in the Last Year:    Transportation Needs:     Lack of Transportation (Medical):      Lack of Transportation (Non-Medical):    Physical Activity:     Days of Exercise per Week:     Minutes of Exercise per Session:    Stress:     Feeling of Stress :    Social Connections:     Frequency of Communication with Friends and Family:     Frequency of Social Gatherings with Friends and Family:     Attends [employer] Services:     Active Member of Clubs or Organizations:     Attends Club or Organization Meetings:     Marital Status:    Intimate Partner Violence:     Fear of Current or Ex-Partner:     Emotionally Abused:     Physically Abused:     Sexually Abused:         Current Outpatient Medications:     aspirin (ASPIRIN 81) 81 mg EC tablet, Take 81 mg by mouth Daily, Disp: , Rfl:     cefadroxil (DURICEF) 500 mg capsule, Take 1 capsule (500 mg total) by mouth every 12 (twelve) hours, Disp: 60 capsule, Rfl: 1    clotrimazole-betamethasone (LOTRISONE) 1-0 05 % cream, Apply topically 2 (two) times a day, Disp: 90 g, Rfl: 0    gabapentin (NEURONTIN) 100 mg capsule, Take 1 capsule (100 mg total) by mouth 3 (three) times a day, Disp: 270 capsule, Rfl: 1    levothyroxine 25 mcg tablet, Take 1 tablet (25 mcg total) by mouth daily In addition to the 300 mcg dose (stop the 75 mcg dose), Disp: 90 tablet, Rfl: 0    levothyroxine 300 MCG tablet, Take 1 tablet (300 mcg total) by mouth daily In addition to 75 mcg dose, Disp: 90 tablet, Rfl: 0    lisinopril (ZESTRIL) 10 mg tablet, Take 1 tablet (10 mg total) by mouth daily, Disp: 90 tablet, Rfl: 0    metFORMIN (GLUCOPHAGE-XR) 750 mg 24 hr tablet, Take 1 tablet (750 mg total) by mouth daily with breakfast, Disp: 90 tablet, Rfl: 0    metoprolol tartrate (LOPRESSOR) 50 mg tablet, Take 1 tablet (50 mg total) by mouth 2 (two) times a day, Disp: 180 tablet, Rfl: 0    Multiple Vitamins-Minerals (MULTIVITAMIN MEN 50+ PO), Take by mouth, Disp: , Rfl:     pantoprazole (PROTONIX) 40 mg tablet, Take 1 tablet (40 mg total) by mouth daily, Disp: 90 tablet, Rfl: 0    simvastatin (ZOCOR) 10 mg tablet, Take 1 tablet (10 mg total) by mouth daily at bedtime, Disp: 90 tablet, Rfl: 0    Zoster Vac Recomb Adjuvanted (Shingrix) 50 MCG/0 5ML SUSR, 0 5mL IM for one dose, followed by 0 5mL IM 2-6 months after first dose (Patient not taking: Reported on 8/9/2021), Disp: 1 each, Rfl: 1  Family History   Problem Relation Age of Onset    Heart disease Family     Alcohol abuse Family     Heart disease Mother     Hypertension Mother    [facility] Migraines Daughter     Leukemia Brother     Migraines Daughter     Substance Abuse Neg Hx     Mental illness Neg Hx     Depression Neg Hx        Review of Systems   Constitutional: Negative for activity change, appetite change, chills, fatigue and fever  HENT: Negative for hearing loss  Eyes: Negative for photophobia and visual disturbance  Respiratory: Negative for cough, chest tightness and shortness of breath  Cardiovascular: Negative for chest pain and palpitations  Gastrointestinal: Negative for diarrhea and nausea  Endocrine: Negative for cold intolerance and heat intolerance  Musculoskeletal: Positive for gait problem  Negative for arthralgias and back pain  Skin: Positive for color change and wound  Neurological: Positive for numbness  Psychiatric/Behavioral: Negative  Negative for agitation, self-injury and suicidal ideas  The patient is not nervous/anxious  Objective:  /72   Pulse 66   Temp 97 6 °F (36 4 °C)   Resp 18     Physical Exam  Constitutional:       Appearance: Normal appearance  He is obese  HENT:      Head: Normocephalic and atraumatic  Right Ear: External ear normal       Left Ear: External ear normal       Nose: Nose normal    Eyes:      Conjunctiva/sclera: Conjunctivae normal    Cardiovascular:      Pulses:           Dorsalis pedis pulses are 1+ on the left side  Posterior tibial pulses are 1+ on the left side  Pulmonary:      Effort: Pulmonary effort is normal    Musculoskeletal:      Cervical back: Normal range of motion  Left lower le+ Edema present  Comments: BKA right   Skin:     General: Skin is warm and dry  Capillary Refill: Capillary refill takes less than 2 seconds  Comments: See detailed wound assessment   Neurological:      General: No focal deficit present        Mental Status: He is alert and oriented to person, place, and time  Mental status is at baseline  Sensory: Sensory deficit present  Coordination: Coordination abnormal       Gait: Gait abnormal       Deep Tendon Reflexes: Reflexes abnormal    Psychiatric:         Mood and Affect: Mood normal          Behavior: Behavior normal          Thought Content: Thought content normal          Judgment: Judgment normal            Wound 01/08/20 Foot Left; Other (Comment) (Active)   Wound Image   08/18/21 1321   Wound Description Granulation tissue;Pink;Beefy red;Epithelialization 08/18/21 1328   Jovita-wound Assessment Edema;Pink;Fragile; Maceration 08/18/21 1328   Wound Length (cm) 11 cm 08/18/21 1328   Wound Width (cm) 9 cm 08/18/21 1328   Wound Depth (cm) 0 1 cm 08/18/21 1328   Wound Surface Area (cm^2) 99 cm^2 08/18/21 1328   Wound Volume (cm^3) 9 9 cm^3 08/18/21 1328   Calculated Wound Volume (cm^3) 9 9 cm^3 08/18/21 1328   Change in Wound Size % -555 63 08/18/21 1328   Drainage Amount Large 08/18/21 1328   Drainage Description Serosanguineous; Yellow 08/18/21 1328   Non-staged Wound Description Full thickness 08/18/21 1328   Treatments Cleansed 11/11/20 1320   Dressing Changed Changed 02/10/21 1341   Patient Tolerance Tolerated well 02/10/21 1341   Dressing Status Intact; Leaking (Comment) 07/06/21 1329                No results found  Wound Instructions:  Orders Placed This Encounter   Procedures    Wound cleansing and dressings     Wound cleansing and dressings                          Left Foot wound:       May shower on dressing change days        Apply lotrisone to leg   Apply calmoseptine to periwound  Apply drawtex or equivalent (qwick) to the wounds  Paint Betadine between toes then Apply alginate between toes    Cover with exu dry or optilock and/or ABDs  Secure with amy and tape   Wound care 3x/wk - if there is excesssive strikethrough drainage then increase frequency to every other day or daily      Apply optilock or ABD to left anterior leg if weeping       This was performed in [address] at today visit                   Standing Status:   Future     Standing Expiration Date:   8/18/2022    Wound compression and edema control      Wound compression and edema control      Wound compression and edema control                          Surepress or circaid to the LLE or compression as per lymphedema specialist     Apply compression wrap to your affected Leg(s) from mid-foot to knee making sure to cover the heel  Apply in the morning and re-wrap as needed during the day if wrap becomes loose  Remove at bedtime and elevate legs or lie down      Avoid prolonged standing in one place      Elevate leg(s) above the level of the heart when sitting or as much as possible  Standing Status:   Future     Standing Expiration Date:   8/18/2022    Wound miscellaneous orders           Please get in bed 2-3 x daily and elevate your legs     Wound miscellaneous orders                          Limit your fluid intake and stay away from carbonated beverages     Standing Status:   Future     Standing Expiration Date:   8/18/2022         Davie Benjamin DPM, FACFAS    Portions of the record may have been created with voice recognition software  Occasional wrong word or "sound a like" substitutions may have occurred due to the inherent limitations of voice recognition software  Read the chart carefully and recognize, using context, where substitutions have occurred

## 2021-08-20 ENCOUNTER — LAB REQUISITION (OUTPATIENT)
Dept: LAB | Facility: HOSPITAL | Age: 66
End: 2021-08-20
Payer: MEDICARE

## 2021-08-20 DIAGNOSIS — E11.8 TYPE 2 DIABETES MELLITUS WITH UNSPECIFIED COMPLICATIONS (HCC): ICD-10-CM

## 2021-08-20 DIAGNOSIS — E03.9 HYPOTHYROIDISM, UNSPECIFIED: ICD-10-CM

## 2021-08-20 DIAGNOSIS — E78.2 MIXED HYPERLIPIDEMIA: ICD-10-CM

## 2021-08-20 LAB
ALBUMIN SERPL BCP-MCNC: 3.2 G/DL (ref 3.5–5)
ALP SERPL-CCNC: 86 U/L (ref 46–116)
ALT SERPL W P-5'-P-CCNC: 16 U/L (ref 12–78)
ANION GAP SERPL CALCULATED.3IONS-SCNC: 4 MMOL/L (ref 4–13)
AST SERPL W P-5'-P-CCNC: 10 U/L (ref 5–45)
BASOPHILS # BLD AUTO: 0.02 THOUSANDS/ΜL (ref 0–0.1)
BASOPHILS NFR BLD AUTO: 0 % (ref 0–1)
BILIRUB SERPL-MCNC: 0.61 MG/DL (ref 0.2–1)
BUN SERPL-MCNC: 11 MG/DL (ref 5–25)
CALCIUM ALBUM COR SERPL-MCNC: 9.2 MG/DL (ref 8.3–10.1)
CALCIUM SERPL-MCNC: 8.6 MG/DL (ref 8.3–10.1)
CHLORIDE SERPL-SCNC: 105 MMOL/L (ref 100–108)
CHOLEST SERPL-MCNC: 124 MG/DL (ref 50–200)
CO2 SERPL-SCNC: 29 MMOL/L (ref 21–32)
CREAT SERPL-MCNC: 0.76 MG/DL (ref 0.6–1.3)
EOSINOPHIL # BLD AUTO: 0.09 THOUSAND/ΜL (ref 0–0.61)
EOSINOPHIL NFR BLD AUTO: 2 % (ref 0–6)
ERYTHROCYTE [DISTWIDTH] IN BLOOD BY AUTOMATED COUNT: 15.9 % (ref 11.6–15.1)
EST. AVERAGE GLUCOSE BLD GHB EST-MCNC: 114 MG/DL
GFR SERPL CREATININE-BSD FRML MDRD: 94 ML/MIN/1.73SQ M
GLUCOSE SERPL-MCNC: 99 MG/DL (ref 65–140)
HBA1C MFR BLD: 5.6 %
HCT VFR BLD AUTO: 38.1 % (ref 36.5–49.3)
HDLC SERPL-MCNC: 32 MG/DL
HGB BLD-MCNC: 11.4 G/DL (ref 12–17)
IMM GRANULOCYTES # BLD AUTO: 0.01 THOUSAND/UL (ref 0–0.2)
IMM GRANULOCYTES NFR BLD AUTO: 0 % (ref 0–2)
LDLC SERPL CALC-MCNC: 70 MG/DL (ref 0–100)
LYMPHOCYTES # BLD AUTO: 0.79 THOUSANDS/ΜL (ref 0.6–4.47)
LYMPHOCYTES NFR BLD AUTO: 14 % (ref 14–44)
MCH RBC QN AUTO: 24.8 PG (ref 26.8–34.3)
MCHC RBC AUTO-ENTMCNC: 29.9 G/DL (ref 31.4–37.4)
MCV RBC AUTO: 83 FL (ref 82–98)
MONOCYTES # BLD AUTO: 0.42 THOUSAND/ΜL (ref 0.17–1.22)
MONOCYTES NFR BLD AUTO: 7 % (ref 4–12)
NEUTROPHILS # BLD AUTO: 4.51 THOUSANDS/ΜL (ref 1.85–7.62)
NEUTS SEG NFR BLD AUTO: 77 % (ref 43–75)
NRBC BLD AUTO-RTO: 0 /100 WBCS
PLATELET # BLD AUTO: 191 THOUSANDS/UL (ref 149–390)
PMV BLD AUTO: 12 FL (ref 8.9–12.7)
POTASSIUM SERPL-SCNC: 4.1 MMOL/L (ref 3.5–5.3)
PROT SERPL-MCNC: 7.6 G/DL (ref 6.4–8.2)
RBC # BLD AUTO: 4.59 MILLION/UL (ref 3.88–5.62)
SODIUM SERPL-SCNC: 138 MMOL/L (ref 136–145)
TRIGL SERPL-MCNC: 111 MG/DL
TSH SERPL DL<=0.05 MIU/L-ACNC: 0.41 UIU/ML (ref 0.36–3.74)
WBC # BLD AUTO: 5.84 THOUSAND/UL (ref 4.31–10.16)

## 2021-08-20 PROCEDURE — 84443 ASSAY THYROID STIM HORMONE: CPT | Performed by: FAMILY MEDICINE

## 2021-08-20 PROCEDURE — 83036 HEMOGLOBIN GLYCOSYLATED A1C: CPT | Performed by: FAMILY MEDICINE

## 2021-08-20 PROCEDURE — 80053 COMPREHEN METABOLIC PANEL: CPT | Performed by: FAMILY MEDICINE

## 2021-08-20 PROCEDURE — 80061 LIPID PANEL: CPT | Performed by: FAMILY MEDICINE

## 2021-08-20 PROCEDURE — 85025 COMPLETE CBC W/AUTO DIFF WBC: CPT | Performed by: FAMILY MEDICINE

## 2021-08-30 ENCOUNTER — TELEPHONE (OUTPATIENT)
Dept: FAMILY MEDICINE CLINIC | Facility: CLINIC | Age: 66
End: 2021-08-30

## 2021-08-30 NOTE — TELEPHONE ENCOUNTER
Home health nurse called stating that  Aroldo Huang had a fall over the weekend has a right black eye, swollen  Declined vision changes  Refused to go to the ED  Patient stated that he had a fall then went back to bed got up Sunday morning and iced his eye   Tatum Garrison states that she just had to let the PCP know/

## 2021-08-31 DIAGNOSIS — L03.119 RECURRENT CELLULITIS OF LOWER EXTREMITY: ICD-10-CM

## 2021-08-31 RX ORDER — CEFADROXIL 500 MG/1
CAPSULE ORAL
Qty: 60 CAPSULE | Refills: 2 | Status: SHIPPED | OUTPATIENT
Start: 2021-08-31 | End: 2021-09-30

## 2021-09-15 ENCOUNTER — OFFICE VISIT (OUTPATIENT)
Dept: WOUND CARE | Facility: HOSPITAL | Age: 66
End: 2021-09-15
Payer: MEDICARE

## 2021-09-15 VITALS
HEART RATE: 93 BPM | RESPIRATION RATE: 20 BRPM | DIASTOLIC BLOOD PRESSURE: 91 MMHG | TEMPERATURE: 95.3 F | SYSTOLIC BLOOD PRESSURE: 189 MMHG

## 2021-09-15 DIAGNOSIS — E11.621 DIABETIC ULCER OF LEFT FOOT ASSOCIATED WITH TYPE 2 DIABETES MELLITUS, LIMITED TO BREAKDOWN OF SKIN, UNSPECIFIED PART OF FOOT (HCC): Primary | ICD-10-CM

## 2021-09-15 DIAGNOSIS — L97.521 DIABETIC ULCER OF LEFT FOOT ASSOCIATED WITH TYPE 2 DIABETES MELLITUS, LIMITED TO BREAKDOWN OF SKIN, UNSPECIFIED PART OF FOOT (HCC): Primary | ICD-10-CM

## 2021-09-15 DIAGNOSIS — B35.9 DERMATOPHYTOSIS: ICD-10-CM

## 2021-09-15 DIAGNOSIS — L85.3 XEROSIS OF SKIN: ICD-10-CM

## 2021-09-15 DIAGNOSIS — E11.42 DIABETIC POLYNEUROPATHY ASSOCIATED WITH TYPE 2 DIABETES MELLITUS (HCC): ICD-10-CM

## 2021-09-15 PROCEDURE — 99212 OFFICE O/P EST SF 10 MIN: CPT | Performed by: PODIATRIST

## 2021-09-15 RX ORDER — AMMONIUM LACTATE 12 G/100G
LOTION TOPICAL 2 TIMES DAILY PRN
Qty: 400 G | Refills: 2 | Status: SHIPPED | OUTPATIENT
Start: 2021-09-15

## 2021-09-15 RX ORDER — KETOCONAZOLE 20 MG/G
CREAM TOPICAL DAILY
Qty: 60 G | Refills: 1 | Status: SHIPPED | OUTPATIENT
Start: 2021-09-15

## 2021-09-15 NOTE — PATIENT INSTRUCTIONS
Orders Placed This Encounter   Procedures    Wound cleansing and dressings     Wound cleansing and dressings  Left Foot wound:       May shower on dressing change days        Apply lotrisone to leg   Apply calmoseptine to periwound  Apply drawtex or equivalent (qwick) to the wounds  Paint Betadine between toes then Apply alginate between toes  Cover with optilock and/or ABDs over foot  Secure with kerlix and tape   Wound care 3x/wk     This was performed in [address] at today visit                   Standing Status:   Future     Standing Expiration Date:   9/15/2022    Wound compression and edema control     Elastic Tubular Stocking size F    Tubular elastic bandage: Apply from base of toes to behind the knee  Apply in AM, may remove for sleep  Avoid prolonged standing in one place  Elevate leg(s) above the level of the heart when sitting or as much as possible       NOTE IF EDEMA IS NOT CONTROLLED WITH SPANDAGRIP SIZE F THEN D/C AND RESUME USE OF FARROW WRAP     Standing Status:   Future     Standing Expiration Date:   9/15/2022    Wound home care     [facility]     Standing Status:   Future     Standing Expiration Date:   9/15/2022

## 2021-09-15 NOTE — PROGRESS NOTES
Patient ID: Ju Bowser is a 74 y o  male Date of Birth 1955       Chief Complaint   Patient presents with    Follow Up Wound Care Visit     left foot       Allergies:  Patient has no known allergies  Diagnosis:  1  Diabetic ulcer of left foot associated with type 2 diabetes mellitus, limited to breakdown of skin, unspecified part of foot ([facility] )  -     Wound cleansing and dressings; Future  -     Wound compression and edema control; Future  -     Wound home care; Future    2  Diabetic polyneuropathy associated with type 2 diabetes mellitus ([facility] )    3  Dermatophytosis  -     ketoconazole (NIZORAL) 2 % cream; Apply topically daily    4  Xerosis of skin  -     ammonium lactate (LAC-HYDRIN) 12 % lotion; Apply topically 2 (two) times a day as needed for dry skin       Diagnosis ICD-10-CM Associated Orders   1  Diabetic ulcer of left foot associated with type 2 diabetes mellitus, limited to breakdown of skin, unspecified part of foot ([facility])  E11 621 Wound cleansing and dressings    L97 521 Wound compression and edema control     Wound home care   2  Diabetic polyneuropathy associated with type 2 diabetes mellitus ([facility])  E11 42    3  Dermatophytosis  B35 9 ketoconazole (NIZORAL) 2 % cream   4  Xerosis of skin  L85 3 ammonium lactate (LAC-HYDRIN) 12 % lotion        Assessment & Plan:  1  Diabetic Naidu grade 2 ulceration dorsal aspect left foot complicated by tinea pedis and xerosis, no debridement was performed,  Calmoseptine applied to the periwound, wound base covered with quick, dry dressing, light compressive dressing to the foot and lower leg continue Farrow wrap  Frequent elevation, low-sodium diet, proper protein intake, proper glycemic control, use of sequential compression pump twice a day for 1 hour  2    Patient to continue antibiotics per Infectious Disease    3   Today I discontinue Lotrisone is there is known  No longer a need for steroid and started with ketoconazole  For antifungal properties, this is to be applied to lower leg at times of dressing change  4    Patient with xerosis o medial and lateral ankle, Amlactin 12% lotion was  Ordered to be applied to this area at time of dressing change  Procedures  - none    Subjective:     Patient presents today for care of left foot diabetic ulceration, visiting nurses, lymphedema OT have been changing dressings as directed, he continues to take  Suppression antibiotics per Infectious Disease and has no new complaints          The following portions of the patient's history were reviewed and updated as appropriate:   Patient Active Problem List   Diagnosis    Controlled type 2 diabetes mellitus with complication, without long-term current use of insulin ([facility] )    HTN (hypertension)    HLD (hyperlipidemia)    Hypothyroidism    Celiac disease    Coronary artery disease    History of duodenal ulcer    Living will on file    Morbid obesity with BMI of 50 0-59 9, adult ([facility] )    S/P BKA (below knee amputation) unilateral, right (HCC)    Paroxysmal atrial fibrillation (HCC)    Iron deficiency anemia due to chronic blood loss    Chronic venous stasis dermatitis of left lower extremity    Gastroesophageal reflux disease without esophagitis    Diabetic ulcer of left foot associated with type 2 diabetes mellitus, limited to breakdown of skin ([address] )    Idiopathic chronic venous hypertension of left lower extremity with ulcer [facility]    Non-pressure chronic ulcer of left calf, limited to breakdown of skin ([address] )    Diabetic polyneuropathy associated with type 2 diabetes mellitus ([address] )    Cellulitis of left lower extremity    Fall    Sepsis ([address] )    Abnormal urinalysis     Past Medical History:   Diagnosis Date    Atrial fibrillation ([address] )     Cellulitis     Diabetes mellitus [facility]     Disease of thyroid gland     Duodenal ulcer     perforated- ICU stay    High blood pressure     High cholesterol     Hyperlipidemia     Hypertension     Hypothyroidism     Lymphedema      b/l LE- was followed at wound center    Morbid obesity with BMI of 40 0-44 9, adult ([facility]     Peritonitis ([facility] )      Past Surgical History:   Procedure Laterality Date    BELOW KNEE LEG AMPUTATION Right     DIAGNOSTIC LAPAROSCOPY      KNEE ARTHROSCOPY Bilateral     OTHER SURGICAL HISTORY  2014     lap repair    OTHER SURGICAL HISTORY      Laparoscopic repair of a perforated duodenal ulcer with Nadara Lilian omental    OTHER SURGICAL HISTORY      Placement of drains     Social History     Socioeconomic History    Marital status: Single     Spouse name: None    Number of children: None    Years of education: None    Highest education level: None   Occupational History    None   Tobacco Use    Smoking status: Former Smoker     Packs/day:  00     Years: 30 00     Pack years: 30      Quit date: 2004     Years since quittin 8    Smokeless tobacco: Never Used   Substance and Sexual Activity    Alcohol use: Not Currently    Drug use: Not Currently    Sexual activity: None   Other Topics Concern    None   Social History Narrative    Former smoker: Quit 3/31/2012 - As per Care Everywhere      Social Determinants of Health     Financial Resource Strain:     Difficulty of Paying Living Expenses:    Food Insecurity:     Worried About Running Out of Food in the Last Year:     Sarika of Food in the Last Year:    Transportation Needs:     Lack of Transportation (Medical):      Lack of Transportation (Non-Medical):    Physical Activity:     Days of Exercise per Week:     Minutes of Exercise per Session:    Stress:     Feeling of Stress :    Social Connections:     Frequency of Communication with Friends and Family:     Frequency of Social Gatherings with Friends and Family:     Attends Rastafarian Services:     Active Member of Clubs or Organizations:     Attends Club or Organization Meetings:     Marital Status:    Intimate Partner Violence:     Fear of Current or Ex-Partner:     Emotionally Abused:     Physically Abused:     Sexually Abused:         Current Outpatient Medications:     ammonium lactate (LAC-HYDRIN) 12 % lotion, Apply topically 2 (two) times a day as needed for dry skin, Disp: 400 g, Rfl: 2    aspirin (ASPIRIN 81) 81 mg EC tablet, Take 81 mg by mouth Daily, Disp: , Rfl:     cefadroxil (DURICEF) 500 mg capsule, TAKE ONE CAPSULE BY MOUTH EVERY 12 HOURS, Disp: 60 capsule, Rfl: 2    clotrimazole-betamethasone (LOTRISONE) 1-0 05 % cream, Apply topically 2 (two) times a day, Disp: 90 g, Rfl: 0    gabapentin (NEURONTIN) 100 mg capsule, Take 1 capsule (100 mg total) by mouth 3 (three) times a day, Disp: 270 capsule, Rfl: 1    ketoconazole (NIZORAL) 2 % cream, Apply topically daily, Disp: 60 g, Rfl: 1    levothyroxine 25 mcg tablet, Take 1 tablet (25 mcg total) by mouth daily In addition to the 300 mcg dose (stop the 75 mcg dose), Disp: 90 tablet, Rfl: 0    levothyroxine 300 MCG tablet, Take 1 tablet (300 mcg total) by mouth daily In addition to 75 mcg dose, Disp: 90 tablet, Rfl: 0    lisinopril (ZESTRIL) 10 mg tablet, Take 1 tablet (10 mg total) by mouth daily, Disp: 90 tablet, Rfl: 0    metFORMIN (GLUCOPHAGE-XR) 750 mg 24 hr tablet, Take 1 tablet (750 mg total) by mouth daily with breakfast, Disp: 90 tablet, Rfl: 0    metoprolol tartrate (LOPRESSOR) 50 mg tablet, Take 1 tablet (50 mg total) by mouth 2 (two) times a day, Disp: 180 tablet, Rfl: 0    Multiple Vitamins-Minerals (MULTIVITAMIN MEN 50+ PO), Take by mouth, Disp: , Rfl:     pantoprazole (PROTONIX) 40 mg tablet, Take 1 tablet (40 mg total) by mouth daily, Disp: 90 tablet, Rfl: 0    simvastatin (ZOCOR) 10 mg tablet, Take 1 tablet (10 mg total) by mouth daily at bedtime, Disp: 90 tablet, Rfl: 0    Zoster Vac Recomb Adjuvanted (Shingrix) 50 MCG/0 5ML SUSR, 0 5mL IM for one dose, followed by 0 5mL IM 2-6 months after first dose (Patient not taking: Reported on 8/9/2021), Disp: 1 each, Rfl: 1  Family History   Problem Relation Age of Onset    Heart disease Family     Alcohol abuse Family     Heart disease Mother     Hypertension Mother    Mayme Faith Migraines Daughter     Leukemia Brother     Migraines Daughter     Substance Abuse Neg Hx     Mental illness Neg Hx     Depression Neg Hx        Review of Systems   Constitutional: Negative for activity change, appetite change, chills, fatigue and fever  HENT: Negative for hearing loss  Eyes: Negative for photophobia and visual disturbance  Respiratory: Negative for cough, chest tightness and shortness of breath  Cardiovascular: Negative for chest pain and palpitations  Gastrointestinal: Negative for diarrhea and nausea  Endocrine: Negative for cold intolerance and heat intolerance  Musculoskeletal: Positive for gait problem  Negative for arthralgias and back pain  Skin: Positive for color change and wound  Psychiatric/Behavioral: Negative  Negative for agitation, self-injury and suicidal ideas  The patient is not nervous/anxious  Objective:  BP (!) 189/91   Pulse 93   Temp (!) 95 3 °F (35 2 °C)   Resp 20     Physical Exam  Constitutional:       Appearance: Normal appearance  He is obese  HENT:      Head: Normocephalic and atraumatic  Right Ear: External ear normal       Left Ear: External ear normal       Nose: Nose normal    Eyes:      Conjunctiva/sclera: Conjunctivae normal    Cardiovascular:      Pulses:           Dorsalis pedis pulses are detected w/ Doppler on the left side  Posterior tibial pulses are detected w/ Doppler on the left side  Pulmonary:      Effort: Pulmonary effort is normal    Musculoskeletal:      Cervical back: Normal range of motion  Left lower le+ Pitting Edema present  Comments: BKA right   Skin:     General: Skin is warm and dry  Capillary Refill: Capillary refill takes less than 2 seconds  Neurological:      General: No focal deficit present        Mental Status: He is alert and oriented to person, place, and time  Mental status is at baseline  Sensory: Sensory deficit present  Coordination: Coordination abnormal       Gait: Gait abnormal       Deep Tendon Reflexes: Reflexes abnormal    Psychiatric:         Mood and Affect: Mood normal          Behavior: Behavior normal          Thought Content: Thought content normal          Judgment: Judgment normal            Wound 01/08/20 Foot Left; Other (Comment) (Active)   Wound Image   09/15/21 1347   Wound Description Granulation tissue;Pink;Beefy red;Epithelialization 09/15/21 1347   Jovita-wound Assessment Edema;Pink;Fragile; Maceration 09/15/21 1347   Wound Length (cm) 11 cm 09/15/21 1347   Wound Width (cm) 8 cm 09/15/21 1347   Wound Depth (cm) 0 1 cm 09/15/21 1347   Wound Surface Area (cm^2) 88 cm^2 09/15/21 1347   Wound Volume (cm^3) 8 8 cm^3 09/15/21 1347   Calculated Wound Volume (cm^3) 8 8 cm^3 09/15/21 1347   Change in Wound Size % -482 78 09/15/21 1347   Drainage Amount Moderate 09/15/21 1347   Drainage Description Serosanguineous 09/15/21 1347   Non-staged Wound Description Full thickness 09/15/21 1347   Treatments Cleansed 11/11/20 1320   Dressing Changed Changed 02/10/21 1341   Patient Tolerance Tolerated well 02/10/21 1341   Dressing Status Intact; Leaking (Comment) 09/15/21 1347                No results found  Wound Instructions:  Orders Placed This Encounter   Procedures    Wound cleansing and dressings     Wound cleansing and dressings  Left Foot wound:       May shower on dressing change days        Apply lotrisone to leg   Apply calmoseptine to periwound  Apply drawtex or equivalent (qwick) to the wounds  Paint Betadine between toes then Apply alginate between toes    Cover with optilock and/or ABDs over foot  Secure with kerlix and tape   Wound care 3x/wk     This was performed in [address] at today visit                   Standing Status:   Future     Standing Expiration Date:   9/15/2022  Wound compression and edema control     Elastic Tubular Stocking size F    Tubular elastic bandage: Apply from base of toes to behind the knee  Apply in AM, may remove for sleep  Avoid prolonged standing in one place  Elevate leg(s) above the level of the heart when sitting or as much as possible  NOTE IF EDEMA IS NOT CONTROLLED WITH SPANDAGRIP SIZE F THEN D/C AND RESUME USE OF FARROW WRAP     Standing Status:   Future     Standing Expiration Date:   9/15/2022    Wound home care     [facility]     Standing Status:   Future     Standing Expiration Date:   9/15/2022         Davie Benjamin DPM, FACFAS    Portions of the record may have been created with voice recognition software  Occasional wrong word or "sound a like" substitutions may have occurred due to the inherent limitations of voice recognition software  Read the chart carefully and recognize, using context, where substitutions have occurred

## 2021-10-12 ENCOUNTER — TELEPHONE (OUTPATIENT)
Dept: FAMILY MEDICINE CLINIC | Facility: CLINIC | Age: 66
End: 2021-10-12

## 2021-10-13 ENCOUNTER — OFFICE VISIT (OUTPATIENT)
Dept: WOUND CARE | Facility: HOSPITAL | Age: 66
End: 2021-10-13
Payer: MEDICARE

## 2021-10-13 VITALS
HEART RATE: 68 BPM | RESPIRATION RATE: 18 BRPM | DIASTOLIC BLOOD PRESSURE: 81 MMHG | TEMPERATURE: 97.2 F | SYSTOLIC BLOOD PRESSURE: 167 MMHG

## 2021-10-13 DIAGNOSIS — E11.42 DIABETIC POLYNEUROPATHY ASSOCIATED WITH TYPE 2 DIABETES MELLITUS (HCC): ICD-10-CM

## 2021-10-13 DIAGNOSIS — E11.621 DIABETIC ULCER OF LEFT FOOT ASSOCIATED WITH TYPE 2 DIABETES MELLITUS, LIMITED TO BREAKDOWN OF SKIN, UNSPECIFIED PART OF FOOT (HCC): Primary | ICD-10-CM

## 2021-10-13 DIAGNOSIS — I89.0 LYMPHEDEMA: ICD-10-CM

## 2021-10-13 DIAGNOSIS — L97.521 DIABETIC ULCER OF LEFT FOOT ASSOCIATED WITH TYPE 2 DIABETES MELLITUS, LIMITED TO BREAKDOWN OF SKIN, UNSPECIFIED PART OF FOOT (HCC): Primary | ICD-10-CM

## 2021-10-13 PROCEDURE — 99212 OFFICE O/P EST SF 10 MIN: CPT | Performed by: PODIATRIST

## 2021-11-10 ENCOUNTER — OFFICE VISIT (OUTPATIENT)
Dept: WOUND CARE | Facility: HOSPITAL | Age: 66
End: 2021-11-10
Payer: MEDICARE

## 2021-11-10 VITALS
RESPIRATION RATE: 18 BRPM | HEART RATE: 95 BPM | SYSTOLIC BLOOD PRESSURE: 191 MMHG | TEMPERATURE: 98.4 F | DIASTOLIC BLOOD PRESSURE: 93 MMHG

## 2021-11-10 DIAGNOSIS — E11.621 DIABETIC ULCER OF LEFT FOOT ASSOCIATED WITH TYPE 2 DIABETES MELLITUS, LIMITED TO BREAKDOWN OF SKIN, UNSPECIFIED PART OF FOOT (HCC): Primary | ICD-10-CM

## 2021-11-10 DIAGNOSIS — L97.521 DIABETIC ULCER OF LEFT FOOT ASSOCIATED WITH TYPE 2 DIABETES MELLITUS, LIMITED TO BREAKDOWN OF SKIN, UNSPECIFIED PART OF FOOT (HCC): Primary | ICD-10-CM

## 2021-11-10 DIAGNOSIS — I89.0 LYMPHEDEMA: ICD-10-CM

## 2021-11-10 DIAGNOSIS — E11.42 DIABETIC POLYNEUROPATHY ASSOCIATED WITH TYPE 2 DIABETES MELLITUS (HCC): ICD-10-CM

## 2021-11-10 PROCEDURE — 99212 OFFICE O/P EST SF 10 MIN: CPT | Performed by: PODIATRIST

## 2021-11-16 ENCOUNTER — TELEPHONE (OUTPATIENT)
Dept: FAMILY MEDICINE CLINIC | Facility: CLINIC | Age: 66
End: 2021-11-16

## 2021-12-01 ENCOUNTER — OFFICE VISIT (OUTPATIENT)
Dept: WOUND CARE | Facility: HOSPITAL | Age: 66
End: 2021-12-01
Payer: MEDICARE

## 2021-12-01 VITALS
RESPIRATION RATE: 18 BRPM | TEMPERATURE: 97 F | DIASTOLIC BLOOD PRESSURE: 73 MMHG | HEART RATE: 90 BPM | SYSTOLIC BLOOD PRESSURE: 172 MMHG

## 2021-12-01 DIAGNOSIS — E11.621 DIABETIC ULCER OF LEFT FOOT ASSOCIATED WITH TYPE 2 DIABETES MELLITUS, LIMITED TO BREAKDOWN OF SKIN, UNSPECIFIED PART OF FOOT (HCC): Primary | ICD-10-CM

## 2021-12-01 DIAGNOSIS — L97.321 DIABETIC ULCER OF LEFT ANKLE ASSOCIATED WITH TYPE 2 DIABETES MELLITUS, LIMITED TO BREAKDOWN OF SKIN (HCC): ICD-10-CM

## 2021-12-01 DIAGNOSIS — L97.521 DIABETIC ULCER OF LEFT FOOT ASSOCIATED WITH TYPE 2 DIABETES MELLITUS, LIMITED TO BREAKDOWN OF SKIN, UNSPECIFIED PART OF FOOT (HCC): Primary | ICD-10-CM

## 2021-12-01 DIAGNOSIS — E11.42 DIABETIC POLYNEUROPATHY ASSOCIATED WITH TYPE 2 DIABETES MELLITUS (HCC): ICD-10-CM

## 2021-12-01 DIAGNOSIS — E11.622 DIABETIC ULCER OF LEFT ANKLE ASSOCIATED WITH TYPE 2 DIABETES MELLITUS, LIMITED TO BREAKDOWN OF SKIN (HCC): ICD-10-CM

## 2021-12-01 DIAGNOSIS — I89.0 LYMPHEDEMA: ICD-10-CM

## 2021-12-01 PROCEDURE — 99213 OFFICE O/P EST LOW 20 MIN: CPT | Performed by: PODIATRIST

## 2021-12-01 RX ORDER — LIDOCAINE 40 MG/G
CREAM TOPICAL ONCE
Status: CANCELLED | OUTPATIENT
Start: 2021-12-01 | End: 2021-12-01

## 2021-12-06 DIAGNOSIS — L03.116 CELLULITIS OF LEFT LOWER EXTREMITY: Primary | ICD-10-CM

## 2021-12-06 RX ORDER — CEFADROXIL 500 MG/1
500 CAPSULE ORAL EVERY 12 HOURS SCHEDULED
Qty: 60 CAPSULE | Refills: 0 | Status: SHIPPED | OUTPATIENT
Start: 2021-12-06 | End: 2022-01-05

## 2021-12-06 RX ORDER — CEFADROXIL 500 MG/1
1 CAPSULE ORAL DAILY
COMMUNITY
End: 2021-12-06 | Stop reason: SDUPTHER

## 2021-12-11 LAB
LEFT EYE DIABETIC RETINOPATHY: NORMAL
RIGHT EYE DIABETIC RETINOPATHY: NORMAL

## 2021-12-29 ENCOUNTER — OFFICE VISIT (OUTPATIENT)
Dept: WOUND CARE | Facility: HOSPITAL | Age: 66
End: 2021-12-29
Payer: MEDICARE

## 2021-12-29 VITALS
DIASTOLIC BLOOD PRESSURE: 88 MMHG | HEART RATE: 77 BPM | SYSTOLIC BLOOD PRESSURE: 172 MMHG | RESPIRATION RATE: 16 BRPM | TEMPERATURE: 97.5 F

## 2021-12-29 DIAGNOSIS — L97.321 NON-PRESSURE CHRONIC ULCER OF LEFT ANKLE, LIMITED TO BREAKDOWN OF SKIN (HCC): ICD-10-CM

## 2021-12-29 DIAGNOSIS — E11.621 DIABETIC ULCER OF OTHER PART OF LEFT FOOT ASSOCIATED WITH TYPE 2 DIABETES MELLITUS, LIMITED TO BREAKDOWN OF SKIN (HCC): Primary | ICD-10-CM

## 2021-12-29 DIAGNOSIS — L97.521 DIABETIC ULCER OF OTHER PART OF LEFT FOOT ASSOCIATED WITH TYPE 2 DIABETES MELLITUS, LIMITED TO BREAKDOWN OF SKIN (HCC): Primary | ICD-10-CM

## 2021-12-29 DIAGNOSIS — I89.0 LYMPHEDEMA: ICD-10-CM

## 2021-12-29 DIAGNOSIS — E11.42 DIABETIC POLYNEUROPATHY ASSOCIATED WITH TYPE 2 DIABETES MELLITUS (HCC): ICD-10-CM

## 2021-12-29 PROCEDURE — 99213 OFFICE O/P EST LOW 20 MIN: CPT | Performed by: PODIATRIST

## 2022-01-12 ENCOUNTER — TELEPHONE (OUTPATIENT)
Dept: FAMILY MEDICINE CLINIC | Facility: CLINIC | Age: 67
End: 2022-01-12

## 2022-01-12 ENCOUNTER — TELEPHONE (OUTPATIENT)
Dept: WOUND CARE | Facility: HOSPITAL | Age: 67
End: 2022-01-12

## 2022-01-12 NOTE — TELEPHONE ENCOUNTER
Taz Neal called from [facility]  Patito Laughter has large amounts of drainage  Spoke with Dr Reji Askew and called Stanley Irwin to tell her to use Quik and Optilock dressing

## 2022-01-24 ENCOUNTER — TELEPHONE (OUTPATIENT)
Dept: WOUND CARE | Facility: HOSPITAL | Age: 67
End: 2022-01-24

## 2022-01-24 NOTE — TELEPHONE ENCOUNTER
Message from [facility] stating that patient had 100 5 temp, increased drainage from left leg wound, wound with darker coloration and patient stated he is not feeling well  I called the patient, he states that he is feeling better, he took tylenol  I advised the patient that if he begins to feel unwell again or drainage/edema/erythema continue on left leg to go to ED for evaluation as he has a history of infection in that leg  Patient verbalized understanding and thanks for the call

## 2022-01-26 ENCOUNTER — OFFICE VISIT (OUTPATIENT)
Dept: WOUND CARE | Facility: HOSPITAL | Age: 67
End: 2022-01-26
Payer: MEDICARE

## 2022-01-26 VITALS
RESPIRATION RATE: 16 BRPM | SYSTOLIC BLOOD PRESSURE: 158 MMHG | TEMPERATURE: 96.6 F | DIASTOLIC BLOOD PRESSURE: 82 MMHG | HEART RATE: 81 BPM

## 2022-01-26 DIAGNOSIS — L97.321 DIABETIC ULCER OF LEFT ANKLE ASSOCIATED WITH TYPE 2 DIABETES MELLITUS, LIMITED TO BREAKDOWN OF SKIN (HCC): ICD-10-CM

## 2022-01-26 DIAGNOSIS — E11.42 DIABETIC POLYNEUROPATHY ASSOCIATED WITH TYPE 2 DIABETES MELLITUS (HCC): ICD-10-CM

## 2022-01-26 DIAGNOSIS — L97.521 DIABETIC ULCER OF OTHER PART OF LEFT FOOT ASSOCIATED WITH TYPE 2 DIABETES MELLITUS, LIMITED TO BREAKDOWN OF SKIN (HCC): Primary | ICD-10-CM

## 2022-01-26 DIAGNOSIS — L97.321 NON-PRESSURE CHRONIC ULCER OF LEFT ANKLE, LIMITED TO BREAKDOWN OF SKIN (HCC): ICD-10-CM

## 2022-01-26 DIAGNOSIS — E11.622 DIABETIC ULCER OF LEFT ANKLE ASSOCIATED WITH TYPE 2 DIABETES MELLITUS, LIMITED TO BREAKDOWN OF SKIN (HCC): ICD-10-CM

## 2022-01-26 DIAGNOSIS — I89.0 LYMPHEDEMA: ICD-10-CM

## 2022-01-26 DIAGNOSIS — E11.621 DIABETIC ULCER OF OTHER PART OF LEFT FOOT ASSOCIATED WITH TYPE 2 DIABETES MELLITUS, LIMITED TO BREAKDOWN OF SKIN (HCC): Primary | ICD-10-CM

## 2022-01-26 DIAGNOSIS — L97.521 ULCER OF TOE OF LEFT FOOT, LIMITED TO BREAKDOWN OF SKIN (HCC): ICD-10-CM

## 2022-01-26 PROCEDURE — 97598 DBRDMT OPN WND ADDL 20CM/<: CPT | Performed by: PODIATRIST

## 2022-01-26 PROCEDURE — 97597 DBRDMT OPN WND 1ST 20 CM/<: CPT | Performed by: PODIATRIST

## 2022-01-26 NOTE — PATIENT INSTRUCTIONS
Orders Placed This Encounter   Procedures    Wound cleansing and dressings     Left lateral ankle wound     Wash your hands with soap and water  Remove old dressing, discard into plastic bag and place in trash  Cleanse the wound with Dakin's solution (soak on gauze x5 min)  prior to applying a clean dressing  Do not use tissue or cotton balls  Do not scrub the wound  Pat dry using gauze  Shower yes     Calmoseptine to surrounding skin and moisturizer to intact skin  Apply Opticell Ag  Cover with ABD pad (optilock if needed for increased drainage)  Secure with Kerlix and tape  Change dressing daily and as needed if soiled or lose integrity  Then apply Surepress and Spandagrip       This was done today     Standing Status:   Future     Standing Expiration Date:   1/26/2023    Wound compression and edema control     Surepress then cover with Spandagrip  Apply compression wrap to your affected Leg(s) from mid-foot to knee making sure to cover the heel  Apply in the morning and re-wrap as needed during the day if wrap becomes loose  Remove at bedtime and elevate legs or lie down  Avoid prolonged standing in one place  Elevate leg(s) above the level of the heart when sitting or as much as possible  Standing Status:   Future     Standing Expiration Date:   1/26/2023    Wound home care     3 times weekly for [facility], reorder Lymphema nurse       Standing Status:   Future     Standing Expiration Date:   1/26/2023

## 2022-01-28 ENCOUNTER — TELEPHONE (OUTPATIENT)
Dept: WOUND CARE | Facility: HOSPITAL | Age: 67
End: 2022-01-28

## 2022-01-28 DIAGNOSIS — L03.116 CELLULITIS OF LEFT LOWER EXTREMITY: ICD-10-CM

## 2022-01-28 DIAGNOSIS — L03.116 CELLULITIS OF LEFT LOWER EXTREMITY: Primary | ICD-10-CM

## 2022-01-28 RX ORDER — CEFADROXIL 500 MG/1
500 CAPSULE ORAL EVERY 12 HOURS SCHEDULED
Qty: 60 CAPSULE | Refills: 0 | Status: SHIPPED | OUTPATIENT
Start: 2022-01-28 | End: 2022-01-28 | Stop reason: SDUPTHER

## 2022-01-28 RX ORDER — CEFADROXIL 500 MG/1
500 CAPSULE ORAL EVERY 12 HOURS SCHEDULED
Qty: 60 CAPSULE | Refills: 0 | Status: SHIPPED | OUTPATIENT
Start: 2022-01-28 | End: 2022-02-24 | Stop reason: SDUPTHER

## 2022-01-28 NOTE — PROGRESS NOTES
Restart antibiotic for recurrent cellulitis, per discussion with Dr Tootie Orourke from [facility]  Patient will follow-up with us in 1-2 weeks

## 2022-01-28 NOTE — TELEPHONE ENCOUNTER
Call from Viktoriya Shown with [facility]  Insurance will not cover opticell; will cover durafiber  I advised that is a fine equivalent dressing, she will order it and continue with silver alginate until it is received

## 2022-02-02 ENCOUNTER — OFFICE VISIT (OUTPATIENT)
Dept: WOUND CARE | Facility: HOSPITAL | Age: 67
End: 2022-02-02
Payer: MEDICARE

## 2022-02-02 ENCOUNTER — OFFICE VISIT (OUTPATIENT)
Dept: INFECTIOUS DISEASES | Facility: CLINIC | Age: 67
End: 2022-02-02
Payer: MEDICARE

## 2022-02-02 VITALS
OXYGEN SATURATION: 98 % | SYSTOLIC BLOOD PRESSURE: 158 MMHG | HEART RATE: 96 BPM | DIASTOLIC BLOOD PRESSURE: 82 MMHG | HEIGHT: 78 IN | TEMPERATURE: 95.1 F | BODY MASS INDEX: 36.45 KG/M2 | WEIGHT: 315 LBS | RESPIRATION RATE: 17 BRPM

## 2022-02-02 VITALS
DIASTOLIC BLOOD PRESSURE: 68 MMHG | HEART RATE: 97 BPM | TEMPERATURE: 98.1 F | RESPIRATION RATE: 16 BRPM | SYSTOLIC BLOOD PRESSURE: 167 MMHG

## 2022-02-02 DIAGNOSIS — L97.321 DIABETIC ULCER OF LEFT ANKLE ASSOCIATED WITH TYPE 2 DIABETES MELLITUS, LIMITED TO BREAKDOWN OF SKIN (HCC): ICD-10-CM

## 2022-02-02 DIAGNOSIS — L97.521 DIABETIC ULCER OF OTHER PART OF LEFT FOOT ASSOCIATED WITH TYPE 2 DIABETES MELLITUS, LIMITED TO BREAKDOWN OF SKIN (HCC): Primary | ICD-10-CM

## 2022-02-02 DIAGNOSIS — E11.622 DIABETIC ULCER OF LEFT ANKLE ASSOCIATED WITH TYPE 2 DIABETES MELLITUS, LIMITED TO BREAKDOWN OF SKIN (HCC): ICD-10-CM

## 2022-02-02 DIAGNOSIS — L97.221 NON-PRESSURE CHRONIC ULCER OF LEFT CALF, LIMITED TO BREAKDOWN OF SKIN (HCC): ICD-10-CM

## 2022-02-02 DIAGNOSIS — E11.621 DIABETIC ULCER OF OTHER PART OF LEFT FOOT ASSOCIATED WITH TYPE 2 DIABETES MELLITUS, LIMITED TO BREAKDOWN OF SKIN (HCC): Primary | ICD-10-CM

## 2022-02-02 DIAGNOSIS — E11.8 CONTROLLED TYPE 2 DIABETES MELLITUS WITH COMPLICATION, WITHOUT LONG-TERM CURRENT USE OF INSULIN (HCC): ICD-10-CM

## 2022-02-02 DIAGNOSIS — L03.116 CELLULITIS OF LEFT LOWER EXTREMITY: Primary | ICD-10-CM

## 2022-02-02 DIAGNOSIS — L03.119 RECURRENT CELLULITIS OF LOWER EXTREMITY: ICD-10-CM

## 2022-02-02 DIAGNOSIS — I87.2 CHRONIC VENOUS STASIS DERMATITIS OF LEFT LOWER EXTREMITY: ICD-10-CM

## 2022-02-02 DIAGNOSIS — L97.521 ULCER OF TOE OF LEFT FOOT, LIMITED TO BREAKDOWN OF SKIN (HCC): ICD-10-CM

## 2022-02-02 PROCEDURE — 99213 OFFICE O/P EST LOW 20 MIN: CPT | Performed by: PODIATRIST

## 2022-02-02 PROCEDURE — 87186 SC STD MICRODIL/AGAR DIL: CPT | Performed by: PODIATRIST

## 2022-02-02 PROCEDURE — 87077 CULTURE AEROBIC IDENTIFY: CPT | Performed by: PODIATRIST

## 2022-02-02 PROCEDURE — 87070 CULTURE OTHR SPECIMN AEROBIC: CPT | Performed by: PODIATRIST

## 2022-02-02 PROCEDURE — 87205 SMEAR GRAM STAIN: CPT | Performed by: PODIATRIST

## 2022-02-02 PROCEDURE — 99214 OFFICE O/P EST MOD 30 MIN: CPT | Performed by: INTERNAL MEDICINE

## 2022-02-02 NOTE — PROGRESS NOTES
Progress Note - Infectious Disease   Ghanshyam Smoke 74 y o  male MRN: [id number]  Unit/Bed#:  Encounter: [id number]      Impression/Recommendations:  1  Nonhealing left distal foot ulcer with recurrent cellulitis   Wound culture on 02/21 had growth of MSSA, GBS and Proteus  patient was placed on Duricef suppression in July of last year  Since then, he has done very well, with ulcers slowly healing  Duricef suppression was discontinued after 6 months, in early January  Since then, ulcers have worsened again  Patient was restarted on Duricef suppression this past weekend  There is some concern the patient may be developing resistance, given that patient states that his foot ulcer started draining even before he stopped Duricef  We should recheck wound culture  Continue cefadroxil  Aggressive leg elevation to control edema  Continue local ulcer care  Recommend checking wound culture during next visit      2  Venous stasis left foot and leg ulcers  Podiatry follow-up and ulcer care      3  Lymphedema   Patient counseled regarding the need to keep leg elevated more aggressively to control edema  Aggressive leg elevation at home  Continue compression stocking as needed      4  DM with neuropathy      5  Status post right BKA in 2017       Discussed patient in detail regarding the above plan  Discussed with Dr Renu Mayo from [facility] earlier  Follow-up with me  in 1 month, at [address]      Antibiotics:  Duricef x 6 months     Subjective:  Patient was doing well on Duricef suppression  He completed Duricef in early January  Patient states that he started having increased drainage from the foot ulcers  In addition, during the recent visit at [address], ulcers were increasing in size  Duricef was restarted, with patient starting it this past weekend  On further questioning, patient tells me that his ulcers actually did start having increased drainage while he was still on Duricef      The following portions of the patient's history were reviewed and updated as appropriate: allergies, current medications, past medical history, past social history, past surgical history and problem list    ROS:  A complete review of systems was done  Except for what is noted above, the rest of the review of systems was negative  Objective:  Vitals:  Temperature: (!) 95 1 °F (35 1 °C)    Physical Exam:     General: Awake, alert, cooperative, no distress  Neck:  Supple  No lymphadenopathy  No mass  Lungs: Expansion symmetric, no rales, no wheezing, respirations unlabored  Heart:  Regular rate and rhythm, S1 and S2 normal, no murmur  Abdomen: Soft, nondistended, non-tender, bowel sounds active all four quadrants,        no masses, no organomegaly  Extremities: Stable 3+ leg edema  Foot with dressing in place  Skin:  No rash  Neuro: Moves all extremities  Invasive Devices  Report    None                 Labs studies:   I have personally reviewed pertinent labs  Imaging Studies:   I have personally reviewed pertinent imaging study reports and images in PACS  EKG, Pathology, and Other Studies:   I have personally reviewed pertinent reports

## 2022-02-02 NOTE — PATIENT INSTRUCTIONS
Orders Placed This Encounter   Procedures    Wound cleansing and dressings     Left lateral ankle wound  Wound care MWF  May remove dressing and take shower  If unable to shower then home health please wash patient's leg and foot  Cleanse wounds with NSS or wound cleanser, pat dry  Apply calmoseptine to periwound  Apply durafiber with silver cut to fit weeping area  Cover with ABDs  Secure with Kerlix and tape      This was applied today                 Standing Status:   Future     Standing Expiration Date:   2/2/2023    Wound compression and edema control     Surepress from base of toes to base of knee; cover with spandagrip to help keep surepress intact       Avoid prolonged standing in one place      Elevate leg(s) above the level of the heart when sitting or as much as possible           Standing Status:   Future     Standing Expiration Date:   2/2/2023    Wound home care     [facility]     Standing Status:   Future     Standing Expiration Date:   2/2/2023    Wound miscellaneous orders     Wound culture obtained today from left dorsal foot    Take antibiotics prescribed by Dr [facility]      Standing Status:   Future     Standing Expiration Date:   2/2/2023    Wound culture and Gram stain     Standing Status:   Future     Standing Expiration Date:   2/2/2023     Order Specific Question:   Release to patient through [facility]     Answer:   Immediate

## 2022-02-02 NOTE — PROGRESS NOTES
Patient ID: Roro Torre is a 74 y o  male Date of Birth 1955       Chief Complaint   Patient presents with    Follow Up Wound Care Visit     LLE wounds       Allergies:  Patient has no known allergies  Diagnosis:  1  Diabetic ulcer of other part of left foot associated with type 2 diabetes mellitus, limited to breakdown of skin ([address] )  -     Wound culture and Gram stain; Future; Expected date: 02/02/2022  -     Wound cleansing and dressings; Future  -     Wound compression and edema control; Future  -     Wound home care; Future  -     Wound miscellaneous orders; Future    2  Ulcer of toe of left foot, limited to breakdown of skin (HCC)  -     Wound cleansing and dressings; Future  -     Wound compression and edema control; Future  -     Wound home care; Future  -     Wound miscellaneous orders; Future    3  Diabetic ulcer of left ankle associated with type 2 diabetes mellitus, limited to breakdown of skin (HCC)  -     Wound cleansing and dressings; Future  -     Wound compression and edema control; Future  -     Wound home care; Future  -     Wound miscellaneous orders; Future    4  Recurrent cellulitis of lower extremity       Diagnosis ICD-10-CM Associated Orders   1  Diabetic ulcer of other part of left foot associated with type 2 diabetes mellitus, limited to breakdown of skin (HCC)  E11 621 Wound culture and Gram stain    L97 521 Wound cleansing and dressings     Wound compression and edema control     Wound home care     Wound miscellaneous orders   2  Ulcer of toe of left foot, limited to breakdown of skin (HCC)  L97 521 Wound cleansing and dressings     Wound compression and edema control     Wound home care     Wound miscellaneous orders   3  Diabetic ulcer of left ankle associated with type 2 diabetes mellitus, limited to breakdown of skin (HCC)  E11 622 Wound cleansing and dressings    L97 321 Wound compression and edema control     Wound home care     Wound miscellaneous orders   4   Recurrent cellulitis of lower extremity  L03 119         Assessment & Plan:  1  Diabetic ulcer dorsal aspect left foot, no debridement was performed, culture was taken from this wound, wound was dressed with Opticell dry dressing, Calmoseptine to the periwound and SurePress for compression, visiting nurses will change his dressing 3 times a week  2  Medial ankle and lower extremity wounds are well epithelialized and healed  3  Patient to take antibiotics as prescribed by Infectious Disease for suppressive antibiotic therapy  4  Frequent elevation, low-sodium diet, proper protein intake, proper glycemic control, continued use of sequential compression pump for 1 hour twice a day was reviewed with patient  He understands and agrees with the plan and will follow-up in 3 weeks  Procedures  - none    Subjective:   Patient presents today for care of left lower extremity diabetic ulcerations, visiting nurses have been changing dressings daily as directed, he states the drainage has significantly decreased  He states he has restart antibiotics per his conversation with Infectious Disease and he will be on this for an indeterminate amount of time  Patient has no new complaints          The following portions of the patient's history were reviewed and updated as appropriate:   Patient Active Problem List   Diagnosis    Controlled type 2 diabetes mellitus with complication, without long-term current use of insulin ([facility] )    HTN (hypertension)    HLD (hyperlipidemia)    Hypothyroidism    Celiac disease    Coronary artery disease    History of duodenal ulcer    Living will on file    Morbid obesity with BMI of 50 0-59 9, adult [facility]    S/P BKA (below knee amputation) unilateral, right (HCC)    Paroxysmal atrial fibrillation (HCC)    Iron deficiency anemia due to chronic blood loss    Chronic venous stasis dermatitis of left lower extremity    Gastroesophageal reflux disease without esophagitis    Diabetic ulcer of left foot associated with type 2 diabetes mellitus, limited to breakdown of skin ([facility] )    Idiopathic chronic venous hypertension of left lower extremity with ulcer (HCC)    Non-pressure chronic ulcer of left calf, limited to breakdown of skin ([facility] )    Diabetic polyneuropathy associated with type 2 diabetes mellitus (HCC)    Cellulitis of left lower extremity    Fall    Sepsis ([place] [address] )    Abnormal urinalysis     Past Medical History:   Diagnosis Date    Atrial fibrillation ([place] [address] )     Cellulitis     Diabetes mellitus ([place] [address] )     Disease of thyroid gland     Duodenal ulcer     perforated- ICU stay    High blood pressure     High cholesterol     Hyperlipidemia     Hypertension     Hypothyroidism     Lymphedema      b/l LE- was followed at wound center    Morbid obesity with BMI of 40 0-44 9, adult ([place] [address] )     Peritonitis ([place] [address] )      Past Surgical History:   Procedure Laterality Date    BELOW KNEE LEG AMPUTATION Right     DIAGNOSTIC LAPAROSCOPY      KNEE ARTHROSCOPY Bilateral     OTHER SURGICAL HISTORY  2014     lap repair    OTHER SURGICAL HISTORY      Laparoscopic repair of a perforated duodenal ulcer with Aileen Fluke omental    OTHER SURGICAL HISTORY      Placement of drains     Social History     Socioeconomic History    Marital status: Single     Spouse name: None    Number of children: None    Years of education: None    Highest education level: None   Occupational History    None   Tobacco Use    Smoking status: Former Smoker     Packs/day: 1 00     Years: 30 00     Pack years: 30 00     Quit date: 2004     Years since quittin 2    Smokeless tobacco: Never Used   Substance and Sexual Activity    Alcohol use: Not Currently    Drug use: Not Currently    Sexual activity: None   Other Topics Concern    None   Social History Narrative    Former smoker: Quit 3/31/2012 - As per Care Everywhere      Social Determinants of Health     Financial Resource Strain: Not on file   Food Insecurity: Not on file   Transportation Needs: Not on file   Physical Activity: Not on file   Stress: Not on file   Social Connections: Not on file   Intimate Partner Violence: Not on file   Housing Stability: Not on file        Current Outpatient Medications:     ammonium lactate (LAC-HYDRIN) 12 % lotion, Apply topically 2 (two) times a day as needed for dry skin, Disp: 400 g, Rfl: 2    aspirin (ASPIRIN 81) 81 mg EC tablet, Take 81 mg by mouth Daily, Disp: , Rfl:     cefadroxil (DURICEF) 500 mg capsule, Take 1 capsule (500 mg total) by mouth every 12 (twelve) hours, Disp: 60 capsule, Rfl: 0    clotrimazole-betamethasone (LOTRISONE) 1-0 05 % cream, Apply topically 2 (two) times a day, Disp: 90 g, Rfl: 0    gabapentin (NEURONTIN) 100 mg capsule, Take 1 capsule (100 mg total) by mouth 3 (three) times a day, Disp: 270 capsule, Rfl: 1    ketoconazole (NIZORAL) 2 % cream, Apply topically daily, Disp: 60 g, Rfl: 1    levothyroxine 25 mcg tablet, Take 1 tablet (25 mcg total) by mouth daily In addition to the 300 mcg dose (stop the 75 mcg dose), Disp: 90 tablet, Rfl: 1    levothyroxine 300 MCG tablet, Take 1 tablet (300 mcg total) by mouth daily In addition to 75 mcg dose, Disp: 90 tablet, Rfl: 1    lisinopril (ZESTRIL) 10 mg tablet, Take 1 tablet (10 mg total) by mouth daily, Disp: 90 tablet, Rfl: 1    metFORMIN (GLUCOPHAGE-XR) 750 mg 24 hr tablet, Take 1 tablet (750 mg total) by mouth daily with breakfast, Disp: 90 tablet, Rfl: 1    metoprolol tartrate (LOPRESSOR) 50 mg tablet, Take 1 tablet (50 mg total) by mouth 2 (two) times a day, Disp: 180 tablet, Rfl: 1    Multiple Vitamins-Minerals (MULTIVITAMIN MEN 50+ PO), Take by mouth, Disp: , Rfl:     pantoprazole (PROTONIX) 40 mg tablet, Take 1 tablet (40 mg total) by mouth daily, Disp: 90 tablet, Rfl: 1    simvastatin (ZOCOR) 10 mg tablet, Take 1 tablet (10 mg total) by mouth daily at bedtime, Disp: 90 tablet, Rfl: 0    Zoster Vac Recomb Adjuvanted (Shingrix) 50 MCG/0 5ML SUSR, 0 5mL IM for one dose, followed by 0 5mL IM 2-6 months after first dose (Patient not taking: Reported on 2021), Disp: 1 each, Rfl: 1  Family History   Problem Relation Age of Onset    Heart disease Family     Alcohol abuse Family     Heart disease Mother     Hypertension Mother    Champ Daubs Migraines Daughter     Leukemia Brother     Migraines Daughter     Substance Abuse Neg Hx     Mental illness Neg Hx     Depression Neg Hx        Review of Systems   Constitutional: Negative for chills and fever  HENT: Negative for ear pain and sore throat  Eyes: Negative for pain and visual disturbance  Respiratory: Negative for cough and shortness of breath  Cardiovascular: Negative for chest pain and palpitations  Gastrointestinal: Negative for abdominal pain and vomiting  Genitourinary: Negative for dysuria and hematuria  Musculoskeletal: Positive for gait problem  Negative for arthralgias and back pain  Skin: Positive for color change and wound  Negative for rash  Neurological: Positive for numbness  Negative for seizures and syncope  Psychiatric/Behavioral: Negative  All other systems reviewed and are negative  Objective:  /68   Pulse 97   Temp 98 1 °F (36 7 °C)   Resp 16     Physical Exam  Constitutional:       Appearance: Normal appearance  He is obese  HENT:      Head: Normocephalic and atraumatic  Right Ear: External ear normal       Left Ear: External ear normal       Nose: Nose normal    Eyes:      Conjunctiva/sclera: Conjunctivae normal    Cardiovascular:      Pulses: Normal pulses  Dorsalis pedis pulses are 2+ on the right side  Posterior tibial pulses are 2+ on the right side  Pulmonary:      Effort: Pulmonary effort is normal    Musculoskeletal:      Cervical back: Normal range of motion  Left lower le+ Edema present  Comments: BKA right   Skin:     General: Skin is warm and dry        Capillary Refill: Capillary refill takes less than 2 seconds  Comments: See detailed wound assessment   Neurological:      General: No focal deficit present  Mental Status: He is alert and oriented to person, place, and time  Mental status is at baseline  Sensory: Sensory deficit present  Coordination: Coordination abnormal       Gait: Gait abnormal       Deep Tendon Reflexes: Reflexes abnormal    Psychiatric:         Mood and Affect: Mood normal          Behavior: Behavior normal          Thought Content: Thought content normal          Judgment: Judgment normal            Wound 01/08/20 Diabetic Ulcer Foot Left; Other (Comment) (Active)   Wound Image    02/02/22 1335   Wound Description Granulation tissue;Pink;Yellow;Slough 02/02/22 1341   Jovita-wound Assessment Dry;Scaly;Erythema;Edema 02/02/22 1341   Wound Length (cm) 8 cm 02/02/22 1341   Wound Width (cm) 7 cm 02/02/22 1341   Wound Depth (cm) 0 1 cm 02/02/22 1341   Wound Surface Area (cm^2) 56 cm^2 02/02/22 1341   Wound Volume (cm^3) 5 6 cm^3 02/02/22 1341   Calculated Wound Volume (cm^3) 5 6 cm^3 02/02/22 1341   Change in Wound Size % -270 86 02/02/22 1341   Drainage Amount Moderate 02/02/22 1341   Drainage Description Serosanguineous; Yellow 02/02/22 1341   Non-staged Wound Description Full thickness 02/02/22 1341   Treatments Cleansed 11/11/20 1320   Wound packed? No 12/29/21 1332   Dressing Changed Changed 02/10/21 1341   Patient Tolerance Tolerated well 02/10/21 1341   Dressing Status Intact 02/02/22 1341       Wound 12/29/21 Diabetic Ulcer Ankle Left;Lateral (Active)   Wound Image   02/02/22 1334   Wound Description Epithelialization;Pink;Yellow 02/02/22 1341   Jovita-wound Assessment Edema; Erythema; Hyperpigmented 02/02/22 1341   Wound Length (cm) 12 cm 02/02/22 1341   Wound Width (cm) 15 cm 02/02/22 1341   Wound Depth (cm) 0 1 cm 02/02/22 1341   Wound Surface Area (cm^2) 180 cm^2 02/02/22 1341   Wound Volume (cm^3) 18 cm^3 02/02/22 1341   Calculated Wound Volume (cm^3) 18 cm^3 02/02/22 1341   Change in Wound Size % -1945 45 02/02/22 1341   Drainage Amount Large 02/02/22 1341   Drainage Description Serosanguineous; Yellow 02/02/22 1341   Non-staged Wound Description Full thickness 02/02/22 1341   Wound packed? No 12/29/21 1346   Dressing Status Intact 02/02/22 1341                No results found  Wound Instructions:  Orders Placed This Encounter   Procedures    Wound cleansing and dressings     Left lateral ankle wound  Wound care MWF  May remove dressing and take shower  If unable to shower then home health please wash patient's leg and foot  Cleanse wounds with NSS or wound cleanser, pat dry  Apply calmoseptine to periwound  Apply durafiber with silver cut to fit weeping area  Cover with ABDs  Secure with Kerlix and tape      This was applied today                 Standing Status:   Future     Standing Expiration Date:   2/2/2023    Wound compression and edema control     Surepress from base of toes to base of knee; cover with spandagrip to help keep surepress intact       Avoid prolonged standing in one place      Elevate leg(s) above the level of the heart when sitting or as much as possible           Standing Status:   Future     Standing Expiration Date:   2/2/2023    Wound home care     [facility]     Standing Status:   Future     Standing Expiration Date:   2/2/2023    Wound miscellaneous orders     Wound culture obtained today from left dorsal foot  Take antibiotics prescribed by Dr Carlin Diaz      Standing Status:   Future     Standing Expiration Date:   2/2/2023    Wound culture and Gram stain     Standing Status:   Future     Standing Expiration Date:   2/2/2023     Order Specific Question:   [facility]     Answer:   Immediate         Latesha Marroquin DPM, FACFAS    Portions of the record may have been created with voice recognition software   Occasional wrong word or "sound a like" substitutions may have occurred due to the inherent limitations of voice recognition software  Read the chart carefully and recognize, using context, where substitutions have occurred

## 2022-02-06 LAB
BACTERIA WND AEROBE CULT: ABNORMAL
GRAM STN SPEC: ABNORMAL

## 2022-02-07 ENCOUNTER — TELEPHONE (OUTPATIENT)
Dept: WOUND CARE | Facility: HOSPITAL | Age: 67
End: 2022-02-07

## 2022-02-07 NOTE — TELEPHONE ENCOUNTER
Message from Castro Larson that they have regular durafiber, not durafiber with silver, is that OK? Advised Castro Larson it was fine to use the plain durafiber

## 2022-02-09 ENCOUNTER — TELEPHONE (OUTPATIENT)
Dept: FAMILY MEDICINE CLINIC | Facility: CLINIC | Age: 67
End: 2022-02-09

## 2022-02-09 NOTE — TELEPHONE ENCOUNTER
LM WITH [facility] , BEST TO CALL BACK AFTER 4 PM  NEED TO CONFIRM IF PATIENT WILL CONTINUE CARE WITH FAMILY MEDICINE

## 2022-02-23 ENCOUNTER — OFFICE VISIT (OUTPATIENT)
Dept: WOUND CARE | Facility: HOSPITAL | Age: 67
End: 2022-02-23
Payer: MEDICARE

## 2022-02-23 VITALS
SYSTOLIC BLOOD PRESSURE: 179 MMHG | DIASTOLIC BLOOD PRESSURE: 88 MMHG | RESPIRATION RATE: 18 BRPM | HEART RATE: 75 BPM | TEMPERATURE: 96.8 F

## 2022-02-23 DIAGNOSIS — E11.42 DIABETIC POLYNEUROPATHY ASSOCIATED WITH TYPE 2 DIABETES MELLITUS (HCC): ICD-10-CM

## 2022-02-23 DIAGNOSIS — I89.0 LYMPHEDEMA: ICD-10-CM

## 2022-02-23 DIAGNOSIS — E11.621 DIABETIC ULCER OF OTHER PART OF LEFT FOOT ASSOCIATED WITH TYPE 2 DIABETES MELLITUS, LIMITED TO BREAKDOWN OF SKIN (HCC): Primary | ICD-10-CM

## 2022-02-23 DIAGNOSIS — L97.521 DIABETIC ULCER OF OTHER PART OF LEFT FOOT ASSOCIATED WITH TYPE 2 DIABETES MELLITUS, LIMITED TO BREAKDOWN OF SKIN (HCC): Primary | ICD-10-CM

## 2022-02-23 DIAGNOSIS — L97.521 ULCER OF TOE OF LEFT FOOT, LIMITED TO BREAKDOWN OF SKIN (HCC): ICD-10-CM

## 2022-02-23 PROCEDURE — 99212 OFFICE O/P EST SF 10 MIN: CPT | Performed by: PODIATRIST

## 2022-02-23 NOTE — PROGRESS NOTES
Patient ID: [id number] Tinsley is a 74 y o  male Date of Birth 1955       No chief complaint on file  Allergies:  Patient has no known allergies  Diagnosis:  1  Diabetic ulcer of other part of left foot associated with type 2 diabetes mellitus, limited to breakdown of skin (HCC)  -     Wound cleansing and dressings; Future    2  Ulcer of toe of left foot, limited to breakdown of skin (HCC)  -     Wound cleansing and dressings; Future    3  Diabetic polyneuropathy associated with type 2 diabetes mellitus ([facility] )    4  Lymphedema       Diagnosis ICD-10-CM Associated Orders   1  Diabetic ulcer of other part of left foot associated with type 2 diabetes mellitus, limited to breakdown of skin (HCC)  E11 621 Wound cleansing and dressings    L97 521    2  Ulcer of toe of left foot, limited to breakdown of skin (HCC)  L97 521 Wound cleansing and dressings   3  Diabetic polyneuropathy associated with type 2 diabetes mellitus (HCC)  E11 42    4  Lymphedema  I89 0         Assessment & Plan:  1  Diabetic Naidu grade 1 ulceration dorsal aspect left foot and in between toes 1 2, 2 3 complicated by lymphedema and chronic venous hypertension, wounds were evaluated, no debridement was performed, alginate dry dressing was applied to all open areas, continue SurePress for compression, visiting nurses will change dressings 3 times a week  At this time we discontinue use of Calmoseptine as periwound is very dry and peeling, visiting nurses were asked would reinitiate as needed  2  Patient to continue use of sequential compression pump twice a day for 1 hour each time  3  Patient to continue antibiotics per Infectious Disease  4  Order placed for lymphedema visiting nurse to resume treatment with patient  5  Frequent elevation, low-sodium diet, proper protein intake and proper glycemic control was reviewed with patient  He understands and agrees with the plan and will follow up in we 3 weeks      Procedures     Subjective: Patient presents today for care of left diabetic foot ulcer, visiting nurses have been changing dressings as directed, he states is looking feeling better since he restarted oral antibiotics per Infectious Disease, he has no new complaints          The following portions of the patient's history were reviewed and updated as appropriate:   Patient Active Problem List   Diagnosis    Controlled type 2 diabetes mellitus with complication, without long-term current use of insulin ([facility] )    HTN (hypertension)    HLD (hyperlipidemia)    Hypothyroidism    Celiac disease    Coronary artery disease    History of duodenal ulcer    Living will on file    Morbid obesity with BMI of 50 0-59 9, adult ([facility] )    S/P BKA (below knee amputation) unilateral, right (HCC)    [facility]oxysmal atrial fibrillation ([facility])    Iron deficiency anemia due to chronic blood loss    Chronic venous stasis dermatitis of left lower extremity    Gastroesophageal reflux disease without esophagitis    Diabetic ulcer of left foot associated with type 2 diabetes mellitus, limited to breakdown of skin ([address] )    Idiopathic chronic venous hypertension of left lower extremity with ulcer ([facility])    Non-pressure chronic ulcer of left calf, limited to breakdown of skin ([facility] )    Diabetic polyneuropathy associated with type 2 diabetes mellitus ([address] )    Cellulitis of left lower extremity    Fall    Sepsis ([address] )    Abnormal urinalysis     Past Medical History:   Diagnosis Date    Atrial fibrillation ([facility]     Cellulitis     Diabetes mellitus ([address] )     Disease of thyroid gland     Duodenal ulcer     perforated- ICU stay    High blood pressure     High cholesterol     Hyperlipidemia     Hypertension     Hypothyroidism     Lymphedema      b/l LE- was followed at wound center    Morbid obesity with BMI of 40 0-44 9, adult ([facility] )     Peritonitis ([facility] )      Past Surgical History:   Procedure Laterality Date    BELOW KNEE LEG AMPUTATION Right     DIAGNOSTIC LAPAROSCOPY      KNEE ARTHROSCOPY Bilateral     OTHER SURGICAL HISTORY  2014     lap repair    OTHER SURGICAL HISTORY      Laparoscopic repair of a perforated duodenal ulcer with Marine Falk omental    OTHER SURGICAL HISTORY      Placement of drains     Social History     Socioeconomic History    Marital status: Single     Spouse name: Not on file    Number of children: Not on file    Years of education: Not on file    Highest education level: Not on file   Occupational History    Not on file   Tobacco Use    Smoking status: Former Smoker     Packs/day: 1 00     Years: 30 00     Pack years: 30 00     Quit date: 2004     Years since quittin 3    Smokeless tobacco: Never Used   Substance and Sexual Activity    Alcohol use: Not Currently    Drug use: Not Currently    Sexual activity: Not on file   Other Topics Concern    Not on file   Social History Narrative    Former smoker: Quit 3/31/2012 - As per Care Everywhere      Social Determinants of Health     Financial Resource Strain: Not on file   Food Insecurity: Not on file   Transportation Needs: Not on file   Physical Activity: Not on file   Stress: Not on file   Social Connections: Not on file   Intimate Partner Violence: Not on file   Housing Stability: Not on file        Current Outpatient Medications:     ammonium lactate (LAC-HYDRIN) 12 % lotion, Apply topically 2 (two) times a day as needed for dry skin, Disp: 400 g, Rfl: 2    aspirin (ASPIRIN 81) 81 mg EC tablet, Take 81 mg by mouth Daily, Disp: , Rfl:     cefadroxil (DURICEF) 500 mg capsule, Take 1 capsule (500 mg total) by mouth every 12 (twelve) hours, Disp: 60 capsule, Rfl: 0    clotrimazole-betamethasone (LOTRISONE) 1-0 05 % cream, Apply topically 2 (two) times a day, Disp: 90 g, Rfl: 0    gabapentin (NEURONTIN) 100 mg capsule, Take 1 capsule (100 mg total) by mouth 3 (three) times a day, Disp: 270 capsule, Rfl: 1    ketoconazole (NIZORAL) 2 % cream, Apply topically daily, Disp: 60 g, Rfl: 1    levothyroxine 25 mcg tablet, Take 1 tablet (25 mcg total) by mouth daily In addition to the 300 mcg dose (stop the 75 mcg dose), Disp: 90 tablet, Rfl: 1    levothyroxine 300 MCG tablet, Take 1 tablet (300 mcg total) by mouth daily In addition to 75 mcg dose, Disp: 90 tablet, Rfl: 1    lisinopril (ZESTRIL) 10 mg tablet, Take 1 tablet (10 mg total) by mouth daily, Disp: 90 tablet, Rfl: 1    metFORMIN (GLUCOPHAGE-XR) 750 mg 24 hr tablet, Take 1 tablet (750 mg total) by mouth daily with breakfast, Disp: 90 tablet, Rfl: 1    metoprolol tartrate (LOPRESSOR) 50 mg tablet, Take 1 tablet (50 mg total) by mouth 2 (two) times a day, Disp: 180 tablet, Rfl: 1    Multiple Vitamins-Minerals (MULTIVITAMIN MEN 50+ PO), Take by mouth, Disp: , Rfl:     pantoprazole (PROTONIX) 40 mg tablet, Take 1 tablet (40 mg total) by mouth daily, Disp: 90 tablet, Rfl: 1    simvastatin (ZOCOR) 10 mg tablet, Take 1 tablet (10 mg total) by mouth daily at bedtime, Disp: 90 tablet, Rfl: 0    Zoster Vac Recomb Adjuvanted (Shingrix) 50 MCG/0 5ML SUSR, 0 5mL IM for one dose, followed by 0 5mL IM 2-6 months after first dose (Patient not taking: Reported on 8/9/2021), Disp: 1 each, Rfl: 1  Family History   Problem Relation Age of Onset    Heart disease Family     Alcohol abuse Family     Heart disease Mother     Hypertension Mother    Gutierrez Migraines Daughter     Leukemia Brother     Migraines Daughter     Substance Abuse Neg Hx     Mental illness Neg Hx     Depression Neg Hx        Review of Systems   Constitutional: Negative for chills and fever  HENT: Negative for ear pain and sore throat  Eyes: Negative for pain and visual disturbance  Respiratory: Negative for cough and shortness of breath  Cardiovascular: Negative for chest pain and palpitations  Gastrointestinal: Negative for abdominal pain and vomiting  Genitourinary: Negative for dysuria and hematuria     Musculoskeletal: Positive for gait problem  Negative for arthralgias and back pain  Skin: Positive for color change and wound  Negative for rash  Neurological: Positive for numbness  Negative for seizures and syncope  Psychiatric/Behavioral: Negative  All other systems reviewed and are negative  Objective:  BP (!) 179/88   Pulse 75   Temp (!) 96 8 °F (36 °C)   Resp 18     Physical Exam  Constitutional:       Appearance: Normal appearance  He is obese  HENT:      Head: Normocephalic and atraumatic  Right Ear: External ear normal       Left Ear: External ear normal       Nose: Nose normal    Eyes:      Conjunctiva/sclera: Conjunctivae normal    Cardiovascular:      Pulses: Normal pulses  Dorsalis pedis pulses are 2+ on the right side  Posterior tibial pulses are 2+ on the right side  Pulmonary:      Effort: Pulmonary effort is normal    Musculoskeletal:      Cervical back: Normal range of motion  Left lower le+ Edema present  Comments: BKA right   Skin:     General: Skin is warm and dry  Capillary Refill: Capillary refill takes less than 2 seconds  Comments: See detailed wound assessment   Neurological:      General: No focal deficit present  Mental Status: He is alert and oriented to person, place, and time  Mental status is at baseline  Sensory: Sensory deficit present  Coordination: Coordination abnormal       Gait: Gait abnormal       Deep Tendon Reflexes: Reflexes abnormal    Psychiatric:         Mood and Affect: Mood normal          Behavior: Behavior normal          Thought Content: Thought content normal          Judgment: Judgment normal            Wound 20 Diabetic Ulcer Foot Left; Other (Comment) (Active)   Wound Image   22 1357   Wound Description Pink; Beefy red 22 1403   Jovita-wound Assessment Dry;Edema; Erythema;Fragile; Maceration;Scaly 22 1403   Wound Length (cm) 6 cm 22 1403   Wound Width (cm) 8 cm 22 1403   Wound Depth (cm) 0 1 cm 02/23/22 1403   Wound Surface Area (cm^2) 48 cm^2 02/23/22 1403   Wound Volume (cm^3) 4 8 cm^3 02/23/22 1403   Calculated Wound Volume (cm^3) 4 8 cm^3 02/23/22 1403   Change in Wound Size % -217 88 02/23/22 1403   Drainage Amount Large 02/23/22 1403   Drainage Description Serosanguineous; Yellow 02/23/22 1403   Non-staged Wound Description Full thickness 02/23/22 1403   Treatments Cleansed 11/11/20 1320   Wound packed? No 12/29/21 1332   Dressing Changed Changed 02/10/21 1341   Patient Tolerance Tolerated well 02/10/21 1341   Dressing Status Intact 02/02/22 1341       Wound 12/29/21 Diabetic Ulcer Ankle Left;Lateral (Active)   Wound Image   02/23/22 1357   Wound Description Epithelialization;Pink 02/23/22 1358   Jovita-wound Assessment Dry;Edema; Erythema;Fragile; Hyperpigmented; Maceration;Scaly 02/23/22 1358   Wound Length (cm) 9 cm 02/23/22 1358   Wound Width (cm) 29 2 cm 02/23/22 1358   Wound Depth (cm) 0 1 cm 02/23/22 1358   Wound Surface Area (cm^2) 262 8 cm^2 02/23/22 1358   Wound Volume (cm^3) 26 28 cm^3 02/23/22 1358   Calculated Wound Volume (cm^3) 26 28 cm^3 02/23/22 1358   Change in Wound Size % -2886 36 02/23/22 1358   Drainage Amount Large 02/23/22 1358   Drainage Description Serosanguineous; Yellow 02/23/22 1358   Non-staged Wound Description Full thickness 02/23/22 1358   Wound packed? No 12/29/21 1346   Dressing Status Intact 02/02/22 1341          Results from last 6 Months   Lab Units 02/02/22  1411   WOUND CULTURE  2+ Growth of Staphylococcus aureus*  2+ Growth of Enterococcus faecalis*  3+ Growth of - Mixed bacteria including gram negative rods x3  /         No results found  Wound Instructions:  Orders Placed This Encounter   Procedures    Wound cleansing and dressings     Left lateral ankle wound  Wound care MWF  May remove dressing and take shower  If unable to shower then home health please wash patient's leg and foot  Cleanse wounds with NSS or wound cleanser, pat dry  Apply calmoseptine to periwound ONLY IF MACERATION IS NOTED     Apply durafiber with silver cut to fit weeping areas including in between toes  Cover with ABDs  Secure with Kerlix and tape      This was applied today                                                       Wound compression and edema control      Surepress from base of toes to base of knee; cover with spandagrip to help keep surepress intact       Avoid prolonged standing in one place      Elevate leg(s) above the level of the heart when sitting or as much as possible                             Wound home care      [facility]    PLEASE HAVE PATIENT EVALUATED FOR LYMPHEDEMA THERAPY IF AVAILABLE              Wound miscellaneous orders       Take antibiotics prescribed by Dr Amanda Olguin      Standing Status:   Future     Standing Expiration Date:   2/23/2023         Jarrell Ser, DPM, FACFAS    Portions of the record may have been created with voice recognition software  Occasional wrong word or "sound a like" substitutions may have occurred due to the inherent limitations of voice recognition software  Read the chart carefully and recognize, using context, where substitutions have occurred

## 2022-02-23 NOTE — PATIENT INSTRUCTIONS
Orders Placed This Encounter   Procedures    Wound cleansing and dressings     Left lateral ankle wound  Wound care MWF  May remove dressing and take shower  If unable to shower then home health please wash patient's leg and foot  Cleanse wounds with NSS or wound cleanser, pat dry       Apply calmoseptine to periwound ONLY IF MACERATION IS NOTED     Apply durafiber with silver cut to fit weeping areas including in between toes  Cover with ABDs  Secure with Kerlix and tape      This was applied today                                                       Wound compression and edema control      Surepress from base of toes to base of knee; cover with spandagrip to help keep surepress intact       Avoid prolonged standing in one place      Elevate leg(s) above the level of the heart when sitting or as much as possible                             Wound home care      [facility]    PLEASE HAVE PATIENT EVALUATED FOR LYMPHEDEMA THERAPY IF AVAILABLE              Wound miscellaneous orders       Take antibiotics prescribed by Dr Palmer Hendrickson      Standing Status:   Future     Standing Expiration Date:   2/23/2023

## 2022-02-24 DIAGNOSIS — L03.116 CELLULITIS OF LEFT LOWER EXTREMITY: ICD-10-CM

## 2022-02-24 RX ORDER — CEFADROXIL 500 MG/1
500 CAPSULE ORAL EVERY 12 HOURS SCHEDULED
Qty: 60 CAPSULE | Refills: 0 | Status: SHIPPED | OUTPATIENT
Start: 2022-02-24 | End: 2022-03-26

## 2022-02-24 RX ORDER — CEFADROXIL 500 MG/1
500 CAPSULE ORAL EVERY 12 HOURS SCHEDULED
Qty: 60 CAPSULE | Refills: 0 | Status: SHIPPED | OUTPATIENT
Start: 2022-02-24 | End: 2022-02-24 | Stop reason: SDUPTHER

## 2022-02-24 NOTE — TELEPHONE ENCOUNTER
Patient is continuing with Dr Jeanette Hurley as his PCP, he will be calling to schedule an appointment when he is able to get in, he has been seeing wound care for a wound on his foot and has not been able to get around well but will call back to schedule

## 2022-02-28 ENCOUNTER — TELEPHONE (OUTPATIENT)
Dept: FAMILY MEDICINE CLINIC | Facility: CLINIC | Age: 67
End: 2022-02-28

## 2022-02-28 NOTE — TELEPHONE ENCOUNTER
Neymar confirmed they did not receive script  Neymar WILHELMOP was NOT in patient's record at that time so I'm guessing script may have gone to mail order  Can you please resend to [facility] (now in chart)?

## 2022-03-01 ENCOUNTER — TELEPHONE (OUTPATIENT)
Dept: FAMILY MEDICINE CLINIC | Facility: CLINIC | Age: 67
End: 2022-03-01

## 2022-03-01 DIAGNOSIS — E78.2 MIXED HYPERLIPIDEMIA: ICD-10-CM

## 2022-03-01 DIAGNOSIS — E11.8 CONTROLLED TYPE 2 DIABETES MELLITUS WITH COMPLICATION, WITHOUT LONG-TERM CURRENT USE OF INSULIN (HCC): ICD-10-CM

## 2022-03-01 DIAGNOSIS — I10 ESSENTIAL HYPERTENSION: Primary | ICD-10-CM

## 2022-03-01 DIAGNOSIS — E03.9 ACQUIRED HYPOTHYROIDISM: ICD-10-CM

## 2022-03-01 RX ORDER — SIMVASTATIN 10 MG
10 TABLET ORAL
Qty: 90 TABLET | Refills: 0 | Status: SHIPPED | OUTPATIENT
Start: 2022-03-01 | End: 2022-06-03 | Stop reason: SDUPTHER

## 2022-03-01 NOTE — TELEPHONE ENCOUNTER
Reviewed patients chart - he requested medication refills on 2/17/22  Our office called patient and left a voice message to schedule an appointment as he was last seen 12/10/2020 for AWV  Placed call to patient and reviewed this with him  Per patient it is very difficult to get out of the house and he sees wound care for his foot and has a visiting nurse come to his home for assessment and vitals  Patient is willing to have virtual visit completed  Scheduled for a 40 minute visit as patient is due for AWV and CC f/u on 3/17/22  Patient will need refills to go to [facility] as it is more cost efficient for him  Advised only one 90 day supply for the medication will be sent, no additional refills until patient is seen  Patient understood  Patient had labs done 08/2021  Medication refill requested:   gabapentin  simvastatin   Pantoprazole  metoprolol tartrate   metFORMIN   Lisinopril  levothyroxine 300 MCG tablet   levothyroxine 25 mcg tablet      Last office visit: 12/2020  Next office visit: 03/17/2022  Last refilled: 11/11/2021 #90x1 for all medications except simvastatin was sent #90x0  Labs: Yes, describe: completed on 08/2021  Ordering Provider: Jefferson Del Real      Pharmacy: [facility]  Placed call to [facility] and spoke to Lex as patient should have a refill on file  Refills were sent out to the patient  Confirmation number is [id number]  Simvastatin will need to be refilled - 90 day supply sent to [facility]

## 2022-03-09 ENCOUNTER — LAB REQUISITION (OUTPATIENT)
Dept: LAB | Facility: HOSPITAL | Age: 67
End: 2022-03-09
Payer: MEDICARE

## 2022-03-09 DIAGNOSIS — E03.9 HYPOTHYROIDISM, UNSPECIFIED: ICD-10-CM

## 2022-03-09 DIAGNOSIS — E78.2 MIXED HYPERLIPIDEMIA: ICD-10-CM

## 2022-03-09 DIAGNOSIS — E11.8 TYPE 2 DIABETES MELLITUS WITH UNSPECIFIED COMPLICATIONS (HCC): ICD-10-CM

## 2022-03-09 LAB
ALBUMIN SERPL BCP-MCNC: 3.3 G/DL (ref 3.5–5)
ALP SERPL-CCNC: 90 U/L (ref 46–116)
ALT SERPL W P-5'-P-CCNC: 15 U/L (ref 12–78)
ANION GAP SERPL CALCULATED.3IONS-SCNC: 6 MMOL/L (ref 4–13)
AST SERPL W P-5'-P-CCNC: 8 U/L (ref 5–45)
BASOPHILS # BLD AUTO: 0.02 THOUSANDS/ΜL (ref 0–0.1)
BASOPHILS NFR BLD AUTO: 0 % (ref 0–1)
BILIRUB SERPL-MCNC: 0.49 MG/DL (ref 0.2–1)
BUN SERPL-MCNC: 14 MG/DL (ref 5–25)
CALCIUM ALBUM COR SERPL-MCNC: 9.2 MG/DL (ref 8.3–10.1)
CALCIUM SERPL-MCNC: 8.6 MG/DL (ref 8.3–10.1)
CHLORIDE SERPL-SCNC: 105 MMOL/L (ref 100–108)
CHOLEST SERPL-MCNC: 114 MG/DL
CO2 SERPL-SCNC: 29 MMOL/L (ref 21–32)
CREAT SERPL-MCNC: 0.8 MG/DL (ref 0.6–1.3)
EOSINOPHIL # BLD AUTO: 0.09 THOUSAND/ΜL (ref 0–0.61)
EOSINOPHIL NFR BLD AUTO: 1 % (ref 0–6)
ERYTHROCYTE [DISTWIDTH] IN BLOOD BY AUTOMATED COUNT: 16 % (ref 11.6–15.1)
EST. AVERAGE GLUCOSE BLD GHB EST-MCNC: 108 MG/DL
GFR SERPL CREATININE-BSD FRML MDRD: 91 ML/MIN/1.73SQ M
GLUCOSE SERPL-MCNC: 96 MG/DL (ref 65–140)
HBA1C MFR BLD: 5.4 %
HCT VFR BLD AUTO: 40.3 % (ref 36.5–49.3)
HDLC SERPL-MCNC: 40 MG/DL
HGB BLD-MCNC: 12.2 G/DL (ref 12–17)
IMM GRANULOCYTES # BLD AUTO: 0.02 THOUSAND/UL (ref 0–0.2)
IMM GRANULOCYTES NFR BLD AUTO: 0 % (ref 0–2)
LDLC SERPL CALC-MCNC: 56 MG/DL (ref 0–100)
LYMPHOCYTES # BLD AUTO: 0.77 THOUSANDS/ΜL (ref 0.6–4.47)
LYMPHOCYTES NFR BLD AUTO: 11 % (ref 14–44)
MCH RBC QN AUTO: 25.2 PG (ref 26.8–34.3)
MCHC RBC AUTO-ENTMCNC: 30.3 G/DL (ref 31.4–37.4)
MCV RBC AUTO: 83 FL (ref 82–98)
MONOCYTES # BLD AUTO: 0.6 THOUSAND/ΜL (ref 0.17–1.22)
MONOCYTES NFR BLD AUTO: 9 % (ref 4–12)
NEUTROPHILS # BLD AUTO: 5.58 THOUSANDS/ΜL (ref 1.85–7.62)
NEUTS SEG NFR BLD AUTO: 79 % (ref 43–75)
NRBC BLD AUTO-RTO: 0 /100 WBCS
PLATELET # BLD AUTO: 210 THOUSANDS/UL (ref 149–390)
PMV BLD AUTO: 11.8 FL (ref 8.9–12.7)
POTASSIUM SERPL-SCNC: 4.2 MMOL/L (ref 3.5–5.3)
PROT SERPL-MCNC: 7.9 G/DL (ref 6.4–8.2)
RBC # BLD AUTO: 4.84 MILLION/UL (ref 3.88–5.62)
SODIUM SERPL-SCNC: 140 MMOL/L (ref 136–145)
TRIGL SERPL-MCNC: 92 MG/DL
TSH SERPL DL<=0.05 MIU/L-ACNC: 3.07 UIU/ML (ref 0.36–3.74)
WBC # BLD AUTO: 7.08 THOUSAND/UL (ref 4.31–10.16)

## 2022-03-09 PROCEDURE — 80061 LIPID PANEL: CPT | Performed by: FAMILY MEDICINE

## 2022-03-09 PROCEDURE — 80053 COMPREHEN METABOLIC PANEL: CPT | Performed by: FAMILY MEDICINE

## 2022-03-09 PROCEDURE — 85025 COMPLETE CBC W/AUTO DIFF WBC: CPT | Performed by: FAMILY MEDICINE

## 2022-03-09 PROCEDURE — 84443 ASSAY THYROID STIM HORMONE: CPT | Performed by: FAMILY MEDICINE

## 2022-03-09 PROCEDURE — 83036 HEMOGLOBIN GLYCOSYLATED A1C: CPT | Performed by: FAMILY MEDICINE

## 2022-03-09 NOTE — RESULT ENCOUNTER NOTE
Samir Gibson,   Your labwork is all stable  We can go through it in detail at your upcoming appointment  Have a great day,  Aida Wayne MD

## 2022-03-10 ENCOUNTER — RA CDI HCC (OUTPATIENT)
Dept: OTHER | Facility: HOSPITAL | Age: 67
End: 2022-03-10

## 2022-03-11 ENCOUNTER — LAB REQUISITION (OUTPATIENT)
Dept: LAB | Facility: HOSPITAL | Age: 67
End: 2022-03-11
Payer: MEDICARE

## 2022-03-11 DIAGNOSIS — E78.2 MIXED HYPERLIPIDEMIA: ICD-10-CM

## 2022-03-11 DIAGNOSIS — E11.8 TYPE 2 DIABETES MELLITUS WITH UNSPECIFIED COMPLICATIONS (HCC): ICD-10-CM

## 2022-03-11 LAB
CREAT UR-MCNC: 60.3 MG/DL
MICROALBUMIN UR-MCNC: 7.5 MG/L (ref 0–20)
MICROALBUMIN/CREAT 24H UR: 12 MG/G CREATININE (ref 0–30)

## 2022-03-11 PROCEDURE — 82043 UR ALBUMIN QUANTITATIVE: CPT | Performed by: FAMILY MEDICINE

## 2022-03-11 PROCEDURE — 82570 ASSAY OF URINE CREATININE: CPT | Performed by: FAMILY MEDICINE

## 2022-03-16 ENCOUNTER — OFFICE VISIT (OUTPATIENT)
Dept: WOUND CARE | Facility: HOSPITAL | Age: 67
End: 2022-03-16
Payer: MEDICARE

## 2022-03-16 VITALS
RESPIRATION RATE: 16 BRPM | SYSTOLIC BLOOD PRESSURE: 170 MMHG | DIASTOLIC BLOOD PRESSURE: 87 MMHG | TEMPERATURE: 97.9 F | HEART RATE: 76 BPM

## 2022-03-16 DIAGNOSIS — L97.321 DIABETIC ULCER OF LEFT ANKLE ASSOCIATED WITH TYPE 2 DIABETES MELLITUS, LIMITED TO BREAKDOWN OF SKIN (HCC): ICD-10-CM

## 2022-03-16 DIAGNOSIS — E11.622 DIABETIC ULCER OF LEFT ANKLE ASSOCIATED WITH TYPE 2 DIABETES MELLITUS, LIMITED TO BREAKDOWN OF SKIN (HCC): ICD-10-CM

## 2022-03-16 DIAGNOSIS — E11.621 DIABETIC ULCER OF OTHER PART OF LEFT FOOT ASSOCIATED WITH TYPE 2 DIABETES MELLITUS, LIMITED TO BREAKDOWN OF SKIN (HCC): Primary | ICD-10-CM

## 2022-03-16 DIAGNOSIS — L97.521 DIABETIC ULCER OF OTHER PART OF LEFT FOOT ASSOCIATED WITH TYPE 2 DIABETES MELLITUS, LIMITED TO BREAKDOWN OF SKIN (HCC): Primary | ICD-10-CM

## 2022-03-16 DIAGNOSIS — B35.9 DERMATOPHYTOSIS: ICD-10-CM

## 2022-03-16 DIAGNOSIS — L97.521 ULCER OF TOE OF LEFT FOOT, LIMITED TO BREAKDOWN OF SKIN (HCC): ICD-10-CM

## 2022-03-16 DIAGNOSIS — I89.0 LYMPHEDEMA: ICD-10-CM

## 2022-03-16 PROCEDURE — 99212 OFFICE O/P EST SF 10 MIN: CPT | Performed by: PODIATRIST

## 2022-03-16 RX ORDER — CLOTRIMAZOLE AND BETAMETHASONE DIPROPIONATE 10; .64 MG/G; MG/G
CREAM TOPICAL 3 TIMES WEEKLY
Qty: 45 G | Refills: 3 | Status: SHIPPED | OUTPATIENT
Start: 2022-03-16 | End: 2022-03-17 | Stop reason: ALTCHOICE

## 2022-03-16 NOTE — PROGRESS NOTES
Patient ID: Felecia Cantrell is a 74 y o  male Date of Birth 1955       Chief Complaint   Patient presents with    Follow Up Wound Care Visit     left foot and leg wounds       Allergies:  Patient has no known allergies  Diagnosis:  1  Diabetic ulcer of other part of left foot associated with type 2 diabetes mellitus, limited to breakdown of skin ([address] )  -     Wound compression and edema control; Future  -     Wound home care; Future  -     Wound cleansing and dressings; Future    2  Ulcer of toe of left foot, limited to breakdown of skin ([address] )  -     Wound compression and edema control; Future  -     Wound home care; Future  -     Wound cleansing and dressings; Future    3  Diabetic ulcer of left ankle associated with type 2 diabetes mellitus, limited to breakdown of skin ([address] )  -     Wound compression and edema control; Future  -     Wound home care; Future  -     Wound cleansing and dressings; Future    4  Dermatophytosis  -     clotrimazole-betamethasone (LOTRISONE) 1-0 05 % cream; Apply topically 3 (three) times a week for 1 dose Please apply to left lower extremity at time of dressing changes for 1 week  Diagnosis ICD-10-CM Associated Orders   1  Diabetic ulcer of other part of left foot associated with type 2 diabetes mellitus, limited to breakdown of skin (HCC)  E11 621 Wound compression and edema control    L97 [id number] Wound home care     Wound cleansing and dressings   2  Ulcer of toe of left foot, limited to breakdown of skin (HCC)  L97 521 Wound compression and edema control     Wound home care     Wound cleansing and dressings   3  Diabetic ulcer of left ankle associated with type 2 diabetes mellitus, limited to breakdown of skin (HCC)  E11 622 Wound compression and edema control    L97 [id number] Wound home care     Wound cleansing and dressings   4  Dermatophytosis  B35 9 clotrimazole-betamethasone (LOTRISONE) 1-0 05 % cream        Assessment & Plan:  1   Diabetic Naidu grade 1 ulceration dorsal aspect and interspaces of toes left foot, no debridement was performed, wounds were assessed and dressed with silver alginate dry dressing, Calmoseptine was applied to denuded areas  SurePress for compression  Visiting nurses and lymphedema visiting nurse will continue to do the same 3 times a week  2  Patient with dermatophytosis to left lower extremity, Lotrisone was applied today in the clinic and I have prescribed for him to be applied at time of dressing changes for 1 week  After that I have asked visiting nurses to use at their discretion  3  Patient to continue suppression oral antibiotics, this was discussed with Dr Cece Doherty from Infectious Disease who is currently managing this  4  Patient to continue to use sequential compression pump left lower extremity for 1 hour twice a day  5  Frequent elevation, low-sodium diet, proper protein intake and proper glycemic control was reviewed with patient  He understands and agrees plan and will follow-up in 2 weeks  Procedures  - none    Subjective:   Patient presents today for care of left foot diabetic ulceration, visiting nurses have been changing dressings as directed, he continues to use his sequential compression pumps and continues with oral suppression antibiotic therapy per Infectious Disease  He has no new complaints          The following portions of the patient's history were reviewed and updated as appropriate:   Patient Active Problem List   Diagnosis    Controlled type 2 diabetes mellitus with complication, without long-term current use of insulin [facility]    HTN (hypertension)    HLD (hyperlipidemia)    Hypothyroidism    Celiac disease    Coronary artery disease    History of duodenal ulcer    Living will on file    Morbid obesity with BMI of 50 0-59 9, adult [facility]    S/P BKA (below knee amputation) unilateral, right (HCC)    Paroxysmal atrial fibrillation (HCC)    Iron deficiency anemia due to chronic blood loss    Chronic venous stasis dermatitis of left lower extremity    Gastroesophageal reflux disease without esophagitis    Diabetic ulcer of left foot associated with type 2 diabetes mellitus, limited to breakdown of skin ([facility]    Idiopathic chronic venous hypertension of left lower extremity with ulcer ([facility])    Non-pressure chronic ulcer of left calf, limited to breakdown of skin ([facility]    Diabetic polyneuropathy associated with type 2 diabetes mellitus ([facility])    Cellulitis of left lower extremity    Fall    Sepsis [facility]    Abnormal urinalysis    Ulcer of toe of left foot, limited to breakdown of skin ([facility] )    Diabetic ulcer of left ankle associated with type 2 diabetes mellitus, limited to breakdown of skin ([facility] )    Dermatophytosis     Past Medical History:   Diagnosis Date    Atrial fibrillation ([facility] )     Cellulitis     Diabetes mellitus ([facility] )     Disease of thyroid gland     Duodenal ulcer     perforated- ICU stay    High blood pressure     High cholesterol     Hyperlipidemia     Hypertension     Hypothyroidism     Lymphedema      b/l LE- was followed at wound center    Morbid obesity with BMI of 40 0-44 9, adult ([place] [address] )     Peritonitis ([facility] )      Past Surgical History:   Procedure Laterality Date    BELOW KNEE LEG AMPUTATION Right     DIAGNOSTIC LAPAROSCOPY      KNEE ARTHROSCOPY Bilateral     OTHER SURGICAL HISTORY  2014     lap repair    OTHER SURGICAL HISTORY      Laparoscopic repair of a perforated duodenal ulcer with [place] omental    OTHER SURGICAL HISTORY      Placement of drains     Social History     Socioeconomic History    Marital status: Single     Spouse name: None    Number of children: None    Years of education: None    Highest education level: None   Occupational History    None   Tobacco Use    Smoking status: Former Smoker     Packs/day: 1 00     Years: 30 00     Pack years: 30 00     Quit date: 2004     Years since quittin 3    Smokeless tobacco: Never Used   Substance and Sexual Activity    Alcohol use: Not Currently    Drug use: Not Currently    Sexual activity: None   Other Topics Concern    None   Social History Narrative    Former smoker: Quit 3/31/2012 - As per Care Everywhere      Social Determinants of Health     Financial Resource Strain: Not on file   Food Insecurity: Not on file   Transportation Needs: Not on file   Physical Activity: Not on file   Stress: Not on file   Social Connections: Not on file   Intimate Partner Violence: Not on file   Housing Stability: Not on file        Current Outpatient Medications:     ammonium lactate (LAC-HYDRIN) 12 % lotion, Apply topically 2 (two) times a day as needed for dry skin, Disp: 400 g, Rfl: 2    aspirin (ASPIRIN 81) 81 mg EC tablet, Take 81 mg by mouth Daily, Disp: , Rfl:     cefadroxil (DURICEF) 500 mg capsule, Take 1 capsule (500 mg total) by mouth every 12 (twelve) hours, Disp: 60 capsule, Rfl: 0    clotrimazole-betamethasone (LOTRISONE) 1-0 05 % cream, Apply topically 3 (three) times a week for 1 dose Please apply to left lower extremity at time of dressing changes for 1 week , Disp: 45 g, Rfl: 3    gabapentin (NEURONTIN) 100 mg capsule, Take 1 capsule (100 mg total) by mouth 3 (three) times a day, Disp: 270 capsule, Rfl: 1    ketoconazole (NIZORAL) 2 % cream, Apply topically daily, Disp: 60 g, Rfl: 1    levothyroxine 25 mcg tablet, Take 1 tablet (25 mcg total) by mouth daily In addition to the 300 mcg dose (stop the 75 mcg dose), Disp: 90 tablet, Rfl: 1    levothyroxine 300 MCG tablet, Take 1 tablet (300 mcg total) by mouth daily In addition to 75 mcg dose, Disp: 90 tablet, Rfl: 1    lisinopril (ZESTRIL) 10 mg tablet, Take 1 tablet (10 mg total) by mouth daily, Disp: 90 tablet, Rfl: 1    metFORMIN (GLUCOPHAGE-XR) 750 mg 24 hr tablet, Take 1 tablet (750 mg total) by mouth daily with breakfast, Disp: 90 tablet, Rfl: 1    metoprolol tartrate (LOPRESSOR) 50 mg tablet, Take 1 tablet (50 mg total) by mouth 2 (two) times a day, Disp: 180 tablet, Rfl: 1    Multiple Vitamins-Minerals (MULTIVITAMIN MEN 50+ PO), Take by mouth, Disp: , Rfl:     pantoprazole (PROTONIX) 40 mg tablet, Take 1 tablet (40 mg total) by mouth daily, Disp: 90 tablet, Rfl: 1    simvastatin (ZOCOR) 10 mg tablet, Take 1 tablet (10 mg total) by mouth daily at bedtime, Disp: 90 tablet, Rfl: 0    Zoster Vac Recomb Adjuvanted (Shingrix) 50 MCG/0 5ML SUSR, 0 5mL IM for one dose, followed by 0 5mL IM 2-6 months after first dose (Patient not taking: Reported on 8/9/2021), Disp: 1 each, Rfl: 1  Family History   Problem Relation Age of Onset    Heart disease Family     Alcohol abuse Family     Heart disease Mother     Hypertension Mother    Ana María Castelan Migraines Daughter     Leukemia Brother     Migraines Daughter     Substance Abuse Neg Hx     Mental illness Neg Hx     Depression Neg Hx        Review of Systems   Constitutional: Negative for chills and fever  HENT: Negative for ear pain and sore throat  Eyes: Negative for pain and visual disturbance  Respiratory: Negative for cough and shortness of breath  Cardiovascular: Negative for chest pain and palpitations  Gastrointestinal: Negative for abdominal pain and vomiting  Genitourinary: Negative for dysuria and hematuria  Musculoskeletal: Positive for gait problem  Negative for arthralgias and back pain  Skin: Positive for color change and wound  Negative for rash  Neurological: Positive for numbness  Negative for seizures and syncope  Psychiatric/Behavioral: Negative  All other systems reviewed and are negative  Objective:  /87   Pulse 76   Temp 97 9 °F (36 6 °C)   Resp 16     Physical Exam  Constitutional:       Appearance: Normal appearance  He is obese  HENT:      Head: Normocephalic and atraumatic        Right Ear: External ear normal       Left Ear: External ear normal       Nose: Nose normal    Eyes:      Conjunctiva/sclera: Conjunctivae normal  Cardiovascular:      Pulses: Normal pulses  Dorsalis pedis pulses are 2+ on the left side  Posterior tibial pulses are 2+ on the left side  Pulmonary:      Effort: Pulmonary effort is normal    Musculoskeletal:      Cervical back: Normal range of motion  Left lower le+ Edema present  Skin:     General: Skin is warm and dry  Capillary Refill: Capillary refill takes less than 2 seconds  Comments: See detailed wound assessment   Neurological:      General: No focal deficit present  Mental Status: He is alert and oriented to person, place, and time  Mental status is at baseline  Sensory: Sensory deficit present  Coordination: Coordination abnormal       Gait: Gait abnormal       Deep Tendon Reflexes: Reflexes abnormal    Psychiatric:         Mood and Affect: Mood normal          Behavior: Behavior normal          Thought Content: Thought content normal          Judgment: Judgment normal            Wound 20 Diabetic Ulcer Foot Left; Other (Comment) (Active)   Wound Image    22 1327   Wound Description Pink;Epithelialization;Yellow 22 1342   Jovita-wound Assessment Dry;Edema; Erythema;Fragile; Maceration;Scaly 22 1342   Wound Length (cm) 10 cm 22 1342   Wound Width (cm) 9 cm 22 1342   Wound Depth (cm) 0 1 cm 22 1342   Wound Surface Area (cm^2) 90 cm^2 22 1342   Wound Volume (cm^3) 9 cm^3 22 1342   Calculated Wound Volume (cm^3) 9 cm^3 22 1342   Change in Wound Size % -496 03 22 1342   Drainage Amount Large 22 1342   Drainage Description Serosanguineous 22 1342   Non-staged Wound Description Full thickness 22 1342   Treatments Cleansed 20 1320   Wound packed?  No 21 1332   Dressing Changed Changed 02/10/21 1341   Patient Tolerance Tolerated well 02/10/21 1341   Dressing Status Intact 22 1342       Wound 21 Diabetic Ulcer Ankle Left;Lateral (Active)   Wound Image 03/16/22 1326   Wound Description Epithelialization;Pink 03/16/22 1341   Jovita-wound Assessment Dry;Edema; Erythema;Fragile; Hyperpigmented;Scaly 03/16/22 1341   Wound Length (cm) 8 cm 03/16/22 1341   Wound Width (cm) 8 cm 03/16/22 1341   Wound Depth (cm) 0 1 cm 03/16/22 1341   Wound Surface Area (cm^2) 64 cm^2 03/16/22 1341   Wound Volume (cm^3) 6 4 cm^3 03/16/22 1341   Calculated Wound Volume (cm^3) 6 4 cm^3 03/16/22 1341   Change in Wound Size % -627 27 03/16/22 1341   Drainage Amount Large 03/16/22 1341   Drainage Description Serosanguineous 03/16/22 1341   Non-staged Wound Description Full thickness 03/16/22 1341   Wound packed? No 12/29/21 1346   Dressing Status Intact 03/16/22 1341          Results from last 6 Months   Lab Units 02/02/22  1411   WOUND CULTURE  2+ Growth of Staphylococcus aureus*  2+ Growth of Enterococcus faecalis*  3+ Growth of - Mixed bacteria including gram negative rods x3  /         No results found  Wound Instructions:  Orders Placed This Encounter   Procedures    Wound compression and edema control     Wound compression and edema control  Surepress from base of toes to base of knee; cover with spandagrip to help keep surepress intact  Avoid prolonged standing in one place  Elevate leg(s) above the level of the heart when sitting or as much as possible  This was applied today             Standing Status:   Future     Standing Expiration Date:   3/16/2023    Wound home care     [facility]     PLEASE HAVE PATIENT EVALUATED BY LYMPHEDEMA SPECIALIST                                   Standing Status:   Future     Standing Expiration Date:   3/16/2023    Wound cleansing and dressings     LLE and left foot wounds:  Wound care MWF  May remove dressing and take shower just prior to dressing change  If unable to shower then home health please wash patient's leg and foot     Cleanse wounds with NSS or wound cleanser, pat dry       Apply calmoseptine to periwound ONLY IF MACERATION IS NOTED Apply Lotrisone (prescribed today) to entire leg for one week  D/C after one week  If irritation reoccurs may resume use of lotrisone       Apply durafiber with silver cut to fit weeping areas including in between toes  Cover with ABDs  Secure with Kerlix and tape      This was applied today              Standing Status:   Future     Standing Expiration Date:   3/16/2023         Clifford Garcia, KIKO, FACFAS    Portions of the record may have been created with voice recognition software  Occasional wrong word or "sound a like" substitutions may have occurred due to the inherent limitations of voice recognition software  Read the chart carefully and recognize, using context, where substitutions have occurred

## 2022-03-16 NOTE — PATIENT INSTRUCTIONS
Orders Placed This Encounter   Procedures    Wound compression and edema control     Wound compression and edema control  Surepress from base of toes to base of knee; cover with spandagrip to help keep surepress intact  Avoid prolonged standing in one place  Elevate leg(s) above the level of the heart when sitting or as much as possible  This was applied today             Standing Status:   Future     Standing Expiration Date:   3/16/2023    Wound home care     [facility]     PLEASE HAVE PATIENT EVALUATED BY LYMPHEDEMA SPECIALIST                                   Standing Status:   Future     Standing Expiration Date:   3/16/2023    Wound cleansing and dressings     LLE and left foot wounds:  Wound care MWF  May remove dressing and take shower just prior to dressing change  If unable to shower then home health please wash patient's leg and foot  Cleanse wounds with NSS or wound cleanser, pat dry       Apply calmoseptine to periwound ONLY IF MACERATION IS NOTED   Apply Lotrisone (prescribed today) to entire leg for one week  D/C after one week     If irritation reoccurs may resume use of lotrisone       Apply durafiber with silver cut to fit weeping areas including in between toes  Cover with ABDs  Secure with Kerlix and tape      This was applied today              Standing Status:   Future     Standing Expiration Date:   3/16/2023

## 2022-03-17 ENCOUNTER — TELEMEDICINE (OUTPATIENT)
Dept: FAMILY MEDICINE CLINIC | Facility: CLINIC | Age: 67
End: 2022-03-17
Payer: MEDICARE

## 2022-03-17 ENCOUNTER — TELEPHONE (OUTPATIENT)
Dept: FAMILY MEDICINE CLINIC | Facility: CLINIC | Age: 67
End: 2022-03-17

## 2022-03-17 VITALS — BODY MASS INDEX: 36.45 KG/M2 | WEIGHT: 315 LBS | HEIGHT: 78 IN

## 2022-03-17 DIAGNOSIS — Z23 NEED FOR VACCINATION: ICD-10-CM

## 2022-03-17 DIAGNOSIS — E11.42 DIABETIC POLYNEUROPATHY ASSOCIATED WITH TYPE 2 DIABETES MELLITUS (HCC): ICD-10-CM

## 2022-03-17 DIAGNOSIS — E03.9 ACQUIRED HYPOTHYROIDISM: ICD-10-CM

## 2022-03-17 DIAGNOSIS — R59.0 INGUINAL LYMPHADENOPATHY: Primary | ICD-10-CM

## 2022-03-17 DIAGNOSIS — Z00.00 MEDICARE ANNUAL WELLNESS VISIT, SUBSEQUENT: Primary | ICD-10-CM

## 2022-03-17 DIAGNOSIS — E11.8 CONTROLLED TYPE 2 DIABETES MELLITUS WITH COMPLICATION, WITHOUT LONG-TERM CURRENT USE OF INSULIN (HCC): ICD-10-CM

## 2022-03-17 DIAGNOSIS — Z12.5 SCREENING FOR MALIGNANT NEOPLASM OF PROSTATE: ICD-10-CM

## 2022-03-17 DIAGNOSIS — I10 ESSENTIAL HYPERTENSION: ICD-10-CM

## 2022-03-17 DIAGNOSIS — Z12.11 SCREEN FOR COLON CANCER: ICD-10-CM
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
## 2022-03-17 PROCEDURE — G0439 PPPS, SUBSEQ VISIT: HCPCS | Performed by: FAMILY MEDICINE

## 2022-03-17 PROCEDURE — 1123F ACP DISCUSS/DSCN MKR DOCD: CPT | Performed by: FAMILY MEDICINE

## 2022-03-17 PROCEDURE — 99214 OFFICE O/P EST MOD 30 MIN: CPT | Performed by: FAMILY MEDICINE

## 2022-03-17 RX ORDER — LISINOPRIL 20 MG/1
20 TABLET ORAL DAILY
Qty: 90 TABLET | Refills: 1 | Status: SHIPPED | OUTPATIENT
Start: 2022-03-17

## 2022-03-17 NOTE — PROGRESS NOTES
Assessment and Plan:        Other Visit Diagnoses     Medicare annual wellness visit, subsequent    -  Primary- as below  Pt will send in a copy of his power of  and living will through his MyChart account    Need for vaccination        Relevant Medications    tetanus-diphtheria-acellular pertussis (ADACEL) 5-2-15 5 LF-mcg/0 5 injection    Screen for colon cancer        Relevant Orders    Ambulatory Referral to Gastroenterology    Screening for malignant neoplasm of prostate        Relevant Orders    PSA, Total Screen           Preventive health issues were discussed with patient, and age appropriate screening tests were ordered as noted in patient's After Visit Summary  Personalized health advice and appropriate referrals for health education or preventive services given if needed, as noted in patient's After Visit Summary       History of Present Illness:     Patient presents for Medicare Annual Wellness visit    Patient Care Team:  Raquel Marte MD as PCP - General (Family Medicine)  Ana Fernando DPM as PCP - Wound (Podiatry)     Problem List:     Patient Active Problem List   Diagnosis    Controlled type 2 diabetes mellitus with complication, without long-term current use of insulin ([facility] )    HTN (hypertension)    HLD (hyperlipidemia)    Hypothyroidism    Celiac disease    Coronary artery disease    History of duodenal ulcer    Living will on file    Morbid obesity with BMI of 50 0-59 9, adult ([facility] )    S/P BKA (below knee amputation) unilateral, right (HCC)    Paroxysmal atrial fibrillation (HCC)    Iron deficiency anemia due to chronic blood loss    Chronic venous stasis dermatitis of left lower extremity    Gastroesophageal reflux disease without esophagitis    Diabetic ulcer of left foot associated with type 2 diabetes mellitus, limited to breakdown of skin ([facility] )    Idiopathic chronic venous hypertension of left lower extremity with ulcer ([facility] )    Non-pressure chronic ulcer of left calf, limited to breakdown of skin ([facility] )    Diabetic polyneuropathy associated with type 2 diabetes mellitus ([facility] )    Cellulitis of left lower extremity    Fall    Sepsis ([facility] )    Abnormal urinalysis    Ulcer of toe of left foot, limited to breakdown of skin ([facility] )    Diabetic ulcer of left ankle associated with type 2 diabetes mellitus, limited to breakdown of skin ([place] [address] )    Dermatophytosis      Past Medical and Surgical History:     Past Medical History:   Diagnosis Date    Atrial fibrillation ([place] [address] )     Cellulitis     Diabetes mellitus ([facility] )     Disease of thyroid gland     Duodenal ulcer     perforated- ICU stay    High blood pressure     High cholesterol     Hyperlipidemia     Hypertension     Hypothyroidism     Lymphedema      b/l LE- was followed at wound center    Morbid obesity with BMI of 40 0-44 9, adult ([place] [address] )     Peritonitis ([place] [address] )      Past Surgical History:   Procedure Laterality Date    BELOW KNEE LEG AMPUTATION Right     DIAGNOSTIC LAPAROSCOPY      KNEE ARTHROSCOPY Bilateral     OTHER SURGICAL HISTORY  03/2014     lap repair    OTHER SURGICAL HISTORY      Laparoscopic repair of a perforated duodenal ulcer with Amie Spark omental    OTHER SURGICAL HISTORY      Placement of drains      Family History:     Family History   Problem Relation Age of Onset    Heart disease Family     Alcohol abuse Family     Heart disease Mother     Hypertension Mother    Ana María Lama Migraines Daughter     Leukemia Brother     Migraines Daughter     Substance Abuse Neg Hx     Mental illness Neg Hx     Depression Neg Hx       Social History:     Social History     Socioeconomic History    Marital status: Single     Spouse name: None    Number of children: None    Years of education: None    Highest education level: None   Occupational History    None   Tobacco Use    Smoking status: Former Smoker     Packs/day: 1 50     Years: 25 00     Pack years: 37 50     Types: Cigarettes     Quit date: 2004     Years since quittin 3    Smokeless tobacco: Never Used   Vaping Use    Vaping Use: Never used   Substance and Sexual Activity    Alcohol use: Not Currently     Alcohol/week: 1 0 standard drink     Types: 1 Standard drinks or equivalent per week    Drug use: Never    Sexual activity: Not Currently   Other Topics Concern    None   Social History Narrative    Former smoker: Quit 3/31/2012 - As per Care Everywhere      Social Determinants of Health     Financial Resource Strain: Not on file   Food Insecurity: Not on file   Transportation Needs: Not on file   Physical Activity: Not on file   Stress: Not on file   Social Connections: Not on file   Intimate Partner Violence: Not on file   Housing Stability: Not on file      Medications and Allergies:     Current Outpatient Medications   Medication Sig Dispense Refill    ammonium lactate (LAC-HYDRIN) 12 % lotion Apply topically 2 (two) times a day as needed for dry skin 400 g 2    aspirin (ASPIRIN 81) 81 mg EC tablet Take 81 mg by mouth Daily      cefadroxil (DURICEF) 500 mg capsule Take 1 capsule (500 mg total) by mouth every 12 (twelve) hours 60 capsule 0    gabapentin (NEURONTIN) 100 mg capsule Take 1 capsule (100 mg total) by mouth 3 (three) times a day (Patient taking differently: Take 100 mg by mouth 2 (two) times a day  ) 270 capsule 1    ketoconazole (NIZORAL) 2 % cream Apply topically daily 60 g 1    levothyroxine 25 mcg tablet Take 1 tablet (25 mcg total) by mouth daily In addition to the 300 mcg dose (stop the 75 mcg dose) 90 tablet 1    levothyroxine 300 MCG tablet Take 1 tablet (300 mcg total) by mouth daily In addition to 75 mcg dose 90 tablet 1    lisinopril (ZESTRIL) 10 mg tablet Take 1 tablet (10 mg total) by mouth daily 90 tablet 1    metFORMIN (GLUCOPHAGE-XR) 750 mg 24 hr tablet Take 1 tablet (750 mg total) by mouth daily with breakfast 90 tablet 1    metoprolol tartrate (LOPRESSOR) 50 mg tablet Take 1 tablet (50 mg total) by mouth 2 (two) times a day 180 tablet 1    Multiple Vitamins-Minerals (MULTIVITAMIN MEN 50+ PO) Take by mouth      pantoprazole (PROTONIX) 40 mg tablet Take 1 tablet (40 mg total) by mouth daily 90 tablet 1    simvastatin (ZOCOR) 10 mg tablet Take 1 tablet (10 mg total) by mouth daily at bedtime 90 tablet 0    Zoster Vac Recomb Adjuvanted (Shingrix) 50 MCG/0 5ML SUSR 0 5mL IM for one dose, followed by 0 5mL IM 2-6 months after first dose (Patient not taking: Reported on 8/9/2021) 1 each 1     No current facility-administered medications for this visit  No Known Allergies   Immunizations:     Immunization History   Administered Date(s) Administered    COVID-19 PFIZER VACCINE 0 3 ML IM 03/21/2021, 04/11/2021    INFLUENZA 10/10/2016, 02/01/2017, 11/15/2020    Influenza, high dose seasonal 0 7 mL 11/06/2019    Pneumococcal Conjugate 13-Valent 04/30/2018    Pneumococcal Polysaccharide PPV23 11/06/2019    Tdap 04/12/2006      Health Maintenance:         Topic Date Due    Colorectal Cancer Screening  Never done    Hepatitis C Screening  Completed         Topic Date Due    DTaP,Tdap,and Td Vaccines (2 - Td or Tdap) 04/12/2016    Influenza Vaccine (1) 09/01/2021    COVID-19 Vaccine (3 - Booster for Pfizer series) 09/11/2021      Medicare Health Risk Assessment:     Ht 6' 8" (2 032 m)   Wt (!) 181 kg (400 lb)   BMI 43 94 kg/m²      [place]  is here for his Subsequent Wellness visit  Health Risk Assessment:   Patient rates overall health as good  Patient feels that their physical health rating is same  Patient is satisfied with their life  Eyesight was rated as same  Hearing was rated as same  Patient feels that their emotional and mental health rating is same  Patients states they are sometimes angry  Patient states they are sometimes unusually tired/fatigued  Pain experienced in the last 7 days has been none  Patient states that he has experienced no weight loss or gain in last 6 months  Depression Screening:   PHQ-2 Score: 0      Fall Risk Screening: In the past year, patient has experienced: no history of falling in past year      Home Safety:  Patient does not have trouble with stairs inside or outside of their home  Patient has working smoke alarms and has working carbon monoxide detector  Home safety hazards include: none  Nutrition:   Current diet is Regular  Medications:   Patient is currently taking over-the-counter supplements  OTC medications include: see medication list  Patient is able to manage medications  Activities of Daily Living (ADLs)/Instrumental Activities of Daily Living (IADLs):   Walk and transfer into and out of bed and chair?: Yes  Dress and groom yourself?: Yes    Bathe or shower yourself?: Yes    Feed yourself? Yes  Do your laundry/housekeeping?: Yes  Manage your money, pay your bills and track your expenses?: Yes  Make your own meals?: Yes    Do your own shopping?: Yes    Previous Hospitalizations:   Any hospitalizations or ED visits within the last 12 months?: No      Advance Care Planning:   Living will: Yes    Durable POA for healthcare: Yes    Advanced directive: Yes      Cognitive Screening:   Provider or family/friend/caregiver concerned regarding cognition?: No    PREVENTIVE SCREENINGS      Cardiovascular Screening:    General: Screening Not Indicated and History Lipid Disorder      Diabetes Screening:     General: Screening Not Indicated and History Diabetes      Abdominal Aortic Aneurysm (AAA) Screening:    Risk factors include: age between 73-67 yo and tobacco use        Lung Cancer Screening:     General: Screening Not Indicated      Hepatitis C Screening:    General: Screening Current    Screening, Brief Intervention, and Referral to Treatment (SBIRT)    Screening  Typical number of drinks in a day: 0  Typical number of drinks in a week: 0  Interpretation: Low risk drinking behavior      Single Item Drug Screening:  How often have you used an illegal drug (including marijuana) or a prescription medication for non-medical reasons in the past year? never    Single Item Drug Screen Score: 0  Interpretation: Negative screen for possible drug use disorder      Shilpa Carballo MD Virtual AWV Consent    Verification of patient location:    Patient is located in the following state in which I hold an active license [place]    Reason for visit is medicare wellness visit and chronic condition follow up    Encounter provider Shilpa Carballo MD    Provider located at [address] [place] [address],[address]  [address]  [facility] [place] [address] [phone]      Recent Visits  No visits were found meeting these conditions  Showing recent visits within past 7 days and meeting all other requirements  Today's Visits  Date Type Provider Dept   03/17/22 Telemedicine Shilpa Carballo MD [facility] today's visits and meeting all other requirements  Future Appointments  No visits were found meeting these conditions  Showing future appointments within next 150 days and meeting all other requirements       After connecting through [employer], the patient was identified by name and date of birth  Nyasia Palafox was informed that this is a telemedicine visit and that the visit is being conducted through [facility] and patient was informed this is a secure, HIPAA-complaint platform  He agrees to proceed  My office door was closed  No one else was in the room  He acknowledged consent and understanding of privacy and security of the video platform  Nyasia Palafox verbally agrees to participate in [employer]  Pt is aware that [employer] could be limited without vital signs or the ability to perform a full hands-on physical exam  Armando Wright understands he or the provider may request at any time to terminate the video visit and request the patient to seek care or treatment in person      Patient is aware this is a billable service

## 2022-03-17 NOTE — PROGRESS NOTES
Assessment/Plan:       Problem List Items Addressed This Visit     None      Visit Diagnoses     Medicare annual wellness visit, subsequent    -  Primary    Need for vaccination        Screen for colon cancer                   Most recent labs reviewed ***      Subjective:     Ana Askew is a 74 y o  male here today with chief complaint below:  Chief Complaint   Patient presents with   [employer] Medicare Wellness Visit    Labs Only     DONE     Medication Refill     NONE AT THIS TIME     Immunizations     TDAP PENDED TO PHARMACY     Other     EYE EXAM - COLONOSCOPY     Virtual Awv     - CC above per clinical staff and reviewed  HPI:    Has vna coming three times a week for wound care  Not much pain in his leg- has longstanding neuropathy  Some tingling in his fingers    Visiting nurses monitor BP     No constipation/diarrhea/blood  No acid reflux  No palpitations  The following portions of the patient's history were reviewed and updated as appropriate: allergies, current medications, past family history, past medical history, past social history, past surgical history and problem list     ROS:  Review of Systems   No fever, chills, congestion, chest pain, shortness of breath, nausea, vomiting, diarrhea, constipation, blood in stool, urinary concerns, mood changes  Rest of ROS neg except as above      Objective:      Ht 6' 8" (2 032 m)   Wt (!) 181 kg (400 lb)   BMI 43 94 kg/m²   BP Readings from Last 3 Encounters:   03/16/22 170/87   02/23/22 (!) 179/88   02/02/22 167/68     Wt Readings from Last 3 Encounters:   03/17/22 (!) 181 kg (400 lb)   02/02/22 (!) 177 kg (390 lb)   08/09/21 (!) 177 kg (390 lb)               Physical Exam:   Physical Exam

## 2022-03-17 NOTE — PROGRESS NOTES
Virtual Regular Visit    Verification of patient location:    Patient is located in the following state in which I hold an active license [place]      Assessment/Plan:    Problem List Items Addressed This Visit        Endocrine    Controlled type 2 diabetes mellitus with complication, without long-term current use of insulin ([address] )     a1c stable, continue metformin extended release 750 mg once daily  Renal function stable  Will call for eye exam- he has this done yearly at [facility]  Lab Results   Component Value Date    HGBA1C 5 4 03/09/2022            Relevant Orders    Comprehensive metabolic panel    Hemoglobin A1C    Lipid Panel with Direct LDL reflex    Microalbumin / creatinine urine ratio    Hypothyroidism     TSH remains in normal range  No dosing change  Recheck in six months  Relevant Orders    TSH, 3rd generation with Free T4 reflex    Diabetic polyneuropathy associated with type 2 diabetes mellitus ([address] )     Diabetes is well controlled  Gabapentin helpful   Lab Results   Component Value Date    HGBA1C 5 4 03/09/2022               Cardiovascular and Mediastinum    Essential hypertension     Home readings have been above goal  Increase lisinopril from 10 mg daily to 20 mg daily  Continue to monitor at home and will let me know if there are significant changes           Relevant Medications    lisinopril (ZESTRIL) 20 mg tablet    Other Relevant Orders    CBC and differential                  Reason for visit is   Chief Complaint   Patient presents with    Medicare Wellness Visit    Labs Only     DONE     Medication Refill     NONE AT THIS TIME     Immunizations     TDAP PENDED TO PHARMACY     Other     EYE EXAM - COLONOSCOPY     Virtual Awv    Virtual Regular Visit        Encounter provider Irma Tanner MD    Provider located at [address]  [address]  [facility] [place] [address] [phone]      Recent Visits  No visits were found meeting these conditions  Showing recent visits within past 7 days and meeting all other requirements  Today's Visits  Date Type Provider Dept   03/17/22 Telephone Lidia Champion MD [facility] [place]   03/17/22 Telemedicine Lidia Champion MD [facility] today's visits and meeting all other requirements  Future Appointments  No visits were found meeting these conditions  Showing future appointments within next 150 days and meeting all other requirements       The patient was identified by name and date of birth  Mingo Steel was informed that this is a telemedicine visit and that the visit is being conducted through [facility] and patient was informed this is a secure, HIPAA-complaint platform  He agrees to proceed     My office door was closed  No one else was in the room  He acknowledged consent and understanding of privacy and security of the video platform  The patient has agreed to participate and understands they can discontinue the visit at any time  Pt has difficulty with mobility, has amputation- prefers virtual visit  Patient is aware this is a billable service  Mateusz Hobson is a 74 y o  male    Pt here virtually for medicare awv and also follow up chronic conditions  Medicare visit in separate note  Chronic lower extremity wounds-- Has vna coming three times a week for wound care  Not much pain in his leg- has longstanding neuropathy  Some tingling in his fingers  Gabapentin has been helping  Notes that the visiting nurse for wound care checks blood pressure  He thinks typically his systolic blood pressure is in the 909-329 range, diastolic in the 27Z  He is taking medication regularly  No constipation/diarrhea/blood in stool  He has had a colonoscopy in the past, but not in a number of years  Would like referral     No acid reflux symptoms    Remains on protonix as he has had substantial upper GI bleed from duodenal ulcer in the past   (no longer on anticoagulation for paroxysmal afib due to his previous bleed)    No palpitations  No cp or sob  Feeling generally well  Daughter Sarah Cedeño is helpful  His mother is doing well in her assisted living facility  He enjoys prepping food for his granddaughter who has PKU and he has been able to enjoy many of her ice hockey games as well by video        HPI     Past Medical History:   Diagnosis Date    Atrial fibrillation ([facility] )     Cellulitis     Diabetes mellitus ([facility] )     Disease of thyroid gland     Duodenal ulcer     perforated- ICU stay    High blood pressure     High cholesterol     Hyperlipidemia     Hypertension     Hypothyroidism     Lymphedema      b/l LE- was followed at wound center    Morbid obesity with BMI of 40 0-44 9, adult ([facility] )     Peritonitis ([facility] )        Past Surgical History:   Procedure Laterality Date    BELOW KNEE LEG AMPUTATION Right     DIAGNOSTIC LAPAROSCOPY      KNEE ARTHROSCOPY Bilateral     OTHER SURGICAL HISTORY  03/2014     lap repair    OTHER SURGICAL HISTORY      Laparoscopic repair of a perforated duodenal ulcer with Lit Fine omental    OTHER SURGICAL HISTORY      Placement of drains       Current Outpatient Medications   Medication Sig Dispense Refill    ammonium lactate (LAC-HYDRIN) 12 % lotion Apply topically 2 (two) times a day as needed for dry skin 400 g 2    aspirin (ASPIRIN 81) 81 mg EC tablet Take 81 mg by mouth Daily      cefadroxil (DURICEF) 500 mg capsule Take 1 capsule (500 mg total) by mouth every 12 (twelve) hours 60 capsule 0    gabapentin (NEURONTIN) 100 mg capsule Take 1 capsule (100 mg total) by mouth 3 (three) times a day (Patient taking differently: Take 100 mg by mouth 2 (two) times a day  ) 270 capsule 1    ketoconazole (NIZORAL) 2 % cream Apply topically daily 60 g 1    levothyroxine 25 mcg tablet Take 1 tablet (25 mcg total) by mouth daily In addition to the 300 mcg dose (stop the 75 mcg dose) 90 tablet 1    levothyroxine 300 MCG tablet Take 1 tablet (300 mcg total) by mouth daily In addition to 75 mcg dose 90 tablet 1    lisinopril (ZESTRIL) 20 mg tablet Take 1 tablet (20 mg total) by mouth daily 90 tablet 1    metFORMIN (GLUCOPHAGE-XR) 750 mg 24 hr tablet Take 1 tablet (750 mg total) by mouth daily with breakfast 90 tablet 1    metoprolol tartrate (LOPRESSOR) 50 mg tablet Take 1 tablet (50 mg total) by mouth 2 (two) times a day 180 tablet 1    Multiple Vitamins-Minerals (MULTIVITAMIN MEN 50+ PO) Take by mouth      pantoprazole (PROTONIX) 40 mg tablet Take 1 tablet (40 mg total) by mouth daily 90 tablet 1    simvastatin (ZOCOR) 10 mg tablet Take 1 tablet (10 mg total) by mouth daily at bedtime 90 tablet 0    tetanus-diphtheria-acellular pertussis (ADACEL) 5-2-15 5 LF-mcg/0 5 injection Inject 0 5 mL into a muscle once for 1 dose 0 5 mL 0    Zoster Vac Recomb Adjuvanted (Shingrix) 50 MCG/0 5ML SUSR 0 5mL IM for one dose, followed by 0 5mL IM 2-6 months after first dose (Patient not taking: Reported on 8/9/2021) 1 each 1     No current facility-administered medications for this visit  No Known Allergies    Review of Systems    Video Exam    Vitals:    03/17/22 1140   Weight: (!) 181 kg (400 lb)   Height: 6' 8" (2 032 m)       Physical Exam  Vitals and nursing note reviewed  Constitutional:       General: He is not in acute distress  Appearance: He is not ill-appearing  HENT:      Head: Normocephalic and atraumatic  Eyes:      Conjunctiva/sclera: Conjunctivae normal    Pulmonary:      Effort: Pulmonary effort is normal  No respiratory distress  Neurological:      Mental Status: He is alert and oriented to person, place, and time  Psychiatric:         Mood and Affect: Mood normal          Behavior: Behavior normal           I spent 20 minutes directly with the patient during this visit    [facility] verbally agrees to participate in [employer]   Pt is aware that [facility] could be limited without vital signs or the ability to perform a full hands-on physical exam  Armando Rice understands he or the provider may request at any time to terminate the video visit and request the patient to seek care or treatment in person

## 2022-03-17 NOTE — ASSESSMENT & PLAN NOTE
Diabetes is well controlled  Gabapentin helpful   Lab Results   Component Value Date    HGBA1C 5 4 03/09/2022

## 2022-03-17 NOTE — TELEPHONE ENCOUNTER
Can you please call radiology (non-urgent) to see if patient's CT scan from 6/8/21 was ever able to be compared to his prior CT scan? They wanted to be sure some enlarged lymph nodes were stable from his previous, but if unable to compare, he will need a repeat study  If they could take a look and see if the comparison was able to be done that would be great  Does the patient need to obtain a disc for them?  (his daughter could pick it up from [facility] if needed)    Report notes:  Mildly enlarged left inguinal external iliac and prominent right inguinal lymph nodes  A CT report from outside hospital dated 3/12/2014 reports prominent inguinal lymph nodes  If images are provided for comparison and document stability, no   further workup indicated  Otherwise, a follow-up CT in 3 months to ensure stability recommended

## 2022-03-17 NOTE — ASSESSMENT & PLAN NOTE
Continue wound care as planned  Has three times weekly visiting nurse for wound care and was seen yesterday at wound care office    Lab Results   Component Value Date    HGBA1C 5 4 03/09/2022

## 2022-03-17 NOTE — PATIENT INSTRUCTIONS

## 2022-03-17 NOTE — ASSESSMENT & PLAN NOTE
Home readings have been above goal  Increase lisinopril from 10 mg daily to 20 mg daily  Continue to monitor at home and will let me know if there are significant changes

## 2022-03-17 NOTE — TELEPHONE ENCOUNTER
Called  & spoke to pt to schedule 6 mo follow up appt from today's virtual visit  Pt stated he would call closer to Sept to schedule  I advised him to call end of July beginning of Sept as at times, Dr Pati Abebe schedules 1-2 months out    I reminded pt to have lab work done prior to next appt

## 2022-03-17 NOTE — ASSESSMENT & PLAN NOTE
a1c stable, continue metformin extended release 750 mg once daily  Renal function stable    Will call for eye exam- he has this done yearly at [facility]  Lab Results   Component Value Date    HGBA1C 5 4 03/09/2022

## 2022-03-18 ENCOUNTER — TELEPHONE (OUTPATIENT)
Dept: ADMINISTRATIVE | Facility: OTHER | Age: 67
End: 2022-03-18

## 2022-03-18 NOTE — TELEPHONE ENCOUNTER
----- Message from Irene Grewal LPN sent at 7/71/4295  3:45 PM EDT -----  Regarding: Care Gap Request  08/20/20 3:25 PM    Hello, our patient attached above has had Diabetic Eye Exam completed/performed  Please assist in updating the patient chart by making an External outreach to [facility] located in [place], [place]   The date of service is last eye exam     Thank you,  Irene Grewal LPN  [facility]

## 2022-03-18 NOTE — LETTER
Diabetic Eye Exam Form    Date Requested: 22  Patient: Cleo Lora  Patient : 1955   Referring Provider: Albert Dawkins MD      DIABETIC Eye Exam Date _______________________________    Type of Exam MUST be documented for Diabetic Eye Exams  Please CHECK ONE  Dilated Retinal Exam      Optomap-Iris Exam      Fundus Photography Completed       Left Eye - Please check Retinopathy AND Type or No Retinopathy      Exam did show retinopathy    Exam did not show retinopathy         Mild     Proliferative           Moderate    Severe            None         Right Eye - Please check Retinopathy AND Type or No Retinopathy     Exam did show retinopathy    Exam did not show retinopathy         Mild     Proliferative        Moderate    Severe        None       Comments __________________________________________________________    Practice Providing Exam ______________________________________________    Exam Performed By (print name) _______________________________________      Provider Signature ___________________________________________________    These reports are needed for  compliance  Please fax this completed form and a copy of the Diabetic Eye Exam report to our office located at [place] [address] as soon as possible via [phone] attention [place]: Phone [phone]    We thank you for your assistance in treating our mutual patient     (sent to [address])

## 2022-03-18 NOTE — TELEPHONE ENCOUNTER
Upon review of the In Basket request and the patient's chart, initial outreach has been made via fax, please see Contacts section for details       Thank you  Anshul Song MA

## 2022-03-21 NOTE — TELEPHONE ENCOUNTER
Received Record Request Response from [address] [place] [address] stating 'only routine exam 12- (no dm eye exam pt need to schedule)'

## 2022-03-21 NOTE — TELEPHONE ENCOUNTER
Upon review of the In Basket request we have found as a result of outreach that patient did not have the requested item(s) completed  Any additional questions or concerns should be emailed to the Practice Liaisons via [email or web address] email, please do not reply via In Basket      Thank you  Mateus Porter MA

## 2022-03-29 DIAGNOSIS — L03.116 CELLULITIS OF LEFT LOWER EXTREMITY: Primary | ICD-10-CM

## 2022-03-29 RX ORDER — CEFADROXIL 500 MG/1
500 CAPSULE ORAL EVERY 12 HOURS SCHEDULED
Qty: 60 CAPSULE | Refills: 0 | Status: SHIPPED | OUTPATIENT
Start: 2022-03-29 | End: 2022-04-18

## 2022-03-30 ENCOUNTER — OFFICE VISIT (OUTPATIENT)
Dept: WOUND CARE | Facility: HOSPITAL | Age: 67
End: 2022-03-30
Payer: MEDICARE

## 2022-03-30 VITALS
HEART RATE: 67 BPM | RESPIRATION RATE: 20 BRPM | TEMPERATURE: 95.8 F | SYSTOLIC BLOOD PRESSURE: 158 MMHG | DIASTOLIC BLOOD PRESSURE: 80 MMHG

## 2022-03-30 DIAGNOSIS — L97.321 DIABETIC ULCER OF LEFT ANKLE ASSOCIATED WITH TYPE 2 DIABETES MELLITUS, LIMITED TO BREAKDOWN OF SKIN (HCC): ICD-10-CM

## 2022-03-30 DIAGNOSIS — L97.521 DIABETIC ULCER OF OTHER PART OF LEFT FOOT ASSOCIATED WITH TYPE 2 DIABETES MELLITUS, LIMITED TO BREAKDOWN OF SKIN (HCC): Primary | ICD-10-CM

## 2022-03-30 DIAGNOSIS — E11.42 DIABETIC POLYNEUROPATHY ASSOCIATED WITH TYPE 2 DIABETES MELLITUS (HCC): ICD-10-CM

## 2022-03-30 DIAGNOSIS — E11.622 DIABETIC ULCER OF LEFT ANKLE ASSOCIATED WITH TYPE 2 DIABETES MELLITUS, LIMITED TO BREAKDOWN OF SKIN (HCC): ICD-10-CM

## 2022-03-30 DIAGNOSIS — I89.0 LYMPHEDEMA: ICD-10-CM

## 2022-03-30 DIAGNOSIS — L97.521 ULCER OF TOE OF LEFT FOOT, LIMITED TO BREAKDOWN OF SKIN (HCC): ICD-10-CM

## 2022-03-30 DIAGNOSIS — E11.621 DIABETIC ULCER OF OTHER PART OF LEFT FOOT ASSOCIATED WITH TYPE 2 DIABETES MELLITUS, LIMITED TO BREAKDOWN OF SKIN (HCC): Primary | ICD-10-CM

## 2022-03-30 PROCEDURE — 97597 DBRDMT OPN WND 1ST 20 CM/<: CPT | Performed by: PODIATRIST

## 2022-03-30 NOTE — PROGRESS NOTES
Patient ID: Ghanshyam Lomas is a 74 y o  male Date of Birth 1955       Chief Complaint   Patient presents with    Follow Up Wound Care Visit     Left ankle and left Foot wounds       Allergies:  Patient has no known allergies  Diagnosis:  1  Diabetic ulcer of other part of left foot associated with type 2 diabetes mellitus, limited to breakdown of skin (HCC)  -     Wound cleansing and dressings; Future  -     Wound compression and edema control; Future  -     Wound home care; Future    2  Ulcer of toe of left foot, limited to breakdown of skin ([facility] )    3  Diabetic ulcer of left ankle associated with type 2 diabetes mellitus, limited to breakdown of skin (HCC)  -     Wound cleansing and dressings; Future  -     Wound compression and edema control; Future  -     Wound home care; Future    4  Lymphedema    5  Diabetic polyneuropathy associated with type 2 diabetes mellitus ([facility] )       Diagnosis ICD-10-CM Associated Orders   1  Diabetic ulcer of other part of left foot associated with type 2 diabetes mellitus, limited to breakdown of skin (HCC)  E11 621 Wound cleansing and dressings    L97 521 Wound compression and edema control     Wound home care   2  Ulcer of toe of left foot, limited to breakdown of skin ([facility] )  L97 521    3  Diabetic ulcer of left ankle associated with type 2 diabetes mellitus, limited to breakdown of skin ([facility])  E11 622 Wound cleansing and dressings    L97 321 Wound compression and edema control     Wound home care   4  Lymphedema  I89 0    5  Diabetic polyneuropathy associated with type 2 diabetes mellitus ([facility])  E11 42         Assessment & Plan:  1  Diabetic Naidu grade 1 ulceration dorsal aspect and interspaces of toes left foot, selective debridement was performed, wounds were assessed and dressed with silver alginate dry dressing, Calmoseptine was applied to denuded areas  SurePress for compression    Visiting nurses and lymphedema visiting nurse will continue to do the same 3 times a week   2  Patient with dermatophytosis to left lower extremity, Lotrisone was applied today in the clinic and I have prescribed for him to be applied at time of dressing changes for 1 week  After that I have asked visiting nurses to use at their discretion  3  Patient to continue suppression oral antibiotics, this was discussed with Dr Li Doctor from Infectious Disease who is currently managing this  4  Patient to continue to use sequential compression pump left lower extremity for 1 hour twice a day  5  Frequent elevation, low-sodium diet, proper protein intake and proper glycemic control was reviewed with patient  He understands and agrees plan and will follow-up in 3 weeks  Debridement   Wound 01/08/20 Diabetic Ulcer Foot Left; Other (Comment)    Universal Protocol:  Consent: Verbal consent obtained  Risks and benefits: risks, benefits and alternatives were discussed  Consent given by: patient  Time out: Immediately prior to procedure a "time out" was called to verify the correct patient, procedure, equipment, support staff and site/side marked as required  Patient understanding: patient states understanding of the procedure being performed  Patient identity confirmed: verbally with patient      Performed by: physician  Debridement type: selective  Pain control: lidocaine 4%  Pre-debridement measurements  Length (cm): 3  Width (cm): 4  Depth (cm): 0 1  Surface Area (cm^2): 12  Volume (cm^3): 1 2    Post-debridement measurements  Length (cm): 3  Width (cm): 4  Depth (cm): 0 1  Percent debrided: 25%  Surface Area (cm^2): 12  Area debrided (cm^2):  3  Volume (cm^3): 1 2  Devitalized tissue debrided: biofilm, callus and slough  Instrument(s) utilized: blade  Bleeding: small  Hemostasis obtained with: pressure  Procedural pain (0-10): insensate  Post-procedural pain: insensate   Response to treatment: procedure was tolerated well    Debridement   Wound 12/29/21 Diabetic Ulcer Ankle Left;Lateral    Universal Protocol:  Consent: Verbal consent obtained  Risks and benefits: risks, benefits and alternatives were discussed  Consent given by: patient  Time out: Immediately prior to procedure a "time out" was called to verify the correct patient, procedure, equipment, support staff and site/side marked as required  Patient understanding: patient states understanding of the procedure being performed  Patient identity confirmed: verbally with patient      Performed by: physician  Debridement type: selective  Pain control: lidocaine 4%  Pre-debridement measurements  Length (cm): 1  Width (cm): 2  Depth (cm): 0 1  Surface Area (cm^2): 2  Volume (cm^3): 0 2    Post-debridement measurements  Length (cm): 1  Width (cm): 2  Depth (cm): 0 1  Percent debrided: 20%  Surface Area (cm^2): 2  Area debrided (cm^2): 0 4  Volume (cm^3): 0 2  Devitalized tissue debrided: biofilm, callus and slough  Instrument(s) utilized: blade  Bleeding: small  Hemostasis obtained with: pressure  Procedural pain (0-10): insensate  Post-procedural pain: insensate   Response to treatment: procedure was tolerated well           Subjective:   Patient presents today for care of left foot diabetic ulceration, visiting nurses have been changing dressings as directed, he continues to use his sequential compression pumps and continues with oral suppression antibiotic therapy per Infectious Disease    He has no new complaints            The following portions of the patient's history were reviewed and updated as appropriate:   Patient Active Problem List   Diagnosis    Controlled type 2 diabetes mellitus with complication, without long-term current use of insulin ([facility] )    Essential hypertension    HLD (hyperlipidemia)    Hypothyroidism    Celiac disease    Coronary artery disease    History of duodenal ulcer    Living will on file    Morbid obesity with BMI of 50 0-59 9, adult ([facility] )    S/P BKA (below knee amputation) unilateral, right (HCC)    Paroxysmal atrial fibrillation (HCC)    Iron deficiency anemia due to chronic blood loss    Chronic venous stasis dermatitis of left lower extremity    Gastroesophageal reflux disease without esophagitis    Diabetic ulcer of left foot associated with type 2 diabetes mellitus, limited to breakdown of skin ([facility])    Idiopathic chronic venous hypertension of left lower extremity with ulcer ([facility])    Non-pressure chronic ulcer of left calf, limited to breakdown of skin ([facility] )    Diabetic polyneuropathy associated with type 2 diabetes mellitus ([facility])    Cellulitis of left lower extremity    Sepsis ([facility] )    Abnormal urinalysis    Ulcer of toe of left foot, limited to breakdown of skin ([facility] )    Diabetic ulcer of left ankle associated with type 2 diabetes mellitus, limited to breakdown of skin ([facility] )    Dermatophytosis     Past Medical History:   Diagnosis Date    Atrial fibrillation ([facility] )     Cellulitis     Diabetes mellitus ([facility] )     Disease of thyroid gland     Duodenal ulcer     perforated- ICU stay    High blood pressure     High cholesterol     Hyperlipidemia     Hypertension     Hypothyroidism     Lymphedema      b/l LE- was followed at wound center    Morbid obesity with BMI of 40 0-44 9, adult ([facility] )     Peritonitis ([facility] )      Past Surgical History:   Procedure Laterality Date    BELOW KNEE LEG AMPUTATION Right     DIAGNOSTIC LAPAROSCOPY      KNEE ARTHROSCOPY Bilateral     OTHER SURGICAL HISTORY  03/2014     lap repair    OTHER SURGICAL HISTORY      Laparoscopic repair of a perforated duodenal ulcer with [place] omental    OTHER SURGICAL HISTORY      Placement of drains     Social History     Socioeconomic History    Marital status: Single     Spouse name: None    Number of children: None    Years of education: None    Highest education level: None   Occupational History    None   Tobacco Use    Smoking status: Former Smoker     Packs/day: 1 50     Years: 25 00     Pack years: 37 50     Types: Cigarettes     Quit date: 2004     Years since quittin 4    Smokeless tobacco: Never Used   Vaping Use    Vaping Use: Never used   Substance and Sexual Activity    Alcohol use: Not Currently     Alcohol/week: 1 0 standard drink     Types: 1 Standard drinks or equivalent per week    Drug use: Never    Sexual activity: Not Currently   Other Topics Concern    None   Social History Narrative    Former smoker: Quit 3/31/2012 - As per Care Everywhere      Social Determinants of Health     Financial Resource Strain: Not on file   Food Insecurity: Not on file   Transportation Needs: Not on file   Physical Activity: Not on file   Stress: Not on file   Social Connections: Not on file   Intimate Partner Violence: Not on file   Housing Stability: Not on file        Current Outpatient Medications:     ammonium lactate (LAC-HYDRIN) 12 % lotion, Apply topically 2 (two) times a day as needed for dry skin, Disp: 400 g, Rfl: 2    aspirin (ASPIRIN 81) 81 mg EC tablet, Take 81 mg by mouth Daily, Disp: , Rfl:     cefadroxil (DURICEF) 500 mg capsule, Take 1 capsule (500 mg total) by mouth every 12 (twelve) hours, Disp: 60 capsule, Rfl: 0    gabapentin (NEURONTIN) 100 mg capsule, Take 1 capsule (100 mg total) by mouth 3 (three) times a day (Patient taking differently: Take 100 mg by mouth 2 (two) times a day  ), Disp: 270 capsule, Rfl: 1    ketoconazole (NIZORAL) 2 % cream, Apply topically daily, Disp: 60 g, Rfl: 1    levothyroxine 25 mcg tablet, Take 1 tablet (25 mcg total) by mouth daily In addition to the 300 mcg dose (stop the 75 mcg dose), Disp: 90 tablet, Rfl: 1    levothyroxine 300 MCG tablet, Take 1 tablet (300 mcg total) by mouth daily In addition to 75 mcg dose, Disp: 90 tablet, Rfl: 1    lisinopril (ZESTRIL) 20 mg tablet, Take 1 tablet (20 mg total) by mouth daily, Disp: 90 tablet, Rfl: 1    metFORMIN (GLUCOPHAGE-XR) 750 mg 24 hr tablet, Take 1 tablet (750 mg total) by mouth daily with breakfast, Disp: 90 tablet, Rfl: 1    metoprolol tartrate (LOPRESSOR) 50 mg tablet, Take 1 tablet (50 mg total) by mouth 2 (two) times a day, Disp: 180 tablet, Rfl: 1    Multiple Vitamins-Minerals (MULTIVITAMIN MEN 50+ PO), Take by mouth, Disp: , Rfl:     pantoprazole (PROTONIX) 40 mg tablet, Take 1 tablet (40 mg total) by mouth daily, Disp: 90 tablet, Rfl: 1    simvastatin (ZOCOR) 10 mg tablet, Take 1 tablet (10 mg total) by mouth daily at bedtime, Disp: 90 tablet, Rfl: 0    Zoster Vac Recomb Adjuvanted (Shingrix) 50 MCG/0 5ML SUSR, 0 5mL IM for one dose, followed by 0 5mL IM 2-6 months after first dose (Patient not taking: Reported on 8/9/2021), Disp: 1 each, Rfl: 1  Family History   Problem Relation Age of Onset    Heart disease Family     Alcohol abuse Family     Heart disease Mother     Hypertension Mother    [facility] Migraines Daughter     Leukemia Brother     Migraines Daughter     Substance Abuse Neg Hx     Mental illness Neg Hx     Depression Neg Hx        Review of Systems   Constitutional: Negative for chills and fever  HENT: Negative for ear pain and sore throat  Eyes: Negative for pain and visual disturbance  Respiratory: Negative for cough and shortness of breath  Cardiovascular: Negative for chest pain and palpitations  Gastrointestinal: Negative for abdominal pain and vomiting  Genitourinary: Negative for dysuria and hematuria  Musculoskeletal: Positive for gait problem  Negative for arthralgias and back pain  Skin: Positive for color change and wound  Negative for rash  Neurological: Positive for numbness  Negative for seizures and syncope  Psychiatric/Behavioral: Negative  All other systems reviewed and are negative  Objective:  Temp (!) 95 8 °F (35 4 °C)   Resp 20     Physical Exam  Constitutional:       Appearance: Normal appearance  He is obese  HENT:      Head: Normocephalic and atraumatic        Right Ear: External ear normal       Left Ear: External ear normal  Nose: Nose normal    Eyes:      Conjunctiva/sclera: Conjunctivae normal    Cardiovascular:      Pulses: Normal pulses  Pulmonary:      Effort: Pulmonary effort is normal    Musculoskeletal:      Cervical back: Normal range of motion  Comments: BKA right   Skin:     General: Skin is warm and dry  Capillary Refill: Capillary refill takes less than 2 seconds  Comments: See detailed wound assessment   Neurological:      General: No focal deficit present  Mental Status: He is alert and oriented to person, place, and time  Mental status is at baseline  Sensory: Sensory deficit present  Coordination: Coordination abnormal       Gait: Gait abnormal       Deep Tendon Reflexes: Reflexes abnormal    Psychiatric:         Mood and Affect: Mood normal          Behavior: Behavior normal          Thought Content: Thought content normal          Judgment: Judgment normal            Wound 01/08/20 Diabetic Ulcer Foot Left; Other (Comment) (Active)   Wound Image    03/30/22 1328   Wound Description Epithelialization;Pink;Yellow 03/30/22 1333   Jovita-wound Assessment Dry;Edema; Erythema;Scaly 03/30/22 1333   Wound Length (cm) 3 cm 03/30/22 1333   Wound Width (cm) 4 cm 03/30/22 1333   Wound Depth (cm) 0 1 cm 03/30/22 1333   Wound Surface Area (cm^2) 12 cm^2 03/30/22 1333   Wound Volume (cm^3) 1 2 cm^3 03/30/22 1333   Calculated Wound Volume (cm^3) 1 2 cm^3 03/30/22 1333   Change in Wound Size % 20 53 03/30/22 1333   Drainage Amount Small 03/30/22 1333   Drainage Description Serosanguineous 03/30/22 1333   Non-staged Wound Description Full thickness 03/30/22 1333   Treatments Cleansed 11/11/20 1320   Wound packed?  No 12/29/21 1332   Dressing Changed Changed 02/10/21 1341   Patient Tolerance Tolerated well 02/10/21 1341   Dressing Status Intact 03/30/22 1333       Wound 12/29/21 Diabetic Ulcer Ankle Left;Lateral (Active)   Wound Image   03/30/22 1329   Wound Description Pink;Yellow 03/30/22 1332   Jovita-wound Assessment Scaly;Edema; Erythema;Dry;Hyperpigmented 03/30/22 1332   Wound Length (cm) 1 cm 03/30/22 1332   Wound Width (cm) 2 cm 03/30/22 1332   Wound Depth (cm) 0 1 cm 03/30/22 1332   Wound Surface Area (cm^2) 2 cm^2 03/30/22 1332   Wound Volume (cm^3) 0 2 cm^3 03/30/22 1332   Calculated Wound Volume (cm^3) 0 2 cm^3 03/30/22 1332   Change in Wound Size % 77 27 03/30/22 1332   Drainage Amount Small 03/30/22 1332   Drainage Description Serosanguineous 03/30/22 1332   Non-staged Wound Description Full thickness 03/30/22 1332   Wound packed? No 12/29/21 1346   Dressing Status Intact 03/30/22 1332          Results from last 6 Months   Lab Units 02/02/22  1411   WOUND CULTURE  2+ Growth of Staphylococcus aureus*  2+ Growth of Enterococcus faecalis*  3+ Growth of - Mixed bacteria including gram negative rods x3  /         No results found  Wound Instructions:  Orders Placed This Encounter   Procedures    Wound cleansing and dressings     LLE and left foot wounds:  Wound care MWF  May remove dressing and take shower just prior to dressing change  If unable to shower then home health please wash patient's leg and foot  Cleanse wounds with NSS or wound cleanser, pat dry       Apply calmoseptine to periwound ONLY IF MACERATION IS NOTED   Apply Lotrisone (prescribed today) to entire leg        Apply durafiber with silver cut to fit weeping areas including in between toes  Cover with ABDs  Secure with Kerlix and tape      This was applied today  Standing Status:   Future     Standing Expiration Date:   3/30/2023    Wound compression and edema control     Wound compression and edema control  Surepress from base of toes to base of knee; cover with spandagrip to help keep surepress intact  Avoid prolonged standing in one place  Elevate leg(s) above the level of the heart when sitting or as much as possible        This was applied today                                              Standing Status:   Future Standing Expiration Date:   3/30/2023    Wound home care     Continue with [facility] for wound care     Standing Status:   Future     Standing Expiration Date:   3/30/2023         Roxann Vang, KIKO, FACFAS    Portions of the record may have been created with voice recognition software  Occasional wrong word or "sound a like" substitutions may have occurred due to the inherent limitations of voice recognition software  Read the chart carefully and recognize, using context, where substitutions have occurred

## 2022-03-30 NOTE — PATIENT INSTRUCTIONS
Orders Placed This Encounter   Procedures    Wound cleansing and dressings     LLE and left foot wounds:  Wound care MWF  May remove dressing and take shower just prior to dressing change  If unable to shower then home health please wash patient's leg and foot  Cleanse wounds with NSS or wound cleanser, pat dry       Apply calmoseptine to periwound ONLY IF MACERATION IS NOTED   Apply Lotrisone (prescribed today) to entire leg        Apply durafiber with silver cut to fit weeping areas including in between toes  Cover with ABDs  Secure with Kerlix and tape      This was applied today  Standing Status:   Future     Standing Expiration Date:   3/30/2023    Wound compression and edema control     Wound compression and edema control  Surepress from base of toes to base of knee; cover with spandagrip to help keep surepress intact  Avoid prolonged standing in one place  Elevate leg(s) above the level of the heart when sitting or as much as possible        This was applied today                                              Standing Status:   Future     Standing Expiration Date:   3/30/2023    Wound home care     Continue with [facility] for [facility] care     Standing Status:   Future     Standing Expiration Date:   3/30/2023

## 2022-04-18 DIAGNOSIS — L03.116 CELLULITIS OF LEFT LOWER EXTREMITY: ICD-10-CM

## 2022-04-18 RX ORDER — CEFADROXIL 500 MG/1
CAPSULE ORAL
Qty: 60 CAPSULE | Refills: 0 | Status: SHIPPED | OUTPATIENT
Start: 2022-04-18 | End: 2022-05-18

## 2022-04-18 NOTE — TELEPHONE ENCOUNTER
Pt called asking that script be sent immediately as he is going away this weekend and will be needing medication for his trip

## 2022-04-18 NOTE — TELEPHONE ENCOUNTER
See note below from 3/17/22 where radiology stated someone would compare the CT from 6/2021 to a previous - I don't see anything noted on the report  Please check in again

## 2022-04-19 NOTE — TELEPHONE ENCOUNTER
Placed call to the imaging room, spoke to Spoke to Cristina Castillo who will have radiologist compare

## 2022-04-20 ENCOUNTER — TELEPHONE (OUTPATIENT)
Dept: FAMILY MEDICINE CLINIC | Facility: CLINIC | Age: 67
End: 2022-04-20

## 2022-04-20 ENCOUNTER — OFFICE VISIT (OUTPATIENT)
Dept: WOUND CARE | Facility: HOSPITAL | Age: 67
End: 2022-04-20
Payer: MEDICARE

## 2022-04-20 VITALS
SYSTOLIC BLOOD PRESSURE: 185 MMHG | HEART RATE: 99 BPM | DIASTOLIC BLOOD PRESSURE: 92 MMHG | TEMPERATURE: 96.8 F | RESPIRATION RATE: 20 BRPM

## 2022-04-20 DIAGNOSIS — I89.0 LYMPHEDEMA: ICD-10-CM

## 2022-04-20 DIAGNOSIS — L97.521 ULCER OF TOE OF LEFT FOOT, LIMITED TO BREAKDOWN OF SKIN (HCC): ICD-10-CM

## 2022-04-20 DIAGNOSIS — L97.521 DIABETIC ULCER OF OTHER PART OF LEFT FOOT ASSOCIATED WITH TYPE 2 DIABETES MELLITUS, LIMITED TO BREAKDOWN OF SKIN (HCC): Primary | ICD-10-CM

## 2022-04-20 DIAGNOSIS — L97.321 DIABETIC ULCER OF LEFT ANKLE ASSOCIATED WITH TYPE 2 DIABETES MELLITUS, LIMITED TO BREAKDOWN OF SKIN (HCC): ICD-10-CM

## 2022-04-20 DIAGNOSIS — E11.42 DIABETIC POLYNEUROPATHY ASSOCIATED WITH TYPE 2 DIABETES MELLITUS (HCC): ICD-10-CM

## 2022-04-20 DIAGNOSIS — E11.622 DIABETIC ULCER OF LEFT ANKLE ASSOCIATED WITH TYPE 2 DIABETES MELLITUS, LIMITED TO BREAKDOWN OF SKIN (HCC): ICD-10-CM

## 2022-04-20 DIAGNOSIS — E11.621 DIABETIC ULCER OF OTHER PART OF LEFT FOOT ASSOCIATED WITH TYPE 2 DIABETES MELLITUS, LIMITED TO BREAKDOWN OF SKIN (HCC): Primary | ICD-10-CM

## 2022-04-20 PROCEDURE — 97597 DBRDMT OPN WND 1ST 20 CM/<: CPT | Performed by: PODIATRIST

## 2022-04-20 NOTE — PROGRESS NOTES
Patient ID: Trevor Thomas is a 69 y o  male Date of Birth 1955       Chief Complaint   Patient presents with    Follow Up Wound Care Visit     left lateral ankle and left foot/toes       Allergies:  Patient has no known allergies  Diagnosis:  1  Diabetic ulcer of other part of left foot associated with type 2 diabetes mellitus, limited to breakdown of skin (HCC)  -     Wound cleansing and dressings; Future  -     Wound compression and edema control; Future  -     Wound home care; Future    2  Ulcer of toe of left foot, limited to breakdown of skin (HCC)  -     Wound cleansing and dressings; Future  -     Wound compression and edema control; Future  -     Wound home care; Future    3  Diabetic ulcer of left ankle associated with type 2 diabetes mellitus, limited to breakdown of skin (HCC)  -     Wound cleansing and dressings; Future  -     Wound compression and edema control; Future  -     Wound home care; Future    4  Lymphedema    5  Diabetic polyneuropathy associated with type 2 diabetes mellitus ([facility] [address] )       Diagnosis ICD-10-CM Associated Orders   1  Diabetic ulcer of other part of left foot associated with type 2 diabetes mellitus, limited to breakdown of skin (HCC)  E11 621 Wound cleansing and dressings    L97 521 Wound compression and edema control     Wound home care   2  Ulcer of toe of left foot, limited to breakdown of skin (HCC)  L97 521 Wound cleansing and dressings     Wound compression and edema control     Wound home care   3  Diabetic ulcer of left ankle associated with type 2 diabetes mellitus, limited to breakdown of skin (HCC)  E11 622 Wound cleansing and dressings    L97 321 Wound compression and edema control     Wound home care   4  Lymphedema  I89 0    5  Diabetic polyneuropathy associated with type 2 diabetes mellitus ([facility])  E11 42         Assessment & Plan:  1   Diabetic Naidu grade 1 ulceration left toes and left lateral ankle, wounds were debrided through selective tissues and dressed with silver alginate dry dressing, Calmoseptine was applied to the periwound, continue Lotrisone to left anterior lower extremity for dermatitis and tinea pedis, SurePress for compression, visiting nurses and lymphedema nursing to do the same 3 times a week  He is to continue use his sequential compression pumps twice a day for 1 hour  2   Continue suppression antibiotics per Dr Tee Chisholm from Infectious Disease  3  Frequent elevation, low-sodium diet, proper protein intake and proper glycemic control was reviewed with the patient  Patient continues to be palliative care from a wound care perspective secondary to multiple comorbidities, he understands and agrees with the plan and will follow-up in 4 weeks  Debridement   Wound 01/08/20 Diabetic Ulcer Foot Left; Other (Comment)    Universal Protocol:  Consent: Verbal consent obtained  Risks and benefits: risks, benefits and alternatives were discussed  Consent given by: patient  Time out: Immediately prior to procedure a "time out" was called to verify the correct patient, procedure, equipment, support staff and site/side marked as required    Patient understanding: patient states understanding of the procedure being performed  Patient identity confirmed: verbally with patient      Performed by: physician  Debridement type: selective  Pain control: lidocaine 4%  Pre-debridement measurements  Length (cm): 4  Width (cm): 4  Depth (cm): 0 1  Surface Area (cm^2): 16  Volume (cm^3): 1 6    Post-debridement measurements  Length (cm): 4  Width (cm): 4  Depth (cm): 0 1  Percent debrided: 25%  Surface Area (cm^2): 16  Area debrided (cm^2): 4  Volume (cm^3): 1 6  Devitalized tissue debrided: biofilm, callus, necrotic debris and slough  Instrument(s) utilized: blade and forceps  Bleeding: small  Hemostasis obtained with: pressure  Procedural pain (0-10): insensate  Post-procedural pain: insensate   Response to treatment: procedure was tolerated well    Debridement Wound 12/29/21 Diabetic Ulcer Ankle Left;Lateral    Universal Protocol:  Consent: Verbal consent obtained  Risks and benefits: risks, benefits and alternatives were discussed  Consent given by: patient  Time out: Immediately prior to procedure a "time out" was called to verify the correct patient, procedure, equipment, support staff and site/side marked as required  Patient understanding: patient states understanding of the procedure being performed  Patient identity confirmed: verbally with patient      Performed by: physician  Debridement type: selective  Pain control: lidocaine 4%  Pre-debridement measurements  Length (cm): 6  Width (cm): 3  Depth (cm): 0 1  Surface Area (cm^2): 18  Volume (cm^3): 1 8    Post-debridement measurements  Length (cm): 6  Width (cm): 3  Depth (cm): 0 1  Percent debrided: 25%  Surface Area (cm^2): 18  Area debrided (cm^2): 4 5  Volume (cm^3): 1 8  Devitalized tissue debrided: biofilm, necrotic debris and slough  Instrument(s) utilized: blade, forceps and scissors  Bleeding: small  Hemostasis obtained with: pressure  Procedural pain (0-10): insensate  Post-procedural pain: insensate   Response to treatment: procedure was tolerated well           Subjective:   Patient presents today for care of left foot and ankle diabetic ulcerations, visiting nurses and lymphedema visiting nurses have been changing dressings as directed, he continues with suppressant antibiotic therapy if per Infectious Disease, he states he continues to use the sequential compression pump twice a day for 1 hour, he has no new complaints and blood sugars are well controlled          The following portions of the patient's history were reviewed and updated as appropriate:   Patient Active Problem List   Diagnosis    Controlled type 2 diabetes mellitus with complication, without long-term current use of insulin ([facility] [address] )    Essential hypertension    HLD (hyperlipidemia)    Hypothyroidism    Celiac disease    Coronary artery disease    History of duodenal ulcer    Living will on file    Morbid obesity with BMI of 50 0-59 9, adult ([facility])    S/P BKA (below knee amputation) unilateral, right ([facility])    Paroxysmal atrial fibrillation ([facility])    Iron deficiency anemia due to chronic blood loss    Chronic venous stasis dermatitis of left lower extremity    Gastroesophageal reflux disease without esophagitis    Diabetic ulcer of left foot associated with type 2 diabetes mellitus, limited to breakdown of skin ([facility])    Idiopathic chronic venous hypertension of left lower extremity with ulcer ([facility])    Non-pressure chronic ulcer of left calf, limited to breakdown of skin ([address]    Diabetic polyneuropathy associated with type 2 diabetes mellitus ([facility])    Cellulitis of left lower extremity    Sepsis ([address] )    Abnormal urinalysis    Ulcer of toe of left foot, limited to breakdown of skin ([address] )    Diabetic ulcer of left ankle associated with type 2 diabetes mellitus, limited to breakdown of skin ([address] )    Dermatophytosis     Past Medical History:   Diagnosis Date    Atrial fibrillation ([facility] [address] )     Cellulitis     Diabetes mellitus ([facility] [address] )     Disease of thyroid gland     Duodenal ulcer     perforated- ICU stay    High blood pressure     High cholesterol     Hyperlipidemia     Hypertension     Hypothyroidism     Lymphedema      b/l LE- was followed at wound center    Morbid obesity with BMI of 40 0-44 9, adult ([facility] [address] )     Peritonitis ([facility] [address] )      Past Surgical History:   Procedure Laterality Date    BELOW KNEE LEG AMPUTATION Right     DIAGNOSTIC LAPAROSCOPY      KNEE ARTHROSCOPY Bilateral     OTHER SURGICAL HISTORY  03/2014     lap repair    OTHER SURGICAL HISTORY      Laparoscopic repair of a perforated duodenal ulcer with Orlie Dys omental    OTHER SURGICAL HISTORY      Placement of drains     Social History     Socioeconomic History    Marital status: Single     Spouse name: Not on file    Number of children: Not on file    Years of education: Not on file    Highest education level: Not on file   Occupational History    Not on file   Tobacco Use    Smoking status: Former Smoker     Packs/day: 1 50     Years: 25 00     Pack years: 37 50     Types: Cigarettes     Quit date: 2004     Years since quittin 4    Smokeless tobacco: Never Used   Vaping Use    Vaping Use: Never used   Substance and Sexual Activity    Alcohol use: Not Currently     Alcohol/week: 1 0 standard drink     Types: 1 Standard drinks or equivalent per week    Drug use: Never    Sexual activity: Not Currently   Other Topics Concern    Not on file   Social History Narrative    Former smoker: Quit 3/31/2012 - As per Care Everywhere      Social Determinants of Health     Financial Resource Strain: Not on file   Food Insecurity: Not on file   Transportation Needs: Not on file   Physical Activity: Not on file   Stress: Not on file   Social Connections: Not on file   Intimate Partner Violence: Not on file   Housing Stability: Not on file        Current Outpatient Medications:     ammonium lactate (LAC-HYDRIN) 12 % lotion, Apply topically 2 (two) times a day as needed for dry skin, Disp: 400 g, Rfl: 2    aspirin (ASPIRIN 81) 81 mg EC tablet, Take 81 mg by mouth Daily, Disp: , Rfl:     cefadroxil (DURICEF) 500 mg capsule, TAKE ONE CAPSULE BY MOUTH EVERY 12 HOURS, Disp: 60 capsule, Rfl: 0    gabapentin (NEURONTIN) 100 mg capsule, Take 1 capsule (100 mg total) by mouth 3 (three) times a day (Patient taking differently: Take 100 mg by mouth 2 (two) times a day  ), Disp: 270 capsule, Rfl: 1    ketoconazole (NIZORAL) 2 % cream, Apply topically daily, Disp: 60 g, Rfl: 1    levothyroxine 25 mcg tablet, Take 1 tablet (25 mcg total) by mouth daily In addition to the 300 mcg dose (stop the 75 mcg dose), Disp: 90 tablet, Rfl: 1    levothyroxine 300 MCG tablet, Take 1 tablet (300 mcg total) by mouth daily In addition to 75 mcg dose, Disp: 90 tablet, Rfl: 1    lisinopril (ZESTRIL) 20 mg tablet, Take 1 tablet (20 mg total) by mouth daily, Disp: 90 tablet, Rfl: 1    metFORMIN (GLUCOPHAGE-XR) 750 mg 24 hr tablet, Take 1 tablet (750 mg total) by mouth daily with breakfast, Disp: 90 tablet, Rfl: 1    metoprolol tartrate (LOPRESSOR) 50 mg tablet, Take 1 tablet (50 mg total) by mouth 2 (two) times a day, Disp: 180 tablet, Rfl: 1    Multiple Vitamins-Minerals (MULTIVITAMIN MEN 50+ PO), Take by mouth, Disp: , Rfl:     pantoprazole (PROTONIX) 40 mg tablet, Take 1 tablet (40 mg total) by mouth daily, Disp: 90 tablet, Rfl: 1    simvastatin (ZOCOR) 10 mg tablet, Take 1 tablet (10 mg total) by mouth daily at bedtime, Disp: 90 tablet, Rfl: 0    Zoster Vac Recomb Adjuvanted (Shingrix) 50 MCG/0 5ML SUSR, 0 5mL IM for one dose, followed by 0 5mL IM 2-6 months after first dose (Patient not taking: Reported on 8/9/2021), Disp: 1 each, Rfl: 1  Family History   Problem Relation Age of Onset    Heart disease Family     Alcohol abuse Family     Heart disease Mother     Hypertension Mother    Shanda Seller Migraines Daughter     Leukemia Brother     Migraines Daughter     Substance Abuse Neg Hx     Mental illness Neg Hx     Depression Neg Hx        Review of Systems   Constitutional: Negative for chills and fever  HENT: Negative for ear pain and sore throat  Eyes: Negative for pain and visual disturbance  Respiratory: Negative for cough and shortness of breath  Cardiovascular: Negative for chest pain and palpitations  Gastrointestinal: Negative for abdominal pain and vomiting  Genitourinary: Negative for dysuria and hematuria  Musculoskeletal: Positive for gait problem  Negative for arthralgias and back pain  Skin: Positive for color change and wound  Negative for rash  Neurological: Positive for numbness  Negative for seizures and syncope  Psychiatric/Behavioral: Negative  All other systems reviewed and are negative          Objective:  BP (!) 185/92   Pulse 99 Temp (!) 96 8 °F (36 °C)   Resp 20     Physical Exam  Constitutional:       Appearance: Normal appearance  He is obese  HENT:      Head: Normocephalic and atraumatic  Right Ear: External ear normal       Left Ear: External ear normal       Nose: Nose normal    Eyes:      Conjunctiva/sclera: Conjunctivae normal    Cardiovascular:      Pulses: Normal pulses  Dorsalis pedis pulses are 2+ on the left side  Posterior tibial pulses are 2+ on the left side  Pulmonary:      Effort: Pulmonary effort is normal    Musculoskeletal:      Cervical back: Normal range of motion  Left lower le+ Edema present  Comments: BKA right   Skin:     General: Skin is warm and dry  Capillary Refill: Capillary refill takes less than 2 seconds  Comments: See detailed wound assessment   Neurological:      General: No focal deficit present  Mental Status: He is alert and oriented to person, place, and time  Mental status is at baseline  Sensory: Sensory deficit present  Coordination: Coordination abnormal       Gait: Gait abnormal       Deep Tendon Reflexes: Reflexes abnormal    Psychiatric:         Mood and Affect: Mood normal          Behavior: Behavior normal          Thought Content: Thought content normal          Judgment: Judgment normal            Wound 20 Diabetic Ulcer Foot Left; Other (Comment) (Active)   Wound Image    22 1332   Wound Description Epithelialization;Pink;Yellow 22 1335   Jovita-wound Assessment Dry;Edema; Erythema;Scaly 22 1335   Wound Length (cm) 4 cm 22 1335   Wound Width (cm) 4 cm 22 1335   Wound Depth (cm) 0 1 cm 22 1335   Wound Surface Area (cm^2) 16 cm^2 22 1335   Wound Volume (cm^3) 1 6 cm^3 22 1335   Calculated Wound Volume (cm^3) 1 6 cm^3 22 1335   Change in Wound Size % -5 96 22 1335   Drainage Amount Small 22 1335   Drainage Description Serous 22 1335   Non-staged Wound Description Full thickness 04/20/22 1335   Treatments Cleansed 11/11/20 1320   Wound packed? No 12/29/21 1332   Dressing Changed Changed 02/10/21 1341   Patient Tolerance Tolerated well 02/10/21 1341   Dressing Status Intact 04/20/22 1335       Wound 12/29/21 Diabetic Ulcer Ankle Left;Lateral (Active)   Wound Image   04/20/22 1400   Wound Description Pink;Yellow;Epithelialization 04/20/22 1335   Jovita-wound Assessment Scaly;Edema; Erythema;Dry;Hyperpigmented 04/20/22 1335   Wound Length (cm) 6 cm 04/20/22 1335   Wound Width (cm) 3 cm 04/20/22 1335   Wound Depth (cm) 0 1 cm 04/20/22 1335   Wound Surface Area (cm^2) 18 cm^2 04/20/22 1335   Wound Volume (cm^3) 1 8 cm^3 04/20/22 1335   Calculated Wound Volume (cm^3) 1 8 cm^3 04/20/22 1335   Change in Wound Size % -104 55 04/20/22 1335   Drainage Amount Small 04/20/22 1335   Drainage Description Serous 04/20/22 1335   Non-staged Wound Description Full thickness 04/20/22 1335   Wound packed? No 12/29/21 1346   Dressing Status Intact 04/20/22 1335          Results from last 6 Months   Lab Units 02/02/22  1411   WOUND CULTURE  2+ Growth of Staphylococcus aureus*  2+ Growth of Enterococcus faecalis*  3+ Growth of - Mixed bacteria including gram negative rods x3  /         No results found  Wound Instructions:  Orders Placed This Encounter   Procedures    Wound cleansing and dressings     LLE and left foot wounds:  Wound care MWF  May remove dressing and take shower just prior to dressing change  If unable to shower then home health please wash patient's leg and foot     Cleanse wounds with NSS or wound cleanser, pat dry       Apply calmoseptine to periwound ONLY IF MACERATION IS NOTED   Apply Lotrisone (prescribed today) to entire leg       Apply durafiber with silver cut to fit weeping areas including in between toes  Cover with ABDs  Secure with Kerlix and tape      This was applied today             Standing Status:   Future     Standing Expiration Date:   4/20/2023  Wound compression and edema control     LLE   Surepress from base of toes to base of knee; cover with spandagrip to help keep surepress intact  Avoid prolonged standing in one place  Elevate leg(s) above the level of the heart when sitting or as much as possible      This was applied today  Standing Status:   Future     Standing Expiration Date:   4/20/2023    Wound home care     SL VNA     Standing Status:   Future     Standing Expiration Date:   4/20/2023         Saud Means DPM, FACFAS    Portions of the record may have been created with voice recognition software  Occasional wrong word or "sound a like" substitutions may have occurred due to the inherent limitations of voice recognition software  Read the chart carefully and recognize, using context, where substitutions have occurred

## 2022-04-20 NOTE — RESULT ENCOUNTER NOTE
Patient called back and I Reviewed addendum with patient, he understands need for repeat evaluation with primary physician and repeat CT scan

## 2022-04-20 NOTE — PATIENT INSTRUCTIONS
Orders Placed This Encounter   Procedures    Wound cleansing and dressings     LLE and left foot wounds:  Wound care MWF  May remove dressing and take shower just prior to dressing change  If unable to shower then home health please wash patient's leg and foot  Cleanse wounds with NSS or wound cleanser, pat dry       Apply calmoseptine to periwound ONLY IF MACERATION IS NOTED   Apply Lotrisone (prescribed today) to entire leg       Apply durafiber with silver cut to fit weeping areas including in between toes  Cover with ABDs  Secure with Kerlix and tape      This was applied today             Standing Status:   Future     Standing Expiration Date:   4/20/2023    Wound compression and edema control     LLE   Surepress from base of toes to base of knee; cover with spandagrip to help keep surepress intact  Avoid prolonged standing in one place  Elevate leg(s) above the level of the heart when sitting or as much as possible      This was applied today       Standing Status:   Future     Standing Expiration Date:   4/20/2023    Wound home care     SL VNA     Standing Status:   Future     Standing Expiration Date:   4/20/2023

## 2022-04-20 NOTE — TELEPHONE ENCOUNTER
Joyce Carballo from radiology called to discuss the addendum Erin is  requesting for pt    She can be reached at the following extension  [phone]

## 2022-04-21 NOTE — TELEPHONE ENCOUNTER
Phone call placed to Osman Brunson  She states that she has tiger text  the provider   This has been addressed

## 2022-04-22 NOTE — TELEPHONE ENCOUNTER
Please let the patient know that I had called radiology to compare his CT scan from June with previous scans  It looks like the lymph node in his groin was larger in June than a prior study in 2014  I'd like him to repeat the CT to assess for any change in this  Advise him not to take metformin the day before or the day of the CT scan

## 2022-04-26 NOTE — TELEPHONE ENCOUNTER
Pt aware of previous message  CT scan was already schedule 05/13/22 radiology called  Aware not to take metformin day before or after

## 2022-04-30 NOTE — PROGRESS NOTES
Dr. Brock notified of recurrent late decelerations and patient's blood pressure of 69/34. Per MD, will continue to reposition and continue to give medication to elevate patient's blood pressure.    Vancomycin IV Pharmacy-to-Dose Consultation     Luanne Ramos is a 74 y o  male who is currently receiving Vancomycin IV with management by the Pharmacy Consult service  Relevant clinical data and objective / subjective history reviewed  Vancomycin Assessment:  Indication: skin-soft tissue infection    Renal Function: Scr 0 92, CrCl 138 mL/min (6/10/21)  Potential Nephrotoxicity Factors:  Medications: lisinopril  Patient-Factors: elderly  Days of Therapy: 3  Current Dose: 1250 mg Q12H   Goal Trough: 15-20 (appropriate for most indications)   Goal AUC(24h): 400-600  Last Level: 10 9      Vancomycin Plan:  New Dosing: change to 1250mg IV Q8H starting at 1600 (Predicted Trough / AUC: 18 9 / 585)  Next Level: trough 6/11 at 1530        Pharmacy will continue to follow closely for s/sx of nephrotoxicity, infusion reactions and appropriateness of therapy  We will continue to follow the patients culture results and clinical progress daily        Melecio Mckinney

## 2022-05-11 ENCOUNTER — HOSPITAL ENCOUNTER (OUTPATIENT)
Dept: RADIOLOGY | Facility: HOSPITAL | Age: 67
Discharge: HOME | End: 2022-05-11
Payer: MEDICARE

## 2022-05-11 DIAGNOSIS — R59.0 INGUINAL LYMPHADENOPATHY: ICD-10-CM

## 2022-05-11 PROCEDURE — 72192 CT PELVIS W/O DYE: CPT

## 2022-05-11 PROCEDURE — G1004 CDSM NDSC: HCPCS

## 2022-05-11 NOTE — LETTER
[address] [place] [address]  [address] [facility] [address]      May 18, 2022    MRN: [id number]     Phone: [phone]     Dear Mr Bianka Woods recently had a(n) Cat Scan performed on 5/11/2022 at  [address] [place] [address] that was requested by Angel Quevedo MD  The study was reviewed by a radiologist, which is a physician who specializes in medical imaging  The radiologist issued a report describing his or her findings  In that report there was a finding that the radiologist felt warranted further discussion with your health care provider and that discussion would be beneficial to you  The results were sent to Angel Quevedo MD on 05/12/2022  3:37 PM  We recommend that you contact Angel Quevedo MD at [phone] or set up an appointment to discuss the results of the imaging test  If you have already heard from Angel Quevedo MD regarding the results of your study, you can disregard this letter  This letter is not meant to alarm you, but intended to encourage you to follow-up on your results with the provider that sent you for the imaging study  In addition, we have enclosed answers to frequently asked questions by other patients who have also received a letter to review results with their health care provider (see page two)  Thank you for choosing [address] [place] [address] for your medical imaging needs  FREQUENTLY ASKED QUESTIONS    1  Why am I receiving this letter? [facility] requires us to notify patients who have findings on imaging exams that may require more testing or follow-up with a health professional within the next 3 months          2  How serious is the finding on the imaging test?  This letter is sent to all patients who may need follow-up or more testing within the next 3 months  Receiving this letter does not necessarily mean you have a life-threatening imaging finding or disease  Recommendations in the radiologists imaging report are general in nature and it is up to your healthcare provider to say whether those recommendations make sense for your situation  You are strongly encouraged to talk to your health care provider about the results and ask whether additional steps need to be taken  3  Where can I get a copy of the final report for my recent radiology exam?  To get a full copy of the report you can access your records online at [email or web address] or please contact [address] [place] [address] Records Department at [phone] Monday through Friday between 8 am and 6 pm          4  What do I need to do now? Please contact your health care provider who requested the imaging study to discuss what further actions (if any) are needed  You may have already heard from (your ordering provider) in regard to this test in which case you can disregard this letter  NOTICE IN ACCORDANCE WITH THE [facility] PATIENT TEST RESULT INFORMATION ACT OF 2018    You are receiving this notice as a result of a determination by your diagnostic imaging service that further discussions of your test results are warranted and would be beneficial to you  The complete results of your test or tests have been or will be sent to the health care practitioner that ordered the test or tests  It is recommended that you contact your health care practitioner to discuss your results as soon as possible

## 2022-05-23 NOTE — RESULT ENCOUNTER NOTE
Samir Gibson,  The CT scan shows continued lymph nodes in the groin area on both sides  I suspect this may be related to the ongoing wound concerns as the larger lymph nodes are on the left side  I am going to forward the result to Dr Devan Lucas as well regarding any further evaluation of these lesions now vs following up to be sure that the lymph nodes are stable in a few months  Krysta Velez MD

## 2022-05-25 ENCOUNTER — OFFICE VISIT (OUTPATIENT)
Dept: WOUND CARE | Facility: HOSPITAL | Age: 67
End: 2022-05-25
Payer: MEDICARE

## 2022-05-25 VITALS
RESPIRATION RATE: 20 BRPM | DIASTOLIC BLOOD PRESSURE: 81 MMHG | HEART RATE: 86 BPM | SYSTOLIC BLOOD PRESSURE: 176 MMHG | TEMPERATURE: 97.8 F

## 2022-05-25 DIAGNOSIS — E11.42 DIABETIC POLYNEUROPATHY ASSOCIATED WITH TYPE 2 DIABETES MELLITUS (HCC): ICD-10-CM

## 2022-05-25 DIAGNOSIS — I89.0 LYMPHEDEMA: ICD-10-CM

## 2022-05-25 DIAGNOSIS — E11.621 DIABETIC ULCER OF OTHER PART OF LEFT FOOT ASSOCIATED WITH TYPE 2 DIABETES MELLITUS, LIMITED TO BREAKDOWN OF SKIN (HCC): Primary | ICD-10-CM

## 2022-05-25 DIAGNOSIS — E11.622 DIABETIC ULCER OF LEFT ANKLE ASSOCIATED WITH TYPE 2 DIABETES MELLITUS, LIMITED TO BREAKDOWN OF SKIN (HCC): ICD-10-CM

## 2022-05-25 DIAGNOSIS — L97.521 DIABETIC ULCER OF OTHER PART OF LEFT FOOT ASSOCIATED WITH TYPE 2 DIABETES MELLITUS, LIMITED TO BREAKDOWN OF SKIN (HCC): Primary | ICD-10-CM

## 2022-05-25 DIAGNOSIS — L97.321 DIABETIC ULCER OF LEFT ANKLE ASSOCIATED WITH TYPE 2 DIABETES MELLITUS, LIMITED TO BREAKDOWN OF SKIN (HCC): ICD-10-CM

## 2022-05-25 DIAGNOSIS — L97.521 ULCER OF TOE OF LEFT FOOT, LIMITED TO BREAKDOWN OF SKIN (HCC): ICD-10-CM

## 2022-05-25 PROCEDURE — 99212 OFFICE O/P EST SF 10 MIN: CPT | Performed by: PODIATRIST

## 2022-05-25 NOTE — PATIENT INSTRUCTIONS
Orders Placed This Encounter   Procedures    Wound compression and edema control     Wound compression and edema control      LLE   Surepress from base of toes to base of knee; cover with spandagrip to help keep surepress intact  Avoid prolonged standing in one place  Elevate leg(s) above the level of the heart when sitting or as much as possible  Standing Status:   Future     Standing Expiration Date:   5/25/2023    Wound cleansing and dressings     Left Ankle wound:  Wound care MWF  May remove dressing and take shower just prior to dressing change  If unable to shower then home health please wash patient's leg and foot  Cleanse wounds with NSS or wound cleanser, pat dry  Apply Lotrisone (prescribed today) to entire leg      Cover with ABD pad of left ankle wound  Secure with Kerlix and tape  Left Foot wound is healed today         This was applied today  Standing Status:   Future     Standing Expiration Date:   5/25/2023    Diabetic Shoe     1 pair diabetic shoes, 3 custom-molded inserts left foot     Order Specific Question:   Type     Answer:    Off the Shelf Vital Signs Last 24 Hrs  T(C): 36.5 (28 Apr 2020 01:01), Max: 36.7 (27 Apr 2020 21:50)  T(F): 97.7 (28 Apr 2020 01:01), Max: 98.1 (27 Apr 2020 21:50)  HR: 69 (28 Apr 2020 01:05) (66 - 69)  BP: 111/56 (28 Apr 2020 01:05) (111/56 - 136/84)  RR: 14 (28 Apr 2020 01:01) (14 - 16)    SSE: grossly ruptured, pooling of fluid with light mec  SVE: 1/50/-2  FHT: baseline 125, moderate variability, + accels, - decels  [place]: irregular contractions Vital Signs Last 24 Hrs  T(C): 36.5 (28 Apr 2020 01:01), Max: 36.7 (27 Apr 2020 21:50)  T(F): 97.7 (28 Apr 2020 01:01), Max: 98.1 (27 Apr 2020 21:50)  HR: 69 (28 Apr 2020 01:05) (66 - 69)  BP: 111/56 (28 Apr 2020 01:05) (111/56 - 136/84)  RR: 14 (28 Apr 2020 01:01) (14 - 16)    SSE: grossly ruptured, pooling of fluid with light mec  SVE: 1/50/-2  FHT: baseline 125, moderate variability, + accels, - decels  [place]: ctx q5min

## 2022-05-25 NOTE — PROGRESS NOTES
Patient ID: Akila Tam is a 69 y o  male Date of Birth 1955       Chief Complaint   Patient presents with    Follow Up Wound Care Visit     Left Foot and left ankle wounds       Allergies:  Patient has no known allergies  Diagnosis:  1  Diabetic ulcer of other part of left foot associated with type 2 diabetes mellitus, limited to breakdown of skin ([facility] )  -     Wound compression and edema control; Future    2  Ulcer of toe of left foot, limited to breakdown of skin ([facility] )  -     Wound compression and edema control; Future    3  Diabetic ulcer of left ankle associated with type 2 diabetes mellitus, limited to breakdown of skin ([facility])  -     Wound cleansing and dressings; Future    4  Lymphedema    5  Diabetic polyneuropathy associated with type 2 diabetes mellitus ([facility] )  -     Diabetic Shoe       Diagnosis ICD-10-CM Associated Orders   1  Diabetic ulcer of other part of left foot associated with type 2 diabetes mellitus, limited to breakdown of skin (HCC)  E11 621 Wound compression and edema control    L97 521    2  Ulcer of toe of left foot, limited to breakdown of skin ([facility])  L97 521 Wound compression and edema control   3  Diabetic ulcer of left ankle associated with type 2 diabetes mellitus, limited to breakdown of skin ([facility])  E11 622 Wound cleansing and dressings    L97 321    4  Lymphedema  I89 0    5  Diabetic polyneuropathy associated with type 2 diabetes mellitus ([facility] )  E11 42 Diabetic Shoe        Assessment & Plan:  1  Diabetic Naidu grade 0 ulceration dorsal aspect left foot is 100% resolved and epithelialized with mature skin, discontinue all dressings  2  Diabetic Naidu grade 1 ulceration lateral left ankle, wound was dressed with ABD pad dry dressing, SurePress for compression to left lower extremity from toes to tibial tuberosity  Lotrisone was applied to left lower extremity as patient has history of tinea pedis    3  Patient to continue with visiting nurses and lymphedema nursing for 3 times a week dressing changes, continue frequent elevation, low-sodium diet, proper protein intake, proper glycemic control, use of sequential compression pump for twice a day for 1 hour  4  Patient is to contact [employer] to see if they can make extra long below knee compression stockings as patient is quite tall and standard ones are too short and create more complications  Patient does have [place] wraps at home which she can transition to when his wound heals temporarily  5  Patient was given a prescription for 1 pair diabetic shoes and 3 custom-molded inserts for the left as his current shoes are no longer supportive and appropriate  6  Patient understands and agrees with the plan and will follow-up in 4 weeks  Procedures - none     Subjective:   Patient presents today for care of left lower extremity diabetic ulcerations complicated by lymphedema and venous hypertension  He is continuing to take his suppressive antibiotics per Infectious Disease, he continues with visiting nurses and lymphedema nursing at home  He states his leg is looking so much better and he is very pleased  States blood sugars well controlled and no new complaints          The following portions of the patient's history were reviewed and updated as appropriate:   Patient Active Problem List   Diagnosis    Controlled type 2 diabetes mellitus with complication, without long-term current use of insulin ([facility] )    Essential hypertension    HLD (hyperlipidemia)    Hypothyroidism    Celiac disease    Coronary artery disease    History of duodenal ulcer    Living will on file    Morbid obesity with BMI of 50 0-59 9, adult ([address] )    S/P BKA (below knee amputation) unilateral, right (HCC)    Paroxysmal atrial fibrillation (HCC)    Iron deficiency anemia due to chronic blood loss    Chronic venous stasis dermatitis of left lower extremity    Gastroesophageal reflux disease without esophagitis    Diabetic ulcer of left foot associated with type 2 diabetes mellitus, limited to breakdown of skin ([facility] )    Idiopathic chronic venous hypertension of left lower extremity with ulcer ([facility])    Non-pressure chronic ulcer of left calf, limited to breakdown of skin ([facility] )    Diabetic polyneuropathy associated with type 2 diabetes mellitus ([facility])    Cellulitis of left lower extremity    Sepsis ([facility] )    Abnormal urinalysis    Ulcer of toe of left foot, limited to breakdown of skin ([facility] )    Diabetic ulcer of left ankle associated with type 2 diabetes mellitus, limited to breakdown of skin ([place] [address] )    Dermatophytosis     Past Medical History:   Diagnosis Date    Atrial fibrillation ([facility])     Cellulitis     Diabetes mellitus ([facility] )     Disease of thyroid gland     Duodenal ulcer     perforated- ICU stay    High blood pressure     High cholesterol     Hyperlipidemia     Hypertension     Hypothyroidism     Lymphedema      b/l LE- was followed at wound center    Morbid obesity with BMI of 40 0-44 9, adult ([place] [address] )     Peritonitis ([place] [address] )      Past Surgical History:   Procedure Laterality Date    BELOW KNEE LEG AMPUTATION Right     DIAGNOSTIC LAPAROSCOPY      KNEE ARTHROSCOPY Bilateral     OTHER SURGICAL HISTORY  2014     lap repair    OTHER SURGICAL HISTORY      Laparoscopic repair of a perforated duodenal ulcer with [place] omental    OTHER SURGICAL HISTORY      Placement of drains     Social History     Socioeconomic History    Marital status: Single     Spouse name: None    Number of children: None    Years of education: None    Highest education level: None   Occupational History    None   Tobacco Use    Smoking status: Former Smoker     Packs/day: 1 50     Years: 25 00     Pack years: 37 50     Types: Cigarettes     Quit date: 2004     Years since quittin 5    Smokeless tobacco: Never Used   Vaping Use    Vaping Use: Never used   Substance and Sexual Activity    Alcohol use: Not Currently     Alcohol/week: 1 0 standard drink     Types: 1 Standard drinks or equivalent per week    Drug use: Never    Sexual activity: Not Currently   Other Topics Concern    None   Social History Narrative    Former smoker: Quit 3/31/2012 - As per Care Everywhere      Social Determinants of Health     Financial Resource Strain: Not on file   Food Insecurity: Not on file   Transportation Needs: Not on file   Physical Activity: Not on file   Stress: Not on file   Social Connections: Not on file   Intimate Partner Violence: Not on file   Housing Stability: Not on file        Current Outpatient Medications:     ammonium lactate (LAC-HYDRIN) 12 % lotion, Apply topically 2 (two) times a day as needed for dry skin, Disp: 400 g, Rfl: 2    aspirin (ASPIRIN 81) 81 mg EC tablet, Take 81 mg by mouth Daily, Disp: , Rfl:     gabapentin (NEURONTIN) 100 mg capsule, Take 1 capsule (100 mg total) by mouth 3 (three) times a day (Patient taking differently: Take 100 mg by mouth 2 (two) times a day  ), Disp: 270 capsule, Rfl: 1    ketoconazole (NIZORAL) 2 % cream, Apply topically daily, Disp: 60 g, Rfl: 1    levothyroxine 25 mcg tablet, Take 1 tablet (25 mcg total) by mouth daily In addition to the 300 mcg dose (stop the 75 mcg dose), Disp: 90 tablet, Rfl: 1    levothyroxine 300 MCG tablet, Take 1 tablet (300 mcg total) by mouth daily In addition to 75 mcg dose, Disp: 90 tablet, Rfl: 1    lisinopril (ZESTRIL) 20 mg tablet, Take 1 tablet (20 mg total) by mouth daily, Disp: 90 tablet, Rfl: 1    metFORMIN (GLUCOPHAGE-XR) 750 mg 24 hr tablet, Take 1 tablet (750 mg total) by mouth daily with breakfast, Disp: 90 tablet, Rfl: 1    metoprolol tartrate (LOPRESSOR) 50 mg tablet, Take 1 tablet (50 mg total) by mouth 2 (two) times a day, Disp: 180 tablet, Rfl: 1    Multiple Vitamins-Minerals (MULTIVITAMIN MEN 50+ PO), Take by mouth, Disp: , Rfl:     pantoprazole (PROTONIX) 40 mg tablet, Take 1 tablet (40 mg total) by mouth daily, Disp: 90 tablet, Rfl: 1   simvastatin (ZOCOR) 10 mg tablet, Take 1 tablet (10 mg total) by mouth daily at bedtime, Disp: 90 tablet, Rfl: 0    Zoster Vac Recomb Adjuvanted (Shingrix) 50 MCG/0 5ML SUSR, 0 5mL IM for one dose, followed by 0 5mL IM 2-6 months after first dose (Patient not taking: Reported on 8/9/2021), Disp: 1 each, Rfl: 1  Family History   Problem Relation Age of Onset    Heart disease Family     Alcohol abuse Family     Heart disease Mother     Hypertension Mother    Ty Pablo Migraines Daughter     Leukemia Brother     Migraines Daughter     Substance Abuse Neg Hx     Mental illness Neg Hx     Depression Neg Hx        Review of Systems   Constitutional: Negative for chills and fever  HENT: Negative for ear pain and sore throat  Eyes: Negative for pain and visual disturbance  Respiratory: Negative for cough and shortness of breath  Cardiovascular: Negative for chest pain and palpitations  Gastrointestinal: Negative for abdominal pain and vomiting  Genitourinary: Negative for dysuria and hematuria  Musculoskeletal: Positive for gait problem  Negative for arthralgias and back pain  Skin: Positive for color change and wound  Negative for rash  Neurological: Positive for numbness  Negative for seizures and syncope  Psychiatric/Behavioral: Negative  All other systems reviewed and are negative  Objective:  BP (!) 176/81   Pulse 86   Temp 97 8 °F (36 6 °C)   Resp 20     Physical Exam  Constitutional:       Appearance: Normal appearance  He is obese  HENT:      Head: Normocephalic and atraumatic  Right Ear: External ear normal       Left Ear: External ear normal       Nose: Nose normal    Eyes:      Conjunctiva/sclera: Conjunctivae normal    Cardiovascular:      Pulses: Normal pulses  Dorsalis pedis pulses are 2+ on the right side  Posterior tibial pulses are 2+ on the right side     Pulmonary:      Effort: Pulmonary effort is normal    Musculoskeletal:      Cervical back: Normal range of motion  Left lower le+ Edema present  Comments: BKA right   Skin:     General: Skin is warm and dry  Capillary Refill: Capillary refill takes less than 2 seconds  Comments: See detailed wound assessment   Neurological:      General: No focal deficit present  Mental Status: He is alert and oriented to person, place, and time  Mental status is at baseline  Sensory: Sensory deficit present  Coordination: Coordination abnormal       Gait: Gait abnormal       Deep Tendon Reflexes: Reflexes abnormal    Psychiatric:         Mood and Affect: Mood normal          Behavior: Behavior normal          Thought Content: Thought content normal          Judgment: Judgment normal            Wound 20 Diabetic Ulcer Foot Left; Other (Comment) (Active)   Wound Image    22 1312   Wound Description Epithelialization 22 1312   [place]-wound Assessment Edema;Dry; Intact 22 1312   Wound Length (cm) 0 cm 22 1312   Wound Width (cm) 0 cm 22 1312   Wound Depth (cm) 0 cm 22 1312   Wound Surface Area (cm^2) 0 cm^2 22 1312   Wound Volume (cm^3) 0 cm^3 22 1312   Calculated Wound Volume (cm^3) 0 cm^3 22 1312   Change in Wound Size % 100 22 1312   Drainage Amount None 22 1312   Drainage Description Serous 22 1335   Non-staged Wound Description Not applicable  1291   Treatments Cleansed 20 1320   Wound packed? No 21 1332   Dressing Changed Changed 02/10/21 1341   Patient Tolerance Tolerated well 02/10/21 1341   Dressing Status Intact 22 1312       Wound 21 Diabetic Ulcer Ankle Left;Lateral (Active)   Wound Image   22 1308   Wound Description Epithelialization;Pink 22 1310   Jovita-wound Assessment Scaly;Edema;Dry;Hyperpigmented; Purple;Brown 22 1310   Wound Length (cm) 1 5 cm 22 1310   Wound Width (cm) 1 cm 22 1310   Wound Depth (cm) 0 1 cm 22 1310   Wound Surface Area (cm^2) 1 5 cm^2 05/25/22 1310   Wound Volume (cm^3) 0 15 cm^3 05/25/22 1310   Calculated Wound Volume (cm^3) 0 15 cm^3 05/25/22 1310   Change in Wound Size % 82 95 05/25/22 1310   Drainage Amount Scant 05/25/22 1310   Drainage Description Serosanguineous 05/25/22 1310   Non-staged Wound Description Full thickness 05/25/22 1310   Wound packed? No 12/29/21 1346   Dressing Status Intact 05/25/22 1310          Results from last 6 Months   Lab Units 02/02/22  1411   WOUND CULTURE  2+ Growth of Staphylococcus aureus*  2+ Growth of Enterococcus faecalis*  3+ Growth of - Mixed bacteria including gram negative rods x3  /         CT pelvis wo contrast    Result Date: 5/12/2022  Narrative: CT PELVIS WITHOUT IV CONTRAST INDICATION:   R59 0: Localized enlarged lymph nodes  COMPARISON:  March 17, 2014; June 8, 2021 TECHNIQUE: CT examination of the pelvis was performed without intravenous contrast  This examination was performed without intravenous contrast in the context of the critical nationwide Omnipaque shortage  Axial, sagittal, and coronal 2D reformatted images were created from the source data and submitted for interpretation  Radiation dose length product (DLP) for this visit:  973 68 mGy-cm   This examination, like all CT scans performed in the [facility], was performed utilizing techniques to minimize radiation dose exposure, including the use of iterative  reconstruction and automated exposure control  FINDINGS: VISUALIZED KIDNEYS/URETERS:  No significant abnormality identified in the partially imaged kidneys and ureters  REPRODUCTIVE ORGANS:  Unremarkable for patient's age  URINARY BLADDER:  Unremarkable  APPENDIX:  No findings to suggest appendicitis  VISUALIZED BOWEL:  Unremarkable  ABDOMINOPELVIC CAVITY:  No ascites or free intraperitoneal air  No lymphadenopathy  VISUALIZED VESSELS:  Unremarkable for patient's age    ABDOMINOPELVIC WALL/INGUINAL REGIONS: The previously measured 1 7 cm short axis left inguinal lymph node has decreased in size now measuring 1 4 cm  There are multiple smaller lymph nodes which are stable  However, imaging on this study goes more inferior than the previous exams and demonstrate a enlarged 2 5 cm short axis left inguinal lymph node  Several nonpathologically enlarged right inguinal lymph nodes are present  However, more inferiorly in the area previously not imaged is a 1 7 cm short axis diameter inguinal lymph node  OSSEOUS STRUCTURES:  No acute fracture or destructive osseous lesion  Impression: Bilateral inguinal lymphadenopathy with a decrease in the size of the previously measured lymph node  Enlarged lymph nodes were not previously imaged and stability cannot be determined  The study was marked in EPIC for significant notification  Workstation performed: [id number]       Wound Instructions:  Orders Placed This Encounter   Procedures    Wound compression and edema control     Wound compression and edema control      LLE   Surepress from base of toes to base of knee; cover with spandagrip to help keep surepress intact  Avoid prolonged standing in one place  Elevate leg(s) above the level of the heart when sitting or as much as possible  Standing Status:   Future     Standing Expiration Date:   5/25/2023    Wound cleansing and dressings     Left Ankle wound:  Wound care MWF  May remove dressing and take shower just prior to dressing change  If unable to shower then home health please wash patient's leg and foot  Cleanse wounds with NSS or wound cleanser, pat dry       Apply Lotrisone (prescribed today) to entire leg      Cover with ABD pad of left ankle wound  Secure with Kerlix and tape      Left Foot wound is healed today         This was applied today                                                 Standing Status:   Future     Standing Expiration Date:   5/25/2023    Diabetic Shoe     1 pair diabetic shoes, 3 custom-molded inserts left foot     Order Specific Question:   Type     Answer: Off the [facility], DPM, FACFAS    Portions of the record may have been created with voice recognition software  Occasional wrong word or "sound a like" substitutions may have occurred due to the inherent limitations of voice recognition software  Read the chart carefully and recognize, using context, where substitutions have occurred

## 2022-05-26 DIAGNOSIS — L03.116 CELLULITIS OF LEFT LOWER EXTREMITY: Primary | ICD-10-CM

## 2022-05-26 RX ORDER — CEFADROXIL 500 MG/1
500 CAPSULE ORAL EVERY 12 HOURS SCHEDULED
Qty: 60 CAPSULE | Refills: 2 | Status: SHIPPED | OUTPATIENT
Start: 2022-05-26 | End: 2022-06-25

## 2022-06-11 ENCOUNTER — HOME HEALTH ADMISSION (OUTPATIENT)
Dept: HOME HEALTH SERVICES | Facility: HOME HEALTHCARE | Age: 67
End: 2022-06-11
Payer: MEDICARE

## 2022-06-16 ENCOUNTER — HOME CARE VISIT (OUTPATIENT)
Dept: HOME HEALTH SERVICES | Facility: HOME HEALTHCARE | Age: 67
End: 2022-06-16

## 2022-06-20 ENCOUNTER — HOME CARE VISIT (OUTPATIENT)
Dept: HOME HEALTH SERVICES | Facility: HOME HEALTHCARE | Age: 67
End: 2022-06-20
Payer: MEDICARE

## 2022-06-20 VITALS — HEART RATE: 87 BPM | TEMPERATURE: 97.8 F | OXYGEN SATURATION: 98 %

## 2022-06-20 NOTE — HOME HEALTH
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   patient is 6 feet 7 inches tall and over 400 pounds with neuropathy in LLE    Therapist wearing [id number] mask       Recert completed  Unk Haroldo Unk Haroldo Vitals taken within parameters  Unk Haroldo Unk Haroldo Unk Haroldo Measurements taken  Unk Haroldo Unk Haroldo Wound care only with protective barrier to protect newly epithelialized skin on the LLE    Unk Haroldo MLD completed to the E    CDT of E with alternating between Connor Collet strong with XL Farrow hybrid sock with Juzo soft 20 to 30mmhg compression stocking  Completed ambulation to and from laundry room with RW and completed armless chair transfer in laundry room and needed sink and table to complete sit to stand with CG x2  Instruction with ambulation on pacing tech and breathing tech to prevent SOB with RW   instructed to not wear his shoe unless needed  Unk Haroldo Unk Haroldo Unk Haroldo Patient is scheduled to see Dr Shilpa Rodriguez tomorrow and Margy Samuels on Friday of this week   appt to assess his RLE prosthesis and attem;pt to set up with a diabetic shoe that does not cause any friction on dorsum of toes on the LLE  Scar massage on dorsum of foot and PROM to metatarsal joints for improve flexibility of metatarsals and skin on dorsum of the foot     Unk Haroldo Unk Haroldo Unk Haroldo no discomfort noted    Unk Haroldo pump being used at 40mmhg every other day 1x  Unk Haroldo HEP completed          HEP program  HEP program set up and on patients computer  Unk Haroldo Unk Haroldo 3x15 from PT program  Theraband 3x15  Unk Haroldo Unk Haroldo 3x a day for   knee flex and ext  ankle flex and ext  shldr flex and ext  elbow flex and ext  ab and adduction  AROM ex  ankle circumduction 3x20  heel raises seated 3x20  no use of walker to and from the elevator until OT return  isometric plantar flexion ex to program to improve skin integrity with pressure  3x15 with 5 sec hold   daily       patient is to mar his shoe 2x a day for 1 hour at that time he is to walk to the elevator and back with RW  Angus Chow      10 23 2021 discharge measurements from prior  LLE  Measurement 1 foot 28 2cm  Measurement 2 ankle 31 5cm  Measurement 3 lower leg 28 0cm  Measurement 4 mid leg 33 2cm  Measurement 5 upper leg 45 9cm  Measurement 6 lower thigh 62 0cm    Eval 3 21 2022  LLE  Measurement 1 foot 30 0cm  Measurement 2 ankle 34 2cm  Measurement 3 lower leg 29 6cm  Measurement 4 mid leg 35 5cm  Measurement 5 upper leg 46 1cm  Measurement 6 lower thigh 64 6cm    3 23 2022  LLE  Measurement 1 foot 29 5cm  Measurement 2 ankle 33 0cm  Measurement 3 lower leg 28 6cm  Measurement 4 mid leg 38 9cm  Measurement 5 upper leg 46 6cm  Measurement 6 lower thigh 66 5cmi    04 01 2022  LLE  Measurement 1 foot 28 8cm  Measurement 2 ankle 32 0cm  Measurement 3 lower leg 28 2cm  Measurement 4 mid leg 37 9cm  Measurement 5 upper leg 47 0cm  Measurement 6 lower thigh 65 5cmi    04 08 2022  LLE  Measurement 1 foot 29 8cm  Measurement 2 ankle 32 7cm  Measurement 3 lower leg 28 2cm  Measurement 4 mid leg 36 8cm  Measurement 5 upper leg 47 5cm  Measurement 6 lower thigh 66 7cmi    12 [id number] RECERT visit  LLE  Measurement 1 foot 28 6cm  Measurement 2 ankle 32 9cm  Measurement 3 lower leg 28 7cm  Measurement 4 mid leg 37 0cm  Measurement 5 upper leg 46 9cm  Measurement 6 lower thigh 65 0cm    04 25 2022   LLE  Measurement 1 foot 29 5cm  Measurement 2 ankle 32 5cm  Measurement 3 lower leg 29 2cm  Measurement 4 mid leg 38 2cm  Measurement 5 upper leg 47 5cm  Measurement 6 lower thigh 67 0cm    05 04 2022   LLE  Measurement 1 foot 28 0cm  Measurement 2 ankle 32 8cm  Measurement 3 lower leg 27 4cm  Measurement 4 mid leg 35 9cm  Measurement 5 upper leg 45 7cm  Measurement 6 lower thigh 67 0cm    05 13 2022   LLE  Measurement 1 foot 28 7cm  Measurement 2 ankle 32 5cm  Measurement 3 lower leg 28 0cm  Measurement 4 mid leg 35 5cm  Measurement 5 upper leg 46 5cm  Measurement 6 lower thigh 67 0cm    05 18 2022   LLE  Measurement 1 foot 28 7cm  Measurement 2 ankle 31 5cm  Measurement 3 lower leg 27 0cm  Measurement 4 mid leg 36 2cm  Measurement 5 upper leg 46 0cm  Measurement 6 lower thigh 65 3cm    05 27 2022   LLE  Measurement 1 foot 29 0cm  Measurement 2 ankle 31 2cm  Measurement 3 lower leg 28 7cm  Measurement 4 mid leg 37 5cm  Measurement 5 upper leg 47 7cm  Measurement 6 lower thigh 66 0cm    06 01 2022   LLE  Measurement 1 foot 28 7cm  Measurement 2 ankle 31 4cm  Measurement 3 lower leg 29 5cm  Measurement 4 mid leg 40 0cm  Measurement 5 upper leg 46 5cm  Measurement 6 lower thigh 66 0cm    06 06 2022   LLE  Measurement 1 foot 28 2cm  Measurement 2 ankle 31 5cm  Measurement 3 lower leg 28 0cm  Measurement 4 mid leg 37 8cm  Measurement 5 upper leg 47 5cm  Measurement 6 lower thigh 66 0cm    06 17 2022   LLE  Measurement 1 foot 28 9cm  Measurement 2 ankle 33 5cm  Measurement 3 lower leg 29 5cm  Measurement 4 mid leg 36 0cm  Measurement 5 upper leg 49 0cm  Measurement 6 lower thigh 67 6cm    [id number] [id number] RECERT  LLE  Measurement 1 foot 29 3cm  Measurement 2 ankle 31 5cm  Measurement 3 lower leg 28 5cm  Measurement 4 mid leg 35 3cm  Measurement 5 upper leg 47 1cm  Measurement 6 lower thigh 64 0cm      Goals  Francella Crooked Francella Crooked Francella Crooked Will continue with OT 2x a week for 3 weeks    to improve from min assist to indep with lower body dressing LLE socks GOAL MET       Francella Crooked improve to indep with donning and doffing appropriate LLE compression garment for LLE from min assist to Indep   improve to indep with with LE HEP program as adjunct to LE edema reduction program HEP   Francella Crooked  decrease LLE edema by 2 to 4cm in LLE CDT with MLD GOAL MET      improve to indep with use of LLE lymphedema pump daily on LLE  GOAL MET     Patient improve endurance by correct pursed breathing tech and through HEP program and increase ambulation with RW within [facility] as evidenced by an RPE scale of 2 or less from a 4 with laundry task    improve to no open areas on LLE GOAL MET through appropriate wound care and compression   assess footwear for diabetic shoe and discuss with prosthetist and order with increase to Indep upon arrival  Dandre Dunlap

## 2022-06-20 NOTE — Clinical Note
Please code  Dx presently are DM with  neuropathy, CVI, and Lymphedema  wounds are healed at this time

## 2022-06-23 NOTE — CASE COMMUNICATION
Home Health need continues for: OT services  Primary diagnoses/co-morbidities/recent procedures in past 60 days that impact current episode: DM, CVI, LE lymphedema  Current level of functional ability: Patient has improved from Min A to Indep with AM care  He has improved to ambulate with Sup to and from laundry room and CG for sit to stand transfers from armless chair  He is indep with armchair transfers and tub with bath seat and rec liner  He is Min A with LE compression sock donning and Indep with doffing  He is currently using the lymphedema pump 1x a day every other day  Homebound status and living arrangements: patient lives alone in a [facility]   Goals accomplished and/or measurable progress toward unmet goals in past 60 days: Mabel Balderas HEP program  HEP program set up and on patients computer  Mabel Balderas 3x15 from PT program  Theraband 3x15  Mabel Balderas 3x a day for    knee flex and ext  ankle flex and ext  shldr flex and ext  elbow flex and ext  ab and adduction  AROM ex  ankle circumduction 3x20  heel raises seated 3x20  no use of walker to and from the elevator until OT return  isometric plantar flexion ex to program to improve skin integrity with pressure  3x15 with 5 sec hold   daily       patient is to mar his shoe 2x a day for 1 hour at that time he is to walk to the elevator and back with INGE Balderas      [id number] discharge measurements from prior  LLE  Measurement 1 foot 28 2cm  Measurement 2 ankle 31 5cm  Measurement 3 lower leg 28 0cm  Measurement 4 mid leg 33 2cm  Measurement 5 upper leg 45 9cm  Measurement 6 lower thigh 62 0cm    Eval 3 21 2022  LLE  Measurement 1 foot 30 0cm  Measurement 2 ankle 34 2cm  Measurement 3 lower leg 29 6cm  Measurement 4 mid leg 35 5cm  Measurement 5 upper leg 46 1cm  Measurement 6 lower thigh  64 6cm    3 23 2022  LLE  Measurement 1 foot 29 5cm  Measurement 2 ankle 33 0cm  Measurement 3 lower leg 28 6cm  Measurement 4 mid leg 38 9cm  Measurement 5 upper leg 46 6cm  Measurement 6 lower thigh 66 5cmi    04 01 2022  LLE  Measurement 1 foot 28 8cm  Measurement 2 ankle 32 0cm  Measurement 3 lower leg 28 2cm  Measurement 4 mid leg 37 9cm  Measurement 5 upper leg 47 0cm  Measurement 6 lower thigh 65 5cmi    04 08 2022  LLE  Measure ment 1 foot 29 8cm  Measurement 2 ankle 32 7cm  Measurement 3 lower leg 28 2cm  Measurement 4 mid leg 36 8cm  Measurement 5 upper leg 47 5cm  Measurement 6 lower thigh 66 7cmi    22 12 1946 RECERT visit  LLE  Measurement 1 foot 28 6cm  Measurement 2 ankle 32 9cm  Measurement 3 lower leg 28 7cm  Measurement 4 mid leg 37 0cm  Measurement 5 upper leg 46 9cm  Measurement 6 lower thigh 65 0cm    06 17 2022   LLE  Measurement 1 foot 28 9cm  M easurement 2 ankle 33 5cm  Measurement 3 lower leg 29 5cm  Measurement 4 mid leg 36 0cm  Measurement 5 upper leg 49 0cm  Measurement 6 lower thigh 67 6cm    29 13 [id number] RECERT  LLE  Measurement 1 foot 29 3cm  Measurement 2 ankle 31 5cm  Measurement 3 lower leg 28 5cm  Measurement 4 mid leg 35 3cm  Measurement 5 upper leg 47 1cm  Measurement 6 lower thigh 64 0cm    Goals  Donnaa Delphine Akers Will continue with OT 1x a week for 1 week then 2x a week for 2 w [place] and then discharge if goals met    to improve from min assist to indep with lower body dressing LLE socks      Megan Akers improve to indep with donning and doffing appropriate LLE compression garment for LLE from min assist improve to indep with with LE HEP program as adjunct to LE edema reduction program HEP   Megan Akers  decrease LLE edema by 2 to 4cm in LLE CDT with MLD    Megan Akers improve to indep with use of LLE lymphedema pump daily on LLE  GOAL MET      Patient improve endurance by correct pursed breathing tech and through HEP program and increase ambulation with RW within [address] limits as evidenced by an RPE scale of 2 or less from a 4 with mail retrieval  improve to no open areas on LLE GOAL MET through appropriate wound care and compression   assess footwear for diabetic shoe and discuss with prosthetist  Focus of care for next 60 days for each discipline ordered: LE  edema control and set up and indep with LLE compression garments for edema control and wound prevention  Skin integrity/wound status: no wounds present at time or recert  Code status:   Most recent fall risk: fair secondary to RLE prosthesis and LLE lymphedema and neuropathy  Plan of Care confirmed with patient

## 2022-06-24 ENCOUNTER — HOME CARE VISIT (OUTPATIENT)
Dept: HOME HEALTH SERVICES | Facility: HOME HEALTHCARE | Age: 67
End: 2022-06-24
Payer: MEDICARE

## 2022-06-24 VITALS — HEART RATE: 73 BPM | OXYGEN SATURATION: 97 % | TEMPERATURE: 98.3 F

## 2022-06-24 PROCEDURE — G0152 HHCP-SERV OF OT,EA 15 MIN: HCPCS

## 2022-06-24 PROCEDURE — 400014 VN F/U

## 2022-06-28 ENCOUNTER — HOME CARE VISIT (OUTPATIENT)
Dept: HOME HEALTH SERVICES | Facility: HOME HEALTHCARE | Age: 67
End: 2022-06-28
Payer: MEDICARE

## 2022-06-28 PROCEDURE — G0152 HHCP-SERV OF OT,EA 15 MIN: HCPCS

## 2022-07-01 ENCOUNTER — HOME CARE VISIT (OUTPATIENT)
Dept: HOME HEALTH SERVICES | Facility: HOME HEALTHCARE | Age: 67
End: 2022-07-01
Payer: MEDICARE

## 2022-07-01 VITALS — HEART RATE: 65 BPM | TEMPERATURE: 98.3 F | OXYGEN SATURATION: 97 %

## 2022-07-01 PROCEDURE — G0152 HHCP-SERV OF OT,EA 15 MIN: HCPCS

## 2022-07-03 ENCOUNTER — HOME CARE VISIT (OUTPATIENT)
Dept: HOME HEALTH SERVICES | Facility: HOME HEALTHCARE | Age: 67
End: 2022-07-03
Payer: MEDICARE

## 2022-07-03 VITALS — HEART RATE: 68 BPM | TEMPERATURE: 98.2 F | OXYGEN SATURATION: 97 %

## 2022-07-03 PROCEDURE — G0152 HHCP-SERV OF OT,EA 15 MIN: HCPCS

## 2022-07-04 ENCOUNTER — HOME CARE VISIT (OUTPATIENT)
Dept: HOME HEALTH SERVICES | Facility: HOME HEALTHCARE | Age: 67
End: 2022-07-04
Payer: MEDICARE

## 2022-07-04 VITALS — OXYGEN SATURATION: 98 % | HEART RATE: 66 BPM | TEMPERATURE: 98.7 F

## 2022-07-04 PROCEDURE — G0152 HHCP-SERV OF OT,EA 15 MIN: HCPCS

## 2022-07-04 NOTE — CASE COMMUNICATION
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  His skin tear has healed on the anterior aspect of his shin  He is trialing a Juzo soft compression stocking with a silicon band on the top in 20 to 30mmhg  He is using the prescription cream on and scaly areas and Cetaphil lotion on the rest of the leg  We went to [facility] to be reassessed for shoes secondary to present shoes rub on the dorsum of his toes  Deliver scheduled for [facility]  He went to see Dr Dana Waddell and in the short amount of time he had his shoe on he formed a blister on the third toe  I have recommended to him to only wear his shoe when he is going out of his apt  I will be on vacation from [place] to [place]  My next visit with him will be the 15th of July  I will keep him on my schedule for three weeks once I return to continue to assess, monitor, adapt his schedule based on his skin and respons e to the level of compression he is presently in  Thank you    Please advise if any questions or concerns

## 2022-07-06 ENCOUNTER — OFFICE VISIT (OUTPATIENT)
Dept: WOUND CARE | Facility: HOSPITAL | Age: 67
End: 2022-07-06
Payer: MEDICARE

## 2022-07-06 VITALS
RESPIRATION RATE: 18 BRPM | HEART RATE: 73 BPM | DIASTOLIC BLOOD PRESSURE: 86 MMHG | SYSTOLIC BLOOD PRESSURE: 187 MMHG | TEMPERATURE: 96.2 F

## 2022-07-06 DIAGNOSIS — E11.42 DIABETIC POLYNEUROPATHY ASSOCIATED WITH TYPE 2 DIABETES MELLITUS (HCC): ICD-10-CM

## 2022-07-06 DIAGNOSIS — I89.0 LYMPHEDEMA: ICD-10-CM

## 2022-07-06 DIAGNOSIS — E11.621 DIABETIC ULCER OF OTHER PART OF LEFT FOOT ASSOCIATED WITH TYPE 2 DIABETES MELLITUS, LIMITED TO BREAKDOWN OF SKIN (HCC): Primary | ICD-10-CM

## 2022-07-06 DIAGNOSIS — L97.521 DIABETIC ULCER OF OTHER PART OF LEFT FOOT ASSOCIATED WITH TYPE 2 DIABETES MELLITUS, LIMITED TO BREAKDOWN OF SKIN (HCC): Primary | ICD-10-CM

## 2022-07-06 DIAGNOSIS — L97.321 DIABETIC ULCER OF LEFT ANKLE ASSOCIATED WITH TYPE 2 DIABETES MELLITUS, LIMITED TO BREAKDOWN OF SKIN (HCC): ICD-10-CM

## 2022-07-06 DIAGNOSIS — E11.622 DIABETIC ULCER OF LEFT ANKLE ASSOCIATED WITH TYPE 2 DIABETES MELLITUS, LIMITED TO BREAKDOWN OF SKIN (HCC): ICD-10-CM

## 2022-07-06 PROCEDURE — 99212 OFFICE O/P EST SF 10 MIN: CPT | Performed by: PODIATRIST

## 2022-07-06 NOTE — PATIENT INSTRUCTIONS
Orders Placed This Encounter   Procedures    Wound cleansing and dressings     Your wound is healed  Skin is fragile so do not scrub leg when washing and pat dry  Continue good daily skin care and use of prescription cream on and scaly areas and Cetaphil lotion on the rest of the leg  Continue use of  Juzo soft compression stocking daily, may remove at bed time    If you notice any drainage or open areas call wound care center immediately   Thank you for choosing [facility] wound care center     Standing Status:   Future     Standing Expiration Date:   7/6/2023

## 2022-07-06 NOTE — PROGRESS NOTES
Patient ID: Sherrod Cushing is a 69 y o  male Date of Birth 1955       Chief Complaint   Patient presents with    Follow Up Wound Care Visit       Allergies:  Patient has no known allergies  Diagnosis:  1  Diabetic ulcer of other part of left foot associated with type 2 diabetes mellitus, limited to breakdown of skin ([address] )    2  Diabetic ulcer of left ankle associated with type 2 diabetes mellitus, limited to breakdown of skin ([facility])  -     Wound cleansing and dressings; Future    3  Lymphedema  -     Wound cleansing and dressings; Future    4  Diabetic polyneuropathy associated with type 2 diabetes mellitus ([facility])  -     Wound cleansing and dressings; Future       Diagnosis ICD-10-CM Associated Orders   1  Diabetic ulcer of other part of left foot associated with type 2 diabetes mellitus, limited to breakdown of skin ([facility] )  E11 621     L97 521    2  Diabetic ulcer of left ankle associated with type 2 diabetes mellitus, limited to breakdown of skin ([facility])  E11 622 Wound cleansing and dressings    L97 321    3  Lymphedema  I89 0 Wound cleansing and dressings   4  Diabetic polyneuropathy associated with type 2 diabetes mellitus ([facility])  E11 42 Wound cleansing and dressings        Assessment & Plan:  1  Diabetic foot and ankle ulceration left complicated by chronic lymphedema and type 2 diabetes with peripheral neuropathy, at this time patient's wounds are well healed, he continues with home lymphedema/occupational therapy  Currently he is trialing 43-52 mm Hg soft silicone topped knee-high compression stockings, he is using ketoconazole as needed on the rough dry areas and Cetaphil on the rest of his leg - he will continue this and work on adjusting his treatment plan with his home lymphedema therapist   2  Patient is to follow up with Maxine regarding his new shoes as current ones are forming a blister on his 3rd toe    3  Patient is to continue to use the sequential compression pump on his left lower extremity twice a day for 1 hour  4  Patient to continue with frequent elevation, low-sodium diet, proper protein intake and proper glycemic control  5  At this time as patient's wound is well healed he is discharged from [address] [place] [address], he is advised if he notices any new or recurrent wounds he is to call us immediately  In the meantime I will follow up with his homework edema therapist to see if he will benefit from outpatient once his wounds are completely resolved and discharged from their care  If so I will place the order  Patient to follow with Podiatry every 3 months for diabetic foot care  Patient is understanding and agree with the plan  Procedures  - none    Subjective:   Patient presents today for care of left lower extremity diabetic ulcerations complicated by lymphedema, he is following with his home lymphedema/occupational therapist and with  for his diabetic shoes  He states his leg is looking and feeling much better and has no new complaints  States blood sugars are well controlled          The following portions of the patient's history were reviewed and updated as appropriate:   Patient Active Problem List   Diagnosis    Controlled type 2 diabetes mellitus with complication, without long-term current use of insulin ([facility] )    Essential hypertension    HLD (hyperlipidemia)    Hypothyroidism    Celiac disease    Coronary artery disease    History of duodenal ulcer    Living will on file    Morbid obesity with BMI of 50 0-59 9, adult ([facility] )    S/P BKA (below knee amputation) unilateral, right (HCC)    Paroxysmal atrial fibrillation (HCC)    Iron deficiency anemia due to chronic blood loss    Chronic venous stasis dermatitis of left lower extremity    Gastroesophageal reflux disease without esophagitis    Diabetic ulcer of left foot associated with type 2 diabetes mellitus, limited to breakdown of skin ([facility] [address] )    Idiopathic chronic venous hypertension of left lower extremity with ulcer ([place] [address] )    Non-pressure chronic ulcer of left calf, limited to breakdown of skin ([place] [address] )    Diabetic polyneuropathy associated with type 2 diabetes mellitus (HCC)    Cellulitis of left lower extremity    Sepsis ([place] [address] )    Abnormal urinalysis    Ulcer of toe of left foot, limited to breakdown of skin ([place] [address] )    Diabetic ulcer of left ankle associated with type 2 diabetes mellitus, limited to breakdown of skin ([place] [address] )    Dermatophytosis     Past Medical History:   Diagnosis Date    Atrial fibrillation ([place] [address] )     Cellulitis     Diabetes mellitus ([place] [address] )     Disease of thyroid gland     Duodenal ulcer     perforated- ICU stay    High blood pressure     High cholesterol     Hyperlipidemia     Hypertension     Hypothyroidism     Lymphedema      b/l LE- was followed at wound center    Morbid obesity with BMI of 40 0-44 9, adult ([place] [address] )     Peritonitis ([place] [address] )      Past Surgical History:   Procedure Laterality Date    BELOW KNEE LEG AMPUTATION Right     DIAGNOSTIC LAPAROSCOPY      KNEE ARTHROSCOPY Bilateral     OTHER SURGICAL HISTORY  2014     lap repair    OTHER SURGICAL HISTORY      Laparoscopic repair of a perforated duodenal ulcer with Dalila Raw omental    OTHER SURGICAL HISTORY      Placement of drains     Social History     Socioeconomic History    Marital status: Single     Spouse name: Not on file    Number of children: Not on file    Years of education: Not on file    Highest education level: Not on file   Occupational History    Not on file   Tobacco Use    Smoking status: Former Smoker     Packs/day: 1 50     Years: 25 00     Pack years: 37 50     Types: Cigarettes     Quit date: 2004     Years since quittin 6    Smokeless tobacco: Never Used   Vaping Use    Vaping Use: Never used   Substance and Sexual Activity    Alcohol use: Not Currently     Alcohol/week: 1 0 standard drink     Types: 1 Standard drinks or equivalent per week    Drug use: Never    Sexual activity: Not Currently   Other Topics Concern    Not on file   Social History Narrative    Former smoker: Quit 3/31/2012 - As per Care Everywhere      Social Determinants of Health     Financial Resource Strain: Not on file   Food Insecurity: Not on file   Transportation Needs: Not on file   Physical Activity: Not on file   Stress: Not on file   Social Connections: Not on file   Intimate Partner Violence: Not on file   Housing Stability: Not on file        Current Outpatient Medications:     ammonium lactate (LAC-HYDRIN) 12 % lotion, Apply topically 2 (two) times a day as needed for dry skin, Disp: 400 g, Rfl: 2    aspirin (ASPIRIN 81) 81 mg EC tablet, Take 81 mg by mouth Daily, Disp: , Rfl:     cefadroxil (DURICEF) 500 mg capsule, Take 500 mg by mouth every 12 (twelve) hours  Indications: Infection of the Skin and/or Skin Structures, Disp: , Rfl:     clotrimazole-betamethasone (LOTRISONE) 1-0 05 % cream, Apply 1 application topically 3 (three) times a week   Indications: Skin Infection due to Candida Yeast, redness, Disp: , Rfl:     gabapentin (NEURONTIN) 100 mg capsule, Take 1 capsule (100 mg total) by mouth 3 (three) times a day (Patient taking differently: Take 100 mg by mouth 2 (two) times a day  ), Disp: 270 capsule, Rfl: 1    ketoconazole (NIZORAL) 2 % cream, Apply topically daily, Disp: 60 g, Rfl: 1    levothyroxine 25 mcg tablet, Take 1 tablet (25 mcg total) by mouth daily In addition to the 300 mcg dose (stop the 75 mcg dose), Disp: 90 tablet, Rfl: 1    levothyroxine 300 MCG tablet, Take 1 tablet (300 mcg total) by mouth daily In addition to 75 mcg dose, Disp: 90 tablet, Rfl: 1    lisinopril (ZESTRIL) 20 mg tablet, Take 1 tablet (20 mg total) by mouth daily, Disp: 90 tablet, Rfl: 1    metFORMIN (GLUCOPHAGE-XR) 750 mg 24 hr tablet, Take 1 tablet (750 mg total) by mouth daily with breakfast, Disp: 90 tablet, Rfl: 1    metoprolol tartrate (LOPRESSOR) 50 mg tablet, Take 1 tablet (50 mg total) by mouth 2 (two) times a day, Disp: 180 tablet, Rfl: 1    Multiple Vitamins-Minerals (MULTIVITAMIN MEN 50+ PO), Take by mouth, Disp: , Rfl:     pantoprazole (PROTONIX) 40 mg tablet, Take 1 tablet (40 mg total) by mouth daily, Disp: 90 tablet, Rfl: 1    simvastatin (ZOCOR) 10 mg tablet, Take 1 tablet (10 mg total) by mouth daily at bedtime, Disp: 90 tablet, Rfl: 1    Zoster Vac Recomb Adjuvanted (Shingrix) 50 MCG/0 5ML SUSR, 0 5mL IM for one dose, followed by 0 5mL IM 2-6 months after first dose (Patient not taking: Reported on 8/9/2021), Disp: 1 each, Rfl: 1  Family History   Problem Relation Age of Onset    Heart disease Family     Alcohol abuse Family     Heart disease Mother     Hypertension Mother    Anne Pulido Migraines Daughter     Leukemia Brother     Migraines Daughter     Substance Abuse Neg Hx     Mental illness Neg Hx     Depression Neg Hx        Review of Systems   Constitutional: Negative for chills and fever  HENT: Negative for ear pain and sore throat  Eyes: Negative for pain and visual disturbance  Respiratory: Negative for cough and shortness of breath  Cardiovascular: Negative for chest pain and palpitations  Gastrointestinal: Negative for abdominal pain and vomiting  Genitourinary: Negative for dysuria and hematuria  Musculoskeletal: Positive for gait problem  Negative for arthralgias and back pain  Skin: Positive for color change and wound  Negative for rash  Neurological: Positive for numbness  Negative for seizures and syncope  Psychiatric/Behavioral: Negative  All other systems reviewed and are negative  Objective:  BP (!) 187/86   Pulse 73   Temp (!) 96 2 °F (35 7 °C)   Resp 18     Physical Exam  Constitutional:       Appearance: Normal appearance  He is obese  HENT:      Head: Normocephalic and atraumatic        Right Ear: External ear normal       Left Ear: External ear normal       Nose: Nose normal    Eyes:      Conjunctiva/sclera: Conjunctivae normal  Cardiovascular:      Pulses: Normal pulses  Dorsalis pedis pulses are 2+ on the left side  Posterior tibial pulses are 2+ on the left side  Pulmonary:      Effort: Pulmonary effort is normal    Musculoskeletal:      Cervical back: Normal range of motion  Left lower le+ Edema present  Comments: BKA right   Skin:     General: Skin is warm and dry  Capillary Refill: Capillary refill takes less than 2 seconds  Comments: See detailed wound assessment   Neurological:      General: No focal deficit present  Mental Status: He is alert and oriented to person, place, and time  Mental status is at baseline  Sensory: Sensory deficit present  Coordination: Coordination abnormal       Gait: Gait abnormal       Deep Tendon Reflexes: Reflexes abnormal    Psychiatric:         Mood and Affect: Mood normal          Behavior: Behavior normal          Thought Content: Thought content normal          Judgment: Judgment normal            Wound 21 Diabetic Ulcer Ankle Left;Lateral (Active)   Wound Image   22 1311   Wound Description Epithelialization 22 1311   Jovita-wound Assessment Edema; Erythema 22 1311   Wound Length (cm) 0 cm 22 1311   Wound Width (cm) 0 cm 22 1311   Wound Depth (cm) 0 cm 22 1311   Wound Surface Area (cm^2) 0 cm^2 22 1311   Wound Volume (cm^3) 0 cm^3 22 1311   Calculated Wound Volume (cm^3) 0 cm^3 22 1311   Change in Wound Size % 100 22 1311   Drainage Amount None 22 1311   Drainage Description Serosanguineous 22 1310   Non-staged Wound Description Full thickness 22 1310   Wound packed? No 21 1346   Dressing Status Intact 22 1310       Wound 22 Skin Tear Leg Anterior; Lower (Active)          Results from last 6 Months   Lab Units 22  1411   WOUND CULTURE  2+ Growth of Staphylococcus aureus*  2+ Growth of Enterococcus faecalis*  3+ Growth of - Mixed bacteria including gram negative rods x3  /         No results found  Wound Instructions:  Orders Placed This Encounter   Procedures    Wound cleansing and dressings     Your wound is healed  Skin is fragile so do not scrub leg when washing and pat dry  Continue good daily skin care and use of prescription cream on and scaly areas and Cetaphil lotion on the rest of the leg  Continue use of  Juzo soft compression stocking daily, may remove at bed time  If you notice any drainage or open areas call wound care center immediately   [facility] you for [facility]     Standing Status:   Future     Standing Expiration Date:   7/6/2023         Ana Fernando DPM, FACFAS    Portions of the record may have been created with voice recognition software  Occasional wrong word or "sound a like" substitutions may have occurred due to the inherent limitations of voice recognition software  Read the chart carefully and recognize, using context, where substitutions have occurred

## 2022-07-14 ENCOUNTER — HOME CARE VISIT (OUTPATIENT)
Dept: HOME HEALTH SERVICES | Facility: HOME HEALTHCARE | Age: 67
End: 2022-07-14
Payer: MEDICARE

## 2022-07-14 PROCEDURE — G0152 HHCP-SERV OF OT,EA 15 MIN: HCPCS

## 2022-07-15 VITALS — TEMPERATURE: 98.3 F | OXYGEN SATURATION: 98 % | HEART RATE: 63 BPM

## 2022-07-19 ENCOUNTER — HOME CARE VISIT (OUTPATIENT)
Dept: HOME HEALTH SERVICES | Facility: HOME HEALTHCARE | Age: 67
End: 2022-07-19
Payer: MEDICARE

## 2022-07-19 VITALS — TEMPERATURE: 98 F | OXYGEN SATURATION: 98 % | HEART RATE: 69 BPM

## 2022-07-19 PROCEDURE — G0152 HHCP-SERV OF OT,EA 15 MIN: HCPCS

## 2022-07-19 NOTE — CASE COMMUNICATION
Dr Rock Sanchez  I am the OT, CLT, [facility] that treats Kellee Lozano  I would like to increase his visits to 2x a week for 3 weeks  His wounds are healed and he has been discharged from [facility] and Dr Francisco Javier Landers  We are concentrating on his footwear which was ordered by Maxine to prevent friction secondary to his present footwear causes blisters on the dorsum of his 2nd and 3rd toes with longer than 2 hours use  His next appt with Maxine is July 27th  I  am also working on his LLE compression needs and adaptation  His care plan has been updated and an order has been sent to you  If you are in agreement please respond to this message and I will then send the updated order  If you have any questions or concerns please contact me  Thank you for your time

## 2022-07-21 ENCOUNTER — HOME CARE VISIT (OUTPATIENT)
Dept: HOME HEALTH SERVICES | Facility: HOME HEALTHCARE | Age: 67
End: 2022-07-21
Payer: MEDICARE

## 2022-07-21 VITALS — TEMPERATURE: 97.6 F | OXYGEN SATURATION: 98 %

## 2022-07-21 PROCEDURE — G0152 HHCP-SERV OF OT,EA 15 MIN: HCPCS

## 2022-07-27 ENCOUNTER — HOME CARE VISIT (OUTPATIENT)
Dept: HOME HEALTH SERVICES | Facility: HOME HEALTHCARE | Age: 67
End: 2022-07-27
Payer: MEDICARE

## 2022-07-27 VITALS — OXYGEN SATURATION: 98 %

## 2022-07-27 PROCEDURE — G0152 HHCP-SERV OF OT,EA 15 MIN: HCPCS

## 2022-07-27 PROCEDURE — 400014 VN F/U

## 2022-07-29 ENCOUNTER — HOME CARE VISIT (OUTPATIENT)
Dept: HOME HEALTH SERVICES | Facility: HOME HEALTHCARE | Age: 67
End: 2022-07-29
Payer: MEDICARE

## 2022-07-29 VITALS — TEMPERATURE: 98.4 F | OXYGEN SATURATION: 98 % | HEART RATE: 64 BPM

## 2022-07-29 PROCEDURE — G0152 HHCP-SERV OF OT,EA 15 MIN: HCPCS

## 2022-07-30 NOTE — CASE COMMUNICATION
FYI 30 day reassessment  Dr Royal Bruce,  Mr Bruno Tang continues to have no open areas on the LLE  We went to [facility] and he was set up with new sleeve, socks, silks for his RLE prosthesis  New shoes received and started with 1 hour wearing time every 3-4 hours a day  Area noted today on the lateral malleolus side and the dorsum of the great toe  Relayed the information to Roman Eddy the prosthetist/orthotist and he has a follow up appt on t he 10th of Aug  to shave the area of the great toe on the insole  He has improved from Min A to Sup with donning of his Juzo soft compression stocking with a [employer]  Will continue with 2x a week for 1 to 2 week and then discharge upon completion of goals  He will be set up with a schedule for his compression garments, lymphedema pump, skin care, and HEP  He has a family vacation planned for week of Aug 15th and is very excit ed to be able to go without wounds  Questions/concerns please contact me    Thank you

## 2022-08-02 ENCOUNTER — HOME CARE VISIT (OUTPATIENT)
Dept: HOME HEALTH SERVICES | Facility: HOME HEALTHCARE | Age: 67
End: 2022-08-02
Payer: MEDICARE

## 2022-08-02 VITALS — HEART RATE: 54 BPM | OXYGEN SATURATION: 99 % | TEMPERATURE: 98.5 F

## 2022-08-02 PROCEDURE — G0152 HHCP-SERV OF OT,EA 15 MIN: HCPCS

## 2022-08-04 ENCOUNTER — HOME CARE VISIT (OUTPATIENT)
Dept: HOME HEALTH SERVICES | Facility: HOME HEALTHCARE | Age: 67
End: 2022-08-04
Payer: MEDICARE

## 2022-08-04 VITALS — HEART RATE: 66 BPM | OXYGEN SATURATION: 98 % | TEMPERATURE: 98.4 F

## 2022-08-04 PROCEDURE — G0152 HHCP-SERV OF OT,EA 15 MIN: HCPCS

## 2022-08-08 ENCOUNTER — HOME CARE VISIT (OUTPATIENT)
Dept: HOME HEALTH SERVICES | Facility: HOME HEALTHCARE | Age: 67
End: 2022-08-08
Payer: MEDICARE

## 2022-08-08 PROCEDURE — G0152 HHCP-SERV OF OT,EA 15 MIN: HCPCS

## 2022-08-10 VITALS — TEMPERATURE: 98.4 F | OXYGEN SATURATION: 98 % | HEART RATE: 68 BPM

## 2022-08-11 ENCOUNTER — HOME CARE VISIT (OUTPATIENT)
Dept: HOME HEALTH SERVICES | Facility: HOME HEALTHCARE | Age: 67
End: 2022-08-11
Payer: MEDICARE

## 2022-08-11 VITALS — TEMPERATURE: 97.8 F | OXYGEN SATURATION: 99 % | HEART RATE: 64 BPM

## 2022-08-11 PROCEDURE — G0152 HHCP-SERV OF OT,EA 15 MIN: HCPCS

## 2022-08-26 DIAGNOSIS — I10 ESSENTIAL HYPERTENSION: ICD-10-CM

## 2022-08-26 DIAGNOSIS — G62.9 NEUROPATHY: ICD-10-CM

## 2022-08-26 RX ORDER — LISINOPRIL 20 MG/1
20 TABLET ORAL DAILY
Qty: 90 TABLET | Refills: 0 | Status: SHIPPED | OUTPATIENT
Start: 2022-08-26

## 2022-08-26 RX ORDER — GABAPENTIN 100 MG/1
100 CAPSULE ORAL 3 TIMES DAILY
Qty: 270 CAPSULE | Refills: 0 | Status: SHIPPED | OUTPATIENT
Start: 2022-08-26

## 2022-09-09 ENCOUNTER — TELEMEDICINE (OUTPATIENT)
Dept: INFECTIOUS DISEASES | Facility: CLINIC | Age: 67
End: 2022-09-09
Payer: MEDICARE

## 2022-09-09 DIAGNOSIS — E11.8 CONTROLLED TYPE 2 DIABETES MELLITUS WITH COMPLICATION, WITHOUT LONG-TERM CURRENT USE OF INSULIN (HCC): ICD-10-CM

## 2022-09-09 DIAGNOSIS — Z89.511 S/P BKA (BELOW KNEE AMPUTATION) UNILATERAL, RIGHT (HCC): ICD-10-CM

## 2022-09-09 DIAGNOSIS — E66.01 OBESITY, CLASS III, BMI 40-49.9 (MORBID OBESITY) (HCC): ICD-10-CM

## 2022-09-09 DIAGNOSIS — I89.0 LYMPHEDEMA OF LEFT LOWER EXTREMITY: ICD-10-CM

## 2022-09-09 DIAGNOSIS — I87.2 CHRONIC VENOUS STASIS DERMATITIS OF LEFT LOWER EXTREMITY: Primary | ICD-10-CM

## 2022-09-09 PROBLEM — E66.813 OBESITY, CLASS III, BMI 40-49.9 (MORBID OBESITY): Status: ACTIVE | Noted: 2019-03-19

## 2022-09-09 PROCEDURE — 99213 OFFICE O/P EST LOW 20 MIN: CPT | Performed by: INTERNAL MEDICINE

## 2022-09-09 NOTE — PROGRESS NOTES
Progress Note - Infectious Disease   Ghanshyam Smoke 69 y o  male MRN: [id number]  Unit/Bed#:  Encounter: [id number]    REQUIRED DOCUMENTATION:   1  This service was provided via Telemedicine  2  Provider located at [address] [place] [address] office  3  TeleMed provider: Giovani Waterman DO   4  Identify all parties in room with patient during tele consult: Pt alone at home address in [place]  5  After connecting through [employer], patient was identified by name and date of birth  Patient was then informed that this was a Telemedicine visit and that the exam was being conducted confidentially over secure lines  My office door was closed  Patient acknowledged consent and understanding of privacy and security of the Telemedicine visit, and gave us permission to have the assistant stay in the room in order to assist with the history and to conduct the exam   I informed the patient that I have reviewed their record in Epic and presented the opportunity for them to ask any questions regarding the visit today  The patient agreed to participate  Impression/Recommendations:   1  History of left distal foot ulcer, now healed, with recurrent cellulitis   Wound culture on 02/21 had growth of MSSA, GBS and Proteus  Duricef suppression was resumed in Feb 2022  Since then, pt has done well and his left foot ulcers have healed  He has been discharged from wound care center  Pt has no ulcers present on exam and no signs of SSTI at this time     Discontinue cefadroxil  Continued left leg elevation, use of compression stocking and lymphedema pumps to control edema  If pt develops LLE/LT foot ulcers or recurrent cellulitis, will resume cefadroxil suppression  ID follow-up prn      2  Venous stasis left foot  Pt has no ulcers on exam today  Podiatry follow-up      3  Lymphedema     Continued leg elevation, use of compression stocking and lymphedema pumps to control edema      4  DM with neuropathy   Well controlled, noted HbA1c of 5 4    Glycemic control as per PCP      5  Status post right BKA in 2017  6   Morbid obesity with BMI of 43    My recommendations were discussed with the patient in detail who verbalized understanding  Thank you for allowing me to participate in the care of this patient  The ID service will follow  History   Subjective:   68 yo man with a PMH of DM2, left foot ulcer and recurrent cellulitis seen for office follow-up  Pt has been compliant with cefadroxil suppression which she is tolerating well  He has not missed any doses in the past 30 days  Pt was discharged from the wound care center a few weeks ago  His left foot ulcer has healed  He has no open wounds or ulcers on his left leg and foot  He continues to use compression stockings, lymphedema pumps and wears diabetic shoes  He denies fevers, chills, or sweats  Patient denies nausea, vomiting, or diarrhea, rash or itching  Antibiotics: cefadroxil    Current Outpatient Medications:     ammonium lactate (LAC-HYDRIN) 12 % lotion, Apply topically 2 (two) times a day as needed for dry skin, Disp: 400 g, Rfl: 2    aspirin (ASPIRIN 81) 81 mg EC tablet, Take 81 mg by mouth Daily, Disp: , Rfl:     cefadroxil (DURICEF) 500 mg capsule, Take 500 mg by mouth every 12 (twelve) hours  Indications: Infection of the Skin and/or Skin Structures, Disp: , Rfl:     clotrimazole-betamethasone (LOTRISONE) 1-0 05 % cream, Apply 1 application topically 3 (three) times a week   Indications: Skin Infection due to Candida Yeast, redness, Disp: , Rfl:     gabapentin (NEURONTIN) 100 mg capsule, Take 1 capsule (100 mg total) by mouth 3 (three) times a day, Disp: 270 capsule, Rfl: 0    ketoconazole (NIZORAL) 2 % cream, Apply topically daily, Disp: 60 g, Rfl: 1    levothyroxine 25 mcg tablet, Take 1 tablet (25 mcg total) by mouth daily In addition to the 300 mcg dose (stop the 75 mcg dose), Disp: 90 tablet, Rfl: 1    levothyroxine 300 MCG tablet, Take 1 tablet (300 mcg total) by mouth daily In addition to 75 mcg dose, Disp: 90 tablet, Rfl: 1    lisinopril (ZESTRIL) 20 mg tablet, Take 1 tablet (20 mg total) by mouth daily, Disp: 90 tablet, Rfl: 0    metFORMIN (GLUCOPHAGE-XR) 750 mg 24 hr tablet, Take 1 tablet (750 mg total) by mouth daily with breakfast, Disp: 90 tablet, Rfl: 1    metoprolol tartrate (LOPRESSOR) 50 mg tablet, Take 1 tablet (50 mg total) by mouth 2 (two) times a day, Disp: 180 tablet, Rfl: 1    Multiple Vitamins-Minerals (MULTIVITAMIN MEN 50+ PO), Take by mouth, Disp: , Rfl:     pantoprazole (PROTONIX) 40 mg tablet, Take 1 tablet (40 mg total) by mouth daily, Disp: 90 tablet, Rfl: 1    simvastatin (ZOCOR) 10 mg tablet, Take 1 tablet (10 mg total) by mouth daily at bedtime, Disp: 90 tablet, Rfl: 1    Zoster Vac Recomb Adjuvanted (Shingrix) 50 MCG/0 5ML SUSR, 0 5mL IM for one dose, followed by 0 5mL IM 2-6 months after first dose (Patient not taking: Reported on 8/9/2021), Disp: 1 each, Rfl: 1    Physical Exam   Limited exam due to tele health visit  General Appearance:  Appearing well, nontoxic, and in no distress   Head:  Normocephalic, atraumatic   Eyes:  EOMI   Throat: Oropharynx moist    Lungs:   Respirations nonlabored on room air   Extremities: RT BKA with prosthesis attached  LLE and foot mild lymphedema noted  Skin: No rash noted  No ulcers of left leg or foot noted  Neurologic: Alert and oriented to person, surroundings, conversant, fluent speech     Invasive Devices:      Labs, Imaging, & Other Studies   Lab Results: I have personally reviewed pertinent labs and cultures  3/9/22:  WBC 7 08, HGB 12 2, HCT 40 3, , BUN 14, creatinine 0 80, AST 8, ALT 15    Imaging Studies:   I have personally reviewed pertinent imaging study reports  Counseling/Coordination of care: Total 25 minutes encounter including direct communication with the patient via telehealth, chart review and coordination of care    Labs, medical tests and imaging studies were independently reviewed by me as noted above  VIRTUAL VISIT DISCLAIMER  The patient has consented to an online visit or consultation  The patient understands that the online visit is based solely on information provided by them, and that, in the absence of a face-to-face physical evaluation by the physician, the diagnosis they receive are both limited and provisional in terms of accuracy and completeness  This is not intended to replace a full medical face-to-face evaluation by the physician  The patient understands and accepts these terms

## 2022-09-09 NOTE — PATIENT INSTRUCTIONS
Discontinue cefadroxil  Continue leg elevation, use of compression stocking and lymphedema pumps to control edema  If you develop leg or foot ulcers or skin infection, contact this office immediately as we will need to resume cefadroxil suppression

## 2022-09-15 ENCOUNTER — OFFICE VISIT (OUTPATIENT)
Dept: GASTROENTEROLOGY | Facility: CLINIC | Age: 67
End: 2022-09-15
Payer: MEDICARE

## 2022-09-15 VITALS
DIASTOLIC BLOOD PRESSURE: 80 MMHG | WEIGHT: 315 LBS | HEIGHT: 78 IN | BODY MASS INDEX: 36.45 KG/M2 | TEMPERATURE: 99.4 F | SYSTOLIC BLOOD PRESSURE: 130 MMHG

## 2022-09-15 DIAGNOSIS — E66.01 OBESITY, CLASS III, BMI 40-49.9 (MORBID OBESITY) (HCC): ICD-10-CM

## 2022-09-15 DIAGNOSIS — K21.9 GASTROESOPHAGEAL REFLUX DISEASE WITHOUT ESOPHAGITIS: ICD-10-CM

## 2022-09-15 DIAGNOSIS — Z12.11 SCREEN FOR COLON CANCER: ICD-10-CM

## 2022-09-15 DIAGNOSIS — K90.0 CELIAC DISEASE: Primary | ICD-10-CM

## 2022-09-15 PROCEDURE — 99204 OFFICE O/P NEW MOD 45 MIN: CPT | Performed by: INTERNAL MEDICINE

## 2022-09-15 NOTE — PROGRESS NOTES
[facility] - Outpatient Consultation  Ashley Romero 69 y o  male MRN: [id number]  Encounter: [id number]          ASSESSMENT AND PLAN:        1  Screen for colon cancer    Will proceed with screening colonoscopy  - Ambulatory Referral to Gastroenterology  - Colonoscopy; Future  - polyethylene glycol (GOLYTELY) 4000 mL solution; Take 4,000 mL by mouth once for 1 dose  Dispense: 4000 mL; Refill: 0  - bisacodyl (DULCOLAX) 5 mg EC tablet; Take 2 tablets (10 mg total) by mouth once for 1 dose  Dispense: 2 tablet; Refill: 0    The rationale for colonoscopy with possible biopsy, possible polypectomy, with IV sedation as well as all risks, benefits, and alternatives were discussed with the patient in detail  Risks including but not limited to perforation, bleeding, need for blood transfusion or emergent surgery, and missed neoplasm were reviewed in detail with the patient  The patient demonstrates full understanding and wishes to proceed with the colonoscopy  2  Celiac disease  3  Gastroesophageal reflux disease    Diet controlled  No issues at this time  ______________________________________________________________    HPI:  Ashley Romero is a 69y o  year old male who presents to the office for evaluation for colorectal cancer screening  Reported symptoms: none  Family history: no known risk factors  Previous colonoscopy: routine screening, had colonoscopy approximately 5 years ago  He believes he had polyps at that time  No blood in stool  No weight loss  No family history of colon cancer  No change in bowel habits  They are not on blood thinners  He does have a history of gastroesophageal reflux disease and celiac disease  These are stable and diet controlled at this time  REVIEW OF SYSTEMS:    CONSTITUTIONAL: Denies any fever, chills, rigors, and weight loss  HEENT: No earache or tinnitus  Denies hearing loss or visual disturbances    CARDIOVASCULAR: No chest pain or palpitations  RESPIRATORY: Denies any cough, hemoptysis, shortness of breath or dyspnea on exertion  GASTROINTESTINAL: As noted in the History of Present Illness  GENITOURINARY: No problems with urination  Denies any hematuria or dysuria  NEUROLOGIC: No dizziness or vertigo, denies headaches  MUSCULOSKELETAL: Denies any muscle or joint pain  SKIN: Denies skin rashes or itching  ENDOCRINE: Denies excessive thirst  Denies intolerance to heat or cold  PSYCHOSOCIAL: Denies depression or anxiety  Denies any recent memory loss         Historical Information   Past Medical History:   Diagnosis Date    Atrial fibrillation ([place] [address] )     Cellulitis     Diabetes mellitus ([place] [address] )     Disease of thyroid gland     Duodenal ulcer     perforated- ICU stay    High blood pressure     High cholesterol     Hyperlipidemia     Hypertension     Hypothyroidism     Lymphedema      b/l LE- was followed at wound center    Morbid obesity with BMI of 40 0-44 9, adult ([place] [address] )     Peritonitis ([place] [address] )      Past Surgical History:   Procedure Laterality Date    BELOW KNEE LEG AMPUTATION Right     DIAGNOSTIC LAPAROSCOPY      KNEE ARTHROSCOPY Bilateral     OTHER SURGICAL HISTORY  2014     lap repair    OTHER SURGICAL HISTORY      Laparoscopic repair of a perforated duodenal ulcer with Bobie Mya omental    OTHER SURGICAL HISTORY      Placement of drains     Social History   Social History     Substance and Sexual Activity   Alcohol Use Not Currently    Alcohol/week: 1 0 standard drink    Types: 1 Standard drinks or equivalent per week     Social History     Substance and Sexual Activity   Drug Use Never     Social History     Tobacco Use   Smoking Status Former Smoker    Packs/day: 1 50    Years: 25 00    Pack years: 37 50    Types: Cigarettes    Quit date: 2004    Years since quittin 8   Smokeless Tobacco Never Used     Family History   Problem Relation Age of Onset    Heart disease Family     Alcohol abuse Family     Heart disease Mother     Hypertension Mother    Angel Pert Migraines Daughter     Leukemia Brother     Migraines Daughter     Substance Abuse Neg Hx     Mental illness Neg Hx     Depression Neg Hx        Meds/Allergies       Current Outpatient Medications:     ammonium lactate (LAC-HYDRIN) 12 % lotion    aspirin (ECOTRIN LOW STRENGTH) 81 mg EC tablet    cefadroxil (DURICEF) 500 mg capsule    clotrimazole-betamethasone (LOTRISONE) 1-0 05 % cream    gabapentin (NEURONTIN) 100 mg capsule    ketoconazole (NIZORAL) 2 % cream    levothyroxine 25 mcg tablet    levothyroxine 300 MCG tablet    lisinopril (ZESTRIL) 20 mg tablet    metFORMIN (GLUCOPHAGE-XR) 750 mg 24 hr tablet    metoprolol tartrate (LOPRESSOR) 50 mg tablet    Multiple Vitamins-Minerals (MULTIVITAMIN MEN 50+ PO)    pantoprazole (PROTONIX) 40 mg tablet    Zoster Vac Recomb Adjuvanted (Shingrix) 50 MCG/0 5ML SUSR    simvastatin (ZOCOR) 10 mg tablet    No Known Allergies        Objective     Blood pressure 130/80, temperature 99 4 °F (37 4 °C), temperature source Tympanic, height 6' 8" (2 032 m), weight (!) 183 kg (403 lb)  Body mass index is 44 27 kg/m²  PHYSICAL EXAM:      General Appearance:   Alert, cooperative, no distress   HEENT:   Normocephalic, atraumatic, anicteric  Lungs:   Equal chest rise and unlabored breathing, normal cough   Heart:   No visualized JVD   Abdomen:   Soft, non-tender, non-distended; no masses, no organomegaly    Genitalia:   Deferred    Rectal:   Deferred    Extremities:  No cyanosis, clubbing or edema    Pulses:  Musculoskeletal:  2+ and symmetric  Normal range of motion visualized    Skin:  Neuro:  No jaundice, rashes, or lesions   Alert and appropriate       Lab Results:   No visits with results within 1 Day(s) from this visit  Latest known visit with results is:   Lab Requisition on 03/11/2022   Component Date Value    Creatinine, Ur 03/11/2022 60 3     Microalbum  ,U,Random 03/11/2022 7 5  Microalb Creat Ratio 03/11/2022 12          Radiology Results:   No results found

## 2022-09-15 NOTE — PATIENT INSTRUCTIONS
Scheduled date of colonoscopy (as of today):  11/30/22   Physician performing colonoscopy: Dr Yancy Sarabia  Location of colonoscopy: [facility]   Bowel prep reviewed with patient: ted/dulcolax  Instructions reviewed with patient by: Bel   Clearances:   n/a

## 2022-10-12 PROBLEM — A41.9 SEPSIS (HCC): Status: RESOLVED | Noted: 2021-06-08 | Resolved: 2022-10-12

## 2022-10-27 ENCOUNTER — PATIENT MESSAGE (OUTPATIENT)
Dept: FAMILY MEDICINE CLINIC | Facility: CLINIC | Age: 67
End: 2022-10-27

## 2022-10-27 DIAGNOSIS — R21 RASH: Primary | ICD-10-CM

## 2022-10-27 RX ORDER — CLOTRIMAZOLE AND BETAMETHASONE DIPROPIONATE 10; .64 MG/G; MG/G
1 CREAM TOPICAL 3 TIMES WEEKLY
Qty: 45 G | Refills: 0 | Status: SHIPPED | OUTPATIENT
Start: 2022-10-28

## 2022-10-27 NOTE — TELEPHONE ENCOUNTER
From: Tali Ballesteros  To: Nikki Ingram  Sent: 10/27/2022 2:38 PM EDT  Subject: Prescription cream for left leg    Hi Doc! Hope all is well  Wound Care had me using a steroidal cream on my leg when it got "rashy"  I asked them for a prescription and was told I needed to make an appt, being that they had discharged me  Could you possibly prescribe it without an appt? CLOTRIMAZOLE/BETAMETHASONE DIPROPIONATE 1%-0 05% CREAM  The leg looks good  No open areas  The steroidal stuff works better if an area starts getting [facility] or rashy  [address] [facility], if you can help   Alva Macedo

## 2022-10-28 ENCOUNTER — HOSPITAL ENCOUNTER (EMERGENCY)
Facility: HOSPITAL | Age: 67
Discharge: HOME | End: 2022-10-28
Attending: EMERGENCY MEDICINE
Payer: MEDICARE

## 2022-10-28 VITALS
OXYGEN SATURATION: 96 % | RESPIRATION RATE: 18 BRPM | WEIGHT: 315 LBS | DIASTOLIC BLOOD PRESSURE: 66 MMHG | TEMPERATURE: 97.9 F | BODY MASS INDEX: 46.14 KG/M2 | HEART RATE: 64 BPM | SYSTOLIC BLOOD PRESSURE: 146 MMHG

## 2022-10-28 DIAGNOSIS — S90.411A ABRASION OF RIGHT GREAT TOE, INITIAL ENCOUNTER: Primary | ICD-10-CM

## 2022-10-28 LAB
ALBUMIN SERPL BCP-MCNC: 3.2 G/DL (ref 3.5–5)
ALP SERPL-CCNC: 96 U/L (ref 46–116)
ALT SERPL W P-5'-P-CCNC: 15 U/L (ref 12–78)
ANION GAP SERPL CALCULATED.3IONS-SCNC: 3 MMOL/L (ref 4–13)
AST SERPL W P-5'-P-CCNC: 13 U/L (ref 5–45)
BASOPHILS # BLD AUTO: 0.02 THOUSANDS/ÂΜL (ref 0–0.1)
BASOPHILS NFR BLD AUTO: 0 % (ref 0–1)
BILIRUB SERPL-MCNC: 0.5 MG/DL (ref 0.2–1)
BUN SERPL-MCNC: 14 MG/DL (ref 5–25)
CALCIUM ALBUM COR SERPL-MCNC: 9.8 MG/DL (ref 8.3–10.1)
CALCIUM SERPL-MCNC: 9.2 MG/DL (ref 8.3–10.1)
CHLORIDE SERPL-SCNC: 106 MMOL/L (ref 96–108)
CO2 SERPL-SCNC: 28 MMOL/L (ref 21–32)
CREAT SERPL-MCNC: 0.94 MG/DL (ref 0.6–1.3)
EOSINOPHIL # BLD AUTO: 0.08 THOUSAND/ÂΜL (ref 0–0.61)
EOSINOPHIL NFR BLD AUTO: 1 % (ref 0–6)
ERYTHROCYTE [DISTWIDTH] IN BLOOD BY AUTOMATED COUNT: 15.9 % (ref 11.6–15.1)
GFR SERPL CREATININE-BSD FRML MDRD: 82 ML/MIN/1.73SQ M
GLUCOSE SERPL-MCNC: 100 MG/DL (ref 65–140)
HCT VFR BLD AUTO: 42.2 % (ref 36.5–49.3)
HGB BLD-MCNC: 12.6 G/DL (ref 12–17)
IMM GRANULOCYTES # BLD AUTO: 0.02 THOUSAND/UL (ref 0–0.2)
IMM GRANULOCYTES NFR BLD AUTO: 0 % (ref 0–2)
INR PPP: 1.07 (ref 0.84–1.19)
LYMPHOCYTES # BLD AUTO: 0.7 THOUSANDS/ÂΜL (ref 0.6–4.47)
LYMPHOCYTES NFR BLD AUTO: 9 % (ref 14–44)
MCH RBC QN AUTO: 25 PG (ref 26.8–34.3)
MCHC RBC AUTO-ENTMCNC: 29.9 G/DL (ref 31.4–37.4)
MCV RBC AUTO: 84 FL (ref 82–98)
MONOCYTES # BLD AUTO: 0.45 THOUSAND/ÂΜL (ref 0.17–1.22)
MONOCYTES NFR BLD AUTO: 6 % (ref 4–12)
NEUTROPHILS # BLD AUTO: 6.29 THOUSANDS/ÂΜL (ref 1.85–7.62)
NEUTS SEG NFR BLD AUTO: 84 % (ref 43–75)
NRBC BLD AUTO-RTO: 0 /100 WBCS
PLATELET # BLD AUTO: 245 THOUSANDS/UL (ref 149–390)
PMV BLD AUTO: 11.5 FL (ref 8.9–12.7)
POTASSIUM SERPL-SCNC: 4.1 MMOL/L (ref 3.5–5.3)
PROT SERPL-MCNC: 7.8 G/DL (ref 6.4–8.4)
PROTHROMBIN TIME: 14.1 SECONDS (ref 11.6–14.5)
RBC # BLD AUTO: 5.05 MILLION/UL (ref 3.88–5.62)
SODIUM SERPL-SCNC: 137 MMOL/L (ref 135–147)
WBC # BLD AUTO: 7.56 THOUSAND/UL (ref 4.31–10.16)

## 2022-10-28 PROCEDURE — 90715 TDAP VACCINE 7 YRS/> IM: CPT | Performed by: EMERGENCY MEDICINE

## 2022-10-28 PROCEDURE — 85610 PROTHROMBIN TIME: CPT | Performed by: EMERGENCY MEDICINE

## 2022-10-28 PROCEDURE — 36415 COLL VENOUS BLD VENIPUNCTURE: CPT | Performed by: EMERGENCY MEDICINE

## 2022-10-28 PROCEDURE — 80053 COMPREHEN METABOLIC PANEL: CPT | Performed by: EMERGENCY MEDICINE

## 2022-10-28 PROCEDURE — 85025 COMPLETE CBC W/AUTO DIFF WBC: CPT | Performed by: EMERGENCY MEDICINE

## 2022-10-28 RX ORDER — CEPHALEXIN 500 MG/1
500 CAPSULE ORAL ONCE
Status: COMPLETED | OUTPATIENT
Start: 2022-10-28 | End: 2022-10-28

## 2022-10-28 RX ORDER — CEPHALEXIN 500 MG/1
500 CAPSULE ORAL EVERY 6 HOURS SCHEDULED
Qty: 28 CAPSULE | Refills: 0 | Status: SHIPPED | OUTPATIENT
Start: 2022-10-28 | End: 2022-11-04

## 2022-10-28 RX ADMIN — CEPHALEXIN 500 MG: 500 CAPSULE ORAL at 13:58

## 2022-10-28 RX ADMIN — TETANUS TOXOID, REDUCED DIPHTHERIA TOXOID AND ACELLULAR PERTUSSIS VACCINE, ADSORBED 0.5 ML: 5; 2.5; 8; 8; 2.5 SUSPENSION INTRAMUSCULAR at 13:58

## 2022-10-28 NOTE — DISCHARGE INSTRUCTIONS
Change the dressing on your toe twice daily  If you notice any signs of infection, please return to the ER or see your family doctor  If you have any fevers, chills, nausea, vomiting, pus drainage, please return to your nearest ER

## 2022-10-28 NOTE — ED PROVIDER NOTES
History  Chief Complaint   Patient presents with   • Toe Pain     Pt tried to remove his own callous from L big toe and then could not stop the bleeding     Maritza Harris is a 69year-old man with diabetes and a right BKA, atrial fibrillation, HTN, presenting with a bleeding toe  He was cutting a "callus" this morning and nicked his toe  It has been bleeding for about 4 hours  Left great toe  He takes aspirin, no other thinners  No other physical complaints  His LLE is edematous and erythematous, but patient reports at baseline  No fevers, chills, nausea, vomiting, shortness of breath  Prior to Admission Medications   Prescriptions Last Dose Informant Patient Reported? Taking? Multiple Vitamins-Minerals (MULTIVITAMIN MEN 50+ PO)  Self Yes Yes   Sig: Take by mouth   Zoster Vac Recomb Adjuvanted (Shingrix) 50 MCG/0 5ML SUSR  Self No No   Si 5mL IM for one dose, followed by 0 5mL IM 2-6 months after first dose   ammonium lactate (LAC-HYDRIN) 12 % lotion  Self No Yes   Sig: Apply topically 2 (two) times a day as needed for dry skin   aspirin (ECOTRIN LOW STRENGTH) 81 mg EC tablet  Self Yes Yes   Sig: Take 81 mg by mouth Daily   bisacodyl (DULCOLAX) 5 mg EC tablet   No Yes   Sig: Take 2 tablets (10 mg total) by mouth once for 1 dose   cefadroxil (DURICEF) 500 mg capsule  Self Yes Yes   Sig: Take 500 mg by mouth every 12 (twelve) hours   Indications: Infection of the Skin and/or Skin Structures   clotrimazole-betamethasone (LOTRISONE) 1-0 05 % cream   No Yes   Sig: Apply 1 application topically 3 (three) times a week As needed for rash   gabapentin (NEURONTIN) 100 mg capsule  Self No Yes   Sig: Take 1 capsule (100 mg total) by mouth 3 (three) times a day   ketoconazole (NIZORAL) 2 % cream  Self No Yes   Sig: Apply topically daily   levothyroxine 25 mcg tablet  Self No Yes   Sig: Take 1 tablet (25 mcg total) by mouth daily In addition to the 300 mcg dose (stop the 75 mcg dose)   levothyroxine 300 MCG tablet  Self No Yes   Sig: Take 1 tablet (300 mcg total) by mouth daily In addition to 75 mcg dose   lisinopril (ZESTRIL) 20 mg tablet  Self No Yes   Sig: Take 1 tablet (20 mg total) by mouth daily   metFORMIN (GLUCOPHAGE-XR) 750 mg 24 hr tablet  Self No Yes   Sig: Take 1 tablet (750 mg total) by mouth daily with breakfast   metoprolol tartrate (LOPRESSOR) 50 mg tablet  Self No Yes   Sig: Take 1 tablet (50 mg total) by mouth 2 (two) times a day   pantoprazole (PROTONIX) 40 mg tablet  Self No Yes   Sig: Take 1 tablet (40 mg total) by mouth daily   simvastatin (ZOCOR) 10 mg tablet   No Yes   Sig: Take 1 tablet (10 mg total) by mouth daily at bedtime      Facility-Administered Medications: None       Past Medical History:   Diagnosis Date   • Atrial fibrillation (HCC)    • Cellulitis    • Diabetes mellitus (HCC)    • Disease of thyroid gland    • Duodenal ulcer     perforated- ICU stay   • High blood pressure    • High cholesterol    • Hyperlipidemia    • Hypertension    • Hypothyroidism    • Lymphedema      b/l LE- was followed at wound center   • Morbid obesity with BMI of 40 0-44 9, adult (HCC)    • Peritonitis ([facility] [address] )        Past Surgical History:   Procedure Laterality Date   • BELOW KNEE LEG AMPUTATION Right    • DIAGNOSTIC LAPAROSCOPY     • KNEE ARTHROSCOPY Bilateral    • OTHER SURGICAL HISTORY  03/2014     lap repair   • OTHER SURGICAL HISTORY      Laparoscopic repair of a perforated duodenal ulcer with Adri Cristina omental   • OTHER SURGICAL HISTORY      Placement of drains       Family History   Problem Relation Age of Onset   • Heart disease Family    • Alcohol abuse Family    • Heart disease Mother    • Hypertension Mother    • Migraines Daughter    • Leukemia Brother    • Migraines Daughter    • Substance Abuse Neg Hx    • Mental illness Neg Hx    • Depression Neg Hx      I have reviewed and agree with the history as documented      E-Cigarette/Vaping   • E-Cigarette Use Never User      E-Cigarette/Vaping Substances     Social History     Tobacco Use   • Smoking status: Former Smoker     Packs/day: 1 50     Years: 25 00     Pack years: 37 50     Types: Cigarettes     Quit date: 2004     Years since quittin 9   • Smokeless tobacco: Never Used   Vaping Use   • Vaping Use: Never used   Substance Use Topics   • Alcohol use: Not Currently     Alcohol/week: 1 0 standard drink     Types: 1 Standard drinks or equivalent per week   • Drug use: Never        Review of Systems   All other systems reviewed and are negative  Physical Exam  ED Triage Vitals [10/28/22 1212]   Temperature Pulse Respirations Blood Pressure SpO2   97 9 °F (36 6 °C) 98 18 (!) 183/90 99 %      Temp Source Heart Rate Source Patient Position - Orthostatic VS BP Location FiO2 (%)   Tympanic Monitor Sitting Right arm --      Pain Score       No Pain             Orthostatic Vital Signs  Vitals:    10/28/22 1212 10/28/22 1259 10/28/22 1400 10/28/22 1430   BP: (!) 183/90 151/78 146/86 146/66   Pulse: 98  60 64   Patient Position - Orthostatic VS: Sitting  Sitting Sitting       Physical Exam  Vitals and nursing note reviewed  Constitutional:       General: He is not in acute distress  Appearance: He is well-developed  HENT:      Head: Normocephalic and atraumatic  Right Ear: External ear normal       Left Ear: External ear normal       Nose: Nose normal       Mouth/Throat:      Mouth: Mucous membranes are moist    Eyes:      Conjunctiva/sclera: Conjunctivae normal    Cardiovascular:      Rate and Rhythm: Normal rate  Pulmonary:      Effort: Pulmonary effort is normal  No respiratory distress  Breath sounds: Normal breath sounds  Abdominal:      General: There is no distension  Palpations: Abdomen is soft  Tenderness: There is no abdominal tenderness  Musculoskeletal:         General: No deformity  Cervical back: Normal range of motion  Comments: Left BKA   RLE erythematous from midcalf distally, patient reports at baseline; not warm or tender; consistent with venous stasis  RLE edematous, patient reports at baseline  Skin:     General: Skin is warm and dry  Comments: Abrasion present on left great toe, minimal bleeding  No ulcers present on left foot  Neurological:      General: No focal deficit present  Mental Status: He is alert and oriented to person, place, and time           ED Medications  Medications   cephalexin (KEFLEX) capsule 500 mg (500 mg Oral Given 10/28/22 1358)   tetanus-diphtheria-acellular pertussis (BOOSTRIX) IM injection 0 5 mL (0 5 mL Intramuscular Given 10/28/22 1358)       Diagnostic Studies  Results Reviewed     Procedure Component Value Units Date/Time    Comprehensive metabolic panel [[id number]]  (Abnormal) Collected: 10/28/22 1322    Lab Status: Final result Specimen: Blood from Arm, Left Updated: 10/28/22 1348     Sodium 137 mmol/L      Potassium 4 1 mmol/L      Chloride 106 mmol/L      CO2 28 mmol/L      ANION GAP 3 mmol/L      BUN 14 mg/dL      Creatinine 0 94 mg/dL      Glucose 100 mg/dL      Calcium 9 2 mg/dL      Corrected Calcium 9 8 mg/dL      AST 13 U/L      ALT 15 U/L      Alkaline Phosphatase 96 U/L      Total Protein 7 8 g/dL      Albumin 3 2 g/dL      Total Bilirubin 0 50 mg/dL      eGFR 82 ml/min/1 73sq m     Narrative:      Jyoti guidelines for Chronic Kidney Disease (CKD):   •  Stage 1 with normal or high GFR (GFR > 90 mL/min/1 73 square meters)  •  Stage 2 Mild CKD (GFR = 60-89 mL/min/1 73 square meters)  •  Stage 3A Moderate CKD (GFR = 45-59 mL/min/1 73 square meters)  •  Stage 3B Moderate CKD (GFR = 30-44 mL/min/1 73 square meters)  •  Stage 4 Severe CKD (GFR = 15-29 mL/min/1 73 square meters)  •  Stage 5 End Stage CKD (GFR <15 mL/min/1 73 square meters)  Note: GFR calculation is accurate only with a steady state creatinine    Protime-INR [[id number]]  (Normal) Collected: 10/28/22 1322    Lab Status: Final result Specimen: Blood from Arm, Left Updated: 10/28/22 1344     Protime 14 1 seconds      INR 1 07    CBC and differential [[id number]]  (Abnormal) Collected: 10/28/22 1322    Lab Status: Final result Specimen: Blood from Arm, Left Updated: 10/28/22 1330     WBC 7 56 Thousand/uL      RBC 5 05 Million/uL      Hemoglobin 12 6 g/dL      Hematocrit 42 2 %      MCV 84 fL      MCH 25 0 pg      MCHC 29 9 g/dL      RDW 15 9 %      MPV 11 5 fL      Platelets 833 Thousands/uL      nRBC 0 /100 WBCs      Neutrophils Relative 84 %      Immat GRANS % 0 %      Lymphocytes Relative 9 %      Monocytes Relative 6 %      Eosinophils Relative 1 %      Basophils Relative 0 %      Neutrophils Absolute 6 29 Thousands/µL      Immature Grans Absolute 0 02 Thousand/uL      Lymphocytes Absolute 0 70 Thousands/µL      Monocytes Absolute 0 45 Thousand/µL      Eosinophils Absolute 0 08 Thousand/µL      Basophils Absolute 0 02 Thousands/µL                  No orders to display         Procedures  Procedures      ED Course  ED Course as of 10/29/22 1952   Fri Oct 28, 2022   1333 Hemoglobin: 12 6               Identification of Seniors at [address] [place] [address] Most Recent Value   (ISAR) Identification of Seniors at Risk    Before the illness or injury that brought you to the Emergency, did you need someone to help you on a regular basis? 0 Filed at: 10/28/2022 1214   In the last 24 hours, have you needed more help than usual? 0 Filed at: 10/28/2022 1214   Have you been hospitalized for one or more nights during the past 6 months? 0 Filed at: 10/28/2022 1214   In general, do you see well? 0 Filed at: 10/28/2022 1214   In general, do you have serious problems with your memory? 0 Filed at: 10/28/2022 1214   Do you take more than three different medications every day?  1 Filed at: 10/28/2022 1214   ISAR Score 1 Filed at: 10/28/2022 1214                    SBIRT 22yo+    Flowsheet Row Most Recent Value   SBIRT (23 yo +)    In order to provide better care to our patients, we are screening all of our patients for alcohol and drug use  Would it be okay to ask you these screening questions? Yes Filed at: 10/28/2022 1215   Initial Alcohol Screen: US AUDIT-C     1  How often do you have a drink containing alcohol? 0 Filed at: 10/28/2022 1215   2  How many drinks containing alcohol do you have on a typical day you are drinking? 0 Filed at: 10/28/2022 1215   3a  Male UNDER 65: How often do you have five or more drinks on one occasion? 0 Filed at: 10/28/2022 1215   3b  FEMALE Any Age, or MALE 65+: How often do you have 4 or more drinks on one occassion? 0 Filed at: 10/28/2022 1215   Audit-C Score 0 Filed at: 10/28/2022 1215   KARRIE: How many times in the past year have you    Used an illegal drug or used a prescription medication for non-medical reasons? Never Filed at: 10/28/2022 1215                MDM  Number of Diagnoses or Management Options  Abrasion of right great toe, initial encounter  Diagnosis management comments: 68 yo man presenting with left great toe abrasion and bleeding  Will evaluate with labs  LLE erythema does not appear cellulitic, and patient reports this is his baseline  Abrasion cleaned and dressed at bedside, patient referred to wound care and podiatry, wound care instructions given, and patient provided with dressing change supplies  Labs unremarkable for acute pathology  Discharged home in stable condition, to follow up with podiatry and wound care, prescribed Keflex prophylactically given high risk of infection due to comorbidities, strong return precautions given         Disposition  Final diagnoses:   Abrasion of right great toe, initial encounter     Time reflects when diagnosis was documented in both MDM as applicable and the Disposition within this note     Time User Action Codes Description Comment    10/28/2022  1:55 PM Ignacio Trevino Add [O13 124L] Abrasion of right great toe, initial encounter       ED Disposition     ED Disposition   Discharge Condition   Stable    Date/Time   Fri Oct 28, 2022  1:54 PM    Comment   Carmenza Barrett discharge to home/self care  Follow-up Information     Follow up With Specialties Details Why Contact Info Additional Mindy Jacobs 10 [phone]  [address] [facility], [address], [place], [place], [address] Y[facility]   [address] [phone]  [address] [facility], [address] [place], [place], [address] [facility] Emergency Department Emergency Medicine  If symptoms worsen Arlene 10 [phone]  [address] [facility] [address] [facility] Emergency Department, [address] [facility] [address], [place], [place], [address]          Discharge Medication List as of 10/28/2022  1:59 PM      START taking these medications    Details   cephalexin (KEFLEX) 500 mg capsule Take 1 capsule (500 mg total) by mouth every 6 (six) hours for 7 days, Starting Fri 10/28/2022, Until Fri 11/4/2022, Normal         CONTINUE these medications which have NOT CHANGED    Details   ammonium lactate (LAC-HYDRIN) 12 % lotion Apply topically 2 (two) times a day as needed for dry skin, Starting Wed 9/15/2021, Normal      aspirin (ECOTRIN LOW STRENGTH) 81 mg EC tablet Take 81 mg by mouth Daily, Starting Thu 5/25/2017, Historical Med      bisacodyl (DULCOLAX) 5 mg EC tablet Take 2 tablets (10 mg total) by mouth once for 1 dose, Starting Thu 9/15/2022, Normal      cefadroxil (DURICEF) 500 mg capsule Take 500 mg by mouth every 12 (twelve) hours   Indications: Infection of the Skin and/or Skin Structures, Historical Med      clotrimazole-betamethasone (LOTRISONE) 1-0 05 % cream Apply 1 application topically 3 (three) times a week As needed for rash, Starting Fri 10/28/2022, Normal      gabapentin (NEURONTIN) 100 mg capsule Take 1 capsule (100 mg total) by mouth 3 (three) times a day, Starting Fri 8/26/2022, Normal      ketoconazole (NIZORAL) 2 % cream Apply topically daily, Starting Wed 9/15/2021, Normal      !! levothyroxine 25 mcg tablet Take 1 tablet (25 mcg total) by mouth daily In addition to the 300 mcg dose (stop the 75 mcg dose), Starting Fri 6/3/2022, Normal      !! levothyroxine 300 MCG tablet Take 1 tablet (300 mcg total) by mouth daily In addition to 75 mcg dose, Starting Fri 6/3/2022, Normal      lisinopril (ZESTRIL) 20 mg tablet Take 1 tablet (20 mg total) by mouth daily, Starting Fri 8/26/2022, Normal      metFORMIN (GLUCOPHAGE-XR) 750 mg 24 hr tablet Take 1 tablet (750 mg total) by mouth daily with breakfast, Starting Fri 6/3/2022, Normal      metoprolol tartrate (LOPRESSOR) 50 mg tablet Take 1 tablet (50 mg total) by mouth 2 (two) times a day, Starting Fri 6/3/2022, Normal      Multiple Vitamins-Minerals (MULTIVITAMIN MEN 50+ PO) Take by mouth, Historical Med      pantoprazole (PROTONIX) 40 mg tablet Take 1 tablet (40 mg total) by mouth daily, Starting Fri 6/3/2022, Normal      simvastatin (ZOCOR) 10 mg tablet Take 1 tablet (10 mg total) by mouth daily at bedtime, Starting Fri 6/3/2022, Until Fri 10/28/2022, Normal      Zoster Vac Recomb Adjuvanted (Shingrix) 50 MCG/0 5ML SUSR 0 5mL IM for one dose, followed by 0 5mL IM 2-6 months after first dose, Print       !! - Potential duplicate medications found  Please discuss with provider  PDMP Review     None           ED Provider  Attending physically available and evaluated Claudeen Gutta  SRI managed the patient along with the ED Attending      Electronically Signed by         Amanda Cerna MD  10/29/22 7761

## 2022-10-28 NOTE — ED NOTES
BRENDA  Called for wheel chair van for patient to go back to his residence       Esteban Jacques  10/28/22 4813

## 2022-10-28 NOTE — ED ATTENDING ATTESTATION
10/28/2022  IClaudia MD, saw and evaluated the patient  I have discussed the patient with the resident/non-physician practitioner and agree with the resident's/non-physician practitioner's findings, Plan of Care, and MDM as documented in the resident's/non-physician practitioner's note, except where noted  All available labs and Radiology studies were reviewed  I was present for key portions of any procedure(s) performed by the resident/non-physician practitioner and I was immediately available to provide assistance  At this point I agree with the current assessment done in the Emergency Department  I have conducted an independent evaluation of this patient a history and physical is as follows:      OA: 70 y/o m who presents with bleeding from his left foot after trying to shave down/cut a callus off of his foot  Pt noted bleeding that was difficult to control from his left great toe  He states that he has multiple paper towels to try to control the bleeding however it continued prompting him to call EMS  EMS was able to control bleeding prior to arrival with pressure  No active bleeding on arrival   Patient denies bleeding from any other sites  States that he was using a file like device to get rid of the callus on his foot  He has baseline neuropathy so was unable to feel when he got to low while trimming the callus  No pain, swelling otherwise  He notes that he always has edema of his left lower extremity which is confirmed in previous record review showing baseline lymphedema  Patient also notes that he has baseline redness to his lower leg that is unchanged x years  Denies any worsening of appearance of his legs/foot and notes in fact it is improved from previous  When specifically asked, patient states that if he had not had bleeding to his like he would not have come in today because he has no other complaints and has not noted any change in his lower extremities otherwise  Denies any lightheadedness or dizziness  No fatigue  No chest pain no shortness of breath  Abdomen soft positive bowel sounds no rebound or guarding  Patient with right BKA  Left lower extremity with lymphedema over tib-fib that appears to be baseline  Patient with baseline mild redness of his left lower extremity over tib-fib and dorsal foot that is not circumferential that is unchanged times years per patient and review of clinical images in media confirmed improved appearance  No ulcerations  No warmth  No ulcerations appreciated  Intact pulses  Capillary refill less than 2 seconds  Patient with area of abrasion to distal tip of left great toe  There is no active bleeding and the area is very superficial   Assessment and plan patient was bleeding to his foot on aspirin  Currently controlled  Will irrigate and dress and monitor in the ED to ensure no rebleeding  Will check baseline blood work given patient states he had significant bleeding at home and is a diabetic  Given patient is a diabetic with history of previous wound infections will also cover with antibiotics  If workup negative and pt without bleeding, feeling well, will give strict follow-up and return precautions  Patient expresses understanding       ED Course     Pt again reconfirms that his leg does not appear different in appearance, only here for the bleeding  Anxious for dc      Critical Care Time  Procedures

## 2022-11-19 DIAGNOSIS — K21.9 GASTROESOPHAGEAL REFLUX DISEASE WITHOUT ESOPHAGITIS: ICD-10-CM

## 2022-11-19 DIAGNOSIS — I10 ESSENTIAL HYPERTENSION: ICD-10-CM

## 2022-11-19 DIAGNOSIS — R21 RASH: ICD-10-CM

## 2022-11-19 DIAGNOSIS — G62.9 NEUROPATHY: ICD-10-CM

## 2022-11-19 DIAGNOSIS — E11.8 CONTROLLED TYPE 2 DIABETES MELLITUS WITH COMPLICATION, WITHOUT LONG-TERM CURRENT USE OF INSULIN (HCC): ICD-10-CM

## 2022-11-19 DIAGNOSIS — E03.9 ACQUIRED HYPOTHYROIDISM: ICD-10-CM

## 2022-11-19 DIAGNOSIS — E78.2 MIXED HYPERLIPIDEMIA: ICD-10-CM

## 2022-11-25 RX ORDER — GABAPENTIN 100 MG/1
100 CAPSULE ORAL 3 TIMES DAILY
Qty: 270 CAPSULE | Refills: 0 | Status: SHIPPED | OUTPATIENT
Start: 2022-11-25

## 2022-11-25 RX ORDER — METOPROLOL TARTRATE 50 MG/1
50 TABLET, FILM COATED ORAL 2 TIMES DAILY
Qty: 180 TABLET | Refills: 0 | Status: SHIPPED | OUTPATIENT
Start: 2022-11-25

## 2022-11-25 RX ORDER — PANTOPRAZOLE SODIUM 40 MG/1
40 TABLET, DELAYED RELEASE ORAL DAILY
Qty: 90 TABLET | Refills: 0 | Status: SHIPPED | OUTPATIENT
Start: 2022-11-25

## 2022-11-25 RX ORDER — LISINOPRIL 20 MG/1
20 TABLET ORAL DAILY
Qty: 90 TABLET | Refills: 0 | Status: SHIPPED | OUTPATIENT
Start: 2022-11-25

## 2022-11-25 RX ORDER — LEVOTHYROXINE SODIUM 300 UG/1
300 TABLET ORAL DAILY
Qty: 90 TABLET | Refills: 0 | Status: SHIPPED | OUTPATIENT
Start: 2022-11-25

## 2022-11-25 RX ORDER — CLOTRIMAZOLE AND BETAMETHASONE DIPROPIONATE 10; .64 MG/G; MG/G
1 CREAM TOPICAL 3 TIMES WEEKLY
Qty: 45 G | Refills: 0 | Status: SHIPPED | OUTPATIENT
Start: 2022-11-25

## 2022-11-25 RX ORDER — METFORMIN HYDROCHLORIDE 750 MG/1
750 TABLET, EXTENDED RELEASE ORAL
Qty: 90 TABLET | Refills: 0 | Status: SHIPPED | OUTPATIENT
Start: 2022-11-25

## 2022-11-25 RX ORDER — LEVOTHYROXINE SODIUM 0.03 MG/1
25 TABLET ORAL DAILY
Qty: 90 TABLET | Refills: 0 | Status: SHIPPED | OUTPATIENT
Start: 2022-11-25

## 2022-11-25 RX ORDER — SIMVASTATIN 10 MG
10 TABLET ORAL
Qty: 90 TABLET | Refills: 0 | Status: SHIPPED | OUTPATIENT
Start: 2022-11-25 | End: 2023-02-23

## 2022-11-25 NOTE — TELEPHONE ENCOUNTER
Medication refill requested:    simvastatin (ZOCOR) 10 mg tablet ()     pantoprazole (PROTONIX) 40 mg tablet  metoprolol tartrate (LOPRESSOR) 50 mg tablet  metFORMIN (GLUCOPHAGE-XR) 750 mg 24 hr tablet  lisinopril (ZESTRIL) 20 mg tablet   levothyroxine 300 MCG tablet   gabapentin (NEURONTIN) 100 mg capsule  levothyroxine 25 mcg tablet  Last office visit: 2022  Next office visit: NONE  Last refilled: 2022  Labs: No  Ordering Provider: [address] [place] [address] ([facility] pharmacy send RX to):     [place] [address]  Phone: [phone] Fax: [phone]

## 2022-11-25 NOTE — TELEPHONE ENCOUNTER
Pt needs to schedule follow up appointment  Labs were due in September  Please have him get labs done- ok to set up home draw if needed  (lab order is from 3/17/22)  Schedule for f/u appointment  meds refilled for 90 days, but must have labs done and OV within 90 days to receive further refills  Needs to be seen at a minimum every six months

## 2022-11-28 NOTE — TELEPHONE ENCOUNTER
Responded to patient's nCinot message regarding the same info  Hold to see if appointment is scheduled so we can set up home lab services  If no return message will call to schedule

## 2022-11-30 ENCOUNTER — ANESTHESIA EVENT (OUTPATIENT)
Dept: GASTROENTEROLOGY | Facility: HOSPITAL | Age: 67
End: 2022-11-30

## 2022-11-30 ENCOUNTER — HOSPITAL ENCOUNTER (OUTPATIENT)
Dept: RADIOLOGY | Facility: HOSPITAL | Age: 67
Discharge: HOME | End: 2022-11-30

## 2022-11-30 ENCOUNTER — ANESTHESIA (OUTPATIENT)
Dept: GASTROENTEROLOGY | Facility: HOSPITAL | Age: 67
End: 2022-11-30

## 2022-11-30 ENCOUNTER — HOSPITAL ENCOUNTER (OUTPATIENT)
Dept: GASTROENTEROLOGY | Facility: HOSPITAL | Age: 67
Setting detail: OUTPATIENT SURGERY
Discharge: HOME | End: 2022-11-30
Attending: INTERNAL MEDICINE

## 2022-11-30 VITALS
DIASTOLIC BLOOD PRESSURE: 59 MMHG | HEART RATE: 65 BPM | HEIGHT: 78 IN | SYSTOLIC BLOOD PRESSURE: 124 MMHG | RESPIRATION RATE: 16 BRPM | WEIGHT: 315 LBS | OXYGEN SATURATION: 97 % | TEMPERATURE: 97.8 F | BODY MASS INDEX: 36.45 KG/M2

## 2022-11-30 DIAGNOSIS — Z12.11 SCREEN FOR COLON CANCER: ICD-10-CM

## 2022-11-30 LAB — GLUCOSE SERPL-MCNC: 123 MG/DL (ref 65–140)

## 2022-11-30 RX ORDER — PROPOFOL 10 MG/ML
INJECTION, EMULSION INTRAVENOUS AS NEEDED
Status: DISCONTINUED | OUTPATIENT
Start: 2022-11-30 | End: 2022-11-30

## 2022-11-30 RX ORDER — SODIUM CHLORIDE 9 MG/ML
INJECTION, SOLUTION INTRAVENOUS CONTINUOUS PRN
Status: DISCONTINUED | OUTPATIENT
Start: 2022-11-30 | End: 2022-11-30

## 2022-11-30 RX ORDER — PROPOFOL 10 MG/ML
INJECTION, EMULSION INTRAVENOUS CONTINUOUS PRN
Status: DISCONTINUED | OUTPATIENT
Start: 2022-11-30 | End: 2022-11-30

## 2022-11-30 RX ORDER — LIDOCAINE HYDROCHLORIDE 10 MG/ML
INJECTION, SOLUTION EPIDURAL; INFILTRATION; INTRACAUDAL; PERINEURAL AS NEEDED
Status: DISCONTINUED | OUTPATIENT
Start: 2022-11-30 | End: 2022-11-30

## 2022-11-30 RX ORDER — SODIUM CHLORIDE, SODIUM LACTATE, POTASSIUM CHLORIDE, CALCIUM CHLORIDE 600; 310; 30; 20 MG/100ML; MG/100ML; MG/100ML; MG/100ML
50 INJECTION, SOLUTION INTRAVENOUS CONTINUOUS
Status: CANCELLED | OUTPATIENT
Start: 2022-11-30

## 2022-11-30 RX ORDER — LIDOCAINE HYDROCHLORIDE 10 MG/ML
0.5 INJECTION, SOLUTION EPIDURAL; INFILTRATION; INTRACAUDAL; PERINEURAL ONCE AS NEEDED
Status: CANCELLED | OUTPATIENT
Start: 2022-11-30

## 2022-11-30 RX ADMIN — PROPOFOL 60 MG: 10 INJECTION, EMULSION INTRAVENOUS at 08:03

## 2022-11-30 RX ADMIN — SODIUM CHLORIDE: 0.9 INJECTION, SOLUTION INTRAVENOUS at 08:00

## 2022-11-30 RX ADMIN — LIDOCAINE HYDROCHLORIDE 50 MG: 10 INJECTION, SOLUTION EPIDURAL; INFILTRATION; INTRACAUDAL; PERINEURAL at 08:03

## 2022-11-30 RX ADMIN — PROPOFOL 120 MCG/KG/MIN: 10 INJECTION, EMULSION INTRAVENOUS at 08:03

## 2022-11-30 NOTE — ANESTHESIA POSTPROCEDURE EVALUATION
Post-Op Assessment Note    CV Status:  Stable  Pain Score: 0    Pain management: adequate  Multimodal analgesia used between 6 hours prior to anesthesia start to PACU discharge    Hydration Status:  Stable   PONV Controlled:  None   Airway Patency:  Patent   Two or more mitigation strategies used for obstructive sleep apnea   Post Op Vitals Reviewed: Yes      Staff: Anesthesiologist, CRNA         No notable events documented      BP  148/71   Temp   97 8   Pulse  84   Resp   14   SpO2   98

## 2022-11-30 NOTE — ANESTHESIA PREPROCEDURE EVALUATION
Procedure:  COLONOSCOPY    Relevant Problems   CARDIO   (+) Coronary artery disease   (+) Essential hypertension   (+) HLD (hyperlipidemia)   (+) Paroxysmal atrial fibrillation (HCC)      ENDO   (+) Controlled type 2 diabetes mellitus with complication, without long-term current use of insulin (HCC)   (+) Hypothyroidism      GI/HEPATIC   (+) Gastroesophageal reflux disease without esophagitis      HEMATOLOGY   (+) Iron deficiency anemia due to chronic blood loss      NEURO/PSYCH   (+) Diabetic polyneuropathy associated with type 2 diabetes mellitus (HCC)   (+) History of duodenal ulcer      Other   (+) Obesity, Class III, BMI 40-49 9 (morbid obesity) (HCC)   (+) S/P BKA (below knee amputation) unilateral, right (HCC)     Denies recent fever, cough or other symptom of upper respiratory tract infection  Confirmed NPO appropriate      Physical Exam    Airway    Mallampati score: III  TM Distance: >3 FB       Dental   upper dentures,     Cardiovascular      Pulmonary      Other Findings        Anesthesia Plan  ASA Score- 3     Anesthesia Type- IV sedation with anesthesia with ASA Monitors  Additional Monitors:   Airway Plan:     Comment: I discussed the risks and benefits of IV sedation anesthesia including the possibility of the need to convert to general anesthesia and the potential risk of awareness  Plan Factors-Exercise comment: Short of breath when ambulating to mailbox, unchanged in past year  Chart reviewed  EKG reviewed  Existing labs reviewed  Patient summary reviewed  Patient is not a current smoker (Quit 10+ years ago)  Induction- intravenous  Postoperative Plan-     Informed Consent- Anesthetic plan and risks discussed with patient

## 2022-11-30 NOTE — H&P
History and Physical -  [facility]  Yessenia Lange 69 y o  male MRN: [id number]                  HPI: Yessenia Lange is a 69y o  year old male who presents for screening colonoscopy, last colonoscopy 5 years ago      REVIEW OF SYSTEMS: Per the HPI, and otherwise unremarkable      Historical Information   Past Medical History:   Diagnosis Date   • Atrial fibrillation ([facility] )    • Cellulitis    • Diabetes mellitus ([facility] )    • Disease of thyroid gland    • Duodenal ulcer     perforated- ICU stay   • High blood pressure    • High cholesterol    • Hyperlipidemia    • Hypertension    • Hypothyroidism    • Lymphedema      b/l LE- was followed at wound center   • Morbid obesity with BMI of 40 0-44 9, adult ([facility] )    • Peritonitis ([facility] )      Past Surgical History:   Procedure Laterality Date   • BELOW KNEE LEG AMPUTATION Right    • DIAGNOSTIC LAPAROSCOPY     • KNEE ARTHROSCOPY Bilateral    • OTHER SURGICAL HISTORY  2014     lap repair   • OTHER SURGICAL HISTORY      Laparoscopic repair of a perforated duodenal ulcer with Velvet Seip omental   • OTHER SURGICAL HISTORY      Placement of drains     Social History   Social History     Substance and Sexual Activity   Alcohol Use Not Currently   • Alcohol/week: 1 0 standard drink   • Types: 1 Standard drinks or equivalent per week     Social History     Substance and Sexual Activity   Drug Use Never     Social History     Tobacco Use   Smoking Status Former   • Packs/day: 1 50   • Years: 25 00   • Pack years: 37 50   • Types: Cigarettes   • Quit date: 2004   • Years since quittin 0   Smokeless Tobacco Never     Family History   Problem Relation Age of Onset   • Heart disease Family    • Alcohol abuse Family    • Heart disease Mother    • Hypertension Mother    • Migraines Daughter    • Leukemia Brother    • Migraines Daughter    • Substance Abuse Neg Hx    • Mental illness Neg Hx    • Depression Neg Hx        Meds/Allergies       Current Outpatient Medications:   •  ammonium lactate (LAC-HYDRIN) 12 % lotion  •  aspirin (ECOTRIN LOW STRENGTH) 81 mg EC tablet  •  cefadroxil (DURICEF) 500 mg capsule  •  clotrimazole-betamethasone (LOTRISONE) 1-0 05 % cream  •  gabapentin (NEURONTIN) 100 mg capsule  •  levothyroxine 25 mcg tablet  •  levothyroxine 300 MCG tablet  •  lisinopril (ZESTRIL) 20 mg tablet  •  metFORMIN (GLUCOPHAGE-XR) 750 mg 24 hr tablet  •  metoprolol tartrate (LOPRESSOR) 50 mg tablet  •  Multiple Vitamins-Minerals (MULTIVITAMIN MEN 50+ PO)  •  pantoprazole (PROTONIX) 40 mg tablet  •  simvastatin (ZOCOR) 10 mg tablet  •  bisacodyl (DULCOLAX) 5 mg EC tablet  •  ketoconazole (NIZORAL) 2 % cream  •  Zoster Vac Recomb Adjuvanted (Shingrix) 50 MCG/0 5ML SUSR    No Known Allergies    Objective     BP (!) 173/80   Pulse 91   Temp 98 3 °F (36 8 °C) (Tympanic)   Resp 17   Ht 6' 8" (2 032 m)   Wt (!) 191 kg (420 lb)   SpO2 96%   BMI 46 14 kg/m²       PHYSICAL EXAM    Gen: NAD  Head: NCAT  CV: RRR  CHEST: Clear  ABD: soft, NT/ND  EXT: no edema      ASSESSMENT/PLAN:  This is a 69y o  year old male here for colonoscopy, and he is stable and optimized for his procedure

## 2023-03-03 DIAGNOSIS — E11.8 CONTROLLED TYPE 2 DIABETES MELLITUS WITH COMPLICATION, WITHOUT LONG-TERM CURRENT USE OF INSULIN (HCC): ICD-10-CM

## 2023-03-03 DIAGNOSIS — I10 ESSENTIAL HYPERTENSION: ICD-10-CM

## 2023-03-03 DIAGNOSIS — R21 RASH: ICD-10-CM

## 2023-03-03 DIAGNOSIS — K21.9 GASTROESOPHAGEAL REFLUX DISEASE WITHOUT ESOPHAGITIS: ICD-10-CM

## 2023-03-03 DIAGNOSIS — E78.2 MIXED HYPERLIPIDEMIA: ICD-10-CM

## 2023-03-03 DIAGNOSIS — E03.9 ACQUIRED HYPOTHYROIDISM: ICD-10-CM

## 2023-03-03 RX ORDER — METOPROLOL TARTRATE 50 MG/1
50 TABLET, FILM COATED ORAL 2 TIMES DAILY
Qty: 180 TABLET | Refills: 0 | Status: SHIPPED | OUTPATIENT
Start: 2023-03-03

## 2023-03-03 RX ORDER — PANTOPRAZOLE SODIUM 40 MG/1
40 TABLET, DELAYED RELEASE ORAL DAILY
Qty: 90 TABLET | Refills: 0 | Status: SHIPPED | OUTPATIENT
Start: 2023-03-03

## 2023-03-03 RX ORDER — METFORMIN HYDROCHLORIDE 750 MG/1
750 TABLET, EXTENDED RELEASE ORAL
Qty: 90 TABLET | Refills: 0 | Status: SHIPPED | OUTPATIENT
Start: 2023-03-03

## 2023-03-03 RX ORDER — LEVOTHYROXINE SODIUM 0.03 MG/1
25 TABLET ORAL DAILY
Qty: 90 TABLET | Refills: 0 | Status: SHIPPED | OUTPATIENT
Start: 2023-03-03

## 2023-03-03 RX ORDER — SIMVASTATIN 10 MG
10 TABLET ORAL
Qty: 90 TABLET | Refills: 0 | Status: SHIPPED | OUTPATIENT
Start: 2023-03-03 | End: 2023-06-01

## 2023-03-03 RX ORDER — LEVOTHYROXINE SODIUM 300 UG/1
300 TABLET ORAL DAILY
Qty: 90 TABLET | Refills: 0 | Status: SHIPPED | OUTPATIENT
Start: 2023-03-03

## 2023-03-03 RX ORDER — LISINOPRIL 20 MG/1
20 TABLET ORAL DAILY
Qty: 90 TABLET | Refills: 0 | Status: SHIPPED | OUTPATIENT
Start: 2023-03-03

## 2023-03-03 RX ORDER — CLOTRIMAZOLE AND BETAMETHASONE DIPROPIONATE 10; .64 MG/G; MG/G
1 CREAM TOPICAL 3 TIMES WEEKLY
Qty: 45 G | Refills: 0 | Status: SHIPPED | OUTPATIENT
Start: 2023-03-03

## 2023-03-20 ENCOUNTER — TELEPHONE (OUTPATIENT)
Dept: FAMILY MEDICINE CLINIC | Facility: CLINIC | Age: 68
End: 2023-03-20

## 2023-03-20 NOTE — TELEPHONE ENCOUNTER
Patient's lab orders  on 3/17/23  Patient's appt c Dr Gina Tracy is 23  I would recommend that patient contact the office 2 weeks prior to his appointment to request Dr Gina Tracy order labs and coordinate home lab draw

## 2023-03-20 NOTE — TELEPHONE ENCOUNTER
Patient had called in to request a tech to come out to his home to do bloodwork before his appt with Dr Sadie Stephen on 5/9/23  Please advise

## 2023-04-25 ENCOUNTER — HOME HEALTH ADMISSION (OUTPATIENT)
Dept: HOME HEALTH SERVICES | Facility: HOME HEALTHCARE | Age: 68
End: 2023-04-25
Payer: MEDICARE

## 2023-04-25 ENCOUNTER — OFFICE VISIT (OUTPATIENT)
Dept: WOUND CARE | Facility: HOSPITAL | Age: 68
End: 2023-04-25

## 2023-04-25 VITALS
TEMPERATURE: 97.3 F | RESPIRATION RATE: 18 BRPM | BODY MASS INDEX: 36.45 KG/M2 | WEIGHT: 315 LBS | DIASTOLIC BLOOD PRESSURE: 90 MMHG | HEIGHT: 78 IN | HEART RATE: 64 BPM | SYSTOLIC BLOOD PRESSURE: 172 MMHG

## 2023-04-25 DIAGNOSIS — E11.42 DIABETIC POLYNEUROPATHY ASSOCIATED WITH TYPE 2 DIABETES MELLITUS (HCC): ICD-10-CM

## 2023-04-25 DIAGNOSIS — L97.929 IDIOPATHIC CHRONIC VENOUS HYPERTENSION OF LEFT LOWER EXTREMITY WITH ULCER (HCC): ICD-10-CM

## 2023-04-25 DIAGNOSIS — I89.0 LYMPHEDEMA: ICD-10-CM

## 2023-04-25 DIAGNOSIS — L97.521 DIABETIC ULCER OF OTHER PART OF LEFT FOOT ASSOCIATED WITH TYPE 2 DIABETES MELLITUS, LIMITED TO BREAKDOWN OF SKIN (HCC): Primary | ICD-10-CM

## 2023-04-25 DIAGNOSIS — I87.312 IDIOPATHIC CHRONIC VENOUS HYPERTENSION OF LEFT LOWER EXTREMITY WITH ULCER (HCC): ICD-10-CM

## 2023-04-25 DIAGNOSIS — E11.621 DIABETIC ULCER OF OTHER PART OF LEFT FOOT ASSOCIATED WITH TYPE 2 DIABETES MELLITUS, LIMITED TO BREAKDOWN OF SKIN (HCC): Primary | ICD-10-CM

## 2023-04-25 NOTE — PROGRESS NOTES
"Debridement   Wound 04/25/23 Diabetic Ulcer Foot Anterior; Left    Universal Protocol:  Consent: Verbal consent obtained  Risks and benefits: risks, benefits and alternatives were discussed  Consent given by: patient  Time out: Immediately prior to procedure a \"time out\" was called to verify the correct patient, procedure, equipment, support staff and site/side marked as required  Patient understanding: patient states understanding of the procedure being performed  Patient identity confirmed: verbally with patient      Performed by: physician  Debridement type: selective  Pain control: lidocaine 4%  Pre-debridement measurements  Length (cm): 3  Width (cm): 1  Depth (cm): 0 1  Surface Area (cm^2): 3  Volume (cm^3): 0 3    Post-debridement measurements  Length (cm): 3  Width (cm): 1  Depth (cm): 0 1  Percent debrided: 100%  Surface Area (cm^2): 3  Area debrided (cm^2):  3  Volume (cm^3): 0 3  Devitalized tissue debrided: biofilm and slough  Instrument(s) utilized: blade  Bleeding: small  Hemostasis obtained with: pressure  Procedural pain (0-10): insensate  Post-procedural pain: insensate   Response to treatment: procedure was tolerated well          "

## 2023-04-25 NOTE — PROGRESS NOTES
Patient ID: Kaela Ludwig is a 77 y o  male Date of Birth 1955       Chief Complaint   Patient presents with   • New Patient Visit     Left leg wounds       Allergies:  Patient has no known allergies  Diagnosis:  1  Diabetic ulcer of other part of left foot associated with type 2 diabetes mellitus, limited to breakdown of skin (HCC)  -     Wound cleansing and dressings; Future  -     Wound compression and edema control; Future  -     Referral to [address] [place] [address]; Future    2  Idiopathic chronic venous hypertension of left lower extremity with ulcer (HCC)  -     Wound cleansing and dressings; Future  -     Wound compression and edema control; Future  -     Referral to [address] [place] [address]; Future    3  Lymphedema  -     Wound cleansing and dressings; Future  -     Wound compression and edema control; Future  -     Referral to [address] [place] [address]; Future    4  Diabetic polyneuropathy associated with type 2 diabetes mellitus (HCC)  -     Wound cleansing and dressings; Future  -     Wound compression and edema control; Future  -     Referral to [address] [place] [address]; Future       Diagnosis ICD-10-CM Associated Orders   1  Diabetic ulcer of other part of left foot associated with type 2 diabetes mellitus, limited to breakdown of skin (HCC)  E11 621 Wound cleansing and dressings    L97 521 Wound compression and edema control     Referral to [address] [place] [address] [facility]      2  Idiopathic chronic venous hypertension of left lower extremity with ulcer (HCC)  I87 312 Wound cleansing and dressings    L97 929 Wound compression and edema control     Referral to [address] [place] [address] [facility]      3  Lymphedema  I89 0 Wound cleansing and dressings     Wound compression and edema control     Referral to [address] [place] [address]      4   Diabetic polyneuropathy associated with type 2 diabetes mellitus ([facility])  E11 42 Wound cleansing and dressings     Wound compression and edema control "Referral to [facility] Suman Berg:  1  Venous stasis ulcerations left lower lateral lower leg x3, distal 2  wounds were debrided through subcuticular tissues, proximal wound was not debrided, they were all  dressed with OPTi cell dry dressing and SurePress for compression  2   Diabetic Naidu grade 1 ulceration dorsal left foot, wound was debrided through selective tissues and dressed with OPTi cell dry dressing and SurePress for compression  3   Patient with type 2 diabetes with peripheral neuropathy and lymphedema  Visiting nurses were initiated for daily dressing changes as patient has copious drainage, this will likely be necessary for the next week to 2 weeks, I have also initiated occupational therapy for lymphedema who is worked with him in the past   4   Patient instructed on frequent elevation, low-sodium diet, proper protein intake, proper glycemic control, compliance with use of sequential compression pumps for 1 hour twice a day  5   Patient understands and agrees with plan and will follow-up in 2 weeks  Debridement   Wound 04/25/23 Venous Ulcer Leg Left; Anterior    Universal Protocol:  Consent: Verbal consent obtained  Risks and benefits: risks, benefits and alternatives were discussed  Consent given by: patient  Time out: Immediately prior to procedure a \"time out\" was called to verify the correct patient, procedure, equipment, support staff and site/side marked as required    Patient understanding: patient states understanding of the procedure being performed  Patient identity confirmed: verbally with patient      Performed by: physician  Debridement type: selective  Pain control: none  Pre-debridement measurements  Length (cm): 2 5  Width (cm): 1 7  Depth (cm): 0 2  Surface Area (cm^2): 4 25  Volume (cm^3): 0 85    Post-debridement measurements  Length (cm): 2 6  Width (cm): 1 8  Depth (cm): 0 4  Percent debrided: 100%  Surface Area (cm^2): 4 68  Area " "debrided (cm^2): 4 68  Volume (cm^3): 1 87  Tissue and other material debrided: subcutaneous tissue  Devitalized tissue debrided: biofilm, fibrin, necrotic debris, slough and eschar  Instrument(s) utilized: blade  Bleeding: small  Hemostasis obtained with: pressure  Procedural pain (0-10): insensate  Post-procedural pain: insensate   Response to treatment: procedure was tolerated well    Debridement   Wound 04/25/23 Venous Ulcer Leg Left;Lateral    Universal Protocol:  Consent: Verbal consent obtained  Risks and benefits: risks, benefits and alternatives were discussed  Consent given by: patient  Time out: Immediately prior to procedure a \"time out\" was called to verify the correct patient, procedure, equipment, support staff and site/side marked as required    Patient understanding: patient states understanding of the procedure being performed  Patient identity confirmed: verbally with patient      Performed by: physician  Debridement type: surgical  Level of debridement: subcutaneous tissue  Pain control: lidocaine 4%  Pre-debridement measurements  Length (cm): 3 2  Width (cm): 2 3  Depth (cm): 0 2  Surface Area (cm^2): 7 36  Volume (cm^3): 1 47    Post-debridement measurements  Length (cm): 3 2  Width (cm): 2 5  Depth (cm): 0 3  Percent debrided: 100%  Surface Area (cm^2): 8  Area debrided (cm^2): 8  Volume (cm^3): 2 4  Tissue and other material debrided: subcutaneous tissue  Devitalized tissue debrided: biofilm, fibrin, necrotic debris and slough  Instrument(s) utilized: blade  Bleeding: small  Hemostasis obtained with: pressure  Procedural pain (0-10): insensate  Post-procedural pain: insensate   Response to treatment: procedure was tolerated well                           Subjective:   Patient presents today for care of new wounds to his left lower extremity, he states they started about 3 to 4 weeks ago, he was doing great up until then, he did get new compression stockings about 1 month ago, he states he " does not wear them as long as 6 months when he does rotate them more frequently  He states he has been using his sequential compression pumps for 1 hour once a day in the evening  He does not complain of any nausea, vomiting, fever, chills          The following portions of the patient's history were reviewed and updated as appropriate:   Patient Active Problem List   Diagnosis   • Controlled type 2 diabetes mellitus with complication, without long-term current use of insulin [facility]   • Essential hypertension   • HLD (hyperlipidemia)   • Hypothyroidism   • Celiac disease   • Coronary artery disease   • History of duodenal ulcer   • Living will on file   • Obesity, Class III, BMI 40-49 9 (morbid obesity) ([facility])   • S/P BKA (below knee amputation) unilateral, right ([facility])   • Paroxysmal atrial fibrillation ([facility])   • Iron deficiency anemia due to chronic blood loss   • Chronic venous stasis dermatitis of left lower extremity   • Gastroesophageal reflux disease without esophagitis   • Diabetic ulcer of left foot associated with type 2 diabetes mellitus, limited to breakdown of skin ([facility])   • Idiopathic chronic venous hypertension of left lower extremity with ulcer ([facility])   • Non-pressure chronic ulcer of left calf, limited to breakdown of skin ([facility] )   • Diabetic polyneuropathy associated with type 2 diabetes mellitus ([facility] )   • Cellulitis of left lower extremity   • Abnormal urinalysis   • Ulcer of toe of left foot, limited to breakdown of skin ([facility] )   • Diabetic ulcer of left ankle associated with type 2 diabetes mellitus, limited to breakdown of skin ([facility] )   • Dermatophytosis     Past Medical History:   Diagnosis Date   • Atrial fibrillation ([facility] )    • Cellulitis    • Diabetes mellitus ([facility] )    • Disease of thyroid gland    • Duodenal ulcer     perforated- ICU stay   • High blood pressure    • High cholesterol    • Hyperlipidemia    • Hypertension    • Hypothyroidism    • Lymphedema      b/l LE- was followed at wound center   • Morbid obesity with BMI of 40 0-44 9, adult ([facility] )    • Peritonitis ([facility] )      Past Surgical History:   Procedure Laterality Date   • BELOW KNEE LEG AMPUTATION Right    • DIAGNOSTIC LAPAROSCOPY     • KNEE ARTHROSCOPY Bilateral    • OTHER SURGICAL HISTORY  2014     lap repair   • OTHER SURGICAL HISTORY      Laparoscopic repair of a perforated duodenal ulcer with [place] omental   • OTHER SURGICAL HISTORY      Placement of drains     Social History     Socioeconomic History   • Marital status: Single     Spouse name: None   • Number of children: 2   • Years of education: 12   • Highest education level: High school graduate   Occupational History   • None   Tobacco Use   • Smoking status: Former     Packs/day: 1 50     Years: 25 00     Pack years: 37 50     Types: Cigarettes     Quit date: 2004     Years since quittin 4   • Smokeless tobacco: Never   Vaping Use   • Vaping Use: Never used   Substance and Sexual Activity   • Alcohol use: Not Currently     Alcohol/week: 1 0 standard drink     Types: 1 Standard drinks or equivalent per week   • Drug use: Never   • Sexual activity: Not Currently   Other Topics Concern   • None   Social History Narrative    Former smoker: Quit 3/31/2012 - As per Care Everywhere      Social Determinants of Health     Financial Resource Strain: Not on file   Food Insecurity: Not on file   Transportation Needs: Not on file   Physical Activity: Not on file   Stress: Not on file   Social Connections: Not on file   Intimate Partner Violence: Not on file   Housing Stability: Not on file        Current Outpatient Medications:   •  ammonium lactate (LAC-HYDRIN) 12 % lotion, Apply topically 2 (two) times a day as needed for dry skin, Disp: 400 g, Rfl: 2  •  aspirin (ECOTRIN LOW STRENGTH) 81 mg EC tablet, Take 81 mg by mouth Daily, Disp: , Rfl:   •  bisacodyl (DULCOLAX) 5 mg EC tablet, Take 2 tablets (10 mg total) by mouth once for 1 dose, Disp: 2 tablet, Rfl: 0  •  cefadroxil (DURICEF) 500 mg capsule, Take 500 mg by mouth every 12 (twelve) hours  Indications: Infection of the Skin and/or Skin Structures, Disp: , Rfl:   •  clotrimazole-betamethasone (LOTRISONE) 1-0 05 % cream, Apply 1 application   topically 3 (three) times a week As needed for rash, Disp: 45 g, Rfl: 0  •  gabapentin (NEURONTIN) 100 mg capsule, Take 1 capsule (100 mg total) by mouth 3 (three) times a day, Disp: 270 capsule, Rfl: 0  •  ketoconazole (NIZORAL) 2 % cream, Apply topically daily, Disp: 60 g, Rfl: 1  •  levothyroxine 25 mcg tablet, Take 1 tablet (25 mcg total) by mouth daily In addition to the 300 mcg dose, Disp: 90 tablet, Rfl: 0  •  levothyroxine 300 MCG tablet, Take 1 tablet (300 mcg total) by mouth daily In addition to 25 mcg dose, Disp: 90 tablet, Rfl: 0  •  lisinopril (ZESTRIL) 20 mg tablet, Take 1 tablet (20 mg total) by mouth daily, Disp: 90 tablet, Rfl: 0  •  metFORMIN (GLUCOPHAGE-XR) 750 mg 24 hr tablet, Take 1 tablet (750 mg total) by mouth daily with breakfast, Disp: 90 tablet, Rfl: 0  •  metoprolol tartrate (LOPRESSOR) 50 mg tablet, Take 1 tablet (50 mg total) by mouth 2 (two) times a day, Disp: 180 tablet, Rfl: 0  •  Multiple Vitamins-Minerals (MULTIVITAMIN MEN 50+ PO), Take by mouth, Disp: , Rfl:   •  pantoprazole (PROTONIX) 40 mg tablet, Take 1 tablet (40 mg total) by mouth daily, Disp: 90 tablet, Rfl: 0  •  simvastatin (ZOCOR) 10 mg tablet, Take 1 tablet (10 mg total) by mouth daily at bedtime, Disp: 90 tablet, Rfl: 0  •  Zoster Vac Recomb Adjuvanted (Shingrix) 50 MCG/0 5ML SUSR, 0 5mL IM for one dose, followed by 0 5mL IM 2-6 months after first dose, Disp: 1 each, Rfl: 1  Family History   Problem Relation Age of Onset   • Heart disease Family    • Alcohol abuse Family    • Heart disease Mother    • Hypertension Mother    • Migraines Daughter    • Leukemia Brother    • Migraines Daughter    • Substance Abuse Neg Hx    • Mental illness Neg Hx    • Depression Neg Hx        Review of Systems "  Constitutional: Negative for chills and fever  HENT: Negative for ear pain and sore throat  Eyes: Negative for pain and visual disturbance  Respiratory: Negative for cough and shortness of breath  Cardiovascular: Negative for chest pain and palpitations  Gastrointestinal: Negative for abdominal pain and vomiting  Genitourinary: Negative for dysuria and hematuria  Musculoskeletal: Positive for gait problem  Negative for arthralgias and back pain  Skin: Positive for color change and wound  Negative for rash  Neurological: Positive for numbness  Negative for seizures and syncope  Psychiatric/Behavioral: Negative  All other systems reviewed and are negative  Objective:  BP (!) 172/90   Pulse 64   Temp (!) 97 3 °F (36 3 °C) (Temporal)   Resp 18   Ht 6' 7\" (2 007 m)   Wt (!) 191 kg (420 lb)   BMI 47 31 kg/m²     Physical Exam  Constitutional:       Appearance: Normal appearance  He is obese  HENT:      Head: Normocephalic and atraumatic  Right Ear: External ear normal       Left Ear: External ear normal       Nose: Nose normal       Mouth/Throat:      Mouth: Mucous membranes are moist       Pharynx: Oropharynx is clear  Eyes:      Conjunctiva/sclera: Conjunctivae normal    Cardiovascular:      Pulses:           Dorsalis pedis pulses are 1+ on the left side  Posterior tibial pulses are 1+ on the left side  Pulmonary:      Effort: Pulmonary effort is normal    Musculoskeletal:      Cervical back: Normal range of motion  Left lower le+ Edema present  Comments: BKA right   Skin:     General: Skin is warm and dry  Capillary Refill: Capillary refill takes less than 2 seconds  Comments: See detailed wound assessment   Neurological:      General: No focal deficit present  Mental Status: He is alert and oriented to person, place, and time  Mental status is at baseline  Sensory: Sensory deficit present        Coordination: Coordination " abnormal       Gait: Gait abnormal       Deep Tendon Reflexes: Reflexes abnormal    Psychiatric:         Mood and Affect: Mood normal          Behavior: Behavior normal          Thought Content: Thought content normal          Judgment: Judgment normal            Wound 04/25/23 Venous Ulcer Foot Anterior; Left (Active)   Wound Image   04/25/23 0931   Wound Description White;Slough 04/25/23 0931   Jovita-wound Assessment Pink;Edema 04/25/23 0931   Wound Length (cm) 3 cm 04/25/23 0931   Wound Width (cm) 1 cm 04/25/23 0931   Wound Depth (cm) 0 1 cm 04/25/23 0931   Wound Surface Area (cm^2) 3 cm^2 04/25/23 0931   Wound Volume (cm^3) 0 3 cm^3 04/25/23 0931   Calculated Wound Volume (cm^3) 0 3 cm^3 04/25/23 0931   Drainage Amount Small 04/25/23 0931   Drainage Description Serous 04/25/23 0931   Non-staged Wound Description Full thickness 04/25/23 0931       Wound 04/25/23 Venous Ulcer Leg Left; Anterior (Active)   Wound Image   04/25/23 1008   Wound Description Slough; Yellow;Pink;Granulation tissue 04/25/23 0932   Jovita-wound Assessment Edema;Pink 04/25/23 0932   Wound Length (cm) 2 5 cm 04/25/23 0932   Wound Width (cm) 1 7 cm 04/25/23 0932   Wound Depth (cm) 0 2 cm 04/25/23 0932   Wound Surface Area (cm^2) 4 25 cm^2 04/25/23 0932   Wound Volume (cm^3) 0 85 cm^3 04/25/23 0932   Calculated Wound Volume (cm^3) 0 85 cm^3 04/25/23 0932   Drainage Amount Moderate 04/25/23 0932   Drainage Description Serous 04/25/23 0932   Non-staged Wound Description Full thickness 04/25/23 0932       Wound 04/25/23 Venous Ulcer Leg Left;Lateral (Active)   Wound Image   04/25/23 1009   Wound Description Granulation tissue;Pink;Yellow;Slough 04/25/23 0934   Jovita-wound Assessment Edema;Pink 04/25/23 0934   Wound Length (cm) 3 2 cm 04/25/23 0934   Wound Width (cm) 2 3 cm 04/25/23 0934   Wound Depth (cm) 0 2 cm 04/25/23 0934   Wound Surface Area (cm^2) 7 36 cm^2 04/25/23 0934   Wound Volume (cm^3) 1 472 cm^3 04/25/23 0934   Calculated Wound Volume (cm^3) 1 47 cm^3 04/25/23 0934   Drainage Amount Moderate 04/25/23 0934   Drainage Description Serous 04/25/23 0934   Non-staged Wound Description Full thickness 04/25/23 0934       Wound 04/25/23 Venous Ulcer Knee Left;Lateral (Active)   Wound Image   04/25/23 0937   Wound Description Granulation tissue;Slough;Black 04/25/23 0937   Jovita-wound Assessment Dry 04/25/23 0937   Wound Length (cm) 1 2 cm 04/25/23 0937   Wound Width (cm) 1 4 cm 04/25/23 0937   Wound Depth (cm) 0 1 cm 04/25/23 0937   Wound Surface Area (cm^2) 1 68 cm^2 04/25/23 0937   Wound Volume (cm^3) 0 168 cm^3 04/25/23 0937   Calculated Wound Volume (cm^3) 0 17 cm^3 04/25/23 0937   Drainage Amount Small 04/25/23 0937   Drainage Description Serosanguineous 04/25/23 0937   Non-staged Wound Description Full thickness 04/25/23 0937                No results found  Wound Instructions:  Orders Placed This Encounter   Procedures   • Wound cleansing and dressings     Wash your hands with soap and water  Remove old dressing, discard into plastic bag and place in trash  Cleanse the wound with mild soap and water prior to applying a clean dressing  Do not use tissue or cotton balls  Do not scrub the wound  Pat dry using gauze  Shower no (Keep dressings clean and dry - may use cast cover)  Apply moisturizer to skin surrounding wound  Apply opticell AG to the left leg and foot wounds  Cover with ABD  Secure with amy and tape  Change dressing daily for first week by home care until return to wound center in one week  This was done today in wound care (optilock applied today until home care able to visit patient)     Standing Status:   Future     Standing Expiration Date:   4/25/2024   • Wound compression and edema control     Surepress    Apply compression wrap to your affected Leg(s) from mid-foot to knee making sure to cover the heel  Apply in the morning and re-wrap as needed during the day if wrap becomes loose   Remove at bedtime and elevate legs or "lie down  Avoid prolonged standing in one place  Elevate leg(s) above the level of the heart when sitting or as much as possible  Limit salt intake  Consult to occupational therapy for lymphedema  Use compression pumps for one hour 2x day  Standing Status:   Future     Standing Expiration Date:   4/25/2024   • Debridement     This order was created via procedure documentation   • Debridement     This order was created via procedure documentation   • Referral to [address] [place] [address]     Standing Status:   Future     Standing Expiration Date:   4/25/2024     Referral Priority:   Routine     Referral Type:   Home Health     Referral Reason:   Specialty Services Required     Requested Specialty:   [address]     Number of Visits Requested:   1     Expiration Date:   4/25/2024         Thelma Thorpe, DPM, DPM, FACFAS    Portions of the record may have been created with voice recognition software  Occasional wrong word or \"sound a like\" substitutions may have occurred due to the inherent limitations of voice recognition software  Read the chart carefully and recognize, using context, where substitutions have occurred      "

## 2023-04-25 NOTE — PATIENT INSTRUCTIONS
Orders Placed This Encounter   Procedures    Wound cleansing and dressings     Wash your hands with soap and water  Remove old dressing, discard into plastic bag and place in trash  Cleanse the wound with mild soap and water prior to applying a clean dressing  Do not use tissue or cotton balls  Do not scrub the wound  Pat dry using gauze  Shower no (Keep dressings clean and dry - may use cast cover)  Apply moisturizer to skin surrounding wound  Apply opticell AG to the left leg and foot wounds  Cover with ABD  Secure with amy and tape  Change dressing daily for first week by home care until return to wound center in one week  This was done today in wound care (optilock applied today until home care able to visit patient)     Standing Status:   Future     Standing Expiration Date:   4/25/2024    Wound compression and edema control     Surepress    Apply compression wrap to your affected Leg(s) from mid-foot to knee making sure to cover the heel  Apply in the morning and re-wrap as needed during the day if wrap becomes loose  Remove at bedtime and elevate legs or lie down  Avoid prolonged standing in one place  Elevate leg(s) above the level of the heart when sitting or as much as possible  Limit salt intake  Consult to occupational therapy for lymphedema  Use compression pumps for one hour 2x day       Standing Status:   Future     Standing Expiration Date:   4/25/2024    Referral to [address] [place] [address]     Standing Status:   Future     Standing Expiration Date:   4/25/2024     Referral Priority:   Routine     Referral Type:   Home Health     Referral Reason:   Specialty Services Required     Requested Specialty:   [address]     Number of Visits Requested:   1     Expiration Date:   4/25/2024

## 2023-04-26 ENCOUNTER — HOME CARE VISIT (OUTPATIENT)
Dept: HOME HEALTH SERVICES | Facility: HOME HEALTHCARE | Age: 68
End: 2023-04-26

## 2023-04-26 VITALS
SYSTOLIC BLOOD PRESSURE: 162 MMHG | TEMPERATURE: 98.8 F | HEART RATE: 68 BPM | OXYGEN SATURATION: 97 % | RESPIRATION RATE: 20 BRPM | DIASTOLIC BLOOD PRESSURE: 80 MMHG

## 2023-04-26 PROCEDURE — 10330081 VN NO-PAY CLAIM PROCEDURE

## 2023-04-27 ENCOUNTER — HOME CARE VISIT (OUTPATIENT)
Dept: HOME HEALTH SERVICES | Facility: HOME HEALTHCARE | Age: 68
End: 2023-04-27

## 2023-04-27 VITALS
RESPIRATION RATE: 20 BRPM | SYSTOLIC BLOOD PRESSURE: 160 MMHG | OXYGEN SATURATION: 96 % | TEMPERATURE: 98.1 F | DIASTOLIC BLOOD PRESSURE: 88 MMHG | HEART RATE: 64 BPM

## 2023-04-27 PROCEDURE — 10330064

## 2023-04-27 PROCEDURE — 10330064 DRESSING, WND OPTILOCK N/ADH 4X4" (10/BX

## 2023-04-27 PROCEDURE — 10330064 TAPE, ADHSV TRANSPORE WHT 2" (6RL/BX 10B

## 2023-04-27 PROCEDURE — 10330064 PAD, ABD 5X9" STR LF (1/PK 20PK/BX) MGM1

## 2023-04-27 PROCEDURE — 10330064 CLEANSER, WND SEA-CLEANS 6OZ  COLPLT

## 2023-04-28 ENCOUNTER — HOME CARE VISIT (OUTPATIENT)
Dept: HOME HEALTH SERVICES | Facility: HOME HEALTHCARE | Age: 68
End: 2023-04-28

## 2023-04-28 VITALS — OXYGEN SATURATION: 98 % | TEMPERATURE: 98.7 F | HEART RATE: 65 BPM

## 2023-04-29 ENCOUNTER — HOME CARE VISIT (OUTPATIENT)
Dept: HOME HEALTH SERVICES | Facility: HOME HEALTHCARE | Age: 68
End: 2023-04-29

## 2023-04-29 VITALS
SYSTOLIC BLOOD PRESSURE: 158 MMHG | DIASTOLIC BLOOD PRESSURE: 72 MMHG | HEART RATE: 76 BPM | TEMPERATURE: 98.1 F | OXYGEN SATURATION: 96 % | RESPIRATION RATE: 20 BRPM

## 2023-04-30 ENCOUNTER — HOME CARE VISIT (OUTPATIENT)
Dept: HOME HEALTH SERVICES | Facility: HOME HEALTHCARE | Age: 68
End: 2023-04-30

## 2023-04-30 VITALS
DIASTOLIC BLOOD PRESSURE: 74 MMHG | OXYGEN SATURATION: 98 % | SYSTOLIC BLOOD PRESSURE: 128 MMHG | TEMPERATURE: 98.1 F | HEART RATE: 74 BPM | RESPIRATION RATE: 18 BRPM

## 2023-05-01 ENCOUNTER — HOME CARE VISIT (OUTPATIENT)
Dept: HOME HEALTH SERVICES | Facility: HOME HEALTHCARE | Age: 68
End: 2023-05-01

## 2023-05-01 VITALS — OXYGEN SATURATION: 98 % | HEART RATE: 66 BPM | TEMPERATURE: 98 F

## 2023-05-02 ENCOUNTER — HOME CARE VISIT (OUTPATIENT)
Dept: HOME HEALTH SERVICES | Facility: HOME HEALTHCARE | Age: 68
End: 2023-05-02

## 2023-05-02 ENCOUNTER — OFFICE VISIT (OUTPATIENT)
Dept: WOUND CARE | Facility: HOSPITAL | Age: 68
End: 2023-05-02

## 2023-05-02 VITALS — TEMPERATURE: 97.7 F

## 2023-05-02 VITALS
TEMPERATURE: 96.5 F | DIASTOLIC BLOOD PRESSURE: 99 MMHG | HEART RATE: 100 BPM | RESPIRATION RATE: 24 BRPM | SYSTOLIC BLOOD PRESSURE: 164 MMHG

## 2023-05-02 DIAGNOSIS — L97.929 IDIOPATHIC CHRONIC VENOUS HYPERTENSION OF LEFT LOWER EXTREMITY WITH ULCER (HCC): ICD-10-CM

## 2023-05-02 DIAGNOSIS — E11.621 DIABETIC ULCER OF OTHER PART OF LEFT FOOT ASSOCIATED WITH TYPE 2 DIABETES MELLITUS, LIMITED TO BREAKDOWN OF SKIN (HCC): Primary | ICD-10-CM

## 2023-05-02 DIAGNOSIS — L97.521 DIABETIC ULCER OF OTHER PART OF LEFT FOOT ASSOCIATED WITH TYPE 2 DIABETES MELLITUS, LIMITED TO BREAKDOWN OF SKIN (HCC): Primary | ICD-10-CM

## 2023-05-02 DIAGNOSIS — I89.0 LYMPHEDEMA: ICD-10-CM

## 2023-05-02 DIAGNOSIS — I87.312 IDIOPATHIC CHRONIC VENOUS HYPERTENSION OF LEFT LOWER EXTREMITY WITH ULCER (HCC): ICD-10-CM

## 2023-05-02 NOTE — PATIENT INSTRUCTIONS
Orders Placed This Encounter   Procedures    Wound cleansing and dressings     Wash your hands with soap and water  Remove old dressing, discard into plastic bag and place in trash  Cleanse the wound with mild soap and water prior to applying a clean dressing  May apply Dakins soak to wounds as needed for drainage  Pat dry using gauze  Shower no (Keep dressings clean and dry - may use cast cover)  Apply Calmoseptine or skin prep to skin surrounding wounds as needed  Apply adaptic then [email or web address] or equivalent cut to fit the left leg and foot wounds  Cover with ABD  Secure with amy and tape  Change dressing daily for next week then may decrease to every other day  This was done today in wound care  Standing Status:   Future     Standing Expiration Date:   5/2/2024    Wound compression and edema control     Surepress: To left leg  Apply compression wrap to your affected Leg(s) from mid-foot to knee making sure to cover the heel  Apply in the morning and re-wrap as needed during the day if wrap becomes loose  Remove at bedtime and elevate legs or lie down  Please ensure that Surepress is at 25 percent stretch and 75 percent overlap  Avoid prolonged standing in one place  Elevate leg(s) above the level of the heart when sitting or as much as possible  Limit salt intake  Use compression pumps for one hour 2x day  Standing Status:   Future     Standing Expiration Date:   5/2/2024    Wound home care     Continue [facility] for continued wound care and lymphedema  Continue daily for 1 week then may decrease visits to every other day       Standing Status:   Future     Standing Expiration Date:   5/2/2024

## 2023-05-02 NOTE — PROGRESS NOTES
Patient ID: Cas Slaughter is a 77 y o  male Date of Birth 1955       Chief Complaint   Patient presents with    Follow Up Wound Care Visit     LLE wounds  Allergies:  Patient has no known allergies  Diagnosis:  1  Diabetic ulcer of other part of left foot associated with type 2 diabetes mellitus, limited to breakdown of skin (HCC)  -     Wound cleansing and dressings; Future  -     Wound compression and edema control; Future  -     Wound home care; Future    2  Idiopathic chronic venous hypertension of left lower extremity with ulcer (HCC)  -     Wound cleansing and dressings; Future  -     Wound compression and edema control; Future  -     Wound home care; Future    3  Lymphedema  -     Wound cleansing and dressings; Future  -     Wound compression and edema control; Future  -     Wound home care; Future       Diagnosis ICD-10-CM Associated Orders   1  Diabetic ulcer of other part of left foot associated with type 2 diabetes mellitus, limited to breakdown of skin (HCC)  E11 621 Wound cleansing and dressings    L97 521 Wound compression and edema control     Wound home care      2  Idiopathic chronic venous hypertension of left lower extremity with ulcer (HCC)  I87 312 Wound cleansing and dressings    L97 929 Wound compression and edema control     Wound home care      3  Lymphedema  I89 0 Wound cleansing and dressings     Wound compression and edema control     Wound home care           Assessment & Plan:  1  Venous stasis ulcerations left lateral lower leg, proximal wound is well epithelialized and healed, distal to wounds were debrided through subcuticular tissues, wounds were dressed with OPTi cell dry dressing and SurePress for compression  May discontinue dressings to proximal leg wound  2   Diabetic Naidu 1 ulceration dorsal left foot, no debridement performed, continue with Opticell dry dressing and SurePress for compression    3   Visiting nurses and lymphedema therapy will continue with "oil emulsion, alginate, Calmoseptine or Skin-Prep to periwound as needed, SurePress for compression, they will continue with daily dressing changes for the next week, and then they will transition to every other day  4   Patient to continue to use his sequential compression pumps twice a day for 1 hour  5   Frequent elevation, low-sodium diet, proper protein intake, proper glycemic control, strict pressure and friction reduction, compliance with lymphedema pumps was reviewed with patient, he understands and agrees with the plan and will follow-up in 2 weeks  Debridement   Wound 04/25/23 Venous Ulcer Leg Left; Anterior    Universal Protocol:  Consent: Verbal consent obtained  Risks and benefits: risks, benefits and alternatives were discussed  Consent given by: patient  Time out: Immediately prior to procedure a \"time out\" was called to verify the correct patient, procedure, equipment, support staff and site/side marked as required    Patient understanding: patient states understanding of the procedure being performed  Patient identity confirmed: verbally with patient      Performed by: physician  Debridement type: surgical  Level of debridement: subcutaneous tissue  Pain control: lidocaine 4%  Pre-debridement measurements  Length (cm): 3 2  Width (cm): 2  Depth (cm): 0 1  Surface Area (cm^2): 6 4  Volume (cm^3): 0 64    Post-debridement measurements  Length (cm): 3  Width (cm): 2 2  Depth (cm): 0 2  Percent debrided: 100%  Surface Area (cm^2): 6 6  Area debrided (cm^2): 6 6  Volume (cm^3): 1 32  Tissue and other material debrided: subcutaneous tissue  Devitalized tissue debrided: biofilm, fibrin, necrotic debris and slough  Instrument(s) utilized: blade  Bleeding: small  Hemostasis obtained with: pressure  Procedural pain (0-10): insensate  Post-procedural pain: insensate   Response to treatment: procedure was tolerated well    Debridement   Wound 04/25/23 Venous Ulcer Leg Left;Lateral    Universal " "Protocol:  Consent: Verbal consent obtained  Risks and benefits: risks, benefits and alternatives were discussed  Consent given by: patient  Time out: Immediately prior to procedure a \"time out\" was called to verify the correct patient, procedure, equipment, support staff and site/side marked as required  Patient understanding: patient states understanding of the procedure being performed  Patient identity confirmed: verbally with patient      Performed by: physician  Debridement type: surgical  Level of debridement: subcutaneous tissue  Pain control: lidocaine 4%  Pre-debridement measurements  Length (cm): 3  Width (cm): 2  Depth (cm): 0 1  Surface Area (cm^2): 6  Volume (cm^3): 0 6    Post-debridement measurements  Length (cm): 3  Width (cm): 2  Depth (cm): 0 2  Percent debrided: 100%  Surface Area (cm^2): 6  Area debrided (cm^2): 6  Volume (cm^3): 1 2  Tissue and other material debrided: subcutaneous tissue  Devitalized tissue debrided: biofilm, fibrin, necrotic debris and slough  Instrument(s) utilized: blade  Bleeding: small  Hemostasis obtained with: pressure  Procedural pain (0-10): insensate  Post-procedural pain: insensate   Response to treatment: procedure was tolerated well           Subjective:   Patient presents today for care of left lower extremity lymphedema and venous stasis ulcerations, visiting nurses and lymphedema therapy has started and he states things are looking better and he is feeling better  No new complaints          The following portions of the patient's history were reviewed and updated as appropriate:   Patient Active Problem List   Diagnosis    Controlled type 2 diabetes mellitus with complication, without long-term current use of insulin [facility]    Essential hypertension    HLD (hyperlipidemia)    Hypothyroidism    Celiac disease    Coronary artery disease    History of duodenal ulcer    Living will on file    Obesity, Class III, BMI 40-49 9 (morbid obesity) [facility]    S/P " BKA (below knee amputation) unilateral, right (HCC)    Paroxysmal atrial fibrillation (HCC)    Iron deficiency anemia due to chronic blood loss    Chronic venous stasis dermatitis of left lower extremity    Gastroesophageal reflux disease without esophagitis    Diabetic ulcer of left foot associated with type 2 diabetes mellitus, limited to breakdown of skin ([facility]    Idiopathic chronic venous hypertension of left lower extremity with ulcer (HCC)    Non-pressure chronic ulcer of left calf, limited to breakdown of skin ([facility]    Diabetic polyneuropathy associated with type 2 diabetes mellitus ([facility])    Cellulitis of left lower extremity    Abnormal urinalysis    Ulcer of toe of left foot, limited to breakdown of skin ([facility]    Diabetic ulcer of left ankle associated with type 2 diabetes mellitus, limited to breakdown of skin ([facility]    Dermatophytosis     Past Medical History:   Diagnosis Date    Atrial fibrillation ([facility]     Cellulitis     Diabetes mellitus ([facility]     Disease of thyroid gland     Duodenal ulcer     perforated- ICU stay    High blood pressure     High cholesterol     Hyperlipidemia     Hypertension     Hypothyroidism     Lymphedema      b/l LE- was followed at wound center    Morbid obesity with BMI of 40 0-44 9, adult ([facility] )     Peritonitis ([facility] )      Past Surgical History:   Procedure Laterality Date    BELOW KNEE LEG AMPUTATION Right     DIAGNOSTIC LAPAROSCOPY      KNEE ARTHROSCOPY Bilateral     OTHER SURGICAL HISTORY  03/2014     lap repair    OTHER SURGICAL HISTORY      Laparoscopic repair of a perforated duodenal ulcer with Marissa Dayhoff omental    OTHER SURGICAL HISTORY      Placement of drains     Social History     Socioeconomic History    Marital status: Single     Spouse name: None    Number of children: 2    Years of education: 12    Highest education level: High school graduate   Occupational History    None   Tobacco Use    Smoking status: Former     Packs/day: 1 50     Years: 25 00     Pack years: 37 50     Types: Cigarettes     Quit date: 2004     Years since quittin 4    Smokeless tobacco: Never   Vaping Use    Vaping Use: Never used   Substance and Sexual Activity    Alcohol use: Not Currently     Alcohol/week: 1 0 standard drink     Types: 1 Standard drinks or equivalent per week    Drug use: Never    Sexual activity: Not Currently   Other Topics Concern    None   Social History Narrative    Former smoker: Quit 3/31/2012 - As per Care Everywhere      Social Determinants of Health     Financial Resource Strain: Not on file   Food Insecurity: Not on file   Transportation Needs: Not on file   Physical Activity: Not on file   Stress: Not on file   Social Connections: Not on file   Intimate Partner Violence: Not on file   Housing Stability: Not on file        Current Outpatient Medications:     ammonium lactate (LAC-HYDRIN) 12 % lotion, Apply topically 2 (two) times a day as needed for dry skin, Disp: 400 g, Rfl: 2    aspirin (ECOTRIN LOW STRENGTH) 81 mg EC tablet, Take 81 mg by mouth Daily, Disp: , Rfl:     bisacodyl (DULCOLAX) 5 mg EC tablet, Take 2 tablets (10 mg total) by mouth once for 1 dose, Disp: 2 tablet, Rfl: 0    cefadroxil (DURICEF) 500 mg capsule, Take 500 mg by mouth every 12 (twelve) hours  Indications: Infection of the Skin and/or Skin Structures, Disp: , Rfl:     clotrimazole-betamethasone (LOTRISONE) 1-0 05 % cream, Apply 1 application   topically 3 (three) times a week As needed for rash, Disp: 45 g, Rfl: 0    gabapentin (NEURONTIN) 100 mg capsule, Take 1 capsule (100 mg total) by mouth 3 (three) times a day, Disp: 270 capsule, Rfl: 0    ketoconazole (NIZORAL) 2 % cream, Apply topically daily, Disp: 60 g, Rfl: 1    levothyroxine 25 mcg tablet, Take 1 tablet (25 mcg total) by mouth daily In addition to the 300 mcg dose, Disp: 90 tablet, Rfl: 0    levothyroxine 300 MCG tablet, Take 1 tablet (300 mcg total) by mouth daily In addition to 25 mcg dose, Disp: 90 tablet, Rfl: 0    lisinopril (ZESTRIL) 20 mg tablet, Take 1 tablet (20 mg total) by mouth daily, Disp: 90 tablet, Rfl: 0    metFORMIN (GLUCOPHAGE-XR) 750 mg 24 hr tablet, Take 1 tablet (750 mg total) by mouth daily with breakfast, Disp: 90 tablet, Rfl: 0    metoprolol tartrate (LOPRESSOR) 50 mg tablet, Take 1 tablet (50 mg total) by mouth 2 (two) times a day, Disp: 180 tablet, Rfl: 0    Multiple Vitamins-Minerals (MULTIVITAMIN MEN 50+ PO), Take by mouth, Disp: , Rfl:     pantoprazole (PROTONIX) 40 mg tablet, Take 1 tablet (40 mg total) by mouth daily, Disp: 90 tablet, Rfl: 0    simvastatin (ZOCOR) 10 mg tablet, Take 1 tablet (10 mg total) by mouth daily at bedtime, Disp: 90 tablet, Rfl: 0    Zoster Vac Recomb Adjuvanted (Shingrix) 50 MCG/0 5ML SUSR, 0 5mL IM for one dose, followed by 0 5mL IM 2-6 months after first dose, Disp: 1 each, Rfl: 1  Family History   Problem Relation Age of Onset    Heart disease Family     Alcohol abuse Family     Heart disease Mother     Hypertension Mother    [place] Migraines Daughter     Leukemia Brother     Migraines Daughter     Substance Abuse Neg Hx     Mental illness Neg Hx     Depression Neg Hx        Review of Systems   Constitutional: Negative for chills and fever  HENT: Negative for ear pain and sore throat  Eyes: Negative for pain and visual disturbance  Respiratory: Negative for cough and shortness of breath  Cardiovascular: Negative for chest pain and palpitations  Gastrointestinal: Negative for abdominal pain and vomiting  Genitourinary: Negative for dysuria and hematuria  Musculoskeletal: Positive for gait problem  Negative for arthralgias and back pain  Skin: Positive for color change and wound  Negative for rash  Neurological: Positive for numbness  Negative for seizures and syncope  Psychiatric/Behavioral: Negative  All other systems reviewed and are negative          Objective:  /99   Pulse 100   Temp Desirae Brewer ) 96 5 °F (35 8 °C)   Resp (!) 24     Physical Exam  Constitutional:       Appearance: Normal appearance  He is obese  HENT:      Head: Normocephalic and atraumatic  Right Ear: External ear normal       Left Ear: External ear normal       Nose: Nose normal       Mouth/Throat:      Mouth: Mucous membranes are moist       Pharynx: Oropharynx is clear  Eyes:      Conjunctiva/sclera: Conjunctivae normal    Cardiovascular:      Pulses: Normal pulses  Dorsalis pedis pulses are 2+ on the left side  Posterior tibial pulses are 2+ on the left side  Pulmonary:      Effort: Pulmonary effort is normal    Musculoskeletal:      Cervical back: Normal range of motion  Left lower le+ Edema present  Comments: Right BKA   Skin:     General: Skin is warm and dry  Capillary Refill: Capillary refill takes less than 2 seconds  Comments: See detailed wound assessment   Neurological:      General: No focal deficit present  Mental Status: He is alert and oriented to person, place, and time  Mental status is at baseline  Sensory: Sensory deficit present  Coordination: Coordination abnormal       Gait: Gait abnormal       Deep Tendon Reflexes: Reflexes abnormal    Psychiatric:         Mood and Affect: Mood normal          Behavior: Behavior normal          Thought Content: Thought content normal          Judgment: Judgment normal            Wound 23 Diabetic Ulcer Foot Anterior; Left (Active)   Wound Image   23 1056   Wound Description Dry;Pink 23 1119   Jovita-wound Assessment Pink 23 1119   Wound Length (cm) 1 3 cm 23 1119   Wound Width (cm) 0 7 cm 23 1119   Wound Depth (cm) 0 1 cm 23 1119   Wound Surface Area (cm^2) 0 91 cm^2 23 1119   Wound Volume (cm^3) 0 091 cm^3 23 1119   Calculated Wound Volume (cm^3) 0 09 cm^3 23 1119   Change in Wound Size % 70 23 1119   Drainage Amount Scant 23 1119   Drainage Description Serous 05/02/23 1119   Non-staged Wound Description Full thickness 05/02/23 1119   Dressing Status Intact 05/02/23 1119       Wound 04/25/23 Venous Ulcer Leg Left; Anterior (Active)   Wound Image   05/02/23 1057   Wound Description Yellow; Beefy red 05/02/23 1120   Jovita-wound Assessment Edema;Pink 05/02/23 1120   Wound Length (cm) 3 2 cm 05/02/23 1120   Wound Width (cm) 2 cm 05/02/23 1120   Wound Depth (cm) 0 1 cm 05/02/23 1120   Wound Surface Area (cm^2) 6 4 cm^2 05/02/23 1120   Wound Volume (cm^3) 0 64 cm^3 05/02/23 1120   Calculated Wound Volume (cm^3) 0 64 cm^3 05/02/23 1120   Change in Wound Size % 24 71 05/02/23 1120   Drainage Amount Moderate 05/02/23 1120   Drainage Description Serosanguineous; Serous 05/02/23 1120   Non-staged Wound Description Full thickness 05/02/23 1120   Dressing Status Intact 05/02/23 1120       Wound 04/25/23 Venous Ulcer Leg Left;Lateral (Active)   Wound Image   05/02/23 1057   Wound Description Granulation tissue; Yellow;Slough 05/02/23 1144   Jovita-wound Assessment Edema;Pink 05/02/23 1144   Wound Length (cm) 3 cm 05/02/23 1144   Wound Width (cm) 2 cm 05/02/23 1144   Wound Depth (cm) 0 1 cm 05/02/23 1144   Wound Surface Area (cm^2) 6 cm^2 05/02/23 1144   Wound Volume (cm^3) 0 6 cm^3 05/02/23 1144   Calculated Wound Volume (cm^3) 0 6 cm^3 05/02/23 1144   Change in Wound Size % 59 18 05/02/23 1144   Drainage Amount Moderate 05/02/23 1144   Drainage Description Serosanguineous 05/02/23 1144   Non-staged Wound Description Full thickness 05/02/23 1144   Dressing Status Intact 05/02/23 1144       Wound 04/25/23 Venous Ulcer Leg Left;Lateral;Proximal (Active)   Wound Image   05/02/23 1059   Wound Description Epithelialization 05/02/23 1143   Jovita-wound Assessment Dry 05/02/23 1143   Wound Length (cm) 0 cm 05/02/23 1143   Wound Width (cm) 0 cm 05/02/23 1143   Wound Depth (cm) 0 cm 05/02/23 1143   Wound Surface Area (cm^2) 0 cm^2 05/02/23 1143   Wound Volume (cm^3) 0 cm^3 05/02/23 1143   Calculated Wound Volume (cm^3) 0 cm^3 05/02/23 1143   Change in Wound Size % 100 05/02/23 1143   Drainage Amount None 05/02/23 1143   Drainage Description Serosanguineous 04/25/23 0937   Non-staged Wound Description Not applicable 92/73/54 2723   Dressing Status Dry 05/02/23 1143                No results found  Wound Instructions:  Orders Placed This Encounter   Procedures    Wound cleansing and dressings     Wash your hands with soap and water  Remove old dressing, discard into plastic bag and place in trash  Cleanse the wound with mild soap and water prior to applying a clean dressing  May apply Dakins soak to wounds as needed for drainage  Pat dry using gauze  Shower no (Keep dressings clean and dry - may use cast cover)  Apply Calmoseptine or skin prep to skin surrounding wounds as needed  Apply adaptic then [employer] or equivalent cut to fit the left leg and foot wounds  Cover with ABD  Secure with amy and tape  Change dressing daily for next week then may decrease to every other day        This was done today in wound care              Standing Status:   Future     Standing Expiration Date:   5/2/2024    Wound compression and edema control     Surepress: To left leg        Apply compression wrap to your affected Leg(s) from mid-foot to knee making sure to cover the heel  Apply in the morning and re-wrap as needed during the day if wrap becomes loose  Remove at bedtime and elevate legs or lie down  Please ensure that Surepress is at 25 percent stretch and 75 percent overlap  Avoid prolonged standing in one place  Elevate leg(s) above the level of the heart when sitting or as much as possible  Limit salt intake      Use compression pumps for one hour 2x day  Standing Status:   Future     Standing Expiration Date:   5/2/2024    Wound home care     Continue [facility] for continued wound care and lymphedema    Continue daily for 1 week then may decrease visits to every "other day  Standing Status:   Future     Standing Expiration Date:   5/2/2024         Dimas Lay, KIKO, DPM, FACFAS    Portions of the record may have been created with voice recognition software  Occasional wrong word or \"sound a like\" substitutions may have occurred due to the inherent limitations of voice recognition software  Read the chart carefully and recognize, using context, where substitutions have occurred      "

## 2023-05-03 ENCOUNTER — HOME CARE VISIT (OUTPATIENT)
Dept: HOME HEALTH SERVICES | Facility: HOME HEALTHCARE | Age: 68
End: 2023-05-03

## 2023-05-03 VITALS
HEART RATE: 88 BPM | TEMPERATURE: 97.4 F | RESPIRATION RATE: 20 BRPM | SYSTOLIC BLOOD PRESSURE: 168 MMHG | OXYGEN SATURATION: 99 % | DIASTOLIC BLOOD PRESSURE: 88 MMHG

## 2023-05-03 PROCEDURE — 10330064 WIPE, SKIN PREP PROT DRSNG (50/BX)

## 2023-05-03 PROCEDURE — 10330064

## 2023-05-03 PROCEDURE — 10330064 DRESSING, OIL EMULSION 3"X3" STR (50/BX

## 2023-05-03 NOTE — CASE COMMUNICATION
Ship to XX Pt  Home   Insurance [employer]    Wound 1 BLE      Full   XX         All items are ordered by the each unless otherwise noted    PLEASE DO NOT ORDER BY THE BOX  Request specific quantity needed  For Private Insurances order should be for a 2 week period    Moist Wound Products  Gauze oil emulsion [id number]      15    Skin Prep 1 Box    Tubifast Blue     1 Box

## 2023-05-04 ENCOUNTER — HOME CARE VISIT (OUTPATIENT)
Dept: HOME HEALTH SERVICES | Facility: HOME HEALTHCARE | Age: 68
End: 2023-05-04

## 2023-05-04 VITALS
DIASTOLIC BLOOD PRESSURE: 84 MMHG | OXYGEN SATURATION: 97 % | RESPIRATION RATE: 20 BRPM | TEMPERATURE: 96.3 F | SYSTOLIC BLOOD PRESSURE: 166 MMHG | HEART RATE: 64 BPM

## 2023-05-04 PROCEDURE — 10330064 DRESSING, WND OPTILOCK N/ADH 4X4" (10/BX

## 2023-05-05 ENCOUNTER — TELEPHONE (OUTPATIENT)
Dept: FAMILY MEDICINE CLINIC | Facility: CLINIC | Age: 68
End: 2023-05-05

## 2023-05-05 ENCOUNTER — HOME CARE VISIT (OUTPATIENT)
Dept: HOME HEALTH SERVICES | Facility: HOME HEALTHCARE | Age: 68
End: 2023-05-05

## 2023-05-05 VITALS
SYSTOLIC BLOOD PRESSURE: 170 MMHG | TEMPERATURE: 97.2 F | DIASTOLIC BLOOD PRESSURE: 88 MMHG | OXYGEN SATURATION: 97 % | RESPIRATION RATE: 29 BRPM | HEART RATE: 68 BPM

## 2023-05-05 NOTE — TELEPHONE ENCOUNTER
"Note send via staff message to Dr Sade Wilcox:   Veena Brown, ALF Mendoza MD   [id number] has been visiting pt for wound care to left leg  Arthur's BP has been elevated systolic in 141B to 258 and diastolic in high 23L  Pt takes metoprolol tartrate 50mg bid  Did you want to order anything else?    Thank you\"  "

## 2023-05-05 NOTE — PROGRESS NOTES
Assessment/Plan:  Problem List Items Addressed This Visit        Digestive    Gastroesophageal reflux disease without esophagitis     On PPI  Watch GERD diet  Relevant Orders    Magnesium       Endocrine    Controlled type 2 diabetes mellitus with complication, without long-term current use of insulin ([address] )       Lab Results   Component Value Date    HGBA1C 5 4 03/09/2022     Pending labs  Recommend lifestyle modifications  Call Insurance to Ask About Diabetes Med Coverage -      GLP-1 Agonists - Pill - Rybelsus, etc , Injectable - Ozempic, Victoza, Byetta, etc   SGLT-2 Inhibitor - Pill - Jardiance, etc            Relevant Orders    Hemoglobin A1C    Microalbumin / creatinine urine ratio    Hypothyroidism     Pending labs  Continue Synthroid to 225 mcg daily  Relevant Orders    TSH, 3rd generation with Free T4 reflex    Diabetic polyneuropathy associated with type 2 diabetes mellitus ([facility] )       Lab Results   Component Value Date    HGBA1C 5 4 03/09/2022     Stable on Neurontin 100mg BID  Recommend lifestyle modifications  Diabetic ulcer of left ankle associated with type 2 diabetes mellitus, limited to breakdown of skin [facility])       Lab Results   Component Value Date    HGBA1C 5 4 03/09/2022     Management per Wound Care  Cardiovascular and Mediastinum    Essential hypertension     Stable BP in office today  Check blood pressure outside of office  Recommend lifestyle modifications  Relevant Orders    CBC and differential    Comprehensive metabolic panel    Paroxysmal atrial fibrillation (HCC)     On PPI c ASA  Not on A/C due to H/O UGI Bleed  On BB  Relevant Orders    TSH, 3rd generation with Free T4 reflex    Idiopathic chronic venous hypertension of left lower extremity with ulcer ([address] )     Management per Wound Care                Musculoskeletal and Integument    Chronic venous stasis dermatitis of left lower extremity     Management per Wound Care  Non-pressure chronic ulcer of left calf, limited to breakdown of skin ([facility] )     Management per Wound Care  Ulcer of toe of left foot, limited to breakdown of skin ([facility] )     Management per Wound Care  Other    HLD (hyperlipidemia)     Pending labs  Continue Zocor 10mg QHS  Recommend lifestyle modifications  Relevant Orders    CBC and differential    Comprehensive metabolic panel    Lipid panel    TSH, 3rd generation with Free T4 reflex    LDL cholesterol, direct    History of duodenal ulcer     On PPI c ASA  Watch GERD diet  S/P BKA (below knee amputation) unilateral, right (HCC)    Iron deficiency anemia due to chronic blood loss     Pending labs  Relevant Orders    CBC and differential   Other Visit Diagnoses     Medicare annual wellness visit, subsequent    -  Primary    Morbid obesity with BMI of 45 0-49 9, adult ([facility] )        Relevant Orders    TSH, 3rd generation with Free T4 reflex    Vitamin D 25 hydroxy    Encounter for immunization        Relevant Medications    Zoster Vac Recomb Adjuvanted (Shingrix) 48 MCG/0 5ML SUSR    Screening for prostate cancer        Relevant Orders    PSA, Total Screen    Screening for AAA (abdominal aortic aneurysm)        Relevant Orders    US abdominal aorta screening aaa    Neuropathy        Relevant Medications    gabapentin (NEURONTIN) 100 mg capsule           Return in 3 months (on 8/16/2023) for With PCP Dr Cristine Payne - 3mo - DM2, HTN, HL, Hypothyroid, GERD, pAfib; PRN Labs        Future Appointments   Date Time Provider Fabi Etienne   5/8/2023 12:30 PM Naomi Mcguire RN [facility]   5/9/2023  6:30 AM CAROLINE Inman [facility]   5/9/2023 To Be Determined Naomi Mcguire RN [facility]   5/10/2023 To Be Determined Naomi Mcguire RN [facility]   5/11/2023 12:00 PM CAROLINE Mcneil [facility]   5/11/2023 To Be Determined Alesha Krueger RN [facility]   5/12/2023 To Be Determined Alesha Krueger RN [facility]   5/16/2023 10:00 AM Oliver Marmolejo DPM [facility]   5/17/2023 To Be Determined Virginia Francis, OTR [facility]   5/19/2023 To Be Determined Virginia Francis, OTR [facility]   5/22/2023 To Be Determined Virginia Francis, OTR [facility]   5/24/2023 To Be Determined Virginia Francis, OTR [facility]   5/31/2023 To Be Determined Virginia Francis, OTR [facility]   6/2/2023 To Be Determined Virginia Francis, OTR [facility]   6/7/2023 To Be Determined Virginia Francis, OTR [facility]   6/9/2023 To Be Determined Virginia Francis, OTR [facility]   6/12/2023 To Be Determined Virginia Francis, OTR [facility]   6/14/2023 To Be Determined Virginia Francis, OTR [facility]   6/21/2023 To Be Determined Virginia Francis, OTR [facility]   6/23/2023 To Be Determined Virginia Francis, OTR [facility]   8/16/2023 11:20 AM Florian Leventhal, MD [facility]        Subjective:     Umang Vazquez is a 77 y o  male who presents today for a follow-up on his chronic medical conditions  HPI:  Chief Complaint   Patient presents with   • Hypertension     Pt states he has visiting nurses coming to the house for wound care on the LLE  The last few visits, systolic BP has been in the 160s  Denies SOB, chest pain, headaches, and dizziness with elevated BP  • Medicare Wellness Visit   •      Declines covid boosters at this time  Declines foot exam today, states his foot is wrapped from wound care  -- Above per clinical staff and reviewed  --      Diabetes  He presents for his follow-up diabetic visit  He has type 2 diabetes mellitus  There are no hypoglycemic associated symptoms   Pertinent negatives for diabetes include no chest pain and no fatigue  Risk factors for coronary artery disease include male sex, obesity, hypertension, diabetes mellitus, dyslipidemia and sedentary lifestyle  Current diabetic treatment includes diet and oral agent (monotherapy)  His weight is stable  He is following a generally unhealthy diet  He never participates in exercise  (Does not check BS as home  ) An ACE inhibitor/angiotensin II receptor blocker is being taken  He sees a podiatrist Eye exam is not current  Today:       Return in about 6 months (around 9/17/2022) for Follow up chronic conditions- labs prior  PCP Dr Heena Morales is out of office  Morbid Obesity - Not watching diet  No regular exercise due to open wounds  Has [employer] at home, but not using  DM2 - On Metformin XR 750mg daily  Sees Optho at [facility] on [address] - due now  Sees Podiatry through [address]  - next appt 5/23  On Metformin XR 750mg QD  No higher dose or other DM meds previously  HTN - On Metoprolol Tartrate 50mg BID, Lisinopril 20mg QD  BP check outside of office per 's-170's/ high 80's? For 2 weeks  No higher dose or other HTN meds previously  HypOthyroidism - On Synthroid 325mg daily  Taking Synthroid regularly and properly  Hyperlipidemia - On Zocor 10mg QHS  No higher dose or other statins previously  GERD - On Protonix 40mg daily  H/O UGI Bleed due to Duodenal Ulcer  Stable  Not watching GERD diet  pAfib - Not on a/c due to H/O UGI Bleed  On ASA 81mg daily  Chronic LE Wounds / Neuropathy - [facility] providing services 3 days per week  Stable on Neurontin 100mg BID instead TID x 1 year because he felt like he didn't need TID dosing  Overdue for colonoscopy?       Reviewed:  Labs 10/28/22    PHQ-2/9 Depression Screening    Little interest or pleasure in doing things: 1 - several days  Feeling down, depressed, or hopeless: 0 - not at all  PHQ-2 Score: 1  PHQ-2 Interpretation: Negative depression screen           The following portions of the patient's history were reviewed and updated as appropriate: allergies, current medications, past family history, past medical history, past social history, past surgical history and problem list       Review of Systems   Constitutional: Negative for appetite change, chills, diaphoresis, fatigue and fever  Respiratory: Negative for chest tightness and shortness of breath  Cardiovascular: Negative for chest pain  Gastrointestinal: Negative for abdominal pain, blood in stool, diarrhea, nausea and vomiting  Genitourinary: Negative for dysuria  Current Outpatient Medications   Medication Sig Dispense Refill   • aspirin (ECOTRIN LOW STRENGTH) 81 mg EC tablet Take 81 mg by mouth Daily     • clotrimazole-betamethasone (LOTRISONE) 1-0 05 % cream Apply 1 application   topically 3 (three) times a week As needed for rash 45 g 0   • gabapentin (NEURONTIN) 100 mg capsule Take 1 capsule (100 mg total) by mouth 2 (two) times a day 180 capsule 0   • ketoconazole (NIZORAL) 2 % cream Apply topically daily 60 g 1   • levothyroxine 25 mcg tablet Take 1 tablet (25 mcg total) by mouth daily In addition to the 300 mcg dose 90 tablet 0   • levothyroxine 300 MCG tablet Take 1 tablet (300 mcg total) by mouth daily In addition to 25 mcg dose 90 tablet 0   • lisinopril (ZESTRIL) 20 mg tablet Take 1 tablet (20 mg total) by mouth daily 90 tablet 0   • metFORMIN (GLUCOPHAGE-XR) 750 mg 24 hr tablet Take 1 tablet (750 mg total) by mouth daily with breakfast 90 tablet 0   • metoprolol tartrate (LOPRESSOR) 50 mg tablet Take 1 tablet (50 mg total) by mouth 2 (two) times a day 180 tablet 0   • Multiple Vitamins-Minerals (MULTIVITAMIN MEN 50+ PO) Take 1 tablet by mouth in the morning     • pantoprazole (PROTONIX) 40 mg tablet Take 1 tablet (40 mg total) by mouth daily 90 tablet 0   • simvastatin (ZOCOR) 10 mg tablet Take 1 tablet (10 mg total) by mouth daily at bedtime 90 "tablet 0   • Zoster Vac Recomb Adjuvanted (Shingrix) 50 MCG/0 5ML SUSR 0 5mL IM for one dose, followed by 0 5mL IM 2-6 months after first dose 1 each 1     No current facility-administered medications for this visit  Objective:  /80   Pulse 89   Temp (!) 97 °F (36 1 °C)   Resp 20   Ht 6' 7\" (2 007 m)   Wt (!) 184 kg (406 lb)   SpO2 97%   BMI 45 74 kg/m²    Wt Readings from Last 3 Encounters:   05/08/23 (!) 184 kg (406 lb)   04/25/23 (!) 191 kg (420 lb)   11/30/22 (!) 191 kg (420 lb)      BP Readings from Last 3 Encounters:   05/08/23 134/80   05/07/23 148/92   05/06/23 130/80          Physical Exam  Vitals and nursing note reviewed  Constitutional:       Appearance: Normal appearance  He is well-developed  He is obese  HENT:      Head: Normocephalic and atraumatic  Eyes:      Conjunctiva/sclera: Conjunctivae normal    Neck:      Thyroid: No thyromegaly  Vascular: No carotid bruit  Cardiovascular:      Rate and Rhythm: Normal rate and regular rhythm  Pulses: Normal pulses  Heart sounds: Normal heart sounds  Pulmonary:      Effort: Pulmonary effort is normal       Breath sounds: Normal breath sounds  Abdominal:      General: Bowel sounds are normal  There is no distension  Palpations: Abdomen is soft  There is no mass  Tenderness: There is no abdominal tenderness  There is no guarding or rebound  Musculoskeletal:         General: Deformity ( s/p R BKA) present  No swelling or tenderness  Cervical back: Neck supple  Left lower leg: Edema (In Wound Care Compression Wraps) present  Neurological:      General: No focal deficit present  Mental Status: He is alert and oriented to person, place, and time  Psychiatric:         Mood and Affect: Mood normal          Behavior: Behavior normal          Thought Content:  Thought content normal          Judgment: Judgment normal          Lab Results:      Lab Results   Component Value Date    WBC 7 56 " 10/28/2022    HGB 12 6 10/28/2022    HCT 42 2 10/28/2022     10/28/2022    TRIG 92 03/09/2022    HDL 40 03/09/2022    ALT 15 10/28/2022    AST 13 10/28/2022    K 4 1 10/28/2022     10/28/2022    CREATININE 0 94 10/28/2022    BUN 14 10/28/2022    CO2 28 10/28/2022    INR 1 07 10/28/2022    GLUF 109 (H) 12/10/2020    HGBA1C 5 4 03/09/2022     No results found for: URICACID  Invalid input(s): BASENAME Vitamin D    No results found  POCT Labs      BMI Counseling: Body mass index is 45 74 kg/m²  The BMI is above normal  Nutrition recommendations include encouraging healthy choices of fruits and vegetables  Exercise recommendations include exercising 3-5 times per week  No pharmacotherapy was ordered  Rationale for BMI follow-up plan is due to patient being overweight or obese  Depression Screening and Follow-up Plan: Patient was screened for depression during today's encounter  They screened negative with a PHQ-2 score of 1  BMI Counseling: Body mass index is 45 74 kg/m²  The BMI is above normal  Nutrition recommendations include 3-5 servings of fruits/vegetables daily  Exercise recommendations include exercising 3-5 times per week

## 2023-05-05 NOTE — TELEPHONE ENCOUNTER
Pt has not been seen since 12/2020  He will need to be seen if he is still a patient here  Will need a 40 min visit - due for MWV, diabetic visit, labs  If blood pressure high this visit will need to be soon

## 2023-05-06 ENCOUNTER — HOME CARE VISIT (OUTPATIENT)
Dept: HOME HEALTH SERVICES | Facility: HOME HEALTHCARE | Age: 68
End: 2023-05-06

## 2023-05-07 ENCOUNTER — HOME CARE VISIT (OUTPATIENT)
Dept: HOME HEALTH SERVICES | Facility: HOME HEALTHCARE | Age: 68
End: 2023-05-07

## 2023-05-07 VITALS
SYSTOLIC BLOOD PRESSURE: 148 MMHG | TEMPERATURE: 97.2 F | DIASTOLIC BLOOD PRESSURE: 92 MMHG | HEART RATE: 69 BPM | RESPIRATION RATE: 18 BRPM | OXYGEN SATURATION: 97 %

## 2023-05-07 VITALS
RESPIRATION RATE: 16 BRPM | OXYGEN SATURATION: 98 % | DIASTOLIC BLOOD PRESSURE: 80 MMHG | TEMPERATURE: 98.4 F | SYSTOLIC BLOOD PRESSURE: 130 MMHG | HEART RATE: 62 BPM

## 2023-05-08 ENCOUNTER — HOME CARE VISIT (OUTPATIENT)
Dept: HOME HEALTH SERVICES | Facility: HOME HEALTHCARE | Age: 68
End: 2023-05-08

## 2023-05-08 ENCOUNTER — OFFICE VISIT (OUTPATIENT)
Dept: FAMILY MEDICINE CLINIC | Facility: CLINIC | Age: 68
End: 2023-05-08

## 2023-05-08 VITALS
DIASTOLIC BLOOD PRESSURE: 86 MMHG | RESPIRATION RATE: 18 BRPM | TEMPERATURE: 98.1 F | OXYGEN SATURATION: 95 % | SYSTOLIC BLOOD PRESSURE: 150 MMHG | HEART RATE: 60 BPM

## 2023-05-08 VITALS
WEIGHT: 315 LBS | BODY MASS INDEX: 36.45 KG/M2 | HEIGHT: 78 IN | TEMPERATURE: 97 F | HEART RATE: 89 BPM | OXYGEN SATURATION: 97 % | DIASTOLIC BLOOD PRESSURE: 80 MMHG | SYSTOLIC BLOOD PRESSURE: 134 MMHG | RESPIRATION RATE: 20 BRPM

## 2023-05-08 DIAGNOSIS — L97.221 NON-PRESSURE CHRONIC ULCER OF LEFT CALF, LIMITED TO BREAKDOWN OF SKIN (HCC): ICD-10-CM

## 2023-05-08 DIAGNOSIS — E11.622 DIABETIC ULCER OF LEFT ANKLE ASSOCIATED WITH TYPE 2 DIABETES MELLITUS, LIMITED TO BREAKDOWN OF SKIN (HCC): ICD-10-CM

## 2023-05-08 DIAGNOSIS — I48.0 PAROXYSMAL ATRIAL FIBRILLATION (HCC): ICD-10-CM

## 2023-05-08 DIAGNOSIS — D50.0 IRON DEFICIENCY ANEMIA DUE TO CHRONIC BLOOD LOSS: ICD-10-CM

## 2023-05-08 DIAGNOSIS — E66.01 MORBID OBESITY WITH BMI OF 45.0-49.9, ADULT (HCC): ICD-10-CM

## 2023-05-08 DIAGNOSIS — E03.9 ACQUIRED HYPOTHYROIDISM: ICD-10-CM

## 2023-05-08 DIAGNOSIS — L97.321 DIABETIC ULCER OF LEFT ANKLE ASSOCIATED WITH TYPE 2 DIABETES MELLITUS, LIMITED TO BREAKDOWN OF SKIN (HCC): ICD-10-CM

## 2023-05-08 DIAGNOSIS — E11.8 CONTROLLED TYPE 2 DIABETES MELLITUS WITH COMPLICATION, WITHOUT LONG-TERM CURRENT USE OF INSULIN (HCC): ICD-10-CM

## 2023-05-08 DIAGNOSIS — Z23 ENCOUNTER FOR IMMUNIZATION: ICD-10-CM

## 2023-05-08 DIAGNOSIS — I87.2 CHRONIC VENOUS STASIS DERMATITIS OF LEFT LOWER EXTREMITY: ICD-10-CM

## 2023-05-08 DIAGNOSIS — Z89.511 S/P BKA (BELOW KNEE AMPUTATION) UNILATERAL, RIGHT (HCC): ICD-10-CM

## 2023-05-08 DIAGNOSIS — K21.9 GASTROESOPHAGEAL REFLUX DISEASE WITHOUT ESOPHAGITIS: ICD-10-CM

## 2023-05-08 DIAGNOSIS — Z13.6 SCREENING FOR AAA (ABDOMINAL AORTIC ANEURYSM): ICD-10-CM

## 2023-05-08 DIAGNOSIS — Z00.00 MEDICARE ANNUAL WELLNESS VISIT, SUBSEQUENT: Primary | ICD-10-CM

## 2023-05-08 DIAGNOSIS — G62.9 NEUROPATHY: ICD-10-CM

## 2023-05-08 DIAGNOSIS — E11.42 DIABETIC POLYNEUROPATHY ASSOCIATED WITH TYPE 2 DIABETES MELLITUS (HCC): ICD-10-CM

## 2023-05-08 DIAGNOSIS — I10 ESSENTIAL HYPERTENSION: ICD-10-CM

## 2023-05-08 DIAGNOSIS — L97.521 ULCER OF TOE OF LEFT FOOT, LIMITED TO BREAKDOWN OF SKIN (HCC): ICD-10-CM

## 2023-05-08 DIAGNOSIS — E78.2 MIXED HYPERLIPIDEMIA: ICD-10-CM

## 2023-05-08 DIAGNOSIS — Z12.5 SCREENING FOR PROSTATE CANCER: ICD-10-CM

## 2023-05-08 DIAGNOSIS — L97.929 IDIOPATHIC CHRONIC VENOUS HYPERTENSION OF LEFT LOWER EXTREMITY WITH ULCER (HCC): ICD-10-CM

## 2023-05-08 DIAGNOSIS — Z87.19 HISTORY OF DUODENAL ULCER: ICD-10-CM

## 2023-05-08 DIAGNOSIS — I87.312 IDIOPATHIC CHRONIC VENOUS HYPERTENSION OF LEFT LOWER EXTREMITY WITH ULCER (HCC): ICD-10-CM

## 2023-05-08 RX ORDER — GABAPENTIN 100 MG/1
100 CAPSULE ORAL 2 TIMES DAILY
Qty: 180 CAPSULE | Refills: 0 | Status: SHIPPED | OUTPATIENT
Start: 2023-05-08

## 2023-05-08 RX ORDER — ZOSTER VACCINE RECOMBINANT, ADJUVANTED 50 MCG/0.5
KIT INTRAMUSCULAR
Qty: 1 EACH | Refills: 1 | Status: SHIPPED | OUTPATIENT
Start: 2023-05-08

## 2023-05-08 NOTE — ASSESSMENT & PLAN NOTE
Lab Results   Component Value Date    HGBA1C 5 4 03/09/2022     Stable on Neurontin 100mg BID  Recommend lifestyle modifications

## 2023-05-08 NOTE — ASSESSMENT & PLAN NOTE
Lab Results   Component Value Date    HGBA1C 5 4 03/09/2022     Pending labs  Recommend lifestyle modifications  Call Insurance to Ask About Diabetes Med Coverage -     1  GLP-1 Agonists - Pill - Rybelsus, etc , Injectable - Ozempic, Victoza, Byetta, etc   2   SGLT-2 Inhibitor - Pill - Jardiance, etc

## 2023-05-08 NOTE — ASSESSMENT & PLAN NOTE
Stable BP in office today  Check blood pressure outside of office  Recommend lifestyle modifications

## 2023-05-08 NOTE — PATIENT INSTRUCTIONS
Call Insurance to Ask About Diabetes Med Coverage -      GLP-1 Agonists - Pill - Rybelsus, etc , Injectable - Ozempic, Victoza, Byetta, etc   SGLT-2 Inhibitor - Pill - Jardiance, etc       Obesity   AMBULATORY CARE:   Obesity  means your body mass index (BMI) is greater than 30  Your healthcare provider will use your age, height, and weight to measure your BMI  The risks of obesity include  many health problems, including injuries or physical disability  Diabetes (high blood sugar level)    High blood pressure or high cholesterol    Heart disease    Stroke    Gallbladder or liver disease    Cancer of the colon, breast, prostate, liver, or kidney    Sleep apnea    Arthritis or gout    Screening  is done to check for health conditions before you have signs or symptoms  If you are 28to 79years old, your blood sugar level may be checked every 3 years for signs of prediabetes or diabetes  Your healthcare provider will check your blood pressure at each visit  High blood pressure can lead to a stroke or other problems  Your provider may check for signs of heart disease, cancer, or other health problems  Seek care immediately if:   You have a severe headache, confusion, or difficulty speaking  You have weakness on one side of your body  You have chest pain, sweating, or shortness of breath  Call your doctor if:   You have symptoms of gallbladder or liver disease, such as pain in your upper abdomen  You have knee or hip pain and discomfort while walking  You have symptoms of diabetes, such as intense hunger and thirst, and frequent urination  You have symptoms of sleep apnea, such as snoring or daytime sleepiness  You have questions or concerns about your condition or care  Treatment for obesity  focuses on helping you lose weight to improve your health  Even a small decrease in BMI can reduce the risk for many health problems   Your healthcare provider will help you set a weight-loss Left voicemail for patient to return call to office regarding labs results. 1st attempt. goal   Lifestyle changes  are the first step in treating obesity  These include making healthy food choices and getting regular physical activity  Your healthcare provider may suggest a weight-loss program that involves coaching, education, and therapy  Medicine  may help you lose weight when it is used with a healthy foods and physical activity  Surgery  can help you lose weight if you have obesity along with other health problems  Several types of weight-loss surgery are available  Ask your healthcare provider for more information  Tips for safe weight loss:   Set small, realistic goals  An example of a small goal is to walk for 20 minutes 5 days a week  Anther goal is to lose 5% of your body weight  Ask for support  Tell friends, family members, and coworkers about your goals  Ask someone to lose weight with you  You may also want to join a weight-loss support group  Identify foods or triggers that may cause you to overeat  Remove tempting high-calorie foods from your home and workplace  Place a bowl of fresh fruit on your kitchen counter  If stress causes you to eat, find other ways to cope with stress  A counselor or therapist may be able to help you  Track your daily calories and activity  Write down what you eat and drink  Also write down how many minutes of physical activity you do each day  Track your weekly weight  Weigh yourself in the morning, before you eat or drink anything but after you use the bathroom  Use the same scale, in the same place, and in similar clothing each time  Only weigh yourself 1 to 2 times each week, or as directed  You may become discouraged if you weigh yourself every day  Eating changes: You will need to eat 500 to 1,000 fewer calories each day than you currently eat to lose 1 to 2 pounds a week  The following changes will help you cut calories:  Eat smaller portions  Use small plates, no larger than 9 inches in diameter   Fill your plate half full of fruits and vegetables  Measure your food using measuring cups until you know what a serving size looks like  Eat 3 meals and 1 or 2 snacks each day  Plan your meals in advance  [place] and eat at home most of the time  Eat slowly  Do not skip meals  Skipping meals can lead to overeating later in the day  This can make it harder for you to lose weight  Talk with a dietitian to help you make a meal plan and schedule that is right for you  Eat fruits and vegetables at every meal   They are low in calories and high in fiber, which makes you feel full  Do not add butter, margarine, or cream sauce to vegetables  Use herbs to season steamed vegetables  Eat less fat and fewer fried foods  Eat more baked or grilled chicken and fish  These protein sources are lower in calories and fat than red meat  Limit fast food  Dress your salads with olive oil and vinegar instead of bottled dressing  Limit the amount of sugar you eat  Do not drink sugary beverages  Limit alcohol  Activity changes:  Physical activity is good for your body in many ways  It helps you burn calories and build strong muscles  It decreases stress and depression, and improves your mood  It can also help you sleep better  Talk to your healthcare provider before you begin an exercise program   Exercise for at least 30 minutes 5 days a week  Start slowly  Set aside time each day for physical activity that you enjoy and that is convenient for you  It is best to do both weight training and an activity that increases your heart rate, such as walking, bicycling, or swimming  Find ways to be more active  Do yard work and housecleaning  Walk up the stairs instead of using elevators  Spend your leisure time going to events that require walking, such as outdoor festivals or fairs  This extra physical activity can help you lose weight and keep it off  Follow up with your doctor as directed: You may need to meet with a dietitian  Write down your questions so you remember to ask them during your visits  © Copyright [facility] 2022 Information is for End User's use only and may not be sold, redistributed or otherwise used for commercial purposes  The above information is an  only  It is not intended as medical advice for individual conditions or treatments  Talk to your doctor, nurse or pharmacist before following any medical regimen to see if it is safe and effective for you  Medicare Preventive Visit Patient Instructions  Thank you for completing your Welcome to Medicare Visit or Medicare Annual Wellness Visit today  Your next wellness visit will be due in one year (5/8/2024)  The screening/preventive services that you may require over the next 5-10 years are detailed below  Some tests may not apply to you based off risk factors and/or age  Screening tests ordered at today's visit but not completed yet may show as past due  Also, please note that scanned in results may not display below  Preventive Screenings:  Service Recommendations Previous Testing/Comments   Colorectal Cancer Screening  Colonoscopy    Fecal Occult Blood Test (FOBT)/Fecal Immunochemical Test (FIT)  Fecal DNA/Cologuard Test  Flexible Sigmoidoscopy Age: 39-70 years old   Colonoscopy: every 10 years (May be performed more frequently if at higher risk)  OR  FOBT/FIT: every 1 year  OR  Cologuard: every 3 years  OR  Sigmoidoscopy: every 5 years  Screening may be recommended earlier than age 39 if at higher risk for colorectal cancer  Also, an individualized decision between you and your healthcare provider will decide whether screening between the ages of 74-80 would be appropriate   Colonoscopy: 11/30/2022  FOBT/FIT: Not on file  Cologuard: Not on file  Sigmoidoscopy: Not on file    Screening Current     Prostate Cancer Screening Individualized decision between patient and health care provider in men between ages of 53-78   Medicare will cover every 12 months beginning on the day after your 50th birthday PSA: No results in last 5 years           Hepatitis C Screening Once for adults born between 1945 and 1965  More frequently in patients at high risk for Hepatitis C Hep C Antibody: 04/25/2018    Screening Current   Diabetes Screening 1-2 times per year if you're at risk for diabetes or have pre-diabetes Fasting glucose: 109 mg/dL (12/10/2020)  A1C: 5 4 % (3/9/2022)  Screening Not Indicated  History Diabetes   Cholesterol Screening Once every 5 years if you don't have a lipid disorder  May order more often based on risk factors  Lipid panel: 03/09/2022  Screening Not Indicated  History Lipid Disorder      Other Preventive Screenings Covered by Medicare:  Abdominal Aortic Aneurysm (AAA) Screening: covered once if your at risk  You're considered to be at risk if you have a family history of AAA or a male between the age of 73-68 who smoking at least 100 cigarettes in your lifetime  Lung Cancer Screening: covers low dose CT scan once per year if you meet all of the following conditions: (1) Age 50-69; (2) No signs or symptoms of lung cancer; (3) Current smoker or have quit smoking within the last 15 years; (4) You have a tobacco smoking history of at least 20 pack years (packs per day x number of years you smoked); (5) You get a written order from a healthcare provider  Glaucoma Screening: covered annually if you're considered high risk: (1) You have diabetes OR (2) Family history of glaucoma OR (3)  aged 48 and older OR (3)  American aged 72 and older  Osteoporosis Screening: covered every 2 years if you meet one of the following conditions: (1) Have a vertebral abnormality; (2) On glucocorticoid therapy for more than 3 months; (3) Have primary hyperparathyroidism; (4) On osteoporosis medications and need to assess response to drug therapy  HIV Screening: covered annually if you're between the age of 12-76   Also covered annually if you are younger than 13 and older than 72 with risk factors for HIV infection  For pregnant patients, it is covered up to 3 times per pregnancy  Immunizations:  Immunization Recommendations   Influenza Vaccine Annual influenza vaccination during flu season is recommended for all persons aged >= 6 months who do not have contraindications   Pneumococcal Vaccine   * Pneumococcal conjugate vaccine = PCV13 (Prevnar 13), PCV15 (Vaxneuvance), PCV20 (Prevnar 20)  * Pneumococcal polysaccharide vaccine = PPSV23 (Pneumovax) Adults 25-60 years old: 1-3 doses may be recommended based on certain risk factors  Adults 72 years old: 1-2 doses may be recommended based off what pneumonia vaccine you previously received   Hepatitis B Vaccine 3 dose series if at intermediate or high risk (ex: diabetes, end stage renal disease, liver disease)   Tetanus (Td) Vaccine - COST NOT COVERED BY MEDICARE PART B Following completion of primary series, a booster dose should be given every 10 years to maintain immunity against tetanus  Td may also be given as tetanus wound prophylaxis  Tdap Vaccine - COST NOT COVERED BY MEDICARE PART B Recommended at least once for all adults  For pregnant patients, recommended with each pregnancy  Shingles Vaccine (Shingrix) - COST NOT COVERED BY MEDICARE PART B  2 shot series recommended in those aged 48 and above     Health Maintenance Due:      Topic Date Due    Colorectal Cancer Screening  11/30/2032    Hepatitis C Screening  Completed     Immunizations Due:  There are no preventive care reminders to display for this patient  Advance Directives   What are advance directives? Advance directives are legal documents that state your wishes and plans for medical care  These plans are made ahead of time in case you lose your ability to make decisions for yourself  Advance directives can apply to any medical decision, such as the treatments you want, and if you want to donate organs     What are the types of advance directives? There are many types of advance directives, and each state has rules about how to use them  You may choose a combination of any of the following:  Living will: This is a written record of the treatment you want  You can also choose which treatments you do not want, which to limit, and which to stop at a certain time  This includes surgery, medicine, IV fluid, and tube feedings  Durable power of  for healthcare [facility]): This is a written record that states who you want to make healthcare choices for you when you are unable to make them for yourself  This person, called a proxy, is usually a family member or a friend  You may choose more than 1 proxy  Do not resuscitate (DNR) order:  A DNR order is used in case your heart stops beating or you stop breathing  It is a request not to have certain forms of treatment, such as CPR  A DNR order may be included in other types of advance directives  Medical directive: This covers the care that you want if you are in a coma, near death, or unable to make decisions for yourself  You can list the treatments you want for each condition  Treatment may include pain medicine, surgery, blood transfusions, dialysis, IV or tube feedings, and a ventilator (breathing machine)  Values history: This document has questions about your views, beliefs, and how you feel and think about life  This information can help others choose the care that you would choose  Why are advance directives important? An advance directive helps you control your care  Although spoken wishes may be used, it is better to have your wishes written down  Spoken wishes can be misunderstood, or not followed  Treatments may be given even if you do not want them  An advance directive may make it easier for your family to make difficult choices about your care     Weight Management   Why it is important to manage your weight:  Being overweight increases your risk of health conditions such as heart disease, high blood pressure, type 2 diabetes, and certain types of cancer  It can also increase your risk for osteoarthritis, sleep apnea, and other respiratory problems  Aim for a slow, steady weight loss  Even a small amount of weight loss can lower your risk of health problems  How to lose weight safely:  A safe and healthy way to lose weight is to eat fewer calories and get regular exercise  You can lose up about 1 pound a week by decreasing the number of calories you eat by 500 calories each day  Healthy meal plan for weight management:  A healthy meal plan includes a variety of foods, contains fewer calories, and helps you stay healthy  A healthy meal plan includes the following:  Eat whole-grain foods more often  A healthy meal plan should contain fiber  Fiber is the part of grains, fruits, and vegetables that is not broken down by your body  Whole-grain foods are healthy and provide extra fiber in your diet  Some examples of whole-grain foods are whole-wheat breads and pastas, oatmeal, brown rice, and bulgur  Eat a variety of vegetables every day  Include dark, leafy greens such as spinach, kale, tacos greens, and mustard greens  Eat yellow and orange vegetables such as carrots, sweet potatoes, and winter squash  Eat a variety of fruits every day  Choose fresh or canned fruit (canned in its own juice or light syrup) instead of juice  Fruit juice has very little or no fiber  Eat low-fat dairy foods  Drink fat-free (skim) milk or 1% milk  Eat fat-free yogurt and low-fat cottage cheese  Try low-fat cheeses such as mozzarella and other reduced-fat cheeses  Choose meat and other protein foods that are low in fat  Choose beans or other legumes such as split peas or lentils  Choose fish, skinless poultry (chicken or turkey), or lean cuts of red meat (beef or pork)  Before you cook meat or poultry, cut off any visible fat  Use less fat and oil  Try baking foods instead of frying them   Add less fat, such as margarine, sour cream, regular salad dressing and mayonnaise to foods  Eat fewer high-fat foods  Some examples of high-fat foods include french fries, doughnuts, ice cream, and cakes  Eat fewer sweets  Limit foods and drinks that are high in sugar  This includes candy, cookies, regular soda, and sweetened drinks  Exercise:  Exercise at least 30 minutes per day on most days of the week  Some examples of exercise include walking, biking, dancing, and swimming  You can also fit in more physical activity by taking the stairs instead of the elevator or parking farther away from stores  Ask your healthcare provider about the best exercise plan for you  © Copyright [employer] 2018 Information is for End User's use only and may not be sold, redistributed or otherwise used for commercial purposes   All illustrations and images included in CareNotes® are the copyrighted property of A [employer]  or [address] [place] [address]

## 2023-05-08 NOTE — PROGRESS NOTES
Assessment and Plan:     Problem List Items Addressed This Visit        Digestive    Gastroesophageal reflux disease without esophagitis     On PPI  Watch GERD diet  Relevant Orders    Magnesium       Endocrine    Controlled type 2 diabetes mellitus with complication, without long-term current use of insulin ([facility] [place] [address] )       Lab Results   Component Value Date    HGBA1C 5 4 03/09/2022     Pending labs  Recommend lifestyle modifications  Call Insurance to Ask About Diabetes Med Coverage -      GLP-1 Agonists - Pill - Rybelsus, etc , Injectable - Ozempic, Victoza, Byetta, etc   SGLT-2 Inhibitor - Pill - Jardiance, etc            Relevant Orders    Hemoglobin A1C    Microalbumin / creatinine urine ratio    Hypothyroidism     Pending labs  Continue Synthroid to 225 mcg daily  Relevant Orders    TSH, 3rd generation with Free T4 reflex    Diabetic polyneuropathy associated with type 2 diabetes mellitus ([facility] [place] [address] )       Lab Results   Component Value Date    HGBA1C 5 4 03/09/2022     Stable on Neurontin 100mg BID  Recommend lifestyle modifications  Diabetic ulcer of left ankle associated with type 2 diabetes mellitus, limited to breakdown of skin [facility])       Lab Results   Component Value Date    HGBA1C 5 4 03/09/2022     Management per Wound Care  Cardiovascular and Mediastinum    Essential hypertension     Stable BP in office today  Check blood pressure outside of office  Recommend lifestyle modifications  Relevant Orders    CBC and differential    Comprehensive metabolic panel    Paroxysmal atrial fibrillation (HCC)     On PPI c ASA  Not on A/C due to H/O UGI Bleed  On BB  Relevant Orders    TSH, 3rd generation with Free T4 reflex    Idiopathic chronic venous hypertension of left lower extremity with ulcer ([facility] [place] [address] )     Management per Wound Care                Musculoskeletal and Integument    Chronic venous stasis dermatitis of left lower extremity     Management per Wound Care  Non-pressure chronic ulcer of left calf, limited to breakdown of skin ([facility] )     Management per Wound Care  Ulcer of toe of left foot, limited to breakdown of skin ([facility] )     Management per Wound Care  Other    HLD (hyperlipidemia)     Pending labs  Continue Zocor 10mg QHS  Recommend lifestyle modifications  Relevant Orders    CBC and differential    Comprehensive metabolic panel    Lipid panel    TSH, 3rd generation with Free T4 reflex    LDL cholesterol, direct    History of duodenal ulcer     On PPI c ASA  Watch GERD diet  S/P BKA (below knee amputation) unilateral, right (HCC)    Iron deficiency anemia due to chronic blood loss     Pending labs  Relevant Orders    CBC and differential   Other Visit Diagnoses     Medicare annual wellness visit, subsequent    -  Primary    Morbid obesity with BMI of 45 0-49 9, adult ([facility] )        Relevant Orders    TSH, 3rd generation with Free T4 reflex    Vitamin D 25 hydroxy    Encounter for immunization        Relevant Medications    Zoster Vac Recomb Adjuvanted (Shingrix) 48 MCG/0 5ML SUSR    Screening for prostate cancer        Relevant Orders    PSA, Total Screen    Screening for AAA (abdominal aortic aneurysm)        Relevant Orders    US abdominal aorta screening aaa    Neuropathy        Relevant Medications    gabapentin (NEURONTIN) 100 mg capsule           Preventive health issues were discussed with patient, and age appropriate screening tests were ordered as noted in patient's After Visit Summary  Personalized health advice and appropriate referrals for health education or preventive services given if needed, as noted in patient's After Visit Summary       History of Present Illness:     Patient presents for a Medicare Wellness Visit    HPI   Patient Care Team:  Ariana Smith MD as PCP - General (Family Medicine)  Lydia Quick DPM as PCP - Wound (Podiatry)     Review of Systems: Review of Systems     See other note           Problem List:     Patient Active Problem List   Diagnosis   • Controlled type 2 diabetes mellitus with complication, without long-term current use of insulin ([facility])   • Essential hypertension   • HLD (hyperlipidemia)   • Hypothyroidism   • Celiac disease   • Coronary artery disease   • History of duodenal ulcer   • Living will on file   • Obesity, Class III, BMI 40-49 9 (morbid obesity) ([facility] )   • S/P BKA (below knee amputation) unilateral, right ([facility])   • Paroxysmal atrial fibrillation ([facility])   • Iron deficiency anemia due to chronic blood loss   • Chronic venous stasis dermatitis of left lower extremity   • Gastroesophageal reflux disease without esophagitis   • Diabetic ulcer of left foot associated with type 2 diabetes mellitus, limited to breakdown of skin ([facility])   • Idiopathic chronic venous hypertension of left lower extremity with ulcer ([facility])   • Non-pressure chronic ulcer of left calf, limited to breakdown of skin ([facility] )   • Diabetic polyneuropathy associated with type 2 diabetes mellitus ([facility] )   • Cellulitis of left lower extremity   • Abnormal urinalysis   • Ulcer of toe of left foot, limited to breakdown of skin ([facility] )   • Diabetic ulcer of left ankle associated with type 2 diabetes mellitus, limited to breakdown of skin ([facility] )   • Dermatophytosis      Past Medical and Surgical History:     Past Medical History:   Diagnosis Date   • Atrial fibrillation ([facility] )    • Cellulitis    • Diabetes mellitus ([facility] [address] [facility] )    • Disease of thyroid gland    • Duodenal ulcer     perforated- ICU stay   • High blood pressure    • High cholesterol    • Hyperlipidemia    • Hypertension    • Hypothyroidism    • Lymphedema      b/l LE- was followed at wound center   • Morbid obesity with BMI of 40 0-44 9, adult ([facility] [facility] )    • Peritonitis ([facility] )      Past Surgical History:   Procedure Laterality Date   • BELOW KNEE LEG AMPUTATION Right    • DIAGNOSTIC LAPAROSCOPY     • KNEE ARTHROSCOPY Bilateral • OTHER SURGICAL HISTORY  2014     lap repair   • OTHER SURGICAL HISTORY      Laparoscopic repair of a perforated duodenal ulcer with Jose Pollard omental   • OTHER SURGICAL HISTORY      Placement of drains      Family History:     Family History   Problem Relation Age of Onset   • Heart disease Family    • Alcohol abuse Family    • Heart disease Mother    • Hypertension Mother    • Breast cancer Mother    • Migraines Daughter    • Leukemia Brother    • Migraines Daughter    • Substance Abuse Neg Hx    • Mental illness Neg Hx    • Depression Neg Hx       Social History:     Social History     Socioeconomic History   • Marital status: Single     Spouse name: None   • Number of children: 2   • Years of education: 15   • Highest education level: High school graduate   Occupational History   • None   Tobacco Use   • Smoking status: Former     Packs/day: 1 50     Years: 25 00     Pack years: 37 50     Types: Cigarettes     Quit date: 2004     Years since quittin 5   • Smokeless tobacco: Never   Vaping Use   • Vaping Use: Never used   Substance and Sexual Activity   • Alcohol use: Not Currently     Alcohol/week: 1 0 standard drink     Types: 1 Standard drinks or equivalent per week   • Drug use: Never   • Sexual activity: Not Currently   Other Topics Concern   • None   Social History Narrative    Former smoker: Quit 3/31/2012 - As per Care Everywhere      Social Determinants of Health     Financial Resource Strain: Low Risk    • Difficulty of Paying Living Expenses: Not very hard   Food Insecurity: Not on file   Transportation Needs: No Transportation Needs   • Lack of Transportation (Medical): No   • Lack of Transportation (Non-Medical):  No   Physical Activity: Not on file   Stress: Not on file   Social Connections: Not on file   Intimate Partner Violence: Not on file   Housing Stability: Not on file      Medications and Allergies:     Current Outpatient Medications   Medication Sig Dispense Refill   • aspirin (ECOTRIN LOW STRENGTH) 81 mg EC tablet Take 81 mg by mouth Daily     • clotrimazole-betamethasone (LOTRISONE) 1-0 05 % cream Apply 1 application  topically 3 (three) times a week As needed for rash 45 g 0   • gabapentin (NEURONTIN) 100 mg capsule Take 1 capsule (100 mg total) by mouth 2 (two) times a day 180 capsule 0   • ketoconazole (NIZORAL) 2 % cream Apply topically daily 60 g 1   • levothyroxine 25 mcg tablet Take 1 tablet (25 mcg total) by mouth daily In addition to the 300 mcg dose 90 tablet 0   • levothyroxine 300 MCG tablet Take 1 tablet (300 mcg total) by mouth daily In addition to 25 mcg dose 90 tablet 0   • lisinopril (ZESTRIL) 20 mg tablet Take 1 tablet (20 mg total) by mouth daily 90 tablet 0   • metFORMIN (GLUCOPHAGE-XR) 750 mg 24 hr tablet Take 1 tablet (750 mg total) by mouth daily with breakfast 90 tablet 0   • metoprolol tartrate (LOPRESSOR) 50 mg tablet Take 1 tablet (50 mg total) by mouth 2 (two) times a day 180 tablet 0   • Multiple Vitamins-Minerals (MULTIVITAMIN MEN 50+ PO) Take 1 tablet by mouth in the morning     • pantoprazole (PROTONIX) 40 mg tablet Take 1 tablet (40 mg total) by mouth daily 90 tablet 0   • simvastatin (ZOCOR) 10 mg tablet Take 1 tablet (10 mg total) by mouth daily at bedtime 90 tablet 0   • Zoster Vac Recomb Adjuvanted (Shingrix) 50 MCG/0 5ML SUSR 0 5mL IM for one dose, followed by 0 5mL IM 2-6 months after first dose 1 each 1     No current facility-administered medications for this visit       No Known Allergies   Immunizations:     Immunization History   Administered Date(s) Administered   • COVID-19 PFIZER VACCINE 0 3 ML IM 03/21/2021, 04/11/2021, 12/21/2021   • INFLUENZA 10/10/2016, 02/01/2017, 11/15/2020, 02/10/2022   • Influenza, high dose seasonal 0 7 mL 11/06/2019   • Pneumococcal Conjugate 13-Valent 04/30/2018   • Pneumococcal Polysaccharide PPV23 11/06/2019   • Tdap 04/12/2006, 10/28/2022      Health Maintenance:         Topic Date Due   • Colorectal Cancer Screening  11/30/2032   • Hepatitis C Screening  Completed     There are no preventive care reminders to display for this patient  Medicare Screening Tests and Risk Assessments:     Fely Mai is here for his Subsequent Wellness visit  Health Risk Assessment:   Patient rates overall health as fair  Patient feels that their physical health rating is same  Patient is satisfied with their life  Eyesight was rated as slightly worse  Hearing was rated as same  Patient feels that their emotional and mental health rating is same  Patients states they are never, rarely angry  Patient states they are sometimes unusually tired/fatigued  Pain experienced in the last 7 days has been none  Patient states that he has experienced no weight loss or gain in last 6 months  Depression Screening:   PHQ-2 Score: 1      Fall Risk Screening: In the past year, patient has experienced: no history of falling in past year      Home Safety:  Patient has trouble with stairs inside or outside of their home  Patient has working smoke alarms and has working carbon monoxide detector  Home safety hazards include: none  Patient does not have stairs at home  Nutrition:   Current diet is Regular  Medications:   Patient is currently taking over-the-counter supplements  OTC medications include: see medication list  Patient is able to manage medications  Activities of Daily Living (ADLs)/Instrumental Activities of Daily Living (IADLs):   Walk and transfer into and out of bed and chair?: Yes  Dress and groom yourself?: Yes    Bathe or shower yourself?: Yes    Feed yourself? Yes  Do your laundry/housekeeping?: Yes  Manage your money, pay your bills and track your expenses?: Yes  Make your own meals?: Yes    Do your own shopping?: Yes    Previous Hospitalizations:   Any hospitalizations or ED visits within the last 12 months?: No      Advance Care Planning:   Living will: Yes    Durable POA for healthcare:  Yes    Advanced directive: "Yes      Cognitive Screening:   Provider or family/friend/caregiver concerned regarding cognition?: No    PREVENTIVE SCREENINGS      Cardiovascular Screening:    General: Screening Not Indicated and History Lipid Disorder      Diabetes Screening:     General: Screening Not Indicated and History Diabetes      Colorectal Cancer Screening:     General: Screening Current    Due for: Colonoscopy - High Risk      Prostate Cancer Screening:    General: Risks and Benefits Discussed    Due for: PSA      Osteoporosis Screening:    General: Screening Not Indicated      Abdominal Aortic Aneurysm (AAA) Screening:    Risk factors include: age between 73-69 yo and tobacco use        General: Risks and Benefits Discussed    Due for: Screening AAA Ultrasound      Lung Cancer Screening:     General: Screening Not Indicated      Hepatitis C Screening:    General: Screening Current    Screening, Brief Intervention, and Referral to Treatment (SBIRT)    Screening  Typical number of drinks in a day: 0  Typical number of drinks in a week: 0  Interpretation: Low risk drinking behavior  Single Item Drug Screening:  How often have you used an illegal drug (including marijuana) or a prescription medication for non-medical reasons in the past year? never    Single Item Drug Screen Score: 0  Interpretation: Negative screen for possible drug use disorder    Annual Depression Screening  Time spent screening and evaluating the patient for depression during today's encounter was 5 minutes  Other Counseling Topics:   Regular weightbearing exercise and calcium and vitamin D intake  No results found  Physical Exam:     /80   Pulse 89   Temp (!) 97 °F (36 1 °C)   Resp 20   Ht 6' 7\" (2 007 m)   Wt (!) 184 kg (406 lb)   SpO2 97%   BMI 45 74 kg/m²     Physical Exam     See other note          Douglas Melendez, DO  "

## 2023-05-09 ENCOUNTER — HOME CARE VISIT (OUTPATIENT)
Dept: HOME HEALTH SERVICES | Facility: HOME HEALTHCARE | Age: 68
End: 2023-05-09

## 2023-05-10 ENCOUNTER — HOME CARE VISIT (OUTPATIENT)
Dept: HOME HEALTH SERVICES | Facility: HOME HEALTHCARE | Age: 68
End: 2023-05-10

## 2023-05-10 VITALS
OXYGEN SATURATION: 97 % | TEMPERATURE: 98 F | SYSTOLIC BLOOD PRESSURE: 154 MMHG | HEART RATE: 56 BPM | RESPIRATION RATE: 20 BRPM | DIASTOLIC BLOOD PRESSURE: 70 MMHG

## 2023-05-10 VITALS — HEART RATE: 64 BPM | OXYGEN SATURATION: 98 % | TEMPERATURE: 98.2 F

## 2023-05-10 DIAGNOSIS — E78.2 MIXED HYPERLIPIDEMIA: ICD-10-CM

## 2023-05-10 DIAGNOSIS — E03.9 ACQUIRED HYPOTHYROIDISM: ICD-10-CM

## 2023-05-10 DIAGNOSIS — I10 ESSENTIAL HYPERTENSION: ICD-10-CM

## 2023-05-10 DIAGNOSIS — K21.9 GASTROESOPHAGEAL REFLUX DISEASE WITHOUT ESOPHAGITIS: ICD-10-CM

## 2023-05-10 DIAGNOSIS — E11.8 CONTROLLED TYPE 2 DIABETES MELLITUS WITH COMPLICATION, WITHOUT LONG-TERM CURRENT USE OF INSULIN (HCC): ICD-10-CM

## 2023-05-10 RX ORDER — LISINOPRIL 20 MG/1
20 TABLET ORAL DAILY
Qty: 90 TABLET | Refills: 1 | Status: CANCELLED | OUTPATIENT
Start: 2023-05-10

## 2023-05-10 RX ORDER — METOPROLOL TARTRATE 50 MG/1
50 TABLET, FILM COATED ORAL 2 TIMES DAILY
Qty: 180 TABLET | Refills: 1 | Status: CANCELLED | OUTPATIENT
Start: 2023-05-10

## 2023-05-10 RX ORDER — LEVOTHYROXINE SODIUM 300 UG/1
300 TABLET ORAL DAILY
Qty: 90 TABLET | Refills: 1 | Status: CANCELLED | OUTPATIENT
Start: 2023-05-10

## 2023-05-10 RX ORDER — PANTOPRAZOLE SODIUM 40 MG/1
40 TABLET, DELAYED RELEASE ORAL DAILY
Qty: 90 TABLET | Refills: 1 | Status: CANCELLED | OUTPATIENT
Start: 2023-05-10

## 2023-05-10 RX ORDER — LEVOTHYROXINE SODIUM 0.03 MG/1
25 TABLET ORAL DAILY
Qty: 90 TABLET | Refills: 1 | Status: CANCELLED | OUTPATIENT
Start: 2023-05-10

## 2023-05-10 RX ORDER — SIMVASTATIN 10 MG
10 TABLET ORAL
Qty: 90 TABLET | Refills: 1 | Status: CANCELLED | OUTPATIENT
Start: 2023-05-10 | End: 2023-08-08

## 2023-05-10 RX ORDER — METFORMIN HYDROCHLORIDE 750 MG/1
750 TABLET, EXTENDED RELEASE ORAL
Qty: 90 TABLET | Refills: 1 | Status: CANCELLED | OUTPATIENT
Start: 2023-05-10

## 2023-05-11 ENCOUNTER — HOME CARE VISIT (OUTPATIENT)
Dept: HOME HEALTH SERVICES | Facility: HOME HEALTHCARE | Age: 68
End: 2023-05-11

## 2023-05-11 VITALS — TEMPERATURE: 98.2 F | HEART RATE: 67 BPM | OXYGEN SATURATION: 98 %

## 2023-05-11 DIAGNOSIS — I10 ESSENTIAL HYPERTENSION: ICD-10-CM

## 2023-05-11 DIAGNOSIS — R21 RASH: ICD-10-CM

## 2023-05-11 DIAGNOSIS — E78.2 MIXED HYPERLIPIDEMIA: ICD-10-CM

## 2023-05-11 DIAGNOSIS — K21.9 GASTROESOPHAGEAL REFLUX DISEASE WITHOUT ESOPHAGITIS: ICD-10-CM

## 2023-05-11 DIAGNOSIS — E03.9 ACQUIRED HYPOTHYROIDISM: ICD-10-CM

## 2023-05-11 DIAGNOSIS — E11.8 CONTROLLED TYPE 2 DIABETES MELLITUS WITH COMPLICATION, WITHOUT LONG-TERM CURRENT USE OF INSULIN (HCC): ICD-10-CM

## 2023-05-11 RX ORDER — LEVOTHYROXINE SODIUM 0.03 MG/1
25 TABLET ORAL DAILY
Qty: 90 TABLET | Refills: 1 | Status: SHIPPED | OUTPATIENT
Start: 2023-05-11

## 2023-05-11 RX ORDER — LISINOPRIL 20 MG/1
20 TABLET ORAL DAILY
Qty: 90 TABLET | Refills: 1 | Status: SHIPPED | OUTPATIENT
Start: 2023-05-11

## 2023-05-11 RX ORDER — PANTOPRAZOLE SODIUM 40 MG/1
40 TABLET, DELAYED RELEASE ORAL DAILY
Qty: 90 TABLET | Refills: 1 | Status: SHIPPED | OUTPATIENT
Start: 2023-05-11

## 2023-05-11 RX ORDER — CLOTRIMAZOLE AND BETAMETHASONE DIPROPIONATE 10; .64 MG/G; MG/G
1 CREAM TOPICAL 3 TIMES WEEKLY
Qty: 45 G | Refills: 1 | Status: SHIPPED | OUTPATIENT
Start: 2023-05-12

## 2023-05-11 RX ORDER — LEVOTHYROXINE SODIUM 300 UG/1
300 TABLET ORAL DAILY
Qty: 90 TABLET | Refills: 1 | Status: SHIPPED | OUTPATIENT
Start: 2023-05-11

## 2023-05-11 RX ORDER — METFORMIN HYDROCHLORIDE 750 MG/1
750 TABLET, EXTENDED RELEASE ORAL
Qty: 90 TABLET | Refills: 1 | Status: SHIPPED | OUTPATIENT
Start: 2023-05-11

## 2023-05-11 RX ORDER — SIMVASTATIN 10 MG
10 TABLET ORAL
Qty: 90 TABLET | Refills: 1 | Status: SHIPPED | OUTPATIENT
Start: 2023-05-11 | End: 2023-11-07

## 2023-05-11 RX ORDER — METOPROLOL TARTRATE 50 MG/1
50 TABLET, FILM COATED ORAL 2 TIMES DAILY
Qty: 180 TABLET | Refills: 1 | Status: SHIPPED | OUTPATIENT
Start: 2023-05-11

## 2023-05-12 ENCOUNTER — HOME CARE VISIT (OUTPATIENT)
Dept: HOME HEALTH SERVICES | Facility: HOME HEALTHCARE | Age: 68
End: 2023-05-12

## 2023-05-13 ENCOUNTER — HOME CARE VISIT (OUTPATIENT)
Dept: HOME HEALTH SERVICES | Facility: HOME HEALTHCARE | Age: 68
End: 2023-05-13

## 2023-05-13 VITALS
SYSTOLIC BLOOD PRESSURE: 148 MMHG | DIASTOLIC BLOOD PRESSURE: 72 MMHG | HEART RATE: 60 BPM | RESPIRATION RATE: 20 BRPM | OXYGEN SATURATION: 96 % | TEMPERATURE: 97.4 F

## 2023-05-15 ENCOUNTER — HOME CARE VISIT (OUTPATIENT)
Dept: HOME HEALTH SERVICES | Facility: HOME HEALTHCARE | Age: 68
End: 2023-05-15

## 2023-05-15 VITALS — TEMPERATURE: 97.4 F | DIASTOLIC BLOOD PRESSURE: 78 MMHG | OXYGEN SATURATION: 99 % | SYSTOLIC BLOOD PRESSURE: 166 MMHG

## 2023-05-16 ENCOUNTER — OFFICE VISIT (OUTPATIENT)
Dept: WOUND CARE | Facility: HOSPITAL | Age: 68
End: 2023-05-16

## 2023-05-16 ENCOUNTER — HOME CARE VISIT (OUTPATIENT)
Dept: HOME HEALTH SERVICES | Facility: HOME HEALTHCARE | Age: 68
End: 2023-05-16

## 2023-05-16 VITALS
SYSTOLIC BLOOD PRESSURE: 174 MMHG | DIASTOLIC BLOOD PRESSURE: 87 MMHG | HEART RATE: 92 BPM | TEMPERATURE: 97.1 F | RESPIRATION RATE: 20 BRPM

## 2023-05-16 DIAGNOSIS — E11.42 DIABETIC POLYNEUROPATHY ASSOCIATED WITH TYPE 2 DIABETES MELLITUS (HCC): ICD-10-CM

## 2023-05-16 DIAGNOSIS — I87.312 IDIOPATHIC CHRONIC VENOUS HYPERTENSION OF LEFT LOWER EXTREMITY WITH ULCER (HCC): ICD-10-CM

## 2023-05-16 DIAGNOSIS — I89.0 LYMPHEDEMA: ICD-10-CM

## 2023-05-16 DIAGNOSIS — L97.929 IDIOPATHIC CHRONIC VENOUS HYPERTENSION OF LEFT LOWER EXTREMITY WITH ULCER (HCC): ICD-10-CM

## 2023-05-16 DIAGNOSIS — E11.621 DIABETIC ULCER OF OTHER PART OF LEFT FOOT ASSOCIATED WITH TYPE 2 DIABETES MELLITUS, LIMITED TO BREAKDOWN OF SKIN (HCC): Primary | ICD-10-CM

## 2023-05-16 DIAGNOSIS — L97.521 DIABETIC ULCER OF OTHER PART OF LEFT FOOT ASSOCIATED WITH TYPE 2 DIABETES MELLITUS, LIMITED TO BREAKDOWN OF SKIN (HCC): Primary | ICD-10-CM

## 2023-05-16 NOTE — PROGRESS NOTES
Patient ID: Princess Zamudio is a 77 y o  male Date of Birth 1955       Chief Complaint   Patient presents with   • Follow Up Wound Care Visit     Left foot wound       Allergies:  Patient has no known allergies  Diagnosis:  1  Diabetic ulcer of other part of left foot associated with type 2 diabetes mellitus, limited to breakdown of skin (HCC)  -     Wound cleansing and dressings; Future  -     Wound compression and edema control; Future  -     Wound home care; Future    2  Idiopathic chronic venous hypertension of left lower extremity with ulcer (HCC)  -     Wound cleansing and dressings; Future  -     Wound compression and edema control; Future  -     Wound home care; Future    3  Lymphedema  -     Wound cleansing and dressings; Future  -     Wound compression and edema control; Future  -     Wound home care; Future    4  Diabetic polyneuropathy associated with type 2 diabetes mellitus (HCC)  -     Wound cleansing and dressings; Future  -     Wound compression and edema control; Future  -     Wound home care; Future       Diagnosis ICD-10-CM Associated Orders   1  Diabetic ulcer of other part of left foot associated with type 2 diabetes mellitus, limited to breakdown of skin (HCC)  E11 621 Wound cleansing and dressings    L97 521 Wound compression and edema control     Wound home care      2  Idiopathic chronic venous hypertension of left lower extremity with ulcer (HCC)  I87 312 Wound cleansing and dressings    L97 929 Wound compression and edema control     Wound home care      3  Lymphedema  I89 0 Wound cleansing and dressings     Wound compression and edema control     Wound home care      4  Diabetic polyneuropathy associated with type 2 diabetes mellitus (HCC)  E11 42 Wound cleansing and dressings     Wound compression and edema control     Wound home care           Assessment & Plan:  1    Venous stasis ulceration left lower leg, proximal wound is well-healed, distal wound was debrided through "selective tissues and wound edges were dressed with OPTi cell dry dressing, SurePress for compression, visiting nurse's and lymphedema therapist will continue to do the same 3 times a week  2   Diabetic Naidu grade 1 ulceration dorsal left foot is well epithelialized and healed, may discontinue all dressings  3   Continue sequential compression pumps once a day for 1 hour in the evening  Frequent elevation, low-sodium diet, proper protein intake, proper glycemic control, strict pressure and friction reduction, patient understands and agrees with the plan and will follow-up in 4 weeks  Debridement   Wound 04/25/23 Venous Ulcer Leg Left; Anterior    Universal Protocol:  Consent: Verbal consent obtained  Risks and benefits: risks, benefits and alternatives were discussed  Consent given by: patient  Time out: Immediately prior to procedure a \"time out\" was called to verify the correct patient, procedure, equipment, support staff and site/side marked as required    Patient understanding: patient states understanding of the procedure being performed  Patient identity confirmed: verbally with patient      Performed by: physician  Debridement type: selective  Pain control: lidocaine 4%  Pre-debridement measurements  Length (cm): 2 8  Width (cm): 1 4  Depth (cm): 0 1  Surface Area (cm^2): 3 92  Volume (cm^3): 0 39    Post-debridement measurements  Length (cm): 2 8  Width (cm): 1 4  Depth (cm): 0 1  Percent debrided: 100%  Surface Area (cm^2): 3 92  Area debrided (cm^2): 3 92  Volume (cm^3): 0 39  Devitalized tissue debrided: biofilm, callus and slough  Instrument(s) utilized: blade  Bleeding: small  Hemostasis obtained with: pressure  Procedural pain (0-10): insensate  Post-procedural pain: insensate   Response to treatment: procedure was tolerated well           Subjective:   Patient presents today for left lower extremity venous stasis ulceration complicated by lymphedema and diabetes, states his blood sugars are " well controlled, visiting nurses and lymphedema therapist continue to change dressings as directed and he has no new complaints          The following portions of the patient's history were reviewed and updated as appropriate:   Patient Active Problem List   Diagnosis   • Controlled type 2 diabetes mellitus with complication, without long-term current use of insulin ([facility] )   • Essential hypertension   • HLD (hyperlipidemia)   • Hypothyroidism   • Celiac disease   • Coronary artery disease   • History of duodenal ulcer   • Living will on file   • Obesity, Class III, BMI 40-49 9 (morbid obesity) ([facility])   • S/P BKA (below knee amputation) unilateral, right ([facility])   • Paroxysmal atrial fibrillation ([facility])   • Iron deficiency anemia due to chronic blood loss   • Chronic venous stasis dermatitis of left lower extremity   • Gastroesophageal reflux disease without esophagitis   • Diabetic ulcer of left foot associated with type 2 diabetes mellitus, limited to breakdown of skin ([facility])   • Idiopathic chronic venous hypertension of left lower extremity with ulcer ([facility])   • Non-pressure chronic ulcer of left calf, limited to breakdown of skin ([facility] )   • Diabetic polyneuropathy associated with type 2 diabetes mellitus ([facility] )   • Cellulitis of left lower extremity   • Abnormal urinalysis   • Ulcer of toe of left foot, limited to breakdown of skin ([facility] )   • Diabetic ulcer of left ankle associated with type 2 diabetes mellitus, limited to breakdown of skin ([facility] )   • Dermatophytosis     Past Medical History:   Diagnosis Date   • Atrial fibrillation ([facility] )    • Cellulitis    • Diabetes mellitus ([facility] )    • Disease of thyroid gland    • Duodenal ulcer     perforated- ICU stay   • High blood pressure    • High cholesterol    • Hyperlipidemia    • Hypertension    • Hypothyroidism    • Lymphedema      b/l LE- was followed at wound center   • Morbid obesity with BMI of 40 0-44 9, adult ([facility] )    • Peritonitis ([facility] )      Past Surgical History: Procedure Laterality Date   • BELOW KNEE LEG AMPUTATION Right    • DIAGNOSTIC LAPAROSCOPY     • KNEE ARTHROSCOPY Bilateral    • OTHER SURGICAL HISTORY  2014     lap repair   • OTHER SURGICAL HISTORY      Laparoscopic repair of a perforated duodenal ulcer with Kelly Nanas omental   • OTHER SURGICAL HISTORY      Placement of drains     Social History     Socioeconomic History   • Marital status: Single     Spouse name: Not on file   • Number of children: 2   • Years of education: 15   • Highest education level: High school graduate   Occupational History   • Not on file   Tobacco Use   • Smoking status: Former     Packs/day: 1 50     Years: 25 00     Pack years: 37 50     Types: Cigarettes     Quit date: 2004     Years since quittin 5   • Smokeless tobacco: Never   Vaping Use   • Vaping Use: Never used   Substance and Sexual Activity   • Alcohol use: Not Currently     Alcohol/week: 1 0 standard drink     Types: 1 Standard drinks or equivalent per week   • Drug use: Never   • Sexual activity: Not Currently   Other Topics Concern   • Not on file   Social History Narrative    Former smoker: Quit 3/31/2012 - As per Care Everywhere      Social Determinants of Health     Financial Resource Strain: Low Risk    • Difficulty of Paying Living Expenses: Not very hard   Food Insecurity: Not on file   Transportation Needs: No Transportation Needs   • Lack of Transportation (Medical): No   • Lack of Transportation (Non-Medical): No   Physical Activity: Not on file   Stress: Not on file   Social Connections: Not on file   Intimate Partner Violence: Not on file   Housing Stability: Not on file        Current Outpatient Medications:   •  aspirin (ECOTRIN LOW STRENGTH) 81 mg EC tablet, Take 81 mg by mouth Daily, Disp: , Rfl:   •  clotrimazole-betamethasone (LOTRISONE) 1-0 05 % cream, Apply 1 application   topically 3 (three) times a week As needed for rash, Disp: 45 g, Rfl: 1  •  gabapentin (NEURONTIN) 100 mg capsule, Take 1 capsule (100 mg total) by mouth 2 (two) times a day, Disp: 180 capsule, Rfl: 0  •  ketoconazole (NIZORAL) 2 % cream, Apply topically daily, Disp: 60 g, Rfl: 1  •  levothyroxine 25 mcg tablet, Take 1 tablet (25 mcg total) by mouth daily In addition to the 300 mcg dose, Disp: 90 tablet, Rfl: 1  •  levothyroxine 300 MCG tablet, Take 1 tablet (300 mcg total) by mouth daily In addition to 25 mcg dose, Disp: 90 tablet, Rfl: 1  •  lisinopril (ZESTRIL) 20 mg tablet, Take 1 tablet (20 mg total) by mouth daily, Disp: 90 tablet, Rfl: 1  •  metFORMIN (GLUCOPHAGE-XR) 750 mg 24 hr tablet, Take 1 tablet (750 mg total) by mouth daily with breakfast, Disp: 90 tablet, Rfl: 1  •  metoprolol tartrate (LOPRESSOR) 50 mg tablet, Take 1 tablet (50 mg total) by mouth 2 (two) times a day, Disp: 180 tablet, Rfl: 1  •  Multiple Vitamins-Minerals (MULTIVITAMIN MEN 50+ PO), Take 1 tablet by mouth in the morning, Disp: , Rfl:   •  pantoprazole (PROTONIX) 40 mg tablet, Take 1 tablet (40 mg total) by mouth daily, Disp: 90 tablet, Rfl: 1  •  simvastatin (ZOCOR) 10 mg tablet, Take 1 tablet (10 mg total) by mouth daily at bedtime, Disp: 90 tablet, Rfl: 1  •  Zoster Vac Recomb Adjuvanted (Shingrix) 50 MCG/0 5ML SUSR, 0 5mL IM for one dose, followed by 0 5mL IM 2-6 months after first dose, Disp: 1 each, Rfl: 1  Family History   Problem Relation Age of Onset   • Heart disease Family    • Alcohol abuse Family    • Heart disease Mother    • Hypertension Mother    • Breast cancer Mother    • Migraines Daughter    • Leukemia Brother    • Migraines Daughter    • Substance Abuse Neg Hx    • Mental illness Neg Hx    • Depression Neg Hx        Review of Systems   Constitutional: Negative for chills and fever  HENT: Negative for ear pain and sore throat  Eyes: Negative for pain and visual disturbance  Respiratory: Negative for cough and shortness of breath  Cardiovascular: Negative for chest pain and palpitations     Gastrointestinal: Negative for abdominal pain and vomiting  Genitourinary: Negative for dysuria and hematuria  Musculoskeletal: Positive for gait problem  Negative for arthralgias and back pain  Skin: Positive for color change and wound  Negative for rash  Neurological: Positive for numbness  Negative for seizures and syncope  Psychiatric/Behavioral: Negative  All other systems reviewed and are negative  Objective:  BP (!) 174/87   Pulse 92   Temp (!) 97 1 °F (36 2 °C)   Resp 20     Physical Exam  Constitutional:       Appearance: Normal appearance  He is obese  HENT:      Head: Normocephalic and atraumatic  Right Ear: External ear normal       Left Ear: External ear normal       Nose: Nose normal       Mouth/Throat:      Mouth: Mucous membranes are moist       Pharynx: Oropharynx is clear  Eyes:      Conjunctiva/sclera: Conjunctivae normal    Cardiovascular:      Pulses: Normal pulses  Dorsalis pedis pulses are 2+ on the left side  Posterior tibial pulses are 2+ on the left side  Pulmonary:      Effort: Pulmonary effort is normal    Musculoskeletal:      Cervical back: Normal range of motion  Left lower le+ Edema present  Comments: BKA right   Skin:     General: Skin is warm and dry  Capillary Refill: Capillary refill takes less than 2 seconds  Comments: See detailed wound assessment   Neurological:      General: No focal deficit present  Mental Status: He is alert and oriented to person, place, and time  Mental status is at baseline  Sensory: Sensory deficit present  Coordination: Coordination abnormal       Gait: Gait abnormal       Deep Tendon Reflexes: Reflexes abnormal    Psychiatric:         Mood and Affect: Mood normal          Behavior: Behavior normal          Thought Content: Thought content normal            Wound 23 Venous Ulcer Leg Left; Anterior (Active)   Wound Image   23 1048   Wound Description Yellow; Beefy red;Bleeding 23 1040   Jovita-wound Assessment Edema;Pink 05/16/23 1040   Wound Length (cm) 2 8 cm 05/16/23 1040   Wound Width (cm) 1 4 cm 05/16/23 1040   Wound Depth (cm) 0 1 cm 05/16/23 1040   Wound Surface Area (cm^2) 3 92 cm^2 05/16/23 1040   Wound Volume (cm^3) 0 392 cm^3 05/16/23 1040   Calculated Wound Volume (cm^3) 0 39 cm^3 05/16/23 1040   Change in Wound Size % 54 12 05/16/23 1040   Drainage Amount Moderate 05/16/23 1040   Drainage Description Serosanguineous; Serous 05/16/23 1040   Non-staged Wound Description Full thickness 05/16/23 1040   Treatments Irrigation with NSS;Cleansed 05/16/23 1040   Dressing Status Intact 05/02/23 1120       Wound 04/25/23 Venous Ulcer Leg Left;Lateral (Active)   Wound Image   05/16/23 1049   Wound Description Epithelialization 05/16/23 1049   Jovita-wound Assessment Edema;Pink 05/16/23 1049   Wound Length (cm) 0 cm 05/16/23 1049   Wound Width (cm) 0 cm 05/16/23 1049   Wound Depth (cm) 0 cm 05/16/23 1049   Wound Surface Area (cm^2) 0 cm^2 05/16/23 1049   Wound Volume (cm^3) 0 cm^3 05/16/23 1049   Calculated Wound Volume (cm^3) 0 cm^3 05/16/23 1049   Change in Wound Size % 100 05/16/23 1049   Drainage Amount None 05/16/23 1049   Drainage Description Serosanguineous 05/02/23 1144   Non-staged Wound Description Full thickness 05/02/23 1144   Treatments Cleansed 05/16/23 1049   Dressing Status Intact 05/02/23 1144                No results found  Wound Instructions:  Orders Placed This Encounter   Procedures   • Wound cleansing and dressings     Wound cleansing and dressings       Wash your hands with soap and water  Remove old dressing, discard into plastic bag and place in trash  Cleanse the wound with mild soap and water prior to applying a clean dressing  May apply Dakins soak to wounds as needed for drainage  Pat dry using gauze  Shower no (Keep dressings clean and dry - may use cast cover)  Apply Calmoseptine or skin prep to skin surrounding wounds as needed      Apply adaptic then "opticell AG or equivalent cut to fit the left leg and foot wounds  Cover with ABD  Secure with amy and tape  Change dressing every other day        This was done today in wound care                                              Standing Status:   Future     Standing Expiration Date:   5/16/2024   • Wound compression and edema control     Wound compression and edema control  Surepress: To left leg        Apply compression wrap to your affected Leg(s) from mid-foot to knee making sure to cover the heel  Apply in the morning and re-wrap as needed during the day if wrap becomes loose  Remove at bedtime and elevate legs or lie down  Please ensure that Surepress is at 25 percent stretch and 75 percent overlap  Avoid prolonged standing in one place  Elevate leg(s) above the level of the heart when sitting or as much as possible  Limit salt intake      Use compression pumps for one hour 2x day  Standing Status:   Future     Standing Expiration Date:   5/16/2024   • Wound home care     Wound home care  Continue [facility] every other day  Standing Status:   Future     Standing Expiration Date:   5/16/2024         Susa Hum, DPM, DPM, FACFAS    Portions of the record may have been created with voice recognition software  Occasional wrong word or \"sound a like\" substitutions may have occurred due to the inherent limitations of voice recognition software  Read the chart carefully and recognize, using context, where substitutions have occurred      "

## 2023-05-16 NOTE — PATIENT INSTRUCTIONS
Orders Placed This Encounter   Procedures    Wound cleansing and dressings     Wound cleansing and dressings       Wash your hands with soap and water  Remove old dressing, discard into plastic bag and place in trash  Cleanse the wound with mild soap and water prior to applying a clean dressing  May apply Dakins soak to wounds as needed for drainage  Pat dry using gauze  Shower no (Keep dressings clean and dry - may use cast cover)  Apply Calmoseptine or skin prep to skin surrounding wounds as needed  Apply adaptic then Nascentric AG or equivalent cut to fit the left leg and foot wounds  Cover with ABD  Secure with amy and tape  Change dressing every other day  This was done today in wound care  Standing Status:   Future     Standing Expiration Date:   5/16/2024    Wound compression and edema control     Wound compression and edema control  Surepress: To left leg  Apply compression wrap to your affected Leg(s) from mid-foot to knee making sure to cover the heel  Apply in the morning and re-wrap as needed during the day if wrap becomes loose  Remove at bedtime and elevate legs or lie down  Please ensure that Surepress is at 25 percent stretch and 75 percent overlap  Avoid prolonged standing in one place  Elevate leg(s) above the level of the heart when sitting or as much as possible  Limit salt intake  Use compression pumps for one hour 2x day  Standing Status:   Future     Standing Expiration Date:   5/16/2024    Wound home care     Wound home care  Continue [facility] every other day       Standing Status:   Future     Standing Expiration Date:   5/16/2024

## 2023-05-17 VITALS
OXYGEN SATURATION: 98 % | HEART RATE: 79 BPM | SYSTOLIC BLOOD PRESSURE: 174 MMHG | TEMPERATURE: 97.1 F | DIASTOLIC BLOOD PRESSURE: 80 MMHG

## 2023-05-18 ENCOUNTER — HOME CARE VISIT (OUTPATIENT)
Dept: HOME HEALTH SERVICES | Facility: HOME HEALTHCARE | Age: 68
End: 2023-05-18

## 2023-05-18 VITALS — TEMPERATURE: 97.4 F | OXYGEN SATURATION: 98 % | HEART RATE: 66 BPM

## 2023-05-20 ENCOUNTER — HOME CARE VISIT (OUTPATIENT)
Dept: HOME HEALTH SERVICES | Facility: HOME HEALTHCARE | Age: 68
End: 2023-05-20

## 2023-05-20 VITALS
DIASTOLIC BLOOD PRESSURE: 80 MMHG | RESPIRATION RATE: 18 BRPM | OXYGEN SATURATION: 98 % | HEART RATE: 69 BPM | SYSTOLIC BLOOD PRESSURE: 138 MMHG | TEMPERATURE: 97.8 F

## 2023-05-22 ENCOUNTER — HOME CARE VISIT (OUTPATIENT)
Dept: HOME HEALTH SERVICES | Facility: HOME HEALTHCARE | Age: 68
End: 2023-05-22
Payer: MEDICARE

## 2023-05-22 ENCOUNTER — HOME CARE VISIT (OUTPATIENT)
Dept: HOME HEALTH SERVICES | Facility: HOME HEALTHCARE | Age: 68
End: 2023-05-22

## 2023-05-22 VITALS — TEMPERATURE: 98.1 F

## 2023-05-24 ENCOUNTER — HOME CARE VISIT (OUTPATIENT)
Dept: HOME HEALTH SERVICES | Facility: HOME HEALTHCARE | Age: 68
End: 2023-05-24

## 2023-05-24 VITALS
TEMPERATURE: 98.3 F | HEART RATE: 76 BPM | RESPIRATION RATE: 20 BRPM | OXYGEN SATURATION: 97 % | DIASTOLIC BLOOD PRESSURE: 92 MMHG | SYSTOLIC BLOOD PRESSURE: 160 MMHG

## 2023-05-24 PROCEDURE — 10330064 SPONGE, GAUZE 8PLY N/S 4"X4" (200/PK 20P

## 2023-05-26 ENCOUNTER — HOME CARE VISIT (OUTPATIENT)
Dept: HOME HEALTH SERVICES | Facility: HOME HEALTHCARE | Age: 68
End: 2023-05-26
Payer: MEDICARE

## 2023-05-26 ENCOUNTER — HOME CARE VISIT (OUTPATIENT)
Dept: HOME HEALTH SERVICES | Facility: HOME HEALTHCARE | Age: 68
End: 2023-05-26

## 2023-05-26 VITALS — TEMPERATURE: 97.7 F

## 2023-05-28 ENCOUNTER — HOME CARE VISIT (OUTPATIENT)
Dept: HOME HEALTH SERVICES | Facility: HOME HEALTHCARE | Age: 68
End: 2023-05-28

## 2023-05-28 VITALS
SYSTOLIC BLOOD PRESSURE: 152 MMHG | RESPIRATION RATE: 16 BRPM | HEART RATE: 68 BPM | TEMPERATURE: 97.7 F | DIASTOLIC BLOOD PRESSURE: 98 MMHG | OXYGEN SATURATION: 98 %

## 2023-05-30 ENCOUNTER — HOME CARE VISIT (OUTPATIENT)
Dept: HOME HEALTH SERVICES | Facility: HOME HEALTHCARE | Age: 68
End: 2023-05-30

## 2023-05-30 VITALS — TEMPERATURE: 98.2 F

## 2023-06-01 ENCOUNTER — HOME CARE VISIT (OUTPATIENT)
Dept: HOME HEALTH SERVICES | Facility: HOME HEALTHCARE | Age: 68
End: 2023-06-01

## 2023-06-01 VITALS
OXYGEN SATURATION: 98 % | DIASTOLIC BLOOD PRESSURE: 88 MMHG | RESPIRATION RATE: 20 BRPM | TEMPERATURE: 97.6 F | HEART RATE: 68 BPM | SYSTOLIC BLOOD PRESSURE: 162 MMHG

## 2023-06-01 PROCEDURE — 10330064 BANDAGE, CNFRM 4"X4.1YDS N/S LF (12RL/BG

## 2023-06-03 ENCOUNTER — HOME CARE VISIT (OUTPATIENT)
Dept: HOME HEALTH SERVICES | Facility: HOME HEALTHCARE | Age: 68
End: 2023-06-03

## 2023-06-03 ENCOUNTER — HOME CARE VISIT (OUTPATIENT)
Dept: HOME HEALTH SERVICES | Facility: HOME HEALTHCARE | Age: 68
End: 2023-06-03
Payer: MEDICARE

## 2023-06-03 VITALS
HEART RATE: 68 BPM | DIASTOLIC BLOOD PRESSURE: 90 MMHG | SYSTOLIC BLOOD PRESSURE: 162 MMHG | RESPIRATION RATE: 20 BRPM | TEMPERATURE: 97.6 F | OXYGEN SATURATION: 97 %

## 2023-06-05 ENCOUNTER — HOME CARE VISIT (OUTPATIENT)
Dept: HOME HEALTH SERVICES | Facility: HOME HEALTHCARE | Age: 68
End: 2023-06-05
Payer: MEDICARE

## 2023-06-05 VITALS
DIASTOLIC BLOOD PRESSURE: 82 MMHG | TEMPERATURE: 97.9 F | OXYGEN SATURATION: 98 % | SYSTOLIC BLOOD PRESSURE: 158 MMHG | HEART RATE: 68 BPM | RESPIRATION RATE: 18 BRPM

## 2023-06-05 PROCEDURE — G0299 HHS/HOSPICE OF RN EA 15 MIN: HCPCS

## 2023-06-07 ENCOUNTER — HOME CARE VISIT (OUTPATIENT)
Dept: HOME HEALTH SERVICES | Facility: HOME HEALTHCARE | Age: 68
End: 2023-06-07
Payer: MEDICARE

## 2023-06-07 VITALS — TEMPERATURE: 98.4 F

## 2023-06-07 PROCEDURE — G0152 HHCP-SERV OF OT,EA 15 MIN: HCPCS

## 2023-06-08 ENCOUNTER — HOME CARE VISIT (OUTPATIENT)
Dept: HOME HEALTH SERVICES | Facility: HOME HEALTHCARE | Age: 68
End: 2023-06-08
Payer: MEDICARE

## 2023-06-09 ENCOUNTER — HOME CARE VISIT (OUTPATIENT)
Dept: HOME HEALTH SERVICES | Facility: HOME HEALTHCARE | Age: 68
End: 2023-06-09
Payer: MEDICARE

## 2023-06-09 VITALS — HEART RATE: 70 BPM | OXYGEN SATURATION: 97 % | TEMPERATURE: 98.3 F

## 2023-06-09 PROCEDURE — G0152 HHCP-SERV OF OT,EA 15 MIN: HCPCS

## 2023-06-10 ENCOUNTER — HOME CARE VISIT (OUTPATIENT)
Dept: HOME HEALTH SERVICES | Facility: HOME HEALTHCARE | Age: 68
End: 2023-06-10
Payer: MEDICARE

## 2023-06-11 ENCOUNTER — HOME CARE VISIT (OUTPATIENT)
Dept: HOME HEALTH SERVICES | Facility: HOME HEALTHCARE | Age: 68
End: 2023-06-11
Payer: MEDICARE

## 2023-06-11 PROCEDURE — G0152 HHCP-SERV OF OT,EA 15 MIN: HCPCS

## 2023-06-12 VITALS — HEART RATE: 58 BPM | TEMPERATURE: 98.4 F | OXYGEN SATURATION: 97 %

## 2023-06-13 ENCOUNTER — OFFICE VISIT (OUTPATIENT)
Dept: WOUND CARE | Facility: HOSPITAL | Age: 68
End: 2023-06-13
Payer: MEDICARE

## 2023-06-13 ENCOUNTER — HOME CARE VISIT (OUTPATIENT)
Dept: HOME HEALTH SERVICES | Facility: HOME HEALTHCARE | Age: 68
End: 2023-06-13
Payer: MEDICARE

## 2023-06-13 VITALS — TEMPERATURE: 96.9 F | OXYGEN SATURATION: 96 % | HEART RATE: 69 BPM

## 2023-06-13 VITALS
RESPIRATION RATE: 20 BRPM | SYSTOLIC BLOOD PRESSURE: 186 MMHG | TEMPERATURE: 96.9 F | DIASTOLIC BLOOD PRESSURE: 91 MMHG | HEART RATE: 67 BPM

## 2023-06-13 DIAGNOSIS — I87.312 IDIOPATHIC CHRONIC VENOUS HYPERTENSION OF LEFT LOWER EXTREMITY WITH ULCER (HCC): ICD-10-CM

## 2023-06-13 DIAGNOSIS — L97.929 IDIOPATHIC CHRONIC VENOUS HYPERTENSION OF LEFT LOWER EXTREMITY WITH ULCER (HCC): ICD-10-CM

## 2023-06-13 DIAGNOSIS — L97.521 DIABETIC ULCER OF OTHER PART OF LEFT FOOT ASSOCIATED WITH TYPE 2 DIABETES MELLITUS, LIMITED TO BREAKDOWN OF SKIN (HCC): Primary | ICD-10-CM

## 2023-06-13 DIAGNOSIS — E11.42 DIABETIC POLYNEUROPATHY ASSOCIATED WITH TYPE 2 DIABETES MELLITUS (HCC): ICD-10-CM

## 2023-06-13 DIAGNOSIS — E11.621 DIABETIC ULCER OF OTHER PART OF LEFT FOOT ASSOCIATED WITH TYPE 2 DIABETES MELLITUS, LIMITED TO BREAKDOWN OF SKIN (HCC): Primary | ICD-10-CM

## 2023-06-13 DIAGNOSIS — I89.0 LYMPHEDEMA: ICD-10-CM

## 2023-06-13 PROCEDURE — G0152 HHCP-SERV OF OT,EA 15 MIN: HCPCS

## 2023-06-13 PROCEDURE — 99213 OFFICE O/P EST LOW 20 MIN: CPT | Performed by: PODIATRIST

## 2023-06-13 NOTE — PATIENT INSTRUCTIONS
Orders Placed This Encounter   Procedures    Wound cleansing and dressings     Wound cleansing and dressings                                 Wash your hands with soap and water  Remove old dressing, discard into plastic bag and place in trash  Cleanse the wound with mild soap and water prior to applying a clean dressing  May apply Dakins soak to wounds as needed for drainage  Pat dry using gauze  Shower no (Keep dressings clean and dry - may use cast cover)  Apply Calmoseptine or skin prep to skin surrounding wound as needed  Apply adaptic then [employer] AG or equivalent cut to fit the left leg wound  Cover with ABD  Secure with amy and tape  Change dressing Monday, Wednesday, Friday  This was done today in wound care  Wound compression and edema control    Surepress: To left leg  Apply compression wrap to your affected Leg(s) from mid-foot to knee making sure to cover the heel  Apply in the morning and re-wrap as needed during the day if wrap becomes loose  Remove at bedtime and elevate legs or lie down  Please ensure that Surepress is at 25 percent stretch and 75 percent overlap  Avoid prolonged standing in one place  Elevate leg(s) above the level of the heart when sitting or as much as possible  Limit salt intake  Use compression pumps for one hour 2x day  Wound home care      Wound home care  Continue [facility] every other day       Standing Status:   Future     Standing Expiration Date:   6/13/2024

## 2023-06-13 NOTE — PROGRESS NOTES
Patient ID: Sidney Seo is a 77 y o  male Date of Birth 1955       Chief Complaint   Patient presents with   • Follow Up Wound Care Visit     Left leg wound       Allergies:  Patient has no known allergies  Diagnosis:  1  Diabetic ulcer of other part of left foot associated with type 2 diabetes mellitus, limited to breakdown of skin (HCC)  -     Wound cleansing and dressings; Future    2  Idiopathic chronic venous hypertension of left lower extremity with ulcer (HCC)  -     Wound cleansing and dressings; Future    3  Lymphedema  -     Wound cleansing and dressings; Future    4  Diabetic polyneuropathy associated with type 2 diabetes mellitus (HCC)  -     Wound cleansing and dressings; Future       Diagnosis ICD-10-CM Associated Orders   1  Diabetic ulcer of other part of left foot associated with type 2 diabetes mellitus, limited to breakdown of skin (HCC)  E11 621 Wound cleansing and dressings    L97 521       2  Idiopathic chronic venous hypertension of left lower extremity with ulcer (HCC)  I87 312 Wound cleansing and dressings    L97 929       3  Lymphedema  I89 0 Wound cleansing and dressings      4  Diabetic polyneuropathy associated with type 2 diabetes mellitus (HCC)  E11 42 Wound cleansing and dressings           Assessment & Plan:  1  Venous stasis ulceration left lower leg, small distal wound continues to be open, no debridement was performed as there is fragile mixed granular epithelial base which is shiny, wound was dressed with OPTi cell dry dressing, SurePress for compression, visiting nurses and lymphedema therapist will continue to do the same 3 times a week  2   Diabetic Naidu grade 0 ulceration dorsal left foot, this is well epithelialized and healed, may discontinue all dressings, continue compression  3   Patient is advised to continue subsequentially compression pumps left lower extremity twice a day for 1 hour    Frequent elevation, low-sodium diet, proper protein intake, proper glycemic control, strict pressure and friction reduction was reviewed, patient understands and agrees with the plan and will follow-up in 4 weeks  Subjective:   Karson Melchor presents today for left lower extremity diabetic and venous stasis ulcerations, visiting nurses have been changing dressings as directed, he continues with lymphedema therapist, he continues to use his sequential compression pumps and has no new complaints          The following portions of the patient's history were reviewed and updated as appropriate:   Patient Active Problem List   Diagnosis   • Controlled type 2 diabetes mellitus with complication, without long-term current use of insulin ([facility] [address] )   • Essential hypertension   • HLD (hyperlipidemia)   • Hypothyroidism   • Celiac disease   • Coronary artery disease   • History of duodenal ulcer   • Living will on file   • Obesity, Class III, BMI 40-49 9 (morbid obesity) ([facility])   • S/P BKA (below knee amputation) unilateral, right ([facility])   • Paroxysmal atrial fibrillation ([facility])   • Iron deficiency anemia due to chronic blood loss   • Chronic venous stasis dermatitis of left lower extremity   • Gastroesophageal reflux disease without esophagitis   • Diabetic ulcer of left foot associated with type 2 diabetes mellitus, limited to breakdown of skin ([facility])   • Idiopathic chronic venous hypertension of left lower extremity with ulcer ([facility])   • Non-pressure chronic ulcer of left calf, limited to breakdown of skin ([address] )   • Diabetic polyneuropathy associated with type 2 diabetes mellitus ([address] )   • Cellulitis of left lower extremity   • Abnormal urinalysis   • Ulcer of toe of left foot, limited to breakdown of skin ([address] )   • Diabetic ulcer of left ankle associated with type 2 diabetes mellitus, limited to breakdown of skin ([address] )   • Dermatophytosis     Past Medical History:   Diagnosis Date   • Atrial fibrillation ([address] )    • Cellulitis    • Diabetes mellitus ([address] )    • Disease of thyroid gland    • Duodenal ulcer     perforated- ICU stay   • High blood pressure    • High cholesterol    • Hyperlipidemia    • Hypertension    • Hypothyroidism    • Lymphedema      b/l LE- was followed at wound center   • Morbid obesity with BMI of 40 0-44 9, adult ([facility] [address] )    • Peritonitis ([facility] [address] )      Past Surgical History:   Procedure Laterality Date   • BELOW KNEE LEG AMPUTATION Right    • DIAGNOSTIC LAPAROSCOPY     • KNEE ARTHROSCOPY Bilateral    • OTHER SURGICAL HISTORY  2014     lap repair   • OTHER SURGICAL HISTORY      Laparoscopic repair of a perforated duodenal ulcer with Gillie Marysol omental   • OTHER SURGICAL HISTORY      Placement of drains     Social History     Socioeconomic History   • Marital status: Single     Spouse name: None   • Number of children: 2   • Years of education: 12   • Highest education level: High school graduate   Occupational History   • None   Tobacco Use   • Smoking status: Former     Packs/day: 1 50     Years: 25 00     Total pack years: 37 50     Types: Cigarettes     Quit date: 2004     Years since quittin 6   • Smokeless tobacco: Never   Vaping Use   • Vaping Use: Never used   Substance and Sexual Activity   • Alcohol use: Not Currently     Alcohol/week: 1 0 standard drink of alcohol     Types: 1 Standard drinks or equivalent per week   • Drug use: Never   • Sexual activity: Not Currently   Other Topics Concern   • None   Social History Narrative    Former smoker: Quit 3/31/2012 - As per Care Everywhere      Social Determinants of Health     Financial Resource Strain: Low Risk  (2023)    Overall Financial Resource Strain (CARDIA)    • Difficulty of Paying Living Expenses: Not very hard   Food Insecurity: Not on file   Transportation Needs: No Transportation Needs (2023)    PRAPARE - Transportation    • Lack of Transportation (Medical): No    • Lack of Transportation (Non-Medical):  No   Physical Activity: Not on file   Stress: Not on file   Social Connections: Not on file   Intimate Partner Violence: Not on file   Housing Stability: Not on file        Current Outpatient Medications:   •  aspirin (ECOTRIN LOW STRENGTH) 81 mg EC tablet, Take 81 mg by mouth Daily, Disp: , Rfl:   •  clotrimazole-betamethasone (LOTRISONE) 1-0 05 % cream, Apply 1 application   topically 3 (three) times a week As needed for rash, Disp: 45 g, Rfl: 1  •  gabapentin (NEURONTIN) 100 mg capsule, Take 1 capsule (100 mg total) by mouth 2 (two) times a day, Disp: 180 capsule, Rfl: 0  •  ketoconazole (NIZORAL) 2 % cream, Apply topically daily, Disp: 60 g, Rfl: 1  •  levothyroxine 25 mcg tablet, Take 1 tablet (25 mcg total) by mouth daily In addition to the 300 mcg dose, Disp: 90 tablet, Rfl: 1  •  levothyroxine 300 MCG tablet, Take 1 tablet (300 mcg total) by mouth daily In addition to 25 mcg dose, Disp: 90 tablet, Rfl: 1  •  lisinopril (ZESTRIL) 20 mg tablet, Take 1 tablet (20 mg total) by mouth daily, Disp: 90 tablet, Rfl: 1  •  metFORMIN (GLUCOPHAGE-XR) 750 mg 24 hr tablet, Take 1 tablet (750 mg total) by mouth daily with breakfast, Disp: 90 tablet, Rfl: 1  •  metoprolol tartrate (LOPRESSOR) 50 mg tablet, Take 1 tablet (50 mg total) by mouth 2 (two) times a day, Disp: 180 tablet, Rfl: 1  •  Multiple Vitamins-Minerals (MULTIVITAMIN MEN 50+ PO), Take 1 tablet by mouth in the morning, Disp: , Rfl:   •  pantoprazole (PROTONIX) 40 mg tablet, Take 1 tablet (40 mg total) by mouth daily, Disp: 90 tablet, Rfl: 1  •  simvastatin (ZOCOR) 10 mg tablet, Take 1 tablet (10 mg total) by mouth daily at bedtime, Disp: 90 tablet, Rfl: 1  •  Zoster Vac Recomb Adjuvanted (Shingrix) 50 MCG/0 5ML SUSR, 0 5mL IM for one dose, followed by 0 5mL IM 2-6 months after first dose, Disp: 1 each, Rfl: 1  Family History   Problem Relation Age of Onset   • Heart disease Family    • Alcohol abuse Family    • Heart disease Mother    • Hypertension Mother    • Breast cancer Mother    • Migraines Daughter    • Leukemia Brother    • Migraines Daughter    • Substance Abuse Neg Hx    • Mental illness Neg Hx    • Depression Neg Hx        Review of Systems   Constitutional: Negative for chills and fever  HENT: Negative for ear pain and sore throat  Eyes: Negative for pain and visual disturbance  Respiratory: Negative for cough and shortness of breath  Cardiovascular: Negative for chest pain and palpitations  Gastrointestinal: Negative for abdominal pain and vomiting  Genitourinary: Negative for dysuria and hematuria  Musculoskeletal: Positive for gait problem  Negative for arthralgias and back pain  Skin: Positive for color change and wound  Negative for rash  Neurological: Positive for numbness  Negative for seizures and syncope  Psychiatric/Behavioral: Negative  All other systems reviewed and are negative  Objective:  BP (!) 186/91   Pulse 67   Temp (!) 96 9 °F (36 1 °C) (Temporal)   Resp 20     Physical Exam  Constitutional:       Appearance: Normal appearance  He is obese  HENT:      Head: Normocephalic and atraumatic  Right Ear: External ear normal       Left Ear: External ear normal       Nose: Nose normal       Mouth/Throat:      Mouth: Mucous membranes are moist       Pharynx: Oropharynx is clear  Eyes:      Conjunctiva/sclera: Conjunctivae normal    Cardiovascular:      Pulses:           Dorsalis pedis pulses are 1+ on the left side  Posterior tibial pulses are 1+ on the left side  Pulmonary:      Effort: Pulmonary effort is normal    Musculoskeletal:      Cervical back: Normal range of motion  Left lower le+ Edema present  Comments: BKA right   Skin:     General: Skin is warm and dry  Capillary Refill: Capillary refill takes less than 2 seconds  Comments: See detailed wound assessment   Neurological:      General: No focal deficit present  Mental Status: He is alert and oriented to person, place, and time  Mental status is at baseline  Sensory: Sensory deficit present  Coordination: Coordination abnormal       Gait: Gait abnormal       Deep Tendon Reflexes: Reflexes abnormal    Psychiatric:         Mood and Affect: Mood normal          Behavior: Behavior normal          Thought Content: Thought content normal          Judgment: Judgment normal            Wound 04/25/23 Venous Ulcer Leg Left; Anterior (Active)   Wound Image   06/13/23 1017   Wound Description Yellow;Pink 06/13/23 1017   Jovita-wound Assessment Edema;Pink 06/13/23 1017   Wound Length (cm) 0 7 cm 06/13/23 1017   Wound Width (cm) 0 5 cm 06/13/23 1017   Wound Depth (cm) 0 1 cm 06/13/23 1017   Wound Surface Area (cm^2) 0 35 cm^2 06/13/23 1017   Wound Volume (cm^3) 0 035 cm^3 06/13/23 1017   Calculated Wound Volume (cm^3) 0 04 cm^3 06/13/23 1017   Change in Wound Size % 95 29 06/13/23 1017   Drainage Amount Moderate 06/13/23 1017   Drainage Description Serosanguineous; Serous 06/13/23 1017   Non-staged Wound Description Full thickness 06/13/23 1017   Treatments Irrigation with NSS;Cleansed 05/16/23 1040   Dressing Status Intact 05/02/23 1120                No results found  Wound Instructions:  Orders Placed This Encounter   Procedures   • Wound cleansing and dressings     Wound cleansing and dressings                                 Wash your hands with soap and water  Remove old dressing, discard into plastic bag and place in trash  Cleanse the wound with mild soap and water prior to applying a clean dressing  May apply Dakins soak to wounds as needed for drainage  Pat dry using gauze  Shower no (Keep dressings clean and dry - may use cast cover)  Apply Calmoseptine or skin prep to skin surrounding wound as needed  Apply adaptic then Drive[employer] AG or equivalent cut to fit the left leg wound  Cover with ABD  Secure with amy and tape    Change dressing Monday, Wednesday, Friday        This was done today in wound care                                        Wound compression and edema control    Surepress: "To left leg        Apply compression wrap to your affected Leg(s) from mid-foot to knee making sure to cover the heel  Apply in the morning and re-wrap as needed during the day if wrap becomes loose  Remove at bedtime and elevate legs or lie down  Please ensure that Surepress is at 25 percent stretch and 75 percent overlap  Avoid prolonged standing in one place  Elevate leg(s) above the level of the heart when sitting or as much as possible  Limit salt intake      Use compression pumps for one hour 2x day  Wound home care      Wound home care  Continue [facility] every other day  Standing Status:   Future     Standing Expiration Date:   6/13/2024         Steven Harris DPM, DPM, FACFAS    Portions of the record may have been created with voice recognition software  Occasional wrong word or \"sound a like\" substitutions may have occurred due to the inherent limitations of voice recognition software  Read the chart carefully and recognize, using context, where substitutions have occurred      "

## 2023-06-16 ENCOUNTER — HOME CARE VISIT (OUTPATIENT)
Dept: HOME HEALTH SERVICES | Facility: HOME HEALTHCARE | Age: 68
End: 2023-06-16
Payer: MEDICARE

## 2023-06-16 VITALS
SYSTOLIC BLOOD PRESSURE: 166 MMHG | RESPIRATION RATE: 20 BRPM | DIASTOLIC BLOOD PRESSURE: 88 MMHG | TEMPERATURE: 97.4 F | HEART RATE: 64 BPM | OXYGEN SATURATION: 97 %

## 2023-06-16 PROCEDURE — G0299 HHS/HOSPICE OF RN EA 15 MIN: HCPCS

## 2023-06-17 ENCOUNTER — HOME CARE VISIT (OUTPATIENT)
Dept: HOME HEALTH SERVICES | Facility: HOME HEALTHCARE | Age: 68
End: 2023-06-17
Payer: MEDICARE

## 2023-06-19 ENCOUNTER — HOME CARE VISIT (OUTPATIENT)
Dept: HOME HEALTH SERVICES | Facility: HOME HEALTHCARE | Age: 68
End: 2023-06-19
Payer: MEDICARE

## 2023-06-19 VITALS
DIASTOLIC BLOOD PRESSURE: 72 MMHG | RESPIRATION RATE: 20 BRPM | OXYGEN SATURATION: 96 % | TEMPERATURE: 96 F | HEART RATE: 60 BPM | SYSTOLIC BLOOD PRESSURE: 150 MMHG

## 2023-06-19 PROCEDURE — G0299 HHS/HOSPICE OF RN EA 15 MIN: HCPCS

## 2023-06-22 ENCOUNTER — HOME CARE VISIT (OUTPATIENT)
Dept: HOME HEALTH SERVICES | Facility: HOME HEALTHCARE | Age: 68
End: 2023-06-22
Payer: MEDICARE

## 2023-06-22 VITALS — OXYGEN SATURATION: 97 % | HEART RATE: 69 BPM | TEMPERATURE: 98.6 F

## 2023-06-22 VITALS
OXYGEN SATURATION: 98 % | HEART RATE: 68 BPM | SYSTOLIC BLOOD PRESSURE: 158 MMHG | DIASTOLIC BLOOD PRESSURE: 88 MMHG | RESPIRATION RATE: 20 BRPM | TEMPERATURE: 97.7 F

## 2023-06-22 PROCEDURE — G0152 HHCP-SERV OF OT,EA 15 MIN: HCPCS

## 2023-06-24 ENCOUNTER — HOME CARE VISIT (OUTPATIENT)
Dept: HOME HEALTH SERVICES | Facility: HOME HEALTHCARE | Age: 68
End: 2023-06-24
Payer: MEDICARE

## 2023-06-26 ENCOUNTER — HOME CARE VISIT (OUTPATIENT)
Dept: HOME HEALTH SERVICES | Facility: HOME HEALTHCARE | Age: 68
End: 2023-06-26
Payer: MEDICARE

## 2023-06-26 VITALS — TEMPERATURE: 98.4 F | OXYGEN SATURATION: 96 % | HEART RATE: 72 BPM

## 2023-06-26 PROCEDURE — G0152 HHCP-SERV OF OT,EA 15 MIN: HCPCS

## 2023-06-26 PROCEDURE — 400014 VN F/U

## 2023-06-29 ENCOUNTER — HOME CARE VISIT (OUTPATIENT)
Dept: HOME HEALTH SERVICES | Facility: HOME HEALTHCARE | Age: 68
End: 2023-06-29
Payer: MEDICARE

## 2023-06-29 VITALS — OXYGEN SATURATION: 96 % | DIASTOLIC BLOOD PRESSURE: 96 MMHG | SYSTOLIC BLOOD PRESSURE: 166 MMHG | TEMPERATURE: 97.2 F

## 2023-06-29 PROCEDURE — G0299 HHS/HOSPICE OF RN EA 15 MIN: HCPCS

## 2023-07-01 ENCOUNTER — HOME CARE VISIT (OUTPATIENT)
Dept: HOME HEALTH SERVICES | Facility: HOME HEALTHCARE | Age: 68
End: 2023-07-01
Payer: MEDICARE

## 2023-07-03 ENCOUNTER — HOME CARE VISIT (OUTPATIENT)
Dept: HOME HEALTH SERVICES | Facility: HOME HEALTHCARE | Age: 68
End: 2023-07-03
Payer: MEDICARE

## 2023-07-03 VITALS — OXYGEN SATURATION: 97 % | TEMPERATURE: 98.3 F | HEART RATE: 73 BPM

## 2023-07-03 PROCEDURE — G0152 HHCP-SERV OF OT,EA 15 MIN: HCPCS

## 2023-07-06 ENCOUNTER — HOME CARE VISIT (OUTPATIENT)
Dept: HOME HEALTH SERVICES | Facility: HOME HEALTHCARE | Age: 68
End: 2023-07-06
Payer: MEDICARE

## 2023-07-06 VITALS
SYSTOLIC BLOOD PRESSURE: 156 MMHG | HEART RATE: 68 BPM | DIASTOLIC BLOOD PRESSURE: 88 MMHG | OXYGEN SATURATION: 97 % | RESPIRATION RATE: 20 BRPM | TEMPERATURE: 97.3 F

## 2023-07-06 PROCEDURE — 10330064 BANDAGE, CNFRM 4"X4.1YDS N/S LF (12RL/BG

## 2023-07-06 PROCEDURE — G0299 HHS/HOSPICE OF RN EA 15 MIN: HCPCS

## 2023-07-06 PROCEDURE — 10330064 TAPE, ADHSV TRANSPORE WHT 2" (6RL/BX 10B

## 2023-07-10 ENCOUNTER — HOME CARE VISIT (OUTPATIENT)
Dept: HOME HEALTH SERVICES | Facility: HOME HEALTHCARE | Age: 68
End: 2023-07-10
Payer: MEDICARE

## 2023-07-10 VITALS
HEART RATE: 68 BPM | DIASTOLIC BLOOD PRESSURE: 92 MMHG | RESPIRATION RATE: 20 BRPM | SYSTOLIC BLOOD PRESSURE: 166 MMHG | OXYGEN SATURATION: 96 % | TEMPERATURE: 96.2 F

## 2023-07-10 PROCEDURE — G0299 HHS/HOSPICE OF RN EA 15 MIN: HCPCS

## 2023-07-11 ENCOUNTER — OFFICE VISIT (OUTPATIENT)
Dept: WOUND CARE | Facility: HOSPITAL | Age: 68
End: 2023-07-11
Payer: MEDICARE

## 2023-07-11 VITALS
HEART RATE: 89 BPM | DIASTOLIC BLOOD PRESSURE: 89 MMHG | RESPIRATION RATE: 18 BRPM | TEMPERATURE: 97.6 F | SYSTOLIC BLOOD PRESSURE: 183 MMHG

## 2023-07-11 DIAGNOSIS — I87.312 IDIOPATHIC CHRONIC VENOUS HYPERTENSION OF LEFT LOWER EXTREMITY WITH ULCER (HCC): Primary | ICD-10-CM

## 2023-07-11 DIAGNOSIS — I89.0 LYMPHEDEMA: ICD-10-CM

## 2023-07-11 DIAGNOSIS — L97.929 IDIOPATHIC CHRONIC VENOUS HYPERTENSION OF LEFT LOWER EXTREMITY WITH ULCER (HCC): Primary | ICD-10-CM

## 2023-07-11 PROCEDURE — 97597 DBRDMT OPN WND 1ST 20 CM/<: CPT | Performed by: PODIATRIST

## 2023-07-11 NOTE — PROGRESS NOTES
Patient ID: Ramin Clinton is a 77 y.o. male Date of Birth 1955       Chief Complaint   Patient presents with   • Follow Up Wound Care Visit     Left leg wound, there is also a blister on the left great toe and left second toe;  pt denies known trauma to the area but with neuropathy is not positive that he did not bump it at some point. Allergies:  Patient has no known allergies. Diagnosis:  1. Idiopathic chronic venous hypertension of left lower extremity with ulcer ([address])  -     Wound compression and edema control; Future  -     Wound cleansing and dressings; Future  -     Wound home care; Future    2. Lymphedema  -     Wound compression and edema control; Future  -     Wound cleansing and dressings; Future  -     Wound home care; Future       Diagnosis ICD-10-CM Associated Orders   1. Idiopathic chronic venous hypertension of left lower extremity with ulcer (HCC)  I87.312 Wound compression and edema control    L97.929 Wound cleansing and dressings     Wound home care      2. Lymphedema  I89.0 Wound compression and edema control     Wound cleansing and dressings     Wound home care           Assessment & Plan:  1. Chronic venous stasis ulceration anterior left lower extremity, selective debridement was performed, wound was dressed with Opticell AG, dry dressing, SurePress for compression. 2.  Of note patient has superficial hematoma over left great toe, this was drained and dry dressing was applied. 3.  Frequent elevation, low-sodium diet, proper protein intake, proper glycemic control, strict pressure and friction reduction was reviewed, patient understands and agrees with the plan and will follow-up in 4 weeks. Visiting nurses and lymphedema Occupational Therapy will continue with seeing patient in home. Debridement   Wound 04/25/23 Venous Ulcer Leg Left; Anterior    Universal Protocol:  Consent: Verbal consent obtained.   Risks and benefits: risks, benefits and alternatives were discussed  Consent given by: patient  Time out: Immediately prior to procedure a "time out" was called to verify the correct patient, procedure, equipment, support staff and site/side marked as required. Patient understanding: patient states understanding of the procedure being performed  Patient identity confirmed: verbally with patient      Performed by: physician  Debridement type: selective  Pain control: lidocaine 4%  Pre-debridement measurements  Length (cm): 4  Width (cm): 0.8  Depth (cm): 0.1  Surface Area (cm^2): 3.2  Volume (cm^3): 0.32    Post-debridement measurements  Length (cm): 4  Width (cm): 0.8  Depth (cm): 0.1  Percent debrided: 100%  Surface Area (cm^2): 3.2  Area debrided (cm^2): 3.2  Volume (cm^3): 0.32  Devitalized tissue debrided: biofilm, callus and slough  Instrument(s) utilized: blade  Bleeding: small  Hemostasis obtained with: pressure  Procedural pain (0-10): insensate  Post-procedural pain: insensate   Response to treatment: procedure was tolerated well           Subjective:   Patient presents today for care of chronic venous stasis ulceration complicated by lymphedema to left anterior lower extremity, visiting nurses and lymphedema therapy continue to change his dressings as directed. Patient states he has a new blood blister on his left great toe, he has no idea how it started it was fine yesterday and then he woke up with it, he is unaware of any trauma to this area. No other new complaints.         The following portions of the patient's history were reviewed and updated as appropriate:   Patient Active Problem List   Diagnosis   • Controlled type 2 diabetes mellitus with complication, without long-term current use of insulin ([address])   • Essential hypertension   • HLD (hyperlipidemia)   • Hypothyroidism   • Celiac disease   • Coronary artery disease   • History of duodenal ulcer   • Living will on file   • Obesity, Class III, BMI 40-49.9 (morbid obesity) ([address])   • S/P BKA (below knee amputation) unilateral, right (HCC)   • Paroxysmal atrial fibrillation (HCC)   • Iron deficiency anemia due to chronic blood loss   • Chronic venous stasis dermatitis of left lower extremity   • Gastroesophageal reflux disease without esophagitis   • Diabetic ulcer of left foot associated with type 2 diabetes mellitus, limited to breakdown of skin (HCC)   • Idiopathic chronic venous hypertension of left lower extremity with ulcer (HCC)   • Non-pressure chronic ulcer of left calf, limited to breakdown of skin ([address])   • Diabetic polyneuropathy associated with type 2 diabetes mellitus (HCC)   • Cellulitis of left lower extremity   • Abnormal urinalysis   • Ulcer of toe of left foot, limited to breakdown of skin ([address])   • Diabetic ulcer of left ankle associated with type 2 diabetes mellitus, limited to breakdown of skin ([address])   • Dermatophytosis     Past Medical History:   Diagnosis Date   • Atrial fibrillation ([address])    • Cellulitis    • Diabetes mellitus ([address])    • Disease of thyroid gland    • Duodenal ulcer     perforated- ICU stay   • High blood pressure    • High cholesterol    • Hyperlipidemia    • Hypertension    • Hypothyroidism    • Lymphedema      b/l LE- was followed at wound center   • Morbid obesity with BMI of 40.0-44.9, adult ([address])    • Peritonitis ([address])      Past Surgical History:   Procedure Laterality Date   • BELOW KNEE LEG AMPUTATION Right    • DIAGNOSTIC LAPAROSCOPY     • KNEE ARTHROSCOPY Bilateral    • OTHER SURGICAL HISTORY  03/2014     lap repair   • OTHER SURGICAL HISTORY      Laparoscopic repair of a perforated duodenal ulcer with [facility]ate[facility] [place] omental   • OTHER SURGICAL HISTORY      Placement of drains     Social History     Socioeconomic History   • Marital status: Single     Spouse name: None   • Number of children: 2   • Years of education: 12   • Highest education level: High school graduate   Occupational History   • None   Tobacco Use   • Smoking status: Former     Packs/day: 1.50     Years: 25.00     Total pack years: 37.50     Types: Cigarettes     Quit date: 2004     Years since quittin.6   • Smokeless tobacco: Never   Vaping Use   • Vaping Use: Never used   Substance and Sexual Activity   • Alcohol use: Not Currently     Alcohol/week: 1.0 standard drink of alcohol     Types: 1 Standard drinks or equivalent per week   • Drug use: Never   • Sexual activity: Not Currently   Other Topics Concern   • None   Social History Narrative    Former smoker: Quit 3/31/2012 - As per Care Everywhere      Social Determinants of Health     Financial Resource Strain: Low Risk  (2023)    Overall Financial Resource Strain (CARDIA)    • Difficulty of Paying Living Expenses: Not very hard   Food Insecurity: Not on file   Transportation Needs: No Transportation Needs (2023)    PRAPARE - Transportation    • Lack of Transportation (Medical): No    • Lack of Transportation (Non-Medical): No   Physical Activity: Not on file   Stress: Not on file   Social Connections: Not on file   Intimate Partner Violence: Not on file   Housing Stability: Not on file        Current Outpatient Medications:   •  aspirin (ECOTRIN LOW STRENGTH) 81 mg EC tablet, Take 81 mg by mouth Daily, Disp: , Rfl:   •  clotrimazole-betamethasone (LOTRISONE) 1-0.05 % cream, Apply 1 application.  topically 3 (three) times a week As needed for rash, Disp: 45 g, Rfl: 1  •  gabapentin (NEURONTIN) 100 mg capsule, Take 1 capsule (100 mg total) by mouth 2 (two) times a day, Disp: 180 capsule, Rfl: 0  •  ketoconazole (NIZORAL) 2 % cream, Apply topically daily, Disp: 60 g, Rfl: 1  •  levothyroxine 25 mcg tablet, Take 1 tablet (25 mcg total) by mouth daily In addition to the 300 mcg dose, Disp: 90 tablet, Rfl: 1  •  levothyroxine 300 MCG tablet, Take 1 tablet (300 mcg total) by mouth daily In addition to 25 mcg dose, Disp: 90 tablet, Rfl: 1  •  lisinopril (ZESTRIL) 20 mg tablet, Take 1 tablet (20 mg total) by mouth daily, Disp: 90 tablet, Rfl: 1  • metFORMIN (GLUCOPHAGE-XR) 750 mg 24 hr tablet, Take 1 tablet (750 mg total) by mouth daily with breakfast, Disp: 90 tablet, Rfl: 1  •  metoprolol tartrate (LOPRESSOR) 50 mg tablet, Take 1 tablet (50 mg total) by mouth 2 (two) times a day, Disp: 180 tablet, Rfl: 1  •  Multiple Vitamins-Minerals (MULTIVITAMIN MEN 50+ PO), Take 1 tablet by mouth in the morning, Disp: , Rfl:   •  pantoprazole (PROTONIX) 40 mg tablet, Take 1 tablet (40 mg total) by mouth daily, Disp: 90 tablet, Rfl: 1  •  simvastatin (ZOCOR) 10 mg tablet, Take 1 tablet (10 mg total) by mouth daily at bedtime, Disp: 90 tablet, Rfl: 1  •  Zoster Vac Recomb Adjuvanted (Shingrix) 50 MCG/0.5ML SUSR, 0.5mL IM for one dose, followed by 0.5mL IM 2-6 months after first dose, Disp: 1 each, Rfl: 1  Family History   Problem Relation Age of Onset   • Heart disease Family    • Alcohol abuse Family    • Heart disease Mother    • Hypertension Mother    • Breast cancer Mother    • Migraines Daughter    • Leukemia Brother    • Migraines Daughter    • Substance Abuse Neg Hx    • Mental illness Neg Hx    • Depression Neg Hx        Review of Systems   Constitutional: Negative for chills and fever. HENT: Negative for ear pain and sore throat. Eyes: Negative for pain and visual disturbance. Respiratory: Negative for cough and shortness of breath. Cardiovascular: Negative for chest pain and palpitations. Gastrointestinal: Negative for abdominal pain and vomiting. Genitourinary: Negative for dysuria and hematuria. Musculoskeletal: Positive for gait problem. Negative for arthralgias and back pain. Skin: Positive for color change and wound. Negative for rash. Neurological: Positive for numbness. Negative for seizures and syncope. Psychiatric/Behavioral: Negative. All other systems reviewed and are negative.         Objective:  BP (!) 183/89   Pulse 89   Temp 97.6 °F (36.4 °C)   Resp 18     Physical Exam  Constitutional:       Appearance: Normal appearance. He is obese. HENT:      Head: Normocephalic and atraumatic. Right Ear: External ear normal.      Left Ear: External ear normal.      Nose: Nose normal.      Mouth/Throat:      Mouth: Mucous membranes are moist.      Pharynx: Oropharynx is clear. Eyes:      Conjunctiva/sclera: Conjunctivae normal.   Cardiovascular:      Pulses:           Dorsalis pedis pulses are 1+ on the left side. Posterior tibial pulses are 1+ on the left side. Pulmonary:      Effort: Pulmonary effort is normal.   Musculoskeletal:      Cervical back: Normal range of motion. Left lower le+ Edema present. Comments: BKA right   Skin:     General: Skin is warm and dry. Capillary Refill: Capillary refill takes less than 2 seconds. Comments: See detailed wound assessment   Neurological:      General: No focal deficit present. Mental Status: He is alert and oriented to person, place, and time. Mental status is at baseline. Sensory: Sensory deficit present. Coordination: Coordination abnormal.      Gait: Gait abnormal.      Deep Tendon Reflexes: Reflexes abnormal.   Psychiatric:         Mood and Affect: Mood normal.         Behavior: Behavior normal.         Thought Content: Thought content normal.         Judgment: Judgment normal.           Wound 23 Venous Ulcer Leg Left; Anterior (Active)   Wound Image   23 1030   Wound Description Granulation tissue; Epithelialization;Yellow;Slough 23 1046   Jovita-wound Assessment Edema 23 1046   Wound Length (cm) 4 cm 23 1046   Wound Width (cm) 0.8 cm 23 1046   Wound Depth (cm) 0.1 cm 23 1046   Wound Surface Area (cm^2) 3.2 cm^2 23 1046   Wound Volume (cm^3) 0.32 cm^3 23 1046   Calculated Wound Volume (cm^3) 0.32 cm^3 23 1046   Change in Wound Size % 62.35 23 1046   Drainage Amount Moderate 23 1046   Drainage Description Serosanguineous 23 1046   Non-staged Wound Description Full thickness 07/11/23 1046   Treatments Irrigation with NSS;Cleansed 05/16/23 1040   Dressing Status Intact 07/11/23 1046                No results found. Wound Instructions:  Orders Placed This Encounter   Procedures   • Wound compression and edema control     Surepress: To left leg.       Apply compression wrap to your affected Leg(s) from mid-foot to knee making sure to cover the heel. Apply in the morning and re-wrap as needed during the day if wrap becomes loose. Remove at bedtime and elevate legs or lie down. Please ensure that Surepress is at 25 percent stretch and 75 percent overlap. Avoid prolonged standing in one place. Elevate leg(s) above the level of the heart when sitting or as much as possible. Limit salt intake.     Use compression pumps for one hour 2x day. Standing Status:   Future     Standing Expiration Date:   7/11/2024   • Wound cleansing and dressings     Wound location left leg wound  Change dressing MWF  You may remove the dressing and shower. Do not leave wound open to air, apply new dressing immediately. Cleanse the wound with wound cleanser or nss, pat dry. Apply opticell ag or equivalent gelling fiber with silver to the wound. If the dressing is adherent to wound may use oil emulsion on the wound covered by gelling fiber with silver  Cover with optilock or equivalent  Secure with rolled gauze and tape. This was applied [facility] at the [facility]. Standing Status:   Future     Standing Expiration Date:   7/11/2024   • Wound home care     SL VNA     Standing Status:   Future     Standing Expiration Date:   7/11/2024         Coby Lara, KIKO, DPM, FACFAS    Portions of the record may have been created with voice recognition software. Occasional wrong word or "sound a like" substitutions may have occurred due to the inherent limitations of voice recognition software. Read the chart carefully and recognize, using context, where substitutions have occurred.

## 2023-07-11 NOTE — PATIENT INSTRUCTIONS
Orders Placed This Encounter   Procedures    Wound compression and edema control     Surepress: To left leg. Apply compression wrap to your affected Leg(s) from mid-foot to knee making sure to cover the heel. Apply in the morning and re-wrap as needed during the day if wrap becomes loose. Remove at bedtime and elevate legs or lie down. Please ensure that Surepress is at 25 percent stretch and 75 percent overlap. Avoid prolonged standing in one place. Elevate leg(s) above the level of the heart when sitting or as much as possible. Limit salt intake. Use compression pumps for one hour 2x day. Standing Status:   Future     Standing Expiration Date:   7/11/2024    Wound cleansing and dressings     Wound location left leg wound  Change dressing MWF  You may remove the dressing and shower. Do not leave wound open to air, apply new dressing immediately. Cleanse the wound with wound cleanser or nss, pat dry. Apply opticell ag or equivalent gelling fiber with silver to the wound. If the dressing is adherent to wound may use oil emulsion on the wound covered by gelling fiber with silver  Cover with optilock or equivalent  Secure with rolled gauze and tape. This was applied today at the [facility].      Standing Status:   Future     Standing Expiration Date:   7/11/2024    Wound home care     SL [facility]     Standing Status:   Future     Standing Expiration Date:   7/11/2024

## 2023-07-15 ENCOUNTER — HOME CARE VISIT (OUTPATIENT)
Dept: HOME HEALTH SERVICES | Facility: HOME HEALTHCARE | Age: 68
End: 2023-07-15
Payer: MEDICARE

## 2023-07-15 PROCEDURE — G0152 HHCP-SERV OF OT,EA 15 MIN: HCPCS

## 2023-07-17 ENCOUNTER — HOME CARE VISIT (OUTPATIENT)
Dept: HOME HEALTH SERVICES | Facility: HOME HEALTHCARE | Age: 68
End: 2023-07-17
Payer: MEDICARE

## 2023-07-17 VITALS
HEART RATE: 75 BPM | DIASTOLIC BLOOD PRESSURE: 78 MMHG | TEMPERATURE: 98.4 F | OXYGEN SATURATION: 97 % | SYSTOLIC BLOOD PRESSURE: 168 MMHG

## 2023-07-17 VITALS — TEMPERATURE: 98.4 F | HEART RATE: 75 BPM | OXYGEN SATURATION: 98 %

## 2023-07-17 PROCEDURE — G0152 HHCP-SERV OF OT,EA 15 MIN: HCPCS

## 2023-07-19 ENCOUNTER — HOME CARE VISIT (OUTPATIENT)
Dept: HOME HEALTH SERVICES | Facility: HOME HEALTHCARE | Age: 68
End: 2023-07-19
Payer: MEDICARE

## 2023-07-19 VITALS
SYSTOLIC BLOOD PRESSURE: 158 MMHG | RESPIRATION RATE: 29 BRPM | OXYGEN SATURATION: 96 % | DIASTOLIC BLOOD PRESSURE: 92 MMHG | HEART RATE: 68 BPM | TEMPERATURE: 96.9 F

## 2023-07-19 PROCEDURE — G0299 HHS/HOSPICE OF RN EA 15 MIN: HCPCS

## 2023-07-20 PROCEDURE — 10330064

## 2023-07-21 ENCOUNTER — HOME CARE VISIT (OUTPATIENT)
Dept: HOME HEALTH SERVICES | Facility: HOME HEALTHCARE | Age: 68
End: 2023-07-21
Payer: MEDICARE

## 2023-07-21 VITALS — OXYGEN SATURATION: 98 % | TEMPERATURE: 98.4 F | HEART RATE: 62 BPM

## 2023-07-21 PROCEDURE — G0152 HHCP-SERV OF OT,EA 15 MIN: HCPCS

## 2023-07-24 ENCOUNTER — HOME CARE VISIT (OUTPATIENT)
Dept: HOME HEALTH SERVICES | Facility: HOME HEALTHCARE | Age: 68
End: 2023-07-24
Payer: MEDICARE

## 2023-07-24 PROCEDURE — G0152 HHCP-SERV OF OT,EA 15 MIN: HCPCS

## 2023-07-25 VITALS — OXYGEN SATURATION: 96 % | TEMPERATURE: 98 F | HEART RATE: 73 BPM

## 2023-07-26 ENCOUNTER — HOME CARE VISIT (OUTPATIENT)
Dept: HOME HEALTH SERVICES | Facility: HOME HEALTHCARE | Age: 68
End: 2023-07-26
Payer: MEDICARE

## 2023-07-26 VITALS — HEART RATE: 68 BPM | TEMPERATURE: 98.9 F | OXYGEN SATURATION: 94 %

## 2023-07-26 PROCEDURE — G0152 HHCP-SERV OF OT,EA 15 MIN: HCPCS

## 2023-07-29 ENCOUNTER — HOME CARE VISIT (OUTPATIENT)
Dept: HOME HEALTH SERVICES | Facility: HOME HEALTHCARE | Age: 68
End: 2023-07-29
Payer: MEDICARE

## 2023-07-29 VITALS — OXYGEN SATURATION: 95 % | TEMPERATURE: 98.1 F | HEART RATE: 72 BPM

## 2023-07-29 PROCEDURE — G0152 HHCP-SERV OF OT,EA 15 MIN: HCPCS

## 2023-07-31 ENCOUNTER — HOME CARE VISIT (OUTPATIENT)
Dept: HOME HEALTH SERVICES | Facility: HOME HEALTHCARE | Age: 68
End: 2023-07-31
Payer: MEDICARE

## 2023-07-31 PROCEDURE — G0152 HHCP-SERV OF OT,EA 15 MIN: HCPCS

## 2023-08-01 VITALS — HEART RATE: 71 BPM | TEMPERATURE: 98.6 F | OXYGEN SATURATION: 97 %

## 2023-08-02 ENCOUNTER — HOME CARE VISIT (OUTPATIENT)
Dept: HOME HEALTH SERVICES | Facility: HOME HEALTHCARE | Age: 68
End: 2023-08-02
Payer: MEDICARE

## 2023-08-02 VITALS
TEMPERATURE: 86.1 F | OXYGEN SATURATION: 95 % | DIASTOLIC BLOOD PRESSURE: 92 MMHG | RESPIRATION RATE: 20 BRPM | HEART RATE: 72 BPM | SYSTOLIC BLOOD PRESSURE: 166 MMHG

## 2023-08-02 PROCEDURE — G0299 HHS/HOSPICE OF RN EA 15 MIN: HCPCS

## 2023-08-04 ENCOUNTER — HOME CARE VISIT (OUTPATIENT)
Dept: HOME HEALTH SERVICES | Facility: HOME HEALTHCARE | Age: 68
End: 2023-08-04
Payer: MEDICARE

## 2023-08-04 VITALS — HEART RATE: 67 BPM | OXYGEN SATURATION: 95 % | TEMPERATURE: 97.9 F

## 2023-08-04 PROCEDURE — G0152 HHCP-SERV OF OT,EA 15 MIN: HCPCS

## 2023-08-11 ENCOUNTER — RA CDI HCC (OUTPATIENT)
Dept: OTHER | Facility: HOSPITAL | Age: 68
End: 2023-08-11

## 2023-08-11 NOTE — PROGRESS NOTES
[address] [facility] coding opportunities          Chart Reviewed number of suggestions sent to Provider: 1  E11.22     Patients Insurance     Medicare Insurance: Medicare

## 2023-08-14 ENCOUNTER — HOME CARE VISIT (OUTPATIENT)
Dept: HOME HEALTH SERVICES | Facility: HOME HEALTHCARE | Age: 68
End: 2023-08-14
Payer: MEDICARE

## 2023-08-14 VITALS — OXYGEN SATURATION: 97 % | TEMPERATURE: 98.7 F | HEART RATE: 72 BPM

## 2023-08-14 PROCEDURE — G0152 HHCP-SERV OF OT,EA 15 MIN: HCPCS

## 2023-08-17 ENCOUNTER — HOME CARE VISIT (OUTPATIENT)
Dept: HOME HEALTH SERVICES | Facility: HOME HEALTHCARE | Age: 68
End: 2023-08-17
Payer: MEDICARE

## 2023-08-17 PROCEDURE — G0152 HHCP-SERV OF OT,EA 15 MIN: HCPCS

## 2023-08-18 ENCOUNTER — TELEPHONE (OUTPATIENT)
Dept: FAMILY MEDICINE CLINIC | Facility: CLINIC | Age: 68
End: 2023-08-18

## 2023-08-18 VITALS — HEART RATE: 75 BPM | TEMPERATURE: 97.5 F | OXYGEN SATURATION: 97 %

## 2023-08-18 DIAGNOSIS — L03.119 CELLULITIS OF LOWER EXTREMITY, UNSPECIFIED LATERALITY: Primary | ICD-10-CM

## 2023-08-18 RX ORDER — CEPHALEXIN 500 MG/1
500 CAPSULE ORAL EVERY 6 HOURS SCHEDULED
Qty: 28 CAPSULE | Refills: 0 | Status: SHIPPED | OUTPATIENT
Start: 2023-08-18 | End: 2023-08-25

## 2023-08-18 NOTE — TELEPHONE ENCOUNTER
Message from [facility] - homecare w concern that pt is developing cellulitis- new area of weeping, redness, some warmth on the upper leg. Dr Aurea Garvin off today. Will start abx (keflex) and he will see wound care next week.

## 2023-08-21 ENCOUNTER — HOME CARE VISIT (OUTPATIENT)
Dept: HOME HEALTH SERVICES | Facility: HOME HEALTHCARE | Age: 68
End: 2023-08-21
Payer: MEDICARE

## 2023-08-21 VITALS — HEART RATE: 72 BPM | TEMPERATURE: 97.8 F | OXYGEN SATURATION: 97 %

## 2023-08-21 DIAGNOSIS — K21.9 GASTROESOPHAGEAL REFLUX DISEASE WITHOUT ESOPHAGITIS: ICD-10-CM

## 2023-08-21 DIAGNOSIS — E03.9 ACQUIRED HYPOTHYROIDISM: ICD-10-CM

## 2023-08-21 DIAGNOSIS — E11.8 CONTROLLED TYPE 2 DIABETES MELLITUS WITH COMPLICATION, WITHOUT LONG-TERM CURRENT USE OF INSULIN (HCC): ICD-10-CM

## 2023-08-21 DIAGNOSIS — G62.9 NEUROPATHY: ICD-10-CM

## 2023-08-21 DIAGNOSIS — E78.2 MIXED HYPERLIPIDEMIA: ICD-10-CM

## 2023-08-21 DIAGNOSIS — I10 ESSENTIAL HYPERTENSION: ICD-10-CM

## 2023-08-21 PROCEDURE — G0152 HHCP-SERV OF OT,EA 15 MIN: HCPCS

## 2023-08-22 ENCOUNTER — HOME CARE VISIT (OUTPATIENT)
Dept: HOME HEALTH SERVICES | Facility: HOME HEALTHCARE | Age: 68
End: 2023-08-22
Payer: MEDICARE

## 2023-08-22 ENCOUNTER — OFFICE VISIT (OUTPATIENT)
Dept: WOUND CARE | Facility: HOSPITAL | Age: 68
End: 2023-08-22
Payer: MEDICARE

## 2023-08-22 VITALS
SYSTOLIC BLOOD PRESSURE: 162 MMHG | HEART RATE: 65 BPM | DIASTOLIC BLOOD PRESSURE: 86 MMHG | TEMPERATURE: 96.4 F | OXYGEN SATURATION: 97 %

## 2023-08-22 VITALS
TEMPERATURE: 96.4 F | RESPIRATION RATE: 24 BRPM | SYSTOLIC BLOOD PRESSURE: 162 MMHG | HEART RATE: 98 BPM | DIASTOLIC BLOOD PRESSURE: 86 MMHG

## 2023-08-22 DIAGNOSIS — I87.312 IDIOPATHIC CHRONIC VENOUS HYPERTENSION OF LEFT LOWER EXTREMITY WITH ULCER (HCC): Primary | ICD-10-CM

## 2023-08-22 DIAGNOSIS — L97.929 IDIOPATHIC CHRONIC VENOUS HYPERTENSION OF LEFT LOWER EXTREMITY WITH ULCER (HCC): Primary | ICD-10-CM

## 2023-08-22 PROCEDURE — G0152 HHCP-SERV OF OT,EA 15 MIN: HCPCS

## 2023-08-22 PROCEDURE — 99213 OFFICE O/P EST LOW 20 MIN: CPT | Performed by: PODIATRIST

## 2023-08-22 RX ORDER — GABAPENTIN 100 MG/1
100 CAPSULE ORAL 2 TIMES DAILY
Qty: 180 CAPSULE | Refills: 0 | Status: SHIPPED | OUTPATIENT
Start: 2023-08-22

## 2023-08-22 RX ORDER — SIMVASTATIN 10 MG
10 TABLET ORAL
Qty: 90 TABLET | Refills: 1 | Status: SHIPPED | OUTPATIENT
Start: 2023-08-22 | End: 2024-02-18

## 2023-08-22 RX ORDER — METOPROLOL TARTRATE 50 MG/1
50 TABLET, FILM COATED ORAL 2 TIMES DAILY
Qty: 180 TABLET | Refills: 1 | Status: SHIPPED | OUTPATIENT
Start: 2023-08-22

## 2023-08-22 RX ORDER — PANTOPRAZOLE SODIUM 40 MG/1
40 TABLET, DELAYED RELEASE ORAL DAILY
Qty: 90 TABLET | Refills: 1 | Status: SHIPPED | OUTPATIENT
Start: 2023-08-22

## 2023-08-22 RX ORDER — METFORMIN HYDROCHLORIDE 750 MG/1
750 TABLET, EXTENDED RELEASE ORAL
Qty: 90 TABLET | Refills: 1 | Status: SHIPPED | OUTPATIENT
Start: 2023-08-22

## 2023-08-22 RX ORDER — LEVOTHYROXINE SODIUM 0.03 MG/1
25 TABLET ORAL DAILY
Qty: 90 TABLET | Refills: 1 | Status: SHIPPED | OUTPATIENT
Start: 2023-08-22

## 2023-08-22 RX ORDER — LISINOPRIL 20 MG/1
20 TABLET ORAL DAILY
Qty: 90 TABLET | Refills: 1 | Status: SHIPPED | OUTPATIENT
Start: 2023-08-22

## 2023-08-22 RX ORDER — LEVOTHYROXINE SODIUM 300 UG/1
300 TABLET ORAL DAILY
Qty: 90 TABLET | Refills: 1 | Status: SHIPPED | OUTPATIENT
Start: 2023-08-22

## 2023-08-22 NOTE — PROGRESS NOTES
Patient ID: Enedina Colorado is a 77 y.o. male Date of Birth 1955       Chief Complaint   Patient presents with   • Follow Up Wound Care Visit     LLE wounds. Allergies:  Patient has no known allergies. Diagnosis:  1. Idiopathic chronic venous hypertension of left lower extremity with ulcer ([address])  -     Wound home care; Future  -     Wound cleansing and dressings; Future  -     Wound compression and edema control; Future       Diagnosis ICD-10-CM Associated Orders   1. Idiopathic chronic venous hypertension of left lower extremity with ulcer ([address])  I87.312 Wound home care    L97.929 Wound cleansing and dressings     Wound compression and edema control           Assessment & Plan:  1. Chronic venous stasis ulceration anterior left lower extremity, no debridement was performed, wound was dressed with Opticell AG dry dressing, SurePress, for compression. He is to leave this dry clean and intact, visiting nurses and lymphedema therapist will continue to change his dressing twice a week. 2.  Superficial hematoma over left great toe is now dry, the cap was trimmed off revealing a well epithelialized base, no dressing is necessary at this point here. 3.  Patient is to complete all antibiotics as prescribed by PCP. 4. Today I reviewed frequent elevation, low-sodium diet, proper protein intake, proper glycemic control, strict pressure and friction reduction was reviewed with patient, he understands and agrees with the plan and will follow-up in 5 weeks. Subjective:   Patient presents today for care of of left lower extremity chronic venous stasis ulcerations complicated by lymphedema, he continues with visiting nurses and lymphedema therapist.  He was doing very well and almost about to be discharged and then he had some edema and erythema over his left foot where he developed a blister, PCP placed him on antibiotics, there was some weeping that has now significantly resolved.   He continues with his sequential compression pumps and lymphedema wraps. He does not complain any nausea, vomiting, fever, chills and states blood sugars are well controlled.         The following portions of the patient's history were reviewed and updated as appropriate:   Patient Active Problem List   Diagnosis   • Controlled type 2 diabetes mellitus with complication, without long-term current use of insulin ([address])   • Essential hypertension   • HLD (hyperlipidemia)   • Hypothyroidism   • Celiac disease   • Coronary artery disease   • History of duodenal ulcer   • Living will on file   • Obesity, Class III, BMI 40-49.9 (morbid obesity) ([facility])   • S/P BKA (below knee amputation) unilateral, right ([facility])   • Paroxysmal atrial fibrillation ([facility])   • Iron deficiency anemia due to chronic blood loss   • Chronic venous stasis dermatitis of left lower extremity   • Gastroesophageal reflux disease without esophagitis   • Diabetic ulcer of left foot associated with type 2 diabetes mellitus, limited to breakdown of skin ([facility])   • Idiopathic chronic venous hypertension of left lower extremity with ulcer ([facility])   • Non-pressure chronic ulcer of left calf, limited to breakdown of skin ([address])   • Diabetic polyneuropathy associated with type 2 diabetes mellitus ([address])   • Cellulitis of left lower extremity   • Abnormal urinalysis   • Ulcer of toe of left foot, limited to breakdown of skin ([address])   • Diabetic ulcer of left ankle associated with type 2 diabetes mellitus, limited to breakdown of skin ([address])   • Dermatophytosis     Past Medical History:   Diagnosis Date   • Atrial fibrillation ([address])    • Cellulitis    • Diabetes mellitus ([address])    • Disease of thyroid gland    • Duodenal ulcer     perforated- ICU stay   • High blood pressure    • High cholesterol    • Hyperlipidemia    • Hypertension    • Hypothyroidism    • Lymphedema      b/l LE- was followed at wound center   • Morbid obesity with BMI of 40.0-44.9, adult ([address])    • Peritonitis ([address])      Past Surgical History:   Procedure Laterality Date   • BELOW KNEE LEG AMPUTATION Right    • DIAGNOSTIC LAPAROSCOPY     • KNEE ARTHROSCOPY Bilateral    • OTHER SURGICAL HISTORY  2014     lap repair   • OTHER SURGICAL HISTORY      Laparoscopic repair of a perforated duodenal ulcer with Trygve Edge omental   • OTHER SURGICAL HISTORY      Placement of drains     Social History     Socioeconomic History   • Marital status: Single     Spouse name: None   • Number of children: 2   • Years of education: 12   • Highest education level: High school graduate   Occupational History   • None   Tobacco Use   • Smoking status: Former     Packs/day: 1.50     Years: 25.00     Total pack years: 37.50     Types: Cigarettes     Quit date: 2004     Years since quittin.8   • Smokeless tobacco: Never   Vaping Use   • Vaping Use: Never used   Substance and Sexual Activity   • Alcohol use: Not Currently     Alcohol/week: 1.0 standard drink of alcohol     Types: 1 Standard drinks or equivalent per week   • Drug use: Never   • Sexual activity: Not Currently   Other Topics Concern   • None   Social History Narrative    Former smoker: Quit 3/31/2012 - As per Care Everywhere      Social Determinants of Health     Financial Resource Strain: Low Risk  (2023)    Overall Financial Resource Strain (CARDIA)    • Difficulty of Paying Living Expenses: Not very hard   Food Insecurity: Not on file   Transportation Needs: No Transportation Needs (2023)    PRAPARE - Transportation    • Lack of Transportation (Medical): No    • Lack of Transportation (Non-Medical):  No   Physical Activity: Not on file   Stress: Not on file   Social Connections: Not on file   Intimate Partner Violence: Not on file   Housing Stability: Not on file        Current Outpatient Medications:   •  aspirin (ECOTRIN LOW STRENGTH) 81 mg EC tablet, Take 81 mg by mouth Daily, Disp: , Rfl:   •  cephalexin (KEFLEX) 500 mg capsule, Take 1 capsule (500 mg total) by mouth every 6 (six) hours for 7 days, Disp: 28 capsule, Rfl: 0  •  clotrimazole-betamethasone (LOTRISONE) 1-0.05 % cream, Apply 1 application. topically 3 (three) times a week As needed for rash, Disp: 45 g, Rfl: 1  •  gabapentin (NEURONTIN) 100 mg capsule, Take 1 capsule (100 mg total) by mouth 2 (two) times a day, Disp: 180 capsule, Rfl: 0  •  ketoconazole (NIZORAL) 2 % cream, Apply topically daily, Disp: 60 g, Rfl: 1  •  levothyroxine 25 mcg tablet, Take 1 tablet (25 mcg total) by mouth daily In addition to the 300 mcg dose, Disp: 90 tablet, Rfl: 1  •  levothyroxine 300 MCG tablet, Take 1 tablet (300 mcg total) by mouth daily In addition to 25 mcg dose, Disp: 90 tablet, Rfl: 1  •  lisinopril (ZESTRIL) 20 mg tablet, Take 1 tablet (20 mg total) by mouth daily, Disp: 90 tablet, Rfl: 1  •  metFORMIN (GLUCOPHAGE-XR) 750 mg 24 hr tablet, Take 1 tablet (750 mg total) by mouth daily with breakfast, Disp: 90 tablet, Rfl: 1  •  metoprolol tartrate (LOPRESSOR) 50 mg tablet, Take 1 tablet (50 mg total) by mouth 2 (two) times a day, Disp: 180 tablet, Rfl: 1  •  Multiple Vitamins-Minerals (MULTIVITAMIN MEN 50+ PO), Take 1 tablet by mouth in the morning, Disp: , Rfl:   •  pantoprazole (PROTONIX) 40 mg tablet, Take 1 tablet (40 mg total) by mouth daily, Disp: 90 tablet, Rfl: 1  •  simvastatin (ZOCOR) 10 mg tablet, Take 1 tablet (10 mg total) by mouth daily at bedtime, Disp: 90 tablet, Rfl: 1  •  Zoster Vac Recomb Adjuvanted (Shingrix) 50 MCG/0.5ML SUSR, 0.5mL IM for one dose, followed by 0.5mL IM 2-6 months after first dose, Disp: 1 each, Rfl: 1  Family History   Problem Relation Age of Onset   • Heart disease Family    • Alcohol abuse Family    • Heart disease Mother    • Hypertension Mother    • Breast cancer Mother    • Migraines Daughter    • Leukemia Brother    • Migraines Daughter    • Substance Abuse Neg Hx    • Mental illness Neg Hx    • Depression Neg Hx        Review of Systems   Constitutional: Negative for chills and fever. HENT: Negative for ear pain and sore throat. Eyes: Negative for pain and visual disturbance. Respiratory: Negative for cough and shortness of breath. Cardiovascular: Negative for chest pain and palpitations. Gastrointestinal: Negative for abdominal pain and vomiting. Genitourinary: Negative for dysuria and hematuria. Musculoskeletal: Positive for gait problem. Negative for arthralgias and back pain. Skin: Positive for color change and wound. Negative for rash. Neurological: Positive for numbness. Negative for seizures and syncope. Psychiatric/Behavioral: Negative. All other systems reviewed and are negative. Objective:  /86   Pulse 98   Temp (!) 96.4 °F (35.8 °C)   Resp (!) 24     Physical Exam  Constitutional:       Appearance: Normal appearance. He is obese. HENT:      Head: Normocephalic and atraumatic. Right Ear: External ear normal.      Left Ear: External ear normal.      Nose: Nose normal.      Mouth/Throat:      Mouth: Mucous membranes are moist.      Pharynx: Oropharynx is clear. Eyes:      Conjunctiva/sclera: Conjunctivae normal.   Cardiovascular:      Pulses:           Dorsalis pedis pulses are detected w/ Doppler on the left side. Posterior tibial pulses are detected w/ Doppler on the left side. Pulmonary:      Effort: Pulmonary effort is normal.   Musculoskeletal:      Cervical back: Normal range of motion. Left lower le+ Edema present. Comments: BKA right   Skin:     General: Skin is warm and dry. Capillary Refill: Capillary refill takes less than 2 seconds. Comments: See detailed wound assessment   Neurological:      General: No focal deficit present. Mental Status: He is alert. Mental status is at baseline. Sensory: Sensory deficit present.       Coordination: Coordination abnormal.      Gait: Gait abnormal.      Deep Tendon Reflexes: Reflexes abnormal.   Psychiatric:         Mood and Affect: Mood normal. Behavior: Behavior normal.         Thought Content: Thought content normal.         Judgment: Judgment normal.           Wound 04/25/23 Venous Ulcer Leg Left; Anterior (Active)   Wound Image   08/22/23 1015   Wound Description Granulation tissue; Epithelialization;Yellow;Slough 08/22/23 1014   Jovita-wound Assessment Pink 08/22/23 1014   Wound Length (cm) 0.3 cm 08/22/23 1014   Wound Width (cm) 0.2 cm 08/22/23 1014   Wound Depth (cm) 0.1 cm 08/22/23 1014   Wound Surface Area (cm^2) 0.06 cm^2 08/22/23 1014   Wound Volume (cm^3) 0.006 cm^3 08/22/23 1014   Calculated Wound Volume (cm^3) 0.01 cm^3 08/22/23 1014   Change in Wound Size % 98.82 08/22/23 1014   Drainage Amount Scant 08/22/23 1014   Drainage Description Serous 08/22/23 1014   Non-staged Wound Description Full thickness 08/22/23 1014   Treatments Irrigation with NSS;Cleansed 05/16/23 1040   Dressing Status Intact 08/22/23 1014                No results found. Wound Instructions:  Orders Placed This Encounter   Procedures   • Wound home care     [facility]     Standing Status:   Future     Standing Expiration Date:   8/22/2024   • Wound cleansing and dressings     Change dressing 2x per week and as needed for drainage or dislodgement  You may remove the dressing and shower. Do not leave wound open to air, apply new dressing immediately. Cleanse the wounds with normal saline or mild soap and water , pat dry. Wound location right anterior shin wound  Apply Opticell Ag to the wound. May use Adaptic to wound bed if Opticell AG is sticking to wound. Cover with gauze or ABD. Secure with roll gauze and tape. Left great toe blister debridement site:    Applied Bandaid to area to protect. May remove in 24 to 48 hours. Left dorsal foot:    Apply adaptic then Opticell Ag to the wound. May use Adaptic to wound bed if Opticell AG is sticking to wound. Cover with gauze. Secure with roll gauze and tape. This was applied today at the [facility].     Standing Status:   Future     Standing Expiration Date:   8/22/2024   • Wound compression and edema control     Setopress: To left leg.       Apply compression wrap to your affected Leg(s) from mid-foot to knee making sure to cover the heel. Apply in the morning and re-wrap as needed during the day if wrap becomes loose. Remove at bedtime and elevate legs or lie down. Please ensure that Setopress is at 25 percent stretch and 75 percent overlap. Avoid prolonged standing in one place. Elevate leg(s) above the level of the heart when sitting or as much as possible. Limit salt intake.     Use compression pumps for one hour 2x day.       Continue lymphedema management as per lymphedema specialist.     Standing Status:   Future     Standing Expiration Date:   8/22/2024         Yesy Mccloud, KIKO, DPM, FACFAS    Portions of the record may have been created with voice recognition software. Occasional wrong word or "sound a like" substitutions may have occurred due to the inherent limitations of voice recognition software. Read the chart carefully and recognize, using context, where substitutions have occurred.

## 2023-08-22 NOTE — CASE COMMUNICATION
Ship to [facility]   Insurance [facility]     Wound 1 BLE        All items are ordered by the each unless otherwise noted.   PLEASE DO NOT ORDER BY THE BOX  Request specific quantity needed  For Private Insurances order should be for a 2 week period  For [facility] patients order should be for a 1 week period    Setopress       4 rolls

## 2023-08-22 NOTE — TELEPHONE ENCOUNTER
Refill sent, but patient cancelled his last appointment.   Please reschedule a 40 min appointment for chronic follow up (I believe pt needs transportation arranged)

## 2023-08-24 ENCOUNTER — HOME CARE VISIT (OUTPATIENT)
Dept: HOME HEALTH SERVICES | Facility: HOME HEALTHCARE | Age: 68
End: 2023-08-24
Payer: MEDICARE

## 2023-08-24 VITALS — OXYGEN SATURATION: 97 % | TEMPERATURE: 98.3 F | HEART RATE: 88 BPM

## 2023-08-24 PROCEDURE — 400014 VN F/U

## 2023-08-24 PROCEDURE — G0152 HHCP-SERV OF OT,EA 15 MIN: HCPCS

## 2023-08-28 ENCOUNTER — HOME CARE VISIT (OUTPATIENT)
Dept: HOME HEALTH SERVICES | Facility: HOME HEALTHCARE | Age: 68
End: 2023-08-28
Payer: MEDICARE

## 2023-08-28 PROCEDURE — G0152 HHCP-SERV OF OT,EA 15 MIN: HCPCS

## 2023-08-29 VITALS — TEMPERATURE: 98.3 F | HEART RATE: 67 BPM | OXYGEN SATURATION: 97 %

## 2023-08-31 ENCOUNTER — HOME CARE VISIT (OUTPATIENT)
Dept: HOME HEALTH SERVICES | Facility: HOME HEALTHCARE | Age: 68
End: 2023-08-31
Payer: MEDICARE

## 2023-08-31 VITALS — HEART RATE: 72 BPM | OXYGEN SATURATION: 97 % | TEMPERATURE: 98.7 F

## 2023-08-31 PROCEDURE — G0152 HHCP-SERV OF OT,EA 15 MIN: HCPCS

## 2023-09-04 ENCOUNTER — HOME CARE VISIT (OUTPATIENT)
Dept: HOME HEALTH SERVICES | Facility: HOME HEALTHCARE | Age: 68
End: 2023-09-04
Payer: MEDICARE

## 2023-09-04 PROCEDURE — G0152 HHCP-SERV OF OT,EA 15 MIN: HCPCS

## 2023-09-05 VITALS — TEMPERATURE: 98.5 F | OXYGEN SATURATION: 95 % | HEART RATE: 73 BPM

## 2023-09-08 ENCOUNTER — HOME CARE VISIT (OUTPATIENT)
Dept: HOME HEALTH SERVICES | Facility: HOME HEALTHCARE | Age: 68
End: 2023-09-08
Payer: MEDICARE

## 2023-09-08 PROCEDURE — G0152 HHCP-SERV OF OT,EA 15 MIN: HCPCS

## 2023-09-15 ENCOUNTER — HOME CARE VISIT (OUTPATIENT)
Dept: HOME HEALTH SERVICES | Facility: HOME HEALTHCARE | Age: 68
End: 2023-09-15
Payer: MEDICARE

## 2023-09-15 PROCEDURE — G0152 HHCP-SERV OF OT,EA 15 MIN: HCPCS

## 2023-09-18 ENCOUNTER — HOME CARE VISIT (OUTPATIENT)
Dept: HOME HEALTH SERVICES | Facility: HOME HEALTHCARE | Age: 68
End: 2023-09-18
Payer: MEDICARE

## 2023-09-21 ENCOUNTER — RA CDI HCC (OUTPATIENT)
Dept: OTHER | Facility: HOSPITAL | Age: 68
End: 2023-09-21

## 2023-09-22 ENCOUNTER — HOME CARE VISIT (OUTPATIENT)
Dept: HOME HEALTH SERVICES | Facility: HOME HEALTHCARE | Age: 68
End: 2023-09-22
Payer: MEDICARE

## 2023-09-25 ENCOUNTER — TELEPHONE (OUTPATIENT)
Age: 68
End: 2023-09-25

## 2023-09-25 ENCOUNTER — HOME CARE VISIT (OUTPATIENT)
Dept: HOME HEALTH SERVICES | Facility: HOME HEALTHCARE | Age: 68
End: 2023-09-25
Payer: MEDICARE

## 2023-09-25 VITALS — HEART RATE: 60 BPM | OXYGEN SATURATION: 95 % | TEMPERATURE: 98.4 F

## 2023-09-25 PROCEDURE — G0152 HHCP-SERV OF OT,EA 15 MIN: HCPCS

## 2023-09-26 ENCOUNTER — OFFICE VISIT (OUTPATIENT)
Dept: WOUND CARE | Facility: HOSPITAL | Age: 68
End: 2023-09-26
Payer: MEDICARE

## 2023-09-26 VITALS
RESPIRATION RATE: 14 BRPM | SYSTOLIC BLOOD PRESSURE: 180 MMHG | DIASTOLIC BLOOD PRESSURE: 95 MMHG | TEMPERATURE: 96 F | HEART RATE: 65 BPM

## 2023-09-26 DIAGNOSIS — R21 RASH: ICD-10-CM

## 2023-09-26 DIAGNOSIS — L97.929 IDIOPATHIC CHRONIC VENOUS HYPERTENSION OF LEFT LOWER EXTREMITY WITH ULCER (HCC): Primary | ICD-10-CM

## 2023-09-26 DIAGNOSIS — I87.312 IDIOPATHIC CHRONIC VENOUS HYPERTENSION OF LEFT LOWER EXTREMITY WITH ULCER (HCC): Primary | ICD-10-CM

## 2023-09-26 DIAGNOSIS — I89.0 LYMPHEDEMA: ICD-10-CM

## 2023-09-26 PROCEDURE — 99213 OFFICE O/P EST LOW 20 MIN: CPT | Performed by: PODIATRIST

## 2023-09-26 NOTE — PATIENT INSTRUCTIONS
Orders Placed This Encounter   Procedures    Wound compression and edema control     Setopress: To left leg. Apply compression wrap to your affected Leg(s) from mid-foot to knee making sure to cover the heel. Apply in the morning and re-wrap as needed during the day if wrap becomes loose. Remove at bedtime and elevate legs or lie down. Please ensure that Setopress is at 25 percent stretch and 75 percent overlap. Avoid prolonged standing in one place. Elevate leg(s) above the level of the heart when sitting or as much as possible. Limit salt intake. Use compression pumps for one hour 2x day. Continue lymphedema management as per lymphedema specialist.     Standing Status:   Future     Standing Expiration Date:   9/26/2024    Wound home care     [facility]     Standing Status:   Future     Standing Expiration Date:   9/26/2024    Wound miscellaneous orders     Get a new insert for your left shoe and keep it in the shoe always. Standing Status:   Future     Standing Expiration Date:   9/26/2024    Wound cleansing and dressings     Change dressing 2x per week and as needed for drainage or dislodgement  You may remove the dressing and shower. Do not leave wound open to air, apply new dressing immediately. Cleanse the wounds with normal saline or mild soap and water , pat dry. Wound location: Left lower leg  Apply Opticell Ag to the wound. May use Adaptic to wound bed if Opticell AG is sticking to wound. Cover with gauze     Secure with roll gauze and tape. Wound location: Left dorsal foot    Apply adaptic then Opticell Ag to the wound. May use Adaptic to wound bed if Opticell AG is sticking to wound. Cover with gauze. Secure with roll gauze and tape. Apply Allevyn border to plantar great toe for protection     This was applied today at the [facility]. To. [facility]: Please check Plantar Left great toe.  If any issue, please text to Dr. Earle Smith     Standing Status:   Future Standing Expiration Date:   9/26/2024

## 2023-09-26 NOTE — PROGRESS NOTES
Patient ID: Sweetie Reveles is a 77 y.o. male Date of Birth 1955       Chief Complaint   Patient presents with   • Follow Up Wound Care Visit     LLE wound       Allergies:  Patient has no known allergies. Diagnosis:  1. Idiopathic chronic venous hypertension of left lower extremity with ulcer ([address])  -     Wound compression and edema control; Future  -     Wound home care; Future  -     Wound miscellaneous orders; Future  -     Wound cleansing and dressings; Future    2. Lymphedema  -     Wound compression and edema control; Future  -     Wound home care; Future  -     Wound miscellaneous orders; Future  -     Wound cleansing and dressings; Future       Diagnosis ICD-10-CM Associated Orders   1. Idiopathic chronic venous hypertension of left lower extremity with ulcer (HCC)  I87.312 Wound compression and edema control    L97.929 Wound home care     Wound miscellaneous orders     Wound cleansing and dressings      2. Lymphedema  I89.0 Wound compression and edema control     Wound home care     Wound miscellaneous orders     Wound cleansing and dressings           Assessment & Plan:  1. Chronic venous stasis ulcerations anterior left lower extremity, no debridement was performed, wound was dressed with Opticell AG dry dressing, SurePress, for compression. He is to leave this dry clean and intact, visiting nurses and lymphedema therapist will continue to change his dressing twice a week. 2.    Continue use of sequential compression pump left lower extremity twice a day for 1 hour. 3.Today I reviewed frequent elevation, low-sodium diet, proper protein intake, proper glycemic control, strict pressure and friction reduction was reviewed with patient, he understands and agrees with the plan and will follow-up in 4 weeks.           Subjective:   Renée Hurt presents today for care of of left lower extremity venous stasis ulcerations complicated by diabetes, states his blood sugars are well controlled, he has been discharged from visiting nurses, lymphedema therapist comes in 1-2 times a week to change his dressings. He continues to use his sequential compression pumps and has no new complaints.         The following portions of the patient's history were reviewed and updated as appropriate:   Patient Active Problem List   Diagnosis   • Controlled type 2 diabetes mellitus with complication, without long-term current use of insulin ([address])   • Essential hypertension   • HLD (hyperlipidemia)   • Hypothyroidism   • Celiac disease   • Coronary artery disease   • History of duodenal ulcer   • Living will on file   • Obesity, Class III, BMI 40-49.9 (morbid obesity) ([facility])   • S/P BKA (below knee amputation) unilateral, right ([facility])   • Paroxysmal atrial fibrillation ([facility])   • Iron deficiency anemia due to chronic blood loss   • Chronic venous stasis dermatitis of left lower extremity   • Gastroesophageal reflux disease without esophagitis   • Diabetic ulcer of left foot associated with type 2 diabetes mellitus, limited to breakdown of skin ([facility])   • Idiopathic chronic venous hypertension of left lower extremity with ulcer ([facility])   • Non-pressure chronic ulcer of left calf, limited to breakdown of skin ([address])   • Diabetic polyneuropathy associated with type 2 diabetes mellitus ([address])   • Cellulitis of left lower extremity   • Abnormal urinalysis   • Ulcer of toe of left foot, limited to breakdown of skin ([address])   • Diabetic ulcer of left ankle associated with type 2 diabetes mellitus, limited to breakdown of skin ([address])   • Dermatophytosis     Past Medical History:   Diagnosis Date   • Atrial fibrillation ([address])    • Cellulitis    • Diabetes mellitus ([address])    • Disease of thyroid gland    • Duodenal ulcer     perforated- ICU stay   • High blood pressure    • High cholesterol    • Hyperlipidemia    • Hypertension    • Hypothyroidism    • Lymphedema      b/l LE- was followed at wound center   • Morbid obesity with BMI of 40.0-44.9, adult ([address]) • Peritonitis [facility])      Past Surgical History:   Procedure Laterality Date   • BELOW KNEE LEG AMPUTATION Right    • DIAGNOSTIC LAPAROSCOPY     • KNEE ARTHROSCOPY Bilateral    • OTHER SURGICAL HISTORY  2014     lap repair   • OTHER SURGICAL HISTORY      Laparoscopic repair of a perforated duodenal ulcer with Weyman Zaida omental   • OTHER SURGICAL HISTORY      Placement of drains     Social History     Socioeconomic History   • Marital status: Single     Spouse name: Not on file   • Number of children: 2   • Years of education: 15   • Highest education level: High school graduate   Occupational History   • Not on file   Tobacco Use   • Smoking status: Former     Packs/day: 1.50     Years: 25.00     Total pack years: 37.50     Types: Cigarettes     Quit date: 2004     Years since quittin.8   • Smokeless tobacco: Never   Vaping Use   • Vaping Use: Never used   Substance and Sexual Activity   • Alcohol use: Not Currently     Alcohol/week: 1.0 standard drink of alcohol     Types: 1 Standard drinks or equivalent per week   • Drug use: Never   • Sexual activity: Not Currently   Other Topics Concern   • Not on file   Social History Narrative    Former smoker: Quit 3/31/2012 - As per Care Everywhere      Social Determinants of Health     Financial Resource Strain: Low Risk  (2023)    Overall Financial Resource Strain (CARDIA)    • Difficulty of Paying Living Expenses: Not very hard   Food Insecurity: Not on file   Transportation Needs: No Transportation Needs (2023)    PRAPARE - Transportation    • Lack of Transportation (Medical): No    • Lack of Transportation (Non-Medical):  No   Physical Activity: Not on file   Stress: Not on file   Social Connections: Not on file   Intimate Partner Violence: Not on file   Housing Stability: Not on file        Current Outpatient Medications:   •  aspirin (ECOTRIN LOW STRENGTH) 81 mg EC tablet, Take 81 mg by mouth Daily, Disp: , Rfl:   •  Cholecalciferol (Vitamin D3) 50 MCG (2000 UT) TABS, Take 2,000 Units by mouth in the morning. Indications: Vitamin D Deficiency, Disp: , Rfl:   •  clotrimazole-betamethasone (LOTRISONE) 1-0.05 % cream, Apply 1 application.  topically 3 (three) times a week As needed for rash, Disp: 45 g, Rfl: 1  •  gabapentin (NEURONTIN) 100 mg capsule, Take 1 capsule (100 mg total) by mouth 2 (two) times a day, Disp: 180 capsule, Rfl: 0  •  ketoconazole (NIZORAL) 2 % cream, Apply topically daily, Disp: 60 g, Rfl: 1  •  levothyroxine 25 mcg tablet, Take 1 tablet (25 mcg total) by mouth daily In addition to the 300 mcg dose, Disp: 90 tablet, Rfl: 1  •  levothyroxine 300 MCG tablet, Take 1 tablet (300 mcg total) by mouth daily In addition to 25 mcg dose, Disp: 90 tablet, Rfl: 1  •  lisinopril (ZESTRIL) 20 mg tablet, Take 1 tablet (20 mg total) by mouth daily, Disp: 90 tablet, Rfl: 1  •  metFORMIN (GLUCOPHAGE-XR) 750 mg 24 hr tablet, Take 1 tablet (750 mg total) by mouth daily with breakfast, Disp: 90 tablet, Rfl: 1  •  metoprolol tartrate (LOPRESSOR) 50 mg tablet, Take 1 tablet (50 mg total) by mouth 2 (two) times a day, Disp: 180 tablet, Rfl: 1  •  Multiple Vitamins-Minerals (MULTIVITAMIN MEN 50+ PO), Take 1 tablet by mouth in the morning, Disp: , Rfl:   •  pantoprazole (PROTONIX) 40 mg tablet, Take 1 tablet (40 mg total) by mouth daily, Disp: 90 tablet, Rfl: 1  •  simvastatin (ZOCOR) 10 mg tablet, Take 1 tablet (10 mg total) by mouth daily at bedtime, Disp: 90 tablet, Rfl: 1  •  Zoster Vac Recomb Adjuvanted (Shingrix) 50 MCG/0.5ML SUSR, 0.5mL IM for one dose, followed by 0.5mL IM 2-6 months after first dose, Disp: 1 each, Rfl: 1  Family History   Problem Relation Age of Onset   • Heart disease Family    • Alcohol abuse Family    • Heart disease Mother    • Hypertension Mother    • Breast cancer Mother    • Migraines Daughter    • Leukemia Brother    • Migraines Daughter    • Substance Abuse Neg Hx    • Mental illness Neg Hx    • Depression Neg Hx        Review of Systems   Constitutional: Negative for chills and fever. HENT: Negative for ear pain and sore throat. Eyes: Negative for pain and visual disturbance. Respiratory: Negative for cough and shortness of breath. Cardiovascular: Negative for chest pain and palpitations. Gastrointestinal: Negative for abdominal pain and vomiting. Genitourinary: Negative for dysuria and hematuria. Musculoskeletal: Positive for gait problem. Negative for arthralgias and back pain. Skin: Positive for color change and wound. Negative for rash. Neurological: Positive for numbness. Negative for seizures and syncope. Psychiatric/Behavioral: Negative. All other systems reviewed and are negative. Objective:  BP (!) 180/95   Pulse 65   Temp (!) 96 °F (35.6 °C)   Resp 14     Physical Exam  Constitutional:       Appearance: Normal appearance. He is obese. HENT:      Head: Normocephalic and atraumatic. Right Ear: External ear normal.      Left Ear: External ear normal.      Nose: Nose normal.      Mouth/Throat:      Mouth: Mucous membranes are moist.      Pharynx: Oropharynx is clear. Eyes:      Conjunctiva/sclera: Conjunctivae normal.   Cardiovascular:      Pulses:           Dorsalis pedis pulses are 1+ on the left side. Posterior tibial pulses are 1+ on the left side. Pulmonary:      Effort: Pulmonary effort is normal.   Musculoskeletal:      Cervical back: Normal range of motion. Left lower le+ Edema present. Comments: BKA right   Skin:     General: Skin is warm and dry. Capillary Refill: Capillary refill takes less than 2 seconds. Neurological:      General: No focal deficit present. Mental Status: He is alert and oriented to person, place, and time. Mental status is at baseline. Sensory: Sensory deficit present.       Coordination: Coordination abnormal.      Gait: Gait abnormal.      Deep Tendon Reflexes: Reflexes abnormal.   Psychiatric:         Mood and Affect: Mood normal.         Behavior: Behavior normal.         Thought Content: Thought content normal.         Judgment: Judgment normal.           Wound 04/25/23 Venous Ulcer Leg Left; Anterior (Active)   Wound Image   09/26/23 0902   Wound Description Pink 09/26/23 0905   Jovita-wound Assessment Intact 09/26/23 0905   Wound Length (cm) 7 cm 09/26/23 0905   Wound Width (cm) 2.5 cm 09/26/23 0905   Wound Depth (cm) 0.1 cm 09/26/23 0905   Wound Surface Area (cm^2) 17.5 cm^2 09/26/23 0905   Wound Volume (cm^3) 1.75 cm^3 09/26/23 0905   Calculated Wound Volume (cm^3) 1.75 cm^3 09/26/23 0905   Change in Wound Size % -105.88 09/26/23 0905   Drainage Amount Scant 09/26/23 0905   Drainage Description Serosanguineous 09/26/23 0905   Non-staged Wound Description Full thickness 09/26/23 0905   Treatments Irrigation with NSS;Cleansed 05/16/23 1040   Patient Tolerance Tolerated well 09/26/23 0905   Dressing Status Intact 09/26/23 0905       Wound 09/26/23 Foot Left;Dorsal (Active)   Wound Image   09/26/23 0903   Wound Description Pink;Slough; Epithelialization 09/26/23 0902   Jovita-wound Assessment Dry 09/26/23 0902   Wound Length (cm) 0.5 cm 09/26/23 0902   Wound Width (cm) 0.7 cm 09/26/23 0902   Wound Depth (cm) 0.1 cm 09/26/23 0902   Wound Surface Area (cm^2) 0.35 cm^2 09/26/23 0902   Wound Volume (cm^3) 0.035 cm^3 09/26/23 0902   Calculated Wound Volume (cm^3) 0.04 cm^3 09/26/23 0902   Drainage Amount Scant 09/26/23 0902   Drainage Description Serous 09/26/23 0902   Non-staged Wound Description Full thickness 09/26/23 0902   Patient Tolerance Tolerated well 09/26/23 0902   Dressing Status Intact 09/26/23 0902                No results found. Wound Instructions:  Orders Placed This Encounter   Procedures   • Wound compression and edema control     Setopress: To left leg.       Apply compression wrap to your affected Leg(s) from mid-foot to knee making sure to cover the heel.  Apply in the morning and re-wrap as needed during the day if wrap becomes loose. Remove at bedtime and elevate legs or lie down. Please ensure that Setopress is at 25 percent stretch and 75 percent overlap. Avoid prolonged standing in one place. Elevate leg(s) above the level of the heart when sitting or as much as possible.      Limit salt intake.     Use compression pumps for one hour 2x day.        Continue lymphedema management as per lymphedema specialist.     Standing Status:   Future     Standing Expiration Date:   9/26/2024   • Wound home care     SL [facility]     Standing Status:   Future     Standing Expiration Date:   9/26/2024   • Wound miscellaneous orders     Get a new insert for your left shoe and keep it in the shoe always. Standing Status:   Future     Standing Expiration Date:   9/26/2024   • Wound cleansing and dressings     Change dressing 2x per week and as needed for drainage or dislodgement  You may remove the dressing and shower. Do not leave wound open to air, apply new dressing immediately. Cleanse the wounds with normal saline or mild soap and water , pat dry.      Wound location: Left lower leg  Apply Opticell Ag to the wound. May use Adaptic to wound bed if Opticell AG is sticking to wound. Cover with gauze     Secure with roll gauze and tape. Wound location: Left dorsal foot    Apply adaptic then Opticell Ag to the wound. May use Adaptic to wound bed if Opticell AG is sticking to wound. Cover with gauze. Secure with roll gauze and tape. Apply Allevyn border to plantar great toe for protection     This was applied [facility] at the [facility]. To. [facility]: Please check Plantar Left great toe. If any issue, please text to Dr. Dave Urena     Standing Status:   Future     Standing Expiration Date:   9/26/2024         Deonte Garcia, KIKO, DPM, FACFAS    Portions of the record may have been created with voice recognition software.  Occasional wrong word or "sound a like" substitutions may have occurred due to the inherent limitations of voice recognition software. Read the chart carefully and recognize, using context, where substitutions have occurred.

## 2023-09-27 RX ORDER — CLOTRIMAZOLE AND BETAMETHASONE DIPROPIONATE 10; .64 MG/G; MG/G
1 CREAM TOPICAL 3 TIMES WEEKLY
Qty: 45 G | Refills: 0 | Status: SHIPPED | OUTPATIENT
Start: 2023-09-27

## 2023-09-28 ENCOUNTER — OFFICE VISIT (OUTPATIENT)
Dept: FAMILY MEDICINE CLINIC | Facility: CLINIC | Age: 68
End: 2023-09-28
Payer: MEDICARE

## 2023-09-28 ENCOUNTER — HOME CARE VISIT (OUTPATIENT)
Dept: HOME HEALTH SERVICES | Facility: HOME HEALTHCARE | Age: 68
End: 2023-09-28
Payer: MEDICARE

## 2023-09-28 VITALS
DIASTOLIC BLOOD PRESSURE: 96 MMHG | HEIGHT: 78 IN | BODY MASS INDEX: 36.45 KG/M2 | SYSTOLIC BLOOD PRESSURE: 154 MMHG | OXYGEN SATURATION: 98 % | RESPIRATION RATE: 22 BRPM | WEIGHT: 315 LBS | HEART RATE: 106 BPM

## 2023-09-28 DIAGNOSIS — E03.9 ACQUIRED HYPOTHYROIDISM: ICD-10-CM

## 2023-09-28 DIAGNOSIS — I10 ESSENTIAL HYPERTENSION: ICD-10-CM

## 2023-09-28 DIAGNOSIS — E11.8 CONTROLLED TYPE 2 DIABETES MELLITUS WITH COMPLICATION, WITHOUT LONG-TERM CURRENT USE OF INSULIN (HCC): ICD-10-CM

## 2023-09-28 DIAGNOSIS — Z23 ENCOUNTER FOR IMMUNIZATION: Primary | ICD-10-CM

## 2023-09-28 DIAGNOSIS — E66.01 OBESITY, CLASS III, BMI 40-49.9 (MORBID OBESITY) (HCC): ICD-10-CM

## 2023-09-28 PROCEDURE — 90677 PCV20 VACCINE IM: CPT

## 2023-09-28 PROCEDURE — G0152 HHCP-SERV OF OT,EA 15 MIN: HCPCS

## 2023-09-28 PROCEDURE — 90662 IIV NO PRSV INCREASED AG IM: CPT

## 2023-09-28 PROCEDURE — G0009 ADMIN PNEUMOCOCCAL VACCINE: HCPCS

## 2023-09-28 PROCEDURE — 99214 OFFICE O/P EST MOD 30 MIN: CPT | Performed by: FAMILY MEDICINE

## 2023-09-28 PROCEDURE — G0008 ADMIN INFLUENZA VIRUS VAC: HCPCS

## 2023-09-28 RX ORDER — LISINOPRIL 20 MG/1
20 TABLET ORAL DAILY
Qty: 90 TABLET | Refills: 1 | Status: SHIPPED | OUTPATIENT
Start: 2023-09-28 | End: 2023-09-29 | Stop reason: SDUPTHER

## 2023-09-28 NOTE — PATIENT INSTRUCTIONS
Increase lisinopril to 40 mg daily (you can take two of the 20 mg tablets, then switch to the new 40 mg tablet)  Get labs done fasting. You are due for your diabetic eye exam.    Jevon De Souza that I recommend you get up to your granddaughter's ice hockey games.

## 2023-09-28 NOTE — PROGRESS NOTES
Assessment/Plan:       Problem List Items Addressed This Visit        Endocrine    Controlled type 2 diabetes mellitus with complication, without long-term current use of insulin (HCC)     A1C demonstrates continued good BS control in May. Jaclyn Chambers He will continue on metformin xr 750 mg daily  Repeat labs ordered- ok to be done by [facility]  Continue working on lower carb diet. Lab Results   Component Value Date    HGBA1C 5.4 05/10/2023            Relevant Orders    Comprehensive metabolic panel    Hemoglobin A1C    Lipid Panel with Direct LDL reflex    CBC and differential    TSH, 3rd generation with Free T4 reflex    Albumin / creatinine urine ratio    Hypothyroidism     Continue levothyroxine. Check tsh         Relevant Orders    TSH, 3rd generation with Free T4 reflex       Cardiovascular and Mediastinum    Essential hypertension     BP elevated today and on recent readings  Increase lisinopril to 40 mg daily. He will call when he runs out of the 20 mg tablets and then I can send in the 40 mg dose. He'll double up on the 20 mg tablets at home. Continue metoprolol 50 mg twice a day  Monitor home BP readings. Relevant Medications    lisinopril (ZESTRIL) 40 mg tablet    Other Relevant Orders    Comprehensive metabolic panel    CBC and differential       Other    Obesity, Class III, BMI 40-49.9 (morbid obesity) ([address])     Encouraged continued work on healthy diet and activity as able. Other Visit Diagnoses     Encounter for immunization    -  Primary    Relevant Orders    influenza vaccine, high-dose, PF 0.7 mL (FLUZONE HIGH-DOSE) (Completed)    Pneumococcal Conjugate Vaccine 20-valent (Pcv20) (Completed)               Most recent labs reviewed       Subjective:     Tri Arguello is a 77 y.o. male here today with chief complaint below:  Chief Complaint   Patient presents with   • Chronic Conditions     Follow up. Pt has no concerns     - CC above per clinical staff and reviewed.     HPI:  Pt here for routine f/u.  Diabetes- taking metformin regularly  Tolerating well  Had last labs in May. Notes BP has been high on other visits as well. No CP or SOB    Moving bowels regularly    Mood is good. A few weeping wounds- getting [facility] lymphedema therapy for wound care and goes to wound center every few weeks. The following portions of the patient's history were reviewed and updated as appropriate: allergies, current medications, past family history, past medical history, past social history, past surgical history and problem list.    ROS:  Review of Systems   No fever, chills, congestion, chest pain, shortness of breath, nausea, vomiting, diarrhea, constipation, blood in stool, urinary concerns, mood changes. Rest of ROS neg except as above. Objective:      /96 (BP Location: Left arm, Patient Position: Sitting, Cuff Size: Large)   Pulse (!) 106   Resp 22   Ht 6' 7" (2.007 m)   Wt (!) 184 kg (405 lb 6.4 oz)   SpO2 98%   BMI 45.67 kg/m²   BP Readings from Last 3 Encounters:   09/28/23 154/96   09/26/23 (!) 180/95   08/22/23 162/86     Wt Readings from Last 3 Encounters:   09/28/23 (!) 184 kg (405 lb 6.4 oz)   05/08/23 (!) 184 kg (406 lb)   04/25/23 (!) 191 kg (420 lb)               Physical Exam:   Physical Exam  Vitals and nursing note reviewed. Constitutional:       General: He is not in acute distress. Appearance: Normal appearance. He is obese. He is not ill-appearing. HENT:      Head: Normocephalic and atraumatic. Neck:      Vascular: No carotid bruit. Cardiovascular:      Rate and Rhythm: Normal rate and regular rhythm. Pulmonary:      Effort: Pulmonary effort is normal.      Breath sounds: Normal breath sounds. Abdominal:      Comments: Seated- soft, nontender   Musculoskeletal:      Comments: R prosthesis in place, LLE wrapped. Lymphadenopathy:      Cervical: No cervical adenopathy. Neurological:      Mental Status: He is alert and oriented to person, place, and time. Psychiatric:         Mood and Affect: Mood normal.         Behavior: Behavior normal.

## 2023-09-29 ENCOUNTER — TELEPHONE (OUTPATIENT)
Age: 68
End: 2023-09-29

## 2023-09-29 ENCOUNTER — TELEPHONE (OUTPATIENT)
Dept: LAB | Facility: HOSPITAL | Age: 68
End: 2023-09-29

## 2023-09-29 VITALS — HEART RATE: 86 BPM | TEMPERATURE: 98.6 F | OXYGEN SATURATION: 96 %

## 2023-09-29 DIAGNOSIS — I10 ESSENTIAL HYPERTENSION: ICD-10-CM

## 2023-09-29 DIAGNOSIS — E11.8 CONTROLLED TYPE 2 DIABETES MELLITUS WITH COMPLICATION, WITHOUT LONG-TERM CURRENT USE OF INSULIN (HCC): Primary | ICD-10-CM

## 2023-09-29 DIAGNOSIS — E03.9 ACQUIRED HYPOTHYROIDISM: ICD-10-CM

## 2023-09-29 RX ORDER — LISINOPRIL 40 MG/1
40 TABLET ORAL DAILY
Start: 2023-09-29

## 2023-09-29 NOTE — ASSESSMENT & PLAN NOTE
A1C demonstrates continued good BS control [facility] will continue on metformin xr 750 mg daily  Repeat labs ordered- ok to be done by [facility]  Continue working on lower carb diet.     Lab Results   Component Value Date    HGBA1C 5.4 05/10/2023

## 2023-09-29 NOTE — ASSESSMENT & PLAN NOTE
BP elevated today and on recent readings  Increase lisinopril to 40 mg daily. He will call when he runs out of the 20 mg tablets and then I can send in the 40 mg dose. He'll double up on the 20 mg tablets at home. Continue metoprolol 50 mg twice a day  Monitor home BP readings.

## 2023-09-29 NOTE — TELEPHONE ENCOUNTER
Patient called blood work orders were placed but the  expected date is 3/28/24. Th lab cannot accept these orders the orders need to have a current expected date. Please advise and call patient when ready. ty

## 2023-09-30 NOTE — TELEPHONE ENCOUNTER
Sorry for the mistake- he can hold the orders for March and get these prior to his next appointment.    I put in new orders for now dated 9/30/23

## 2023-10-02 ENCOUNTER — TELEPHONE (OUTPATIENT)
Dept: LAB | Facility: HOSPITAL | Age: 68
End: 2023-10-02

## 2023-10-02 ENCOUNTER — HOME CARE VISIT (OUTPATIENT)
Dept: HOME HEALTH SERVICES | Facility: HOME HEALTHCARE | Age: 68
End: 2023-10-02
Payer: MEDICARE

## 2023-10-02 VITALS — OXYGEN SATURATION: 97 % | TEMPERATURE: 98.3 F | HEART RATE: 80 BPM

## 2023-10-02 PROCEDURE — G0152 HHCP-SERV OF OT,EA 15 MIN: HCPCS

## 2023-10-02 NOTE — TELEPHONE ENCOUNTER
Called patient, he's been advised that new lab work orders have been placed. Patient verbalizes understanding.

## 2023-10-05 ENCOUNTER — HOME CARE VISIT (OUTPATIENT)
Dept: HOME HEALTH SERVICES | Facility: HOME HEALTHCARE | Age: 68
End: 2023-10-05
Payer: MEDICARE

## 2023-10-05 PROCEDURE — G0152 HHCP-SERV OF OT,EA 15 MIN: HCPCS

## 2023-10-06 ENCOUNTER — LAB (OUTPATIENT)
Dept: LAB | Facility: HOSPITAL | Age: 68
End: 2023-10-06
Payer: MEDICARE

## 2023-10-06 VITALS — HEART RATE: 76 BPM | TEMPERATURE: 98.7 F | OXYGEN SATURATION: 96 %

## 2023-10-06 DIAGNOSIS — I10 ESSENTIAL HYPERTENSION: ICD-10-CM

## 2023-10-06 DIAGNOSIS — K21.9 GASTROESOPHAGEAL REFLUX DISEASE WITHOUT ESOPHAGITIS: ICD-10-CM

## 2023-10-06 DIAGNOSIS — E66.01 MORBID OBESITY WITH BMI OF 45.0-49.9, ADULT (HCC): ICD-10-CM

## 2023-10-06 DIAGNOSIS — E11.8 CONTROLLED TYPE 2 DIABETES MELLITUS WITH COMPLICATION, WITHOUT LONG-TERM CURRENT USE OF INSULIN (HCC): ICD-10-CM

## 2023-10-06 DIAGNOSIS — E78.2 MIXED HYPERLIPIDEMIA: ICD-10-CM

## 2023-10-06 DIAGNOSIS — I48.0 PAROXYSMAL ATRIAL FIBRILLATION (HCC): ICD-10-CM

## 2023-10-06 DIAGNOSIS — D50.0 IRON DEFICIENCY ANEMIA DUE TO CHRONIC BLOOD LOSS: ICD-10-CM

## 2023-10-06 DIAGNOSIS — E03.9 ACQUIRED HYPOTHYROIDISM: ICD-10-CM

## 2023-10-06 LAB
25(OH)D3 SERPL-MCNC: 39.5 NG/ML (ref 30–100)
ALBUMIN SERPL BCP-MCNC: 3.9 G/DL (ref 3.5–5)
ALP SERPL-CCNC: 84 U/L (ref 34–104)
ALT SERPL W P-5'-P-CCNC: 10 U/L (ref 7–52)
ANION GAP SERPL CALCULATED.3IONS-SCNC: 8 MMOL/L
AST SERPL W P-5'-P-CCNC: 13 U/L (ref 13–39)
BASOPHILS # BLD AUTO: 0.03 THOUSANDS/ÂΜL (ref 0–0.1)
BASOPHILS NFR BLD AUTO: 1 % (ref 0–1)
BILIRUB SERPL-MCNC: 0.71 MG/DL (ref 0.2–1)
BUN SERPL-MCNC: 12 MG/DL (ref 5–25)
CALCIUM SERPL-MCNC: 9 MG/DL (ref 8.4–10.2)
CHLORIDE SERPL-SCNC: 103 MMOL/L (ref 96–108)
CHOLEST SERPL-MCNC: 102 MG/DL
CO2 SERPL-SCNC: 29 MMOL/L (ref 21–32)
CREAT SERPL-MCNC: 0.8 MG/DL (ref 0.6–1.3)
CREAT UR-MCNC: 74.6 MG/DL
EOSINOPHIL # BLD AUTO: 0.09 THOUSAND/ÂΜL (ref 0–0.61)
EOSINOPHIL NFR BLD AUTO: 2 % (ref 0–6)
ERYTHROCYTE [DISTWIDTH] IN BLOOD BY AUTOMATED COUNT: 16.4 % (ref 11.6–15.1)
EST. AVERAGE GLUCOSE BLD GHB EST-MCNC: 120 MG/DL
GFR SERPL CREATININE-BSD FRML MDRD: 90 ML/MIN/1.73SQ M
GLUCOSE P FAST SERPL-MCNC: 102 MG/DL (ref 65–99)
HBA1C MFR BLD: 5.8 %
HCT VFR BLD AUTO: 39.2 % (ref 36.5–49.3)
HDLC SERPL-MCNC: 34 MG/DL
HGB BLD-MCNC: 11.8 G/DL (ref 12–17)
IMM GRANULOCYTES # BLD AUTO: 0.02 THOUSAND/UL (ref 0–0.2)
IMM GRANULOCYTES NFR BLD AUTO: 0 % (ref 0–2)
LDLC SERPL CALC-MCNC: 54 MG/DL (ref 0–100)
LDLC SERPL DIRECT ASSAY-MCNC: 58 MG/DL (ref 0–100)
LYMPHOCYTES # BLD AUTO: 0.87 THOUSANDS/ÂΜL (ref 0.6–4.47)
LYMPHOCYTES NFR BLD AUTO: 15 % (ref 14–44)
MAGNESIUM SERPL-MCNC: 1.9 MG/DL (ref 1.9–2.7)
MCH RBC QN AUTO: 25 PG (ref 26.8–34.3)
MCHC RBC AUTO-ENTMCNC: 30.1 G/DL (ref 31.4–37.4)
MCV RBC AUTO: 83 FL (ref 82–98)
MICROALBUMIN UR-MCNC: 36.1 MG/L
MICROALBUMIN/CREAT 24H UR: 48 MG/G CREATININE (ref 0–30)
MONOCYTES # BLD AUTO: 0.52 THOUSAND/ÂΜL (ref 0.17–1.22)
MONOCYTES NFR BLD AUTO: 9 % (ref 4–12)
NEUTROPHILS # BLD AUTO: 4.27 THOUSANDS/ÂΜL (ref 1.85–7.62)
NEUTS SEG NFR BLD AUTO: 73 % (ref 43–75)
NONHDLC SERPL-MCNC: 68 MG/DL
NRBC BLD AUTO-RTO: 0 /100 WBCS
PLATELET # BLD AUTO: 182 THOUSANDS/UL (ref 149–390)
PMV BLD AUTO: 12 FL (ref 8.9–12.7)
POTASSIUM SERPL-SCNC: 3.9 MMOL/L (ref 3.5–5.3)
PROT SERPL-MCNC: 7.4 G/DL (ref 6.4–8.4)
RBC # BLD AUTO: 4.72 MILLION/UL (ref 3.88–5.62)
SODIUM SERPL-SCNC: 140 MMOL/L (ref 135–147)
TRIGL SERPL-MCNC: 70 MG/DL
TSH SERPL DL<=0.05 MIU/L-ACNC: 3.47 UIU/ML (ref 0.45–4.5)
WBC # BLD AUTO: 5.8 THOUSAND/UL (ref 4.31–10.16)

## 2023-10-06 PROCEDURE — 36415 COLL VENOUS BLD VENIPUNCTURE: CPT

## 2023-10-06 PROCEDURE — 80053 COMPREHEN METABOLIC PANEL: CPT

## 2023-10-06 PROCEDURE — 80061 LIPID PANEL: CPT

## 2023-10-06 PROCEDURE — 84443 ASSAY THYROID STIM HORMONE: CPT

## 2023-10-06 PROCEDURE — 83721 ASSAY OF BLOOD LIPOPROTEIN: CPT

## 2023-10-06 PROCEDURE — 83036 HEMOGLOBIN GLYCOSYLATED A1C: CPT

## 2023-10-06 PROCEDURE — 85025 COMPLETE CBC W/AUTO DIFF WBC: CPT

## 2023-10-06 PROCEDURE — 82306 VITAMIN D 25 HYDROXY: CPT

## 2023-10-06 PROCEDURE — 83735 ASSAY OF MAGNESIUM: CPT

## 2023-10-06 NOTE — RESULT ENCOUNTER NOTE
Samir Gibson,     I forwarded your labs to PCP Dr. Yahaira Pederson for further chronic care management.       Take care,     Dr. Sudha Welsh

## 2023-10-06 NOTE — RESULT ENCOUNTER NOTE
Samir Gibson,     I forwarded your labs to PCP Dr. Jacqui Fajardo for further chronic care management.       Take care,     Dr. Louise Trevino    Message sent to patient via [employer] patient portal.

## 2023-10-06 NOTE — RESULT ENCOUNTER NOTE
Lashaun Cardoso,    I forwarded your labs to PCP Dr. Heriberto Amos for further chronic care management.       Take care,    Dr. Nicko Grey    Message sent to patient via [employer] patient portal.

## 2023-10-09 DIAGNOSIS — I10 ESSENTIAL HYPERTENSION: ICD-10-CM

## 2023-10-09 DIAGNOSIS — D64.9 ANEMIA, UNSPECIFIED TYPE: ICD-10-CM

## 2023-10-09 DIAGNOSIS — E11.8 CONTROLLED TYPE 2 DIABETES MELLITUS WITH COMPLICATION, WITHOUT LONG-TERM CURRENT USE OF INSULIN (HCC): Primary | ICD-10-CM

## 2023-10-09 DIAGNOSIS — E03.9 ACQUIRED HYPOTHYROIDISM: ICD-10-CM

## 2023-10-10 ENCOUNTER — HOME CARE VISIT (OUTPATIENT)
Dept: HOME HEALTH SERVICES | Facility: HOME HEALTHCARE | Age: 68
End: 2023-10-10
Payer: MEDICARE

## 2023-10-10 VITALS — TEMPERATURE: 98.5 F | HEART RATE: 63 BPM | OXYGEN SATURATION: 97 %

## 2023-10-10 PROCEDURE — G0152 HHCP-SERV OF OT,EA 15 MIN: HCPCS

## 2023-10-13 ENCOUNTER — HOME CARE VISIT (OUTPATIENT)
Dept: HOME HEALTH SERVICES | Facility: HOME HEALTHCARE | Age: 68
End: 2023-10-13
Payer: MEDICARE

## 2023-10-13 VITALS — TEMPERATURE: 98.2 F | OXYGEN SATURATION: 99 % | HEART RATE: 66 BPM

## 2023-10-13 PROCEDURE — G0152 HHCP-SERV OF OT,EA 15 MIN: HCPCS

## 2023-10-17 ENCOUNTER — HOME CARE VISIT (OUTPATIENT)
Dept: HOME HEALTH SERVICES | Facility: HOME HEALTHCARE | Age: 68
End: 2023-10-17
Payer: MEDICARE

## 2023-10-17 VITALS — TEMPERATURE: 98.6 F | OXYGEN SATURATION: 97 % | HEART RATE: 64 BPM

## 2023-10-17 PROCEDURE — G0152 HHCP-SERV OF OT,EA 15 MIN: HCPCS

## 2023-10-20 ENCOUNTER — HOME CARE VISIT (OUTPATIENT)
Dept: HOME HEALTH SERVICES | Facility: HOME HEALTHCARE | Age: 68
End: 2023-10-20
Payer: MEDICARE

## 2023-10-20 VITALS — TEMPERATURE: 97.3 F | HEART RATE: 71 BPM | OXYGEN SATURATION: 95 %

## 2023-10-20 PROCEDURE — G0152 HHCP-SERV OF OT,EA 15 MIN: HCPCS

## 2023-10-24 ENCOUNTER — OFFICE VISIT (OUTPATIENT)
Dept: WOUND CARE | Facility: HOSPITAL | Age: 68
End: 2023-10-24
Payer: MEDICARE

## 2023-10-24 VITALS
RESPIRATION RATE: 20 BRPM | DIASTOLIC BLOOD PRESSURE: 98 MMHG | TEMPERATURE: 97.7 F | HEART RATE: 86 BPM | SYSTOLIC BLOOD PRESSURE: 145 MMHG

## 2023-10-24 DIAGNOSIS — I89.0 LYMPHEDEMA: ICD-10-CM

## 2023-10-24 DIAGNOSIS — I87.312 IDIOPATHIC CHRONIC VENOUS HYPERTENSION OF LEFT LOWER EXTREMITY WITH ULCER (HCC): Primary | ICD-10-CM

## 2023-10-24 DIAGNOSIS — L97.929 IDIOPATHIC CHRONIC VENOUS HYPERTENSION OF LEFT LOWER EXTREMITY WITH ULCER (HCC): Primary | ICD-10-CM

## 2023-10-24 PROCEDURE — 99212 OFFICE O/P EST SF 10 MIN: CPT | Performed by: PODIATRIST

## 2023-10-24 NOTE — PATIENT INSTRUCTIONS
Orders Placed This Encounter   Procedures    Wound cleansing and dressings     Your left foot wound is now healed!!!.    Please continue to monitor area for wound reopening. Please call [facility] should wound reopen at [phone]. Cleanse area gently with mild soap and water daily, pat dry. Apply nonscented moisturizer to area daily to prevent skin cracking. Standing Status:   Future     Standing Expiration Date:   10/24/2024    Wound compression and edema control     Compression Stocking:  left leg. Remove compression stockings every night HS and re-apply first thing qAM.   Follow daily skin care as instructed. Avoid prolonged standing in one place. Elevate leg(s) above the level of the heart when sitting or as much as possible. Continue to use compression pumps at home daily for 45 mins to an hour.      Standing Status:   Future     Standing Expiration Date:   10/24/2024

## 2023-10-24 NOTE — PROGRESS NOTES
Patient ID: Gilles Redmond is a 77 y.o. male Date of Birth 1955       Chief Complaint   Patient presents with    Follow Up Wound Care Visit     LLE wounds. Allergies:  Patient has no known allergies. Diagnosis:  1. Idiopathic chronic venous hypertension of left lower extremity with ulcer (HCC)  -     Wound cleansing and dressings; Future  -     Wound compression and edema control; Future    2. Lymphedema  -     Wound cleansing and dressings; Future  -     Wound compression and edema control; Future       Diagnosis ICD-10-CM Associated Orders   1. Idiopathic chronic venous hypertension of left lower extremity with ulcer (HCC)  I87.312 Wound cleansing and dressings    L97.929 Wound compression and edema control      2. Lymphedema  I89.0 Wound cleansing and dressings     Wound compression and edema control           Assessment & Plan:  Stasis ulceration and tear left lower extremity, no debridement was performed, these wounds are well-healed, and may discontinue all dressings. Patient may get leg wet in shower, moisturize daily, continue compression stockings, continue sequential compression pumps twice a day to left lower extremity. I have encouraged patient to make an appoint with  per occupational therapy/lymphedema therapist patient has abnormal wear pattern on his right lower extremity from his prosthesis. Patient is advised to follow-up with podiatry, he needs to establish care for at risk diabetic foot care. At this time as his wound is well-healed he is discharged from [facility] wound management center, ice advised if he has any new or recurrent wounds to call immediately. Patient understands and agrees with the plan. Subjective:   Giovana Cohen presents today for care of left lower leg venous stasis ulcerations, he has been discharged by visiting nurses and lymphedema therapist as his wounds are well-healed, he states they continue to be well-healed.   He has no new complaints.           The following portions of the patient's history were reviewed and updated as appropriate:   Patient Active Problem List   Diagnosis    Controlled type 2 diabetes mellitus with complication, without long-term current use of insulin ([address])    Essential hypertension    HLD (hyperlipidemia)    Hypothyroidism    Celiac disease    Coronary artery disease    History of duodenal ulcer    Living will on file    Obesity, Class III, BMI 40-49.9 (morbid obesity) ([facility])    S/P BKA (below knee amputation) unilateral, right ([facility])    Paroxysmal atrial fibrillation ([facility])    Iron deficiency anemia due to chronic blood loss    Chronic venous stasis dermatitis of left lower extremity    Gastroesophageal reflux disease without esophagitis    Diabetic ulcer of left foot associated with type 2 diabetes mellitus, limited to breakdown of skin (HCC)    Idiopathic chronic venous hypertension of left lower extremity with ulcer ([facility])    Non-pressure chronic ulcer of left calf, limited to breakdown of skin ([address])    Diabetic polyneuropathy associated with type 2 diabetes mellitus ([facility])    Cellulitis of left lower extremity    Abnormal urinalysis    Ulcer of toe of left foot, limited to breakdown of skin ([address])    Diabetic ulcer of left ankle associated with type 2 diabetes mellitus, limited to breakdown of skin ([address])    Dermatophytosis     Past Medical History:   Diagnosis Date    Atrial fibrillation ([address])     Cellulitis     Diabetes mellitus ([address])     Disease of thyroid gland     Duodenal ulcer     perforated- ICU stay    High blood pressure     High cholesterol     Hyperlipidemia     Hypertension     Hypothyroidism     Lymphedema      b/l LE- was followed at wound center    Morbid obesity with BMI of 40.0-44.9, adult ([address])     Peritonitis ([address])      Past Surgical History:   Procedure Laterality Date    BELOW KNEE LEG AMPUTATION Right     DIAGNOSTIC LAPAROSCOPY      KNEE ARTHROSCOPY Bilateral     OTHER SURGICAL HISTORY  03/2014 lap repair    OTHER SURGICAL HISTORY      Laparoscopic repair of a perforated duodenal ulcer with Valri Sudeep omental    OTHER SURGICAL HISTORY      Placement of drains     Social History     Socioeconomic History    Marital status: Single     Spouse name: None    Number of children: 2    Years of education: 12    Highest education level: High school graduate   Occupational History    None   Tobacco Use    Smoking status: Former     Packs/day: 1.50     Years: 25.00     Total pack years: 37.50     Types: Cigarettes     Quit date: 2004     Years since quittin.9    Smokeless tobacco: Never   Vaping Use    Vaping Use: Never used   Substance and Sexual Activity    Alcohol use: Not Currently     Alcohol/week: 1.0 standard drink of alcohol     Types: 1 Standard drinks or equivalent per week    Drug use: Never    Sexual activity: Not Currently   Other Topics Concern    None   Social History Narrative    Former smoker: Quit 3/31/2012 - As per Care Everywhere      Social Determinants of Health     Financial Resource Strain: Low Risk  (2023)    Overall Financial Resource Strain (CARDIA)     Difficulty of Paying Living Expenses: Not very hard   Food Insecurity: No Food Insecurity (2023)    Hunger Vital Sign     Worried About Running Out of Food in the Last Year: Never true     Ran Out of Food in the Last Year: Never true   Transportation Needs: No Transportation Needs (2023)    PRAPARE - Transportation     Lack of Transportation (Medical): No     Lack of Transportation (Non-Medical):  No   Physical Activity: Not on file   Stress: Not on file   Social Connections: Not on file   Intimate Partner Violence: Not on file   Housing Stability: Low Risk  (2023)    Housing Stability Vital Sign     Unable to Pay for Housing in the Last Year: No     Number of Places Lived in the Last Year: 1     Unstable Housing in the Last Year: No        Current Outpatient Medications:     aspirin (ECOTRIN LOW STRENGTH) 81 mg EC tablet, Take 81 mg by mouth Daily, Disp: , Rfl:     Cholecalciferol (Vitamin D3) 50 MCG (2000 UT) TABS, Take 2,000 Units by mouth in the morning. Indications: Vitamin D Deficiency, Disp: , Rfl:     clotrimazole-betamethasone (LOTRISONE) 1-0.05 % cream, Apply 1 Application topically 3 (three) times a week As needed for rash, Disp: 45 g, Rfl: 0    gabapentin (NEURONTIN) 100 mg capsule, Take 1 capsule (100 mg total) by mouth 2 (two) times a day, Disp: 180 capsule, Rfl: 0    influenza vaccine, high-dose (FLUZONE HIGH-DOSE) 0.7 ML SHERICE, 0.7 mL by IM- Deltoid route once.  Indications: Influenza A and B Inactivated Vaccination, Disp: , Rfl:     ketoconazole (NIZORAL) 2 % cream, Apply topically daily, Disp: 60 g, Rfl: 1    levothyroxine 25 mcg tablet, Take 1 tablet (25 mcg total) by mouth daily In addition to the 300 mcg dose, Disp: 90 tablet, Rfl: 1    levothyroxine 300 MCG tablet, Take 1 tablet (300 mcg total) by mouth daily In addition to 25 mcg dose, Disp: 90 tablet, Rfl: 1    lisinopril (ZESTRIL) 40 mg tablet, Take 1 tablet (40 mg total) by mouth daily, Disp: , Rfl:     metFORMIN (GLUCOPHAGE-XR) 750 mg 24 hr tablet, Take 1 tablet (750 mg total) by mouth daily with breakfast, Disp: 90 tablet, Rfl: 1    metoprolol tartrate (LOPRESSOR) 50 mg tablet, Take 1 tablet (50 mg total) by mouth 2 (two) times a day, Disp: 180 tablet, Rfl: 1    Multiple Vitamins-Minerals (MULTIVITAMIN MEN 50+ PO), Take 1 tablet by mouth in the morning, Disp: , Rfl:     pantoprazole (PROTONIX) 40 mg tablet, Take 1 tablet (40 mg total) by mouth daily, Disp: 90 tablet, Rfl: 1    simvastatin (ZOCOR) 10 mg tablet, Take 1 tablet (10 mg total) by mouth daily at bedtime, Disp: 90 tablet, Rfl: 1    Zoster Vac Recomb Adjuvanted (Shingrix) 50 MCG/0.5ML SUSR, 0.5mL IM for one dose, followed by 0.5mL IM 2-6 months after first dose, Disp: 1 each, Rfl: 1  Family History   Problem Relation Age of Onset    Heart disease Family     Alcohol abuse Family     Heart disease Mother     Hypertension Mother     Breast cancer Mother     Migraines Daughter     Leukemia Brother     Migraines Daughter     Substance Abuse Neg Hx     Mental illness Neg Hx     Depression Neg Hx        Review of Systems   Constitutional:  Negative for chills and fever. HENT:  Negative for ear pain and sore throat. Eyes:  Negative for pain and visual disturbance. Respiratory:  Negative for cough and shortness of breath. Cardiovascular:  Negative for chest pain and palpitations. Gastrointestinal:  Negative for abdominal pain and vomiting. Genitourinary:  Negative for dysuria and hematuria. Musculoskeletal:  Positive for gait problem. Negative for arthralgias and back pain. Skin:  Positive for color change and wound. Negative for rash. Neurological:  Positive for numbness. Negative for seizures and syncope. Psychiatric/Behavioral: Negative. All other systems reviewed and are negative. Objective:  /98   Pulse 86   Temp 97.7 °F (36.5 °C)   Resp 20     Physical Exam  Constitutional:       Appearance: Normal appearance. He is obese. HENT:      Head: Normocephalic and atraumatic. Right Ear: External ear normal.      Left Ear: External ear normal.      Nose: Nose normal.      Mouth/Throat:      Mouth: Mucous membranes are moist.      Pharynx: Oropharynx is clear. Eyes:      Conjunctiva/sclera: Conjunctivae normal.   Cardiovascular:      Pulses: Normal pulses. Dorsalis pedis pulses are 2+ on the left side. Posterior tibial pulses are 2+ on the left side. Pulmonary:      Effort: Pulmonary effort is normal.   Musculoskeletal:      Cervical back: Normal range of motion. Left lower le+ Edema present. Comments: BKA right   Skin:     General: Skin is warm and dry. Capillary Refill: Capillary refill takes less than 2 seconds. Neurological:      General: No focal deficit present.       Mental Status: He is alert and oriented to person, place, and time. Mental status is at baseline. Sensory: Sensory deficit present. Coordination: Coordination abnormal.      Gait: Gait abnormal.      Deep Tendon Reflexes: Reflexes abnormal.   Psychiatric:         Mood and Affect: Mood normal.         Behavior: Behavior normal.         Thought Content: Thought content normal.         Judgment: Judgment normal.           Wound 04/25/23 Venous Ulcer Leg Left; Anterior (Active)   Wound Image   09/26/23 0902   Wound Description Epithelialization 10/24/23 1008   Jovita-wound Assessment Intact 10/24/23 1008   Wound Length (cm) 0 cm 10/24/23 1008   Wound Width (cm) 0 cm 10/24/23 1008   Wound Depth (cm) 0 cm 10/24/23 1008   Wound Surface Area (cm^2) 0 cm^2 10/24/23 1008   Wound Volume (cm^3) 0 cm^3 10/24/23 1008   Calculated Wound Volume (cm^3) 0 cm^3 10/24/23 1008   Change in Wound Size % 100 10/24/23 1008   Drainage Amount None 10/24/23 1008   Drainage Description Serosanguineous 09/26/23 0905   Non-staged Wound Description Not applicable 27/25/48 1180   Treatments Irrigation with NSS;Cleansed 05/16/23 1040   Patient Tolerance Tolerated well 09/26/23 0905   Dressing Status Other (Comment) 10/24/23 1008       Wound 09/26/23 Foot Left;Dorsal (Active)   Wound Image   10/24/23 1003   Wound Description Dry;Brown;Eschar;Epithelialization 10/24/23 1001   Jovita-wound Assessment Dry;Scaly 10/24/23 1001   Wound Length (cm) 0 cm 10/24/23 1001   Wound Width (cm) 0 cm 10/24/23 1001   Wound Depth (cm) 0 cm 10/24/23 1001   Wound Surface Area (cm^2) 0 cm^2 10/24/23 1001   Wound Volume (cm^3) 0 cm^3 10/24/23 1001   Calculated Wound Volume (cm^3) 0 cm^3 10/24/23 1001   Change in Wound Size % 100 10/24/23 1001   Drainage Amount None 10/24/23 1001   Drainage Description Serous 09/26/23 0902   Non-staged Wound Description Not applicable 56/66/92 1946   Patient Tolerance Tolerated well 09/26/23 0902   Dressing Status Intact 10/24/23 1001                No results found.     Wound Instructions:  Orders Placed This Encounter   Procedures    Wound cleansing and dressings     Your left foot wound is now healed!!!.    Please continue to monitor area for wound reopening. Please call [facility] should wound reopen at [phone]. Cleanse area gently with mild soap and water daily, pat dry. Apply nonscented moisturizer to area daily to prevent skin cracking. Standing Status:   Future     Standing Expiration Date:   10/24/2024    Wound compression and edema control     Compression Stocking:  left leg. Remove compression stockings every night HS and re-apply first thing qAM.   Follow daily skin care as instructed. Avoid prolonged standing in one place. Elevate leg(s) above the level of the heart when sitting or as much as possible. Continue to use compression pumps at home daily for 45 mins to an hour. Standing Status:   Future     Standing Expiration Date:   10/24/2024         Agustina Jackson, KIKO, DPM, FACFAS    Portions of the record may have been created with voice recognition software. Occasional wrong word or "sound a like" substitutions may have occurred due to the inherent limitations of voice recognition software. Read the chart carefully and recognize, using context, where substitutions have occurred.

## 2023-11-27 DIAGNOSIS — E78.2 MIXED HYPERLIPIDEMIA: ICD-10-CM

## 2023-11-27 DIAGNOSIS — I10 ESSENTIAL HYPERTENSION: ICD-10-CM

## 2023-11-27 DIAGNOSIS — G62.9 NEUROPATHY: ICD-10-CM

## 2023-11-27 DIAGNOSIS — E03.9 ACQUIRED HYPOTHYROIDISM: ICD-10-CM

## 2023-11-27 DIAGNOSIS — E11.8 CONTROLLED TYPE 2 DIABETES MELLITUS WITH COMPLICATION, WITHOUT LONG-TERM CURRENT USE OF INSULIN (HCC): ICD-10-CM

## 2023-11-27 DIAGNOSIS — K21.9 GASTROESOPHAGEAL REFLUX DISEASE WITHOUT ESOPHAGITIS: ICD-10-CM

## 2023-11-28 RX ORDER — LEVOTHYROXINE SODIUM 0.03 MG/1
25 TABLET ORAL DAILY
Qty: 90 TABLET | Refills: 0 | Status: SHIPPED | OUTPATIENT
Start: 2023-11-28

## 2023-11-28 RX ORDER — PANTOPRAZOLE SODIUM 40 MG/1
40 TABLET, DELAYED RELEASE ORAL DAILY
Qty: 90 TABLET | Refills: 0 | Status: SHIPPED | OUTPATIENT
Start: 2023-11-28

## 2023-11-28 RX ORDER — METOPROLOL TARTRATE 50 MG/1
50 TABLET, FILM COATED ORAL 2 TIMES DAILY
Qty: 180 TABLET | Refills: 0 | Status: SHIPPED | OUTPATIENT
Start: 2023-11-28

## 2023-11-28 RX ORDER — METFORMIN HYDROCHLORIDE 750 MG/1
750 TABLET, EXTENDED RELEASE ORAL
Qty: 90 TABLET | Refills: 0 | Status: SHIPPED | OUTPATIENT
Start: 2023-11-28

## 2023-11-28 RX ORDER — LISINOPRIL 40 MG/1
40 TABLET ORAL DAILY
Qty: 90 TABLET | Refills: 0 | Status: SHIPPED | OUTPATIENT
Start: 2023-11-28

## 2023-11-28 RX ORDER — SIMVASTATIN 10 MG
10 TABLET ORAL
Qty: 90 TABLET | Refills: 0 | Status: SHIPPED | OUTPATIENT
Start: 2023-11-28 | End: 2024-05-26

## 2023-11-28 RX ORDER — GABAPENTIN 100 MG/1
100 CAPSULE ORAL 2 TIMES DAILY
Qty: 180 CAPSULE | Refills: 0 | Status: SHIPPED | OUTPATIENT
Start: 2023-11-28

## 2023-11-28 RX ORDER — LEVOTHYROXINE SODIUM 300 UG/1
300 TABLET ORAL DAILY
Qty: 90 TABLET | Refills: 0 | Status: SHIPPED | OUTPATIENT
Start: 2023-11-28

## 2024-03-21 DIAGNOSIS — E78.2 MIXED HYPERLIPIDEMIA: ICD-10-CM

## 2024-03-21 DIAGNOSIS — E03.9 ACQUIRED HYPOTHYROIDISM: ICD-10-CM

## 2024-03-21 DIAGNOSIS — I10 ESSENTIAL HYPERTENSION: ICD-10-CM

## 2024-03-21 DIAGNOSIS — E11.8 CONTROLLED TYPE 2 DIABETES MELLITUS WITH COMPLICATION, WITHOUT LONG-TERM CURRENT USE OF INSULIN (HCC): ICD-10-CM

## 2024-03-21 DIAGNOSIS — K21.9 GASTROESOPHAGEAL REFLUX DISEASE WITHOUT ESOPHAGITIS: ICD-10-CM

## 2024-03-21 DIAGNOSIS — G62.9 NEUROPATHY: ICD-10-CM

## 2024-03-22 RX ORDER — PANTOPRAZOLE SODIUM 40 MG/1
40 TABLET, DELAYED RELEASE ORAL DAILY
Qty: 90 TABLET | Refills: 1 | Status: SHIPPED | OUTPATIENT
Start: 2024-03-22

## 2024-03-22 RX ORDER — LEVOTHYROXINE SODIUM 0.03 MG/1
25 TABLET ORAL DAILY
Qty: 90 TABLET | Refills: 1 | Status: SHIPPED | OUTPATIENT
Start: 2024-03-22

## 2024-03-22 RX ORDER — LEVOTHYROXINE SODIUM 300 UG/1
300 TABLET ORAL DAILY
Qty: 90 TABLET | Refills: 1 | Status: SHIPPED | OUTPATIENT
Start: 2024-03-22

## 2024-03-22 RX ORDER — METOPROLOL TARTRATE 50 MG/1
50 TABLET, FILM COATED ORAL 2 TIMES DAILY
Qty: 180 TABLET | Refills: 1 | Status: SHIPPED | OUTPATIENT
Start: 2024-03-22

## 2024-03-22 RX ORDER — LISINOPRIL 40 MG/1
40 TABLET ORAL DAILY
Qty: 90 TABLET | Refills: 1 | Status: SHIPPED | OUTPATIENT
Start: 2024-03-22

## 2024-03-22 RX ORDER — METFORMIN HYDROCHLORIDE 750 MG/1
750 TABLET, EXTENDED RELEASE ORAL
Qty: 90 TABLET | Refills: 1 | Status: SHIPPED | OUTPATIENT
Start: 2024-03-22

## 2024-03-22 RX ORDER — GABAPENTIN 100 MG/1
100 CAPSULE ORAL 2 TIMES DAILY
Qty: 180 CAPSULE | Refills: 1 | Status: SHIPPED | OUTPATIENT
Start: 2024-03-22

## 2024-03-22 RX ORDER — SIMVASTATIN 10 MG
10 TABLET ORAL
Qty: 90 TABLET | Refills: 1 | Status: SHIPPED | OUTPATIENT
Start: 2024-03-22 | End: 2024-09-18

## 2024-03-22 NOTE — TELEPHONE ENCOUNTER
Reason for call:   [x] Refill   [] Prior Auth  [] Other:     Office:   [x] PCP/Provider -   [] Specialty/Provider -     Does the patient have enough for 3 days?   [] Yes   [x] No - Send as HP to POD

## 2024-05-24 ENCOUNTER — TELEPHONE (OUTPATIENT)
Age: 69
End: 2024-05-24

## 2024-05-24 NOTE — TELEPHONE ENCOUNTER
Arthur Deutsch    Patient is looking for a refill of a antibiotic he was given last year for his R Leg that had reddness to it.    This past week his R Leg has become very Reddish  and painful, patient is asking for the same medication he used last year, he doesn't remember the name, but states it helped a lot with the redness.    Patient is asking for a phone call today to see what the Dr is sending in for him.    Looking at patients chart please see med below that patient did take last year.    cephalexin (KEFLEX) 500 mg capsule      Pharmacy:  [facility]      CB: [phone]

## 2024-05-24 NOTE — TELEPHONE ENCOUNTER
Contacted patient to let him know that Dr. Lehman recommends urgent care evaluation. Patient stated that he hoped to not have to be seen before his wound appointment next week, but verbalized understanding about going to urgent care.

## 2024-07-15 NOTE — ASSESSMENT & PLAN NOTE
Stent catheter removed intact after successful deployment. · On simvastatin 10 mg/Pravachol 20 mg daily

## 2024-07-16 ENCOUNTER — APPOINTMENT (EMERGENCY)
Dept: RADIOLOGY | Facility: HOSPITAL | Age: 69
DRG: 291 | End: 2024-07-16
Payer: MEDICARE

## 2024-07-16 ENCOUNTER — HOSPITAL ENCOUNTER (INPATIENT)
Facility: HOSPITAL | Age: 69
LOS: 6 days | Discharge: NURSING, REHAB OR LONG-TERM CARE | DRG: 291 | End: 2024-07-22
Attending: EMERGENCY MEDICINE | Admitting: INTERNAL MEDICINE
Payer: MEDICARE

## 2024-07-16 DIAGNOSIS — E03.9 HYPOTHYROIDISM: ICD-10-CM

## 2024-07-16 DIAGNOSIS — E11.622 DIABETIC ULCER OF LEFT ANKLE ASSOCIATED WITH TYPE 2 DIABETES MELLITUS, LIMITED TO BREAKDOWN OF SKIN (HCC): ICD-10-CM

## 2024-07-16 DIAGNOSIS — I50.21 ACUTE SYSTOLIC HEART FAILURE (HCC): ICD-10-CM

## 2024-07-16 DIAGNOSIS — E78.5 HLD (HYPERLIPIDEMIA): ICD-10-CM

## 2024-07-16 DIAGNOSIS — K59.00 CONSTIPATION: Chronic | ICD-10-CM

## 2024-07-16 DIAGNOSIS — K21.9 GASTROESOPHAGEAL REFLUX DISEASE WITHOUT ESOPHAGITIS: ICD-10-CM

## 2024-07-16 DIAGNOSIS — L97.321 DIABETIC ULCER OF LEFT ANKLE ASSOCIATED WITH TYPE 2 DIABETES MELLITUS, LIMITED TO BREAKDOWN OF SKIN (HCC): ICD-10-CM

## 2024-07-16 DIAGNOSIS — I27.20 PULMONARY HYPERTENSION (HCC): ICD-10-CM

## 2024-07-16 DIAGNOSIS — I83.009 VENOUS STASIS ULCER (HCC): ICD-10-CM

## 2024-07-16 DIAGNOSIS — I10 ESSENTIAL HYPERTENSION: ICD-10-CM

## 2024-07-16 DIAGNOSIS — L97.909 VENOUS STASIS ULCER (HCC): ICD-10-CM

## 2024-07-16 DIAGNOSIS — I48.0 PAROXYSMAL ATRIAL FIBRILLATION (HCC): ICD-10-CM

## 2024-07-16 DIAGNOSIS — E55.9 VITAMIN D DEFICIENCY: ICD-10-CM

## 2024-07-16 DIAGNOSIS — R06.09 DYSPNEA ON EXERTION: Primary | ICD-10-CM

## 2024-07-16 DIAGNOSIS — I50.31 ACUTE DIASTOLIC CONGESTIVE HEART FAILURE (HCC): ICD-10-CM

## 2024-07-16 DIAGNOSIS — E11.42 DIABETIC POLYNEUROPATHY ASSOCIATED WITH TYPE 2 DIABETES MELLITUS (HCC): ICD-10-CM

## 2024-07-16 DIAGNOSIS — I25.10 CORONARY ARTERY DISEASE: ICD-10-CM

## 2024-07-16 PROBLEM — R06.02 SHORTNESS OF BREATH: Status: ACTIVE | Noted: 2024-07-16

## 2024-07-16 LAB
2HR DELTA HS TROPONIN: -1 NG/L
4HR DELTA HS TROPONIN: 1 NG/L
ALBUMIN SERPL BCG-MCNC: 3.9 G/DL (ref 3.5–5)
ALP SERPL-CCNC: 89 U/L (ref 34–104)
ALT SERPL W P-5'-P-CCNC: 5 U/L (ref 7–52)
ANION GAP SERPL CALCULATED.3IONS-SCNC: 8 MMOL/L (ref 4–13)
AST SERPL W P-5'-P-CCNC: 10 U/L (ref 13–39)
ATRIAL RATE: 150 BPM
BASOPHILS # BLD AUTO: 0.02 THOUSANDS/ÂΜL (ref 0–0.1)
BASOPHILS NFR BLD AUTO: 0 % (ref 0–1)
BILIRUB DIRECT SERPL-MCNC: 0.19 MG/DL (ref 0–0.2)
BILIRUB SERPL-MCNC: 0.66 MG/DL (ref 0.2–1)
BNP SERPL-MCNC: 304 PG/ML (ref 0–100)
BUN SERPL-MCNC: 9 MG/DL (ref 5–25)
CALCIUM SERPL-MCNC: 8.9 MG/DL (ref 8.4–10.2)
CARDIAC TROPONIN I PNL SERPL HS: 7 NG/L
CARDIAC TROPONIN I PNL SERPL HS: 8 NG/L
CARDIAC TROPONIN I PNL SERPL HS: 9 NG/L
CHLORIDE SERPL-SCNC: 100 MMOL/L (ref 96–108)
CO2 SERPL-SCNC: 31 MMOL/L (ref 21–32)
CREAT SERPL-MCNC: 0.85 MG/DL (ref 0.6–1.3)
EOSINOPHIL # BLD AUTO: 0.04 THOUSAND/ÂΜL (ref 0–0.61)
EOSINOPHIL NFR BLD AUTO: 1 % (ref 0–6)
ERYTHROCYTE [DISTWIDTH] IN BLOOD BY AUTOMATED COUNT: 17.1 % (ref 11.6–15.1)
EST. AVERAGE GLUCOSE BLD GHB EST-MCNC: 108 MG/DL
GFR SERPL CREATININE-BSD FRML MDRD: 87 ML/MIN/1.73SQ M
GLUCOSE SERPL-MCNC: 117 MG/DL (ref 65–140)
GLUCOSE SERPL-MCNC: 86 MG/DL (ref 65–140)
GLUCOSE SERPL-MCNC: 92 MG/DL (ref 65–140)
GLUCOSE SERPL-MCNC: 94 MG/DL (ref 65–140)
HBA1C MFR BLD: 5.4 %
HCT VFR BLD AUTO: 37.3 % (ref 36.5–49.3)
HGB BLD-MCNC: 11.1 G/DL (ref 12–17)
IMM GRANULOCYTES # BLD AUTO: 0.01 THOUSAND/UL (ref 0–0.2)
IMM GRANULOCYTES NFR BLD AUTO: 0 % (ref 0–2)
LYMPHOCYTES # BLD AUTO: 0.79 THOUSANDS/ÂΜL (ref 0.6–4.47)
LYMPHOCYTES NFR BLD AUTO: 13 % (ref 14–44)
MCH RBC QN AUTO: 24.6 PG (ref 26.8–34.3)
MCHC RBC AUTO-ENTMCNC: 29.8 G/DL (ref 31.4–37.4)
MCV RBC AUTO: 83 FL (ref 82–98)
MONOCYTES # BLD AUTO: 0.62 THOUSAND/ÂΜL (ref 0.17–1.22)
MONOCYTES NFR BLD AUTO: 10 % (ref 4–12)
NEUTROPHILS # BLD AUTO: 4.55 THOUSANDS/ÂΜL (ref 1.85–7.62)
NEUTS SEG NFR BLD AUTO: 76 % (ref 43–75)
NRBC BLD AUTO-RTO: 0 /100 WBCS
PLATELET # BLD AUTO: 215 THOUSANDS/UL (ref 149–390)
PMV BLD AUTO: 11.9 FL (ref 8.9–12.7)
POTASSIUM SERPL-SCNC: 3.8 MMOL/L (ref 3.5–5.3)
PROT SERPL-MCNC: 7.7 G/DL (ref 6.4–8.4)
QRS AXIS: -50 DEGREES
QRSD INTERVAL: 110 MS
QT INTERVAL: 388 MS
QTC INTERVAL: 421 MS
RBC # BLD AUTO: 4.51 MILLION/UL (ref 3.88–5.62)
SODIUM SERPL-SCNC: 139 MMOL/L (ref 135–147)
T WAVE AXIS: 6 DEGREES
TSH SERPL DL<=0.05 MIU/L-ACNC: 6.49 UIU/ML (ref 0.45–4.5)
VENTRICULAR RATE: 71 BPM
WBC # BLD AUTO: 6.03 THOUSAND/UL (ref 4.31–10.16)

## 2024-07-16 PROCEDURE — 99223 1ST HOSP IP/OBS HIGH 75: CPT | Performed by: INTERNAL MEDICINE

## 2024-07-16 PROCEDURE — 84443 ASSAY THYROID STIM HORMONE: CPT | Performed by: INTERNAL MEDICINE

## 2024-07-16 PROCEDURE — 84484 ASSAY OF TROPONIN QUANT: CPT

## 2024-07-16 PROCEDURE — 82948 REAGENT STRIP/BLOOD GLUCOSE: CPT

## 2024-07-16 PROCEDURE — 83880 ASSAY OF NATRIURETIC PEPTIDE: CPT

## 2024-07-16 PROCEDURE — 93005 ELECTROCARDIOGRAM TRACING: CPT

## 2024-07-16 PROCEDURE — 80076 HEPATIC FUNCTION PANEL: CPT | Performed by: INTERNAL MEDICINE

## 2024-07-16 PROCEDURE — 99285 EMERGENCY DEPT VISIT HI MDM: CPT

## 2024-07-16 PROCEDURE — 80048 BASIC METABOLIC PNL TOTAL CA: CPT

## 2024-07-16 PROCEDURE — 83036 HEMOGLOBIN GLYCOSYLATED A1C: CPT | Performed by: INTERNAL MEDICINE

## 2024-07-16 PROCEDURE — 99285 EMERGENCY DEPT VISIT HI MDM: CPT | Performed by: EMERGENCY MEDICINE

## 2024-07-16 PROCEDURE — 99222 1ST HOSP IP/OBS MODERATE 55: CPT | Performed by: PODIATRIST

## 2024-07-16 PROCEDURE — 93010 ELECTROCARDIOGRAM REPORT: CPT | Performed by: INTERNAL MEDICINE

## 2024-07-16 PROCEDURE — 71045 X-RAY EXAM CHEST 1 VIEW: CPT

## 2024-07-16 PROCEDURE — 36415 COLL VENOUS BLD VENIPUNCTURE: CPT

## 2024-07-16 PROCEDURE — 85025 COMPLETE CBC W/AUTO DIFF WBC: CPT

## 2024-07-16 RX ORDER — GABAPENTIN 100 MG/1
100 CAPSULE ORAL 2 TIMES DAILY
Status: DISCONTINUED | OUTPATIENT
Start: 2024-07-16 | End: 2024-07-22 | Stop reason: HOSPADM

## 2024-07-16 RX ORDER — INSULIN LISPRO 100 [IU]/ML
1-6 INJECTION, SOLUTION INTRAVENOUS; SUBCUTANEOUS
Status: DISCONTINUED | OUTPATIENT
Start: 2024-07-16 | End: 2024-07-22 | Stop reason: HOSPADM

## 2024-07-16 RX ORDER — ACETAMINOPHEN 325 MG/1
650 TABLET ORAL EVERY 4 HOURS PRN
Status: DISCONTINUED | OUTPATIENT
Start: 2024-07-16 | End: 2024-07-22 | Stop reason: HOSPADM

## 2024-07-16 RX ORDER — INSULIN GLARGINE 100 [IU]/ML
20 INJECTION, SOLUTION SUBCUTANEOUS
Status: DISCONTINUED | OUTPATIENT
Start: 2024-07-16 | End: 2024-07-18

## 2024-07-16 RX ORDER — ONDANSETRON 2 MG/ML
4 INJECTION INTRAMUSCULAR; INTRAVENOUS EVERY 6 HOURS PRN
Status: DISCONTINUED | OUTPATIENT
Start: 2024-07-16 | End: 2024-07-22 | Stop reason: HOSPADM

## 2024-07-16 RX ORDER — PRAVASTATIN SODIUM 10 MG
10 TABLET ORAL
Status: DISCONTINUED | OUTPATIENT
Start: 2024-07-16 | End: 2024-07-22 | Stop reason: HOSPADM

## 2024-07-16 RX ORDER — LISINOPRIL 20 MG/1
40 TABLET ORAL DAILY
Status: DISCONTINUED | OUTPATIENT
Start: 2024-07-16 | End: 2024-07-22 | Stop reason: HOSPADM

## 2024-07-16 RX ORDER — LEVOTHYROXINE SODIUM 0.03 MG/1
25 TABLET ORAL DAILY
Status: DISCONTINUED | OUTPATIENT
Start: 2024-07-16 | End: 2024-07-16

## 2024-07-16 RX ORDER — FUROSEMIDE 10 MG/ML
40 INJECTION INTRAMUSCULAR; INTRAVENOUS 2 TIMES DAILY
Status: DISCONTINUED | OUTPATIENT
Start: 2024-07-17 | End: 2024-07-20

## 2024-07-16 RX ORDER — METOPROLOL TARTRATE 50 MG/1
50 TABLET, FILM COATED ORAL 2 TIMES DAILY
Status: DISCONTINUED | OUTPATIENT
Start: 2024-07-16 | End: 2024-07-22 | Stop reason: HOSPADM

## 2024-07-16 RX ORDER — PANTOPRAZOLE SODIUM 40 MG/1
40 TABLET, DELAYED RELEASE ORAL
Status: DISCONTINUED | OUTPATIENT
Start: 2024-07-17 | End: 2024-07-22 | Stop reason: HOSPADM

## 2024-07-16 RX ORDER — ALBUTEROL SULFATE 2.5 MG/3ML
1 SOLUTION RESPIRATORY (INHALATION) ONCE
Status: COMPLETED | OUTPATIENT
Start: 2024-07-16 | End: 2024-07-16

## 2024-07-16 RX ORDER — FUROSEMIDE 10 MG/ML
40 INJECTION INTRAMUSCULAR; INTRAVENOUS 2 TIMES DAILY
Status: DISCONTINUED | OUTPATIENT
Start: 2024-07-16 | End: 2024-07-16

## 2024-07-16 RX ORDER — LEVOTHYROXINE SODIUM 0.1 MG/1
300 TABLET ORAL DAILY
Status: DISCONTINUED | OUTPATIENT
Start: 2024-07-16 | End: 2024-07-16

## 2024-07-16 RX ADMIN — FUROSEMIDE 40 MG: 10 INJECTION, SOLUTION INTRAMUSCULAR; INTRAVENOUS at 14:40

## 2024-07-16 RX ADMIN — METOPROLOL TARTRATE 50 MG: 50 TABLET, FILM COATED ORAL at 18:53

## 2024-07-16 RX ADMIN — INSULIN GLARGINE 20 UNITS: 100 INJECTION, SOLUTION SUBCUTANEOUS at 22:17

## 2024-07-16 RX ADMIN — APIXABAN 5 MG: 5 TABLET, FILM COATED ORAL at 18:52

## 2024-07-16 RX ADMIN — PRAVASTATIN SODIUM 10 MG: 10 TABLET ORAL at 16:53

## 2024-07-16 RX ADMIN — GABAPENTIN 100 MG: 100 CAPSULE ORAL at 18:52

## 2024-07-16 NOTE — CONSULTS
Podiatry - Consultation    Patient Information:   Armando Erickson 69 y.o. male MRN: [id number]  Unit/Bed#: ED 25 Encounter: [id number]  PCP: Aida Interiano MD  Date of Admission:  7/16/2024  Date of Consultation: 07/16/24  Requesting Physician: Jayesh Li MD      ASSESSMENT:    Armando Erickson is a 69 y.o. male with:    Chronic venous stasis with chronic dermal disruption of left lower extremity  Type 2 diabetes mellitus  Obesity class III  Diabetic polyneuropathy  Coronary artery disease, atrial fibrillation  S/p below-knee amputation of right lower leg    PLAN:    Patient was seen, evaluated, and treated with all questions answered.  Patient is well known to Dr. Skinner as has been seeing her in wound care. Last visit was in October of 2023. He mentions that he lives alone and has been doing his own wound care at home. Patient is s/p right BKA 05/19/17.   Discussed the etiology and symptoms associated with Chronic Venous stasis. Explained our plan for local wound care in addition to diuresis via internal medicine team. Explained the importance of elevation of the lower extremity as well as light compression. Left lower leg wound is clinically stable at this time without any signs of soft tissue infection, lymphangitis, or purulence/odor. Lower leg area is minimally draining and no maceration of tissue noted. Podiatry will continue to monitor patient while inpatient and appreciates wound care assistance for the time being as wound is clinically stable. Nurse wound care instruction order placed.  A DSD consisting of Xeroform, ABD, Kerlix, and Lightly wrapped ACE bandage was placed to the left lower limb.   Local wound care consisting of ABD, Kerlix, and lightly wrapped ACE to the left lower leg. Wound care instructions placed.  Elevation on green foam wedges or pillows when non-ambulatory  Rest of care per primary team. Appreciate consult.  Will discuss this plan with my attending and update as  needed.    Weightbearing status: WBAT to left lower extremity, Right BKA    SUBJECTIVE:    History of Present Illness:    Armando Erickson is a 69 y.o. male who is originally admitted 7/16/2024 due to complaints of shortness of breath. Patient has a past medical history of Chronic venous stasis, Type 2 diabetes with concurrent polyneuropathy, BKA right lower limb (DOS: 05/19/17). He relays that he used to see Dr. Skinner regularly for venous stasis wound care but has stopped after he healed on the last visit. He has been doing local wound care at home alone.     We are consulted for Left lower leg venous dermal disruption/dermatitis.     Review of Systems:    Constitutional: Negative.    HENT: Negative.    Eyes: Negative.    Respiratory: Negative.    Cardiovascular: Negative.    Gastrointestinal: Negative.    Musculoskeletal: Right BKA, healed   Skin: Left lower leg dermal disruption   Neurological: Negative   Psych: Negative.     Past Medical and Surgical History:     Past Medical History:   Diagnosis Date    Atrial fibrillation (HCC)     Cellulitis     Diabetes mellitus (HCC)     Disease of thyroid gland     Duodenal ulcer     perforated- ICU stay    High blood pressure     High cholesterol     Hyperlipidemia     Hypertension     Hypothyroidism     Lymphedema      b/l LE- was followed at wound center    Morbid obesity with BMI of 40.0-44.9, adult (HCC)     Peritonitis (HCC)        Past Surgical History:   Procedure Laterality Date    BELOW KNEE LEG AMPUTATION Right     DIAGNOSTIC LAPAROSCOPY      KNEE ARTHROSCOPY Bilateral     OTHER SURGICAL HISTORY  03/2014     lap repair    OTHER SURGICAL HISTORY      Laparoscopic repair of a perforated duodenal ulcer with Javed omental    OTHER SURGICAL HISTORY      Placement of drains       Meds/Allergies:    Not in a hospital admission.    No Known Allergies    Social History:     Marital Status: Single    Substance Use History:   Social History     Substance and Sexual Activity  "  Alcohol Use Not Currently    Alcohol/week: 1.0 standard drink of alcohol    Types: 1 Standard drinks or equivalent per week     Social History     Tobacco Use   Smoking Status Former    Current packs/day: 0.00    Average packs/day: 1.5 packs/day for 25.0 years (37.5 ttl pk-yrs)    Types: Cigarettes    Start date: 1979    Quit date: 2004    Years since quittin.7   Smokeless Tobacco Never     Social History     Substance and Sexual Activity   Drug Use Never       Family History:    Family History   Problem Relation Age of Onset    Heart disease Family     Alcohol abuse Family     Heart disease Mother     Hypertension Mother     Breast cancer Mother     Migraines Daughter     Leukemia Brother     Migraines Daughter     Substance Abuse Neg Hx     Mental illness Neg Hx     Depression Neg Hx          OBJECTIVE:    Vitals:   Blood Pressure: 170/80 (24 1445)  Pulse: 76 (24 1445)  Temperature: (!) 97.1 °F (36.2 °C) (24 1128)  Temp Source: Oral (24 1128)  Respirations: 18 (24 1445)  Height: 6' 7\" (200.7 cm) (24 1128)  Weight - Scale: (!) 191 kg (420 lb) (24 1128)  SpO2: 97 % (24 1445)    Physical Exam:    General Appearance: Alert, cooperative, no distress.  HEENT: Head normocephalic, atraumatic, without obvious abnormality.  Heart: Normal rate and rhythm.  Lungs: Non-labored breathing. No respiratory distress.  Abdomen: Without distension.  Psychiatric: AAOx3  Lower Extremity:  Vascular:   Right BKA    Left DP/PT are 2+/4.  Pedal hair is absent. Skin temperature is WNL left leg. Significant edema noted to the entire left lower limb.    Musculoskeletal:  Right BKA    MMT is 4/5 in all muscle compartments left leg. ROM at the 1st MPJ and ankle joint are reduced left leg with the leg extended.      Dermatological:  Lower extremity wound(s) as noted below:      Left anterior leg:    Large dermal disruption noted, mildly discolored and consistent with venous stasis " "dermatitis. No signs of soft tissue infection, odor, lymphangitis, purulence. No open areas/ areas that are full thickness. Area is consistent with his baseline skin quality.    Neurological:  Gross sensation is diminished. Protective sensation is diminished.    Clinical Images 07/16/24:            Additional data:     Lab Results: I have personally reviewed pertinent labs including:    Results from last 7 days   Lab Units 07/16/24  1238   WBC Thousand/uL 6.03   HEMOGLOBIN g/dL 11.1*   HEMATOCRIT % 37.3   PLATELETS Thousands/uL 215   SEGS PCT % 76*   LYMPHO PCT % 13*   MONO PCT % 10   EOS PCT % 1     Results from last 7 days   Lab Units 07/16/24  1238   POTASSIUM mmol/L 3.8   CHLORIDE mmol/L 100   CO2 mmol/L 31   BUN mg/dL 9   CREATININE mg/dL 0.85   CALCIUM mg/dL 8.9   ALK PHOS U/L 89   ALT U/L 5*   AST U/L 10*           Cultures: I have personally reviewed pertinent cultures including:                  ** Please Note: Portions of the record may have been created with voice recognition software. Occasional wrong word or \"sound a like\" substitutions may have occurred due to the inherent limitations of voice recognition software. Read the chart carefully and recognize, using context, where substitutions have occurred. **   "

## 2024-07-16 NOTE — ASSESSMENT & PLAN NOTE
"Lab Results   Component Value Date    HGBA1C 5.8 (H) 10/06/2023       No results for input(s): \"POCGLU\" in the last 72 hours.    Blood Sugar Average: Last 72 hrs:  appears adequately controlled on metoformin outpatient  DM diet while inpatient  Basal lantus while inpatient  Sliding scale coverage    "

## 2024-07-16 NOTE — ASSESSMENT & PLAN NOTE
Do not appear to be infected at this time  Monitor closely  IV diuresis for volume overload  Consult with podiatry for wound care recomendations

## 2024-07-16 NOTE — ED ATTENDING ATTESTATION
7/16/2024  I, Fabien Soler DO, saw and evaluated the patient. I have discussed the patient with the resident/non-physician practitioner and agree with the resident's/non-physician practitioner's findings, Plan of Care, and MDM as documented in the resident's/non-physician practitioner's note, except where noted. All available labs and Radiology studies were reviewed.  I was present for key portions of any procedure(s) performed by the resident/non-physician practitioner and I was immediately available to provide assistance.       At this point I agree with the current assessment done in the Emergency Department.  I have conducted an independent evaluation of this patient a history and physical is as follows:    70 yo male w/DM s/p R AKA remotely, PAF, pulm HTN on echo from 2014 which hasn't been treated or followed up since 2014. Now presenting with CROFT improved after neb treatment per EMS, as well as R AKA stump redness, swelling. States it doesn't fit in his prosthetic. Doesn't have adequate follow up for possible CHF. Unclear how long patient has been in AF, as he doesn't feel any palpitations. He is currently in AF on ECG.    R AKA stump erythematous but normothermic, no induration, fluctuance, drainage/discharge.    Echo 3/17/24 [facility]  LVEF 65%  Severe biatrial enlargement  Mild TR PASP 40-45        Imp: R AKA stump swelling likely due to not previously diagnosed CHF. I suspect that pt may have permanent AF now, and likely has worsening cardiac function. I do not suspect stump infection/cellulitis. plan: cardiac eval, recommend admission for cardiology consult, GDMT pending formal echo. Need to determine dry weight.      ED Course         Critical Care Time  Procedures

## 2024-07-16 NOTE — ASSESSMENT & PLAN NOTE
Affects all aspects of his care  Bariatric bed ordered  Turn q2h  Weight loss counseling when stable as an outpatient

## 2024-07-16 NOTE — H&P
"[facility]  H&P  Name: Armando Erickson 69 y.o. male I MRN: [id number]  Unit/Bed#: ED 25 I Date of Admission: 7/16/2024   Date of Service: 7/16/2024 I Hospital Day: 0      Assessment & Plan   * Shortness of breath  Assessment & Plan  Suspect cor pulmonale due to PH from obesity  Admit to inpatient for IV diuresis  BNP elevated at 300  Check echocardiogram  IV lasix 40mg BID, not on any diuretic at [facility]  Monitor intake and output  Monitor daily weights  Consult cardiology for assistance with management    Controlled type 2 diabetes mellitus with complication, without long-term current use of insulin ([facility])  Assessment & Plan  Lab Results   Component Value Date    HGBA1C 5.8 (H) 10/06/2023       No results for input(s): \"POCGLU\" in the last 72 hours.    Blood Sugar Average: Last 72 hrs:  appears adequately controlled on metoformin outpatient  DM diet while inpatient  Basal lantus while inpatient  Sliding scale coverage      Diabetic polyneuropathy associated with type 2 diabetes mellitus (HCC)  Assessment & Plan  continue neurontin      Obesity, Class III, BMI 40-49.9 (morbid obesity) (HCC)  Assessment & Plan  Affects all aspects of his care  Bariatric bed ordered  Turn q2h  Weight loss counseling when stable as an outpatient    Venous stasis ulcer (HCC)  Assessment & Plan  Do not appear to be infected at this time  Monitor closely  IV diuresis for volume overload  Consult with podiatry for wound care recomendations    Chronic venous stasis dermatitis of left lower extremity  Assessment & Plan  Do not appear to be infected at this time  Monitor closely  IV diuresis for volume overload  Consult with podiatry for wound care recomendations    Paroxysmal atrial fibrillation (HCC)  Assessment & Plan  His of PAF, does not appear to be on AC   Rate is controlled  Start Eliquis  Check TSH and echocardiogram  Cardiology consultation requested         VTE Pharmacologic Prophylaxis:   Moderate " Risk (Score 3-4) - Pharmacological DVT Prophylaxis Ordered: apixaban (Eliquis).   Status: Level 1 - Full Code   Discussion with family:     Anticipated Length of Stay: Patient will be admitted on an inpatient basis with an anticipated length of stay of greater than 2 midnights secondary to diuresis.    Total Time Spent on Date of Encounter in care of patient: 50 mins. This time was spent on one or more of the following: performing physical exam; counseling and coordination of care; obtaining or reviewing history; documenting in the medical record; reviewing/ordering tests, medications or procedures; communicating with other healthcare professionals and discussing with patient's family/caregivers.    Chief Complaint: shortness of breath    History of Present Illness:  Armando Erickson is a 69 y.o. male with a PMH of morbid obesity, PH, DM who presents with shortness of breath. He notes that today, while doing his routine activities he is short of breath. Denies any chest pain, tightness or cough. No fever or chills. He reports edema in his legs and is unable to wear his prosthetic leg due to edema. He chronically sleeps in a recliner so cannot provide history on orthopnea or PND.    Review of Systems:  Review of Systems   All other systems reviewed and are negative.      Past Medical and Surgical History:   Past Medical History:   Diagnosis Date    Atrial fibrillation (HCC)     Cellulitis     Diabetes mellitus (HCC)     Disease of thyroid gland     Duodenal ulcer     perforated- ICU stay    High blood pressure     High cholesterol     Hyperlipidemia     Hypertension     Hypothyroidism     Lymphedema      b/l LE- was followed at wound center    Morbid obesity with BMI of 40.0-44.9, adult (HCC)     Peritonitis (HCC)        Past Surgical History:   Procedure Laterality Date    BELOW KNEE LEG AMPUTATION Right     DIAGNOSTIC LAPAROSCOPY      KNEE ARTHROSCOPY Bilateral     OTHER SURGICAL HISTORY  03/2014     lap repair     OTHER SURGICAL HISTORY      Laparoscopic repair of a perforated duodenal ulcer with Javed omental    OTHER SURGICAL HISTORY      Placement of drains       Meds/Allergies:  Prior to Admission medications    Medication Sig Start Date End Date Taking? Authorizing Provider   aspirin (ECOTRIN LOW STRENGTH) 81 mg EC tablet Take 81 mg by mouth Daily 5/25/17   Historical Provider, MD   Cholecalciferol (Vitamin D3) 50 MCG (2000 UT) TABS Take 2,000 Units by mouth in the morning. Indications: Vitamin D Deficiency    Aida Interiano MD   gabapentin (NEURONTIN) 100 mg capsule Take 1 capsule (100 mg total) by mouth 2 (two) times a day 3/22/24   Aida Interiano MD   levothyroxine 25 mcg tablet Take 1 tablet (25 mcg total) by mouth daily In addition to the 300 mcg dose 3/22/24   Aida Interiano MD   levothyroxine 300 MCG tablet Take 1 tablet (300 mcg total) by mouth daily In addition to 25 mcg dose 3/22/24   Aida Interiano MD   lisinopril (ZESTRIL) 40 mg tablet Take 1 tablet (40 mg total) by mouth daily 3/22/24   Aida Interiano MD   metFORMIN (GLUCOPHAGE-XR) 750 mg 24 hr tablet Take 1 tablet (750 mg total) by mouth daily with breakfast 3/22/24   Aida Interiano MD   metoprolol tartrate (LOPRESSOR) 50 mg tablet Take 1 tablet (50 mg total) by mouth 2 (two) times a day 3/22/24   Aida Interiano MD   Multiple Vitamins-Minerals (MULTIVITAMIN MEN 50+ PO) Take 1 tablet by mouth in the morning    Historical Provider, MD   pantoprazole (PROTONIX) 40 mg tablet Take 1 tablet (40 mg total) by mouth daily 3/22/24   Aida Interiano MD   simvastatin (ZOCOR) 10 mg tablet Take 1 tablet (10 mg total) by mouth daily at bedtime 3/22/24 9/18/24  Aida Interiano MD   clotrimazole-betamethasone (LOTRISONE) 1-0.05 % cream Apply 1 Application topically 3 (three) times a week As needed for rash 9/27/23 7/16/24  Aida Interiano MD   influenza vaccine, high-dose (FLUZONE HIGH-DOSE) 0.7 ML SHERICE 0.7 mL by IM- Deltoid route once. Indications:  "Influenza A and B Inactivated Vaccination  24  Historical Provider, MD   ketoconazole (NIZORAL) 2 % cream Apply topically daily 9/15/21 7/16/24  Ericka Skinner DPM   Zoster Vac Recomb Adjuvanted (Shingrix) 50 MCG/0.5ML SUSR 0.5mL IM for one dose, followed by 0.5mL IM 2-6 months after first dose 23  Merle Kay DO     I have reviewed home medications using recent Epic encounter.    Allergies: No Known Allergies    Social History:  Marital Status: Single   Occupation: not employed  Patient Pre-hospital Living Situation: Home  Patient Pre-hospital Level of Mobility:  walk with prosthetic  Patient Pre-hospital Diet Restrictions: None  Substance Use History:   Social History     Substance and Sexual Activity   Alcohol Use Not Currently    Alcohol/week: 1.0 standard drink of alcohol    Types: 1 Standard drinks or equivalent per week     Social History     Tobacco Use   Smoking Status Former    Current packs/day: 0.00    Average packs/day: 1.5 packs/day for 25.0 years (37.5 ttl pk-yrs)    Types: Cigarettes    Start date: 1979    Quit date: 2004    Years since quittin.7   Smokeless Tobacco Never     Social History     Substance and Sexual Activity   Drug Use Never       Family History:  Family History   Problem Relation Age of Onset    Heart disease Family     Alcohol abuse Family     Heart disease Mother     Hypertension Mother     Breast cancer Mother     Migraines Daughter     Leukemia Brother     Migraines Daughter     Substance Abuse Neg Hx     Mental illness Neg Hx     Depression Neg Hx        Physical Exam:     Vitals:   Blood Pressure: 157/69 (24 1300)  Pulse: 68 (24 1300)  Temperature: (!) 97.1 °F (36.2 °C) (24 1128)  Temp Source: Oral (24 1128)  Respirations: 19 (24 1300)  Height: 6' 7\" (200.7 cm) (24 1128)  Weight - Scale: (!) 191 kg (420 lb) (24 1128)  SpO2: 96 % (24 1200)    Physical Exam  Constitutional:       General: He is " not in acute distress.     Appearance: He is obese. He is not toxic-appearing.   HENT:      Head: Normocephalic and atraumatic.      Nose: Nose normal.   Eyes:      Extraocular Movements: Extraocular movements intact.   Neck:      Comments: JVD present  Cardiovascular:      Rate and Rhythm: Normal rate. Rhythm irregular.   Pulmonary:      Effort: Pulmonary effort is normal.      Breath sounds: No wheezing or rales.   Abdominal:      General: There is no distension.      Palpations: Abdomen is soft.      Tenderness: There is no abdominal tenderness.   Musculoskeletal:      Cervical back: Normal range of motion and neck supple.      Right lower leg: Edema present.      Left lower leg: Edema present.      Comments: Well healed RLE BKA  Stasis dermatitis bilateral  LLE edema with mutliple venous stasis ulcerations and clear drainage   Neurological:      Mental Status: He is alert and oriented to person, place, and time.   Psychiatric:         Mood and Affect: Mood normal.         Behavior: Behavior normal.          Additional Data:     Lab Results:  Results from last 7 days   Lab Units 07/16/24  1238   WBC Thousand/uL 6.03   HEMOGLOBIN g/dL 11.1*   HEMATOCRIT % 37.3   PLATELETS Thousands/uL 215   SEGS PCT % 76*   LYMPHO PCT % 13*   MONO PCT % 10   EOS PCT % 1     Results from last 7 days   Lab Units 07/16/24  1238   SODIUM mmol/L 139   POTASSIUM mmol/L 3.8   CHLORIDE mmol/L 100   CO2 mmol/L 31   BUN mg/dL 9   CREATININE mg/dL 0.85   ANION GAP mmol/L 8   CALCIUM mg/dL 8.9   GLUCOSE RANDOM mg/dL 92             Lab Results   Component Value Date    HGBA1C 5.8 (H) 10/06/2023    HGBA1C 5.4 05/10/2023    HGBA1C 5.4 03/09/2022           Lines/Drains:  Invasive Devices       Peripheral Intravenous Line  Duration             Peripheral IV 07/16/24 Right Antecubital <1 day                        Imaging: Personally reviewed the following imaging: chest xray pulmonary edema  XR chest 1 view portable   ED Interpretation by Wayne  DO Melecio (07/16 8512)   No signs of consolidation or acute pulmonary disease          EKG and Other Studies Reviewed on Admission:   EKG: Atrial fibrillation. HR 80.     ** Please Note: This note has been constructed using a voice recognition system. **

## 2024-07-16 NOTE — ED PROVIDER NOTES
History  Chief Complaint   Patient presents with    Shortness of Breath     Per ems pt has been having shortness of breath for awhile now, today he feels worst, albuterol neb was given      Patient is a 69-year-old male with past medical history of diabetes with right BKA, CAD, pulmonary hypertension, atrial fibrillation brought by EMS for shortness of breath.  Patient endorses shortness of breath on exertion for a while however over the last few days and into today he states it has gotten worse with exertion.  EMS gave patient nebulized albuterol for suspected wheezing, patient states he is not sure if it helped because he was not short of breath at rest.  He is currently breathing comfortably at rest.  He denies chest pain, headache, visual changes, fevers, chills, nausea, vomiting, diarrhea, changes in bowels, changes in bladder, bloody stool, hematuria.  Patient states his right leg near the site of his amputation appears more swollen and erythematous to him.  Patient states he has been compliant with all his medications.       Shortness of Breath  Associated symptoms: no abdominal pain, no chest pain, no cough, no diaphoresis, no fever, no vomiting and no wheezing        Prior to Admission Medications   Prescriptions Last Dose Informant Patient Reported? Taking?   Cholecalciferol (Vitamin D3) 50 MCG (2000 UT) TABS 7/16/2024 Self Yes Yes   Sig: Take 2,000 Units by mouth in the morning. Indications: Vitamin D Deficiency   Multiple Vitamins-Minerals (MULTIVITAMIN MEN 50+ PO) 7/16/2024 Self Yes Yes   Sig: Take 1 tablet by mouth in the morning   aspirin (ECOTRIN LOW STRENGTH) 81 mg EC tablet 7/16/2024 Self Yes Yes   Sig: Take 81 mg by mouth Daily   gabapentin (NEURONTIN) 100 mg capsule 7/16/2024  No Yes   Sig: Take 1 capsule (100 mg total) by mouth 2 (two) times a day   levothyroxine 25 mcg tablet 7/16/2024  No Yes   Sig: Take 1 tablet (25 mcg total) by mouth daily In addition to the 300 mcg dose   levothyroxine 300  MCG tablet 7/16/2024  No Yes   Sig: Take 1 tablet (300 mcg total) by mouth daily In addition to 25 mcg dose   lisinopril (ZESTRIL) 40 mg tablet 7/16/2024  No Yes   Sig: Take 1 tablet (40 mg total) by mouth daily   metFORMIN (GLUCOPHAGE-XR) 750 mg 24 hr tablet 7/16/2024  No Yes   Sig: Take 1 tablet (750 mg total) by mouth daily with breakfast   metoprolol tartrate (LOPRESSOR) 50 mg tablet 7/16/2024  No Yes   Sig: Take 1 tablet (50 mg total) by mouth 2 (two) times a day   pantoprazole (PROTONIX) 40 mg tablet   No No   Sig: Take 1 tablet (40 mg total) by mouth daily   simvastatin (ZOCOR) 10 mg tablet 7/16/2024  No Yes   Sig: Take 1 tablet (10 mg total) by mouth daily at bedtime      Facility-Administered Medications: None       Past Medical History:   Diagnosis Date    Atrial fibrillation (HCC)     Cellulitis     Diabetes mellitus (HCC)     Disease of thyroid gland     Duodenal ulcer     perforated- ICU stay    High blood pressure     High cholesterol     Hyperlipidemia     Hypertension     Hypothyroidism     Lymphedema      b/l LE- was followed at wound center    Morbid obesity with BMI of 40.0-44.9, adult (HCC)     Peritonitis (HCC)        Past Surgical History:   Procedure Laterality Date    BELOW KNEE LEG AMPUTATION Right     DIAGNOSTIC LAPAROSCOPY      KNEE ARTHROSCOPY Bilateral     OTHER SURGICAL HISTORY  03/2014     lap repair    OTHER SURGICAL HISTORY      Laparoscopic repair of a perforated duodenal ulcer with Javed omental    OTHER SURGICAL HISTORY      Placement of drains       Family History   Problem Relation Age of Onset    Heart disease Family     Alcohol abuse Family     Heart disease Mother     Hypertension Mother     Breast cancer Mother     Migraines Daughter     Leukemia Brother     Migraines Daughter     Substance Abuse Neg Hx     Mental illness Neg Hx     Depression Neg Hx      I have reviewed and agree with the history as documented.    E-Cigarette/Vaping    E-Cigarette Use Never User       E-Cigarette/Vaping Substances     Social History     Tobacco Use    Smoking status: Former     Current packs/day: 0.00     Average packs/day: 1.5 packs/day for 25.0 years (37.5 ttl pk-yrs)     Types: Cigarettes     Start date: 1979     Quit date: 2004     Years since quittin.7    Smokeless tobacco: Never   Vaping Use    Vaping status: Never Used   Substance Use Topics    Alcohol use: Not Currently     Alcohol/week: 1.0 standard drink of alcohol     Types: 1 Standard drinks or equivalent per week    Drug use: Never        Review of Systems   Constitutional:  Negative for chills, diaphoresis, fatigue and fever.   Eyes:  Negative for photophobia and visual disturbance.   Respiratory:  Positive for shortness of breath. Negative for cough, chest tightness and wheezing.    Cardiovascular:  Positive for leg swelling. Negative for chest pain and palpitations.   Gastrointestinal:  Negative for abdominal pain, diarrhea, nausea and vomiting.   Endocrine: Negative for polyuria.   Genitourinary:  Negative for difficulty urinating and hematuria.   Skin:  Positive for wound. Negative for color change.   Neurological:  Negative for dizziness and light-headedness.       Physical Exam  ED Triage Vitals   Temperature Pulse Respirations Blood Pressure SpO2   24 1128 24 1131 24 1131 24 1131 24 1131   (!) 97.1 °F (36.2 °C) 74 20 (!) 172/81 96 %      Temp Source Heart Rate Source Patient Position - Orthostatic VS BP Location FiO2 (%)   24 1128 24 1131 24 1131 24 1131 --   Oral Monitor Sitting Right arm       Pain Score       24 1128       No Pain             Orthostatic Vital Signs  Vitals:    24 1200 24 1300 24 1445 24 1700   BP: 144/63 157/69 170/80 156/73   Pulse: 68 68 76 68   Patient Position - Orthostatic VS: Lying Lying Lying Lying       Physical Exam  Constitutional:       General: He is not in acute distress.     Appearance: He  is obese. He is not ill-appearing or diaphoretic.   HENT:      Head: Normocephalic and atraumatic.      Mouth/Throat:      Mouth: Mucous membranes are moist.      Pharynx: No pharyngeal swelling or oropharyngeal exudate.   Cardiovascular:      Rate and Rhythm: Normal rate. Rhythm irregular.      Heart sounds: Normal heart sounds.      Comments: Radial pulses palpated B/L  Pulmonary:      Effort: Pulmonary effort is normal. No tachypnea, bradypnea or respiratory distress.      Breath sounds: Normal breath sounds. No decreased breath sounds, wheezing, rhonchi or rales.   Abdominal:      General: Bowel sounds are normal.      Palpations: Abdomen is soft.      Tenderness: There is no abdominal tenderness. There is no guarding or rebound.   Musculoskeletal:      Right lower leg: Edema present.      Left lower leg: Edema present.      Comments: Right BKA with some erythema and edema present at the distal part of the extremity, LLE edema and chronic venous stasis ulcers that are weeping, no signs of induration, fluctuance, pustulant drainage    Skin:     General: Skin is warm and dry.      Capillary Refill: Capillary refill takes less than 2 seconds.   Neurological:      General: No focal deficit present.      Mental Status: He is alert and oriented to person, place, and time.   Psychiatric:         Mood and Affect: Mood normal.         Behavior: Behavior normal.         ED Medications  Medications   aspirin (ECOTRIN LOW STRENGTH) EC tablet 81 mg (81 mg Oral Not Given 7/16/24 1439)   cholecalciferol (VITAMIN D3) tablet 2,000 Units (2,000 Units Oral Not Given 7/16/24 1631)   gabapentin (NEURONTIN) capsule 100 mg (100 mg Oral Given 7/16/24 1852)   lisinopril (ZESTRIL) tablet 40 mg (40 mg Oral Not Given 7/16/24 1632)   metoprolol tartrate (LOPRESSOR) tablet 50 mg (50 mg Oral Given 7/16/24 1853)   multivitamin-minerals (CENTRUM) tablet 1 tablet (1 tablet Oral Not Given 7/16/24 1440)   pantoprazole (PROTONIX) EC tablet 40 mg  (has no administration in time range)   pravastatin (PRAVACHOL) tablet 10 mg (10 mg Oral Given 7/16/24 1653)   ondansetron (ZOFRAN) injection 4 mg (has no administration in time range)   apixaban (ELIQUIS) tablet 5 mg (5 mg Oral Given 7/16/24 1852)   acetaminophen (TYLENOL) tablet 650 mg (has no administration in time range)   insulin glargine (LANTUS) subcutaneous injection 20 Units 0.2 mL (has no administration in time range)   insulin lispro (HumALOG/ADMELOG) 100 units/mL subcutaneous injection 1-6 Units (0 Units Subcutaneous Not Given 7/16/24 1632)   insulin lispro (HumALOG/ADMELOG) 100 units/mL subcutaneous injection 1-6 Units (has no administration in time range)   furosemide (LASIX) injection 40 mg (has no administration in time range)   levothyroxine tablet 325 mcg (has no administration in time range)   albuterol (FOR EMS ONLY) (2.5 mg/3 mL) 0.083 % inhalation solution 2.5 mg (0 mg Does not apply Given to EMS 7/16/24 1151)       Diagnostic Studies  Results Reviewed       Procedure Component Value Units Date/Time    Fingerstick Glucose (POCT) [[id number]]  (Normal) Collected: 07/16/24 1629    Lab Status: Final result Specimen: Blood Updated: 07/16/24 1630     POC Glucose 94 mg/dl     TSH, 3rd generation [[id number]]  (Abnormal) Collected: 07/16/24 1238    Lab Status: Final result Specimen: Blood from Arm, Right Updated: 07/16/24 1527     TSH 3RD GENERATON 6.492 uIU/mL     Hepatic function panel [[id number]]  (Abnormal) Collected: 07/16/24 1238    Lab Status: Final result Specimen: Blood from Arm, Right Updated: 07/16/24 1527     Total Bilirubin 0.66 mg/dL      Bilirubin, Direct 0.19 mg/dL      Alkaline Phosphatase 89 U/L      AST 10 U/L      ALT 5 U/L      Total Protein 7.7 g/dL      Albumin 3.9 g/dL     HS Troponin I 2hr [[id number]]  (Normal) Collected: 07/16/24 1444    Lab Status: Final result Specimen: Blood from Line, Venous Updated: 07/16/24 1517     hs TnI 2hr 7 ng/L      Delta 2hr hsTnI -1 ng/L     HS  Troponin I 4hr [[id number]]     Lab Status: No result Specimen: Blood     Hemoglobin A1c w/EAG Estimation (Prechecked if no A1C within 90 days) [[id number]]     Lab Status: No result Specimen: Blood     B-Type Natriuretic Peptide(BNP) [[id number]]  (Abnormal) Collected: 07/16/24 1238    Lab Status: Final result Specimen: Blood from Arm, Right Updated: 07/16/24 1412      pg/mL     Basic metabolic panel [[id number]] Collected: 07/16/24 1238    Lab Status: Final result Specimen: Blood from Arm, Right Updated: 07/16/24 1316     Sodium 139 mmol/L      Potassium 3.8 mmol/L      Chloride 100 mmol/L      CO2 31 mmol/L      ANION GAP 8 mmol/L      BUN 9 mg/dL      Creatinine 0.85 mg/dL      Glucose 92 mg/dL      Calcium 8.9 mg/dL      eGFR 87 ml/min/1.73sq m     Narrative:      [employer] guidelines for Chronic Kidney Disease (CKD):     Stage 1 with normal or high GFR (GFR > 90 mL/min/1.73 square meters)    Stage 2 Mild CKD (GFR = 60-89 mL/min/1.73 square meters)    Stage 3A Moderate CKD (GFR = 45-59 mL/min/1.73 square meters)    Stage 3B Moderate CKD (GFR = 30-44 mL/min/1.73 square meters)    Stage 4 Severe CKD (GFR = 15-29 mL/min/1.73 square meters)    Stage 5 End Stage CKD (GFR <15 mL/min/1.73 square meters)  Note: GFR calculation is accurate only with a steady state creatinine    HS Troponin 0hr (reflex protocol) [[id number]]  (Normal) Collected: 07/16/24 1238    Lab Status: Final result Specimen: Blood from Arm, Right Updated: 07/16/24 1311     hs TnI 0hr 8 ng/L     CBC and differential [[id number]]  (Abnormal) Collected: 07/16/24 1238    Lab Status: Final result Specimen: Blood from Arm, Right Updated: 07/16/24 1251     WBC 6.03 Thousand/uL      RBC 4.51 Million/uL      Hemoglobin 11.1 g/dL      Hematocrit 37.3 %      MCV 83 fL      MCH 24.6 pg      MCHC 29.8 g/dL      RDW 17.1 %      MPV 11.9 fL      Platelets 215 Thousands/uL      nRBC 0 /100 WBCs      Segmented % 76 %      Immature Grans % 0  %      Lymphocytes % 13 %      Monocytes % 10 %      Eosinophils Relative 1 %      Basophils Relative 0 %      Absolute Neutrophils 4.55 Thousands/µL      Absolute Immature Grans 0.01 Thousand/uL      Absolute Lymphocytes 0.79 Thousands/µL      Absolute Monocytes 0.62 Thousand/µL      Eosinophils Absolute 0.04 Thousand/µL      Basophils Absolute 0.02 Thousands/µL                    XR chest 1 view portable   ED Interpretation by Wayne Majano DO (07/16 1334)   No signs of consolidation or acute pulmonary disease      Final Result by Christy Lira MD (07/16 1521)      Mild pulmonary venous congestion.            Workstation performed: [id number]               Procedures  Procedures      ED Course  ED Course as of 07/16/24 2055 Tue Jul 16, 2024   1232 Blood Pressure: 144/63  Stable Vitals, Feeling comfortable at rest   1233 Procedure Note: EKG  Date/Time: 07/16/24 12:33 PM   Interpreted by:Wayne Majano  Indications / Diagnosis: Dyspnea  ECG reviewed by me, the ED Provider: yes   The EKG demonstrates:  Rate: 70-80  Rhythm: Irregular, Atrial Fibrillation  Intervals: No presence of p waves, QRS and QT normal  Axis: Left axis to normal  QRS/Blocks: none  ST Changes: No acute ST Changes, no STD/STACIE.  Compared to prior EKG on 06/08/21, now in atrial fibrillation.     1319 Basic metabolic panel  Unremarkable   1320 hs TnI 0hr: 8  Will follow trend, no acute ischemic changes on ECG   1320 Hemoglobin(!): 11.1  Not grossly changed from prior   1521 Delta 2hr hsTnI: -1  Less concerned for ACS                             SBIRT 22yo+      Flowsheet Row Most Recent Value   Initial Alcohol Screen: US AUDIT-C     1. How often do you have a drink containing alcohol? 2 Filed at: 07/16/2024 1131   Audit-C Score 2 Filed at: 07/16/2024 1131   KARRIE: How many times in the past year have you...    Used an illegal drug or used a prescription medication for non-medical reasons? Never Filed at: 07/16/2024 1131                  Medical  Decision Making  ASSESSMENT: Patient is a 69 y.o. male who presents with worsening dyspnea on exertion, leg swelling.   DDX includes but not limited to: new onset congestive heart failure, acute on chronic pulmonary hypertension, chronic obstructive pulmonary disorder, atrial fibrillation, pneumonia.   PLAN: ECG, CBC, BMP, Tn, BNP, CXR.     Physical exam findings and CXR less concerned for COPD, pneumonia.    Patient is being admitted for further evaluation of his dyspnea, atrial fibrillation and to further evaluate for congestive heart failure with cardiology evaluation. Patient was made aware of his admission and plan and was agreeable to plan.       Amount and/or Complexity of Data Reviewed  Labs: ordered. Decision-making details documented in ED Course.  Radiology: ordered and independent interpretation performed.    Risk  Prescription drug management.  Decision regarding hospitalization.          Disposition  Final diagnoses:   Dyspnea on exertion   Paroxysmal atrial fibrillation (HCC)   Pulmonary hypertension (HCC)     Time reflects when diagnosis was documented in both MDM as applicable and the Disposition within this note       Time User Action Codes Description Comment    7/16/2024  1:39 PM Wayne Majano Add [R06.09] Dyspnea on exertion     7/16/2024  1:40 PM Wayne Majano Add [I48.91] Atrial fibrillation (HCC)     7/16/2024  1:40 PM Wayne Majano Add [I48.0] Paroxysmal atrial fibrillation (HCC)     7/16/2024  1:40 PM Wayne Majano Remove [I48.91] Atrial fibrillation (HCC)     7/16/2024  1:41 PM Wayne Majano [I27.20] Pulmonary hypertension (HCC)     7/16/2024  2:15 PM Jayesh Li Add [I83.009,  L97.909] Venous stasis ulcer (HCC)           ED Disposition       ED Disposition   Admit    Condition   Stable    Date/Time   Tue Jul 16, 2024 1341    Comment   Case was discussed with Dr. Li and the patient's admission status was agreed to be Admission Status: inpatient status to the service of   [facility] .               Follow-up Information    None         Current Discharge Medication List        CONTINUE these medications which have NOT CHANGED    Details   aspirin (ECOTRIN LOW STRENGTH) 81 mg EC tablet Take 81 mg by mouth Daily      Cholecalciferol (Vitamin D3) 50 MCG (2000 UT) TABS Take 2,000 Units by mouth in the morning. Indications: Vitamin D Deficiency      gabapentin (NEURONTIN) 100 mg capsule Take 1 capsule (100 mg total) by mouth 2 (two) times a day  Qty: 180 capsule, Refills: 1    Associated Diagnoses: Neuropathy      !! levothyroxine 25 mcg tablet Take 1 tablet (25 mcg total) by mouth daily In addition to the 300 mcg dose  Qty: 90 tablet, Refills: 1    Associated Diagnoses: Acquired hypothyroidism      !! levothyroxine 300 MCG tablet Take 1 tablet (300 mcg total) by mouth daily In addition to 25 mcg dose  Qty: 90 tablet, Refills: 1    Associated Diagnoses: Acquired hypothyroidism      lisinopril (ZESTRIL) 40 mg tablet Take 1 tablet (40 mg total) by mouth daily  Qty: 90 tablet, Refills: 1    Associated Diagnoses: Essential hypertension      metFORMIN (GLUCOPHAGE-XR) 750 mg 24 hr tablet Take 1 tablet (750 mg total) by mouth daily with breakfast  Qty: 90 tablet, Refills: 1    Associated Diagnoses: Controlled type 2 diabetes mellitus with complication, without long-term current use of insulin ([facility])      metoprolol tartrate (LOPRESSOR) 50 mg tablet Take 1 tablet (50 mg total) by mouth 2 (two) times a day  Qty: 180 tablet, Refills: 1    Associated Diagnoses: Essential hypertension      Multiple Vitamins-Minerals (MULTIVITAMIN MEN 50+ PO) Take 1 tablet by mouth in the morning      simvastatin (ZOCOR) 10 mg tablet Take 1 tablet (10 mg total) by mouth daily at bedtime  Qty: 90 tablet, Refills: 1    Associated Diagnoses: Mixed hyperlipidemia      pantoprazole (PROTONIX) 40 mg tablet Take 1 tablet (40 mg total) by mouth daily  Qty: 90 tablet, Refills: 1    Associated Diagnoses: Gastroesophageal reflux  disease without esophagitis       !! - Potential duplicate medications found. Please discuss with provider.        No discharge procedures on file.    PDMP Review       None             ED Provider  Attending physically available and evaluated Armando Erickson. I managed the patient along with the ED Attending.    Electronically Signed by           Wayne Majano DO  07/16/24 6290

## 2024-07-16 NOTE — ASSESSMENT & PLAN NOTE
Suspect cor pulmonale due to PH from obesity  Admit to inpatient for IV diuresis  BNP elevated at 300  Check echocardiogram  IV lasix 40mg BID, not on any diuretic at [facility]  Monitor intake and output  Monitor daily weights  Consult cardiology for assistance with management

## 2024-07-16 NOTE — ASSESSMENT & PLAN NOTE
His of PAF, does not appear to be on AC   Rate is controlled  Start Eliquis  Check TSH and echocardiogram  Cardiology consultation requested

## 2024-07-16 NOTE — CONSULTS
Consultation - Cardiology   Armando Erickson 69 y.o. male MRN: [id number]  Unit/Bed#: ED 25 Encounter: [id number]    Inpatient consult to Cardiology  Consult performed by: Anthony Pineda MD  Consult ordered by: Jayesh Li MD          History of Present Illness   Physician Requesting Consult: Jayesh Li MD  Reason for Consult / Principal Problem: shortness of breath      Assessment:  Principal Problem:    Shortness of breath  Active Problems:    Controlled type 2 diabetes mellitus with complication, without long-term current use of insulin (HCC)    Obesity, Class III, BMI 40-49.9 (morbid obesity) ([facility], [facility])    Paroxysmal atrial fibrillation (HCC)    Diabetic polyneuropathy associated with type 2 diabetes mellitus (HCC)    Venous stasis ulcer (HCC)      Assessment/ Plan:    Volume overload likely secondary to acute CHF  Presentation with shortness of breath, swelling, PND  , BMI 47.31  Chest x-ray with mild pulmonary edema  Echo from 2014: Preserved LVEF, RV function normal, dilated LA and RA, mild TR, RVSP 40 to 45  Possible right heart failure versus right-sided heart failure given presence of dilated right ventricle in CT imaging from 2022    Paroxysmal atrial fibrillation  Patient has not been following up with cardiologist for many years  AC DCCV in 2012  Patient has not been on anticoagulation, currently on aspirin  CHADVASC 3  Currently rate controlled on Lopressor 50 mg twice daily    Hypertension  Blood pressure on arrival 174 140/60 with admission  Continue home blood pressure medications lisinopril 40 mg daily and Lopressor 50 mg twice daily    Hyperlipidemia  Diabetes mellitus  Chronic lower extremity lymphedema  Peripheral artery disease  Status post right BKA  Snoring  BMI 47.31    Plan  Agree with IV diuretics.  Continue IV Lasix 40 mL twice daily  Transthoracic echocardiogram to evaluate LV/RV function, and other structural heart disease  Daily weights standing if able, intake/output  Check  BMP, magnesium in the morning  Will plan for further workup following echo findings which may include pulmonary hypertension workup.  Discussed about anticoagulation for atrial fibrillation.  Continue lisinopril 40 mg daily  Continue Lopressor 50 mg twice daily      HPI: Armando Erickson is a 69 y.o. year old male who has a history of paroxysmal atrial fibrillation status post AC DCCV in 2012, not anticoagulated, hypertension, hyperlipidemia, diabetes mellitus, chronic lymphedema of lower extremities, peripheral artery disease, status post right BKA for nonhealing ulcers, and a BMI of 47.31.  He presents with shortness of breath that was ongoing for about a week.  Shortness of breath is present with minimal exertion, however not during rest.  He denies chest pain, palpitations, dizziness/presyncope.  He has also noticed swelling of his legs to the point where his right leg prosthesis does not fit the stump.  He does not know if he has orthopnea as he sleeps on a recliner.  2 months back he had an episode of paroxysmal nocturnal dyspnea.  Denies wheezing.  Family history is significant for coronary artery disease in both parents who required CABG.  He is a former smoker, quit more than 20 years back.  Alcohol intake is minimal about once a month.  He snores at night, however has never been tested for sleep apnea.    On arrival to the emergency department, he was hypertensive with a blood pressure 122/81 which later improved to 140/60.  Chest x-ray showed mild pulmonary edema.  .  EKG showed rate controlled atrial fibrillation with incomplete right bundle branch block which is chronic.  Troponins 8.  Creatinine 0.85, potassium 3.8.  Hemoglobin 11.1.  His last echo is from 2014 which had preserved EF 65%, severe left atrial and right atrial dilation, mild TR, and RVSP of 40 to 45%.  CT chest from 2022 was RA/RV dilation.  He is currently receiving IV Lasix 40 mg twice daily.        Review of Systems    Constitutional: Positive for malaise/fatigue. Negative for decreased appetite, diaphoresis, fever and weight loss.   HENT: Negative.     Eyes: Negative.    Cardiovascular:  Positive for dyspnea on exertion, irregular heartbeat, leg swelling and paroxysmal nocturnal dyspnea. Negative for chest pain, near-syncope, palpitations and syncope.   Respiratory:  Positive for shortness of breath, sleep disturbances due to breathing and snoring. Negative for cough and wheezing.    Endocrine: Negative.    Hematologic/Lymphatic: Negative.    Skin:  Positive for poor wound healing.   Gastrointestinal: Negative.    Genitourinary: Negative.    Neurological: Negative.    All other systems reviewed and are negative.    Historical Information   Past Medical History:   Diagnosis Date    Atrial fibrillation (HCC)     Cellulitis     Diabetes mellitus (HCC)     Disease of thyroid gland     Duodenal ulcer     perforated- ICU stay    High blood pressure     High cholesterol     Hyperlipidemia     Hypertension     Hypothyroidism     Lymphedema      b/l LE- was followed at wound center    Morbid obesity with BMI of 40.0-44.9, adult (HCC)     Peritonitis (HCC)      Past Surgical History:   Procedure Laterality Date    BELOW KNEE LEG AMPUTATION Right     DIAGNOSTIC LAPAROSCOPY      KNEE ARTHROSCOPY Bilateral     OTHER SURGICAL HISTORY  03/2014     lap repair    OTHER SURGICAL HISTORY      Laparoscopic repair of a perforated duodenal ulcer with Javed omental    OTHER SURGICAL HISTORY      Placement of drains     Social History     Substance and Sexual Activity   Alcohol Use Not Currently    Alcohol/week: 1.0 standard drink of alcohol    Types: 1 Standard drinks or equivalent per week     Social History     Substance and Sexual Activity   Drug Use Never     Social History     Tobacco Use   Smoking Status Former    Current packs/day: 0.00    Average packs/day: 1.5 packs/day for 25.0 years (37.5 ttl pk-yrs)    Types: Cigarettes    Start date:  1979    Quit date: 2004    Years since quittin.7   Smokeless Tobacco Never     Family History:   Family History   Problem Relation Age of Onset    Heart disease Family     Alcohol abuse Family     Heart disease Mother     Hypertension Mother     Breast cancer Mother     Migraines Daughter     Leukemia Brother     Migraines Daughter     Substance Abuse Neg Hx     Mental illness Neg Hx     Depression Neg Hx        Meds/Allergies   all current active meds have been reviewed, current meds:   Current Facility-Administered Medications   Medication Dose Route Frequency    acetaminophen (TYLENOL) tablet 650 mg  650 mg Oral Q4H PRN    apixaban (ELIQUIS) tablet 5 mg  5 mg Oral BID    aspirin (ECOTRIN LOW STRENGTH) EC tablet 81 mg  81 mg Oral Daily    cholecalciferol (VITAMIN D3) tablet 2,000 Units  2,000 Units Oral Daily    furosemide (LASIX) injection 40 mg  40 mg Intravenous BID    gabapentin (NEURONTIN) capsule 100 mg  100 mg Oral BID    insulin glargine (LANTUS) subcutaneous injection 20 Units 0.2 mL  20 Units Subcutaneous HS    insulin lispro (HumALOG/ADMELOG) 100 units/mL subcutaneous injection 1-6 Units  1-6 Units Subcutaneous TID AC    insulin lispro (HumALOG/ADMELOG) 100 units/mL subcutaneous injection 1-6 Units  1-6 Units Subcutaneous HS    levothyroxine tablet 25 mcg  25 mcg Oral Daily    levothyroxine tablet 300 mcg  300 mcg Oral Daily    lisinopril (ZESTRIL) tablet 40 mg  40 mg Oral Daily    metoprolol tartrate (LOPRESSOR) tablet 50 mg  50 mg Oral BID    multivitamin-minerals (CENTRUM) tablet 1 tablet  1 tablet Oral Daily    ondansetron (ZOFRAN) injection 4 mg  4 mg Intravenous Q6H PRN    [START ON 2024] pantoprazole (PROTONIX) EC tablet 40 mg  40 mg Oral Early Morning    pravastatin (PRAVACHOL) tablet 10 mg  10 mg Oral Daily With Dinner   , and PTA meds:   Prior to Admission Medications   Prescriptions Last Dose Informant Patient Reported? Taking?   Cholecalciferol (Vitamin D3) 50 MCG (  "UT) TABS  Self Yes No   Sig: Take 2,000 Units by mouth in the morning. Indications: Vitamin D Deficiency   Multiple Vitamins-Minerals (MULTIVITAMIN MEN 50+ PO)  Self Yes No   Sig: Take 1 tablet by mouth in the morning   aspirin (ECOTRIN LOW STRENGTH) 81 mg EC tablet  Self Yes No   Sig: Take 81 mg by mouth Daily   gabapentin (NEURONTIN) 100 mg capsule   No No   Sig: Take 1 capsule (100 mg total) by mouth 2 (two) times a day   levothyroxine 25 mcg tablet   No No   Sig: Take 1 tablet (25 mcg total) by mouth daily In addition to the 300 mcg dose   levothyroxine 300 MCG tablet   No No   Sig: Take 1 tablet (300 mcg total) by mouth daily In addition to 25 mcg dose   lisinopril (ZESTRIL) 40 mg tablet   No No   Sig: Take 1 tablet (40 mg total) by mouth daily   metFORMIN (GLUCOPHAGE-XR) 750 mg 24 hr tablet   No No   Sig: Take 1 tablet (750 mg total) by mouth daily with breakfast   metoprolol tartrate (LOPRESSOR) 50 mg tablet   No No   Sig: Take 1 tablet (50 mg total) by mouth 2 (two) times a day   pantoprazole (PROTONIX) 40 mg tablet   No No   Sig: Take 1 tablet (40 mg total) by mouth daily   simvastatin (ZOCOR) 10 mg tablet   No No   Sig: Take 1 tablet (10 mg total) by mouth daily at bedtime      Facility-Administered Medications: None          No Known Allergies    Objective   Vitals: Blood pressure 170/80, pulse 76, temperature (!) 97.1 °F (36.2 °C), temperature source Oral, resp. rate 18, height 6' 7\" (2.007 m), weight (!) 191 kg (420 lb), SpO2 97%., Body mass index is 47.31 kg/m².,   Orthostatic Blood Pressures      Flowsheet Row Most Recent Value   Blood Pressure 170/80 filed at 2024 1445   Patient Position - Orthostatic VS Lying filed at 2024 1445          Systolic (24hrs), Av , Min:144 , Max:172     Diastolic (24hrs), Av, Min:63, Max:81    No intake or output data in the 24 hours ending 24 1513    Weight (last 2 days)       Date/Time Weight    24 1128 191 (420)            Invasive " Devices       Peripheral Intravenous Line  Duration             Peripheral IV 07/16/24 Right Antecubital <1 day                        Physical Exam:   Physical Exam  Vitals and nursing note reviewed.   Constitutional:       General: He is not in acute distress.     Appearance: He is not ill-appearing.   HENT:      Head: Normocephalic.      Mouth/Throat:      Mouth: Mucous membranes are moist.   Eyes:      Pupils: Pupils are equal, round, and reactive to light.   Neck:      Comments: JVD 15  Cardiovascular:      Rate and Rhythm: Normal rate. Rhythm irregular.      Heart sounds: No murmur heard.  Pulmonary:      Effort: Pulmonary effort is normal.      Breath sounds: Rales (Occasional bilateral) present.   Abdominal:      Palpations: Abdomen is soft.   Musculoskeletal:      Left lower leg: Edema present.      Comments: Status post right BKA   Neurological:      Mental Status: He is alert and oriented to person, place, and time.            Laboratory Results:        CBC with diff:   Results from last 7 days   Lab Units 07/16/24  1238   WBC Thousand/uL 6.03   HEMOGLOBIN g/dL 11.1*   HEMATOCRIT % 37.3   MCV fL 83   PLATELETS Thousands/uL 215   RBC Million/uL 4.51   MCH pg 24.6*   MCHC g/dL 29.8*   RDW % 17.1*   MPV fL 11.9   NRBC AUTO /100 WBCs 0         CMP:  Results from last 7 days   Lab Units 07/16/24  1238   POTASSIUM mmol/L 3.8   CHLORIDE mmol/L 100   CO2 mmol/L 31   BUN mg/dL 9   CREATININE mg/dL 0.85   CALCIUM mg/dL 8.9   EGFR ml/min/1.73sq m 87         BMP:  Results from last 7 days   Lab Units 07/16/24  1238   POTASSIUM mmol/L 3.8   CHLORIDE mmol/L 100   CO2 mmol/L 31   BUN mg/dL 9   CREATININE mg/dL 0.85   CALCIUM mg/dL 8.9       BNP:    Recent Labs     07/16/24  1238   *       Magnesium:       Coags:       TSH:       Hemoglobin A1C       Lipid Profile:         Cardiac testing:   No results found for this or any previous visit.    No results found for this or any previous visit.    No results found for  this or any previous visit.    No results found for this or any previous visit.        Imaging: I have personally reviewed pertinent reports.    No results found.    EKG reviewed personally: atrial fibrillation, rate controlled, incomplete RBBB  Telemetry reviewed personally: atrial fibrillation        Code Status: Level 1 - Full Code

## 2024-07-17 ENCOUNTER — APPOINTMENT (INPATIENT)
Dept: NON INVASIVE DIAGNOSTICS | Facility: HOSPITAL | Age: 69
DRG: 291 | End: 2024-07-17
Payer: MEDICARE

## 2024-07-17 LAB
ANION GAP SERPL CALCULATED.3IONS-SCNC: 8 MMOL/L (ref 4–13)
AORTIC ROOT: 4.1 CM
APICAL FOUR CHAMBER EJECTION FRACTION: 41 %
ASCENDING AORTA: 4.2 CM
BSA FOR ECHO PROCEDURE: 2.98 M2
BUN SERPL-MCNC: 10 MG/DL (ref 5–25)
CALCIUM SERPL-MCNC: 9 MG/DL (ref 8.4–10.2)
CHLORIDE SERPL-SCNC: 100 MMOL/L (ref 96–108)
CO2 SERPL-SCNC: 31 MMOL/L (ref 21–32)
CREAT SERPL-MCNC: 0.87 MG/DL (ref 0.6–1.3)
E WAVE DECELERATION TIME: 156 MS
E/A RATIO: 2.81
ERYTHROCYTE [DISTWIDTH] IN BLOOD BY AUTOMATED COUNT: 17 % (ref 11.6–15.1)
FRACTIONAL SHORTENING: 22 (ref 28–44)
GFR SERPL CREATININE-BSD FRML MDRD: 86 ML/MIN/1.73SQ M
GLUCOSE SERPL-MCNC: 74 MG/DL (ref 65–140)
GLUCOSE SERPL-MCNC: 76 MG/DL (ref 65–140)
GLUCOSE SERPL-MCNC: 86 MG/DL (ref 65–140)
GLUCOSE SERPL-MCNC: 96 MG/DL (ref 65–140)
GLUCOSE SERPL-MCNC: 96 MG/DL (ref 65–140)
HCT VFR BLD AUTO: 37 % (ref 36.5–49.3)
HGB BLD-MCNC: 11 G/DL (ref 12–17)
INTERVENTRICULAR SEPTUM IN DIASTOLE (PARASTERNAL SHORT AXIS VIEW): 1.7 CM
INTERVENTRICULAR SEPTUM: 1.7 CM (ref 0.6–1.1)
IVC: 27 MM
LAAS-AP2: 49.6 CM2
LAAS-AP4: 49.9 CM2
LEFT ATRIUM SIZE: 6.6 CM
LEFT ATRIUM VOLUME (MOD BIPLANE): 220 ML
LEFT ATRIUM VOLUME INDEX (MOD BIPLANE): 73.8 ML/M2
LEFT INTERNAL DIMENSION IN SYSTOLE: 5 CM (ref 2.1–4)
LEFT VENTRICULAR INTERNAL DIMENSION IN DIASTOLE: 6.4 CM (ref 3.5–6)
LEFT VENTRICULAR POSTERIOR WALL IN END DIASTOLE: 1.7 CM
LEFT VENTRICULAR STROKE VOLUME: 92 ML
LVSV (TEICH): 92 ML
MCH RBC QN AUTO: 24.6 PG (ref 26.8–34.3)
MCHC RBC AUTO-ENTMCNC: 29.7 G/DL (ref 31.4–37.4)
MCV RBC AUTO: 83 FL (ref 82–98)
MV E'TISSUE VEL-SEP: 12 CM/S
MV PEAK A VEL: 0.43 M/S
MV PEAK E VEL: 121 CM/S
MV STENOSIS PRESSURE HALF TIME: 45 MS
MV VALVE AREA P 1/2 METHOD: 4.89
PLATELET # BLD AUTO: 202 THOUSANDS/UL (ref 149–390)
PMV BLD AUTO: 12 FL (ref 8.9–12.7)
POTASSIUM SERPL-SCNC: 3.6 MMOL/L (ref 3.5–5.3)
RA PRESSURE ESTIMATED: 15 MMHG
RBC # BLD AUTO: 4.47 MILLION/UL (ref 3.88–5.62)
RIGHT ATRIAL 2D VOLUME: 143 ML
RIGHT ATRIUM AREA SYSTOLE A4C: 39 CM2
RIGHT VENTRICLE ID DIMENSION: 5.3 CM
RV PSP: 41 MMHG
SL CV LEFT ATRIUM LENGTH A2C: 8.9 CM
SL CV LV EF: 50
SL CV PED ECHO LEFT VENTRICLE DIASTOLIC VOLUME (MOD BIPLANE) 2D: 209 ML
SL CV PED ECHO LEFT VENTRICLE SYSTOLIC VOLUME (MOD BIPLANE) 2D: 117 ML
SODIUM SERPL-SCNC: 139 MMOL/L (ref 135–147)
TR MAX PG: 26 MMHG
TR PEAK VELOCITY: 2.5 M/S
TRICUSPID VALVE PEAK REGURGITATION VELOCITY: 2.53 M/S
WBC # BLD AUTO: 5.83 THOUSAND/UL (ref 4.31–10.16)

## 2024-07-17 PROCEDURE — 93306 TTE W/DOPPLER COMPLETE: CPT | Performed by: INTERNAL MEDICINE

## 2024-07-17 PROCEDURE — 99232 SBSQ HOSP IP/OBS MODERATE 35: CPT | Performed by: FAMILY MEDICINE

## 2024-07-17 PROCEDURE — 99232 SBSQ HOSP IP/OBS MODERATE 35: CPT | Performed by: INTERNAL MEDICINE

## 2024-07-17 PROCEDURE — C8929 TTE W OR WO FOL WCON,DOPPLER: HCPCS

## 2024-07-17 PROCEDURE — 85027 COMPLETE CBC AUTOMATED: CPT | Performed by: INTERNAL MEDICINE

## 2024-07-17 PROCEDURE — 80048 BASIC METABOLIC PNL TOTAL CA: CPT | Performed by: INTERNAL MEDICINE

## 2024-07-17 PROCEDURE — 82948 REAGENT STRIP/BLOOD GLUCOSE: CPT

## 2024-07-17 RX ADMIN — FUROSEMIDE 40 MG: 10 INJECTION, SOLUTION INTRAMUSCULAR; INTRAVENOUS at 09:54

## 2024-07-17 RX ADMIN — ASPIRIN 81 MG: 81 TABLET, COATED ORAL at 09:54

## 2024-07-17 RX ADMIN — METOPROLOL TARTRATE 50 MG: 50 TABLET, FILM COATED ORAL at 17:27

## 2024-07-17 RX ADMIN — Medication 1 TABLET: at 09:54

## 2024-07-17 RX ADMIN — PERFLUTREN 1 ML/MIN: 6.52 INJECTION, SUSPENSION INTRAVENOUS at 11:45

## 2024-07-17 RX ADMIN — PANTOPRAZOLE SODIUM 40 MG: 40 TABLET, DELAYED RELEASE ORAL at 05:46

## 2024-07-17 RX ADMIN — GABAPENTIN 100 MG: 100 CAPSULE ORAL at 17:27

## 2024-07-17 RX ADMIN — PRAVASTATIN SODIUM 10 MG: 10 TABLET ORAL at 17:27

## 2024-07-17 RX ADMIN — FUROSEMIDE 40 MG: 10 INJECTION, SOLUTION INTRAMUSCULAR; INTRAVENOUS at 17:27

## 2024-07-17 RX ADMIN — LISINOPRIL 40 MG: 20 TABLET ORAL at 09:54

## 2024-07-17 RX ADMIN — METOPROLOL TARTRATE 50 MG: 50 TABLET, FILM COATED ORAL at 09:54

## 2024-07-17 RX ADMIN — INSULIN GLARGINE 20 UNITS: 100 INJECTION, SOLUTION SUBCUTANEOUS at 22:37

## 2024-07-17 RX ADMIN — GABAPENTIN 100 MG: 100 CAPSULE ORAL at 09:54

## 2024-07-17 RX ADMIN — LEVOTHYROXINE SODIUM 325 MCG: 125 TABLET ORAL at 05:45

## 2024-07-17 RX ADMIN — APIXABAN 5 MG: 5 TABLET, FILM COATED ORAL at 09:54

## 2024-07-17 RX ADMIN — Medication 2000 UNITS: at 09:54

## 2024-07-17 RX ADMIN — APIXABAN 5 MG: 5 TABLET, FILM COATED ORAL at 17:27

## 2024-07-17 NOTE — PROGRESS NOTES
Cardiology Progress Note - Armando Erickson 69 y.o. male MRN: [id number]    Unit/Bed#: -01 Encounter: [id number]      Assessment:  Principal Problem:    Shortness of breath  Active Problems:    Controlled type 2 diabetes mellitus with complication, without long-term current use of insulin (HCC)    Obesity, Class III, BMI 40-49.9 (morbid obesity) (HCC)    Paroxysmal atrial fibrillation (HCC)    Diabetic polyneuropathy associated with type 2 diabetes mellitus (HCC)    Venous stasis ulcer (HCC)    69 y.o. male with paroxysmal atrial fibrillation status post AC DCCV in 2012, not anticoagulated, hypertension, hyperlipidemia, diabetes mellitus, chronic lymphedema of lower extremities, peripheral artery disease, status post right BKA for nonhealing ulcers, and a BMI of 47.31.  He presents with shortness of breath and is noted to have volume overload. There is questionable pulmonary hypertension given imaging findings of RV and RA dilation.     Volume overload likely secondary to acute CHF  Presentation with shortness of breath, swelling, PND  , BMI 47.31  Chest x-ray with mild pulmonary edema  Bonding well to IV Lasix 40 mg twice daily  Urine output 2.5 L, -3.1 L  Continues to examine volume up with JVD, swelling  Echo from 2014: Preserved LVEF, RV function normal, dilated LA and RA, mild TR, RVSP 40 to 45  Possible right heart failure versus right-sided heart failure given presence of dilated right ventricle in CT imaging from 2022     Paroxysmal atrial fibrillation  Patient has not been following up with cardiologist for many years  AC DCCV in 2012  Patient has not been on anticoagulation, currently on aspirin  CHADVASC 3  Currently rate controlled on Lopressor 50 mg twice daily     Hypertension  Blood pressure on arrival 174 140/60 with admission  Continue home blood pressure medications lisinopril 40 mg daily and Lopressor 50 mg twice daily     Hyperlipidemia  Diabetes mellitus  Chronic lower extremity  "lymphedema  Peripheral artery disease  Status post right BKA  Snoring  BMI 47.31     Plan  Continue IV Lasix 40 mg twice daily  Will follow up on echo  Daily weights standing if able, intake/output  Monitor renal functions, electrolytes  Will plan for further workup following echo findings which may include pulmonary hypertension workup.  Discussed about anticoagulation for atrial fibrillation.  Continue lisinopril 40 mg daily  Continue Lopressor 50 mg twice daily  May need to uptitrate antihypertensives however would hold off on this now as patient is being diuresed    Subjective:   Patient seen and examined.  No significant events overnight.      Objective:     Vitals: Blood pressure 151/87, pulse 61, temperature 98 °F (36.7 °C), temperature source Oral, resp. rate 19, height 6' 7\" (2.007 m), weight (!) 170 kg (375 lb 7.1 oz), SpO2 96%., Body mass index is 42.3 kg/m².,   Orthostatic Blood Pressures      Flowsheet Row Most Recent Value   Blood Pressure 151/87 filed at 07/17/2024 0712   Patient Position - Orthostatic VS Lying filed at 07/17/2024 0712              Intake/Output Summary (Last 24 hours) at 7/17/2024 1156  Last data filed at 7/17/2024 1015  Gross per 24 hour   Intake 780 ml   Output 4425 ml   Net -3645 ml       Physical Exam:    GEN: Armando Erickson appears well, alert and oriented x 3, pleasant and cooperative   HEENT: pupils equal, round, and reactive to light; extraocular muscles intact  NECK: supple, no carotid bruits, JVD+  HEART: regular rhythm, normal S1 and S2, no murmurs, clicks, gallops or rubs   LUNGS: clear to auscultation bilaterally; no wheezes, rales, or rhonchi   ABDOMEN: normal bowel sounds, soft, no tenderness, no distention  EXTREMITIES: peripheral pulses normal; no clubbing, cyanosis.  There is bilateral lower extremity swelling  NEURO: no focal findings   SKIN: normal without suspicious lesions on exposed skin    Medications:      Current Facility-Administered Medications:     " acetaminophen (TYLENOL) tablet 650 mg, 650 mg, Oral, Q4H PRN, Jayesh Li MD    apixaban (ELIQUIS) tablet 5 mg, 5 mg, Oral, BID, Jayesh Li MD, 5 mg at 07/17/24 0954    aspirin (ECOTRIN LOW STRENGTH) EC tablet 81 mg, 81 mg, Oral, Daily, Jayesh Li MD, 81 mg at 07/17/24 0954    cholecalciferol (VITAMIN D3) tablet 2,000 Units, 2,000 Units, Oral, Daily, Jayesh Li MD, 2,000 Units at 07/17/24 0954    furosemide (LASIX) injection 40 mg, 40 mg, Intravenous, BID, Anthony Pineda MD, 40 mg at 07/17/24 0954    gabapentin (NEURONTIN) capsule 100 mg, 100 mg, Oral, BID, Jayesh Li MD, 100 mg at 07/17/24 0954    insulin glargine (LANTUS) subcutaneous injection 20 Units 0.2 mL, 20 Units, Subcutaneous, HS, Jayesh Li MD, 20 Units at 07/16/24 2217    insulin lispro (HumALOG/ADMELOG) 100 units/mL subcutaneous injection 1-6 Units, 1-6 Units, Subcutaneous, TID AC **AND** Fingerstick Glucose (POCT), , , TID AC, Jayesh Li MD    insulin lispro (HumALOG/ADMELOG) 100 units/mL subcutaneous injection 1-6 Units, 1-6 Units, Subcutaneous, HS, Jayesh Li MD    levothyroxine tablet 325 mcg, 325 mcg, Oral, Early Morning, Jayesh Li MD, 325 mcg at 07/17/24 0545    lisinopril (ZESTRIL) tablet 40 mg, 40 mg, Oral, Daily, Jayesh Li MD, 40 mg at 07/17/24 0954    metoprolol tartrate (LOPRESSOR) tablet 50 mg, 50 mg, Oral, BID, Jayesh Li MD, 50 mg at 07/17/24 0954    multivitamin-minerals (CENTRUM) tablet 1 tablet, 1 tablet, Oral, Daily, Jayesh iL MD, 1 tablet at 07/17/24 0954    ondansetron (ZOFRAN) injection 4 mg, 4 mg, Intravenous, Q6H PRN, Jayesh Li MD    pantoprazole (PROTONIX) EC tablet 40 mg, 40 mg, Oral, Early Morning, Jayesh Li MD, 40 mg at 07/17/24 0546    pravastatin (PRAVACHOL) tablet 10 mg, 10 mg, Oral, Daily With Dinner, Jayesh Li MD, 10 mg at 07/16/24 1653     Labs & Results:        Results from last 7 days   Lab Units 07/17/24  0501 07/16/24  1238   WBC Thousand/uL 5.83  6.03   HEMOGLOBIN g/dL 11.0* 11.1*   HEMATOCRIT % 37.0 37.3   PLATELETS Thousands/uL 202 215         Results from last 7 days   Lab Units 07/17/24  0501 07/16/24  1238   POTASSIUM mmol/L 3.6 3.8   CHLORIDE mmol/L 100 100   CO2 mmol/L 31 31   BUN mg/dL 10 9   CREATININE mg/dL 0.87 0.85   CALCIUM mg/dL 9.0 8.9   ALK PHOS U/L  --  89   ALT U/L  --  5*   AST U/L  --  10*               EKG personally reviewed by Anthony Pineda MD.

## 2024-07-17 NOTE — PLAN OF CARE
Problem: SAFETY ADULT  Goal: Patient will remain free of falls  Description: INTERVENTIONS:  - Educate patient/family on patient safety including physical limitations  - Instruct patient to call for assistance with activity   - Consult OT/PT to assist with strengthening/mobility   - Keep Call bell within reach  - Keep bed low and locked with side rails adjusted as appropriate  - Keep care items and personal belongings within reach  - Initiate and maintain comfort rounds  - Make Fall Risk Sign visible to staff  - Offer Toileting every  Hours, in advance of need  - Initiate/Maintain alarm  - Obtain necessary fall risk management equipment:   - Apply yellow socks and bracelet for high fall risk patients  - Consider moving patient to room near nurses station  Outcome: Progressing     Problem: SAFETY ADULT  Goal: Maintain or return to baseline ADL function  Description: INTERVENTIONS:  -  Assess patient's ability to carry out ADLs; assess patient's baseline for ADL function and identify physical deficits which impact ability to perform ADLs (bathing, care of mouth/teeth, toileting, grooming, dressing, etc.)  - Assess/evaluate cause of self-care deficits   - Assess range of motion  - Assess patient's mobility; develop plan if impaired  - Assess patient's need for assistive devices and provide as appropriate  - Encourage maximum independence but intervene and supervise when necessary  - Involve family in performance of ADLs  - Assess for home care needs following discharge   - Consider OT consult to assist with ADL evaluation and planning for discharge  - Provide patient education as appropriate  Outcome: Progressing     Problem: SAFETY ADULT  Goal: Maintains/Returns to pre admission functional level  Description: INTERVENTIONS:  - Perform AM-PAC 6 Click Basic Mobility/ Daily Activity assessment daily.  - Set and communicate daily mobility goal to care team and patient/family/caregiver.   - Collaborate with  rehabilitation services on mobility goals if consulted  - Perform Range of Motion  times a day.  - Reposition patient every  hours.  - Dangle patient  times a day  - Stand patient  times a day  - Ambulate patient  times a day  - Out of bed to chair  times a day   - Out of bed for meals  times a day  - Out of bed for toileting  - Record patient progress and toleration of activity level   Outcome: Progressing     Problem: CARDIOVASCULAR - ADULT  Goal: Maintains optimal cardiac output and hemodynamic stability  Description: INTERVENTIONS:  - Monitor I/O, vital signs and rhythm  - Monitor for S/S and trends of decreased cardiac output  - Administer and titrate ordered vasoactive medications to optimize hemodynamic stability  - Assess quality of pulses, skin color and temperature  - Assess for signs of decreased coronary artery perfusion  - Instruct patient to report change in severity of symptoms  Outcome: Progressing     Problem: CARDIOVASCULAR - ADULT  Goal: Absence of cardiac dysrhythmias or at baseline rhythm  Description: INTERVENTIONS:  - Continuous cardiac monitoring, vital signs, obtain 12 lead EKG if ordered  - Administer antiarrhythmic and heart rate control medications as ordered  - Monitor electrolytes and administer replacement therapy as ordered  Outcome: Progressing     Problem: RESPIRATORY - ADULT  Goal: Achieves optimal ventilation and oxygenation  Description: INTERVENTIONS:  - Assess for changes in respiratory status  - Assess for changes in mentation and behavior  - Position to facilitate oxygenation and minimize respiratory effort  - Oxygen administered by appropriate delivery if ordered  - Initiate smoking cessation education as indicated  - Encourage broncho-pulmonary hygiene including cough, deep breathe, Incentive Spirometry  - Assess the need for suctioning and aspirate as needed  - Assess and instruct to report SOB or any respiratory difficulty  - Respiratory Therapy support as  indicated  Outcome: Progressing

## 2024-07-17 NOTE — ASSESSMENT & PLAN NOTE
Lab Results   Component Value Date    HGBA1C 5.4 07/16/2024       Recent Labs     07/16/24  2132 07/16/24  2216 07/17/24  0759 07/17/24  1157   POCGLU 86 117 74 86       Blood Sugar Average: Last 72 hrs:  (P) 91.4appears adequately controlled on metoformin outpatient  DM diet while inpatient  Basal lantus while inpatient  Sliding scale coverage

## 2024-07-17 NOTE — ASSESSMENT & PLAN NOTE
Do not appear to be infected at this time  Monitor closely  IV diuresis for volume overload   podiatry consult appreciated

## 2024-07-17 NOTE — ASSESSMENT & PLAN NOTE
Affects all aspects of his care  Bariatric bed ordered  Turn q2h  Weight loss counseling when stable as an outpatient   0 = independent

## 2024-07-17 NOTE — ASSESSMENT & PLAN NOTE
69-year-old male with PMH of morbid obesity, controlled DM2, venous stasis, presented to ED with complaint of exertional dyspnea suspect cor pulmonale due to PH from obesity  BNP elevated at 300  Chest x-ray showed mild venous congestion  Check echocardiogram  IV lasix 40mg BID, not on any diuretic at [facility]  Cardiology consult appreciated

## 2024-07-17 NOTE — ASSESSMENT & PLAN NOTE
His of PAF, does not appear to be on AC   Rate is controlled with home medication metoprolol  Start Eliquis  Follow-up on echo

## 2024-07-17 NOTE — PROGRESS NOTES
[facility]  Progress Note  Name: Armando Erickson I  MRN: [id number]  Unit/Bed#: -01 I Date of Admission: 7/16/2024   Date of Service: 7/17/2024 I Hospital Day: 1    Assessment & Plan   * Shortness of breath  Assessment & Plan  69-year-old male with PMH of morbid obesity, controlled DM2, venous stasis, presented to ED with complaint of exertional dyspnea suspect cor pulmonale due to PH from obesity  BNP elevated at 300  Chest x-ray showed mild venous congestion  Check echocardiogram  IV lasix 40mg BID, not on any diuretic at [facility]  Cardiology consult appreciated    Venous stasis ulcer (HCC)  Assessment & Plan  Do not appear to be infected at this time  Monitor closely  IV diuresis for volume overload   podiatry consult appreciated    Diabetic polyneuropathy associated with type 2 diabetes mellitus (HCC)  Assessment & Plan  (P) 91.4  continue neurontin      Paroxysmal atrial fibrillation (HCC)  Assessment & Plan  His of PAF, does not appear to be on AC   Rate is controlled with home medication metoprolol  Start Eliquis  Follow-up on echo    Obesity, Class III, BMI 40-49.9 (morbid obesity) ([facility])  Assessment & Plan  Affects all aspects of his care  Bariatric bed ordered  Turn q2h  Weight loss counseling when stable as an outpatient    Controlled type 2 diabetes mellitus with complication, without long-term current use of insulin ([facility])  Assessment & Plan  Lab Results   Component Value Date    HGBA1C 5.4 07/16/2024       Recent Labs     07/16/24  2132 07/16/24  2216 07/17/24  0759 07/17/24  1157   POCGLU 86 117 74 86       Blood Sugar Average: Last 72 hrs:  (P) 91.4appears adequately controlled on metoformin outpatient  DM diet while inpatient  Basal lantus while inpatient  Sliding scale coverage               VTE Pharmacologic Prophylaxis:   Pharmacologic: Apixaban (Eliquis)  Mechanical VTE Prophylaxis in Place: Yes    Patient Centered Rounds: I have performed bedside rounds with  nursing staff today.       Current Length of Stay: 1 day(s)    Current Patient Status: Inpatient   Certification Statement: The patient will continue to require additional inpatient hospital stay due to IV diuresis    Discharge Plan: IV diuresis, at least 48 hours    Code Status: Level 1 - Full Code      Subjective:   Patient examined at bedside.  Patient is sitting up on the bed, currently in room air.  Patient reports he has been ambulating to the bathroom.  Has exertional dyspnea.  Denies any chest pain.    Objective:     Vitals:   Temp (24hrs), Av.6 °F (36.4 °C), Min:97.2 °F (36.2 °C), Max:98 °F (36.7 °C)    Temp:  [97.2 °F (36.2 °C)-98 °F (36.7 °C)] 98 °F (36.7 °C)  HR:  [61-76] 72  Resp:  [17-19] 19  BP: (147-170)/(70-87) 151/87  SpO2:  [94 %-98 %] 94 %  Body mass index is 42.22 kg/m².     Input and Output Summary (last 24 hours):       Intake/Output Summary (Last 24 hours) at 2024 1347  Last data filed at 2024 1110  Gross per 24 hour   Intake 1380 ml   Output 5325 ml   Net -3945 ml       Physical Exam:     Physical Exam  Vitals and nursing note reviewed.   Constitutional:       General: He is not in acute distress.     Appearance: Normal appearance. He is obese.   HENT:      Head: Normocephalic and atraumatic.      Right Ear: External ear normal.      Left Ear: External ear normal.      Nose: Nose normal.   Eyes:      Extraocular Movements: Extraocular movements intact.   Pulmonary:      Effort: Pulmonary effort is normal.   Musculoskeletal:      Cervical back: Normal range of motion.   Neurological:      Mental Status: He is alert. Mental status is at baseline.   Psychiatric:         Mood and Affect: Mood normal.         Labs:    Results from last 7 days   Lab Units 24  0501 24  1238   WBC Thousand/uL 5.83 6.03   HEMOGLOBIN g/dL 11.0* 11.1*   HEMATOCRIT % 37.0 37.3   PLATELETS Thousands/uL 202 215   SEGS PCT %  --  76*   LYMPHO PCT %  --  13*   MONO PCT %  --  10   EOS PCT %  --  1      Results from last 7 days   Lab Units 07/17/24  0501 07/16/24  1238   SODIUM mmol/L 139 139   POTASSIUM mmol/L 3.6 3.8   CHLORIDE mmol/L 100 100   CO2 mmol/L 31 31   BUN mg/dL 10 9   CREATININE mg/dL 0.87 0.85   ANION GAP mmol/L 8 8   CALCIUM mg/dL 9.0 8.9   ALBUMIN g/dL  --  3.9   TOTAL BILIRUBIN mg/dL  --  0.66   ALK PHOS U/L  --  89   ALT U/L  --  5*   AST U/L  --  10*   GLUCOSE RANDOM mg/dL 76 92         Results from last 7 days   Lab Units 07/17/24  1157 07/17/24  0759 07/16/24  2216 07/16/24  2132 07/16/24  1629   POC GLUCOSE mg/dl 86 74 117 86 94     Results from last 7 days   Lab Units 07/16/24  2155   HEMOGLOBIN A1C % 5.4                  Recent Cultures (last 7 days):           Last 24 Hours Medication List:   Current Facility-Administered Medications   Medication Dose Route Frequency Provider Last Rate    acetaminophen  650 mg Oral Q4H PRN Jayesh Li MD      apixaban  5 mg Oral BID Jayesh Li MD      aspirin  81 mg Oral Daily Jayesh Li MD      cholecalciferol  2,000 Units Oral Daily Jayesh Li MD      furosemide  40 mg Intravenous BID Anthony Pineda MD      gabapentin  100 mg Oral BID Jayesh Li MD      insulin glargine  20 Units Subcutaneous HS Jayesh Li MD      insulin lispro  1-6 Units Subcutaneous TID AC Jayesh Li MD      insulin lispro  1-6 Units Subcutaneous HS Jayesh Li MD      levothyroxine  325 mcg Oral Early Morning Jayesh Li MD      lisinopril  40 mg Oral Daily Jayesh Li MD      metoprolol tartrate  50 mg Oral BID Jayesh Li MD      multivitamin-minerals  1 tablet Oral Daily Jayesh Li MD      ondansetron  4 mg Intravenous Q6H PRN Jayesh Li MD      pantoprazole  40 mg Oral Early Morning Jayesh Li MD      pravastatin  10 mg Oral Daily With Dinner Jayesh Li MD          Today, Patient Was Seen By: Abeeb Licea MD    ** Please Note: Dictation voice to text software may have been used in the creation of this document.  **

## 2024-07-18 ENCOUNTER — PATIENT OUTREACH (OUTPATIENT)
Dept: CARDIOLOGY CLINIC | Facility: CLINIC | Age: 69
End: 2024-07-18

## 2024-07-18 PROBLEM — I50.9 ACUTE CONGESTIVE HEART FAILURE (HCC): Status: ACTIVE | Noted: 2024-07-16

## 2024-07-18 PROBLEM — I50.31 ACUTE DIASTOLIC CONGESTIVE HEART FAILURE (HCC): Status: ACTIVE | Noted: 2024-07-16

## 2024-07-18 LAB
ANION GAP SERPL CALCULATED.3IONS-SCNC: 11 MMOL/L (ref 4–13)
BUN SERPL-MCNC: 10 MG/DL (ref 5–25)
CALCIUM SERPL-MCNC: 9.1 MG/DL (ref 8.4–10.2)
CHLORIDE SERPL-SCNC: 98 MMOL/L (ref 96–108)
CO2 SERPL-SCNC: 30 MMOL/L (ref 21–32)
CREAT SERPL-MCNC: 0.93 MG/DL (ref 0.6–1.3)
ERYTHROCYTE [DISTWIDTH] IN BLOOD BY AUTOMATED COUNT: 17.2 % (ref 11.6–15.1)
GFR SERPL CREATININE-BSD FRML MDRD: 82 ML/MIN/1.73SQ M
GLUCOSE SERPL-MCNC: 112 MG/DL (ref 65–140)
GLUCOSE SERPL-MCNC: 80 MG/DL (ref 65–140)
GLUCOSE SERPL-MCNC: 84 MG/DL (ref 65–140)
GLUCOSE SERPL-MCNC: 87 MG/DL (ref 65–140)
GLUCOSE SERPL-MCNC: 90 MG/DL (ref 65–140)
HCT VFR BLD AUTO: 38.6 % (ref 36.5–49.3)
HGB BLD-MCNC: 11.3 G/DL (ref 12–17)
MCH RBC QN AUTO: 24 PG (ref 26.8–34.3)
MCHC RBC AUTO-ENTMCNC: 29.3 G/DL (ref 31.4–37.4)
MCV RBC AUTO: 82 FL (ref 82–98)
PLATELET # BLD AUTO: 232 THOUSANDS/UL (ref 149–390)
PMV BLD AUTO: 12 FL (ref 8.9–12.7)
POTASSIUM SERPL-SCNC: 3.4 MMOL/L (ref 3.5–5.3)
RBC # BLD AUTO: 4.7 MILLION/UL (ref 3.88–5.62)
SODIUM SERPL-SCNC: 139 MMOL/L (ref 135–147)
WBC # BLD AUTO: 5.85 THOUSAND/UL (ref 4.31–10.16)

## 2024-07-18 PROCEDURE — 97163 PT EVAL HIGH COMPLEX 45 MIN: CPT

## 2024-07-18 PROCEDURE — 85027 COMPLETE CBC AUTOMATED: CPT | Performed by: FAMILY MEDICINE

## 2024-07-18 PROCEDURE — 82948 REAGENT STRIP/BLOOD GLUCOSE: CPT

## 2024-07-18 PROCEDURE — 99231 SBSQ HOSP IP/OBS SF/LOW 25: CPT | Performed by: PODIATRIST

## 2024-07-18 PROCEDURE — 97167 OT EVAL HIGH COMPLEX 60 MIN: CPT

## 2024-07-18 PROCEDURE — 80048 BASIC METABOLIC PNL TOTAL CA: CPT | Performed by: FAMILY MEDICINE

## 2024-07-18 PROCEDURE — 99232 SBSQ HOSP IP/OBS MODERATE 35: CPT | Performed by: INTERNAL MEDICINE

## 2024-07-18 RX ORDER — POTASSIUM CHLORIDE 20 MEQ/1
40 TABLET, EXTENDED RELEASE ORAL ONCE
Status: COMPLETED | OUTPATIENT
Start: 2024-07-18 | End: 2024-07-18

## 2024-07-18 RX ORDER — POTASSIUM CHLORIDE 20 MEQ/1
40 TABLET, EXTENDED RELEASE ORAL ONCE
Status: DISCONTINUED | OUTPATIENT
Start: 2024-07-18 | End: 2024-07-18

## 2024-07-18 RX ORDER — POTASSIUM CHLORIDE 20 MEQ/1
40 TABLET, EXTENDED RELEASE ORAL DAILY
Status: DISCONTINUED | OUTPATIENT
Start: 2024-07-18 | End: 2024-07-18

## 2024-07-18 RX ORDER — POTASSIUM CHLORIDE 20 MEQ/1
40 TABLET, EXTENDED RELEASE ORAL DAILY
Status: DISCONTINUED | OUTPATIENT
Start: 2024-07-18 | End: 2024-07-22 | Stop reason: HOSPADM

## 2024-07-18 RX ADMIN — APIXABAN 5 MG: 5 TABLET, FILM COATED ORAL at 09:23

## 2024-07-18 RX ADMIN — PANTOPRAZOLE SODIUM 40 MG: 40 TABLET, DELAYED RELEASE ORAL at 06:37

## 2024-07-18 RX ADMIN — PRAVASTATIN SODIUM 10 MG: 10 TABLET ORAL at 17:41

## 2024-07-18 RX ADMIN — ASPIRIN 81 MG: 81 TABLET, COATED ORAL at 09:23

## 2024-07-18 RX ADMIN — FUROSEMIDE 40 MG: 10 INJECTION, SOLUTION INTRAMUSCULAR; INTRAVENOUS at 17:44

## 2024-07-18 RX ADMIN — Medication 2000 UNITS: at 12:34

## 2024-07-18 RX ADMIN — GABAPENTIN 100 MG: 100 CAPSULE ORAL at 17:41

## 2024-07-18 RX ADMIN — Medication 1 TABLET: at 09:23

## 2024-07-18 RX ADMIN — METOPROLOL TARTRATE 50 MG: 50 TABLET, FILM COATED ORAL at 17:41

## 2024-07-18 RX ADMIN — LISINOPRIL 40 MG: 20 TABLET ORAL at 09:23

## 2024-07-18 RX ADMIN — FUROSEMIDE 40 MG: 10 INJECTION, SOLUTION INTRAMUSCULAR; INTRAVENOUS at 09:24

## 2024-07-18 RX ADMIN — POTASSIUM CHLORIDE 40 MEQ: 1500 TABLET, EXTENDED RELEASE ORAL at 09:23

## 2024-07-18 RX ADMIN — LEVOTHYROXINE SODIUM 325 MCG: 125 TABLET ORAL at 06:37

## 2024-07-18 RX ADMIN — METOPROLOL TARTRATE 50 MG: 50 TABLET, FILM COATED ORAL at 09:23

## 2024-07-18 RX ADMIN — APIXABAN 5 MG: 5 TABLET, FILM COATED ORAL at 17:41

## 2024-07-18 RX ADMIN — GABAPENTIN 100 MG: 100 CAPSULE ORAL at 09:23

## 2024-07-18 RX ADMIN — POTASSIUM CHLORIDE 40 MEQ: 1500 TABLET, EXTENDED RELEASE ORAL at 17:41

## 2024-07-18 NOTE — DISCHARGE INSTR - AVS FIRST PAGE
Discharge Instructions - Podiatry    Weight Bearing Status: Weight bearing as tolerated                    Pain: Continue analgesics as directed    Follow-up appointment instructions: Please make an appointment within one week of discharge with Dr. Skinner. Contact sooner if any increase in pain, or signs of infection occur    Wound Care: Please ensure dressings are clean, dry and intact at all times. Dressings should be changed every day or every other day at the latest. Dressings consist of Xeroform or any other petroleum enriched gauze, placed directly on the affected areas and covered with either many layers of gauze or an ABD pad, if available, followed by rolled gauze. If an ACE bandage is available, lightly wrap the lower limb with it to secure the dressing in place as well as provide light compression. Please call if you are unable to do dressing changes yourself and we will attempt to acquire professional nursing assistance.    Dear Armando Erickson,     It was our pleasure to care for you here at [facility].  It is our hope that we were always able to meet and exceed the expected standards for your care during your stay.  You were hospitalized due to Shortness of Breath .  You were cared for on the 5th floor under the service of May Rahman PA-C with the [facility] who covers for your primary care physician (PCP), Aida Interiano MD, while you were hospitalized.  If you have any questions or concerns related to this hospitalization, you may contact us at .  For follow up, we recommend that you follow up with your primary care physician.  Please review this entire discharge summary as additional general instructions may be provided later as well.  However, at this time we provide for you here, the most important instructions / recommendations at discharge:     Continue Lasix, Metoprolol, and Spirolactone   Continue Eliquis   Ambulatory referral  made to cardiology       Sincerely,     May Rahman PA-C

## 2024-07-18 NOTE — PHYSICAL THERAPY NOTE
Physical Therapy Evaluation     Patient's Name: Armando Erickson    Admitting Diagnosis  Shortness of breath [R06.02]  Paroxysmal atrial fibrillation (HCC) [I48.0]  Dyspnea on exertion [R06.09]  Pulmonary hypertension (HCC) [I27.20]  Venous stasis ulcer (HCC) [I83.009, L97909]    Problem List  Patient Active Problem List   Diagnosis    Controlled type 2 diabetes mellitus with complication, without long-term current use of insulin (HCC)    Essential hypertension    HLD (hyperlipidemia)    Hypothyroidism    Celiac disease    Coronary artery disease    History of duodenal ulcer    Living will on file    Obesity, Class III, BMI 40-49.9 (morbid obesity) (HCC)    S/P BKA (below knee amputation) unilateral, right (HCC)    Paroxysmal atrial fibrillation (HCC)    Iron deficiency anemia due to chronic blood loss    Chronic venous stasis dermatitis of left lower extremity    Gastroesophageal reflux disease without esophagitis    Diabetic ulcer of left foot associated with type 2 diabetes mellitus, limited to breakdown of skin (HCC)    Idiopathic chronic venous hypertension of left lower extremity with ulcer (HCC)    Non-pressure chronic ulcer of left calf, limited to breakdown of skin (HCC)    Diabetic polyneuropathy associated with type 2 diabetes mellitus (HCC)    Cellulitis of left lower extremity    Abnormal urinalysis    Ulcer of toe of left foot, limited to breakdown of skin (HCC)    Diabetic ulcer of left ankle associated with type 2 diabetes mellitus, limited to breakdown of skin (HCC)    Dermatophytosis    Venous stasis ulcer (HCC)    Acute diastolic congestive heart failure (HCC)       Past Medical History  Past Medical History:   Diagnosis Date    Atrial fibrillation (HCC)     Cellulitis     Diabetes mellitus (HCC)     Disease of thyroid gland     Duodenal ulcer     perforated- ICU stay    High blood pressure     High cholesterol     Hyperlipidemia     Hypertension     Hypothyroidism     Lymphedema      b/l LE- was  followed at wound center    Morbid obesity with BMI of 40.0-44.9, adult (HCC)     Peritonitis (HCC)        Past Surgical History  Past Surgical History:   Procedure Laterality Date    BELOW KNEE LEG AMPUTATION Right     DIAGNOSTIC LAPAROSCOPY      KNEE ARTHROSCOPY Bilateral     OTHER SURGICAL HISTORY  03/2014     lap repair    OTHER SURGICAL HISTORY      Laparoscopic repair of a perforated duodenal ulcer with Javed omental    OTHER SURGICAL HISTORY      Placement of drains          07/18/24 1031   PT Last Visit   PT Visit Date 07/18/24   Note Type   Note type Evaluation   Pain Assessment   Pain Assessment Tool 0-10   Pain Score No Pain   Restrictions/Precautions   Weight Bearing Precautions Per Order No   Other Precautions Fall Risk  (hx of R BKA, prosthetic)   Home Living   Type of Home Apartment   Home Layout One level;Ramped entrance;Elevator   Bathroom Shower/Tub Tub/shower unit   Bathroom Toilet Raised   Bathroom Equipment Shower chair;Grab bars around toilet;Toilet raiser;Tub transfer bench   Home Equipment Walker;Cane  (uses RW PTA)   Prior Function   Level of [place] Independent with ADLs;Independent with functional mobility;Independent with IADLS   Lives With Alone   Receives Help From Family   IADLs Independent with meal prep;Independent with medication management;Family/Friend/Other provides transportation   Falls in the last 6 months 0   Vocational Retired   General   Family/Caregiver Present No   Cognition   Overall Cognitive Status WFL   Arousal/Participation Alert   Attention Within functional limits   Orientation Level Oriented X4   Memory Within functional limits   Following Commands Follows all commands and directions without difficulty   Subjective   Subjective pt pleasant and cooperative throughout therapy session. pt received seated at EOB   RLE Assessment   RLE Assessment X  (4-/5 grossly)   LLE Assessment   LLE Assessment X  (4-/5 grossly)   Bed Mobility   Supine to Sit Unable to assess    Sit to Supine Unable to assess   Transfers   Sit to Stand 4  Minimal assistance   Additional items Assist x 1;Increased time required;Verbal cues   Stand to Sit 4  Minimal assistance   Additional items Assist x 1;Increased time required;Verbal cues   Additional Comments transfers w RW   Ambulation/Elevation   Gait pattern Improper Weight shift;Forward Flexion;Wide DOMINGA;Decreased foot clearance;Shuffling;Inconsistent demond;Short stride   Gait Assistance 4  Minimal assist   Additional items Assist x 1;Verbal cues;Tactile cues   Assistive Device Rolling walker   Distance 15'   Stair Management Assistance Not tested   Balance   Static Sitting Fair +   Dynamic Sitting Fair   Static Standing Fair -   Dynamic Standing Poor +   Ambulatory Poor +   Endurance Deficit   Endurance Deficit Yes   Endurance Deficit Description generalized weakness, decreased exercise tolerance   Activity Tolerance   Activity Tolerance Patient limited by fatigue   Medical Staff Made Aware OT Cami, nancy Ledezma due to medical complexity and mulitple comorbidities   Nurse Made Aware RN cleared and updated   Assessment   Prognosis Fair   Problem List Decreased strength;Decreased endurance;Decreased range of motion;Impaired balance;Decreased mobility;Pain   Assessment PT orders received and acknowledged. Patient was seen today for high complexity PT evaluation. High complexity evaluation due to Ongoing medical management for primary dx, Decreased activity tolerance compared to baseline, Fall risk, Continuous pulse oximetry monitoring  Patient is a 69 y.o. male  who was admitted to [facility] on 7/16/2024  with Acute diastolic congestive heart failure (HCC) . [facility] [place] with SOB . At baseline, pt resides alone in apartment and was independent prior to hospital admission. Currently, upon initial examination, pt  is requiring  min assist x1 for functional transfers and  min assist x1 for ambulation with RW.  Patient currently  presents below baseline with limitations in gait, balance, and transfers. Patient will benefit from continued PT services while in hospital in order to address remaining limitations. The patients AM-[facility] Basic Mobility Inpatient Short From Raw Score is 18 . Based on AM-PAC scoring and patient presentation, PT currently recommending Level II (Moderate Resource Intensity). Please also refer to the recommendation of the Physical Therapist for safe discharge planning.   Barriers to Discharge Decreased caregiver support   Goals   Patient Goals to get better   STG Expiration Date 08/01/24   Short Term Goal #1 Patient will be able to perform bed mobility tasks with  modified independent   in order to improve overall functional mobility and assist in safe d/c. STG 2 Patient will sit EOB for at least 25 minutes at  modified independent   level in order to strengthen abdominal musculature and assist in future transfers and ambulation. STG 3 Patient will be able to perform functional transfer with  modified independent   in order to improve overall functional mobility and assist in safe discharge. STG 4 Patient will be able to ambulate at least 150 feet with least restrictive device with  modified independent   assist in order to improve overall functional mobility and assist in safe discharge. STG 5 Patient will improve sitting/standing static/dynamic balance 1/2 grade in order to improve functional mobility and assist in safe discharge. STG 6 Patient will improve LE strength by 1/2 grade in order to improve functional mobility and assist in safe discharge.   PT Treatment Day 0   Plan   Treatment/Interventions ADL retraining;Functional transfer training;LE strengthening/ROM;Elevations;Therapeutic exercise;Endurance training;Gait training;Bed mobility;Spoke to nursing;OT   PT Frequency 3-5x/wk   Discharge Recommendation   Rehab Resource Intensity Level, PT II (Moderate Resource Intensity)   AM-PAC Basic Mobility Inpatient    Turning in Flat Bed Without Bedrails 4   Lying on Back to Sitting on Edge of Flat Bed Without Bedrails 4   Moving Bed to Chair 3   Standing Up From Chair Using Arms 3   Walk in Room 2   Climb 3-5 Stairs With Railing 2   Basic Mobility Inpatient Raw Score 18   Basic Mobility Standardized Score 41.05   [facility] Highest Level Of Mobility   -[facility] Goal 6: Walk 10 steps or more   -HLM Achieved 6: Walk 10 steps or more   End of Consult   Patient Position at End of Consult Bedside chair;All needs within reach         Manny Vega, PT

## 2024-07-18 NOTE — ASSESSMENT & PLAN NOTE
His of PAF, does not appear to be on AC at baseline  Rate is controlled with home medication metoprolol 50 mg BID  Started on Eliquis, will send to [facility] for price check on 7/18  Echo noted

## 2024-07-18 NOTE — PROGRESS NOTES
Cardiology Progress Note - Armando Erickson 69 y.o. male MRN: [id number]    Unit/Bed#: -01 Encounter: [id number]      Assessment:  Principal Problem:    Acute congestive heart failure (HCC)  Active Problems:    Controlled type 2 diabetes mellitus with complication, without long-term current use of insulin (HCC)    Obesity, Class III, BMI 40-49.9 (morbid obesity) (HCC)    Paroxysmal atrial fibrillation (HCC)    Diabetic polyneuropathy associated with type 2 diabetes mellitus (HCC)    Venous stasis ulcer (HCC)    69 y.o. male with paroxysmal atrial fibrillation status post AC DCCV in 2012, not anticoagulated, hypertension, hyperlipidemia, diabetes mellitus, chronic lymphedema of lower extremities, peripheral artery disease, status post right BKA for nonhealing ulcers, and a BMI of 47.31.  He presents with shortness of breath and is noted to have volume overload. There is questionable pulmonary hypertension given imaging findings of RV and RA dilation.      Volume overload likely secondary to acute CHF  Presentation with shortness of breath, swelling, PND  , BMI 47.31  Chest x-ray with mild pulmonary edema  Responding well to IV Lasix 40 mg twice daily  Urine output 4.7 L, -3.21 L  Continues to examine volume up with JVD, swelling is improving  TTE showed no significant changes from 2014.  EF 50%, moderately dilated RV 5.3 cm, normal RV function, mild TR, RVSP 44, biatrial dilation     Paroxysmal atrial fibrillation  Patient has not been following up with cardiologist for many years  AC DCCV in 2012  Patient has not been on anticoagulation, currently on aspirin  CHADVASC 3  Currently rate controlled on Lopressor 50 mg twice daily     Hypertension  Blood pressure was elevated on admission  Continue home blood pressure medications lisinopril 40 mg daily and Lopressor 50 mg twice daily     Hyperlipidemia  Diabetes mellitus  Chronic lower extremity lymphedema  Peripheral artery disease  Status post right  "BKA  Snoring  BMI 47.31     Plan  Responding well to current diuretic regimen with stable renal functions. Continue IV Lasix 40 mg twice daily  Daily weights standing if able, intake/output  Monitor renal functions, electrolytes  Continue Eliquis for atrial fibrillation  Continue lisinopril 40 mg daily  Continue Lopressor 50 mg twice daily  May need to uptitrate antihypertensives once patient is euvolemic  Outpatient sleep study    Subjective:   Patient seen and examined.  No significant events overnight.      Objective:     Vitals: Blood pressure 139/74, pulse 59, temperature 97.8 °F (36.6 °C), temperature source Oral, resp. rate 18, height 6' 7\" (2.007 m), weight (!) 169 kg (372 lb 2.2 oz), SpO2 93%., Body mass index is 41.92 kg/m².,   Orthostatic Blood Pressures      Flowsheet Row Most Recent Value   Blood Pressure 139/74 filed at 07/18/2024 0712   Patient Position - Orthostatic VS Sitting filed at 07/18/2024 0712              Intake/Output Summary (Last 24 hours) at 7/18/2024 0955  Last data filed at 7/18/2024 0900  Gross per 24 hour   Intake 1740 ml   Output 4675 ml   Net -2935 ml             Physical Exam:    GEN: Armando Erickson appears well, alert and oriented x 3, pleasant and cooperative   HEENT: pupils equal, round, and reactive to light; extraocular muscles intact  NECK: supple, no carotid bruits, JVD +  HEART: regular rhythm, normal S1 and S2, no murmurs, clicks, gallops or rubs   LUNGS: clear to auscultation bilaterally; no wheezes, rales, or rhonchi   ABDOMEN: normal bowel sounds, soft, no tenderness, no distention  EXTREMITIES: Chronic left lower extremity swelling, wrapped in dressing.  Amputation stump right side with mild swelling, edema continues to improve  NEURO: no focal findings   SKIN: normal without suspicious lesions on exposed skin    Medications:      Current Facility-Administered Medications:     acetaminophen (TYLENOL) tablet 650 mg, 650 mg, Oral, Q4H PRN, Jayesh Li MD    apixaban " (ELIQUIS) tablet 5 mg, 5 mg, Oral, BID, Jayesh Li MD, 5 mg at 07/18/24 0923    aspirin (ECOTRIN LOW STRENGTH) EC tablet 81 mg, 81 mg, Oral, Daily, Jayesh Li MD, 81 mg at 07/18/24 0923    cholecalciferol (VITAMIN D3) tablet 2,000 Units, 2,000 Units, Oral, Daily, Jayehs Li MD, 2,000 Units at 07/17/24 0954    furosemide (LASIX) injection 40 mg, 40 mg, Intravenous, BID, Anthony Pineda MD, 40 mg at 07/18/24 0924    gabapentin (NEURONTIN) capsule 100 mg, 100 mg, Oral, BID, Jayesh Li MD, 100 mg at 07/18/24 0923    insulin lispro (HumALOG/ADMELOG) 100 units/mL subcutaneous injection 1-6 Units, 1-6 Units, Subcutaneous, TID AC **AND** Fingerstick Glucose (POCT), , , TID AC, Jayesh Li MD    insulin lispro (HumALOG/ADMELOG) 100 units/mL subcutaneous injection 1-6 Units, 1-6 Units, Subcutaneous, HS, Jayesh Li MD    levothyroxine tablet 325 mcg, 325 mcg, Oral, Early Morning, Jayesh Li MD, 325 mcg at 07/18/24 0637    lisinopril (ZESTRIL) tablet 40 mg, 40 mg, Oral, Daily, Jayesh Li MD, 40 mg at 07/18/24 0923    metoprolol tartrate (LOPRESSOR) tablet 50 mg, 50 mg, Oral, BID, Jayesh Li MD, 50 mg at 07/18/24 0923    multivitamin-minerals (CENTRUM) tablet 1 tablet, 1 tablet, Oral, Daily, Jayesh Li MD, 1 tablet at 07/18/24 0923    ondansetron (ZOFRAN) injection 4 mg, 4 mg, Intravenous, Q6H PRN, Jayesh Li MD    pantoprazole (PROTONIX) EC tablet 40 mg, 40 mg, Oral, Early Morning, Jayesh Li MD, 40 mg at 07/18/24 0637    potassium chloride (Klor-Con M20) CR tablet 40 mEq, 40 mEq, Oral, Daily, Anthony Pineda MD, 40 mEq at 07/18/24 0923    potassium chloride (Klor-Con M20) CR tablet 40 mEq, 40 mEq, Oral, Once, Anthony Pineda MD    pravastatin (PRAVACHOL) tablet 10 mg, 10 mg, Oral, Daily With Dinner, Jayesh Li MD, 10 mg at 07/17/24 1727     Labs & Results:        Results from last 7 days   Lab Units 07/18/24  0455 07/17/24  0501 07/16/24  1238   WBC Thousand/uL  5.85 5.83 6.03   HEMOGLOBIN g/dL 11.3* 11.0* 11.1*   HEMATOCRIT % 38.6 37.0 37.3   PLATELETS Thousands/uL 232 202 215         Results from last 7 days   Lab Units 07/18/24  0455 07/17/24  0501 07/16/24  1238   POTASSIUM mmol/L 3.4* 3.6 3.8   CHLORIDE mmol/L 98 100 100   CO2 mmol/L 30 31 31   BUN mg/dL 10 10 9   CREATININE mg/dL 0.93 0.87 0.85   CALCIUM mg/dL 9.1 9.0 8.9   ALK PHOS U/L  --   --  89   ALT U/L  --   --  5*   AST U/L  --   --  10*             EKG personally reviewed by Anthony Pineda MD.

## 2024-07-18 NOTE — ASSESSMENT & PLAN NOTE
Presented with SOB, edema and PND,  and chest x-ray showing mild pulmonary edema.  Not on any diuretics at baseline  Cardiology following, input appreciated  Continues on IV Lasix 40 mg twice daily  Echo with EF 50%, cannot identify RWMA, severely dilated LA, moderate MVR, mild TVR and PVR  Continue lopressor 50 mg BID and lisinopril 40 mg daily

## 2024-07-18 NOTE — PLAN OF CARE
Problem: PHYSICAL THERAPY ADULT  Goal: Performs mobility at highest level of function for planned discharge setting.  See evaluation for individualized goals.  Description: Treatment/Interventions: ADL retraining, Functional transfer training, LE strengthening/ROM, Elevations, Therapeutic exercise, Endurance training, Gait training, Bed mobility, Spoke to nursing, OT          See flowsheet documentation for full assessment, interventions and recommendations.  Note: Prognosis: Fair  Problem List: Decreased strength, Decreased endurance, Decreased range of motion, Impaired balance, Decreased mobility, Pain  Assessment: PT orders received and acknowledged. Patient was seen today for high complexity PT evaluation. High complexity evaluation due to Ongoing medical management for primary dx, Decreased activity tolerance compared to baseline, Fall risk, Continuous pulse oximetry monitoring  Patient is a 69 y.o. male  who was admitted to [facility] on 7/16/2024  with Acute diastolic congestive heart failure (HCC) . Pt presents to [facility] with SOB . At baseline, pt resides alone in apartment and was independent prior to hospital admission. Currently, upon initial examination, pt  is requiring  min assist x1 for functional transfers and  min assist x1 for ambulation with RW.  Patient currently presents below baseline with limitations in gait, balance, and transfers. Patient will benefit from continued PT services while in hospital in order to address remaining limitations. The patients AM-PAC Basic Mobility Inpatient Short From Raw Score is 18 . Based on AM-PAC scoring and patient presentation, PT currently recommending Level II (Moderate Resource Intensity). Please also refer to the recommendation of the Physical Therapist for safe discharge planning.  Barriers to Discharge: Decreased caregiver support     Rehab Resource Intensity Level, PT: II (Moderate Resource Intensity)    See flowsheet documentation for full  assessment.

## 2024-07-18 NOTE — OCCUPATIONAL THERAPY NOTE
Occupational Therapy Evaluation     Patient Name: Armando Erickson  Today's Date: 7/18/2024  Problem List  Principal Problem:    Acute diastolic congestive heart failure (HCC)  Active Problems:    Controlled type 2 diabetes mellitus with complication, without long-term current use of insulin (HCC)    Obesity, Class III, BMI 40-49.9 (morbid obesity) (HCC)    Paroxysmal atrial fibrillation (HCC)    Diabetic polyneuropathy associated with type 2 diabetes mellitus (HCC)    Venous stasis ulcer (HCC)    Past Medical History  Past Medical History:   Diagnosis Date    Atrial fibrillation (HCC)     Cellulitis     Diabetes mellitus (HCC)     Disease of thyroid gland     Duodenal ulcer     perforated- ICU stay    High blood pressure     High cholesterol     Hyperlipidemia     Hypertension     Hypothyroidism     Lymphedema      b/l LE- was followed at wound center    Morbid obesity with BMI of 40.0-44.9, adult (HCC)     Peritonitis (HCC)      Past Surgical History  Past Surgical History:   Procedure Laterality Date    BELOW KNEE LEG AMPUTATION Right     DIAGNOSTIC LAPAROSCOPY      KNEE ARTHROSCOPY Bilateral     OTHER SURGICAL HISTORY  03/2014     lap repair    OTHER SURGICAL HISTORY      Laparoscopic repair of a perforated duodenal ulcer with Javed omental    OTHER SURGICAL HISTORY      Placement of drains         07/18/24 1030   OT Last Visit   OT Visit Date 07/18/24   Note Type   Note type Evaluation   Pain Assessment   Pain Assessment Tool 0-10   Pain Score No Pain   Restrictions/Precautions   Weight Bearing Precautions Per Order No   Other Precautions Chair Alarm;Bed Alarm;Fall Risk;Telemetry  (hx R BKA with prosthesis)   Home Living   Type of Home Apartment   Home Layout One level;Elevator   Bathroom Shower/Tub Tub/shower unit   Bathroom Toilet Raised   Bathroom Equipment Shower chair;Grab bars around toilet   Home Equipment Walker;Cane  (Pt reports using rw for mobility at baseline)   Prior Function   Level of  [place] Independent with ADLs;Independent with functional mobility;Independent with IADLS   Lives With Alone   Receives Help From Family   IADLs Independent with meal prep;Independent with medication management;Family/Friend/Other provides transportation   Falls in the last 6 months 0   Vocational Retired   Lifestyle   Autonomy Pt (I) with ADLs, IADLs, and functional mobility. Pt -  and retired   Reciprocal Relationships family   Service to Others retired   ADL   Where Assessed Edge of bed   Eating Assistance 6  Modified independent   Grooming Assistance 5  Supervision/Setup   UB Bathing Assistance 5  Supervision/Setup   LB Bathing Assistance 3  Moderate Assistance   UB Dressing Assistance 5  Supervision/Setup   LB Dressing Assistance 3  Moderate Assistance   Toileting Assistance  3  Moderate Assistance   Functional Assistance 4  Minimal Assistance   Bed Mobility   Supine to Sit 4  Minimal assistance   Additional items Assist x 1;HOB elevated;Increased time required;Verbal cues   Transfers   Sit to Stand 4  Minimal assistance   Additional items Assist x 1;Increased time required;Verbal cues   Stand to Sit 4  Minimal assistance   Additional items Assist x 1;Increased time required;Verbal cues   Additional Comments with rw   Functional Mobility   Functional Mobility 4  Minimal assistance   Additional Comments Pt requires MIN Ax1 to ambulate short distances with rw   Additional items Rolling walker   Balance   Static Sitting Fair +   Dynamic Sitting Fair   Static Standing Fair -   Dynamic Standing Poor +   Ambulatory Poor +   Activity Tolerance   Activity Tolerance Patient limited by fatigue   Medical Staff Made Aware PT   Nurse Made Aware RN Cleared   RUE Assessment   RUE Assessment WFL   LUE Assessment   LUE Assessment WFL   Hand Function   Gross Motor Coordination Functional   Fine Motor Coordination Functional   Cognition   Overall Cognitive Status WFL   Arousal/Participation Alert;Responsive;Cooperative    Attention Within functional limits   Orientation Level Oriented X4   Memory Within functional limits   Following Commands Follows all commands and directions without difficulty   Comments Pt agreeable to therapy with good safety awareness   Assessment   Limitation Decreased ADL status;Decreased endurance;Decreased self-care trans;Decreased high-level ADLs   Prognosis Good   Assessment Pt is a 70 y/o male that was admitted to [facility] 7/16/2024 with acute CHF. Pt  has a past medical history of Atrial fibrillation (HCC), Cellulitis, Diabetes mellitus (HCC), Disease of thyroid gland, Duodenal ulcer, High blood pressure, High cholesterol, Hyperlipidemia, Hypertension, Hypothyroidism, Lymphedema, Morbid obesity with BMI of 40.0-44.9, adult (HCC), and Peritonitis (HCC). Pt lives alone in a one level apartment with elevator access, raised toilet with grab bars, and tub shower unit with shower chair. Pt reports using rw for mobility at baseline. Prior to admission pt (I) ADLs, IADLs, and functional mobility. Pt currently requires MIN A to complete bed mobility, functional transfers, and short distance ambulation with rw. Pt requires MOD A to complete LB ADLs and toileting. Pt limited by decreased ADL status, functional transfers, functional mobility, and activity tolerance. Pt supine in bed at begning of session, pt seated in bedside chair at end of session with alarm set and items within reach. The patient's raw score on the -PAC Daily Activity Inpatient Short Form is 18. A raw score of less than 19 suggests the patient may benefit from discharge to post-acute rehabilitation services. Please refer to the recommendation of the Occupational Therapist for safe discharge planning.  Recommend Level II moderate intensity OT services at d/c to maximize pt function.   Goals   Patient Goals to feel better   LTG Time Frame 10-14   Plan   Treatment Interventions ADL retraining;Functional transfer training;UE  strengthening/ROM;Endurance training;Patient/family training;Equipment evaluation/education;Compensatory technique education;Continued evaluation;Energy conservation;Activityengagement   Goal Expiration Date 08/01/24   OT Frequency 2-3x/wk   Discharge Recommendation   Rehab Resource Intensity Level, OT II (Moderate Resource Intensity)   AM-PAC Daily Activity Inpatient   Lower Body Dressing 2   Bathing 2   Toileting 2   Upper Body Dressing 4   Grooming 4   Eating 4   Daily Activity Raw Score 18   Daily Activity Standardized Score (Calc for Raw Score >=11) 38.66   AM-PAC Applied Cognition Inpatient   Following a Speech/Presentation 4   Understanding Ordinary Conversation 4   Taking Medications 4   Remembering Where Things Are Placed or Put Away 4   Remembering List of 4-5 Errands 4   Taking Care of Complicated Tasks 4   Applied Cognition Raw Score 24   Applied Cognition Standardized Score 62.21   End of Consult   Education Provided Yes   Patient Position at End of Consult Bedside chair;Bed/Chair alarm activated;All needs within reach   Nurse Communication Nurse aware of consult     Goals:    Pt will complete functional transfers with MOD IND and appropriate AD to maximize pt safety.    Pt will complete bed mobility with MOD IND  to maximize pt safety.    Pt will complete grooming tasks with MOD IND to maximize pt independence.    Pt will complete LB ADLs with MOD IND  to maximize pt independence.    Pt will complete UB ADLs with MOD IND to maximize pt independence.    Pt will complete toileting with MOD IND to maximize pt independence.    Pt will complete functional household distance mobility with MOD IND and appropriate AD to maximize pt safety.    Pt will complete simulated IADL tasks with MOD IND to maximize pt independence.     Pt will be able to tolerate 30 minutes of functional activity during therapy session.        JAYASHREE George, OTR/L

## 2024-07-18 NOTE — CASE MANAGEMENT
Case Management Assessment & Discharge Planning Note    Patient name Armando Dre  Location /-01 MRN [id number]  : 1955 Date 2024       Current Admission Date: 2024  Current Admission Diagnosis:Acute diastolic congestive heart failure ([facility])   Patient Active Problem List    Diagnosis Date Noted Date Diagnosed    Venous stasis ulcer ([facility]) 2024     Acute diastolic congestive heart failure ([facility]) 2024     Ulcer of toe of left foot, limited to breakdown of skin ([facility]) 2022     Diabetic ulcer of left ankle associated with type 2 diabetes mellitus, limited to breakdown of skin ([facility]) 2022     Dermatophytosis 2022     Abnormal urinalysis 06/10/2021     Cellulitis of left lower extremity 2021     Diabetic polyneuropathy associated with type 2 diabetes mellitus ([facility]) 2021     Diabetic ulcer of left foot associated with type 2 diabetes mellitus, limited to breakdown of skin ([facility]) 02/10/2021     Idiopathic chronic venous hypertension of left lower extremity with ulcer ([facility]) 02/10/2021     Non-pressure chronic ulcer of left calf, limited to breakdown of skin ([facility]) 02/10/2021     Gastroesophageal reflux disease without esophagitis 2019     Chronic venous stasis dermatitis of left lower extremity 2019     Essential hypertension 2019     HLD (hyperlipidemia) 2019     Hypothyroidism 2019     Obesity, Class III, BMI 40-49.9 (morbid obesity) ([facility]) 2019     Living will on file 2018     S/P BKA (below knee amputation) unilateral, right ([facility]) 2017     Controlled type 2 diabetes mellitus with complication, without long-term current use of insulin ([facility]) 10/10/2016     Iron deficiency anemia due to chronic blood loss 2014     History of duodenal ulcer 2014     Paroxysmal atrial fibrillation ([facility]) 10/02/2012     Celiac disease 2012     Coronary artery disease 2012       LOS (days): 2  Geometric  Mean LOS (GMLOS) (days): 2.7  Days to GMLOS:0.8     OBJECTIVE:    Risk of Unplanned Readmission Score: 10.97         Current admission status: Inpatient       Preferred Pharmacy:   [facility] - [place], [place] - [address]  [address] [address]  Phone: [phone] Fax: [phone]    [facility] [address] [place] [address]  Phone: [phone] Fax: [phone]    Primary Care Provider: Aida Interiano MD    Primary Insurance: MEDICARE  Secondary Insurance: BLUE CROSS    ASSESSMENT:  Active Health Care Proxies       Mary Jo Calle Health Care Agent - Daughter   Primary Phone: [phone] (Mobile)                 Advance Directives  Does patient have a Health Care POA?: Yes  Does patient have Advance Directives?: Yes  Advance Directives: Living will, Power of  for health care, Power of  for finance  Primary Contact: Hiliary, daughter         Readmission Root Cause  30 Day Readmission: No    Patient Information  Admitted from:: Home  Mental Status: Alert  During Assessment patient was accompanied by: Not accompanied during assessment  Assessment information provided by:: Patient  Primary Caregiver: Self  Support Systems: Daughter, Self, Friends/neighbors  County of Residence: [place]  [facility] do you live in?: [place]  Home entry access options. Select all that apply.: Ramp, Elevator  Type of Current Residence: Apartment  Floor Level: 3  Upon entering residence, is there a bedroom on the main floor (no further steps)?: Yes  Upon entering residence, is there a bathroom on the main floor (no further steps)?: Yes  Living Arrangements: Lives Alone    Activities of Daily Living Prior to Admission  Functional Status: Assistance  Completes ADLs independently?: No  Level of ADL dependence: Assistance  Ambulates independently?: No  Level of ambulatory dependence: Assistance  Does  patient use assisted devices?: Yes  Assisted Devices (DME) used: Walker, Shower Chair, Straight Cane  Does patient currently own DME?: Yes  What DME does the patient currently own?: Shower Chair, Straight Cane, Walker  Does patient have a history of Outpatient Therapy (PT/OT)?: No  Does the patient have a history of Short-Term Rehab?: Yes ([facility])  Does patient have a history of HHC?: Yes ([facility])  Does patient currently have HHC?: No         Patient Information Continued  Income Source: Pension/FCI  Does patient have prescription coverage?: Yes  Does patient receive dialysis treatments?: No  Does patient have a history of substance abuse?: No  Does patient have a history of Mental Health Diagnosis?: No         Means of Transportation  Means of Transport to App:: Family transport      Social Determinants of Health (SDOH)      Flowsheet Row Most Recent Value   Housing Stability    In the past 12 months, how many times have you moved where you were living? 0   At any time in the past 12 months, were you homeless or living in a shelter (including now)? N   Transportation Needs    In the past 12 months, has lack of transportation kept you from medical appointments or from getting medications? no   In the past 12 months, has lack of transportation kept you from meetings, work, or from getting things needed for daily living? No   Food Insecurity    Within the past 12 months, you worried that your food would run out before you got the money to buy more. Never true   Within the past 12 months, the food you bought just didn't last and you didn't have money to get more. Never true   Utilities    In the past 12 months has the electric, gas, oil, or water company threatened to shut off services in your home? No            DISCHARGE DETAILS:    Discharge planning discussed with:: Pt  Freedom of Choice: Yes  Comments - Freedom of Choice: Discussed FOC  CM contacted family/caregiver?: No- see comments (Pt alert and  oriented)  Were Treatment Team discharge recommendations reviewed with patient/caregiver?: Yes          Contacts  Patient Contacts: lazarus Gibson  Relationship to Patient:: Other (Comment) (Self)  Contact Method: In Person  Reason/Outcome: Continuity of Care, Discharge Planning                                                                     Additional Comments: Met pt bedside to introduce self and role. Pt resides alone in 3rd floor apartment with ramp entrance and elevator access. He has hx of BKA, prosthetic leg by bedside. He uses walker and cane for ambulation. Pt has no hx of OPPT, utilized [facility] for HHC, and went to [facility] for rehab when he had his amputation. He denies hx of MH diagnosis or substance use. He is retired/disabled and does not drive. His two daugthers, who have equal POA, can provide transportation when needed. [facility] also provides transportaiton for his dr visits.

## 2024-07-18 NOTE — ASSESSMENT & PLAN NOTE
Do not appear to be infected at this time  Monitor closely  On IV diuretics  Podiatry input appreciated, recommending local wound care

## 2024-07-18 NOTE — PROGRESS NOTES
"Podiatry - Progress Note  Patient: Armando Erickson 69 y.o. male   MRN: [id number]  PCP: Aida Interiano MD  Unit/Bed#: -01 Encounter: [id number]  Date Of Visit: 24    ASSESSMENT:    Armando Erickson is a 69 y.o. male with:    Chronic Venous stasis with denudation/disruption of skin LLE.  Type 2 DM with PN  OBS class 3  CAD - a-fib  S/P right BKA      PLAN:    Patient was seen, evaluated and treated with all questions answered.  Dressing changed. The left lower leg wound showed moderate serous drainage with no sign of infection, including purulence, redness, swelling, severe pain on palpation or malodor. No crepitus or fluctuation was found.   New dressing was applied to the left lower leg, including Xeroform, ABD, Kerlix and light wrapped ACE bandage.   Continue local wound care, including Xeroform, ABD, Kerlix and lightly wrapped ACE to the left lower leg. Wound care instruction placed. Appreciate nursing assistance with dressing changes.  Elevation and offloading on green foam wedges or pillows when non-ambulatory.  Rest of care per primary team.     Weightbearing status: Weightbearing to heel left  foot, right foot s/p BKA    SUBJECTIVE:     The patient was seen, evaluated, and assessed at bedside today. The patient was awake, alert, and in no acute distress. No acute events overnight. The patient reports minimal pain to the left lower extremity. Patient denies N/V/F/chills/SOB/CP.      OBJECTIVE:     Vitals:   /60 (BP Location: Left arm)   Pulse 59   Temp 98 °F (36.7 °C) (Oral)   Resp 19   Ht 6' 7\" (2.007 m)   Wt (!) 169 kg (372 lb 2.2 oz)   SpO2 93%   BMI 41.92 kg/m²     Temp (24hrs), Av.1 °F (36.7 °C), Min:97.8 °F (36.6 °C), Max:98.3 °F (36.8 °C)      Physical Exam:     Lungs: Non labored breathing  Abdomen: Soft, non-tender.  Lower Extremity:  Cardiovascular status at baseline from admission.  Neurological status at baseline from admission.  Musculoskeletal status at baseline from " "admission. No calf tenderness noted.     Large epidermal disruption in the anterior aspect of the lower left leg. Mildly discolored and consistent with venous stasis dermatitis. Moderate amount of serous drainage was found. No sign of infection was noted, including purulence, redness, swelling, heat, malodor, lymphangitis. No full thickness skin open wound was found. The skin quality and edema is improved since 7/16 encounter.     Clinical Images 07/18/24:      Additional Data:     Labs:    Results from last 7 days   Lab Units 07/18/24  0455 07/17/24  0501 07/16/24  1238   WBC Thousand/uL 5.85   < > 6.03   HEMOGLOBIN g/dL 11.3*   < > 11.1*   HEMATOCRIT % 38.6   < > 37.3   PLATELETS Thousands/uL 232   < > 215   SEGS PCT %  --   --  76*   LYMPHO PCT %  --   --  13*   MONO PCT %  --   --  10   EOS PCT %  --   --  1    < > = values in this interval not displayed.     Results from last 7 days   Lab Units 07/18/24  0455 07/17/24  0501 07/16/24  1238   POTASSIUM mmol/L 3.4*   < > 3.8   CHLORIDE mmol/L 98   < > 100   CO2 mmol/L 30   < > 31   BUN mg/dL 10   < > 9   CREATININE mg/dL 0.93   < > 0.85   CALCIUM mg/dL 9.1   < > 8.9   ALK PHOS U/L  --   --  89   ALT U/L  --   --  5*   AST U/L  --   --  10*    < > = values in this interval not displayed.           * I Have Reviewed All Lab Data Listed Above.    Recent Cultures (last 7 days):               Imaging: I have personally reviewed pertinent films in PACS  EKG, Pathology, and Other Studies: I have personally reviewed pertinent reports.    ** Please Note: Portions of the record may have been created with voice recognition software. Occasional wrong word or \"sound a like\" substitutions may have occurred due to the inherent limitations of voice recognition software. Read the chart carefully and recognize, using context, where substitutions have occurred. **     "

## 2024-07-18 NOTE — PROGRESS NOTES
Patient admitted to [facility]; Advanced Heart Failure Census.     Outpatient Advanced Heart Failure LCSW completed electronic chart review and rounded with the HF Team. Patient sitting in recliner and wearing his prosthesis (hx R BKA). HF Team discussed Today's Plan and will continue diuresis.    Referral for outpatient social work not entered at this time.   Please enter referral if outpatient resources are needed in the future.

## 2024-07-18 NOTE — ASSESSMENT & PLAN NOTE
Lab Results   Component Value Date    HGBA1C 5.4 07/16/2024       Recent Labs     07/17/24  1157 07/17/24  1608 07/17/24  2141 07/18/24  0742   POCGLU 86 96 96 84         Blood Sugar Average: Last 72 hrs:  (P) 91.625  Appears adequately controlled on metoformin outpatient  DM diet while inpatient  Basal lantus 20 units started while inpatient however will discontinue and instead continue SSI alone

## 2024-07-18 NOTE — PROGRESS NOTES
[facility]  Progress Note  Name: Armando Erickson I  MRN: [id number]  Unit/Bed#: -01 I Date of Admission: 7/16/2024   Date of Service: 7/18/2024 I Hospital Day: 2    Assessment & Plan   * Acute congestive heart failure (HCC)  Assessment & Plan  Presented with SOB, edema and PND,  and chest x-ray showing mild pulmonary edema.  Not on any diuretics at baseline  Cardiology following, input appreciated  Continues on IV Lasix 40 mg twice daily  Echo with EF 50%, cannot identify RWMA, severely dilated LA, moderate MVR, mild TVR and PVR  Continue lopressor 50 mg BID and lisinopril 40 mg daily    Venous stasis ulcer (HCC)  Assessment & Plan  Do not appear to be infected at this time  Monitor closely  On IV diuretics  Podiatry input appreciated, recommending local wound care    Paroxysmal atrial fibrillation (HCC)  Assessment & Plan  His of PAF, does not appear to be on AC at baseline  Rate is controlled with home medication metoprolol 50 mg BID  Started on Eliquis, will send to [employer] for price check on 7/18  Echo noted     Diabetic polyneuropathy associated with type 2 diabetes mellitus (HCC)  Assessment & Plan  (P) 91.625  continue neurontin      Obesity, Class III, BMI 40-49.9 (morbid obesity) ([facility])  Assessment & Plan  Affects all aspects of his care  Weight loss counseling when stable as an outpatient    Controlled type 2 diabetes mellitus with complication, without long-term current use of insulin ([facility])  Assessment & Plan  Lab Results   Component Value Date    HGBA1C 5.4 07/16/2024       Recent Labs     07/17/24  1157 07/17/24  1608 07/17/24  2141 07/18/24  0742   POCGLU 86 96 96 84         Blood Sugar Average: Last 72 hrs:  (P) 91.625  Appears adequately controlled on metoformin outpatient  DM diet while inpatient  Basal lantus 20 units started while inpatient however will discontinue and instead continue SSI alone               VTE Pharmacologic Prophylaxis:    Moderate Risk (Score 3-4) - Pharmacological DVT Prophylaxis Ordered: apixaban (Eliquis).    Mobility:   Basic Mobility Inpatient Raw Score: 17  JH-HLM Goal: 5: Stand one or more mins  JH-HLM Achieved: 5: Stand (1 or more minutes)  JH-HLM Goal achieved. Continue to encourage appropriate mobility.    Patient Centered Rounds: I performed bedside rounds with nursing staff today.   Discussions with Specialists or Other Care Team Provider: harpal LADD, appreciate cards input     Education and Discussions with Family / Patient: Patient declined call to .     Total Time Spent on Date of Encounter in care of patient: 35 mins. This time was spent on one or more of the following: performing physical exam; counseling and coordination of care; obtaining or reviewing history; documenting in the medical record; reviewing/ordering tests, medications or procedures; communicating with other healthcare professionals and discussing with patient's family/caregivers.    Current Length of Stay: 2 day(s)  Current Patient Status: Inpatient   Certification Statement: The patient will continue to require additional inpatient hospital stay due to IV diuresis   Discharge Plan: Anticipate discharge in 48-72 hrs to discharge location to be determined pending rehab evaluations.    Code Status: Level 1 - Full Code    Subjective:   Pt seen and examined. Denies complaints. Still feels SOB but improved from when he was admitted. Just worked with PT.     Objective:     Vitals:   Temp (24hrs), Av.1 °F (36.7 °C), Min:97.8 °F (36.6 °C), Max:98.3 °F (36.8 °C)    Temp:  [97.8 °F (36.6 °C)-98.3 °F (36.8 °C)] 97.8 °F (36.6 °C)  HR:  [59-72] 59  Resp:  [16-18] 18  BP: (139-153)/(74-87) 139/74  SpO2:  [93 %-97 %] 93 %  Body mass index is 41.92 kg/m².     Input and Output Summary (last 24 hours):     Intake/Output Summary (Last 24 hours) at 2024 0946  Last data filed at 2024 0900  Gross per 24 hour   Intake 1740 ml   Output 4675 ml   Net  -2935 ml       Physical Exam:   Physical Exam  Vitals and nursing note reviewed.   Constitutional:       Appearance: He is obese.      Comments: On RA    Cardiovascular:      Rate and Rhythm: Normal rate.   Pulmonary:      Effort: No respiratory distress.      Comments: Dec   Abdominal:      General: Bowel sounds are normal. There is no distension.      Palpations: Abdomen is soft.   Musculoskeletal:      Left lower leg: Edema present.      Comments: S/p R BKA with prosthesis    Skin:     Comments: LLE wrapped   Neurological:      Mental Status: He is alert and oriented to person, place, and time.   Psychiatric:         Mood and Affect: Mood normal.          Additional Data:     Labs:  Results from last 7 days   Lab Units 07/18/24  0455 07/17/24  0501 07/16/24  1238   WBC Thousand/uL 5.85   < > 6.03   HEMOGLOBIN g/dL 11.3*   < > 11.1*   HEMATOCRIT % 38.6   < > 37.3   PLATELETS Thousands/uL 232   < > 215   SEGS PCT %  --   --  76*   LYMPHO PCT %  --   --  13*   MONO PCT %  --   --  10   EOS PCT %  --   --  1    < > = values in this interval not displayed.     Results from last 7 days   Lab Units 07/18/24  0455 07/17/24  0501 07/16/24  1238   SODIUM mmol/L 139   < > 139   POTASSIUM mmol/L 3.4*   < > 3.8   CHLORIDE mmol/L 98   < > 100   CO2 mmol/L 30   < > 31   BUN mg/dL 10   < > 9   CREATININE mg/dL 0.93   < > 0.85   ANION GAP mmol/L 11   < > 8   CALCIUM mg/dL 9.1   < > 8.9   ALBUMIN g/dL  --   --  3.9   TOTAL BILIRUBIN mg/dL  --   --  0.66   ALK PHOS U/L  --   --  89   ALT U/L  --   --  5*   AST U/L  --   --  10*   GLUCOSE RANDOM mg/dL 80   < > 92    < > = values in this interval not displayed.         Results from last 7 days   Lab Units 07/18/24  0742 07/17/24  2141 07/17/24  1608 07/17/24  1157 07/17/24  0759 07/16/24  2216 07/16/24  2132 07/16/24  1629   POC GLUCOSE mg/dl 84 96 96 86 74 117 86 94     Results from last 7 days   Lab Units 07/16/24  0382   HEMOGLOBIN A1C % 5.4           Lines/Drains:  Invasive  Devices       Peripheral Intravenous Line  Duration             Peripheral IV 07/16/24 Right Antecubital 1 day                      Telemetry:  Telemetry Orders (From admission, onward)               24 Hour Telemetry Monitoring  Continuous x 24 Hours (Telem)        Question:  Reason for 24 Hour Telemetry  Answer:  Decompensated CHF- and any one of the following: continuous diuretic infusion or total diuretic dose >200 mg daily, associated electrolyte derangement (I.e. K < 3.0), ionotropic drip (continuous infusion), hx of ventricular arrhythmia, or new EF < 35%                     Telemetry Reviewed:  reviewed  Indication for Continued Telemetry Use: Acute CHF on >200 mg lasix/day or equivalent dose or with new reduced EF.              Imaging: Reviewed radiology reports from this admission including: chest xray    Recent Cultures (last 7 days):         Last 24 Hours Medication List:   Current Facility-Administered Medications   Medication Dose Route Frequency Provider Last Rate    acetaminophen  650 mg Oral Q4H PRN Jayesh Li MD      apixaban  5 mg Oral BID Jayesh Li MD      aspirin  81 mg Oral Daily Jayesh Li MD      cholecalciferol  2,000 Units Oral Daily Jayesh Li MD      furosemide  40 mg Intravenous BID Anthony Pineda MD      gabapentin  100 mg Oral BID Jayesh Li MD      insulin lispro  1-6 Units Subcutaneous TID AC Jayesh Li MD      insulin lispro  1-6 Units Subcutaneous HS Jayesh Li MD      levothyroxine  325 mcg Oral Early Morning Jayesh Li MD      lisinopril  40 mg Oral Daily Jayesh Li MD      metoprolol tartrate  50 mg Oral BID Jayesh Li MD      multivitamin-minerals  1 tablet Oral Daily Jayesh Li MD      ondansetron  4 mg Intravenous Q6H PRN Jayesh Li MD      pantoprazole  40 mg Oral Early Morning Jayesh Li MD      potassium chloride  40 mEq Oral Daily Anthony Pineda MD      potassium chloride  40 mEq Oral Once Anthony Pineda  MD      pravastatin  10 mg Oral Daily With Edison Li MD          Today, Patient Was Seen By: Sandra Alvarez PA-C    **Please Note: This note may have been constructed using a voice recognition system.**

## 2024-07-19 PROBLEM — K59.00 CONSTIPATION: Chronic | Status: ACTIVE | Noted: 2024-07-19

## 2024-07-19 LAB
ANION GAP SERPL CALCULATED.3IONS-SCNC: 10 MMOL/L (ref 4–13)
BUN SERPL-MCNC: 13 MG/DL (ref 5–25)
CALCIUM SERPL-MCNC: 9.3 MG/DL (ref 8.4–10.2)
CHLORIDE SERPL-SCNC: 97 MMOL/L (ref 96–108)
CO2 SERPL-SCNC: 31 MMOL/L (ref 21–32)
CREAT SERPL-MCNC: 0.9 MG/DL (ref 0.6–1.3)
GFR SERPL CREATININE-BSD FRML MDRD: 85 ML/MIN/1.73SQ M
GLUCOSE SERPL-MCNC: 121 MG/DL (ref 65–140)
GLUCOSE SERPL-MCNC: 88 MG/DL (ref 65–140)
GLUCOSE SERPL-MCNC: 94 MG/DL (ref 65–140)
GLUCOSE SERPL-MCNC: 96 MG/DL (ref 65–140)
GLUCOSE SERPL-MCNC: 98 MG/DL (ref 65–140)
MAGNESIUM SERPL-MCNC: 2 MG/DL (ref 1.9–2.7)
POTASSIUM SERPL-SCNC: 3.7 MMOL/L (ref 3.5–5.3)
SODIUM SERPL-SCNC: 138 MMOL/L (ref 135–147)

## 2024-07-19 PROCEDURE — 83735 ASSAY OF MAGNESIUM: CPT | Performed by: INTERNAL MEDICINE

## 2024-07-19 PROCEDURE — 99232 SBSQ HOSP IP/OBS MODERATE 35: CPT | Performed by: INTERNAL MEDICINE

## 2024-07-19 PROCEDURE — 99232 SBSQ HOSP IP/OBS MODERATE 35: CPT

## 2024-07-19 PROCEDURE — 82948 REAGENT STRIP/BLOOD GLUCOSE: CPT

## 2024-07-19 PROCEDURE — 80048 BASIC METABOLIC PNL TOTAL CA: CPT | Performed by: INTERNAL MEDICINE

## 2024-07-19 RX ORDER — POLYETHYLENE GLYCOL 3350 17 G/17G
17 POWDER, FOR SOLUTION ORAL DAILY PRN
Status: DISCONTINUED | OUTPATIENT
Start: 2024-07-19 | End: 2024-07-22 | Stop reason: HOSPADM

## 2024-07-19 RX ORDER — DOCUSATE SODIUM 100 MG/1
100 CAPSULE, LIQUID FILLED ORAL 2 TIMES DAILY
Status: DISCONTINUED | OUTPATIENT
Start: 2024-07-19 | End: 2024-07-20

## 2024-07-19 RX ADMIN — POTASSIUM CHLORIDE 40 MEQ: 1500 TABLET, EXTENDED RELEASE ORAL at 09:27

## 2024-07-19 RX ADMIN — GABAPENTIN 100 MG: 100 CAPSULE ORAL at 17:14

## 2024-07-19 RX ADMIN — LISINOPRIL 40 MG: 20 TABLET ORAL at 09:28

## 2024-07-19 RX ADMIN — FUROSEMIDE 40 MG: 10 INJECTION, SOLUTION INTRAMUSCULAR; INTRAVENOUS at 17:14

## 2024-07-19 RX ADMIN — Medication 2000 UNITS: at 10:30

## 2024-07-19 RX ADMIN — PANTOPRAZOLE SODIUM 40 MG: 40 TABLET, DELAYED RELEASE ORAL at 05:49

## 2024-07-19 RX ADMIN — ASPIRIN 81 MG: 81 TABLET, COATED ORAL at 09:28

## 2024-07-19 RX ADMIN — APIXABAN 5 MG: 5 TABLET, FILM COATED ORAL at 09:28

## 2024-07-19 RX ADMIN — Medication 1 TABLET: at 09:28

## 2024-07-19 RX ADMIN — METOPROLOL TARTRATE 50 MG: 50 TABLET, FILM COATED ORAL at 09:28

## 2024-07-19 RX ADMIN — METOPROLOL TARTRATE 50 MG: 50 TABLET, FILM COATED ORAL at 17:14

## 2024-07-19 RX ADMIN — APIXABAN 5 MG: 5 TABLET, FILM COATED ORAL at 17:14

## 2024-07-19 RX ADMIN — GABAPENTIN 100 MG: 100 CAPSULE ORAL at 09:27

## 2024-07-19 RX ADMIN — FUROSEMIDE 40 MG: 10 INJECTION, SOLUTION INTRAMUSCULAR; INTRAVENOUS at 09:28

## 2024-07-19 RX ADMIN — PRAVASTATIN SODIUM 10 MG: 10 TABLET ORAL at 17:14

## 2024-07-19 RX ADMIN — LEVOTHYROXINE SODIUM 325 MCG: 125 TABLET ORAL at 05:49

## 2024-07-19 NOTE — ASSESSMENT & PLAN NOTE
Presented with SOB, edema and PND,  and chest x-ray showing mild pulmonary edema.  Not on any diuretics at baseline  Cardiology following, input appreciated  Treated with IV Lasix 40 mg twice daily  Transitioned to Lasix 40 mg po BID on 7/20  Strict I/O's and daily weights  Outpatient sleep study recommended  Echo with EF 50%, cannot identify RWMA, severely dilated LA, moderate MVR, mild TVR and PVR  Continued fluid restriction of 1800  Stable for discharge to [facility]

## 2024-07-19 NOTE — CASE MANAGEMENT
Case Management Progress Note    Patient name Armando Espinozar  Location /-01 MRN [id number]  : 1955 Date 2024       LOS (days): 3  Geometric Mean LOS (GMLOS) (days): 2.7  Days to GMLOS:-0.4        OBJECTIVE:        Current admission status: Inpatient  Preferred Pharmacy:   [facility] - [address] [address]  Phone: [phone] Fax: [phone]    [facility] [address] [place] [id number]  Phone: [phone] Fax: [phone]    Primary Care Provider: Aida Interiano MD    Primary Insurance: MEDICARE  Secondary Insurance: BLUE CROSS    PROGRESS NOTE:  Met with pt bedside to discuss current PT/OT recommendation of [facility]. Pt is agreeable to blanket referrals placed to STR, CM sent referral via [employer]. Awaiting response. CM office to f/u and present choice list to pt and assist in reserving facility.

## 2024-07-19 NOTE — ASSESSMENT & PLAN NOTE
Patient has not had bowel movement since admission but states that is normal for him to go 3 to 5 days without having a bowel movement  Add Senna 2 tabs at bedtime  Miralax PRN

## 2024-07-19 NOTE — ASSESSMENT & PLAN NOTE
Home medication regimen: Lisinopril 40 mg daily, Toprol tartrate 50 mg twice daily  Continue home regimen  Most Recent Blood Pressure: 120/70   Continue monitoring BP

## 2024-07-19 NOTE — PROGRESS NOTES
[facility]  Progress Note  Name: Armando Erickson I MRN: [id number]  Unit/Bed#: BE -01I Date of Admission: 7/16/2024   Date of Service: 7/16/2024  I Hospital Day: 3    * Acute diastolic congestive heart failure (HCC)  Assessment & Plan  Presented with SOB, edema and PND,  and chest x-ray showing mild pulmonary edema.  Not on any diuretics at baseline  Cardiology following, input appreciated  Continue on IV Lasix 40 mg twice daily  Strict I/O's and daily weights  Outpatient sleep study recommended  Echo with EF 50%, cannot identify RWMA, severely dilated LA, moderate MVR, mild TVR and PVR  Continued fluid restriction of 1800    Constipation  Assessment & Plan  Patient has not had bowel movement since admission but states that is normal for him to go 3 to 5 days without having a bowel movement  Will start Colace twice daily and MiraLAX as needed  Continue to monitor    Venous stasis ulcer (HCC)  Assessment & Plan  Do not appear to be infected at this time  Monitor closely  On IV diuretics  Podiatry input appreciated, recommending local wound care    Diabetic polyneuropathy associated with type 2 diabetes mellitus (HCC)  Assessment & Plan  continue neurontin      Paroxysmal atrial fibrillation (HCC)  Assessment & Plan  His of PAF, does not appear to be on AC at baseline  Rate is controlled with home medication metoprolol 50 mg BID  Started on Eliquis, will send to [facility] for price check on 7/18 7/17 echocardiogram EF 50%    Obesity, Class III, BMI 40-49.9 (morbid obesity) ([facility])  Assessment & Plan  Affects all aspects of his care  Weight loss counseling when stable as an outpatient    Essential hypertension  Assessment & Plan  Patient with a history of HTN  Home medication regimen: Lisinopril 40 mg daily, Toprol tartrate 50 mg twice daily  Continue home regimen  Most Recent Blood Pressure: 124/78   Continue monitoring BP     Controlled type 2 diabetes mellitus with  complication, without long-term current use of insulin (HCC)  Assessment & Plan  Lab Results   Component Value Date    HGBA1C 5.4 07/16/2024       Recent Labs     07/18/24  2038 07/19/24  0741 07/19/24  1201 07/19/24  1654   POCGLU 112 98 96 94         Blood Sugar Average: Last 72 hrs:  (P) 93.0544464715921175  Appears adequately controlled on metoformin outpatient  DM diet while inpatient  Basal lantus 20 units started while inpatient however will discontinue and instead continue SSI alone          VTE Pharmacologic Prophylaxis: VTE Score: 6 High Risk (Score >/= 5) - Pharmacological DVT Prophylaxis Ordered: apixaban (Eliquis). Sequential Compression Devices Ordered.    Mobility:   Basic Mobility Inpatient Raw Score: 18  JH-HLM Goal: 6: Walk 10 steps or more  JH-HLM Achieved: 4: Move to chair/commode  JH-HLM Goal NOT achieved. Continue with multidisciplinary rounding and encourage appropriate mobility to improve upon JH-HLM goals.    Patient Centered Rounds: I performed bedside rounds with nursing staff today.   Discussions with Specialists or Other Care Team Provider: N/A    Education and Discussions with Family / Patient: Patient declined call to .     Total Time Spent on Date of Encounter in care of patient: 50 mins. This time was spent on one or more of the following: performing physical exam; counseling and coordination of care; obtaining or reviewing history; documenting in the medical record; reviewing/ordering tests, medications or procedures; communicating with other healthcare professionals and discussing with patient's family/caregivers.    Current Length of Stay: 3 day(s)  Current Patient Status: Inpatient   Certification Statement: The patient will continue to require additional inpatient hospital stay due to IV diuresis  Discharge Plan: Anticipate discharge in 24-48 hrs to rehab facility.    Code Status: Level 1 - Full Code    Subjective:   Patient is feeling improved.  He is breathing  more easily and swelling has gone down significantly.  He denies pain.  He states he has not had bowel movement since admission but that it is normal for him to go 3 to 5 days without BM.  He has no questions or concerns at this time.    Objective:     Vitals:   Temp (24hrs), Av.7 °F (36.5 °C), Min:97.4 °F (36.3 °C), Max:98.1 °F (36.7 °C)    Temp:  [97.4 °F (36.3 °C)-98.1 °F (36.7 °C)] 98.1 °F (36.7 °C)  HR:  [62-67] 67  BP: (117-143)/(58-83) 124/78  SpO2:  [93 %-97 %] 94 %  Body mass index is 40.58 kg/m².     Input and Output Summary (last 24 hours):     Intake/Output Summary (Last 24 hours) at 2024 1732  Last data filed at 2024 1300  Gross per 24 hour   Intake 1404 ml   Output 2025 ml   Net -621 ml       Physical Exam:   Physical Exam  Vitals and nursing note reviewed.   Constitutional:       General: He is not in acute distress.     Appearance: Normal appearance. He is well-developed. He is morbidly obese.   HENT:      Head: Normocephalic and atraumatic.      Mouth/Throat:      Mouth: Mucous membranes are moist.      Pharynx: Oropharynx is clear.   Eyes:      Conjunctiva/sclera: Conjunctivae normal.   Cardiovascular:      Rate and Rhythm: Normal rate and regular rhythm.      Pulses: Normal pulses.      Heart sounds: Normal heart sounds. No murmur heard.  Pulmonary:      Effort: Pulmonary effort is normal. No respiratory distress.      Breath sounds: Normal breath sounds. No wheezing or rhonchi.   Abdominal:      General: Bowel sounds are normal. There is no distension.      Palpations: Abdomen is soft.      Tenderness: There is no abdominal tenderness. There is no guarding.   Musculoskeletal:         General: No swelling.      Cervical back: Neck supple.   Skin:     General: Skin is warm and dry.      Capillary Refill: Capillary refill takes less than 2 seconds.   Neurological:      General: No focal deficit present.      Mental Status: He is alert and oriented to person, place, and time. Mental  status is at baseline.   Psychiatric:         Mood and Affect: Mood normal.         Additional Data:     Labs:  Results from last 7 days   Lab Units 24  0455 24  0501 24  1238   WBC Thousand/uL 5.85   < > 6.03   HEMOGLOBIN g/dL 11.3*   < > 11.1*   HEMATOCRIT % 38.6   < > 37.3   PLATELETS Thousands/uL 232   < > 215   SEGS PCT %  --   --  76*   LYMPHO PCT %  --   --  13*   MONO PCT %  --   --  10   EOS PCT %  --   --  1    < > = values in this interval not displayed.     Results from last 7 days   Lab Units 24  0609 24  0501 24  1238   SODIUM mmol/L 138   < > 139   POTASSIUM mmol/L 3.7   < > 3.8   CHLORIDE mmol/L 97   < > 100   CO2 mmol/L 31   < > 31   BUN mg/dL 13   < > 9   CREATININE mg/dL 0.90   < > 0.85   ANION GAP mmol/L 10   < > 8   CALCIUM mg/dL 9.3   < > 8.9   ALBUMIN g/dL  --   --  3.9   TOTAL BILIRUBIN mg/dL  --   --  0.66   ALK PHOS U/L  --   --  89   ALT U/L  --   --  5*   AST U/L  --   --  10*   GLUCOSE RANDOM mg/dL 88   < > 92    < > = values in this interval not displayed.         Results from last 7 days   Lab Units 24  1654 24  1201 24  0741 24  2038 24  1715 24  1135 24  0742 24  2141 24  1608 24  1157 24  0759 24  2216   POC GLUCOSE mg/dl 94 96 98 112 90 87 84 96 96 86 74 117     Results from last 7 days   Lab Units 24  2155   HEMOGLOBIN A1C % 5.4           Lines/Drains:  Invasive Devices       Peripheral Intravenous Line  Duration             Peripheral IV 24 Right Antecubital 3 days                      Telemetry:  Telemetry Orders (From admission, onward)               24 Hour Telemetry Monitoring  Continuous x 24 Hours (Telem)           Question:  Reason for 24 Hour Telemetry  Answer:  Decompensated CHF- and any one of the following: continuous diuretic infusion or total diuretic dose >200 mg daily, associated electrolyte derangement (I.e. K < 3.0), ionotropic drip  (continuous infusion), hx of ventricular arrhythmia, or new EF < 35%                     Telemetry Reviewed: Atrial fibrillation. HR averaging 63  Indication for Continued Telemetry Use: No indication for continued use. Will discontinue.              Imaging: No pertinent imaging reviewed.    Recent Cultures (last 7 days):         Last 24 Hours Medication List:   Current Facility-Administered Medications   Medication Dose Route Frequency Provider Last Rate    acetaminophen  650 mg Oral Q4H PRN Jayesh Li MD      apixaban  5 mg Oral BID Jayesh Li MD      aspirin  81 mg Oral Daily Jayesh Li MD      cholecalciferol  2,000 Units Oral Daily Jayesh Li MD      docusate sodium  100 mg Oral BID HAROON Lees      furosemide  40 mg Intravenous BID Anthony Pineda MD      gabapentin  100 mg Oral BID Jayesh Li MD      insulin lispro  1-6 Units Subcutaneous TID AC Jayesh Li MD      insulin lispro  1-6 Units Subcutaneous HS Jayesh Li MD      levothyroxine  325 mcg Oral Early Morning Jayesh Li MD      lisinopril  40 mg Oral Daily Jayesh Li MD      metoprolol tartrate  50 mg Oral BID Jayesh Li MD      multivitamin-minerals  1 tablet Oral Daily Jayesh Li MD      ondansetron  4 mg Intravenous Q6H PRN Jayesh Li MD      pantoprazole  40 mg Oral Early Morning Jayesh Li MD      polyethylene glycol  17 g Oral Daily PRN HAROON Lees      potassium chloride  40 mEq Oral Daily Anthony Pineda MD      pravastatin  10 mg Oral Daily With Dinner Jayesh Li MD          Today, Patient Was Seen By: HAROON Lees    **Please Note: This note may have been constructed using a voice recognition system.**

## 2024-07-19 NOTE — ASSESSMENT & PLAN NOTE
Hst of PAF, does not appear to be on AC at baseline  Rate is controlled with home medication metoprolol 50 mg BID  Started on Eliquis, sent to [facility] for price check on 7/18 7/17 echocardiogram EF 50%

## 2024-07-19 NOTE — ASSESSMENT & PLAN NOTE
Treated with IV diuresis  No signs of infection   Podiatry input appreciated, recommending local wound care

## 2024-07-19 NOTE — ASSESSMENT & PLAN NOTE
Lab Results   Component Value Date    HGBA1C 5.4 07/16/2024       Recent Labs     07/19/24  1654 07/19/24  2110 07/20/24  0812 07/20/24  1152   POCGLU 94 121 92 92       Blood Sugar Average: Last 72 hrs:  (P) [id number]  Well controlled  Holding metformin   Diabetic diet   Monitor ACCU checks AC + HS with lispro insulin sliding scale coverage

## 2024-07-19 NOTE — PROGRESS NOTES
Cardiology Progress Note - Armando Erickson 69 y.o. male MRN: [id number]    Unit/Bed#: -01 Encounter: [id number]      Assessment:  Principal Problem:    Acute diastolic congestive heart failure (HCC)  Active Problems:    Controlled type 2 diabetes mellitus with complication, without long-term current use of insulin (HCC)    Obesity, Class III, BMI 40-49.9 (morbid obesity) (HCC)    Paroxysmal atrial fibrillation (HCC)    Diabetic polyneuropathy associated with type 2 diabetes mellitus (HCC)    Venous stasis ulcer (HCC)    69 y.o. male with paroxysmal atrial fibrillation status post AC DCCV in 2012, not anticoagulated, hypertension, hyperlipidemia, diabetes mellitus, chronic lymphedema of lower extremities, peripheral artery disease, status post right BKA for nonhealing ulcers, and a BMI of 47.31.  He presents with shortness of breath and is noted to have volume overload. There is questionable pulmonary hypertension given imaging findings of RV and RA dilation.      Volume overload likely secondary to acute CHF  Presentation with shortness of breath, swelling, PND  , BMI 47.31  Chest x-ray with mild pulmonary edema  Responding well to IV Lasix 40 mg twice daily  Urine output 3.35 L, -1.6 L  Continues to examine volume up with JVD, swelling is improving  TTE showed no significant changes from 2014.  EF 50%, moderately dilated RV 5.3 cm, normal RV function, mild TR, RVSP 44, biatrial dilation     Paroxysmal atrial fibrillation  Patient has not been following up with cardiologist for many years  AC DCCV in 2012  Patient has not been on anticoagulation, currently on aspirin  CHADVASC 3  Currently rate controlled on Lopressor 50 mg twice daily     Hypertension  Blood pressure was elevated on admission, now improved with continued diuresis  Continue home blood pressure medications lisinopril 40 mg daily and Lopressor 50 mg twice daily     Hyperlipidemia  Diabetes mellitus  Chronic lower extremity  "lymphedema  Peripheral artery disease  Status post right BKA  Snoring  BMI 47.31     Plan  Responding well to current diuretic regimen with stable renal functions. Continue IV Lasix 40 mg twice daily  Daily weights standing if able, intake/output  Monitor renal functions, electrolytes  Continue Eliquis for atrial fibrillation  Continue lisinopril 40 mg daily  Continue Lopressor 50 mg twice daily  Outpatient sleep study     Subjective:   Patient seen and examined.  No significant events overnight.      Objective:     Vitals: Blood pressure 123/75, pulse 62, temperature 97.7 °F (36.5 °C), resp. rate 19, height 6' 7\" (2.007 m), weight (!) 163 kg (360 lb 3.7 oz), SpO2 97%., Body mass index is 40.58 kg/m².,   Orthostatic Blood Pressures      Flowsheet Row Most Recent Value   Blood Pressure 123/75 filed at 07/19/2024 1138   Patient Position - Orthostatic VS Lying filed at 07/19/2024 0741              Intake/Output Summary (Last 24 hours) at 7/19/2024 1256  Last data filed at 7/19/2024 1001  Gross per 24 hour   Intake 1764 ml   Output 2125 ml   Net -361 ml             Physical Exam:    GEN: Armando Erickson appears well, alert and oriented x 3, pleasant and cooperative   HEENT: pupils equal, round, and reactive to light; extraocular muscles intact  NECK: supple, no carotid bruits, JVD+   HEART: irregular rhythm, normal S1 and S2, no murmurs, clicks, gallops or rubs   LUNGS: clear to auscultation bilaterally; no wheezes, rales, or rhonchi   ABDOMEN: normal bowel sounds, soft, no tenderness, no distention  EXTREMITIES: peripheral pulses normal; no clubbing, cyanosis, R BKA, chronic L lower extremity edema  NEURO: no focal findings   SKIN: normal without suspicious lesions on exposed skin    Medications:      Current Facility-Administered Medications:     acetaminophen (TYLENOL) tablet 650 mg, 650 mg, Oral, Q4H PRN, Jayesh Li MD    apixaban (ELIQUIS) tablet 5 mg, 5 mg, Oral, BID, Jayesh Li MD, 5 mg at 07/19/24 0928    " aspirin (ECOTRIN LOW STRENGTH) EC tablet 81 mg, 81 mg, Oral, Daily, Jayesh Li MD, 81 mg at 07/19/24 0928    cholecalciferol (VITAMIN D3) tablet 2,000 Units, 2,000 Units, Oral, Daily, Jayesh Li MD, 2,000 Units at 07/19/24 1030    furosemide (LASIX) injection 40 mg, 40 mg, Intravenous, BID, Anthony Pineda MD, 40 mg at 07/19/24 0928    gabapentin (NEURONTIN) capsule 100 mg, 100 mg, Oral, BID, Jayesh Li MD, 100 mg at 07/19/24 0927    insulin lispro (HumALOG/ADMELOG) 100 units/mL subcutaneous injection 1-6 Units, 1-6 Units, Subcutaneous, TID AC **AND** Fingerstick Glucose (POCT), , , TID AC, Jayesh Li MD    insulin lispro (HumALOG/ADMELOG) 100 units/mL subcutaneous injection 1-6 Units, 1-6 Units, Subcutaneous, HS, Jayesh Li MD    levothyroxine tablet 325 mcg, 325 mcg, Oral, Early Morning, Jayesh Li MD, 325 mcg at 07/19/24 0549    lisinopril (ZESTRIL) tablet 40 mg, 40 mg, Oral, Daily, Jayesh Li MD, 40 mg at 07/19/24 0928    metoprolol tartrate (LOPRESSOR) tablet 50 mg, 50 mg, Oral, BID, Jayesh Li MD, 50 mg at 07/19/24 0928    multivitamin-minerals (CENTRUM) tablet 1 tablet, 1 tablet, Oral, Daily, Jayesh Li MD, 1 tablet at 07/19/24 0928    ondansetron (ZOFRAN) injection 4 mg, 4 mg, Intravenous, Q6H PRN, Jayesh Li MD    pantoprazole (PROTONIX) EC tablet 40 mg, 40 mg, Oral, Early Morning, Jayesh Li MD, 40 mg at 07/19/24 0549    potassium chloride (Klor-Con M20) CR tablet 40 mEq, 40 mEq, Oral, Daily, Anthony Pineda MD, 40 mEq at 07/19/24 0927    pravastatin (PRAVACHOL) tablet 10 mg, 10 mg, Oral, Daily With Dinner, Jayesh Li MD, 10 mg at 07/18/24 1741     Labs & Results:        Results from last 7 days   Lab Units 07/18/24  0455 07/17/24  0501 07/16/24  1238   WBC Thousand/uL 5.85 5.83 6.03   HEMOGLOBIN g/dL 11.3* 11.0* 11.1*   HEMATOCRIT % 38.6 37.0 37.3   PLATELETS Thousands/uL 232 202 215         Results from last 7 days   Lab Units 07/19/24  0609  07/18/24  0455 07/17/24  0501 07/16/24  1238   POTASSIUM mmol/L 3.7 3.4* 3.6 3.8   CHLORIDE mmol/L 97 98 100 100   CO2 mmol/L 31 30 31 31   BUN mg/dL 13 10 10 9   CREATININE mg/dL 0.90 0.93 0.87 0.85   CALCIUM mg/dL 9.3 9.1 9.0 8.9   ALK PHOS U/L  --   --   --  89   ALT U/L  --   --   --  5*   AST U/L  --   --   --  10*         Results from last 7 days   Lab Units 07/19/24  0609   MAGNESIUM mg/dL 2.0       EKG personally reviewed by Anthony Pineda MD.

## 2024-07-20 LAB
ANION GAP SERPL CALCULATED.3IONS-SCNC: 9 MMOL/L (ref 4–13)
BUN SERPL-MCNC: 15 MG/DL (ref 5–25)
CALCIUM SERPL-MCNC: 9.4 MG/DL (ref 8.4–10.2)
CHLORIDE SERPL-SCNC: 95 MMOL/L (ref 96–108)
CO2 SERPL-SCNC: 32 MMOL/L (ref 21–32)
CREAT SERPL-MCNC: 0.97 MG/DL (ref 0.6–1.3)
ERYTHROCYTE [DISTWIDTH] IN BLOOD BY AUTOMATED COUNT: 17 % (ref 11.6–15.1)
GFR SERPL CREATININE-BSD FRML MDRD: 78 ML/MIN/1.73SQ M
GLUCOSE SERPL-MCNC: 103 MG/DL (ref 65–140)
GLUCOSE SERPL-MCNC: 87 MG/DL (ref 65–140)
GLUCOSE SERPL-MCNC: 91 MG/DL (ref 65–140)
GLUCOSE SERPL-MCNC: 92 MG/DL (ref 65–140)
GLUCOSE SERPL-MCNC: 92 MG/DL (ref 65–140)
HCT VFR BLD AUTO: 38.6 % (ref 36.5–49.3)
HGB BLD-MCNC: 11.7 G/DL (ref 12–17)
MCH RBC QN AUTO: 24.6 PG (ref 26.8–34.3)
MCHC RBC AUTO-ENTMCNC: 30.3 G/DL (ref 31.4–37.4)
MCV RBC AUTO: 81 FL (ref 82–98)
PLATELET # BLD AUTO: 246 THOUSANDS/UL (ref 149–390)
PMV BLD AUTO: 12.1 FL (ref 8.9–12.7)
POTASSIUM SERPL-SCNC: 3.6 MMOL/L (ref 3.5–5.3)
RBC # BLD AUTO: 4.76 MILLION/UL (ref 3.88–5.62)
SODIUM SERPL-SCNC: 136 MMOL/L (ref 135–147)
WBC # BLD AUTO: 5.88 THOUSAND/UL (ref 4.31–10.16)

## 2024-07-20 PROCEDURE — 99232 SBSQ HOSP IP/OBS MODERATE 35: CPT | Performed by: INTERNAL MEDICINE

## 2024-07-20 PROCEDURE — 80048 BASIC METABOLIC PNL TOTAL CA: CPT

## 2024-07-20 PROCEDURE — 85027 COMPLETE CBC AUTOMATED: CPT

## 2024-07-20 PROCEDURE — 99232 SBSQ HOSP IP/OBS MODERATE 35: CPT | Performed by: NURSE PRACTITIONER

## 2024-07-20 PROCEDURE — 82948 REAGENT STRIP/BLOOD GLUCOSE: CPT

## 2024-07-20 RX ORDER — SENNOSIDES 8.6 MG
2 TABLET ORAL
Status: DISCONTINUED | OUTPATIENT
Start: 2024-07-20 | End: 2024-07-21

## 2024-07-20 RX ORDER — POTASSIUM CHLORIDE 20 MEQ/1
20 TABLET, EXTENDED RELEASE ORAL ONCE
Status: COMPLETED | OUTPATIENT
Start: 2024-07-20 | End: 2024-07-20

## 2024-07-20 RX ORDER — FUROSEMIDE 40 MG/1
40 TABLET ORAL
Status: DISCONTINUED | OUTPATIENT
Start: 2024-07-20 | End: 2024-07-22 | Stop reason: HOSPADM

## 2024-07-20 RX ADMIN — PRAVASTATIN SODIUM 10 MG: 10 TABLET ORAL at 17:38

## 2024-07-20 RX ADMIN — APIXABAN 5 MG: 5 TABLET, FILM COATED ORAL at 17:38

## 2024-07-20 RX ADMIN — LEVOTHYROXINE SODIUM 325 MCG: 125 TABLET ORAL at 05:56

## 2024-07-20 RX ADMIN — ASPIRIN 81 MG: 81 TABLET, COATED ORAL at 08:38

## 2024-07-20 RX ADMIN — POTASSIUM CHLORIDE 40 MEQ: 1500 TABLET, EXTENDED RELEASE ORAL at 08:38

## 2024-07-20 RX ADMIN — PANTOPRAZOLE SODIUM 40 MG: 40 TABLET, DELAYED RELEASE ORAL at 05:56

## 2024-07-20 RX ADMIN — METOPROLOL TARTRATE 50 MG: 50 TABLET, FILM COATED ORAL at 17:38

## 2024-07-20 RX ADMIN — POTASSIUM CHLORIDE 20 MEQ: 1500 TABLET, EXTENDED RELEASE ORAL at 17:39

## 2024-07-20 RX ADMIN — APIXABAN 5 MG: 5 TABLET, FILM COATED ORAL at 08:38

## 2024-07-20 RX ADMIN — DOCUSATE SODIUM 100 MG: 100 CAPSULE, LIQUID FILLED ORAL at 08:38

## 2024-07-20 RX ADMIN — GABAPENTIN 100 MG: 100 CAPSULE ORAL at 17:38

## 2024-07-20 RX ADMIN — FUROSEMIDE 40 MG: 40 TABLET ORAL at 17:39

## 2024-07-20 RX ADMIN — FUROSEMIDE 40 MG: 10 INJECTION, SOLUTION INTRAMUSCULAR; INTRAVENOUS at 08:38

## 2024-07-20 RX ADMIN — Medication 1 TABLET: at 08:38

## 2024-07-20 RX ADMIN — METOPROLOL TARTRATE 50 MG: 50 TABLET, FILM COATED ORAL at 08:38

## 2024-07-20 RX ADMIN — LISINOPRIL 40 MG: 20 TABLET ORAL at 08:38

## 2024-07-20 RX ADMIN — GABAPENTIN 100 MG: 100 CAPSULE ORAL at 08:38

## 2024-07-20 NOTE — PLAN OF CARE
Problem: PAIN - ADULT  Goal: Verbalizes/displays adequate comfort level or baseline comfort level  Description: Interventions:  - Encourage patient to monitor pain and request assistance  - Assess pain using appropriate pain scale  - Administer analgesics based on type and severity of pain and evaluate response  - Implement non-pharmacological measures as appropriate and evaluate response  - Consider cultural and social influences on pain and pain management  - Notify physician/advanced practitioner if interventions unsuccessful or patient reports new pain  Outcome: Progressing     Problem: CARDIOVASCULAR - ADULT  Goal: Maintains optimal cardiac output and hemodynamic stability  Description: INTERVENTIONS:  - Monitor I/O, vital signs and rhythm  - Monitor for S/S and trends of decreased cardiac output  - Administer and titrate ordered vasoactive medications to optimize hemodynamic stability  - Assess quality of pulses, skin color and temperature  - Assess for signs of decreased coronary artery perfusion  - Instruct patient to report change in severity of symptoms  Outcome: Progressing  Goal: Absence of cardiac dysrhythmias or at baseline rhythm  Description: INTERVENTIONS:  - Continuous cardiac monitoring, vital signs, obtain 12 lead EKG if ordered  - Administer antiarrhythmic and heart rate control medications as ordered  - Monitor electrolytes and administer replacement therapy as ordered  Outcome: Progressing     Problem: RESPIRATORY - ADULT  Goal: Achieves optimal ventilation and oxygenation  Description: INTERVENTIONS:  - Assess for changes in respiratory status  - Assess for changes in mentation and behavior  - Position to facilitate oxygenation and minimize respiratory effort  - Oxygen administered by appropriate delivery if ordered  - Initiate smoking cessation education as indicated  - Encourage broncho-pulmonary hygiene including cough, deep breathe, Incentive Spirometry  - Assess the need for suctioning  and aspirate as needed  - Assess and instruct to report SOB or any respiratory difficulty  - Respiratory Therapy support as indicated  Outcome: Progressing     Problem: METABOLIC, FLUID AND ELECTROLYTES - ADULT  Goal: Electrolytes maintained within normal limits  Description: INTERVENTIONS:  - Monitor labs and assess patient for signs and symptoms of electrolyte imbalances  - Administer electrolyte replacement as ordered  - Monitor response to electrolyte replacements, including repeat lab results as appropriate  - Instruct patient on fluid and nutrition as appropriate  Outcome: Progressing  Goal: Fluid balance maintained  Description: INTERVENTIONS:  - Monitor labs   - Monitor I/O and WT  - Instruct patient on fluid and nutrition as appropriate  - Assess for signs & symptoms of volume excess or deficit  Outcome: Progressing  Goal: Glucose maintained within target range  Description: INTERVENTIONS:  - Monitor Blood Glucose as ordered  - Assess for signs and symptoms of hyperglycemia and hypoglycemia  - Administer ordered medications to maintain glucose within target range  - Assess nutritional intake and initiate nutrition service referral as needed  Outcome: Progressing

## 2024-07-20 NOTE — PROGRESS NOTES
"  Heart Failure/ Pulmonary Hypertension Progress Note - Armando Erickson 69 y.o. male MRN: [id number]    Unit/Bed#: -01 Encounter: [id number]      Assessment/Plan:     # Acute heart failure with preserved ejection fraction with RV dilation, normal systolic function.     Studies- personally reviewed by me     Echocardiogram 7/17/24  LVEF: 50%  LVIDd: 6.4cm  RV: mildly dilated with normal systolic function  MR: at least moderate MR  PASP: 41mmHg, mild TR, estimated RAP 15mmHg  RVOT: no notching  Other: no pericardial effusion; severely dilated LA/RA     Echo from 2014 ([facility]): Preserved LVEF, RV function normal, dilated LA and RA, mild TR, RVSP 40 to 45     # Paroxsymal atrial fibrillation  H/o DCCV in 2012, and was not on AC. Now on apixaban 5mg BID  Rate: metoprolol     # Hypertension  Continue lisinopril and metoprolol     # Hyperlipidemia  10/6/23 TG 70 HDL 34 LDL 54  Continue statin     # DM type 2, HbA1c 5.4 7/16/24  # PAD  On aspirin, statin  # R BKA  # Likely sleep apnea  # Morbid obesity     Today's Plan:  Transition oral furosemide 40mg BID  Strict I/O and daily weights  Outpatient sleep study  BP improved with diuresis      Subjective:   Patient seen and examined.  No significant events overnight.      Review of Systems   Constitutional:  Negative for chills and fever.   Respiratory:  Negative for chest tightness and shortness of breath.    Cardiovascular:  Negative for chest pain and palpitations.   Gastrointestinal:  Negative for abdominal distention, nausea and vomiting.   Neurological:  Negative for dizziness and light-headedness.        Objective:   Intake/ Output: 1320/2700, -1380  Weight: 364 lbs bedscale  Tele: afib, rate controlled      Vitals: Blood pressure 122/63, pulse 63, temperature 98.2 °F (36.8 °C), resp. rate 17, height 6' 7\" (2.007 m), weight (!) 165 kg (364 lb 3.2 oz), SpO2 93%., Body mass index is 41.03 kg/m²., I/O last 3 completed shifts:  In: 1320 [P.O.:1320]  Out: 2950 " [Urine:2950]  I/O this shift:  In: 240 [P.O.:240]  Out: -   Wt Readings from Last 3 Encounters:   07/20/24 (!) 165 kg (364 lb 3.2 oz)   09/28/23 (!) 184 kg (405 lb 6.4 oz)   05/08/23 (!) 184 kg (406 lb)       Intake/Output Summary (Last 24 hours) at 7/20/2024 0950  Last data filed at 7/20/2024 0900  Gross per 24 hour   Intake 1080 ml   Output 2700 ml   Net -1620 ml     I/O last 3 completed shifts:  In: 1320 [P.O.:1320]  Out: 2950 [Urine:2950]      Physical Exam:  Vitals:    07/19/24 2230 07/20/24 0237 07/20/24 0600 07/20/24 0752   BP: 125/64 115/65  122/63   BP Location: Right arm Left arm     Pulse: 59 65  63   Resp: 16 18  17   Temp: 97.9 °F (36.6 °C) 98 °F (36.7 °C)  98.2 °F (36.8 °C)   TempSrc: Oral Oral     SpO2: 94% 96%  93%   Weight:   (!) 165 kg (364 lb 3.2 oz)    Height:           GEN: Armando Erickson appears well, alert and oriented x 3, pleasant and cooperative   HEENT: NC/AT, moist mucosa, anicteric sclerae; extraocular muscles intact  NECK: supple, no carotid bruits   HEART: regular rhythm, normal S1 and S2, no murmurs, clicks, gallops or rubs, JVP is nonelevated    LUNGS: clear to auscultation bilaterally; no wheezes, rales, or rhonchi   ABDOMEN: normal bowel sounds, soft, no tenderness, no distention  EXTREMITIES: peripheral pulses normal; no clubbing, cyanosis, R BKA, elastic wrap on LLE  NEURO: no focal findings   SKIN: normal without suspicious lesions on exposed skin      Current Facility-Administered Medications:     acetaminophen (TYLENOL) tablet 650 mg, 650 mg, Oral, Q4H PRN, Jayesh Li MD    apixaban (ELIQUIS) tablet 5 mg, 5 mg, Oral, BID, Jayesh Li MD, 5 mg at 07/20/24 0838    aspirin (ECOTRIN LOW STRENGTH) EC tablet 81 mg, 81 mg, Oral, Daily, Jayesh Li MD, 81 mg at 07/20/24 0838    cholecalciferol (VITAMIN D3) tablet 2,000 Units, 2,000 Units, Oral, Daily, Jayesh Li MD, 2,000 Units at 07/19/24 1030    docusate sodium (COLACE) capsule 100 mg, 100 mg, Oral, BID, Garima Hoff  HAROON Costa, 100 mg at 07/20/24 0838    furosemide (LASIX) tablet 40 mg, 40 mg, Oral, BID (diuretic), Lucila Schwarz MD    gabapentin (NEURONTIN) capsule 100 mg, 100 mg, Oral, BID, Jayesh Li MD, 100 mg at 07/20/24 0838    insulin lispro (HumALOG/ADMELOG) 100 units/mL subcutaneous injection 1-6 Units, 1-6 Units, Subcutaneous, TID AC **AND** Fingerstick Glucose (POCT), , , TID AC, Jayesh Li MD    insulin lispro (HumALOG/ADMELOG) 100 units/mL subcutaneous injection 1-6 Units, 1-6 Units, Subcutaneous, HS, Jayesh Li MD    levothyroxine tablet 325 mcg, 325 mcg, Oral, Early Morning, Jayesh Li MD, 325 mcg at 07/20/24 0556    lisinopril (ZESTRIL) tablet 40 mg, 40 mg, Oral, Daily, Jayesh Li MD, 40 mg at 07/20/24 0838    metoprolol tartrate (LOPRESSOR) tablet 50 mg, 50 mg, Oral, BID, Jayesh Li MD, 50 mg at 07/20/24 0838    multivitamin-minerals (CENTRUM) tablet 1 tablet, 1 tablet, Oral, Daily, Jayesh Li MD, 1 tablet at 07/20/24 0838    ondansetron (ZOFRAN) injection 4 mg, 4 mg, Intravenous, Q6H PRN, Jayesh Li MD    pantoprazole (PROTONIX) EC tablet 40 mg, 40 mg, Oral, Early Morning, Jayesh Li MD, 40 mg at 07/20/24 0556    polyethylene glycol (MIRALAX) packet 17 g, 17 g, Oral, Daily PRN, HAROON Lees    potassium chloride (Klor-Con M20) CR tablet 40 mEq, 40 mEq, Oral, Daily, Anthony Pineda MD, 40 mEq at 07/20/24 0838    pravastatin (PRAVACHOL) tablet 10 mg, 10 mg, Oral, Daily With Dinner, Jayesh Li MD, 10 mg at 07/19/24 5949      Labs & Results:        Results from last 7 days   Lab Units 07/20/24  0447 07/18/24  0455 07/17/24  0501   WBC Thousand/uL 5.88 5.85 5.83   HEMOGLOBIN g/dL 11.7* 11.3* 11.0*   HEMATOCRIT % 38.6 38.6 37.0   PLATELETS Thousands/uL 246 232 202         Results from last 7 days   Lab Units 07/20/24  0447 07/19/24  0609 07/18/24  0455 07/17/24  0501 07/16/24  1238   POTASSIUM mmol/L 3.6 3.7 3.4*   < > 3.8   CHLORIDE mmol/L 95* 97 98   < >  100   CO2 mmol/L 32 31 30   < > 31   BUN mg/dL 15 13 10   < > 9   CREATININE mg/dL 0.97 0.90 0.93   < > 0.85   CALCIUM mg/dL 9.4 9.3 9.1   < > 8.9   ALK PHOS U/L  --   --   --   --  89   ALT U/L  --   --   --   --  5*   AST U/L  --   --   --   --  10*    < > = values in this interval not displayed.             Lucila Schwarz MD  Advanced Heart Failure and Mechanical Circulatory Support  [facility]

## 2024-07-20 NOTE — PROGRESS NOTES
[facility]  Progress Note  Name: Armando Erickson I  MRN: [id number]  Unit/Bed#: -01 I Date of Admission: 7/16/2024   Date of Service: 7/20/2024 I Hospital Day: 4    Assessment & Plan   * Acute diastolic congestive heart failure (HCC)  Assessment & Plan  Presented with SOB, edema and PND,  and chest x-ray showing mild pulmonary edema.  Not on any diuretics at baseline  Cardiology following, input appreciated  Treated with IV Lasix 40 mg twice daily  Transitioned to Lasix 40 mg po BID on 7/20  Strict I/O's and daily weights  Outpatient sleep study recommended  Echo with EF 50%, cannot identify RWMA, severely dilated LA, moderate MVR, mild TVR and PVR  Continued fluid restriction of 1800  Stable for discharge to [facility]    Venous stasis ulcer (HCC)  Assessment & Plan  Treated with IV diuresis  No signs of infection   Podiatry input appreciated, recommending local wound care    Paroxysmal atrial fibrillation (HCC)  Assessment & Plan  Hst of PAF, does not appear to be on AC at baseline  Rate is controlled with home medication metoprolol 50 mg BID  Started on Eliquis, sent to [facility] for price check on 7/18 7/17 echocardiogram EF 50%    Essential hypertension  Assessment & Plan  Home medication regimen: Lisinopril 40 mg daily, Toprol tartrate 50 mg twice daily  Continue home regimen  Most Recent Blood Pressure: 120/70   Continue monitoring BP     Constipation  Assessment & Plan  Patient has not had bowel movement since admission but states that is normal for him to go 3 to 5 days without having a bowel movement  Add Senna 2 tabs at bedtime  Miralax PRN     Diabetic polyneuropathy associated with type 2 diabetes mellitus ([facility])  Assessment & Plan  Continue neurontin      Obesity, Class III, BMI 40-49.9 (morbid obesity) ([facility])  Assessment & Plan  Affects all aspects of his care  Weight loss counseling when stable as an outpatient    Controlled type 2 diabetes mellitus with  complication, without long-term current use of insulin (HCC)  Assessment & Plan  Lab Results   Component Value Date    HGBA1C 5.4 07/16/2024       Recent Labs     07/19/24  1654 07/19/24  2110 07/20/24  0812 07/20/24  1152   POCGLU 94 121 92 92       Blood Sugar Average: Last 72 hrs:  (P) [id number]  Well controlled  Holding metformin   Diabetic diet   Monitor ACCU checks AC + HS with lispro insulin sliding scale coverage                VTE Pharmacologic Prophylaxis: VTE Score: 6 Moderate Risk (Score 3-4) - Pharmacological DVT Prophylaxis Ordered: apixaban (Eliquis).    Mobility:   Basic Mobility Inpatient Raw Score: 21  JH-HLM Goal: 6: Walk 10 steps or more  JH-HLM Achieved: 1: Laying in bed  JH-HLM Goal NOT achieved. Continue with multidisciplinary rounding and encourage appropriate mobility to improve upon JH-HLM goals.    Patient Centered Rounds: I performed bedside rounds with nursing staff today.   Discussions with Specialists or Other Care Team Provider: nursing, CM    Education and Discussions with Family / Patient: Patient declined call to .     Total Time Spent on Date of Encounter in care of patient:  mins. This time was spent on one or more of the following: performing physical exam; counseling and coordination of care; obtaining or reviewing history; documenting in the medical record; reviewing/ordering tests, medications or procedures; communicating with other healthcare professionals and discussing with patient's family/caregivers.    Current Length of Stay: 4 day(s)  Current Patient Status: Inpatient   Certification Statement: The patient will continue to require additional inpatient hospital stay due to transition to oral diuretics, monitor response, pending STR  Discharge Plan: Anticipate discharge tomorrow to rehab facility.    Code Status: Level 1 - Full Code    Subjective:   Patient seen and examined at bedside. Resting comfortably. Denies HA, dizziness, CP, SOB, N/V/D.  Still no BM since admission. Amenable to trialing Senna at bedtime. Amenable to STR - awaiting facility.     Objective:     Vitals:   Temp (24hrs), Av °F (36.7 °C), Min:97.9 °F (36.6 °C), Max:98.2 °F (36.8 °C)    Temp:  [97.9 °F (36.6 °C)-98.2 °F (36.8 °C)] 97.9 °F (36.6 °C)  HR:  [59-67] 65  Resp:  [16-18] 18  BP: (115-132)/(63-78) 120/70  SpO2:  [93 %-96 %] 95 %  Body mass index is 41.03 kg/m².     Input and Output Summary (last 24 hours):     Intake/Output Summary (Last 24 hours) at 2024 1310  Last data filed at 2024 1130  Gross per 24 hour   Intake 600 ml   Output 2775 ml   Net -2175 ml       Physical Exam:   Physical Exam  Vitals and nursing note reviewed.   Constitutional:       General: He is not in acute distress.     Appearance: He is obese. He is not toxic-appearing or diaphoretic.      Comments: Pleasant, talkative gentleman resting in bed on room air    HENT:      Head: Normocephalic.      Mouth/Throat:      Mouth: Mucous membranes are moist.   Eyes:      Conjunctiva/sclera: Conjunctivae normal.   Cardiovascular:      Rate and Rhythm: Normal rate.   Pulmonary:      Effort: Pulmonary effort is normal.      Breath sounds: Normal breath sounds.   Abdominal:      General: Bowel sounds are normal. There is no distension.      Palpations: Abdomen is soft.      Tenderness: There is no abdominal tenderness.   Musculoskeletal:         General: Deformity (right BKA) present. Normal range of motion.      Cervical back: Normal range of motion.      Left lower leg: Edema present.   Skin:     General: Skin is warm and dry.      Capillary Refill: Capillary refill takes less than 2 seconds.      Comments: LLE venous stasis dermatitis with kerlex and ACE bandage intact    Neurological:      Mental Status: He is alert and oriented to person, place, and time. Mental status is at baseline.   Psychiatric:         Speech: Speech normal.         Behavior: Behavior is cooperative.          Additional Data:      Labs:  Results from last 7 days   Lab Units 07/20/24  0447 07/17/24  0501 07/16/24  1238   WBC Thousand/uL 5.88   < > 6.03   HEMOGLOBIN g/dL 11.7*   < > 11.1*   HEMATOCRIT % 38.6   < > 37.3   PLATELETS Thousands/uL 246   < > 215   SEGS PCT %  --   --  76*   LYMPHO PCT %  --   --  13*   MONO PCT %  --   --  10   EOS PCT %  --   --  1    < > = values in this interval not displayed.     Results from last 7 days   Lab Units 07/20/24  0447 07/17/24  0501 07/16/24  1238   SODIUM mmol/L 136   < > 139   POTASSIUM mmol/L 3.6   < > 3.8   CHLORIDE mmol/L 95*   < > 100   CO2 mmol/L 32   < > 31   BUN mg/dL 15   < > 9   CREATININE mg/dL 0.97   < > 0.85   ANION GAP mmol/L 9   < > 8   CALCIUM mg/dL 9.4   < > 8.9   ALBUMIN g/dL  --   --  3.9   TOTAL BILIRUBIN mg/dL  --   --  0.66   ALK PHOS U/L  --   --  89   ALT U/L  --   --  5*   AST U/L  --   --  10*   GLUCOSE RANDOM mg/dL 91   < > 92    < > = values in this interval not displayed.         Results from last 7 days   Lab Units 07/20/24  1152 07/20/24  0812 07/19/24  2110 07/19/24  1654 07/19/24  1201 07/19/24  0741 07/18/24  2038 07/18/24  1715 07/18/24  1135 07/18/24  0742 07/17/24  2141 07/17/24  1608   POC GLUCOSE mg/dl 92 92 121 94 96 98 112 90 87 84 96 96     Results from last 7 days   Lab Units 07/16/24  2155   HEMOGLOBIN A1C % 5.4           Lines/Drains:  Invasive Devices       Peripheral Intravenous Line  Duration             Peripheral IV 07/20/24 Distal;Dorsal (posterior);Right Forearm <1 day                          Imaging: Reviewed radiology reports from this admission including: chest xray    Recent Cultures (last 7 days):         Last 24 Hours Medication List:   Current Facility-Administered Medications   Medication Dose Route Frequency Provider Last Rate    acetaminophen  650 mg Oral Q4H PRN Jayesh Li MD      apixaban  5 mg Oral BID Jayesh Li MD      aspirin  81 mg Oral Daily Jayesh Li MD      cholecalciferol  2,000 Units Oral Daily Jayesh  MD Jen      furosemide  40 mg Oral BID (diuretic) Lucila Schwarz MD      gabapentin  100 mg Oral BID Jayesh Li MD      insulin lispro  1-6 Units Subcutaneous TID AC Jayesh Li MD      insulin lispro  1-6 Units Subcutaneous HS Jayesh Li MD      levothyroxine  325 mcg Oral Early Morning Jayesh Li MD      lisinopril  40 mg Oral Daily Jayesh Li MD      metoprolol tartrate  50 mg Oral BID Jayesh Li MD      multivitamin-minerals  1 tablet Oral Daily Jayesh Li MD      ondansetron  4 mg Intravenous Q6H PRN Jayesh Li MD      pantoprazole  40 mg Oral Early Morning Jayesh Li MD      polyethylene glycol  17 g Oral Daily PRN HAROON Lees      potassium chloride  40 mEq Oral Daily Anthony Pineda MD      pravastatin  10 mg Oral Daily With Dinner Jayesh Li MD      senna  2 tablet Oral HS HAROON Johnson          Today, Patient Was Seen By: HAROON Johnson    **Please Note: This note may have been constructed using a voice recognition system.**

## 2024-07-20 NOTE — PLAN OF CARE
Problem: PAIN - ADULT  Goal: Verbalizes/displays adequate comfort level or baseline comfort level  Description: Interventions:  - Encourage patient to monitor pain and request assistance  - Assess pain using appropriate pain scale  - Administer analgesics based on type and severity of pain and evaluate response  - Implement non-pharmacological measures as appropriate and evaluate response  - Consider cultural and social influences on pain and pain management  - Notify physician/advanced practitioner if interventions unsuccessful or patient reports new pain  Outcome: Progressing     Problem: SAFETY ADULT  Goal: Patient will remain free of falls  Description: INTERVENTIONS:  - Educate patient/family on patient safety including physical limitations  - Instruct patient to call for assistance with activity   - Consult OT/PT to assist with strengthening/mobility   - Keep Call bell within reach  - Keep bed low and locked with side rails adjusted as appropriate  - Keep care items and personal belongings within reach  - Initiate and maintain comfort rounds  - Make Fall Risk Sign visible to staff  - Offer Toileting every 2 Hours, in advance of need  - Initiate/Maintain 2alarm  - Obtain necessary fall risk management equipment: 2  - Apply yellow socks and bracelet for high fall risk patients  - Consider moving patient to room near nurses station  Outcome: Progressing  Goal: Maintain or return to baseline ADL function  Description: INTERVENTIONS:  -  Assess patient's ability to carry out ADLs; assess patient's baseline for ADL function and identify physical deficits which impact ability to perform ADLs (bathing, care of mouth/teeth, toileting, grooming, dressing, etc.)  - Assess/evaluate cause of self-care deficits   - Assess range of motion  - Assess patient's mobility; develop plan if impaired  - Assess patient's need for assistive devices and provide as appropriate  - Encourage maximum independence but intervene and  supervise when necessary  - Involve family in performance of ADLs  - Assess for home care needs following discharge   - Consider OT consult to assist with ADL evaluation and planning for discharge  - Provide patient education as appropriate  Outcome: Progressing  Goal: Maintains/Returns to pre admission functional level  Description: INTERVENTIONS:  - Perform AM-PAC 6 Click Basic Mobility/ Daily Activity assessment daily.  - Set and communicate daily mobility goal to care team and patient/family/caregiver.   - Collaborate with rehabilitation services on mobility goals if consulted  - Perform Range of Motion 2 times a day.  - Reposition patient every 2 hours.  - Dangle patient 2 times a day  - Stand patient 2 times a day  - Ambulate patient 2 times a day  - Out of bed to chair 2 times a day   - Out of bed for meals 2 times a day  - Out of bed for toileting  - Record patient progress and toleration of activity level   Outcome: Progressing     Problem: CARDIOVASCULAR - ADULT  Goal: Maintains optimal cardiac output and hemodynamic stability  Description: INTERVENTIONS:  - Monitor I/O, vital signs and rhythm  - Monitor for S/S and trends of decreased cardiac output  - Administer and titrate ordered vasoactive medications to optimize hemodynamic stability  - Assess quality of pulses, skin color and temperature  - Assess for signs of decreased coronary artery perfusion  - Instruct patient to report change in severity of symptoms  Outcome: Progressing  Goal: Absence of cardiac dysrhythmias or at baseline rhythm  Description: INTERVENTIONS:  - Continuous cardiac monitoring, vital signs, obtain 12 lead EKG if ordered  - Administer antiarrhythmic and heart rate control medications as ordered  - Monitor electrolytes and administer replacement therapy as ordered  Outcome: Progressing     Problem: RESPIRATORY - ADULT  Goal: Achieves optimal ventilation and oxygenation  Description: INTERVENTIONS:  - Assess for changes in  respiratory status  - Assess for changes in mentation and behavior  - Position to facilitate oxygenation and minimize respiratory effort  - Oxygen administered by appropriate delivery if ordered  - Initiate smoking cessation education as indicated  - Encourage broncho-pulmonary hygiene including cough, deep breathe, Incentive Spirometry  - Assess the need for suctioning and aspirate as needed  - Assess and instruct to report SOB or any respiratory difficulty  - Respiratory Therapy support as indicated  Outcome: Progressing     Problem: Prexisting or High Potential for Compromised Skin Integrity  Goal: Skin integrity is maintained or improved  Description: INTERVENTIONS:  - Identify patients at risk for skin breakdown  - Assess and monitor skin integrity  - Assess and monitor nutrition and hydration status  - Monitor labs   - Assess for incontinence   - Turn and reposition patient  - Assist with mobility/ambulation  - Relieve pressure over bony prominences  - Avoid friction and shearing  - Provide appropriate hygiene as needed including keeping skin clean and dry  - Evaluate need for skin moisturizer/barrier cream  - Collaborate with interdisciplinary team   - Patient/family teaching  - Consider wound care consult   Outcome: Progressing     Problem: METABOLIC, FLUID AND ELECTROLYTES - ADULT  Goal: Electrolytes maintained within normal limits  Description: INTERVENTIONS:  - Monitor labs and assess patient for signs and symptoms of electrolyte imbalances  - Administer electrolyte replacement as ordered  - Monitor response to electrolyte replacements, including repeat lab results as appropriate  - Instruct patient on fluid and nutrition as appropriate  Outcome: Progressing  Goal: Fluid balance maintained  Description: INTERVENTIONS:  - Monitor labs   - Monitor I/O and WT  - Instruct patient on fluid and nutrition as appropriate  - Assess for signs & symptoms of volume excess or deficit  Outcome: Progressing  Goal:  Glucose maintained within target range  Description: INTERVENTIONS:  - Monitor Blood Glucose as ordered  - Assess for signs and symptoms of hyperglycemia and hypoglycemia  - Administer ordered medications to maintain glucose within target range  - Assess nutritional intake and initiate nutrition service referral as needed  Outcome: Progressing

## 2024-07-20 NOTE — CASE MANAGEMENT
Case Management Progress Note    Patient name Armando Erickson  Location /-01 MRN [id number]  : 1955 Date 2024       LOS (days): 4  Geometric Mean LOS (GMLOS) (days): 3.9  Days to GMLOS:-0.2        OBJECTIVE:        Current admission status: Inpatient  Preferred Pharmacy:   [facility] - [address] [address]  Phone: [phone] Fax: [phone]    [facility] [place] [address] [address] [address]  Phone: [phone] Fax: [phone]    Primary Care Provider: Aida Interiano MD    Primary Insurance: MEDICARE  Secondary Insurance: BLUE CROSS    PROGRESS NOTE:    Provided list to patient with available  facilities. Spoke with patient and his family  Made choices of [facility] , [facility] and  [facility].    Reserved first choice  CM to follow for availablilty tomorrow

## 2024-07-21 PROBLEM — K59.00 CONSTIPATION: Chronic | Status: RESOLVED | Noted: 2024-07-19 | Resolved: 2024-07-21

## 2024-07-21 LAB
ANION GAP SERPL CALCULATED.3IONS-SCNC: 9 MMOL/L (ref 4–13)
BASOPHILS # BLD AUTO: 0.03 THOUSANDS/ÂΜL (ref 0–0.1)
BASOPHILS NFR BLD AUTO: 1 % (ref 0–1)
BUN SERPL-MCNC: 18 MG/DL (ref 5–25)
CALCIUM SERPL-MCNC: 9.6 MG/DL (ref 8.4–10.2)
CHLORIDE SERPL-SCNC: 97 MMOL/L (ref 96–108)
CO2 SERPL-SCNC: 32 MMOL/L (ref 21–32)
CREAT SERPL-MCNC: 1.04 MG/DL (ref 0.6–1.3)
EOSINOPHIL # BLD AUTO: 0.11 THOUSAND/ÂΜL (ref 0–0.61)
EOSINOPHIL NFR BLD AUTO: 2 % (ref 0–6)
ERYTHROCYTE [DISTWIDTH] IN BLOOD BY AUTOMATED COUNT: 17 % (ref 11.6–15.1)
GFR SERPL CREATININE-BSD FRML MDRD: 71 ML/MIN/1.73SQ M
GLUCOSE SERPL-MCNC: 81 MG/DL (ref 65–140)
GLUCOSE SERPL-MCNC: 88 MG/DL (ref 65–140)
GLUCOSE SERPL-MCNC: 90 MG/DL (ref 65–140)
GLUCOSE SERPL-MCNC: 92 MG/DL (ref 65–140)
GLUCOSE SERPL-MCNC: 92 MG/DL (ref 65–140)
HCT VFR BLD AUTO: 39.1 % (ref 36.5–49.3)
HGB BLD-MCNC: 11.7 G/DL (ref 12–17)
IMM GRANULOCYTES # BLD AUTO: 0.02 THOUSAND/UL (ref 0–0.2)
IMM GRANULOCYTES NFR BLD AUTO: 0 % (ref 0–2)
LYMPHOCYTES # BLD AUTO: 0.83 THOUSANDS/ÂΜL (ref 0.6–4.47)
LYMPHOCYTES NFR BLD AUTO: 15 % (ref 14–44)
MAGNESIUM SERPL-MCNC: 2.1 MG/DL (ref 1.9–2.7)
MCH RBC QN AUTO: 24.4 PG (ref 26.8–34.3)
MCHC RBC AUTO-ENTMCNC: 29.9 G/DL (ref 31.4–37.4)
MCV RBC AUTO: 82 FL (ref 82–98)
MONOCYTES # BLD AUTO: 0.57 THOUSAND/ÂΜL (ref 0.17–1.22)
MONOCYTES NFR BLD AUTO: 10 % (ref 4–12)
NEUTROPHILS # BLD AUTO: 4.05 THOUSANDS/ÂΜL (ref 1.85–7.62)
NEUTS SEG NFR BLD AUTO: 72 % (ref 43–75)
NRBC BLD AUTO-RTO: 0 /100 WBCS
PLATELET # BLD AUTO: 241 THOUSANDS/UL (ref 149–390)
PMV BLD AUTO: 11.1 FL (ref 8.9–12.7)
POTASSIUM SERPL-SCNC: 3.9 MMOL/L (ref 3.5–5.3)
RBC # BLD AUTO: 4.79 MILLION/UL (ref 3.88–5.62)
SODIUM SERPL-SCNC: 138 MMOL/L (ref 135–147)
WBC # BLD AUTO: 5.61 THOUSAND/UL (ref 4.31–10.16)

## 2024-07-21 PROCEDURE — 85025 COMPLETE CBC W/AUTO DIFF WBC: CPT | Performed by: NURSE PRACTITIONER

## 2024-07-21 PROCEDURE — 80048 BASIC METABOLIC PNL TOTAL CA: CPT | Performed by: NURSE PRACTITIONER

## 2024-07-21 PROCEDURE — 83735 ASSAY OF MAGNESIUM: CPT | Performed by: NURSE PRACTITIONER

## 2024-07-21 PROCEDURE — 99232 SBSQ HOSP IP/OBS MODERATE 35: CPT

## 2024-07-21 PROCEDURE — 99232 SBSQ HOSP IP/OBS MODERATE 35: CPT | Performed by: INTERNAL MEDICINE

## 2024-07-21 PROCEDURE — 82948 REAGENT STRIP/BLOOD GLUCOSE: CPT

## 2024-07-21 RX ORDER — SPIRONOLACTONE 25 MG/1
25 TABLET ORAL DAILY
Status: DISCONTINUED | OUTPATIENT
Start: 2024-07-21 | End: 2024-07-22 | Stop reason: HOSPADM

## 2024-07-21 RX ADMIN — POTASSIUM CHLORIDE 40 MEQ: 1500 TABLET, EXTENDED RELEASE ORAL at 08:16

## 2024-07-21 RX ADMIN — GABAPENTIN 100 MG: 100 CAPSULE ORAL at 17:49

## 2024-07-21 RX ADMIN — PANTOPRAZOLE SODIUM 40 MG: 40 TABLET, DELAYED RELEASE ORAL at 07:48

## 2024-07-21 RX ADMIN — LEVOTHYROXINE SODIUM 325 MCG: 125 TABLET ORAL at 07:46

## 2024-07-21 RX ADMIN — FUROSEMIDE 40 MG: 40 TABLET ORAL at 08:16

## 2024-07-21 RX ADMIN — Medication 2000 UNITS: at 08:17

## 2024-07-21 RX ADMIN — LISINOPRIL 40 MG: 20 TABLET ORAL at 08:16

## 2024-07-21 RX ADMIN — GABAPENTIN 100 MG: 100 CAPSULE ORAL at 08:16

## 2024-07-21 RX ADMIN — APIXABAN 5 MG: 5 TABLET, FILM COATED ORAL at 17:49

## 2024-07-21 RX ADMIN — FUROSEMIDE 40 MG: 40 TABLET ORAL at 17:50

## 2024-07-21 RX ADMIN — METOPROLOL TARTRATE 50 MG: 50 TABLET, FILM COATED ORAL at 08:16

## 2024-07-21 RX ADMIN — Medication 1 TABLET: at 08:16

## 2024-07-21 RX ADMIN — APIXABAN 5 MG: 5 TABLET, FILM COATED ORAL at 08:16

## 2024-07-21 RX ADMIN — ASPIRIN 81 MG: 81 TABLET, COATED ORAL at 08:16

## 2024-07-21 RX ADMIN — METOPROLOL TARTRATE 50 MG: 50 TABLET, FILM COATED ORAL at 17:49

## 2024-07-21 RX ADMIN — SPIRONOLACTONE 25 MG: 25 TABLET ORAL at 12:43

## 2024-07-21 RX ADMIN — PRAVASTATIN SODIUM 10 MG: 10 TABLET ORAL at 17:49

## 2024-07-21 NOTE — PROGRESS NOTES
"  Heart Failure/ Pulmonary Hypertension Progress Note - Armando Erickson 69 y.o. male MRN: [id number]    Unit/Bed#: -01 Encounter: [id number]      Assessment/Plan:     # Acute heart failure with preserved ejection fraction with RV dilation, normal systolic function.     Studies- personally reviewed by me     Echocardiogram 7/17/24  LVEF: 50%  LVIDd: 6.4cm  RV: mildly dilated with normal systolic function  MR: at least moderate MR  PASP: 41mmHg, mild TR, estimated RAP 15mmHg  RVOT: no notching  Other: no pericardial effusion; severely dilated LA/RA     Echo from 2014 ([facility]): Preserved LVEF, RV function normal, dilated LA and RA, mild TR, RVSP 40 to 45     # Paroxsymal atrial fibrillation  H/o DCCV in 2012, and was not on AC. Now on apixaban 5mg BID  Rate: metoprolol     # Hypertension  Continue lisinopril and metoprolol     # Hyperlipidemia  10/6/23 TG 70 HDL 34 LDL 54  Continue statin     # DM type 2, HbA1c 5.4 7/16/24  # PAD  On aspirin, statin  # R BKA  # Likely sleep apnea  # Morbid obesity     Today's Plan:  Continue oral lasix 40mg BID  Start spironolactone 25mg daily  Strict I/O and daily weights  Outpatient sleep study  Discharge planning to rehab      Subjective:   Patient seen and examined.  No significant events overnight.    No urine output documented overnight post transition to oral    Review of Systems   Constitutional:  Negative for chills and fever.   Respiratory:  Negative for chest tightness and shortness of breath.    Cardiovascular:  Negative for chest pain and palpitations.   Gastrointestinal:  Negative for abdominal distention, nausea and vomiting.   Neurological:  Negative for dizziness and light-headedness.        Objective:   Intake/ Output: 1320/2700, -1380  Weight: 364 lbs bedscale  Tele: afib, rate controlled      Vitals: Blood pressure 136/69, pulse 63, temperature (!) 97.3 °F (36.3 °C), temperature source Oral, resp. rate 16, height 6' 7\" (2.007 m), weight (!) 165 kg (364 lb), SpO2 " 97%., Body mass index is 41.01 kg/m²., I/O last 3 completed shifts:  In: 1160 [P.O.:1160]  Out: 1825 [Urine:1825]  I/O this shift:  In: 360 [P.O.:360]  Out: 800 [Urine:800]  Wt Readings from Last 3 Encounters:   07/21/24 (!) 165 kg (364 lb)   09/28/23 (!) 184 kg (405 lb 6.4 oz)   05/08/23 (!) 184 kg (406 lb)       Intake/Output Summary (Last 24 hours) at 7/21/2024 1056  Last data filed at 7/21/2024 0951  Gross per 24 hour   Intake 1180 ml   Output 2050 ml   Net -870 ml     I/O last 3 completed shifts:  In: 1160 [P.O.:1160]  Out: 1825 [Urine:1825]      Physical Exam:  Vitals:    07/20/24 1905 07/20/24 2231 07/21/24 0703 07/21/24 0813   BP: 148/70 132/62 136/69    BP Location:   Left arm    Pulse: 83 69 63    Resp:   16    Temp: 97.5 °F (36.4 °C) 98.2 °F (36.8 °C) (!) 97.3 °F (36.3 °C)    TempSrc:   Oral    SpO2: 95% 95% 97%    Weight:    (!) 165 kg (364 lb)   Height:           GEN: Armando Erickson appears well, alert and oriented x 3, pleasant and cooperative   HEENT: NC/AT, moist mucosa, anicteric sclerae; extraocular muscles intact  NECK: supple, no carotid bruits   HEART: regular rhythm, normal S1 and S2, no murmurs, clicks, gallops or rubs, JVP at the clavicle sitting upright  LUNGS: clear to auscultation bilaterally; no wheezes, rales, or rhonchi   ABDOMEN: normal bowel sounds, soft, no tenderness, no distention  EXTREMITIES: peripheral pulses normal; no clubbing, cyanosis, R BKA, elastic wrap on LLE  NEURO: no focal findings   SKIN: normal without suspicious lesions on exposed skin      Current Facility-Administered Medications:     acetaminophen (TYLENOL) tablet 650 mg, 650 mg, Oral, Q4H PRN, Jayesh Li MD    apixaban (ELIQUIS) tablet 5 mg, 5 mg, Oral, BID, Jayesh Li MD, 5 mg at 07/21/24 0816    aspirin (ECOTRIN LOW STRENGTH) EC tablet 81 mg, 81 mg, Oral, Daily, Jayesh Li MD, 81 mg at 07/21/24 0816    cholecalciferol (VITAMIN D3) tablet 2,000 Units, 2,000 Units, Oral, Daily, Jayesh Li MD,  2,000 Units at 07/21/24 0817    furosemide (LASIX) tablet 40 mg, 40 mg, Oral, BID (diuretic), Lucila Schwarz MD, 40 mg at 07/21/24 0816    gabapentin (NEURONTIN) capsule 100 mg, 100 mg, Oral, BID, Jayesh Li MD, 100 mg at 07/21/24 0816    insulin lispro (HumALOG/ADMELOG) 100 units/mL subcutaneous injection 1-6 Units, 1-6 Units, Subcutaneous, TID AC **AND** Fingerstick Glucose (POCT), , , TID AC, Jayesh Li MD    insulin lispro (HumALOG/ADMELOG) 100 units/mL subcutaneous injection 1-6 Units, 1-6 Units, Subcutaneous, HS, Jayesh Li MD    levothyroxine tablet 325 mcg, 325 mcg, Oral, Early Morning, Jayesh Li MD, 325 mcg at 07/21/24 0746    lisinopril (ZESTRIL) tablet 40 mg, 40 mg, Oral, Daily, Jayesh Li MD, 40 mg at 07/21/24 0816    metoprolol tartrate (LOPRESSOR) tablet 50 mg, 50 mg, Oral, BID, Jayesh Li MD, 50 mg at 07/21/24 0816    multivitamin-minerals (CENTRUM) tablet 1 tablet, 1 tablet, Oral, Daily, Jayesh Li MD, 1 tablet at 07/21/24 0816    ondansetron (ZOFRAN) injection 4 mg, 4 mg, Intravenous, Q6H PRN, Jayesh Li MD    pantoprazole (PROTONIX) EC tablet 40 mg, 40 mg, Oral, Early Morning, Jayesh Li MD, 40 mg at 07/21/24 0748    polyethylene glycol (MIRALAX) packet 17 g, 17 g, Oral, Daily PRN, HAROON Lees    potassium chloride (Klor-Con M20) CR tablet 40 mEq, 40 mEq, Oral, Daily, Anthony Pineda MD, 40 mEq at 07/21/24 0816    pravastatin (PRAVACHOL) tablet 10 mg, 10 mg, Oral, Daily With Dinner, Jayesh Li MD, 10 mg at 07/20/24 1738      Labs & Results:        Results from last 7 days   Lab Units 07/21/24  0449 07/20/24  0447 07/18/24  0455   WBC Thousand/uL 5.61 5.88 5.85   HEMOGLOBIN g/dL 11.7* 11.7* 11.3*   HEMATOCRIT % 39.1 38.6 38.6   PLATELETS Thousands/uL 241 246 232         Results from last 7 days   Lab Units 07/21/24  0449 07/20/24  0447 07/19/24  0609 07/17/24  0501 07/16/24  1238   POTASSIUM mmol/L 3.9 3.6 3.7   < > 3.8   CHLORIDE mmol/L  97 95* 97   < > 100   CO2 mmol/L 32 32 31   < > 31   BUN mg/dL 18 15 13   < > 9   CREATININE mg/dL 1.04 0.97 0.90   < > 0.85   CALCIUM mg/dL 9.6 9.4 9.3   < > 8.9   ALK PHOS U/L  --   --   --   --  89   ALT U/L  --   --   --   --  5*   AST U/L  --   --   --   --  10*    < > = values in this interval not displayed.             Lucila Schwarz MD  Advanced Heart Failure and Mechanical Circulatory Support  [facility]

## 2024-07-21 NOTE — CASE MANAGEMENT
Case Management Discharge Planning Note    Patient name Armando Dre  Location /-01 MRN [id number]  : 1955 Date 2024       Current Admission Date: 2024  Current Admission Diagnosis:Acute diastolic congestive heart failure ([facility])   Patient Active Problem List    Diagnosis Date Noted Date Diagnosed    Venous stasis ulcer ([facility]) 2024     Acute diastolic congestive heart failure ([facility]) 2024     Ulcer of toe of left foot, limited to breakdown of skin ([facility]) 2022     Diabetic ulcer of left ankle associated with type 2 diabetes mellitus, limited to breakdown of skin ([facility]) 2022     Dermatophytosis 2022     Abnormal urinalysis 06/10/2021     Cellulitis of left lower extremity 2021     Diabetic polyneuropathy associated with type 2 diabetes mellitus ([facility]) 2021     Diabetic ulcer of left foot associated with type 2 diabetes mellitus, limited to breakdown of skin ([facility]) 02/10/2021     Idiopathic chronic venous hypertension of left lower extremity with ulcer ([facility]) 02/10/2021     Non-pressure chronic ulcer of left calf, limited to breakdown of skin ([facility]) 02/10/2021     Gastroesophageal reflux disease without esophagitis 2019     Chronic venous stasis dermatitis of left lower extremity 2019     Essential hypertension 2019     HLD (hyperlipidemia) 2019     Hypothyroidism 2019     Obesity, Class III, BMI 40-49.9 (morbid obesity) ([facility]) 2019     Living will on file 2018     S/P BKA (below knee amputation) unilateral, right ([facility]) 2017     Controlled type 2 diabetes mellitus with complication, without long-term current use of insulin ([facility]) 10/10/2016     Iron deficiency anemia due to chronic blood loss 2014     History of duodenal ulcer 2014     Paroxysmal atrial fibrillation ([facility]) 10/02/2012     Celiac disease 2012     Coronary artery disease 2012       LOS (days): 5  Geometric Mean LOS (GMLOS)  (days): 3.9  Days to GMLOS:-1     OBJECTIVE:  Risk of Unplanned Readmission Score: 12.33         Current admission status: Inpatient   Preferred Pharmacy:   [facility] - [place], [place] - [address] [place], [place] [address] [facility] [address]  Phone: [phone] Fax: [phone]    [facility] [address] [place] [address]  Phone: [phone] Fax: [phone]    Primary Care Provider: Aida Interiano MD    Primary Insurance: MEDICARE  Secondary Insurance: BLUE CROSS    DISCHARGE DETAILS:                                                    Treatment Team Recommendation: Short Term Rehab                                            Additional Comments: Per facility contact, Ericka, they do not have a [facility]-bed availble today, will be able to accept Monday. Provider updated.    Accepting Facility Name, City & State : [facility]  Receiving Facility/Agency Phone Number: [phone]  Facility/Agency Fax Number: [phone]

## 2024-07-21 NOTE — ASSESSMENT & PLAN NOTE
Hst of PAF, does not appear to be on AC at baseline  Rate is controlled with home medication metoprolol 50 mg BID  Started on Eliquis, [facility] price [facility] on 7/18 7/17 echocardiogram EF 50%  Continue Eliquis upon discharge

## 2024-07-21 NOTE — ASSESSMENT & PLAN NOTE
Home medication regimen: Lisinopril 40 mg daily, Toprol tartrate 50 mg twice daily  Continue home regimen  Most Recent Blood Pressure: 136/69   Continue monitoring BP

## 2024-07-21 NOTE — ASSESSMENT & PLAN NOTE
Treated with IV diuresis  No signs of infection   Podiatry input appreciated, recommending local wound care  Follow up with wound care upon discharge

## 2024-07-21 NOTE — PROGRESS NOTES
[facility]  Progress Note  Name: Armando Erickson I MRN: [id number]  Unit/Bed#: [facility] -01I Date of Admission: 7/16/2024   Date of Service: 7/16/2024  I Hospital Day: 5    * Acute diastolic congestive heart failure (HCC)  Assessment & Plan  Presented with SOB, edema and PND,  and chest x-ray showing mild pulmonary edema.  Not on any diuretics at baseline  Cardiology following, input appreciated  Treated with IV Lasix 40 mg twice daily  Transitioned to Lasix 40 mg po BID on 7/20  Strict I/O's and daily weights  Outpatient sleep study recommended  Echo with EF 50%, cannot identify RWMA, severely dilated LA, moderate MVR, mild TVR and PVR  Continued fluid restriction of 1800  Stable for discharge to [facility]    Venous stasis ulcer ([facility])  Assessment & Plan  Treated with IV diuresis  No signs of infection   Podiatry input appreciated, recommending local wound care    Diabetic polyneuropathy associated with type 2 diabetes mellitus ([facility])  Assessment & Plan  Continue neurontin      Paroxysmal atrial fibrillation ([facility])  Assessment & Plan  Hst of PAF, does not appear to be on AC at baseline  Rate is controlled with home medication metoprolol 50 mg BID  Started on Eliquis, sent to [facility] for price check on 7/18 7/17 echocardiogram EF 50%    Obesity, Class III, BMI 40-49.9 (morbid obesity) ([facility])  Assessment & Plan  Affects all aspects of his care  Weight loss counseling when stable as an outpatient    Essential hypertension  Assessment & Plan  Home medication regimen: Lisinopril 40 mg daily, Toprol tartrate 50 mg twice daily  Continue home regimen  Most Recent Blood Pressure: 136/69   Continue monitoring BP     Controlled type 2 diabetes mellitus with complication, without long-term current use of insulin ([facility])  Assessment & Plan  Lab Results   Component Value Date    HGBA1C 5.4 07/16/2024       Recent Labs     07/20/24  1152 07/20/24  1707 07/20/24 2033 07/21/24  0801   POCGLU 92 87  103 88       Blood Sugar Average: Last 72 hrs:  (P) 95.3094109987734440  Well controlled  Holding metformin   Diabetic diet   Monitor ACCU checks AC + HS with lispro insulin sliding scale coverage     Constipation-resolved as of 7/21/2024  Assessment & Plan  Patient has not had bowel movement since admission but states that is normal for him to go 3 to 5 days without having a bowel movement  7/21 patient reports multiple bowel movements overnight  Will DC Senokot  Miralax PRN           VTE Pharmacologic Prophylaxis: VTE Score: 6 High Risk (Score >/= 5) - Pharmacological DVT Prophylaxis Ordered: apixaban (Eliquis). Sequential Compression Devices Ordered.    Mobility:   Basic Mobility Inpatient Raw Score: 24  JH-HLM Goal: 8: Walk 250 feet or more  JH-HLM Achieved: 5: Stand (1 or more minutes)  JH-HLM Goal NOT achieved. Continue with multidisciplinary rounding and encourage appropriate mobility to improve upon JH-HLM goals.    Patient Centered Rounds: I performed bedside rounds with nursing staff today.   Discussions with Specialists or Other Care Team Provider: Case management    Education and Discussions with Family / Patient: Patient declined call to .     Total Time Spent on Date of Encounter in care of patient: 41 mins. This time was spent on one or more of the following: performing physical exam; counseling and coordination of care; obtaining or reviewing history; documenting in the medical record; reviewing/ordering tests, medications or procedures; communicating with other healthcare professionals and discussing with patient's family/caregivers.    Current Length of Stay: 5 day(s)  Current Patient Status: Inpatient   Certification Statement: The patient will continue to require additional inpatient hospital stay due to rehab facility placement  Discharge Plan: Anticipate discharge later today or tomorrow to rehab facility.    Code Status: Level 1 - Full Code    Subjective:   Patient said he had  multiple bowel movements last night and is no longer constipated.  He denies pain.  He is feeling good and ready to go to rehab when placement is finalized.  He has no questions or concerns at this time.    Objective:     Vitals:   Temp (24hrs), Av.7 °F (36.5 °C), Min:97.3 °F (36.3 °C), Max:98.2 °F (36.8 °C)    Temp:  [97.3 °F (36.3 °C)-98.2 °F (36.8 °C)] 97.3 °F (36.3 °C)  HR:  [63-83] 63  Resp:  [16-18] 16  BP: (120-148)/(62-70) 136/69  SpO2:  [95 %-97 %] 97 %  Body mass index is 41.01 kg/m².     Input and Output Summary (last 24 hours):     Intake/Output Summary (Last 24 hours) at 2024 1058  Last data filed at 2024 0951  Gross per 24 hour   Intake 1180 ml   Output 2050 ml   Net -870 ml       Physical Exam:   Physical Exam  Vitals and nursing note reviewed.   Constitutional:       General: He is not in acute distress.     Appearance: Normal appearance. He is well-developed. He is morbidly obese.   HENT:      Head: Normocephalic and atraumatic.      Mouth/Throat:      Mouth: Mucous membranes are moist.      Pharynx: Oropharynx is clear.   Eyes:      Conjunctiva/sclera: Conjunctivae normal.   Cardiovascular:      Rate and Rhythm: Normal rate and regular rhythm.      Pulses: Normal pulses.      Heart sounds: Normal heart sounds. No murmur heard.  Pulmonary:      Effort: Pulmonary effort is normal. No respiratory distress.      Breath sounds: Normal breath sounds. No wheezing or rhonchi.   Abdominal:      General: Bowel sounds are normal. There is no distension.      Palpations: Abdomen is soft.      Tenderness: There is no abdominal tenderness. There is no guarding.   Musculoskeletal:         General: No swelling.      Cervical back: Neck supple.      Left Lower Extremity: Left leg is amputated below knee.   Skin:     General: Skin is warm and dry.      Capillary Refill: Capillary refill takes less than 2 seconds.   Neurological:      General: No focal deficit present.      Mental Status: He is alert  and oriented to person, place, and time. Mental status is at baseline.   Psychiatric:         Mood and Affect: Mood normal.         Additional Data:     Labs:  Results from last 7 days   Lab Units 07/21/24  0449   WBC Thousand/uL 5.61   HEMOGLOBIN g/dL 11.7*   HEMATOCRIT % 39.1   PLATELETS Thousands/uL 241   SEGS PCT % 72   LYMPHO PCT % 15   MONO PCT % 10   EOS PCT % 2     Results from last 7 days   Lab Units 07/21/24  0449 07/17/24  0501 07/16/24  1238   SODIUM mmol/L 138   < > 139   POTASSIUM mmol/L 3.9   < > 3.8   CHLORIDE mmol/L 97   < > 100   CO2 mmol/L 32   < > 31   BUN mg/dL 18   < > 9   CREATININE mg/dL 1.04   < > 0.85   ANION GAP mmol/L 9   < > 8   CALCIUM mg/dL 9.6   < > 8.9   ALBUMIN g/dL  --   --  3.9   TOTAL BILIRUBIN mg/dL  --   --  0.66   ALK PHOS U/L  --   --  89   ALT U/L  --   --  5*   AST U/L  --   --  10*   GLUCOSE RANDOM mg/dL 92   < > 92    < > = values in this interval not displayed.         Results from last 7 days   Lab Units 07/21/24  0801 07/20/24  2033 07/20/24  1707 07/20/24  1152 07/20/24  0812 07/19/24  2110 07/19/24  1654 07/19/24  1201 07/19/24  0741 07/18/24  2038 07/18/24  1715 07/18/24  1135   POC GLUCOSE mg/dl 88 103 87 92 92 121 94 96 98 112 90 87     Results from last 7 days   Lab Units 07/16/24  2155   HEMOGLOBIN A1C % 5.4           Lines/Drains:  Invasive Devices       Peripheral Intravenous Line  Duration             Peripheral IV 07/20/24 Distal;Dorsal (posterior);Right Forearm 1 day                          Imaging: No pertinent imaging reviewed.    Recent Cultures (last 7 days):         Last 24 Hours Medication List:   Current Facility-Administered Medications   Medication Dose Route Frequency Provider Last Rate    acetaminophen  650 mg Oral Q4H PRN Jayesh Li MD      apixaban  5 mg Oral BID Jayesh Li MD      aspirin  81 mg Oral Daily Jayesh Li MD      cholecalciferol  2,000 Units Oral Daily Jayesh Li MD      furosemide  40 mg Oral BID (diuretic) Lucila  Aislinn Schwarz MD      gabapentin  100 mg Oral BID Jayesh Li MD      insulin lispro  1-6 Units Subcutaneous TID AC Jayesh Li MD      insulin lispro  1-6 Units Subcutaneous HS Jayesh Li MD      levothyroxine  325 mcg Oral Early Morning Jayesh Li MD      lisinopril  40 mg Oral Daily Jayesh Li MD      metoprolol tartrate  50 mg Oral BID Jayesh Li MD      multivitamin-minerals  1 tablet Oral Daily Jayesh Li MD      ondansetron  4 mg Intravenous Q6H PRN Jayesh Li MD      pantoprazole  40 mg Oral Early Morning Jayesh Li MD      polyethylene glycol  17 g Oral Daily PRN HAROON Lees      potassium chloride  40 mEq Oral Daily Anthony Pineda MD      pravastatin  10 mg Oral Daily With Dinner Jayesh Li MD          Today, Patient Was Seen By: HAROON Lees    **Please Note: This note may have been constructed using a voice recognition system.**

## 2024-07-21 NOTE — ASSESSMENT & PLAN NOTE
Presented with SOB, edema and PND,  and chest x-ray showing mild pulmonary edema.  Not on any diuretics at baseline  Cardiology following, input appreciated  Treated with IV Lasix 40 mg twice daily  Transitioned to Lasix 40 mg po BID on 7/20  Strict I/O's and daily weights  Outpatient sleep study recommended  Echo with EF 50%, cannot identify RWMA, severely dilated LA, moderate MVR, mild TVR and PVR  Continued fluid restriction of 1800  Continue Lasix, Spironolactone, and Metoprolol   Stable for discharge to [facility]

## 2024-07-21 NOTE — ASSESSMENT & PLAN NOTE
Lab Results   Component Value Date    HGBA1C 5.4 07/16/2024       Recent Labs     07/20/24  1152 07/20/24  1707 07/20/24 2033 07/21/24  0801   POCGLU 92 87 103 88       Blood Sugar Average: Last 72 hrs:  (P) 95.1334609646807156  Well controlled  Holding metformin   Diabetic diet   Monitor ACCU checks AC + HS with lispro insulin sliding scale coverage

## 2024-07-21 NOTE — ASSESSMENT & PLAN NOTE
Patient has not had bowel movement since admission but states that is normal for him to go 3 to 5 days without having a bowel movement  7/21 patient reports multiple bowel movements overnight  Will DC Senokot  Miralax PRN

## 2024-07-21 NOTE — CASE MANAGEMENT
Case Management Discharge Planning Note    Patient name Armando Dre  Location /-01 MRN [id number]  : 1955 Date 2024       Current Admission Date: 2024  Current Admission Diagnosis:Acute diastolic congestive heart failure ([facility])   Patient Active Problem List    Diagnosis Date Noted Date Diagnosed    Venous stasis ulcer ([facility]) 2024     Acute diastolic congestive heart failure ([facility]) 2024     Ulcer of toe of left foot, limited to breakdown of skin ([facility]) 2022     Diabetic ulcer of left ankle associated with type 2 diabetes mellitus, limited to breakdown of skin ([facility]) 2022     Dermatophytosis 2022     Abnormal urinalysis 06/10/2021     Cellulitis of left lower extremity 2021     Diabetic polyneuropathy associated with type 2 diabetes mellitus ([facility]) 2021     Diabetic ulcer of left foot associated with type 2 diabetes mellitus, limited to breakdown of skin ([facility]) 02/10/2021     Idiopathic chronic venous hypertension of left lower extremity with ulcer ([facility]) 02/10/2021     Non-pressure chronic ulcer of left calf, limited to breakdown of skin ([facility]) 02/10/2021     Gastroesophageal reflux disease without esophagitis 2019     Chronic venous stasis dermatitis of left lower extremity 2019     Essential hypertension 2019     HLD (hyperlipidemia) 2019     Hypothyroidism 2019     Obesity, Class III, BMI 40-49.9 (morbid obesity) ([facility]) 2019     Living will on file 2018     S/P BKA (below knee amputation) unilateral, right ([facility]) 2017     Controlled type 2 diabetes mellitus with complication, without long-term current use of insulin ([facility]) 10/10/2016     Iron deficiency anemia due to chronic blood loss 2014     History of duodenal ulcer 2014     Paroxysmal atrial fibrillation ([facility]) 10/02/2012     Celiac disease 2012     Coronary artery disease 2012       LOS (days): 5  Geometric Mean LOS (GMLOS)  (days): 3.9  Days to GMLOS:-1     OBJECTIVE:  Risk of Unplanned Readmission Score: 12.33         Current admission status: Inpatient   Preferred Pharmacy:   [facility] - [place], [place] - [address] [place] [address]  [address] [facility] [address]  Phone: [phone] Fax: [phone]    [facility] [address] [address]  Phone: [phone] Fax: [phone]    Primary Care Provider: Aida Interiano MD    Primary Insurance: MEDICARE  Secondary Insurance: BLUE CROSS    DISCHARGE DETAILS:                                                                                                 Additional Comments: CM notified by Provider that pt would be medically stable for d/c today to [facility] pending bed availbility. Review of [facility] showing that [facility] has accepted pt for admission once d/c. Outreach made to facility contact requesting bed availability for today, Sunday, pending response.

## 2024-07-22 ENCOUNTER — PATIENT OUTREACH (OUTPATIENT)
Dept: CARDIOLOGY CLINIC | Facility: CLINIC | Age: 69
End: 2024-07-22

## 2024-07-22 VITALS
HEART RATE: 78 BPM | WEIGHT: 315 LBS | TEMPERATURE: 98.1 F | RESPIRATION RATE: 18 BRPM | OXYGEN SATURATION: 93 % | DIASTOLIC BLOOD PRESSURE: 73 MMHG | BODY MASS INDEX: 36.45 KG/M2 | HEIGHT: 78 IN | SYSTOLIC BLOOD PRESSURE: 135 MMHG

## 2024-07-22 LAB
ANION GAP SERPL CALCULATED.3IONS-SCNC: 8 MMOL/L (ref 4–13)
BUN SERPL-MCNC: 18 MG/DL (ref 5–25)
CALCIUM SERPL-MCNC: 9.1 MG/DL (ref 8.4–10.2)
CHLORIDE SERPL-SCNC: 99 MMOL/L (ref 96–108)
CO2 SERPL-SCNC: 31 MMOL/L (ref 21–32)
CREAT SERPL-MCNC: 1.06 MG/DL (ref 0.6–1.3)
GFR SERPL CREATININE-BSD FRML MDRD: 70 ML/MIN/1.73SQ M
GLUCOSE SERPL-MCNC: 107 MG/DL (ref 65–140)
GLUCOSE SERPL-MCNC: 126 MG/DL (ref 65–140)
GLUCOSE SERPL-MCNC: 127 MG/DL (ref 65–140)
GLUCOSE SERPL-MCNC: 88 MG/DL (ref 65–140)
POTASSIUM SERPL-SCNC: 3.9 MMOL/L (ref 3.5–5.3)
SODIUM SERPL-SCNC: 138 MMOL/L (ref 135–147)

## 2024-07-22 PROCEDURE — 97530 THERAPEUTIC ACTIVITIES: CPT

## 2024-07-22 PROCEDURE — 80048 BASIC METABOLIC PNL TOTAL CA: CPT | Performed by: INTERNAL MEDICINE

## 2024-07-22 PROCEDURE — 99232 SBSQ HOSP IP/OBS MODERATE 35: CPT | Performed by: STUDENT IN AN ORGANIZED HEALTH CARE EDUCATION/TRAINING PROGRAM

## 2024-07-22 PROCEDURE — 99239 HOSP IP/OBS DSCHRG MGMT >30: CPT

## 2024-07-22 PROCEDURE — 82948 REAGENT STRIP/BLOOD GLUCOSE: CPT

## 2024-07-22 PROCEDURE — 97116 GAIT TRAINING THERAPY: CPT

## 2024-07-22 RX ORDER — ASPIRIN 81 MG/1
81 TABLET ORAL DAILY
Start: 2024-07-22 | End: 2024-08-21

## 2024-07-22 RX ORDER — FUROSEMIDE 40 MG/1
40 TABLET ORAL 2 TIMES DAILY
Start: 2024-07-22

## 2024-07-22 RX ORDER — LISINOPRIL 40 MG/1
40 TABLET ORAL DAILY
Start: 2024-07-22

## 2024-07-22 RX ORDER — METFORMIN HYDROCHLORIDE 750 MG/1
750 TABLET, EXTENDED RELEASE ORAL
Start: 2024-07-22

## 2024-07-22 RX ORDER — POLYETHYLENE GLYCOL 3350 17 G/17G
17 POWDER, FOR SOLUTION ORAL DAILY PRN
Start: 2024-07-22

## 2024-07-22 RX ORDER — LEVOTHYROXINE SODIUM 300 UG/1
300 TABLET ORAL DAILY
Start: 2024-07-22

## 2024-07-22 RX ORDER — CHOLECALCIFEROL (VITAMIN D3) 50 MCG
2000 TABLET ORAL DAILY
Start: 2024-07-22

## 2024-07-22 RX ORDER — LEVOTHYROXINE SODIUM 0.03 MG/1
25 TABLET ORAL DAILY
Start: 2024-07-22

## 2024-07-22 RX ORDER — SIMVASTATIN 10 MG
10 TABLET ORAL
Start: 2024-07-22 | End: 2025-01-18

## 2024-07-22 RX ORDER — SIMETHICONE 80 MG
40 TABLET,CHEWABLE ORAL EVERY 6 HOURS PRN
Status: DISCONTINUED | OUTPATIENT
Start: 2024-07-22 | End: 2024-07-22 | Stop reason: HOSPADM

## 2024-07-22 RX ORDER — SIMETHICONE 80 MG
40 TABLET,CHEWABLE ORAL EVERY 6 HOURS PRN
Start: 2024-07-22

## 2024-07-22 RX ORDER — SPIRONOLACTONE 25 MG/1
25 TABLET ORAL DAILY
Start: 2024-07-23

## 2024-07-22 RX ORDER — METOPROLOL TARTRATE 50 MG/1
50 TABLET, FILM COATED ORAL 2 TIMES DAILY
Start: 2024-07-22

## 2024-07-22 RX ORDER — GABAPENTIN 100 MG/1
100 CAPSULE ORAL 2 TIMES DAILY
Start: 2024-07-22 | End: 2024-08-21

## 2024-07-22 RX ORDER — PANTOPRAZOLE SODIUM 40 MG/1
40 TABLET, DELAYED RELEASE ORAL DAILY
Start: 2024-07-22

## 2024-07-22 RX ADMIN — APIXABAN 5 MG: 5 TABLET, FILM COATED ORAL at 17:05

## 2024-07-22 RX ADMIN — SPIRONOLACTONE 25 MG: 25 TABLET ORAL at 09:00

## 2024-07-22 RX ADMIN — ASPIRIN 81 MG: 81 TABLET, COATED ORAL at 09:00

## 2024-07-22 RX ADMIN — SIMETHICONE 40 MG: 80 TABLET, CHEWABLE ORAL at 16:10

## 2024-07-22 RX ADMIN — PRAVASTATIN SODIUM 10 MG: 10 TABLET ORAL at 16:10

## 2024-07-22 RX ADMIN — GABAPENTIN 100 MG: 100 CAPSULE ORAL at 17:05

## 2024-07-22 RX ADMIN — GABAPENTIN 100 MG: 100 CAPSULE ORAL at 09:00

## 2024-07-22 RX ADMIN — METOPROLOL TARTRATE 50 MG: 50 TABLET, FILM COATED ORAL at 17:05

## 2024-07-22 RX ADMIN — FUROSEMIDE 40 MG: 40 TABLET ORAL at 09:00

## 2024-07-22 RX ADMIN — POTASSIUM CHLORIDE 40 MEQ: 1500 TABLET, EXTENDED RELEASE ORAL at 09:00

## 2024-07-22 RX ADMIN — LEVOTHYROXINE SODIUM 325 MCG: 125 TABLET ORAL at 05:07

## 2024-07-22 RX ADMIN — Medication 1 TABLET: at 09:00

## 2024-07-22 RX ADMIN — APIXABAN 5 MG: 5 TABLET, FILM COATED ORAL at 09:00

## 2024-07-22 RX ADMIN — METOPROLOL TARTRATE 50 MG: 50 TABLET, FILM COATED ORAL at 09:00

## 2024-07-22 RX ADMIN — Medication 2000 UNITS: at 09:46

## 2024-07-22 RX ADMIN — PANTOPRAZOLE SODIUM 40 MG: 40 TABLET, DELAYED RELEASE ORAL at 05:07

## 2024-07-22 RX ADMIN — LISINOPRIL 40 MG: 20 TABLET ORAL at 08:59

## 2024-07-22 RX ADMIN — FUROSEMIDE 40 MG: 40 TABLET ORAL at 16:09

## 2024-07-22 NOTE — PLAN OF CARE
Problem: PAIN - ADULT  Goal: Verbalizes/displays adequate comfort level or baseline comfort level  Description: Interventions:  - Encourage patient to monitor pain and request assistance  - Assess pain using appropriate pain scale  - Administer analgesics based on type and severity of pain and evaluate response  - Implement non-pharmacological measures as appropriate and evaluate response  - Consider cultural and social influences on pain and pain management  - Notify physician/advanced practitioner if interventions unsuccessful or patient reports new pain  Outcome: Progressing     Problem: SAFETY ADULT  Goal: Patient will remain free of falls  Description: INTERVENTIONS:  - Educate patient/family on patient safety including physical limitations  - Instruct patient to call for assistance with activity   - Consult OT/PT to assist with strengthening/mobility   - Keep Call bell within reach  - Keep bed low and locked with side rails adjusted as appropriate  - Keep care items and personal belongings within reach  - Initiate and maintain comfort rounds  - Make Fall Risk Sign visible to staff  - Offer Toileting every 2 Hours, in advance of need  - Initiate/Maintain chair/bed alarm  - Obtain necessary fall risk management equipment: call bell within reach  - Apply yellow socks and bracelet for high fall risk patients  - Consider moving patient to room near nurses station  Outcome: Progressing     Problem: CARDIOVASCULAR - ADULT  Goal: Maintains optimal cardiac output and hemodynamic stability  Description: INTERVENTIONS:  - Monitor I/O, vital signs and rhythm  - Monitor for S/S and trends of decreased cardiac output  - Administer and titrate ordered vasoactive medications to optimize hemodynamic stability  - Assess quality of pulses, skin color and temperature  - Assess for signs of decreased coronary artery perfusion  - Instruct patient to report change in severity of symptoms  Outcome: Progressing  Goal: Absence of  cardiac dysrhythmias or at baseline rhythm  Description: INTERVENTIONS:  - Continuous cardiac monitoring, vital signs, obtain 12 lead EKG if ordered  - Administer antiarrhythmic and heart rate control medications as ordered  - Monitor electrolytes and administer replacement therapy as ordered  Outcome: Progressing     Problem: RESPIRATORY - ADULT  Goal: Achieves optimal ventilation and oxygenation  Description: INTERVENTIONS:  - Assess for changes in respiratory status  - Assess for changes in mentation and behavior  - Position to facilitate oxygenation and minimize respiratory effort  - Oxygen administered by appropriate delivery if ordered  - Initiate smoking cessation education as indicated  - Encourage broncho-pulmonary hygiene including cough, deep breathe, Incentive Spirometry  - Assess the need for suctioning and aspirate as needed  - Assess and instruct to report SOB or any respiratory difficulty  - Respiratory Therapy support as indicated  Outcome: Progressing     Problem: Prexisting or High Potential for Compromised Skin Integrity  Goal: Skin integrity is maintained or improved  Description: INTERVENTIONS:  - Identify patients at risk for skin breakdown  - Assess and monitor skin integrity  - Assess and monitor nutrition and hydration status  - Monitor labs   - Assess for incontinence   - Turn and reposition patient  - Assist with mobility/ambulation  - Relieve pressure over bony prominences  - Avoid friction and shearing  - Provide appropriate hygiene as needed including keeping skin clean and dry  - Evaluate need for skin moisturizer/barrier cream  - Collaborate with interdisciplinary team   - Patient/family teaching  - Consider wound care consult   Outcome: Progressing

## 2024-07-22 NOTE — PHYSICAL THERAPY NOTE
PHYSICAL THERAPY NOTE          Patient Name: Armando Erickson  Today's Date: 7/22/2024 07/22/24 1329   PT Last Visit   PT Visit Date 07/22/24   Note Type   Note Type Treatment   Pain Assessment   Pain Assessment Tool 0-10   Pain Score No Pain   Restrictions/Precautions   Weight Bearing Precautions Per Order No   Other Precautions Fall Risk;Telemetry  (hx of R BKA with prosthesis)   General   Chart Reviewed Yes   Response to Previous Treatment Patient with no complaints from previous session.   Family/Caregiver Present No   Cognition   Overall Cognitive Status WFL   Arousal/Participation Alert;Responsive;Cooperative   Attention Within functional limits   Orientation Level Oriented X4   Memory Within functional limits   Following Commands Follows all commands and directions without difficulty   Subjective   Subjective pt pleasant and cooperative throughout therapy session. pt received seated in bedside recliner   Bed Mobility   Supine to Sit Unable to assess   Sit to Supine Unable to assess   Transfers   Sit to Stand 4  Minimal assistance   Additional items Assist x 1;Increased time required;Verbal cues   Stand to Sit 4  Minimal assistance   Additional items Assist x 1;Increased time required;Verbal cues   Toilet transfer 4  Minimal assistance   Additional items Assist x 1;Increased time required;Verbal cues;Standard toilet;Commode   Additional Comments transfers w RW   Ambulation/Elevation   Gait pattern Improper Weight shift;Forward Flexion;Decreased foot clearance;Wide DOMINGA;Shuffling;Short stride;Excessively slow;Knees flexed   Gait Assistance 4  Minimal assist   Additional items Assist x 1;Verbal cues;Tactile cues   Assistive Device Rolling walker   Distance 20'+10'+10'+10'  (w seated breaks)   Stair Management Assistance Not tested   Balance   Static Sitting Fair +   Dynamic Sitting Fair   Static Standing Fair -   Dynamic Standing  Poor +   Ambulatory Poor +   Endurance Deficit   Endurance Deficit Yes   Endurance Deficit Description generalized weakness, decreased exercise tolerance   Activity Tolerance   Activity Tolerance Patient limited by fatigue   Nurse Made Aware RN cleared and updated   Assessment   Prognosis Fair   Problem List Decreased strength;Decreased endurance;Decreased range of motion;Impaired balance;Decreased mobility;Pain   Assessment Patient was received seated in bedside recliner . Patient was agreeable to therapy services today. PT session focused on gait and transfers today in order to improve functional mobility and independence. Functional mobility was performed as described above.  Pt was eager to participate in therapy services today. Pt displayed improved gait and independence today compared to previous therapy session, but continues to display need for assist with all standing activities. Pt required several seated rests today due to fatigue. Afterwards, pt was assisted to toilet and with toileting/hygiene. Pt assisted back to bedside recliner at conclusion of therapy session and made comfortable.  Patient will benefit from continued PT services while in hospital in order to address remaining limitations. The patients AM-PAC Basic Mobility Inpatient Short From Raw Score is 16 .  Based on AM-PAC scoring and patient presentation, PT currently recommending Level II (Moderate Resource Intensity). Please also refer to the recommendation of the Physical Therapist for safe discharge planning.   Barriers to Discharge Decreased caregiver support   Goals   Patient Goals to walk better   [facility] Expiration Date 08/01/24   PT Treatment Day 1   Plan   Treatment/Interventions ADL retraining;Functional transfer training;LE strengthening/ROM;Elevations;Therapeutic exercise;Endurance training;Bed mobility;Gait training;Spoke to nursing;OT   Progress Progressing toward goals   PT Frequency 3-5x/wk   Discharge Recommendation   Rehab  Resource Intensity Level, PT II (Moderate Resource Intensity)   AM-PAC Basic Mobility Inpatient   Turning in Flat Bed Without Bedrails 3   Lying on Back to Sitting on Edge of Flat Bed Without Bedrails 3   Moving Bed to Chair 3   Standing Up From Chair Using Arms 3   Walk in Room 3   Climb 3-5 Stairs With Railing 1   Basic Mobility Inpatient Raw Score 16   Basic Mobility Standardized Score 38.32   Tomasz Oxford Highest Level Of Mobility   -HLM Goal 5: Stand one or more mins   -HLM Achieved 7: Walk 25 feet or more   Education   Education Provided Mobility training   Patient Demonstrates acceptance/verbal understanding   End of Consult   Patient Position at End of Consult Bedside chair;All needs within reach       Manny Vega PT, DPT

## 2024-07-22 NOTE — PROGRESS NOTES
Cardiology Progress Note - Armando Erickson 69 y.o. male MRN: [id number]    Unit/Bed#: -01 Encounter: [id number]      Assessment:  Principal Problem:    Acute diastolic congestive heart failure (HCC)  Active Problems:    Controlled type 2 diabetes mellitus with complication, without long-term current use of insulin (HCC)    Essential hypertension    Obesity, Class III, BMI 40-49.9 (morbid obesity) (HCC)    Paroxysmal atrial fibrillation (HCC)    Diabetic polyneuropathy associated with type 2 diabetes mellitus (HCC)    Venous stasis ulcer (HCC)    69 y.o. male with paroxysmal atrial fibrillation status post AC DCCV in 2012, not anticoagulated, hypertension, hyperlipidemia, diabetes mellitus, chronic lymphedema of lower extremities, peripheral artery disease, status post right BKA for nonhealing ulcers, and a BMI of 47.31.  He presents with shortness of breath and is noted to have volume overload. There is questionable pulmonary hypertension given imaging findings of RV and RA dilation.      Volume overload likely secondary to acute CHF  Presentation with shortness of breath, swelling, PND  , BMI 47.31  Chest x-ray with mild pulmonary edema  IV Lasix now transition to p.o. Lasix 40 mg twice daily 40 mg bid  Volume status has now improved.  TTE showed no significant changes from 2014.  EF 50%, moderately dilated RV 5.3 cm, normal RV function, mild TR, RVSP 44, biatrial dilation     Paroxysmal atrial fibrillation  Patient has not been following up with cardiologist for many years  AC DCCV in 2012  Patient has not been on anticoagulation, currently on aspirin  CHADVASC 3  Currently rate controlled on Lopressor 50 mg twice daily     Hypertension  Blood pressure was elevated on admission, now improved with continued diuresis  Continue home blood pressure medications lisinopril 40 mg daily and Lopressor 50 mg twice daily     Hyperlipidemia  Diabetes mellitus  Chronic lower extremity lymphedema  Peripheral artery  "disease  Status post right BKA  Snoring  BMI 47.31     Plan  Po lasix 40 mg bid  Spironolactone 25 mg daily  Price checked [facility] $11/month. Can consider starting in the outpatient setting   Continue Eliquis for atrial fibrillation  Continue lisinopril 40 mg daily  Continue Lopressor 50 mg twice daily  Advised on checking weight daily at home, and limiting salt/fluid intake  Outpatient sleep study  Patient will be discharged to short term rehab. Will plan for 1 week outpatient follow up    Subjective:   Patient seen and examined.  No significant events overnight.      Objective:     Vitals: Blood pressure 113/56, pulse 59, temperature (!) 97.3 °F (36.3 °C), temperature source Oral, resp. rate 17, height 6' 7\" (2.007 m), weight (!) 166 kg (367 lb 1.1 oz), SpO2 95%., Body mass index is 41.35 kg/m².,   Orthostatic Blood Pressures      Flowsheet Row Most Recent Value   Blood Pressure 113/56 filed at 07/22/2024 0724   Patient Position - Orthostatic VS Sitting filed at 07/22/2024 0724              Intake/Output Summary (Last 24 hours) at 7/22/2024 1332  Last data filed at 7/22/2024 1301  Gross per 24 hour   Intake 780 ml   Output 1200 ml   Net -420 ml             Physical Exam:    GEN: Armando Erickson appears well, alert and oriented x 3, pleasant and cooperative   HEENT: pupils equal, round, and reactive to light; extraocular muscles intact  NECK: supple, no carotid bruits   HEART: regular rhythm, normal S1 and S2, no murmurs, clicks, gallops or rubs   LUNGS: clear to auscultation bilaterally; no wheezes, rales, or rhonchi   ABDOMEN: normal bowel sounds, soft, no tenderness, no distention  EXTREMITIES: s/l R BKA, chronic lower extremity edema  NEURO: no focal findings   SKIN: normal without suspicious lesions on exposed skin    Medications:      Current Facility-Administered Medications:     acetaminophen (TYLENOL) tablet 650 mg, 650 mg, Oral, Q4H PRN, Jayesh Li MD    apixaban (ELIQUIS) tablet 5 mg, 5 mg, Oral, " BID, Jayesh Li MD, 5 mg at 07/22/24 0900    aspirin (ECOTRIN LOW STRENGTH) EC tablet 81 mg, 81 mg, Oral, Daily, Jayesh Li MD, 81 mg at 07/22/24 0900    Cholecalciferol (VITAMIN D3) tablet 2,000 Units, 2,000 Units, Oral, Daily, Jayesh Li MD, 2,000 Units at 07/22/24 0946    furosemide (LASIX) tablet 40 mg, 40 mg, Oral, BID (diuretic), Lucila Schwarz MD, 40 mg at 07/22/24 0900    gabapentin (NEURONTIN) capsule 100 mg, 100 mg, Oral, BID, Jayesh Li MD, 100 mg at 07/22/24 0900    insulin lispro (HumALOG/ADMELOG) 100 units/mL subcutaneous injection 1-6 Units, 1-6 Units, Subcutaneous, TID AC **AND** Fingerstick Glucose (POCT), , , TID AC, Jayesh Li MD    insulin lispro (HumALOG/ADMELOG) 100 units/mL subcutaneous injection 1-6 Units, 1-6 Units, Subcutaneous, HS, Jayesh Li MD    levothyroxine tablet 325 mcg, 325 mcg, Oral, Early Morning, Jayesh Li MD, 325 mcg at 07/22/24 0507    lisinopril (ZESTRIL) tablet 40 mg, 40 mg, Oral, Daily, Jayesh Li MD, 40 mg at 07/22/24 0859    metoprolol tartrate (LOPRESSOR) tablet 50 mg, 50 mg, Oral, BID, Jayesh Li MD, 50 mg at 07/22/24 0900    multivitamin-minerals (CENTRUM) tablet 1 tablet, 1 tablet, Oral, Daily, Jayesh Li MD, 1 tablet at 07/22/24 0900    ondansetron (ZOFRAN) injection 4 mg, 4 mg, Intravenous, Q6H PRN, Jayesh Li MD    pantoprazole (PROTONIX) EC tablet 40 mg, 40 mg, Oral, Early Morning, Jayesh Li MD, 40 mg at 07/22/24 0507    polyethylene glycol (MIRALAX) packet 17 g, 17 g, Oral, Daily PRN, HAROON Lees    potassium chloride (Klor-Con M20) CR tablet 40 mEq, 40 mEq, Oral, Daily, Anthony Pineda MD, 40 mEq at 07/22/24 0900    pravastatin (PRAVACHOL) tablet 10 mg, 10 mg, Oral, Daily With Dinner, Jayesh Li MD, 10 mg at 07/21/24 1749    spironolactone (ALDACTONE) tablet 25 mg, 25 mg, Oral, Daily, Lucila Schwarz MD, 25 mg at 07/22/24 0900     Labs & Results:        Results from last 7 days   Lab  Units 07/21/24  0449 07/20/24  0447 07/18/24  0455   WBC Thousand/uL 5.61 5.88 5.85   HEMOGLOBIN g/dL 11.7* 11.7* 11.3*   HEMATOCRIT % 39.1 38.6 38.6   PLATELETS Thousands/uL 241 246 232         Results from last 7 days   Lab Units 07/22/24  0604 07/21/24  0449 07/20/24  0447 07/17/24  0501 07/16/24  1238   POTASSIUM mmol/L 3.9 3.9 3.6   < > 3.8   CHLORIDE mmol/L 99 97 95*   < > 100   CO2 mmol/L 31 32 32   < > 31   BUN mg/dL 18 18 15   < > 9   CREATININE mg/dL 1.06 1.04 0.97   < > 0.85   CALCIUM mg/dL 9.1 9.6 9.4   < > 8.9   ALK PHOS U/L  --   --   --   --  89   ALT U/L  --   --   --   --  5*   AST U/L  --   --   --   --  10*    < > = values in this interval not displayed.         Results from last 7 days   Lab Units 07/21/24  0449 07/19/24  0609   MAGNESIUM mg/dL 2.1 2.0     EKG personally reviewed by Anthony Pineda MD.

## 2024-07-22 NOTE — DISCHARGE SUMMARY
[facility]  Discharge- Armando Dre 1955, 69 y.o. male MRN: [id number]  Unit/Bed#: [facility] [place] Encounter: [id number]  Primary Care Provider: Aida Interiano MD   Date and time admitted to hospital: 7/16/2024 11:28 AM    * Acute diastolic congestive heart failure (HCC)  Assessment & Plan  Presented with SOB, edema and PND,  and chest x-ray showing mild pulmonary edema.  Not on any diuretics at baseline  Cardiology following, input appreciated  Treated with IV Lasix 40 mg twice daily  Transitioned to Lasix 40 mg po BID on 7/20  Strict I/O's and daily weights  Outpatient sleep study recommended  Echo with EF 50%, cannot identify RWMA, severely dilated LA, moderate MVR, mild TVR and PVR  Continued fluid restriction of 1800  Continue Lasix, Spironolactone, and Metoprolol   Stable for discharge to [facility]    Diabetic polyneuropathy associated with type 2 diabetes mellitus (HCC)  Assessment & Plan  Continue neurontin upon discharge       Controlled type 2 diabetes mellitus with complication, without long-term current use of insulin ([facility])  Assessment & Plan  Lab Results   Component Value Date    HGBA1C 5.4 07/16/2024       Recent Labs     07/20/24  1152 07/20/24  1707 07/20/24  2033 07/21/24  0801   POCGLU 92 87 103 88       Blood Sugar Average: Last 72 hrs:  (P) 95.8388347839192506  Well controlled  Holding metformin   Diabetic diet   Monitor ACCU checks AC + HS with lispro insulin sliding scale coverage     Venous stasis ulcer (HCC)  Assessment & Plan  Treated with IV diuresis  No signs of infection   Podiatry input appreciated, recommending local wound care  Follow up with wound care upon discharge     Paroxysmal atrial fibrillation (HCC)  Assessment & Plan  Hst of PAF, does not appear to be on AC at baseline  Rate is controlled with home medication metoprolol 50 mg BID  Started on Eliquis, [facility] to [facility] price check on 7/18 7/17 echocardiogram EF 50%  Continue Eliquis  upon discharge     Obesity, Class III, BMI 40-49.9 (morbid obesity) ([facility])  Assessment & Plan  Affects all aspects of his care  Weight loss counseling when stable as an outpatient    Essential hypertension  Assessment & Plan  Home medication regimen: Lisinopril 40 mg daily, Toprol tartrate 50 mg twice daily  Continue home regimen  Most Recent Blood Pressure: 136/69   Continue monitoring BP     Constipation-resolved as of 7/21/2024  Assessment & Plan  Patient has not had bowel movement since admission but states that is normal for him to go 3 to 5 days without having a bowel movement  7/21 patient reports multiple bowel movements overnight  Will DC Senokot  Miralax PRN         Medical Problems       Resolved Problems  Date Reviewed: 7/20/2024            Resolved    Constipation 7/21/2024     Resolved by  HAROON Lees        Discharging Physician / Practitioner: May Rahman PA-C  PCP: Aida Interiano MD  Admission Date:   Admission Orders (From admission, onward)       Ordered        07/16/24 1343  INPATIENT ADMISSION  Once                          Discharge Date: 07/22/24    Consultations During Hospital Stay:  Heart Failure   Podiatry     Procedures Performed:   Echocardiogram     Significant Findings / Test Results:   Echo: Left Ventricle: Left ventricular cavity size is normal. Wall thickness is moderately increased. The left ventricular ejection fraction is 50% by visual estimation.     Incidental Findings:   None        Test Results Pending at Discharge (will require follow up):   Follow up with PCP   Follow up with Cardiology      Outpatient Tests Requested:  None     Complications:  none    Reason for Admission: Acute Diastolic Congestive Heart Failure     Hospital Course:   Armando Erickson is a 69 y.o. male patient who originally presented to the hospital on 7/16/2024 due to Shortness of breath. Patient has a past medical history significant for R AKA stump, morbid obesity, paroxysmal  "Afib, Type 2 DM, and chronic venous status of left lower extremity. Cardiology was consulted as patient reported worsening shortness of breath at rest. Cardiology had evaluated the patient and suspected that his shortness of breath was likely due to volume overload from heart failure. Echocardiogram was completed which showed preserved LVEF, dilated LA and RA. Patient received IV diuretics to address volume overload- he was then transitioned to PO diuretics. Podiatry was also consulted to chronic venous stasis with chronic dermal disruption. Left lower leg would is clinically stable, recommended WBAT and daily wound changes. PT/OT had been consulted for disposition planning and recommended Level II Rehab. Case management was consulted and placement was found at [facility].     Hospital Course: No notes on file    Please see above list of diagnoses and related plan for additional information.     Condition at Discharge: stable    Discharge Day Visit / Exam:   Subjective:  Endorsing gas pain. Denies all other symptoms.   Vitals: Blood Pressure: 135/73 (07/22/24 1705)  Pulse: 78 (07/22/24 1705)  Temperature: 98.1 °F (36.7 °C) (07/22/24 1519)  Temp Source: Oral (07/22/24 0724)  Respirations: 18 (07/22/24 1519)  Height: 6' 7\" (200.7 cm) (07/17/24 1110)  Weight - Scale: (!) 166 kg (367 lb 1.1 oz) (07/22/24 0600)  SpO2: 93 % (07/22/24 1519)  Exam:   Physical Exam  Vitals reviewed.   Constitutional:       General: He is not in acute distress.     Appearance: He is not diaphoretic.   Cardiovascular:      Rate and Rhythm: Normal rate. Rhythm irregular.   Neurological:      Mental Status: He is alert and oriented to person, place, and time.   Psychiatric:         Mood and Affect: Mood is not anxious or depressed.         Behavior: Behavior is cooperative.          Discussion with Family: Patient declined call to .     Discharge instructions/Information to patient and family:   See after visit " summary for information provided to patient and family.      Provisions for Follow-Up Care:  See after visit summary for information related to follow-up care and any pertinent home health orders.      Mobility at time of Discharge:   Basic Mobility Inpatient Raw Score: 16  JH-HLM Goal: 5: Stand one or more mins  JH-HLM Achieved: 7: Walk 25 feet or more  HLM Goal achieved. Continue to encourage appropriate mobility.     Disposition:   Acute Rehab at [facility]    Planned Readmission: none      Discharge Statement:  I spent 45 minutes discharging the patient. This time was spent on the day of discharge. I had direct contact with the patient on the day of discharge. Greater than 50% of the total time was spent examining patient, answering all patient questions, arranging and discussing plan of care with patient as well as directly providing post-discharge instructions.  Additional time then spent on discharge activities.    Discharge Medications:  See after visit summary for reconciled discharge medications provided to patient and/or family.      **Please Note: This note may have been constructed using a voice recognition system**

## 2024-07-22 NOTE — PROGRESS NOTES
Patient continues admission at [facility]; Advanced Heart Failure Census.     Outpatient Advanced Heart Failure LCSW completed electronic chart review and rounded with the HF Team. Jason CM noted that plan is [facility]. Dr. Prince discussed heart condition with patient and Today's Plan.     Referral for outpatient social work not entered at this time.   Please enter referral if outpatient resources are needed in the future.

## 2024-07-22 NOTE — PLAN OF CARE
Problem: PHYSICAL THERAPY ADULT  Goal: Performs mobility at highest level of function for planned discharge setting.  See evaluation for individualized goals.  Description: Treatment/Interventions: ADL retraining, Functional transfer training, LE strengthening/ROM, Elevations, Therapeutic exercise, Endurance training, Gait training, Bed mobility, Spoke to nursing, OT          See flowsheet documentation for full assessment, interventions and recommendations.  Outcome: Progressing  Note: Prognosis: Fair  Problem List: Decreased strength, Decreased endurance, Decreased range of motion, Impaired balance, Decreased mobility, Pain  Assessment: Patient was received seated in bedside recliner . Patient was agreeable to therapy services today. PT session focused on gait and transfers today in order to improve functional mobility and independence. Functional mobility was performed as described above.  Pt was eager to participate in therapy services today. Pt displayed improved gait and independence today compared to previous therapy session, but continues to display need for assist with all standing activities. Pt required several seated rests today due to fatigue. Afterwards, pt was assisted to toilet and with toileting/hygiene. Pt assisted back to bedside recliner at conclusion of therapy session and made comfortable.  Patient will benefit from continued PT services while in hospital in order to address remaining limitations. The patients AM-PAC Basic Mobility Inpatient Short From Raw Score is 16 .  Based on AM-PAC scoring and patient presentation, PT currently recommending Level II (Moderate Resource Intensity). Please also refer to the recommendation of the Physical Therapist for safe discharge planning.  Barriers to Discharge: Decreased caregiver support     Rehab Resource Intensity Level, PT: II (Moderate Resource Intensity)    See flowsheet documentation for full assessment.

## 2024-07-22 NOTE — PLAN OF CARE
Problem: PAIN - ADULT  Goal: Verbalizes/displays adequate comfort level or baseline comfort level  Description: Interventions:  - Encourage patient to monitor pain and request assistance  - Assess pain using appropriate pain scale  - Administer analgesics based on type and severity of pain and evaluate response  - Implement non-pharmacological measures as appropriate and evaluate response  - Consider cultural and social influences on pain and pain management  - Notify physician/advanced practitioner if interventions unsuccessful or patient reports new pain  Outcome: Progressing     Problem: SAFETY ADULT  Goal: Patient will remain free of falls  Description: INTERVENTIONS:  - Educate patient/family on patient safety including physical limitations  - Instruct patient to call for assistance with activity   - Consult OT/PT to assist with strengthening/mobility   - Keep Call bell within reach  - Keep bed low and locked with side rails adjusted as appropriate  - Keep care items and personal belongings within reach  - Initiate and maintain comfort rounds  - Make Fall Risk Sign visible to staff  - Offer Toileting every 2 Hours, in advance of need  - Initiate/Maintain bed alarm  - Obtain necessary fall risk management equipment:   - Apply yellow socks and bracelet for high fall risk patients  - Consider moving patient to room near nurses station  Outcome: Progressing

## 2024-07-22 NOTE — CASE MANAGEMENT
Case Management Discharge Planning Note    Patient name Armando Dre  Location /-01 MRN [id number]  : 1955 Date 2024       Current Admission Date: 2024  Current Admission Diagnosis:Acute diastolic congestive heart failure (Formerly [facility])   Patient Active Problem List    Diagnosis Date Noted Date Diagnosed    Venous stasis ulcer ([facility]ly [facility]) 2024     Acute diastolic congestive heart failure (Formerly [facility]) 2024     Ulcer of toe of left foot, limited to breakdown of skin ([facility]) 2022     Diabetic ulcer of left ankle associated with type 2 diabetes mellitus, limited to breakdown of skin ([facility]) 2022     Dermatophytosis 2022     Abnormal urinalysis 06/10/2021     Cellulitis of left lower extremity 2021     Diabetic polyneuropathy associated with type 2 diabetes mellitus ([facility]) 2021     Diabetic ulcer of left foot associated with type 2 diabetes mellitus, limited to breakdown of skin ([facility]) 02/10/2021     Idiopathic chronic venous hypertension of left lower extremity with ulcer ([facility]) 02/10/2021     Non-pressure chronic ulcer of left calf, limited to breakdown of skin ([facility]) 02/10/2021     Gastroesophageal reflux disease without esophagitis 2019     Chronic venous stasis dermatitis of left lower extremity 2019     Essential hypertension 2019     HLD (hyperlipidemia) 2019     Hypothyroidism 2019     Obesity, Class III, BMI 40-49.9 (morbid obesity) ([facility]) 2019     Living will on file 2018     S/P BKA (below knee amputation) unilateral, right ([facility]) 2017     Controlled type 2 diabetes mellitus with complication, without long-term current use of insulin ([facility]) 10/10/2016     Iron deficiency anemia due to chronic blood loss 2014     History of duodenal ulcer 2014     Paroxysmal atrial fibrillation ([facility]) 10/02/2012     Celiac disease 2012     Coronary artery disease 2012       LOS (days): 6  Geometric Mean LOS (GMLOS)  (days): 3.9  Days to GMLOS:-2.1     OBJECTIVE:  Risk of Unplanned Readmission Score: 12.42         Current admission status: Inpatient   Preferred Pharmacy:   [facility] - [place], [place] - [address]  [address] [address]  Phone: [phone] Fax: [phone]    [facility] [id number] [place] [id number] [address] [address]  Phone: [phone] Fax: [phone]    Primary Care Provider: Aida Interiano MD    Primary Insurance: MEDICARE  Secondary Insurance: BLUE CROSS    DISCHARGE DETAILS:    Discharge planning discussed with:: Pt  Freedom of Choice: Yes  Comments - Freedom of Choice: Discussed FOC  CM contacted family/caregiver?: No- see comments (Pt alert and oriented)  Were Treatment Team discharge recommendations reviewed with patient/caregiver?: Yes  Did patient/caregiver verbalize understanding of patient care needs?: N/A- going to facility  Were patient/caregiver advised of the risks associated with not following Treatment Team discharge recommendations?: Yes    Contacts  Patient Contacts: lazarus Gibson  Relationship to Patient:: Other (Comment) (Self)  Contact Method: In Person  Reason/Outcome: Discharge Planning, Continuity of Care    Requested Home Health Care         Is the patient interested in HHC at discharge?: No    DME Referral Provided  Referral made for DME?: No    Other Referral/Resources/Interventions Provided:  Interventions: Short Term Rehab  Referral Comments: Choice list presented to pt over weekend for STR, pt chose [facility]. CM f/u with facility today, they have availability to accept. CM reached out to primary provider, who stated that pt is medically stable. CM met pt bedside to discuss DCP, pt agreeable to discharge, IMM signed. CM set up transportation by [facility] via [employer], awaiting [employer]nasport time confirmation.         Treatment Team Recommendation: Short Term  Rehab  Discharge Destination Plan:: Short Term Rehab  Transport at Discharge : Wheelchair [facility]           ETA of Transport (Date): 07/22/24  ETA of Transport (Time): 1600              IMM Given (Date):: 07/22/24  IMM Given to:: Patient

## 2024-07-23 ENCOUNTER — NURSING HOME VISIT (OUTPATIENT)
Dept: GERIATRICS | Facility: OTHER | Age: 69
End: 2024-07-23
Payer: MEDICARE

## 2024-07-23 DIAGNOSIS — I48.0 PAROXYSMAL ATRIAL FIBRILLATION (HCC): ICD-10-CM

## 2024-07-23 DIAGNOSIS — E03.9 ACQUIRED HYPOTHYROIDISM: ICD-10-CM

## 2024-07-23 DIAGNOSIS — I10 ESSENTIAL HYPERTENSION: ICD-10-CM

## 2024-07-23 DIAGNOSIS — Z89.511 S/P BKA (BELOW KNEE AMPUTATION) UNILATERAL, RIGHT (HCC): ICD-10-CM

## 2024-07-23 DIAGNOSIS — I50.31 ACUTE DIASTOLIC CONGESTIVE HEART FAILURE (HCC): Primary | ICD-10-CM

## 2024-07-23 DIAGNOSIS — E78.2 MIXED HYPERLIPIDEMIA: ICD-10-CM

## 2024-07-23 DIAGNOSIS — Z87.19 HISTORY OF DUODENAL ULCER: ICD-10-CM

## 2024-07-23 DIAGNOSIS — L97.221 NON-PRESSURE CHRONIC ULCER OF LEFT CALF, LIMITED TO BREAKDOWN OF SKIN (HCC): ICD-10-CM

## 2024-07-23 DIAGNOSIS — E11.8 CONTROLLED TYPE 2 DIABETES MELLITUS WITH COMPLICATION, WITHOUT LONG-TERM CURRENT USE OF INSULIN (HCC): ICD-10-CM

## 2024-07-23 DIAGNOSIS — I25.10 CORONARY ARTERY DISEASE INVOLVING NATIVE HEART WITHOUT ANGINA PECTORIS, UNSPECIFIED VESSEL OR LESION TYPE: ICD-10-CM

## 2024-07-23 DIAGNOSIS — E11.42 DIABETIC POLYNEUROPATHY ASSOCIATED WITH TYPE 2 DIABETES MELLITUS (HCC): ICD-10-CM

## 2024-07-23 DIAGNOSIS — K21.9 GASTROESOPHAGEAL REFLUX DISEASE WITHOUT ESOPHAGITIS: ICD-10-CM

## 2024-07-23 PROCEDURE — 99305 1ST NF CARE MODERATE MDM 35: CPT | Performed by: FAMILY MEDICINE

## 2024-07-23 NOTE — PROGRESS NOTES
[facility]  [address]  [place] [address]   [facility]  History and Physical    NAME: Armando Erickson  AGE: 69 y.o. SEX: male [phone]    DATE OF ENCOUNTER: 7/23/2024    Code status:  CPR    Assessment and Plan     Problem List Items Addressed This Visit        Cardiovascular and Mediastinum    Acute diastolic congestive heart failure (HCC) - Primary     Wt Readings from Last 3 Encounters:   07/22/24 (!) 166 kg (367 lb 1.1 oz)   09/28/23 (!) 184 kg (405 lb 6.4 oz)   05/08/23 (!) 184 kg (406 lb)     Presentation to ER was SOB due to acute CHF on chronic .   EF at 50% with moderate MVR.     May have been exacerbated by recurrence of Afib ( s/p dccv in 2012).     Improved with diuresis.   Initial weight of 420 pounds down to 367 pounds.   AT SNF currently 378.     Monitor daily weights espceially with addition of jardiance.     Low salt diet.     Ad[facility] to [facility] for comprehnesive rehabilitation program to include pt, ot, nutrition, wound care,                  Coronary artery disease     No current sytmpoms.   Continue with ASA and statin.   Need to reestablish with Cardiology.          Essential hypertension     Monitor.   Goal of sbp < 130.   Adjust if continues to be elevated.          Paroxysmal atrial fibrillation (HCC)     Currently rate controlled.   Now back on ASA and Eliquis.   No signs of GI bleed. Does have ho gi bleed.   Need to monitor.   On PPI.   No NSAID use    S/p lazaro dccv in 2012.     Need cardiology fu.     Need sleep study as outpatient.            Digestive    Gastroesophageal reflux disease without esophagitis       Endocrine    Controlled type 2 diabetes mellitus with complication, without long-term current use of insulin ([facility])       Lab Results   Component Value Date    HGBA1C 5.4 07/16/2024     Well controlled DM.   On metformin .   Will lower dosing of metformin.     Due to CHF/CAD will initiate jardiance as already recommended by  Cardiology.   Start jardiance 10 mg daily   Reveiwed with patient and is agreeable.     May need to adjust diuretics.   Monitor weights.          Diabetic polyneuropathy associated with type 2 diabetes mellitus (HCC)       Lab Results   Component Value Date    HGBA1C 5.4 07/16/2024       Known DM neuropathy.   Reports no pain and on gabapentin BID.   Did not note a change from TID to BID dosing in the past.   Will lower to HS dosing. Gabapentin may contriubte to leg edema/swelling.              Hypothyroidism     Stable. On high dose synthroid.   Last tsh slightly elevated, likely due to acute illness.   Monitor.             Musculoskeletal and Integument    Non-pressure chronic ulcer of left calf, limited to breakdown of skin (HCC)     Consult wound team at [facility].   Fu as wound care at [facility] as outpatient.             Surgery/Wound/Pain    S/P BKA (below knee amputation) unilateral, right (HCC)     Stable. With improved chf prosthesis is now fitting better.             Other    History of duodenal ulcer    HLD (hyperlipidemia)     Continue on statin.   Work on dietary control.             All medications and routine orders were reviewed and updated as needed.    Plan discussed with: Patient and nursing    Chief Complaint     Seen for admission at Nursing Facility    History of Present Illness     Arthur is seen for admission to [facility] for comprehensive rehabilitation program. Was dc'd from the hospital. Presented to ER with subsequent admission for acute CHF on chronic CHR. Improved with diuresis. Went from 420 pounds to 367 pounds on discharge. He is feeling improved. Improved with his breathing, no edema over his right stump, less leg swelling. He denies any chest pain or palpitations. Has some weakness.     He has known Afib, sp cv in 2012. Has been on metoprolol and ASA. Was not on AC due to hx of GI bleed many years ago. He is back on anticoagulation due to high Chadvasc score. No signs/complaints of bleeding.      With DM, type 2 with DM neuropathy. S/p right BKA several years ago. His DM has been well controlled. He is taking metformin regularly.     Today he reports no new complaints. Eating and slept well. No significant pain. On review is on gabapentin for DM neuropathy but no pain.     HISTORY:  Past Medical History:   Diagnosis Date   • Atrial fibrillation (HCC)    • Cellulitis    • Diabetes mellitus (HCC)    • Disease of thyroid gland    • Duodenal ulcer     perforated- ICU stay   • High blood pressure    • High cholesterol    • Hyperlipidemia    • Hypertension    • Hypothyroidism    • Lymphedema      b/l LE- was followed at wound center   • Morbid obesity with BMI of 40.0-44.9, adult (HCC)    • Peritonitis (HCC)      Family History   Problem Relation Age of Onset   • Heart disease Family    • Alcohol abuse Family    • Heart disease Mother    • Hypertension Mother    • Breast cancer Mother    • Migraines Daughter    • Leukemia Brother    • Migraines Daughter    • Substance Abuse Neg Hx    • Mental illness Neg Hx    • Depression Neg Hx      Social History     Socioeconomic History   • Marital status: Single     Spouse name: Not on file   • Number of children: 2   • Years of education: 12   • Highest education level: High school graduate   Occupational History   • Not on file   Tobacco Use   • Smoking status: Former     Current packs/day: 0.00     Average packs/day: 1.5 packs/day for 25.0 years (37.5 ttl pk-yrs)     Types: Cigarettes     Start date: 1979     Quit date: 2004     Years since quittin.7   • Smokeless tobacco: Never   Vaping Use   • Vaping status: Never Used   Substance and Sexual Activity   • Alcohol use: Not Currently     Alcohol/week: 1.0 standard drink of alcohol     Types: 1 Standard drinks or equivalent per week   • Drug use: Never   • Sexual activity: Not Currently   Other Topics Concern   • Not on file   Social History Narrative    Former smoker: Quit 3/31/2012 - As per Care  Everywhere      Social Determinants of Health     Financial Resource Strain: Low Risk  (5/8/2023)    Overall Financial Resource Strain (CARDIA)    • Difficulty of Paying Living Expenses: Not very hard   Food Insecurity: No Food Insecurity (7/18/2024)    Hunger Vital Sign    • Worried About Running Out of Food in the Last Year: Never true    • Ran Out of Food in the Last Year: Never true   Transportation Needs: No Transportation Needs (7/18/2024)    PRAPARE - Transportation    • Lack of Transportation (Medical): No    • Lack of Transportation (Non-Medical): No   Physical Activity: Not on file   Stress: Not on file   Social Connections: Not on file   Intimate Partner Violence: Not on file   Housing Stability: Low Risk  (7/18/2024)    Housing Stability Vital Sign    • Unable to Pay for Housing in the Last Year: No    • Number of Times Moved in the Last Year: 0    • Homeless in the Last Year: No       Allergies:  No Known Allergies    Review of Systems     Review of Systems   Constitutional: Negative.  Negative for activity change, appetite change, chills and diaphoresis.   HENT:  Negative for congestion and dental problem.    Respiratory: Negative.  Negative for apnea, chest tightness, shortness of breath and wheezing.    Cardiovascular: Negative.  Negative for chest pain, palpitations and leg swelling.   Gastrointestinal: Negative.  Negative for abdominal distention, abdominal pain, constipation, diarrhea and nausea.   Genitourinary: Negative.  Negative for difficulty urinating, dysuria and frequency.       Medications and orders     All medications reviewed and updated in halfway EMR.      Objective     Vitals: Blood pressure 136/62, heart rate 70, respiratory rate 18, temperature 97% on room air, weight 378 pounds.    Physical Exam  Vitals reviewed.   Constitutional:       Appearance: Normal appearance. He is obese.   HENT:      Head: Normocephalic.      Right Ear: External ear normal.      Left Ear: External  ear normal.      Nose: Nose normal.      Mouth/Throat:      Mouth: Mucous membranes are moist.   Eyes:      Extraocular Movements: Extraocular movements intact.      Pupils: Pupils are equal, round, and reactive to light.   Cardiovascular:      Rate and Rhythm: Normal rate. Rhythm irregular.      Pulses: Normal pulses.      Heart sounds: Normal heart sounds.   Pulmonary:      Effort: Pulmonary effort is normal.      Breath sounds: Normal breath sounds.   Abdominal:      General: Bowel sounds are normal.      Palpations: Abdomen is soft.   Musculoskeletal:      Cervical back: Normal range of motion and neck supple.      Comments: Right BKA with prosthesis.     Left leg bandage , not taken down.    Skin:     General: Skin is warm.      Capillary Refill: Capillary refill takes less than 2 seconds.   Neurological:      General: No focal deficit present.      Mental Status: He is alert and oriented to person, place, and time.   Psychiatric:         Mood and Affect: Mood normal.         Behavior: Behavior normal.         Pertinent Laboratory/Diagnostic Studies:   The following labs/studies were reviewed please see chart or hospital paperwork for details.  Sodium 138, potassium 3.9, chloride 99, CO2 31, BUN 18, creatinine 1.06    Hemoglobin 11.7, hematocrit 39.1    Echo showing moderate MVR, ejection fraction 50%.      - Counseling Documentation: patient was counseled regarding: instructions for management, risk factor reductions, patient and family education, impressions, risks and benefits of treatment options, and importance of compliance with treatment

## 2024-07-23 NOTE — ASSESSMENT & PLAN NOTE
Currently rate controlled.   Now back on ASA and Eliquis.   No signs of GI bleed. Does have ho gi bleed.   Need to monitor.   On PPI.   No NSAID use    S/p lazaro dccv in 2012.     Need cardiology fu.     Need sleep study as outpatient.

## 2024-07-23 NOTE — ASSESSMENT & PLAN NOTE
Consult wound team at [facility].   Fu as wound care at [facility] as outpatient.

## 2024-07-23 NOTE — ASSESSMENT & PLAN NOTE
Lab Results   Component Value Date    HGBA1C 5.4 07/16/2024     Well controlled DM.   On metformin .   Will lower dosing of metformin.     Due to CHF/CAD will initiate jardiance as already recommended by Cardiology.   Start jardiance 10 mg daily   Reveiwed with patient and is agreeable.     May need to adjust diuretics.   Monitor weights.

## 2024-07-23 NOTE — ASSESSMENT & PLAN NOTE
Stable. On high dose synthroid.   Last tsh slightly elevated, likely due to acute illness.   Monitor.

## 2024-07-23 NOTE — ASSESSMENT & PLAN NOTE
Lab Results   Component Value Date    HGBA1C 5.4 07/16/2024       Known DM neuropathy.   Reports no pain and on gabapentin BID.   Did not note a change from TID to BID dosing in the past.   Will lower to HS dosing. Gabapentin may contriubte to leg edema/swelling.

## 2024-07-23 NOTE — ASSESSMENT & PLAN NOTE
Wt Readings from Last 3 Encounters:   07/22/24 (!) 166 kg (367 lb 1.1 oz)   09/28/23 (!) 184 kg (405 lb 6.4 oz)   05/08/23 (!) 184 kg (406 lb)     Presentation to ER was SOB due to acute CHF on chronic .   EF at 50% with moderate MVR.     May have been exacerbated by recurrence of Afib ( s/p dccv in 2012).     Improved with diuresis.   Initial weight of 420 pounds down to 367 pounds.   AT SNF currently 378.     Monitor daily weights espceially with addition of jardiance.     Low salt diet.     Ad[facility] to [facility] for comprehnesive rehabilitation program to include pt, ot, nutrition, wound care,

## 2024-07-24 ENCOUNTER — TRANSITIONAL CARE MANAGEMENT (OUTPATIENT)
Dept: FAMILY MEDICINE CLINIC | Facility: CLINIC | Age: 69
End: 2024-07-24

## 2024-07-24 ENCOUNTER — PATIENT OUTREACH (OUTPATIENT)
Dept: CASE MANAGEMENT | Facility: OTHER | Age: 69
End: 2024-07-24

## 2024-07-24 ENCOUNTER — NURSING HOME VISIT (OUTPATIENT)
Dept: WOUND CARE | Facility: HOSPITAL | Age: 69
End: 2024-07-24
Payer: MEDICARE

## 2024-07-24 DIAGNOSIS — E11.621 DIABETIC ULCER OF TOE OF LEFT FOOT ASSOCIATED WITH TYPE 2 DIABETES MELLITUS, WITH OTHER ULCER SEVERITY (HCC): ICD-10-CM

## 2024-07-24 DIAGNOSIS — L97.528 DIABETIC ULCER OF TOE OF LEFT FOOT ASSOCIATED WITH TYPE 2 DIABETES MELLITUS, WITH OTHER ULCER SEVERITY (HCC): ICD-10-CM

## 2024-07-24 DIAGNOSIS — L97.929 IDIOPATHIC CHRONIC VENOUS HYPERTENSION OF LEFT LOWER EXTREMITY WITH ULCER (HCC): Primary | ICD-10-CM

## 2024-07-24 DIAGNOSIS — I87.312 IDIOPATHIC CHRONIC VENOUS HYPERTENSION OF LEFT LOWER EXTREMITY WITH ULCER (HCC): Primary | ICD-10-CM

## 2024-07-24 DIAGNOSIS — I89.0 LYMPHEDEMA: ICD-10-CM

## 2024-07-24 PROCEDURE — 99305 1ST NF CARE MODERATE MDM 35: CPT | Performed by: NURSE PRACTITIONER

## 2024-07-24 NOTE — PROGRESS NOTES
Outpatient care management referral via HRR report 7/23/24. Discharged to [facility] 7/22/24. Email sent to facility to inform them I will be following the patient during their skilled stay.  This Admin Coordinator will continue to monitor via chart review.

## 2024-07-25 ENCOUNTER — TELEPHONE (OUTPATIENT)
Dept: CARDIOLOGY CLINIC | Facility: CLINIC | Age: 69
End: 2024-07-25

## 2024-07-25 ENCOUNTER — TELEPHONE (OUTPATIENT)
Dept: OTHER | Facility: OTHER | Age: 69
End: 2024-07-25

## 2024-07-25 ENCOUNTER — NURSING HOME VISIT (OUTPATIENT)
Dept: GERIATRICS | Facility: OTHER | Age: 69
End: 2024-07-25
Payer: MEDICARE

## 2024-07-25 VITALS
DIASTOLIC BLOOD PRESSURE: 66 MMHG | SYSTOLIC BLOOD PRESSURE: 145 MMHG | RESPIRATION RATE: 18 BRPM | WEIGHT: 315 LBS | TEMPERATURE: 97 F | BODY MASS INDEX: 42.58 KG/M2 | OXYGEN SATURATION: 96 % | HEART RATE: 76 BPM

## 2024-07-25 DIAGNOSIS — I10 ESSENTIAL HYPERTENSION: ICD-10-CM

## 2024-07-25 DIAGNOSIS — E03.9 ACQUIRED HYPOTHYROIDISM: ICD-10-CM

## 2024-07-25 DIAGNOSIS — I50.31 ACUTE DIASTOLIC CONGESTIVE HEART FAILURE (HCC): Primary | ICD-10-CM

## 2024-07-25 DIAGNOSIS — Z89.511 S/P BKA (BELOW KNEE AMPUTATION) UNILATERAL, RIGHT (HCC): ICD-10-CM

## 2024-07-25 DIAGNOSIS — E11.8 CONTROLLED TYPE 2 DIABETES MELLITUS WITH COMPLICATION, WITHOUT LONG-TERM CURRENT USE OF INSULIN (HCC): ICD-10-CM

## 2024-07-25 DIAGNOSIS — I48.0 PAROXYSMAL ATRIAL FIBRILLATION (HCC): ICD-10-CM

## 2024-07-25 PROBLEM — L97.509 DIABETIC FOOT ULCER (HCC): Status: ACTIVE | Noted: 2024-07-25

## 2024-07-25 PROBLEM — E11.621 DIABETIC FOOT ULCER (HCC): Status: ACTIVE | Noted: 2024-07-25

## 2024-07-25 PROBLEM — I89.0 LYMPHEDEMA: Status: ACTIVE | Noted: 2024-07-25

## 2024-07-25 PROCEDURE — 99309 SBSQ NF CARE MODERATE MDM 30: CPT

## 2024-07-25 NOTE — PROGRESS NOTES
[facility] WOUND CARE MANAGEMENT   AND HYPERBARIC MEDICINE CENTER       Patient ID: Armando Erickson is a 69 y.o. male Date of Birth 1955     Location of Service: [facility]    Performed wound round with: Wound team     Chief Complaint : Left lower leg    Wound Instructions:  Wound:  Left lower leg  Discontinue previous wound order  Cleanse the left lower leg and wound bed with soap and water, pat dry  Apply non-sting skin prep to periwound area  Apply calcium alginate to wound bed, then cover with ABD and kerlix   Frequency : Daily and prn for soiling    Apply AmLactin lotion on to left foot and surrounding tissue    Arterial duplex on the left lower leg    Offload all wounds  Turn and reposition frequently  Instruct / Assist with weight shifting in wheelchair  Increase protein intake.  Monitor for any sign of infection or worsening, inform PCP or patient's primary physician in your facility.      Allergies  Patient has no known allergies.      Assessment & Plan:  1. Idiopathic chronic venous hypertension of left lower extremity with ulcer (HCC)  Assessment & Plan:  Left lower leg  Wound on the left medial calf, left foot dorsal, and left lower leg anterior.  Partial-thickness, with large amount of serous drainage, surrounding tissue is dry and flaky  Local wound care with AmLactin lotion on the surrounding tissue, calcium alginate to wound bed  Compression with Tubigrip  I will order arterial duplex on the left lower leg, patient last arterial duplex based on medical record review is 2020  Follow-up next week  2. Lymphedema  3. Diabetic ulcer of toe of left foot associated with type 2 diabetes mellitus, with other ulcer severity (HCC)  Assessment & Plan:  Positive wound on the left toes, covered with eschar, with no obvious sign of infection  Apply AmLactin lotion  Continue to offload  I will order arterial duplex to determine healing potential of the wound.  Surrounding tissue is positive  bluish discoloration.  Patient needs to follow-up with podiatry and vascular  Follow-up next week           Subjective:   7/24/2024This is a 69 y.o., male referred to our service for wound/ skin alterations on Left lower leg.Patient have a complex medical history including but not limited to Diabetes mellitus (HCC),  Hyperlipidemia, Hypertension, Hypothyroidism, Lymphedema, Morbid obesity with BMI of 40.0-44.9, adult (HCC.  . Patient was referred by Senior Care Team. Patient was seen in collaboration with the facility wound team.     Wound History:   As per report, patient have chronic venous ulcer on the left lower leg. Last arterial duplex performed on 2020.     Received patient on chair, seems comfortable. Denies pain. Patient have history of right BKA. No significant issues reported related to the wound.               Review of Systems   Constitutional: Negative.    Respiratory: Negative.     Cardiovascular: Negative.    Skin:  Positive for wound.   Psychiatric/Behavioral: Negative.         Objective:    Physical Exam  Constitutional:       Appearance: Normal appearance.   Cardiovascular:      Rate and Rhythm: Normal rate.   Pulmonary:      Effort: Pulmonary effort is normal.   Musculoskeletal:      Left lower leg: Edema present.      Comments: Moderate edema on left lower leg  + bluish discoloration left foot   Skin:     Findings: Lesion present.      Comments: Left medial calf : Wound size is 4.6 x 1.8 x 0.1 cm., 100% epithelial, large amount of serous drainage, with no obvious sign of infection    Left fourth toe dorsal: Wound size is 0.2 x 0.2 cm.,  100% eschar, no drainage, no obvious sign of infection    Left foot dorsal: Wound size is 5.5 x 3.7 x 0.1 cm.,  100% epithelial, large amount of serous drainage, macerated periwound area, with no obvious sign of infection    Left fifth toe: Wound size is 1.1 x 0.6 cm.,  100% eschar, no drainage, no obvious sign of infection    Left third toe wound: Wound size is  0.7 x 0.6 cm.,  100% eschar, no drainage, no obvious sign of infection    Left lower leg anterior: Wound size is 4.4 x 5.7 x 0.1 cm.,  100% epithelial, large amount of serous drainage, periwound is normal, with no obvious sign of infection   Neurological:      Mental Status: He is alert.      Gait: Gait abnormal.              Procedures           Patient's care was coordinated with nursing facility staff. Recent vitals, labs and updated medications were reviewed on EMR or chart system of facility. Past Medical, surgical, social, medication and allergy history and patient's previous records were reviewed and updated as appropriate: Most up-to date information is available in the facility EMR where the patient is currently admitted.    Patient Active Problem List   Diagnosis    Controlled type 2 diabetes mellitus with complication, without long-term current use of insulin ([facility])    Essential hypertension    HLD (hyperlipidemia)    Hypothyroidism    Celiac disease    Coronary artery disease    History of duodenal ulcer    Living will on file    Obesity, Class III, BMI 40-49.9 (morbid obesity) ([facility])    S/P BKA (below knee amputation) unilateral, right ([facility])    Paroxysmal atrial fibrillation ([facility])    Iron deficiency anemia due to chronic blood loss    Chronic venous stasis dermatitis of left lower extremity    Gastroesophageal reflux disease without esophagitis    Diabetic ulcer of left foot associated with type 2 diabetes mellitus, limited to breakdown of skin ([facility])    Idiopathic chronic venous hypertension of left lower extremity with ulcer ([facility])    Non-pressure chronic ulcer of left calf, limited to breakdown of skin ([facility])    Diabetic polyneuropathy associated with type 2 diabetes mellitus ([facility])    Cellulitis of left lower extremity    Abnormal urinalysis    Ulcer of toe of left foot, limited to breakdown of skin ([facility])    Diabetic ulcer of left ankle associated with type 2 diabetes mellitus, limited to breakdown of  skin (HCC)    Dermatophytosis    Venous stasis ulcer (HCC)    Acute diastolic congestive heart failure (HCC)    Diabetic foot ulcer (HCC)    Lymphedema     Past Medical History:   Diagnosis Date    Atrial fibrillation (HCC)     Cellulitis     Diabetes mellitus (HCC)     Disease of thyroid gland     Duodenal ulcer     perforated- ICU stay    High blood pressure     High cholesterol     Hyperlipidemia     Hypertension     Hypothyroidism     Lymphedema      b/l LE- was followed at wound center    Morbid obesity with BMI of 40.0-44.9, adult (HCC)     Peritonitis (HCC)      Past Surgical History:   Procedure Laterality Date    BELOW KNEE LEG AMPUTATION Right     DIAGNOSTIC LAPAROSCOPY      KNEE ARTHROSCOPY Bilateral     OTHER SURGICAL HISTORY  2014     lap repair    OTHER SURGICAL HISTORY      Laparoscopic repair of a perforated duodenal ulcer with Javed omental    OTHER SURGICAL HISTORY      Placement of drains     Social History     Socioeconomic History    Marital status: Single     Spouse name: None    Number of children: 2    Years of education: 12    Highest education level: High school graduate   Occupational History    None   Tobacco Use    Smoking status: Former     Current packs/day: 0.00     Average packs/day: 1.5 packs/day for 25.0 years (37.5 ttl pk-yrs)     Types: Cigarettes     Start date: 1979     Quit date: 2004     Years since quittin.7    Smokeless tobacco: Never   Vaping Use    Vaping status: Never Used   Substance and Sexual Activity    Alcohol use: Not Currently     Alcohol/week: 1.0 standard drink of alcohol     Types: 1 Standard drinks or equivalent per week    Drug use: Never    Sexual activity: Not Currently   Other Topics Concern    None   Social History Narrative    Former smoker: Quit 3/31/2012 - As per Care Everywhere      Social Determinants of Health     Financial Resource Strain: Low Risk  (2023)    Overall Financial Resource Strain (CARDIA)     Difficulty of Paying  Living Expenses: Not very hard   Food Insecurity: No Food Insecurity (7/18/2024)    Hunger Vital Sign     Worried About Running Out of Food in the Last Year: Never true     Ran Out of Food in the Last Year: Never true   Transportation Needs: No Transportation Needs (7/18/2024)    PRAPARE - Transportation     Lack of Transportation (Medical): No     Lack of Transportation (Non-Medical): No   Physical Activity: Not on file   Stress: Not on file   Social Connections: Not on file   Intimate Partner Violence: Not on file   Housing Stability: Low Risk  (7/18/2024)    Housing Stability Vital Sign     Unable to Pay for Housing in the Last Year: No     Number of Times Moved in the Last Year: 0     Homeless in the Last Year: No        Current Outpatient Medications:     apixaban (ELIQUIS) 5 mg, Take 1 tablet (5 mg total) by mouth 2 (two) times a day, Disp: , Rfl:     aspirin (ECOTRIN LOW STRENGTH) 81 mg EC tablet, Take 1 tablet (81 mg total) by mouth daily, Disp: , Rfl:     Cholecalciferol (Vitamin D3) 50 MCG (2000 UT) TABS, Take 1 tablet (2,000 Units total) by mouth in the morning, Disp: , Rfl:     furosemide (LASIX) 40 mg tablet, Take 1 tablet (40 mg total) by mouth 2 (two) times a day, Disp: , Rfl:     gabapentin (NEURONTIN) 100 mg capsule, Take 1 capsule (100 mg total) by mouth 2 (two) times a day, Disp: , Rfl:     levothyroxine 25 mcg tablet, Take 1 tablet (25 mcg total) by mouth daily In addition to the 300 mcg dose, Disp: , Rfl:     levothyroxine 300 MCG tablet, Take 1 tablet (300 mcg total) by mouth daily In addition to 25 mcg dose, Disp: , Rfl:     lisinopril (ZESTRIL) 40 mg tablet, Take 1 tablet (40 mg total) by mouth daily, Disp: , Rfl:     metFORMIN (GLUCOPHAGE-XR) 750 mg 24 hr tablet, Take 1 tablet (750 mg total) by mouth daily with breakfast, Disp: , Rfl:     metoprolol tartrate (LOPRESSOR) 50 mg tablet, Take 1 tablet (50 mg total) by mouth 2 (two) times a day, Disp: , Rfl:     Multiple Vitamins-Minerals  "(MULTIVITAMIN MEN 50+ PO), Take 1 tablet by mouth in the morning, Disp: , Rfl:     pantoprazole (PROTONIX) 40 mg tablet, Take 1 tablet (40 mg total) by mouth daily, Disp: , Rfl:     polyethylene glycol (MIRALAX) 17 g packet, Take 17 g by mouth daily as needed (constipation), Disp: , Rfl:     simethicone (MYLICON) 80 mg chewable tablet, Chew 0.5 tablets (40 mg total) every 6 (six) hours as needed for flatulence, Disp: , Rfl:     simvastatin (ZOCOR) 10 mg tablet, Take 1 tablet (10 mg total) by mouth daily at bedtime, Disp: , Rfl:     spironolactone (ALDACTONE) 25 mg tablet, Take 1 tablet (25 mg total) by mouth daily, Disp: , Rfl:   Family History   Problem Relation Age of Onset    Heart disease Family     Alcohol abuse Family     Heart disease Mother     Hypertension Mother     Breast cancer Mother     Migraines Daughter     Leukemia Brother     Migraines Daughter     Substance Abuse Neg Hx     Mental illness Neg Hx     Depression Neg Hx               Coordination of Care: Wound team aware of the treatment plan. Facility nurse will provide wound treatment and monitor the wound for any changes.     Patient / Staff education : Patient / Staff was given education on sign of infection and pressure ulcer prevention. Patient/ Staff verbalized understanding     Follow up :  Next week    Voice-recognition software may have been used in the preparation of this document. Occasional wrong word or \"sound-alike\" substitutions may have occurred due to the inherent limitations of voice recognition software. Interpretation should be guided by context.      HAROON Rhodes  "

## 2024-07-25 NOTE — ASSESSMENT & PLAN NOTE
Left lower leg  Wound on the left medial calf, left foot dorsal, and left lower leg anterior.  Partial-thickness, with large amount of serous drainage, surrounding tissue is dry and flaky  Local wound care with AmLactin lotion on the surrounding tissue, calcium alginate to wound bed  Compression with Tubigrip  I will order arterial duplex on the left lower leg, patient last arterial duplex based on medical record review is 2020  Follow-up next week

## 2024-07-25 NOTE — ASSESSMENT & PLAN NOTE
Positive wound on the left toes, covered with eschar, with no obvious sign of infection  Apply AmLactin lotion  Continue to offload  I will order arterial duplex to determine healing potential of the wound.  Surrounding tissue is positive bluish discoloration.  Patient needs to follow-up with podiatry and vascular  Follow-up next week

## 2024-07-25 NOTE — ASSESSMENT & PLAN NOTE
BP stable  Continue to monitor BP  Continue Metoprolol 50 mg q 12  Continue lisinopril 40 mg daily

## 2024-07-25 NOTE — TELEPHONE ENCOUNTER
Allison from [facility] called to speak to the on call provider regarding the pt's pain in their shoulder.    Contacted the on call provider via secure chat.

## 2024-07-25 NOTE — ASSESSMENT & PLAN NOTE
Wt Readings from Last 3 Encounters:   07/25/24 (!) 171 kg (378 lb)   07/22/24 (!) 166 kg (367 lb 1.1 oz)   09/28/23 (!) 184 kg (405 lb 6.4 oz)   Recent hospital stay for increased SOB  EF of 50%  Treated with IV diuretics  Continue Lasix 40 mg BID  Continue Jardiance 10 mg daily  Continue metoprolol 50 mg q 12  Continue spironolactone 25 mg daily

## 2024-07-25 NOTE — TELEPHONE ENCOUNTER
Pt had appt scheduled for [facility] on 7/26/24 which he canceled. Per email request, I scheduled pt for virtual appt on 7/29 to hold spot for pt. Spoke to Helga at rehab facility to explain~ she said she would talk to pt and call back. Pt called back with nurse and was angry he received a text that he scheduled an appt he didn't make. When it was explained we made it to hold the spot for pt and asked if he wanted that spot or if he wanted to reschedule, pt stated to cancel and he would call us if he wants an appointment.

## 2024-07-25 NOTE — PROGRESS NOTES
[facility]  [address] [address]  [phone]  [place] [facility]  Code 31 (STR)          NAME: Armando Erickson  AGE: 69 y.o. SEX: male CODE STATUS: CPR    DATE OF ENCOUNTER: 7/25/2024    Assessment and Plan     1. Acute diastolic congestive heart failure (HCC)  Assessment & Plan:  Wt Readings from Last 3 Encounters:   07/25/24 (!) 171 kg (378 lb)   07/22/24 (!) 166 kg (367 lb 1.1 oz)   09/28/23 (!) 184 kg (405 lb 6.4 oz)   Recent hospital stay for increased SOB  EF of 50%  Treated with IV diuretics  Continue Lasix 40 mg BID  Continue Jardiance 10 mg daily  Continue metoprolol 50 mg q 12  Continue spironolactone 25 mg daily  2. Paroxysmal atrial fibrillation (HCC)  Assessment & Plan:  HR controlled, 76  Denies palpitations  Continue metoprolol 50 mg q 12  Continue Apixaban 5 mg q 12  3. Essential hypertension  Assessment & Plan:  BP stable  Continue to monitor BP  Continue Metoprolol 50 mg q 12  Continue lisinopril 40 mg daily  4. Controlled type 2 diabetes mellitus with complication, without long-term current use of insulin (HCC)  Assessment & Plan:    Lab Results   Component Value Date    HGBA1C 5.4 07/16/2024   Morning   Continue accu-cheks  Continue metformin  mg daily  Continue Jardiance 10 mg daily  Avoid hypoglycemia  Encourage diabetic diet  5. Acquired hypothyroidism  Assessment & Plan:  Lab Results   Component Value Date    RZQ6FYIGCHOT 6.492 (H) 07/16/2024    TSH 3.30 01/23/2019    Continue high dose Levothyroxine 325 mcg daily  Monitor TSH  6. S/P BKA (below knee amputation) unilateral, right (HCC)  Assessment & Plan:  Patient reports AKA from 2017  Continue PT/OT       All medications and routine orders were reviewed and updated as needed.    Chief Complaint     STR follow up visit  Patient's care was coordinated with nursing facility staff. Recent vitals, labs, and updated medications were review on [facility] system in facility.  Past Medical and Surgical  History      Past Medical History:   Diagnosis Date    Atrial fibrillation (HCC)     Cellulitis     Diabetes mellitus (HCC)     Disease of thyroid gland     Duodenal ulcer     perforated- ICU stay    High blood pressure     High cholesterol     Hyperlipidemia     Hypertension     Hypothyroidism     Lymphedema      b/l LE- was followed at wound center    Morbid obesity with BMI of 40.0-44.9, adult (HCC)     Peritonitis (HCC)      Past Surgical History:   Procedure Laterality Date    BELOW KNEE LEG AMPUTATION Right     DIAGNOSTIC LAPAROSCOPY      KNEE ARTHROSCOPY Bilateral     OTHER SURGICAL HISTORY  03/2014     lap repair    OTHER SURGICAL HISTORY      Laparoscopic repair of a perforated duodenal ulcer with Javed omental    OTHER SURGICAL HISTORY      Placement of drains     No Known Allergies       History of Present Illness     HPI  Armando Erickson is a 69 year old male, he is a STR patient of [facility] since 7/22/24. Past Medical Hx including but not limited to R AKA, CHF, Afib, venous stasis, diabetes, HTN. He was seen in collaboration with nursing for medical mgmt and STR follow up.     Hospital Course  Patient was admitted to the hospital 7/16/24 d/t SOB, Cardiology followed felt likely volume overload from heart failure. Echo showed preserved EF, dilated LA and RA. He was treated with IV diuretics and transitioned to PO. Podiatry followed for chronic venous stasis LLE, recommended WBAT and daily wound care. PT/OT recommended rehab and patient was discharged to [facility].    Rehab Course  Arthur was seen and examined at bedside today. Patient is a reliable historian and is AAO x 3. On exam patient is sitting at the side of his bed and does not appear to be in distress. He reports having right sided chest pain that started following his therapy yesterday. He says pain is worse with inspiration.  He denies cough, SOB, pain is not reproducible.  Likely MS, will order Lidoderm patch daily and CXR. He denies  difficulty sleeping and has a good appetite.  He Lives at home alone and is able to get around with his prothesis. He is asking about home therapy, will relay to SS.    Denies CP/SOB/N/V/D. Denies lightheadedness, dizziness, headaches, vision changes. Patient states they are eating well and staying hydrated. Denies any bowel or bladder issues. Per review of SNF records, Patient is eating 3 meals per day, consuming  %. Last documented BM 7/23/24. No concerns from nursing at this time.    The patient's allergies, past medical, surgical, social and family history were reviewed and unchanged.    Review of Systems     Review of Systems   Constitutional:  Negative for activity change, appetite change and fever.   HENT:  Negative for congestion.    Respiratory:  Negative for cough, shortness of breath and wheezing.    Cardiovascular:  Positive for chest pain.        Right sided chest pain with inspiration   Gastrointestinal:  Negative for abdominal pain, constipation, diarrhea, nausea and vomiting.   Genitourinary:  Negative for difficulty urinating and dysuria.   Musculoskeletal:  Positive for gait problem.        R AKA with prothesis    Skin:  Positive for wound.        LLE   Neurological:  Negative for dizziness and headaches.   Hematological: Negative.    Psychiatric/Behavioral:  Negative for confusion and sleep disturbance.          Objective     Vitals:   Vitals:    07/25/24 1502   BP: 145/66   Pulse: 76   Resp: 18   Temp: (!) 97 °F (36.1 °C)   SpO2: 96%         Physical Exam  Vitals and nursing note reviewed.   Constitutional:       Appearance: Normal appearance.   HENT:      Head: Normocephalic and atraumatic.   Eyes:      Conjunctiva/sclera: Conjunctivae normal.   Cardiovascular:      Rate and Rhythm: Normal rate and regular rhythm.      Heart sounds: Normal heart sounds.   Pulmonary:      Effort: Pulmonary effort is normal. No respiratory distress.      Breath sounds: No wheezing.   Abdominal:      General:  Bowel sounds are normal. There is no distension.      Palpations: Abdomen is soft.      Tenderness: There is no abdominal tenderness.   Musculoskeletal:         General: Deformity present.   Skin:     General: Skin is warm.   Neurological:      Mental Status: He is alert and oriented to person, place, and time.      Motor: Weakness present.      Gait: Gait abnormal.   Psychiatric:         Mood and Affect: Mood normal.         Behavior: Behavior normal.         Pertinent Laboratory/Diagnostic Studies:   Reviewed in facility chart-stable      Current Medications   Medications reviewed and updated see facility MAR for details.      Current Outpatient Medications:     apixaban (ELIQUIS) 5 mg, Take 1 tablet (5 mg total) by mouth 2 (two) times a day, Disp: , Rfl:     aspirin (ECOTRIN LOW STRENGTH) 81 mg EC tablet, Take 1 tablet (81 mg total) by mouth daily, Disp: , Rfl:     Cholecalciferol (Vitamin D3) 50 MCG (2000 UT) TABS, Take 1 tablet (2,000 Units total) by mouth in the morning, Disp: , Rfl:     furosemide (LASIX) 40 mg tablet, Take 1 tablet (40 mg total) by mouth 2 (two) times a day, Disp: , Rfl:     gabapentin (NEURONTIN) 100 mg capsule, Take 1 capsule (100 mg total) by mouth 2 (two) times a day, Disp: , Rfl:     levothyroxine 25 mcg tablet, Take 1 tablet (25 mcg total) by mouth daily In addition to the 300 mcg dose, Disp: , Rfl:     levothyroxine 300 MCG tablet, Take 1 tablet (300 mcg total) by mouth daily In addition to 25 mcg dose, Disp: , Rfl:     lisinopril (ZESTRIL) 40 mg tablet, Take 1 tablet (40 mg total) by mouth daily, Disp: , Rfl:     metFORMIN (GLUCOPHAGE-XR) 750 mg 24 hr tablet, Take 1 tablet (750 mg total) by mouth daily with breakfast, Disp: , Rfl:     metoprolol tartrate (LOPRESSOR) 50 mg tablet, Take 1 tablet (50 mg total) by mouth 2 (two) times a day, Disp: , Rfl:     Multiple Vitamins-Minerals (MULTIVITAMIN MEN 50+ PO), Take 1 tablet by mouth in the morning, Disp: , Rfl:     pantoprazole  "(PROTONIX) 40 mg tablet, Take 1 tablet (40 mg total) by mouth daily, Disp: , Rfl:     polyethylene glycol (MIRALAX) 17 g packet, Take 17 g by mouth daily as needed (constipation), Disp: , Rfl:     simethicone (MYLICON) 80 mg chewable tablet, Chew 0.5 tablets (40 mg total) every 6 (six) hours as needed for flatulence, Disp: , Rfl:     simvastatin (ZOCOR) 10 mg tablet, Take 1 tablet (10 mg total) by mouth daily at bedtime, Disp: , Rfl:     spironolactone (ALDACTONE) 25 mg tablet, Take 1 tablet (25 mg total) by mouth daily, Disp: , Rfl:      Please note:  Voice-recognition software may have been used in the preparation of this document.  Occasional wrong word or \"sound-alike\" substitutions may have occurred due to the inherent limitations of voice recognition software.  Interpretation should be guided by context.         HAROON Godoy  7/25/2024  3:15 PM    "

## 2024-07-25 NOTE — ASSESSMENT & PLAN NOTE
HR controlled, 76  Denies palpitations  Continue metoprolol 50 mg q 12  Continue Apixaban 5 mg q 12

## 2024-07-25 NOTE — TELEPHONE ENCOUNTER
Nursing home or Independent living name:  If its not nursing home or Independent living, do they see PCP in Network: [facility]   Who is calling: [facility]  Callback number: [phone]  Symptoms: Patient had an episode of shortness of breath.  Did you page oncall or place in triage hub: paged on call provider

## 2024-07-25 NOTE — ASSESSMENT & PLAN NOTE
Lab Results   Component Value Date    HGBA1C 5.4 07/16/2024   Morning   Continue accu-cheks  Continue metformin  mg daily  Continue Jardiance 10 mg daily  Avoid hypoglycemia  Encourage diabetic diet

## 2024-07-25 NOTE — ASSESSMENT & PLAN NOTE
Lab Results   Component Value Date    PEG0QDJAGZVA 6.492 (H) 07/16/2024    TSH 3.30 01/23/2019    Continue high dose Levothyroxine 325 mcg daily  Monitor TSH

## 2024-07-29 ENCOUNTER — PATIENT OUTREACH (OUTPATIENT)
Dept: CASE MANAGEMENT | Facility: OTHER | Age: 69
End: 2024-07-29

## 2024-07-30 ENCOUNTER — NURSING HOME VISIT (OUTPATIENT)
Dept: GERIATRICS | Facility: OTHER | Age: 69
End: 2024-07-30
Payer: MEDICARE

## 2024-07-30 VITALS
RESPIRATION RATE: 18 BRPM | HEART RATE: 66 BPM | OXYGEN SATURATION: 96 % | TEMPERATURE: 97 F | DIASTOLIC BLOOD PRESSURE: 70 MMHG | SYSTOLIC BLOOD PRESSURE: 127 MMHG

## 2024-07-30 DIAGNOSIS — I10 ESSENTIAL HYPERTENSION: ICD-10-CM

## 2024-07-30 DIAGNOSIS — I87.2 CHRONIC VENOUS STASIS DERMATITIS OF LEFT LOWER EXTREMITY: ICD-10-CM

## 2024-07-30 DIAGNOSIS — I48.0 PAROXYSMAL ATRIAL FIBRILLATION (HCC): ICD-10-CM

## 2024-07-30 DIAGNOSIS — E11.8 CONTROLLED TYPE 2 DIABETES MELLITUS WITH COMPLICATION, WITHOUT LONG-TERM CURRENT USE OF INSULIN (HCC): ICD-10-CM

## 2024-07-30 DIAGNOSIS — I50.31 ACUTE DIASTOLIC CONGESTIVE HEART FAILURE (HCC): Primary | ICD-10-CM

## 2024-07-30 DIAGNOSIS — I25.10 CORONARY ARTERY DISEASE INVOLVING NATIVE HEART WITHOUT ANGINA PECTORIS, UNSPECIFIED VESSEL OR LESION TYPE: ICD-10-CM

## 2024-07-30 DIAGNOSIS — E03.9 ACQUIRED HYPOTHYROIDISM: ICD-10-CM

## 2024-07-30 PROCEDURE — 99309 SBSQ NF CARE MODERATE MDM 30: CPT

## 2024-07-30 NOTE — ASSESSMENT & PLAN NOTE
Lab Results   Component Value Date    [id number] 6.492 (H) 07/16/2024    TSH 3.30 01/23/2019    Continue high dose Levothyroxine 325 mcg daily  Monitor TSH

## 2024-07-30 NOTE — ASSESSMENT & PLAN NOTE
HR controlled, 66  Denies palpitations  Continue metoprolol 50 mg q 12  Continue Apixaban 5 mg q 12

## 2024-07-30 NOTE — PROGRESS NOTES
[facility]  [address] [address]  [phone]  [place] [facility]  Code 31 (STR)          NAME: Armando Erickson  AGE: 69 y.o. SEX: male CODE STATUS: CPR    DATE OF ENCOUNTER: 7/30/2024    Assessment and Plan     1. Acute diastolic congestive heart failure (HCC)  Assessment & Plan:  Wt Readings from Last 3 Encounters:   07/25/24 (!) 171 kg (378 lb)   07/22/24 (!) 166 kg (367 lb 1.1 oz)   09/28/23 (!) 184 kg (405 lb 6.4 oz)   Recent hospital stay for increased SOB  EF of 50%  Treated with IV diuretics  On exam today LS decreased, 99% on RA  Chronic LE edema  Continue Lasix 40 mg BID  Continue Jardiance 10 mg daily  Continue metoprolol 50 mg q 12  Continue spironolactone 25 mg daily  2. Paroxysmal atrial fibrillation (HCC)  Assessment & Plan:  HR controlled, 66  Denies palpitations  Continue metoprolol 50 mg q 12  Continue Apixaban 5 mg q 12  3. Essential hypertension  Assessment & Plan:  BP stable, 127/70  Continue to monitor BP  Continue Metoprolol 50 mg q 12  Continue lisinopril 40 mg daily  4. Coronary artery disease involving native heart without angina pectoris, unspecified vessel or lesion type  Assessment & Plan:  Stable  Continue ASA, statin  Op follow up with cardiology  5. Controlled type 2 diabetes mellitus with complication, without long-term current use of insulin ([facility])  Assessment & Plan:    Lab Results   Component Value Date    HGBA1C 5.4 07/16/2024   Morning   Continue accu-cheks  Continue metformin  mg daily  Continue Jardiance 10 mg daily  Avoid hypoglycemia  Encourage diabetic diet  6. Acquired hypothyroidism  Assessment & Plan:  Lab Results   Component Value Date    OKM6PQCNSIAY 6.492 (H) 07/16/2024    TSH 3.30 01/23/2019    Continue high dose Levothyroxine 325 mcg daily  Monitor TSH  7. Chronic venous stasis dermatitis of left lower extremity  Assessment & Plan:  Followed by wound care  Continue local wound care  Continue compression and elevation  Vascular  study done 7/26: normal study       All medications and routine orders were reviewed and updated as needed.    Chief Complaint     STR follow up visit  Patient's care was coordinated with nursing facility staff. Recent vitals, labs, and updated medications were review on [facility] system in facility.  Past Medical and Surgical History      Past Medical History:   Diagnosis Date    Atrial fibrillation (HCC)     Cellulitis     Diabetes mellitus (HCC)     Disease of thyroid gland     Duodenal ulcer     perforated- ICU stay    High blood pressure     High cholesterol     Hyperlipidemia     Hypertension     Hypothyroidism     Lymphedema      b/l LE- was followed at wound center    Morbid obesity with BMI of 40.0-44.9, adult (HCC)     Peritonitis (HCC)      Past Surgical History:   Procedure Laterality Date    BELOW KNEE LEG AMPUTATION Right     DIAGNOSTIC LAPAROSCOPY      KNEE ARTHROSCOPY Bilateral     OTHER SURGICAL HISTORY  03/2014     lap repair    OTHER SURGICAL HISTORY      Laparoscopic repair of a perforated duodenal ulcer with Javed omental    OTHER SURGICAL HISTORY      Placement of drains     No Known Allergies       History of Present Illness     HPI  Armando Erickson is a 69 year old male, he is a STR patient of [facility] since 7/22/24. Past Medical Hx including but not limited to R AKA, CHF, Afib, venous stasis, diabetes, HTN. He was seen in collaboration with nursing for medical mgmt and STR follow up.     Hospital Course  Patient was admitted to the hospital 7/16/24 d/t SOB, Cardiology followed felt likely volume overload from heart failure. Echo showed preserved EF, dilated LA and RA. He was treated with IV diuretics and transitioned to PO. Podiatry followed for chronic venous stasis LLE, recommended WBAT and daily wound care. PT/OT recommended rehab and patient was discharged to [facility].    Rehab Course  Arthur was seen and examined at bedside today. Patient is a reliable historian and is  AAO x 3. On exam patient is sitting in his wheelchair and does not appear to be in distress. CXR done 7/26 showed cardiomegaly, CHF, on lasix 40 mg BID. LS are decreased on exam, O2 sat of 99% on RA. Patient has been ambulating independently with his wheelchair. He continues with therapy.    Denies CP/SOB/N/V/D. Denies lightheadedness, dizziness, headaches, vision changes. Patient states they are eating well and staying hydrated. Denies any bowel or bladder issues. Per review of SNF records, Patient is eating 3 meals per day, consuming  %. Last documented BM 7/29/24. No concerns from nursing at this time.    The patient's allergies, past medical, surgical, social and family history were reviewed and unchanged.    Review of Systems     Review of Systems   Constitutional:  Negative for activity change, appetite change and fever.   HENT:  Negative for congestion.    Respiratory:  Negative for cough, shortness of breath and wheezing.    Cardiovascular:  Positive for chest pain.        Right sided chest pain with inspiration   Gastrointestinal:  Negative for abdominal pain, constipation, diarrhea, nausea and vomiting.   Genitourinary:  Negative for difficulty urinating and dysuria.   Musculoskeletal:  Positive for gait problem.        R AKA with prothesis    Skin:  Positive for wound.        LLE   Neurological:  Negative for dizziness and headaches.   Hematological: Negative.    Psychiatric/Behavioral:  Negative for confusion and sleep disturbance.          Objective     Vitals:   Vitals:    07/30/24 1133   BP: 127/70   Pulse: 66   Resp: 18   Temp: (!) 97 °F (36.1 °C)   SpO2: 96%         Physical Exam  Vitals and nursing note reviewed.   Constitutional:       Appearance: Normal appearance.   HENT:      Head: Normocephalic and atraumatic.   Eyes:      Conjunctiva/sclera: Conjunctivae normal.   Cardiovascular:      Rate and Rhythm: Normal rate and regular rhythm.      Heart sounds: Normal heart sounds.   Pulmonary:       Effort: Pulmonary effort is normal. No respiratory distress.      Breath sounds: No wheezing.   Abdominal:      General: Bowel sounds are normal. There is no distension.      Palpations: Abdomen is soft.      Tenderness: There is no abdominal tenderness.   Musculoskeletal:         General: Deformity present.   Skin:     General: Skin is warm.   Neurological:      Mental Status: He is alert and oriented to person, place, and time.      Motor: Weakness present.      Gait: Gait abnormal.   Psychiatric:         Mood and Affect: Mood normal.         Behavior: Behavior normal.         Pertinent Laboratory/Diagnostic Studies:   Reviewed in facility chart-stable      Current Medications   Medications reviewed and updated see facility MAR for details.      Current Outpatient Medications:     apixaban (ELIQUIS) 5 mg, Take 1 tablet (5 mg total) by mouth 2 (two) times a day, Disp: , Rfl:     aspirin (ECOTRIN LOW STRENGTH) 81 mg EC tablet, Take 1 tablet (81 mg total) by mouth daily, Disp: , Rfl:     Cholecalciferol (Vitamin D3) 50 MCG (2000 UT) TABS, Take 1 tablet (2,000 Units total) by mouth in the morning, Disp: , Rfl:     furosemide (LASIX) 40 mg tablet, Take 1 tablet (40 mg total) by mouth 2 (two) times a day, Disp: , Rfl:     gabapentin (NEURONTIN) 100 mg capsule, Take 1 capsule (100 mg total) by mouth 2 (two) times a day, Disp: , Rfl:     levothyroxine 25 mcg tablet, Take 1 tablet (25 mcg total) by mouth daily In addition to the 300 mcg dose, Disp: , Rfl:     levothyroxine 300 MCG tablet, Take 1 tablet (300 mcg total) by mouth daily In addition to 25 mcg dose, Disp: , Rfl:     lisinopril (ZESTRIL) 40 mg tablet, Take 1 tablet (40 mg total) by mouth daily, Disp: , Rfl:     metFORMIN (GLUCOPHAGE-XR) 750 mg 24 hr tablet, Take 1 tablet (750 mg total) by mouth daily with breakfast, Disp: , Rfl:     metoprolol tartrate (LOPRESSOR) 50 mg tablet, Take 1 tablet (50 mg total) by mouth 2 (two) times a day, Disp: , Rfl:      "Multiple Vitamins-Minerals (MULTIVITAMIN MEN 50+ PO), Take 1 tablet by mouth in the morning, Disp: , Rfl:     pantoprazole (PROTONIX) 40 mg tablet, Take 1 tablet (40 mg total) by mouth daily, Disp: , Rfl:     polyethylene glycol (MIRALAX) 17 g packet, Take 17 g by mouth daily as needed (constipation), Disp: , Rfl:     simethicone (MYLICON) 80 mg chewable tablet, Chew 0.5 tablets (40 mg total) every 6 (six) hours as needed for flatulence, Disp: , Rfl:     simvastatin (ZOCOR) 10 mg tablet, Take 1 tablet (10 mg total) by mouth daily at bedtime, Disp: , Rfl:     spironolactone (ALDACTONE) 25 mg tablet, Take 1 tablet (25 mg total) by mouth daily, Disp: , Rfl:      Please note:  Voice-recognition software may have been used in the preparation of this document.  Occasional wrong word or \"sound-alike\" substitutions may have occurred due to the inherent limitations of voice recognition software.  Interpretation should be guided by context.         HAROON Godoy  7/30/2024  3:02 PM    "

## 2024-07-30 NOTE — ASSESSMENT & PLAN NOTE
BP stable, 127/70  Continue to monitor BP  Continue Metoprolol 50 mg q 12  Continue lisinopril 40 mg daily

## 2024-07-30 NOTE — ASSESSMENT & PLAN NOTE
Followed by wound care  Continue local wound care  Continue compression and elevation  Vascular study done 7/26: normal study

## 2024-07-31 ENCOUNTER — NURSING HOME VISIT (OUTPATIENT)
Dept: WOUND CARE | Facility: HOSPITAL | Age: 69
End: 2024-07-31
Payer: MEDICARE

## 2024-07-31 DIAGNOSIS — I87.312 IDIOPATHIC CHRONIC VENOUS HYPERTENSION OF LEFT LOWER EXTREMITY WITH ULCER (HCC): Primary | ICD-10-CM

## 2024-07-31 DIAGNOSIS — E11.621 DIABETIC ULCER OF TOE OF LEFT FOOT ASSOCIATED WITH TYPE 2 DIABETES MELLITUS, WITH OTHER ULCER SEVERITY (HCC): ICD-10-CM

## 2024-07-31 DIAGNOSIS — L97.929 IDIOPATHIC CHRONIC VENOUS HYPERTENSION OF LEFT LOWER EXTREMITY WITH ULCER (HCC): Primary | ICD-10-CM

## 2024-07-31 DIAGNOSIS — L97.528 DIABETIC ULCER OF TOE OF LEFT FOOT ASSOCIATED WITH TYPE 2 DIABETES MELLITUS, WITH OTHER ULCER SEVERITY (HCC): ICD-10-CM

## 2024-07-31 DIAGNOSIS — I89.0 LYMPHEDEMA: ICD-10-CM

## 2024-07-31 PROCEDURE — 99308 SBSQ NF CARE LOW MDM 20: CPT | Performed by: NURSE PRACTITIONER

## 2024-08-01 PROBLEM — I50.32 CHRONIC HEART FAILURE WITH PRESERVED EJECTION FRACTION (HCC): Chronic | Status: ACTIVE | Noted: 2024-07-16

## 2024-08-01 PROBLEM — E78.5 HLD (HYPERLIPIDEMIA): Chronic | Status: ACTIVE | Noted: 2019-03-19

## 2024-08-01 PROBLEM — I10 ESSENTIAL HYPERTENSION: Chronic | Status: ACTIVE | Noted: 2019-03-19

## 2024-08-01 PROBLEM — L03.116 CELLULITIS OF LEFT LOWER EXTREMITY: Status: RESOLVED | Noted: 2021-04-09 | Resolved: 2024-08-01

## 2024-08-01 PROBLEM — R82.90 ABNORMAL URINALYSIS: Status: RESOLVED | Noted: 2021-06-10 | Resolved: 2024-08-01

## 2024-08-01 NOTE — ASSESSMENT & PLAN NOTE
Positive wound on the left toes, covered with eschar, with no obvious sign of infection  Apply AmLactin lotion  Continue to offload   Arterial duplex completed on July 26, 2024 is nonsignificant, no occlusion

## 2024-08-01 NOTE — PROGRESS NOTES
[facility] WOUND CARE MANAGEMENT   AND HYPERBARIC MEDICINE CENTER       Patient ID: Armando Erickson is a 69 y.o. male Date of Birth 1955     Location of Service: [facility]    Performed wound round with: Wound team     Chief Complaint : Left lower leg    Wound Instructions:  Wound:  Left lower leg  Discontinue previous wound order  Apply AmLactin lotion on left lower leg  Referred to lymphedema therapy after discharge from skilled nursing facility    Offload all wounds  Turn and reposition frequently  Instruct / Assist with weight shifting in wheelchair  Increase protein intake.  Monitor for any sign of infection or worsening, inform PCP or patient's primary physician in your facility.      Allergies  Patient has no known allergies.      Assessment & Plan:  1. Idiopathic chronic venous hypertension of left lower extremity with ulcer (HCC)  Assessment & Plan:  Left lower leg  Wound on the left medial calf, left foot dorsal, and left lower leg anterior all improved, the only open area is on the left lower leg, no drainage, partial-thickness.    Local wound care with AmLactin lotion  Compression with Tubigrip  Arterial duplex completed on July 26, 2024 showed normal left lower extremity arterial ultrasound, waves are triphasic and biphasic  This is a chronic wound that can reopen again.  Patient can follow-up with outpatient wound center if reopened.  Sign off. Facility staff will continue to provide treatment and monitor the wound. Can reconsult wound nurse practitioner if wound failed to heal or worsened.     2. Lymphedema  Assessment & Plan:  Patient will benefit from lymphedema therapy after discharge from skilled nursing facility  3. Diabetic ulcer of toe of left foot associated with type 2 diabetes mellitus, with other ulcer severity (HCC)  Assessment & Plan:  Positive wound on the left toes, covered with eschar, with no obvious sign of infection  Apply AmLactin lotion  Continue to offload    Arterial duplex completed on July 26, 2024 is nonsignificant, no occlusion             Subjective:   7/24/2024This is a 69 y.o., male referred to our service for wound/ skin alterations on Left lower leg.Patient have a complex medical history including but not limited to Diabetes mellitus (HCC),  Hyperlipidemia, Hypertension, Hypothyroidism, Lymphedema, Morbid obesity with BMI of 40.0-44.9, adult (HCC.  . Patient was referred by Senior Care Team. Patient was seen in collaboration with the facility wound team.     Wound History:   As per report, patient have chronic venous ulcer on the left lower leg. Last arterial duplex performed on 2020.     Received patient on chair, seems comfortable. Denies pain. Patient have history of right BKA. No significant issues reported related to the wound.     7/31/2024 Follow up for wound on the left lower leg. Received patient, not in distress. Facility staff did not report any significant issues related to the wound.  Denies pain.                Review of Systems   Constitutional: Negative.    Respiratory: Negative.     Cardiovascular: Negative.    Skin:  Positive for wound.   Psychiatric/Behavioral: Negative.         Objective:    Physical Exam  Constitutional:       Appearance: Normal appearance.   Cardiovascular:      Rate and Rhythm: Normal rate.   Pulmonary:      Effort: Pulmonary effort is normal.   Musculoskeletal:      Left lower leg: Edema present.      Comments: Moderate edema on left lower leg  + bluish discoloration left foot   Skin:     Findings: Lesion present.      Comments: Left medial calf : Resolved, dry skin     left fourth toe dorsal: Resolved dry skin    Left foot dorsal: Resolved dry skin     left fifth toe: Wound is covered with 100% eschar, no drainage, no obvious sign of infection, stable    Left third toe wound: Wound is covered with 100% eschar, no drainage, stable, with no obvious sign of infection    Left lower leg anterior: Wound size is 1.9 x 2.7  cm, 100% epithelial, no drainage, no obvious sign of infection   Neurological:      Mental Status: He is alert.      Gait: Gait abnormal.              Procedures           Patient's care was coordinated with nursing facility staff. Recent vitals, labs and updated medications were reviewed on EMR or chart system of facility. Past Medical, surgical, social, medication and allergy history and patient's previous records were reviewed and updated as appropriate: Most up-to date information is available in the facility EMR where the patient is currently admitted.    Patient Active Problem List   Diagnosis    Controlled type 2 diabetes mellitus with complication, without long-term current use of insulin ([facility])    Essential hypertension    HLD (hyperlipidemia)    Hypothyroidism    Celiac disease    Coronary artery disease    History of duodenal ulcer    Living will on file    Obesity, Class III, BMI 40-49.9 (morbid obesity) ([facility])    S/P BKA (below knee amputation) unilateral, right ([facility])    Paroxysmal atrial fibrillation ([facility])    Iron deficiency anemia due to chronic blood loss    Chronic venous stasis dermatitis of left lower extremity    Gastroesophageal reflux disease without esophagitis    Diabetic ulcer of left foot associated with type 2 diabetes mellitus, limited to breakdown of skin ([facility])    Idiopathic chronic venous hypertension of left lower extremity with ulcer ([facility])    Non-pressure chronic ulcer of left calf, limited to breakdown of skin ([facility])    Diabetic polyneuropathy associated with type 2 diabetes mellitus ([facility])    Cellulitis of left lower extremity    Abnormal urinalysis    Ulcer of toe of left foot, limited to breakdown of skin ([facility])    Diabetic ulcer of left ankle associated with type 2 diabetes mellitus, limited to breakdown of skin ([facility])    Dermatophytosis    Venous stasis ulcer ([facility])    Acute diastolic congestive heart failure ([facility])    Diabetic foot ulcer ([facility])    Lymphedema     Past Medical History:    Diagnosis Date    Atrial fibrillation (HCC)     Cellulitis     Diabetes mellitus (HCC)     Disease of thyroid gland     Duodenal ulcer     perforated- ICU stay    High blood pressure     High cholesterol     Hyperlipidemia     Hypertension     Hypothyroidism     Lymphedema      b/l LE- was followed at wound center    Morbid obesity with BMI of 40.0-44.9, adult (HCC)     Peritonitis (HCC)      Past Surgical History:   Procedure Laterality Date    BELOW KNEE LEG AMPUTATION Right     DIAGNOSTIC LAPAROSCOPY      KNEE ARTHROSCOPY Bilateral     OTHER SURGICAL HISTORY  2014     lap repair    OTHER SURGICAL HISTORY      Laparoscopic repair of a perforated duodenal ulcer with Javed omental    OTHER SURGICAL HISTORY      Placement of drains     Social History     Socioeconomic History    Marital status: Single     Spouse name: None    Number of children: 2    Years of education: 12    Highest education level: High school graduate   Occupational History    None   Tobacco Use    Smoking status: Former     Current packs/day: 0.00     Average packs/day: 1.5 packs/day for 25.0 years (37.5 ttl pk-yrs)     Types: Cigarettes     Start date: 1979     Quit date: 2004     Years since quittin.7    Smokeless tobacco: Never   Vaping Use    Vaping status: Never Used   Substance and Sexual Activity    Alcohol use: Not Currently     Alcohol/week: 1.0 standard drink of alcohol     Types: 1 Standard drinks or equivalent per week    Drug use: Never    Sexual activity: Not Currently   Other Topics Concern    None   Social History Narrative    Former smoker: Quit 3/31/2012 - As per Care Everywhere      Social Determinants of Health     Financial Resource Strain: Low Risk  (2023)    Overall Financial Resource Strain (CARDIA)     Difficulty of Paying Living Expenses: Not very hard   Food Insecurity: No Food Insecurity (2024)    Hunger Vital Sign     Worried About Running Out of Food in the Last Year: Never true     Ran  Out of Food in the Last Year: Never true   Transportation Needs: No Transportation Needs (7/18/2024)    PRAPARE - Transportation     Lack of Transportation (Medical): No     Lack of Transportation (Non-Medical): No   Physical Activity: Not on file   Stress: Not on file   Social Connections: Not on file   Intimate Partner Violence: Not on file   Housing Stability: Low Risk  (7/18/2024)    Housing Stability Vital Sign     Unable to Pay for Housing in the Last Year: No     Number of Times Moved in the Last Year: 0     Homeless in the Last Year: No        Current Outpatient Medications:     apixaban (ELIQUIS) 5 mg, Take 1 tablet (5 mg total) by mouth 2 (two) times a day, Disp: , Rfl:     aspirin (ECOTRIN LOW STRENGTH) 81 mg EC tablet, Take 1 tablet (81 mg total) by mouth daily, Disp: , Rfl:     Cholecalciferol (Vitamin D3) 50 MCG (2000 UT) TABS, Take 1 tablet (2,000 Units total) by mouth in the morning, Disp: , Rfl:     furosemide (LASIX) 40 mg tablet, Take 1 tablet (40 mg total) by mouth 2 (two) times a day, Disp: , Rfl:     gabapentin (NEURONTIN) 100 mg capsule, Take 1 capsule (100 mg total) by mouth 2 (two) times a day, Disp: , Rfl:     levothyroxine 25 mcg tablet, Take 1 tablet (25 mcg total) by mouth daily In addition to the 300 mcg dose, Disp: , Rfl:     levothyroxine 300 MCG tablet, Take 1 tablet (300 mcg total) by mouth daily In addition to 25 mcg dose, Disp: , Rfl:     lisinopril (ZESTRIL) 40 mg tablet, Take 1 tablet (40 mg total) by mouth daily, Disp: , Rfl:     metFORMIN (GLUCOPHAGE-XR) 750 mg 24 hr tablet, Take 1 tablet (750 mg total) by mouth daily with breakfast, Disp: , Rfl:     metoprolol tartrate (LOPRESSOR) 50 mg tablet, Take 1 tablet (50 mg total) by mouth 2 (two) times a day, Disp: , Rfl:     Multiple Vitamins-Minerals (MULTIVITAMIN MEN 50+ PO), Take 1 tablet by mouth in the morning, Disp: , Rfl:     pantoprazole (PROTONIX) 40 mg tablet, Take 1 tablet (40 mg total) by mouth daily, Disp: , Rfl:      "polyethylene glycol (MIRALAX) 17 g packet, Take 17 g by mouth daily as needed (constipation), Disp: , Rfl:     simethicone (MYLICON) 80 mg chewable tablet, Chew 0.5 tablets (40 mg total) every 6 (six) hours as needed for flatulence, Disp: , Rfl:     simvastatin (ZOCOR) 10 mg tablet, Take 1 tablet (10 mg total) by mouth daily at bedtime, Disp: , Rfl:     spironolactone (ALDACTONE) 25 mg tablet, Take 1 tablet (25 mg total) by mouth daily, Disp: , Rfl:   Family History   Problem Relation Age of Onset    Heart disease Family     Alcohol abuse Family     Heart disease Mother     Hypertension Mother     Breast cancer Mother     Migraines Daughter     Leukemia Brother     Migraines Daughter     Substance Abuse Neg Hx     Mental illness Neg Hx     Depression Neg Hx               Coordination of Care: Wound team aware of the treatment plan. Facility nurse will provide wound treatment and monitor the wound for any changes.     Patient / Staff education : Patient / Staff was given education on sign of infection and pressure ulcer prevention. Patient/ Staff verbalized understanding     Follow up :  Signed off    Voice-recognition software may have been used in the preparation of this document. Occasional wrong word or \"sound-alike\" substitutions may have occurred due to the inherent limitations of voice recognition software. Interpretation should be guided by context.      HAROON Rhodes  "

## 2024-08-01 NOTE — PROGRESS NOTES
General Cardiology Outpatient Visit    Armanod Erickson 69 y.o. male   MRN: [id number]  Encounter: [id number]    Assessment:  Patient Active Problem List    Diagnosis Date Noted    Diabetic foot ulcer (HCC) 07/25/2024    Lymphedema 07/25/2024    Venous stasis ulcer ([facility]) 07/16/2024    Chronic heart failure with preserved ejection fraction ([facility]) 07/16/2024    Ulcer of toe of left foot, limited to breakdown of skin ([facility]) 03/16/2022    Diabetic ulcer of left ankle associated with type 2 diabetes mellitus, limited to breakdown of skin ([facility]) 03/16/2022    Dermatophytosis 03/16/2022    Diabetic polyneuropathy associated with type 2 diabetes mellitus ([facility]) 03/03/2021    Diabetic ulcer of left foot associated with type 2 diabetes mellitus, limited to breakdown of skin ([facility]) 02/10/2021    Idiopathic chronic venous hypertension of left lower extremity with ulcer ([facility]) 02/10/2021    Non-pressure chronic ulcer of left calf, limited to breakdown of skin ([facility]) 02/10/2021    Gastroesophageal reflux disease without esophagitis 04/12/2019    Chronic venous stasis dermatitis of left lower extremity 03/20/2019    Essential hypertension 03/19/2019    HLD (hyperlipidemia) 03/19/2019    Hypothyroidism 03/19/2019    Obesity, Class III, BMI 40-49.9 (morbid obesity) ([facility]) 03/19/2019    Living will on file 04/29/2018    S/P BKA (below knee amputation) unilateral, right ([facility]) 05/25/2017    Controlled type 2 diabetes mellitus with complication, without long-term current use of insulin ([facility]) 10/10/2016    Iron deficiency anemia due to chronic blood loss 04/28/2014    Paroxysmal atrial fibrillation ([facility]) 10/02/2012    Celiac disease 08/30/2012     Telemedicine Consent:  Patient: Armando Erickson  Provider located at   HEART & VASCULAR [facility] CARDIOLOGY [address]  [facility] [address]    The patient was identified by name and date of birth. Roberts Dre was informed that this is a telemedicine visit and that  the visit is being conducted through the Epic Embedded platform. He agrees to proceed. My office door was closed. No one else was in the room.  He acknowledged consent and understanding of privacy and security of the video platform. The patient has agreed to participate and understands they can discontinue the visit at any time. Patient is aware this is a billable service. I have spent a total time of 25 minutes in caring for this patient on the day of the visit/encounter including Documenting in the medical record, Reviewing / ordering tests, medicine, procedures  , and Obtaining or reviewing history  .    Today's Plan:  Continue current medications.  Recommend daily weights, low-sodium diet, and 2 L fluid restriction.  RTC in 2-3 weeks.    Plan:  Chronic HFpEF; LVEF 50%   TTE 07/21/2024 (volume up): LVEF 50%. LVIDd 6.4 cm. Mildly dilated RV. Moderate MR. Mild TR.    Weight of 367 lbs on 07/22 (day of discharge). Today, weighs 370 lbs at facility.     Most recent BMP from 07/22/2024: sodium 138; potassium 3.9; BUN 18; creatinine 1.06; eGFR 70.     Pharmacotherapies:  --Aldosterone Antagonist: spironolactone 25 mg daily.  --SGLT2 Inhibitor: No.   --Diuretic: Lasix 40 mg BID.     Atrial fibrillation, paroxysmal    [id number] = 5 (age, HF, HTN, DM, PAD).   Anticoagulation on Eliquis.    Rate control: metoprolol tartrate 50 mg q12 hours.   Rhythm control: No.    Hypertension   BP of 147/61 mmHg at facility today.   Continues on lisinopril 40 mg daily and medications as above.     Peripheral arterial disease  Hyperlipidemia  Diabetes mellitus, type II  Hypothyroidism  S/p right BKA  Celiac disease  ?sleep apnea    History of GI bleed    HPI:   Armando Erickson is a 69-year-old man with a PMH as above who is being contacted for hospital follow-up and to establish with practice.    Admitted to [facility] from 07/16 to 07/22/2024 after presenting with SOB, PND, and LE swelling. . Started on IV diuretics (IV Lasix  40 mg BID), and cardiology consulted. Transitioned to PO Lasix on 07/20. MRA added during admission. Lost ~7 lbs this admission (bed weights). Discharged to acute rehab.    08/02/2024: Patient contacted for hospital follow-up. Feeling well today.  Denies lightheadedness, chest pain, palpitations, SOB, LE swelling, PND, and orthopnea.  Tolerating physical therapy well but does endorse decreased stamina/endurance. Hoping to be able to return home in the next few weeks.    Past Medical History:   Diagnosis Date    Abnormal urinalysis 06/10/2021    Atrial fibrillation (HCC)     Cellulitis     Cellulitis of left lower extremity 04/09/2021    Diabetes mellitus (HCC)     Disease of thyroid gland     Duodenal ulcer     perforated- ICU stay    High blood pressure     High cholesterol     History of duodenal ulcer 04/04/2014    Hyperlipidemia     Hypertension     Hypothyroidism     Lymphedema      b/l LE- was followed at wound center    Morbid obesity with BMI of 40.0-44.9, adult (HCC)     Peritonitis (HCC)        Review of Systems   Constitutional:  Negative for activity change, appetite change, fatigue and unexpected weight change.   Respiratory:  Negative for cough, chest tightness and shortness of breath.    Cardiovascular:  Negative for chest pain, palpitations and leg swelling.   Gastrointestinal:  Negative for abdominal pain.   Genitourinary:  Negative for dysuria.   Musculoskeletal: Negative.    Skin: Negative.    Neurological:  Negative for dizziness, syncope, weakness and light-headedness.   Psychiatric/Behavioral:  Negative for confusion and sleep disturbance. The patient is not nervous/anxious.      No Known Allergies      Current Outpatient Medications:     apixaban (ELIQUIS) 5 mg, Take 1 tablet (5 mg total) by mouth 2 (two) times a day, Disp: , Rfl:     aspirin (ECOTRIN LOW STRENGTH) 81 mg EC tablet, Take 1 tablet (81 mg total) by mouth daily, Disp: , Rfl:     Cholecalciferol (Vitamin D3) 50 MCG (2000 UT) TABS,  Take 1 tablet (2,000 Units total) by mouth in the morning, Disp: , Rfl:     furosemide (LASIX) 40 mg tablet, Take 1 tablet (40 mg total) by mouth 2 (two) times a day, Disp: , Rfl:     gabapentin (NEURONTIN) 100 mg capsule, Take 1 capsule (100 mg total) by mouth 2 (two) times a day, Disp: , Rfl:     levothyroxine 25 mcg tablet, Take 1 tablet (25 mcg total) by mouth daily In addition to the 300 mcg dose, Disp: , Rfl:     levothyroxine 300 MCG tablet, Take 1 tablet (300 mcg total) by mouth daily In addition to 25 mcg dose, Disp: , Rfl:     lisinopril (ZESTRIL) 40 mg tablet, Take 1 tablet (40 mg total) by mouth daily, Disp: , Rfl:     metFORMIN (GLUCOPHAGE-XR) 750 mg 24 hr tablet, Take 1 tablet (750 mg total) by mouth daily with breakfast, Disp: , Rfl:     metoprolol tartrate (LOPRESSOR) 50 mg tablet, Take 1 tablet (50 mg total) by mouth 2 (two) times a day, Disp: , Rfl:     Multiple Vitamins-Minerals (MULTIVITAMIN MEN 50+ PO), Take 1 tablet by mouth in the morning, Disp: , Rfl:     pantoprazole (PROTONIX) 40 mg tablet, Take 1 tablet (40 mg total) by mouth daily, Disp: , Rfl:     polyethylene glycol (MIRALAX) 17 g packet, Take 17 g by mouth daily as needed (constipation), Disp: , Rfl:     simethicone (MYLICON) 80 mg chewable tablet, Chew 0.5 tablets (40 mg total) every 6 (six) hours as needed for flatulence, Disp: , Rfl:     simvastatin (ZOCOR) 10 mg tablet, Take 1 tablet (10 mg total) by mouth daily at bedtime, Disp: , Rfl:     spironolactone (ALDACTONE) 25 mg tablet, Take 1 tablet (25 mg total) by mouth daily, Disp: , Rfl:     Social History     Socioeconomic History    Marital status: Single     Spouse name: Not on file    Number of children: 2    Years of education: 12    Highest education level: High school graduate   Occupational History    Not on file   Tobacco Use    Smoking status: Former     Current packs/day: 0.00     Average packs/day: 1.5 packs/day for 25.0 years (37.5 ttl pk-yrs)     Types: Cigarettes      Start date: 1979     Quit date: 2004     Years since quittin.7    Smokeless tobacco: Never   Vaping Use    Vaping status: Never Used   Substance and Sexual Activity    Alcohol use: Not Currently     Alcohol/week: 1.0 standard drink of alcohol     Types: 1 Standard drinks or equivalent per week    Drug use: Never    Sexual activity: Not Currently   Other Topics Concern    Not on file   Social History Narrative    Former smoker: Quit 3/31/2012 - As per Care Everywhere      Social Determinants of Health     Financial Resource Strain: Low Risk  (2023)    Overall Financial Resource Strain (CARDIA)     Difficulty of Paying Living Expenses: Not very hard   Food Insecurity: No Food Insecurity (2024)    Hunger Vital Sign     Worried About Running Out of Food in the Last Year: Never true     Ran Out of Food in the Last Year: Never true   Transportation Needs: No Transportation Needs (2024)    PRAPARE - Transportation     Lack of Transportation (Medical): No     Lack of Transportation (Non-Medical): No   Physical Activity: Not on file   Stress: Not on file   Social Connections: Not on file   Intimate Partner Violence: Not on file   Housing Stability: Low Risk  (2024)    Housing Stability Vital Sign     Unable to Pay for Housing in the Last Year: No     Number of Times Moved in the Last Year: 0     Homeless in the Last Year: No     Family History   Problem Relation Age of Onset    Heart disease Family     Alcohol abuse Family     Heart disease Mother     Hypertension Mother     Breast cancer Mother     Migraines Daughter     Leukemia Brother     Migraines Daughter     Substance Abuse Neg Hx     Mental illness Neg Hx     Depression Neg Hx        Vitals:   Blood pressure 147/61, pulse 69, weight (!) 168 kg (370 lb).    Wt Readings from Last 10 Encounters:   24 (!) 168 kg (370 lb)   24 (!) 171 kg (378 lb)   24 (!) 166 kg (367 lb 1.1 oz)   23 (!) 184 kg (405 lb 6.4 oz)    05/08/23 (!) 184 kg (406 lb)   04/25/23 (!) 191 kg (420 lb)   11/30/22 (!) 191 kg (420 lb)   10/28/22 (!) 191 kg (420 lb)   09/15/22 (!) 183 kg (403 lb)   03/17/22 (!) 181 kg (400 lb)     Vitals:    08/02/24 0900   BP: 147/61   Pulse: 69   Weight: (!) 168 kg (370 lb)       Physical Exam  Vitals reviewed.   Constitutional:       General: He is awake. He is not in acute distress.     Appearance: Normal appearance. He is well-developed and overweight. He is not ill-appearing, toxic-appearing or diaphoretic.   HENT:      Head: Normocephalic.      Nose: Nose normal.      Mouth/Throat:      Mouth: Mucous membranes are moist.   Eyes:      General: No scleral icterus.     Conjunctiva/sclera: Conjunctivae normal.   Pulmonary:      Effort: Pulmonary effort is normal. No tachypnea, bradypnea or respiratory distress.      Breath sounds: Normal air entry. No stridor.   Abdominal:      General: Bowel sounds are normal. There is no distension.   Skin:     Coloration: Skin is not jaundiced or pale.   Neurological:      General: No focal deficit present.      Mental Status: He is alert and oriented to person, place, and time.   Psychiatric:         Attention and Perception: Attention normal.         Mood and Affect: Mood and affect normal.         Speech: Speech normal.         Behavior: Behavior normal. Behavior is cooperative.         Thought Content: Thought content normal.       Labs & Results:  Lab Results   Component Value Date    WBC 5.61 07/21/2024    HGB 11.7 (L) 07/21/2024    HCT 39.1 07/21/2024    MCV 82 07/21/2024     07/21/2024     Lab Results   Component Value Date    SODIUM 138 07/22/2024    K 3.9 07/22/2024    CL 99 07/22/2024    CO2 31 07/22/2024    BUN 18 07/22/2024    CREATININE 1.06 07/22/2024    GLUC 126 07/22/2024    CALCIUM 9.1 07/22/2024     Lab Results   Component Value Date    INR 1.07 10/28/2022    INR 1.35 (H) 06/08/2021    PROTIME 14.1 10/28/2022    PROTIME 16.7 (H) 06/08/2021     Lab Results    Component Value Date     (H) 07/16/2024      Vandana Olguin PA-C

## 2024-08-01 NOTE — ASSESSMENT & PLAN NOTE
Left lower leg  Wound on the left medial calf, left foot dorsal, and left lower leg anterior all improved, the only open area is on the left lower leg, no drainage, partial-thickness.    Local wound care with AmLactin lotion  Compression with Tubigrip  Arterial duplex completed on July 26, 2024 showed normal left lower extremity arterial ultrasound, waves are triphasic and biphasic  This is a chronic wound that can reopen again.  Patient can follow-up with outpatient wound center if reopened.  Sign off. Facility staff will continue to provide treatment and monitor the wound. Can reconsult wound nurse practitioner if wound failed to heal or worsened.

## 2024-08-02 ENCOUNTER — TELEMEDICINE (OUTPATIENT)
Dept: CARDIOLOGY CLINIC | Facility: CLINIC | Age: 69
End: 2024-08-02
Payer: MEDICARE

## 2024-08-02 VITALS
WEIGHT: 315 LBS | HEART RATE: 69 BPM | DIASTOLIC BLOOD PRESSURE: 61 MMHG | BODY MASS INDEX: 41.68 KG/M2 | SYSTOLIC BLOOD PRESSURE: 147 MMHG

## 2024-08-02 DIAGNOSIS — I48.0 PAROXYSMAL ATRIAL FIBRILLATION (HCC): Chronic | ICD-10-CM

## 2024-08-02 DIAGNOSIS — Z09 HOSPITAL DISCHARGE FOLLOW-UP: Primary | ICD-10-CM

## 2024-08-02 DIAGNOSIS — I50.32 CHRONIC HEART FAILURE WITH PRESERVED EJECTION FRACTION (HCC): ICD-10-CM

## 2024-08-02 DIAGNOSIS — I10 ESSENTIAL HYPERTENSION: Chronic | ICD-10-CM

## 2024-08-02 PROCEDURE — 99214 OFFICE O/P EST MOD 30 MIN: CPT | Performed by: PHYSICIAN ASSISTANT

## 2024-08-05 ENCOUNTER — PATIENT OUTREACH (OUTPATIENT)
Dept: CASE MANAGEMENT | Facility: OTHER | Age: 69
End: 2024-08-05

## 2024-08-07 ENCOUNTER — TELEPHONE (OUTPATIENT)
Dept: CARDIOLOGY CLINIC | Facility: CLINIC | Age: 69
End: 2024-08-07

## 2024-08-07 NOTE — TELEPHONE ENCOUNTER
LVM - patient needs to schedule a 3 week follow up with Janiya (around 8/23/24) as well as a 6-8 week appt (around 9/27/24) with Venus Martins or Anne (per virtual visit with Vandana)

## 2024-08-08 ENCOUNTER — NURSING HOME VISIT (OUTPATIENT)
Dept: GERIATRICS | Facility: OTHER | Age: 69
End: 2024-08-08
Payer: MEDICARE

## 2024-08-08 VITALS
HEART RATE: 76 BPM | RESPIRATION RATE: 18 BRPM | SYSTOLIC BLOOD PRESSURE: 106 MMHG | DIASTOLIC BLOOD PRESSURE: 67 MMHG | OXYGEN SATURATION: 96 %

## 2024-08-08 DIAGNOSIS — I50.32 CHRONIC HEART FAILURE WITH PRESERVED EJECTION FRACTION (HCC): Chronic | ICD-10-CM

## 2024-08-08 DIAGNOSIS — E03.9 ACQUIRED HYPOTHYROIDISM: ICD-10-CM

## 2024-08-08 DIAGNOSIS — E11.8 CONTROLLED TYPE 2 DIABETES MELLITUS WITH COMPLICATION, WITHOUT LONG-TERM CURRENT USE OF INSULIN (HCC): Primary | ICD-10-CM

## 2024-08-08 DIAGNOSIS — I48.0 PAROXYSMAL ATRIAL FIBRILLATION (HCC): Chronic | ICD-10-CM

## 2024-08-08 DIAGNOSIS — I10 ESSENTIAL HYPERTENSION: Chronic | ICD-10-CM

## 2024-08-08 DIAGNOSIS — Z89.511 S/P BKA (BELOW KNEE AMPUTATION) UNILATERAL, RIGHT (HCC): ICD-10-CM

## 2024-08-08 PROCEDURE — 99309 SBSQ NF CARE MODERATE MDM 30: CPT

## 2024-08-08 NOTE — PROGRESS NOTES
[facility]  [address] [address] [phone]  [facility]  Code 31 (STR)          NAME: Armando Erickson  AGE: 69 y.o. SEX: male CODE STATUS: CPR    DATE OF ENCOUNTER: 8/8/2024    Assessment and Plan     1. Controlled type 2 diabetes mellitus with complication, without long-term current use of insulin (HCC)  Assessment & Plan:    Lab Results   Component Value Date    HGBA1C 5.4 07/16/2024   Morning   Continue accu-cheks  Continue metformin  mg daily  Continue Jardiance 10 mg daily  Avoid hypoglycemia  Encourage diabetic diet  2. Acquired hypothyroidism  Assessment & Plan:  Lab Results   Component Value Date    IRN3ANOQBCAJ 6.492 (H) 07/16/2024    TSH 3.30 01/23/2019    Continue high dose Levothyroxine 325 mcg daily  Monitor TSH  3. Essential hypertension  Assessment & Plan:  BP stable, 106/67  Continue to monitor BP  Continue Metoprolol 50 mg q 12  Continue lisinopril 40 mg daily  4. Paroxysmal atrial fibrillation (HCC)  Assessment & Plan:  HR controlled, 76  Denies palpitations  Continue metoprolol 50 mg q 12  Continue Apixaban 5 mg q 12  5. Chronic heart failure with preserved ejection fraction (HCC)  Assessment & Plan:  Wt Readings from Last 3 Encounters:   08/02/24 (!) 168 kg (370 lb)   07/25/24 (!) 171 kg (378 lb)   07/22/24 (!) 166 kg (367 lb 1.1 oz)   Recent hospital stay for increased SOB  EF of 50%  Treated with IV diuretics  On exam today LS decreased, 99% on RA  Chronic LE edema  Continue Lasix 40 mg BID  Continue Jardiance 10 mg daily  Continue metoprolol 50 mg q 12  Continue spironolactone 25 mg daily  6. S/P BKA (below knee amputation) unilateral, right (HCC)  Assessment & Plan:  Patient reports AKA from 2017  Uses prothesis, wheelchair  Continue PT/OT       All medications and routine orders were reviewed and updated as needed.    Chief Complaint     STR follow up visit  Patient's care was coordinated with nursing facility staff. Recent vitals, labs, and  updated medications were review on [facility] system in facility.  Past Medical and Surgical History      Past Medical History:   Diagnosis Date    Abnormal urinalysis 06/10/2021    Atrial fibrillation (HCC)     Cellulitis     Cellulitis of left lower extremity 04/09/2021    Diabetes mellitus (HCC)     Disease of thyroid gland     Duodenal ulcer     perforated- ICU stay    High blood pressure     High cholesterol     History of duodenal ulcer 04/04/2014    Hyperlipidemia     Hypertension     Hypothyroidism     Lymphedema      b/l LE- was followed at wound center    Morbid obesity with BMI of 40.0-44.9, adult (HCC)     Peritonitis (HCC)      Past Surgical History:   Procedure Laterality Date    BELOW KNEE LEG AMPUTATION Right     DIAGNOSTIC LAPAROSCOPY      KNEE ARTHROSCOPY Bilateral     OTHER SURGICAL HISTORY  03/2014     lap repair    OTHER SURGICAL HISTORY      Laparoscopic repair of a perforated duodenal ulcer with Javed omental    OTHER SURGICAL HISTORY      Placement of drains     No Known Allergies       History of Present Illness     HPI  Armando Erickson is a 69 year old male, he is a STR patient of [facility] since 7/22/24. Past Medical Hx including but not limited to R AKA, CHF, Afib, venous stasis, diabetes, HTN. He was seen in collaboration with nursing for medical mgmt and STR follow up.     Hospital Course  Patient was admitted to the hospital 7/16/24 d/t SOB, Cardiology followed felt likely volume overload from heart failure. Echo showed preserved EF, dilated LA and RA. He was treated with IV diuretics and transitioned to PO. Podiatry followed for chronic venous stasis LLE, recommended WBAT and daily wound care. PT/OT recommended rehab and patient was discharged to [facility].    Rehab Course  Arthur was seen and examined at bedside today. Patient is a reliable historian and is AAO x 3. On exam patient is sitting in his wheelchair and does not appear to be in distress. He appears to be  doing well.  He denies pain, sleep difficulty, and has a good appetite.  He states he normally only has bowel movements every 4-5 days and did not want stool softeners or Laxatives at this time.  Will continue to monitor. Patient has been ambulating independently with his wheelchair. He continues with therapy.    Denies CP/SOB/N/V/D. Denies lightheadedness, dizziness, headaches, vision changes. Patient states they are eating well and staying hydrated. Denies any bowel or bladder issues. Per review of SNF records, Patient is eating 3 meals per day, consuming  %. Last documented BM 8/7/24. No concerns from nursing at this time.    The patient's allergies, past medical, surgical, social and family history were reviewed and unchanged.    Review of Systems     Review of Systems   Constitutional:  Negative for activity change, appetite change and fever.   HENT:  Negative for congestion.    Respiratory:  Negative for cough, shortness of breath and wheezing.    Cardiovascular:  Positive for leg swelling. Negative for chest pain and palpitations.   Gastrointestinal:  Negative for abdominal pain, constipation, diarrhea, nausea and vomiting.   Genitourinary:  Negative for difficulty urinating and dysuria.   Musculoskeletal:  Positive for gait problem.        R AKA with prothesis    Skin:  Positive for wound.        LLE   Neurological:  Negative for dizziness and headaches.   Hematological: Negative.    Psychiatric/Behavioral:  Negative for confusion and sleep disturbance.          Objective     Vitals:   Vitals:    08/08/24 1435   BP: 106/67   Pulse: 76   Resp: 18   SpO2: 96%           Physical Exam  Vitals and nursing note reviewed.   Constitutional:       Appearance: Normal appearance.   HENT:      Head: Normocephalic and atraumatic.   Eyes:      Conjunctiva/sclera: Conjunctivae normal.   Cardiovascular:      Rate and Rhythm: Normal rate and regular rhythm.      Heart sounds: Normal heart sounds.   Pulmonary:       Effort: Pulmonary effort is normal. No respiratory distress.      Breath sounds: No wheezing.   Abdominal:      General: Bowel sounds are normal. There is no distension.      Palpations: Abdomen is soft.      Tenderness: There is no abdominal tenderness.   Musculoskeletal:         General: Deformity present.   Skin:     General: Skin is warm.   Neurological:      Mental Status: He is alert and oriented to person, place, and time.      Motor: Weakness present.      Gait: Gait abnormal.   Psychiatric:         Mood and Affect: Mood normal.         Behavior: Behavior normal.         Pertinent Laboratory/Diagnostic Studies:   Reviewed in facility chart-stable      Current Medications   Medications reviewed and updated see facility MAR for details.      Current Outpatient Medications:     apixaban (ELIQUIS) 5 mg, Take 1 tablet (5 mg total) by mouth 2 (two) times a day, Disp: , Rfl:     aspirin (ECOTRIN LOW STRENGTH) 81 mg EC tablet, Take 1 tablet (81 mg total) by mouth daily, Disp: , Rfl:     Cholecalciferol (Vitamin D3) 50 MCG (2000 UT) TABS, Take 1 tablet (2,000 Units total) by mouth in the morning, Disp: , Rfl:     furosemide (LASIX) 40 mg tablet, Take 1 tablet (40 mg total) by mouth 2 (two) times a day, Disp: , Rfl:     gabapentin (NEURONTIN) 100 mg capsule, Take 1 capsule (100 mg total) by mouth 2 (two) times a day, Disp: , Rfl:     levothyroxine 25 mcg tablet, Take 1 tablet (25 mcg total) by mouth daily In addition to the 300 mcg dose, Disp: , Rfl:     levothyroxine 300 MCG tablet, Take 1 tablet (300 mcg total) by mouth daily In addition to 25 mcg dose, Disp: , Rfl:     lisinopril (ZESTRIL) 40 mg tablet, Take 1 tablet (40 mg total) by mouth daily, Disp: , Rfl:     metFORMIN (GLUCOPHAGE-XR) 750 mg 24 hr tablet, Take 1 tablet (750 mg total) by mouth daily with breakfast, Disp: , Rfl:     metoprolol tartrate (LOPRESSOR) 50 mg tablet, Take 1 tablet (50 mg total) by mouth 2 (two) times a day, Disp: , Rfl:     Multiple  "Vitamins-Minerals (MULTIVITAMIN MEN 50+ PO), Take 1 tablet by mouth in the morning, Disp: , Rfl:     pantoprazole (PROTONIX) 40 mg tablet, Take 1 tablet (40 mg total) by mouth daily, Disp: , Rfl:     polyethylene glycol (MIRALAX) 17 g packet, Take 17 g by mouth daily as needed (constipation), Disp: , Rfl:     simethicone (MYLICON) 80 mg chewable tablet, Chew 0.5 tablets (40 mg total) every 6 (six) hours as needed for flatulence, Disp: , Rfl:     simvastatin (ZOCOR) 10 mg tablet, Take 1 tablet (10 mg total) by mouth daily at bedtime, Disp: , Rfl:     spironolactone (ALDACTONE) 25 mg tablet, Take 1 tablet (25 mg total) by mouth daily, Disp: , Rfl:      Please note:  Voice-recognition software may have been used in the preparation of this document.  Occasional wrong word or \"sound-alike\" substitutions may have occurred due to the inherent limitations of voice recognition software.  Interpretation should be guided by context.         HAROON Godoy  8/8/2024  2:49 PM    "

## 2024-08-08 NOTE — ASSESSMENT & PLAN NOTE
Wt Readings from Last 3 Encounters:   08/02/24 (!) 168 kg (370 lb)   07/25/24 (!) 171 kg (378 lb)   07/22/24 (!) 166 kg (367 lb 1.1 oz)   Recent hospital stay for increased SOB  EF of 50%  Treated with IV diuretics  On exam today LS decreased, 99% on RA  Chronic LE edema  Continue Lasix 40 mg BID  Continue Jardiance 10 mg daily  Continue metoprolol 50 mg q 12  Continue spironolactone 25 mg daily

## 2024-08-08 NOTE — ASSESSMENT & PLAN NOTE
Lab Results   Component Value Date    [id number] 6.492 (H) 07/16/2024    TSH 3.30 01/23/2019    Continue high dose Levothyroxine 325 mcg daily  Monitor TSH

## 2024-08-13 ENCOUNTER — PATIENT OUTREACH (OUTPATIENT)
Dept: CASE MANAGEMENT | Facility: OTHER | Age: 69
End: 2024-08-13

## 2024-08-13 ENCOUNTER — NURSING HOME VISIT (OUTPATIENT)
Dept: GERIATRICS | Facility: OTHER | Age: 69
End: 2024-08-13
Payer: MEDICARE

## 2024-08-13 VITALS
TEMPERATURE: 97 F | SYSTOLIC BLOOD PRESSURE: 114 MMHG | RESPIRATION RATE: 18 BRPM | OXYGEN SATURATION: 96 % | DIASTOLIC BLOOD PRESSURE: 68 MMHG | HEART RATE: 72 BPM

## 2024-08-13 DIAGNOSIS — I87.2 CHRONIC VENOUS STASIS DERMATITIS OF LEFT LOWER EXTREMITY: ICD-10-CM

## 2024-08-13 DIAGNOSIS — I48.0 PAROXYSMAL ATRIAL FIBRILLATION (HCC): Chronic | ICD-10-CM

## 2024-08-13 DIAGNOSIS — I50.32 CHRONIC HEART FAILURE WITH PRESERVED EJECTION FRACTION (HCC): Chronic | ICD-10-CM

## 2024-08-13 DIAGNOSIS — E11.8 CONTROLLED TYPE 2 DIABETES MELLITUS WITH COMPLICATION, WITHOUT LONG-TERM CURRENT USE OF INSULIN (HCC): ICD-10-CM

## 2024-08-13 DIAGNOSIS — E03.9 ACQUIRED HYPOTHYROIDISM: ICD-10-CM

## 2024-08-13 DIAGNOSIS — I10 ESSENTIAL HYPERTENSION: Primary | Chronic | ICD-10-CM

## 2024-08-13 PROCEDURE — 99309 SBSQ NF CARE MODERATE MDM 30: CPT

## 2024-08-13 NOTE — ASSESSMENT & PLAN NOTE
Wt Readings from Last 3 Encounters:   08/02/24 (!) 168 kg (370 lb)   07/25/24 (!) 171 kg (378 lb)   07/22/24 (!) 166 kg (367 lb 1.1 oz)   Recent hospital stay for increased SOB  EF of 50%  Treated with IV diuretics  On exam today LS decreased, 99% on RA  8/9/2024 weight was 366.5 lb  Chronic LLE edema  Continue Lasix 40 mg BID  Continue Jardiance 10 mg daily  Continue metoprolol 50 mg q 12  Continue spironolactone 25 mg daily

## 2024-08-13 NOTE — ASSESSMENT & PLAN NOTE
Lab Results   Component Value Date    HGBA1C 5.4 07/16/2024   Morning BS  113  Continue accu-cheks  Continue metformin  mg daily  Continue Jardiance 10 mg daily  Avoid hypoglycemia  Encourage diabetic diet

## 2024-08-13 NOTE — ASSESSMENT & PLAN NOTE
Lab Results   Component Value Date    [id number] 6.492 (H) 07/16/2024    TSH 3.30 01/23/2019    Continue high dose Levothyroxine 325 mcg daily  Monitor TSH

## 2024-08-13 NOTE — ASSESSMENT & PLAN NOTE
HR controlled,  72  Denies palpitations  Continue metoprolol 50 mg q 12  Continue Apixaban 5 mg q 12

## 2024-08-13 NOTE — ASSESSMENT & PLAN NOTE
BP stable, 114/68  Continue to monitor BP  Continue Metoprolol 50 mg q 12  Continue lisinopril 40 mg daily

## 2024-08-13 NOTE — PROGRESS NOTES
Chart review complete update obtained from [facility] the patient is currently admitted to SNF for STR. This Admin Coordinator will continue to monitor via chart review.

## 2024-08-13 NOTE — PROGRESS NOTES
[facility]  [address] [address]  [phone]  [facility]  Code 31 (STR)          NAME: Armando Erickson  AGE: 69 y.o. SEX: male CODE STATUS: CPR    DATE OF ENCOUNTER: 8/13/2024    Assessment and Plan     1. Essential hypertension  Assessment & Plan:  BP stable, 114/68  Continue to monitor BP  Continue Metoprolol 50 mg q 12  Continue lisinopril 40 mg daily  2. Paroxysmal atrial fibrillation (HCC)  Assessment & Plan:  HR controlled,  72  Denies palpitations  Continue metoprolol 50 mg q 12  Continue Apixaban 5 mg q 12  3. Chronic heart failure with preserved ejection fraction (HCC)  Assessment & Plan:  Wt Readings from Last 3 Encounters:   08/02/24 (!) 168 kg (370 lb)   07/25/24 (!) 171 kg (378 lb)   07/22/24 (!) 166 kg (367 lb 1.1 oz)   Recent hospital stay for increased SOB  EF of 50%  Treated with IV diuretics  On exam today LS decreased, 99% on RA  8/9/2024 weight was 366.5 lb  Chronic LLE edema  Continue Lasix 40 mg BID  Continue Jardiance 10 mg daily  Continue metoprolol 50 mg q 12  Continue spironolactone 25 mg daily  4. Chronic venous stasis dermatitis of left lower extremity  Assessment & Plan:  Followed by wound care  Continue local wound care  Continue compression and elevation  Vascular study done 7/26: normal study  5. Controlled type 2 diabetes mellitus with complication, without long-term current use of insulin ([facility])  Assessment & Plan:    Lab Results   Component Value Date    HGBA1C 5.4 07/16/2024   Morning BS  113  Continue accu-cheks  Continue metformin  mg daily  Continue Jardiance 10 mg daily  Avoid hypoglycemia  Encourage diabetic diet  6. Acquired hypothyroidism  Assessment & Plan:  Lab Results   Component Value Date    [id number] 6.492 (H) 07/16/2024    TSH 3.30 01/23/2019    Continue high dose Levothyroxine 325 mcg daily  Monitor TSH       All medications and routine orders were reviewed and updated as needed.    Chief Complaint     STR follow up  visit  Patient's care was coordinated with nursing facility staff. Recent vitals, labs, and updated medications were review on [facility] system in facility.  Past Medical and Surgical History      Past Medical History:   Diagnosis Date    Abnormal urinalysis 06/10/2021    Atrial fibrillation (HCC)     Cellulitis     Cellulitis of left lower extremity 04/09/2021    Diabetes mellitus (HCC)     Disease of thyroid gland     Duodenal ulcer     perforated- ICU stay    High blood pressure     High cholesterol     History of duodenal ulcer 04/04/2014    Hyperlipidemia     Hypertension     Hypothyroidism     Lymphedema      b/l LE- was followed at wound center    Morbid obesity with BMI of 40.0-44.9, adult (HCC)     Peritonitis (HCC)      Past Surgical History:   Procedure Laterality Date    BELOW KNEE LEG AMPUTATION Right     DIAGNOSTIC LAPAROSCOPY      KNEE ARTHROSCOPY Bilateral     OTHER SURGICAL HISTORY  03/2014     lap repair    OTHER SURGICAL HISTORY      Laparoscopic repair of a perforated duodenal ulcer with Javed omental    OTHER SURGICAL HISTORY      Placement of drains     No Known Allergies       History of Present Illness     HPI  Armando Erickson is a 69 year old male, he is a STR patient of [facility] since 7/22/24. Past Medical Hx including but not limited to R AKA, CHF, Afib, venous stasis, diabetes, HTN. He was seen in collaboration with nursing for medical mgmt and STR follow up.     Hospital Course  Patient was admitted to the hospital 7/16/24 d/t SOB, Cardiology followed felt likely volume overload from heart failure. Echo showed preserved EF, dilated LA and RA. He was treated with IV diuretics and transitioned to PO. Podiatry followed for chronic venous stasis LLE, recommended WBAT and daily wound care. PT/OT recommended rehab and patient was discharged to [facility].    Rehab Course  Arthur was seen and examined at bedside today. Patient is a reliable historian and is AAO x 3. On exam  patient is sitting in his wheelchair and does not appear to be in distress.  He says he is doing well.  He denies pain, sleep difficulty, and has a good appetite.  Ace wrap intact to left lower extremity.  Patient has been ambulating independently with his wheelchair. He continues with therapy.      Denies CP/SOB/N/V/D. Denies lightheadedness, dizziness, headaches, vision changes. Patient states they are eating well and staying hydrated. Denies any bowel or bladder issues. Per review of SNF records, Patient is eating 3 meals per day, consuming  %. Last documented BM 8/11/24. No concerns from nursing at this time.    The patient's allergies, past medical, surgical, social and family history were reviewed and unchanged.    Review of Systems     Review of Systems   Constitutional:  Negative for activity change, appetite change and fever.   HENT:  Negative for congestion.    Respiratory:  Negative for cough, shortness of breath and wheezing.    Cardiovascular:  Positive for leg swelling. Negative for chest pain and palpitations.   Gastrointestinal:  Negative for abdominal pain, constipation, diarrhea, nausea and vomiting.   Genitourinary:  Negative for difficulty urinating and dysuria.   Musculoskeletal:  Positive for gait problem.        R AKA with prothesis    Skin:  Positive for wound.        LLE   Neurological:  Negative for dizziness and headaches.   Hematological: Negative.    Psychiatric/Behavioral:  Negative for confusion and sleep disturbance.          Objective     Vitals:   Vitals:    08/13/24 1547   BP: 114/68   Pulse: 72   Resp: 18   Temp: (!) 97 °F (36.1 °C)   SpO2: 96%             Physical Exam  Vitals and nursing note reviewed.   Constitutional:       Appearance: Normal appearance.   HENT:      Head: Normocephalic and atraumatic.   Eyes:      Conjunctiva/sclera: Conjunctivae normal.   Cardiovascular:      Rate and Rhythm: Normal rate and regular rhythm.      Heart sounds: Normal heart sounds.    Pulmonary:      Effort: Pulmonary effort is normal. No respiratory distress.      Breath sounds: No wheezing.   Abdominal:      General: Bowel sounds are normal. There is no distension.      Palpations: Abdomen is soft.      Tenderness: There is no abdominal tenderness.   Musculoskeletal:         General: Deformity present.   Skin:     General: Skin is warm.   Neurological:      Mental Status: He is alert and oriented to person, place, and time.      Motor: Weakness present.      Gait: Gait abnormal.   Psychiatric:         Mood and Affect: Mood normal.         Behavior: Behavior normal.         Pertinent Laboratory/Diagnostic Studies:   Reviewed in facility chart-stable      Current Medications   Medications reviewed and updated see facility MAR for details.      Current Outpatient Medications:     apixaban (ELIQUIS) 5 mg, Take 1 tablet (5 mg total) by mouth 2 (two) times a day, Disp: , Rfl:     aspirin (ECOTRIN LOW STRENGTH) 81 mg EC tablet, Take 1 tablet (81 mg total) by mouth daily, Disp: , Rfl:     Cholecalciferol (Vitamin D3) 50 MCG (2000 UT) TABS, Take 1 tablet (2,000 Units total) by mouth in the morning, Disp: , Rfl:     furosemide (LASIX) 40 mg tablet, Take 1 tablet (40 mg total) by mouth 2 (two) times a day, Disp: , Rfl:     gabapentin (NEURONTIN) 100 mg capsule, Take 1 capsule (100 mg total) by mouth 2 (two) times a day, Disp: , Rfl:     levothyroxine 25 mcg tablet, Take 1 tablet (25 mcg total) by mouth daily In addition to the 300 mcg dose, Disp: , Rfl:     levothyroxine 300 MCG tablet, Take 1 tablet (300 mcg total) by mouth daily In addition to 25 mcg dose, Disp: , Rfl:     lisinopril (ZESTRIL) 40 mg tablet, Take 1 tablet (40 mg total) by mouth daily, Disp: , Rfl:     metFORMIN (GLUCOPHAGE-XR) 750 mg 24 hr tablet, Take 1 tablet (750 mg total) by mouth daily with breakfast, Disp: , Rfl:     metoprolol tartrate (LOPRESSOR) 50 mg tablet, Take 1 tablet (50 mg total) by mouth 2 (two) times a day, Disp: ,  "Rfl:     Multiple Vitamins-Minerals (MULTIVITAMIN MEN 50+ PO), Take 1 tablet by mouth in the morning, Disp: , Rfl:     pantoprazole (PROTONIX) 40 mg tablet, Take 1 tablet (40 mg total) by mouth daily, Disp: , Rfl:     polyethylene glycol (MIRALAX) 17 g packet, Take 17 g by mouth daily as needed (constipation), Disp: , Rfl:     simethicone (MYLICON) 80 mg chewable tablet, Chew 0.5 tablets (40 mg total) every 6 (six) hours as needed for flatulence, Disp: , Rfl:     simvastatin (ZOCOR) 10 mg tablet, Take 1 tablet (10 mg total) by mouth daily at bedtime, Disp: , Rfl:     spironolactone (ALDACTONE) 25 mg tablet, Take 1 tablet (25 mg total) by mouth daily, Disp: , Rfl:      Please note:  Voice-recognition software may have been used in the preparation of this document.  Occasional wrong word or \"sound-alike\" substitutions may have occurred due to the inherent limitations of voice recognition software.  Interpretation should be guided by context.         HAROON Godoy  8/13/2024  3:50 PM    "

## 2024-08-19 ENCOUNTER — NURSING HOME VISIT (OUTPATIENT)
Dept: GERIATRICS | Facility: OTHER | Age: 69
End: 2024-08-19
Payer: MEDICARE

## 2024-08-19 ENCOUNTER — HOME HEALTH ADMISSION (OUTPATIENT)
Dept: HOME HEALTH SERVICES | Facility: HOME HEALTHCARE | Age: 69
End: 2024-08-19
Payer: MEDICARE

## 2024-08-19 VITALS
WEIGHT: 315 LBS | RESPIRATION RATE: 18 BRPM | HEART RATE: 67 BPM | BODY MASS INDEX: 41.48 KG/M2 | OXYGEN SATURATION: 96 % | TEMPERATURE: 97 F | SYSTOLIC BLOOD PRESSURE: 137 MMHG | DIASTOLIC BLOOD PRESSURE: 73 MMHG

## 2024-08-19 DIAGNOSIS — E11.8 CONTROLLED TYPE 2 DIABETES MELLITUS WITH COMPLICATION, WITHOUT LONG-TERM CURRENT USE OF INSULIN (HCC): ICD-10-CM

## 2024-08-19 DIAGNOSIS — I50.32 CHRONIC HEART FAILURE WITH PRESERVED EJECTION FRACTION (HCC): Chronic | ICD-10-CM

## 2024-08-19 DIAGNOSIS — I87.2 CHRONIC VENOUS STASIS DERMATITIS OF LEFT LOWER EXTREMITY: ICD-10-CM

## 2024-08-19 DIAGNOSIS — I10 ESSENTIAL HYPERTENSION: Primary | Chronic | ICD-10-CM

## 2024-08-19 DIAGNOSIS — I50.21 ACUTE SYSTOLIC HEART FAILURE (HCC): ICD-10-CM

## 2024-08-19 DIAGNOSIS — I48.0 PAROXYSMAL ATRIAL FIBRILLATION (HCC): Chronic | ICD-10-CM

## 2024-08-19 PROCEDURE — 99316 NF DSCHRG MGMT 30 MIN+: CPT

## 2024-08-19 RX ORDER — DAPAGLIFLOZIN 5 MG/1
5 TABLET, FILM COATED ORAL DAILY
COMMUNITY
End: 2024-08-19

## 2024-08-19 RX ORDER — DAPAGLIFLOZIN 5 MG/1
5 TABLET, FILM COATED ORAL DAILY
Qty: 30 TABLET | Refills: 0 | Status: SHIPPED | OUTPATIENT
Start: 2024-08-19

## 2024-08-19 RX ORDER — ACETAMINOPHEN 325 MG/1
975 TABLET ORAL EVERY 8 HOURS PRN
COMMUNITY

## 2024-08-19 RX ORDER — AMMONIUM LACTATE 12 G/100G
1 LOTION TOPICAL DAILY
Qty: 222 ML | Refills: 0 | Status: SHIPPED | OUTPATIENT
Start: 2024-08-19

## 2024-08-19 RX ORDER — METFORMIN HCL 500 MG
500 TABLET, EXTENDED RELEASE 24 HR ORAL
COMMUNITY
End: 2024-08-19 | Stop reason: SDUPTHER

## 2024-08-19 RX ORDER — FUROSEMIDE 40 MG
40 TABLET ORAL 2 TIMES DAILY
Qty: 60 TABLET | Refills: 0 | Status: SHIPPED | OUTPATIENT
Start: 2024-08-19

## 2024-08-19 RX ORDER — AMMONIUM LACTATE 12 G/100G
1 LOTION TOPICAL DAILY
COMMUNITY
End: 2024-08-19 | Stop reason: SDUPTHER

## 2024-08-19 RX ORDER — SPIRONOLACTONE 25 MG/1
25 TABLET ORAL DAILY
Qty: 30 TABLET | Refills: 0 | Status: SHIPPED | OUTPATIENT
Start: 2024-08-19

## 2024-08-19 RX ORDER — METFORMIN HCL 500 MG
500 TABLET, EXTENDED RELEASE 24 HR ORAL
Qty: 30 TABLET | Refills: 0 | Status: SHIPPED | OUTPATIENT
Start: 2024-08-19

## 2024-08-19 NOTE — ASSESSMENT & PLAN NOTE
HR controlled,  67  Denies palpitations  Continue metoprolol 50 mg q 12  Continue Apixaban 5 mg q 12

## 2024-08-19 NOTE — LETTER
August 19, 2024     Aida Interiano MD  [address] [facility] [address].  [address]  [facility] [place] [address]    Patient: Armando Erickson   YOB: 1955   Date of Visit: 8/19/2024       Dear Dr. Interiano:    Arthur Erickson will discharge from [facility] following completion of rehab. Please arrange for follow up visit.     Sincerely,        HAROON Godoy        CC: No Recipients    HAROON Godoy  8/19/2024  5:33 PM  [facility]  [address] [address]  [phone]  DISCHARGE SUMMARY  Facility: [facility]  Code 31    NAME: Armando Erickson  AGE: 69 y.o. SEX: male   CODE STATUS: CPR    DATE OF ADMISSION: 7/22/24   DATE OF DISCHARGE: 8/20/24   DISCHARGE DISPOSITION: Stable for discharge to home with family support and home health PT/OT/SN services.   Reason for Admission: Patient was admitted to [facility] for rehabilitation after hospitalization for CHF.    Past Medical and Surgical History:   Past Medical History:   Diagnosis Date   • Abnormal urinalysis 06/10/2021   • Atrial fibrillation (HCC)    • Cellulitis    • Cellulitis of left lower extremity 04/09/2021   • Diabetes mellitus (HCC)    • Disease of thyroid gland    • Duodenal ulcer     perforated- ICU stay   • High blood pressure    • High cholesterol    • History of duodenal ulcer 04/04/2014   • Hyperlipidemia    • Hypertension    • Hypothyroidism    • Lymphedema      b/l LE- was followed at wound center   • Morbid obesity with BMI of 40.0-44.9, adult (HCC)    • Peritonitis (HCC)       Past Surgical History:   Procedure Laterality Date   • BELOW KNEE LEG AMPUTATION Right    • DIAGNOSTIC LAPAROSCOPY     • KNEE ARTHROSCOPY Bilateral    • OTHER SURGICAL HISTORY  03/2014     lap repair   • OTHER SURGICAL HISTORY      Laparoscopic repair of a perforated duodenal ulcer with Javed omental   • OTHER SURGICAL HISTORY      Placement of drains       Course of stay:   JONO Erickson is a 69 year old male, he  is a STR patient of [facility] since 7/22/24. Past Medical Hx including but not limited to R AKA, CHF, Afib, venous stasis, diabetes, HTN. He was seen in collaboration with nursing for medical mgmt and STR follow up.     Hospital Course  Patient was admitted to the hospital 7/16/24 d/t SOB, Cardiology followed felt likely volume overload from heart failure. Echo showed preserved EF, dilated LA and RA. He was treated with IV diuretics and transitioned to PO. Podiatry followed for chronic venous stasis LLE, recommended WBAT and daily wound care. PT/OT recommended rehab and patient was discharged to [facility].    Rehab Course  Arthur was seen and examined at bedside today. Patient is a reliable historian and is AAO x 3. On exam patient is sitting in his wheelchair and is in no distress.  He feels he is ready to go home tomorrow.   He denies pain, sleep difficulty, and has a good appetite.  Ace wrap intact to left lower extremity.  Patient has been ambulating independently with his wheelchair.      Denies CP/SOB/N/V/D. Denies lightheadedness, dizziness, headaches, vision changes. Patient states they are eating well and staying hydrated. Denies any bowel or bladder issues. Per review of SNF records, Patient is eating 3 meals per day, consuming  %. Last documented BM 8/18/24. No concerns from nursing at this time.  During the patients stay at [facility], he received skilled nursing care, PT, OT, social service support, dietician support, and medical management.  Pt scheduled to be discharged on 8/20/24.  ROS:  Review of Systems   Constitutional:  Negative for activity change, appetite change and fever.   HENT:  Negative for congestion.    Respiratory:  Negative for cough, shortness of breath and wheezing.    Cardiovascular:  Positive for leg swelling. Negative for chest pain and palpitations.   Gastrointestinal:  Negative for abdominal pain, constipation, diarrhea, nausea and vomiting.   Genitourinary:  Negative  for difficulty urinating and dysuria.   Musculoskeletal:  Positive for gait problem.        R AKA with prothesis    Skin:  Positive for wound.        LLE   Neurological:  Negative for dizziness and headaches.   Hematological: Negative.    Psychiatric/Behavioral:  Negative for confusion and sleep disturbance.        PHYSICAL EXAM:  VITALS:   Vitals:    08/19/24 1213   BP: 137/73   Pulse: 67   Resp: 18   Temp: (!) 97 °F (36.1 °C)   SpO2: 96%        Physical Exam  Vitals and nursing note reviewed.   Constitutional:       Appearance: Normal appearance.   HENT:      Head: Normocephalic and atraumatic.   Eyes:      Conjunctiva/sclera: Conjunctivae normal.   Cardiovascular:      Rate and Rhythm: Normal rate and regular rhythm.      Heart sounds: Normal heart sounds.   Pulmonary:      Effort: Pulmonary effort is normal. No respiratory distress.      Breath sounds: No wheezing.   Abdominal:      General: Bowel sounds are normal. There is no distension.      Palpations: Abdomen is soft.      Tenderness: There is no abdominal tenderness.   Musculoskeletal:         General: Deformity present.   Skin:     General: Skin is warm.   Neurological:      Mental Status: He is alert and oriented to person, place, and time.      Motor: Weakness present.      Gait: Gait abnormal.   Psychiatric:         Mood and Affect: Mood normal.         Behavior: Behavior normal.         Admission Diagnoses:   1. Essential hypertension  Assessment & Plan:  BP stable, 137/73  Continue to monitor BP  Continue Metoprolol 50 mg q 12  Continue lisinopril 40 mg daily  2. Paroxysmal atrial fibrillation (HCC)  Assessment & Plan:  HR controlled,  67  Denies palpitations  Continue metoprolol 50 mg q 12  Continue Apixaban 5 mg q 12  3. Chronic heart failure with preserved ejection fraction (HCC)  Assessment & Plan:  Wt Readings from Last 3 Encounters:   08/19/24 (!) 167 kg (368 lb 3.2 oz)   08/02/24 (!) 168 kg (370 lb)   07/25/24 (!) 171 kg (378 lb)   Recent  hospital stay for increased SOB  EF of 50%  Treated with IV diuretics  Chronic LLE edema, ace wrap  Continue Lasix 40 mg BID  Continue Jardiance 10 mg daily  Continue metoprolol 50 mg q 12  Continue spironolactone 25 mg daily  4. Chronic venous stasis dermatitis of left lower extremity  Assessment & Plan:  Followed by wound care  Continue local wound care  Continue compression and elevation  Vascular study done 7/26: normal study  5. Controlled type 2 diabetes mellitus with complication, without long-term current use of insulin (HCC)  Assessment & Plan:    Lab Results   Component Value Date    HGBA1C 5.4 07/16/2024   Morning BS  113  Continue accu-cheks  Continue metformin  mg daily  Continue Jardiance 10 mg daily  Avoid hypoglycemia  Encourage diabetic diet  6. Acute systolic heart failure (HCC)       Follow-up Recommendations:    Outpatient Follow up with PCP in the next 2 weeks  Home Health PT/OT/SN services     Labs and testing performed during stay:  8/15/24   WBC COUNT, AUTOMATED 7.4 K/CU.MM 3.5-11.0  Final              RBC COUNT 4.7 M/CU.MM 3.6-5.6  Final             HEMOGLOBIN 11.3 g/dL 12.1-17.1 L Final             HEMATOCRIT 38 PERCENT 36-51  Final             MCH 24 pg 25-32 L Final             MCV 81 fL   Final             MCHC 29 g/dL 31-36 L Final             RDW 17.8 % 11.6-14.4 H Final             PLATELETS, AUTOMATED 229 K/CU.-400  Final             MPV 12.3 fL 9.4-12.4  Final      CREATININE 1.59 mg/dL 0.60-1.50 H Final              GLUCOSE 73 mg/dL 65-99  Final             UREA NITROGEN 36 mg/dL 8-23 H Final             SODIUM 137 mMOL/L 135-145  Final             POTASSIUM 5.0 mMOL/L 3.4-5.3  Final             CHLORIDE 96 mmol/L  L Final             UREA N / CREAT RATIO 22.6 RATIO 12-20 H Final             CO2 30 mmol/L 22-32  Final             OSMOLALITY 291 mOsm/kG 275-305  Final     The Serum Osmolality Reference Range has been  adjusted based on current information  provided  by the [facility].        CALCIUM 9.6 mg/dL 8.4-10.2  Final             eGFR 46 See note >59 L Final      eGFR units are mL/min/1.73 m^2.         Discharge Medications: See discharge medication list which was reviewed and signed.      Current Outpatient Medications:   •  acetaminophen (TYLENOL) 325 mg tablet, Take 975 mg by mouth every 8 (eight) hours as needed for mild pain, Disp: , Rfl:   •  ammonium lactate (LAC-HYDRIN) 12 % lotion, Apply 1 Application topically daily In evening, Disp: , Rfl:   •  apixaban (ELIQUIS) 5 mg, Take 1 tablet (5 mg total) by mouth 2 (two) times a day, Disp: , Rfl:   •  aspirin (ECOTRIN LOW STRENGTH) 81 mg EC tablet, Take 1 tablet (81 mg total) by mouth daily, Disp: , Rfl:   •  Cholecalciferol (Vitamin D3) 50 MCG (2000 UT) TABS, Take 1 tablet (2,000 Units total) by mouth in the morning, Disp: , Rfl:   •  dapagliflozin 5 MG TABS, Take 5 mg by mouth daily, Disp: , Rfl:   •  furosemide (LASIX) 40 mg tablet, Take 1 tablet (40 mg total) by mouth 2 (two) times a day, Disp: , Rfl:   •  gabapentin (NEURONTIN) 100 mg capsule, Take 1 capsule (100 mg total) by mouth 2 (two) times a day, Disp: , Rfl:   •  levothyroxine 25 mcg tablet, Take 1 tablet (25 mcg total) by mouth daily In addition to the 300 mcg dose, Disp: , Rfl:   •  levothyroxine 300 MCG tablet, Take 1 tablet (300 mcg total) by mouth daily In addition to 25 mcg dose, Disp: , Rfl:   •  lisinopril (ZESTRIL) 40 mg tablet, Take 1 tablet (40 mg total) by mouth daily, Disp: , Rfl:   •  metFORMIN (GLUCOPHAGE-XR) 500 mg 24 hr tablet, Take 500 mg by mouth daily with breakfast, Disp: , Rfl:   •  metoprolol tartrate (LOPRESSOR) 50 mg tablet, Take 1 tablet (50 mg total) by mouth 2 (two) times a day, Disp: , Rfl:   •  Multiple Vitamins-Minerals (MULTIVITAMIN MEN 50+ PO), Take 1 tablet by mouth in the morning, Disp: , Rfl:   •  pantoprazole (PROTONIX) 40 mg tablet, Take 1 tablet (40 mg total) by mouth daily, Disp: , Rfl:   •  polyethylene  "glycol (MIRALAX) 17 g packet, Take 17 g by mouth daily as needed (constipation), Disp: , Rfl:   •  simethicone (MYLICON) 80 mg chewable tablet, Chew 0.5 tablets (40 mg total) every 6 (six) hours as needed for flatulence, Disp: , Rfl:   •  simvastatin (ZOCOR) 10 mg tablet, Take 1 tablet (10 mg total) by mouth daily at bedtime, Disp: , Rfl:   •  spironolactone (ALDACTONE) 25 mg tablet, Take 1 tablet (25 mg total) by mouth daily, Disp: , Rfl:      Discussion with patient/family and further instructions:  -Fall precautions  -Aspiration precautions  -Bleeding precautions  -Monitor for signs/symptoms of infection  -Medication list was reviewed and signed  -DME form was completed    Status at time of discharge: Stable     Billing based on time. Time spent on unit, 40 minutes. Time spent counseling pt on debility/condition, 30 minutes.    Please note:  Voice-recognition software may have been used in the preparation of this document.  Occasional wrong word or \"sound-alike\" substitutions may have occurred due to the inherent limitations of voice recognition software.  Interpretation should be guided by context.        HAROON Godoy  8/19/2024     HAROON Godoy  8/19/2024 12:27 PM  Sign when Signing Visit  [address] [address] [phone]  [facility]  Code 31 (STR)          NAME: Armando Erickson  AGE: 69 y.o. SEX: male CODE STATUS: CPR    DATE OF ENCOUNTER: 8/19/2024    Assessment and Plan     1. Essential hypertension  Assessment & Plan:  BP stable, 137/73  Continue to monitor BP  Continue Metoprolol 50 mg q 12  Continue lisinopril 40 mg daily  2. Paroxysmal atrial fibrillation (HCC)  Assessment & Plan:  HR controlled,  67  Denies palpitations  Continue metoprolol 50 mg q 12  Continue Apixaban 5 mg q 12  3. Chronic heart failure with preserved ejection fraction (HCC)  Assessment & Plan:  Wt Readings from Last 3 Encounters:   08/19/24 (!) 167 kg (368 " lb 3.2 oz)   08/02/24 (!) 168 kg (370 lb)   07/25/24 (!) 171 kg (378 lb)   Recent hospital stay for increased SOB  EF of 50%  Treated with IV diuretics  On exam today LS decreased, 99% on RA  8/9/2024 weight was 366.5 lb  Chronic LLE edema  Continue Lasix 40 mg BID  Continue Jardiance 10 mg daily  Continue metoprolol 50 mg q 12  Continue spironolactone 25 mg daily  4. Chronic venous stasis dermatitis of left lower extremity  Assessment & Plan:  Followed by wound care  Continue local wound care  Continue compression and elevation  Vascular study done 7/26: normal study  5. Controlled type 2 diabetes mellitus with complication, without long-term current use of insulin (HCC)  Assessment & Plan:    Lab Results   Component Value Date    HGBA1C 5.4 07/16/2024   Morning BS  113  Continue accu-cheks  Continue metformin  mg daily  Continue Jardiance 10 mg daily  Avoid hypoglycemia  Encourage diabetic diet       All medications and routine orders were reviewed and updated as needed.    Chief Complaint     STR follow up visit  Patient's care was coordinated with nursing facility staff. Recent vitals, labs, and updated medications were review on [facility] system in facility.  Past Medical and Surgical History      Past Medical History:   Diagnosis Date   • Abnormal urinalysis 06/10/2021   • Atrial fibrillation (HCC)    • Cellulitis    • Cellulitis of left lower extremity 04/09/2021   • Diabetes mellitus (HCC)    • Disease of thyroid gland    • Duodenal ulcer     perforated- ICU stay   • High blood pressure    • High cholesterol    • History of duodenal ulcer 04/04/2014   • Hyperlipidemia    • Hypertension    • Hypothyroidism    • Lymphedema      b/l LE- was followed at wound center   • Morbid obesity with BMI of 40.0-44.9, adult (HCC)    • Peritonitis (HCC)      Past Surgical History:   Procedure Laterality Date   • BELOW KNEE LEG AMPUTATION Right    • DIAGNOSTIC LAPAROSCOPY     • KNEE ARTHROSCOPY Bilateral    •  OTHER SURGICAL HISTORY  03/2014     lap repair   • OTHER SURGICAL HISTORY      Laparoscopic repair of a perforated duodenal ulcer with Javed omental   • OTHER SURGICAL HISTORY      Placement of drains     No Known Allergies       History of Present Illness     JONO Erickson is a 69 year old male, he is a STR patient of [facility] since 7/22/24. Past Medical Hx including but not limited to R AKA, CHF, Afib, venous stasis, diabetes, HTN. He was seen in collaboration with nursing for medical mgmt and STR follow up.     Hospital Course  Patient was admitted to the hospital 7/16/24 d/t SOB, Cardiology followed felt likely volume overload from heart failure. Echo showed preserved EF, dilated LA and RA. He was treated with IV diuretics and transitioned to PO. Podiatry followed for chronic venous stasis LLE, recommended WBAT and daily wound care. PT/OT recommended rehab and patient was discharged to [facility].    Rehab Course  Arthur was seen and examined at bedside today. Patient is a reliable historian and is AAO x 3. On exam patient is sitting in his wheelchair and does not appear to be in distress.  He says he is doing well.  He denies pain, sleep difficulty, and has a good appetite.  Ace wrap intact to left lower extremity.  Patient has been ambulating independently with his wheelchair. He continues with therapy.      Denies CP/SOB/N/V/D. Denies lightheadedness, dizziness, headaches, vision changes. Patient states they are eating well and staying hydrated. Denies any bowel or bladder issues. Per review of SNF records, Patient is eating 3 meals per day, consuming  %. Last documented BM 8/18/24. No concerns from nursing at this time.    The patient's allergies, past medical, surgical, social and family history were reviewed and unchanged.    Review of Systems     Review of Systems   Constitutional:  Negative for activity change, appetite change and fever.   HENT:  Negative for congestion.     Respiratory:  Negative for cough, shortness of breath and wheezing.    Cardiovascular:  Positive for leg swelling. Negative for chest pain and palpitations.   Gastrointestinal:  Negative for abdominal pain, constipation, diarrhea, nausea and vomiting.   Genitourinary:  Negative for difficulty urinating and dysuria.   Musculoskeletal:  Positive for gait problem.        R AKA with prothesis    Skin:  Positive for wound.        LLE   Neurological:  Negative for dizziness and headaches.   Hematological: Negative.    Psychiatric/Behavioral:  Negative for confusion and sleep disturbance.          Objective     Vitals:   Vitals:    08/19/24 1213   BP: 137/73   Pulse: 67   Resp: 18   Temp: (!) 97 °F (36.1 °C)   SpO2: 96%             Physical Exam  Vitals and nursing note reviewed.   Constitutional:       Appearance: Normal appearance.   HENT:      Head: Normocephalic and atraumatic.   Eyes:      Conjunctiva/sclera: Conjunctivae normal.   Cardiovascular:      Rate and Rhythm: Normal rate and regular rhythm.      Heart sounds: Normal heart sounds.   Pulmonary:      Effort: Pulmonary effort is normal. No respiratory distress.      Breath sounds: No wheezing.   Abdominal:      General: Bowel sounds are normal. There is no distension.      Palpations: Abdomen is soft.      Tenderness: There is no abdominal tenderness.   Musculoskeletal:         General: Deformity present.   Skin:     General: Skin is warm.   Neurological:      Mental Status: He is alert and oriented to person, place, and time.      Motor: Weakness present.      Gait: Gait abnormal.   Psychiatric:         Mood and Affect: Mood normal.         Behavior: Behavior normal.         Pertinent Laboratory/Diagnostic Studies:   Reviewed in facility chart-stable      Current Medications   Medications reviewed and updated see facility MAR for details.      Current Outpatient Medications:   •  apixaban (ELIQUIS) 5 mg, Take 1 tablet (5 mg total) by mouth 2 (two) times a  "day, Disp: , Rfl:   •  aspirin (ECOTRIN LOW STRENGTH) 81 mg EC tablet, Take 1 tablet (81 mg total) by mouth daily, Disp: , Rfl:   •  Cholecalciferol (Vitamin D3) 50 MCG (2000 UT) TABS, Take 1 tablet (2,000 Units total) by mouth in the morning, Disp: , Rfl:   •  furosemide (LASIX) 40 mg tablet, Take 1 tablet (40 mg total) by mouth 2 (two) times a day, Disp: , Rfl:   •  gabapentin (NEURONTIN) 100 mg capsule, Take 1 capsule (100 mg total) by mouth 2 (two) times a day, Disp: , Rfl:   •  levothyroxine 25 mcg tablet, Take 1 tablet (25 mcg total) by mouth daily In addition to the 300 mcg dose, Disp: , Rfl:   •  levothyroxine 300 MCG tablet, Take 1 tablet (300 mcg total) by mouth daily In addition to 25 mcg dose, Disp: , Rfl:   •  lisinopril (ZESTRIL) 40 mg tablet, Take 1 tablet (40 mg total) by mouth daily, Disp: , Rfl:   •  metFORMIN (GLUCOPHAGE-XR) 750 mg 24 hr tablet, Take 1 tablet (750 mg total) by mouth daily with breakfast, Disp: , Rfl:   •  metoprolol tartrate (LOPRESSOR) 50 mg tablet, Take 1 tablet (50 mg total) by mouth 2 (two) times a day, Disp: , Rfl:   •  Multiple Vitamins-Minerals (MULTIVITAMIN MEN 50+ PO), Take 1 tablet by mouth in the morning, Disp: , Rfl:   •  pantoprazole (PROTONIX) 40 mg tablet, Take 1 tablet (40 mg total) by mouth daily, Disp: , Rfl:   •  polyethylene glycol (MIRALAX) 17 g packet, Take 17 g by mouth daily as needed (constipation), Disp: , Rfl:   •  simethicone (MYLICON) 80 mg chewable tablet, Chew 0.5 tablets (40 mg total) every 6 (six) hours as needed for flatulence, Disp: , Rfl:   •  simvastatin (ZOCOR) 10 mg tablet, Take 1 tablet (10 mg total) by mouth daily at bedtime, Disp: , Rfl:   •  spironolactone (ALDACTONE) 25 mg tablet, Take 1 tablet (25 mg total) by mouth daily, Disp: , Rfl:      Please note:  Voice-recognition software may have been used in the preparation of this document.  Occasional wrong word or \"sound-alike\" substitutions may have occurred due to the inherent " limitations of voice recognition software.  Interpretation should be guided by context.         HAROON Godoy  8/19/2024  12:26 PM

## 2024-08-19 NOTE — ASSESSMENT & PLAN NOTE
BP stable, 137/73  Continue to monitor BP  Continue Metoprolol 50 mg q 12  Continue lisinopril 40 mg daily

## 2024-08-19 NOTE — PROGRESS NOTES
[facility]  [address] [facility] [address]  [phone]  DISCHARGE SUMMARY  Facility: [facility]  Code 31    NAME: Armando Erickson  AGE: 69 y.o. SEX: male   CODE STATUS: CPR    DATE OF ADMISSION: 7/22/24   DATE OF DISCHARGE: 8/20/24   DISCHARGE DISPOSITION: Stable for discharge to home with family support and home health PT/OT/SN services.   Reason for Admission: Patient was admitted to [facility] for rehabilitation after hospitalization for CHF.    Past Medical and Surgical History:   Past Medical History:   Diagnosis Date    Abnormal urinalysis 06/10/2021    Atrial fibrillation (HCC)     Cellulitis     Cellulitis of left lower extremity 04/09/2021    Diabetes mellitus (HCC)     Disease of thyroid gland     Duodenal ulcer     perforated- ICU stay    High blood pressure     High cholesterol     History of duodenal ulcer 04/04/2014    Hyperlipidemia     Hypertension     Hypothyroidism     Lymphedema      b/l LE- was followed at wound center    Morbid obesity with BMI of 40.0-44.9, adult (HCC)     Peritonitis (HCC)       Past Surgical History:   Procedure Laterality Date    BELOW KNEE LEG AMPUTATION Right     DIAGNOSTIC LAPAROSCOPY      KNEE ARTHROSCOPY Bilateral     OTHER SURGICAL HISTORY  03/2014     lap repair    OTHER SURGICAL HISTORY      Laparoscopic repair of a perforated duodenal ulcer with Javed omental    OTHER SURGICAL HISTORY      Placement of drains       Course of stay:   JONO Erickson is a 69 year old male, he is a STR patient of [facility] since 7/22/24. Past Medical Hx including but not limited to R AKA, CHF, Afib, venous stasis, diabetes, HTN. He was seen in collaboration with nursing for medical mgmt and STR follow up.     Hospital Course  Patient was admitted to the hospital 7/16/24 d/t SOB, Cardiology followed felt likely volume overload from heart failure. Echo showed preserved EF, dilated LA and RA. He was treated with IV diuretics and transitioned to PO.  Podiatry followed for chronic venous stasis LLE, recommended WBAT and daily wound care. PT/OT recommended rehab and patient was discharged to [facility].    Rehab Course  Arthur was seen and examined at bedside today. Patient is a reliable historian and is AAO x 3. On exam patient is sitting in his wheelchair and is in no distress.  He feels he is ready to go home tomorrow.   He denies pain, sleep difficulty, and has a good appetite.  Ace wrap intact to left lower extremity.  Patient has been ambulating independently with his wheelchair.      Denies CP/SOB/N/V/D. Denies lightheadedness, dizziness, headaches, vision changes. Patient states they are eating well and staying hydrated. Denies any bowel or bladder issues. Per review of SNF records, Patient is eating 3 meals per day, consuming  %. Last documented BM 8/18/24. No concerns from nursing at this time.  During the patients stay at [facility], he received skilled nursing care, PT, OT, social service support, dietician support, and medical management.  Pt scheduled to be discharged on 8/20/24.  ROS:  Review of Systems   Constitutional:  Negative for activity change, appetite change and fever.   HENT:  Negative for congestion.    Respiratory:  Negative for cough, shortness of breath and wheezing.    Cardiovascular:  Positive for leg swelling. Negative for chest pain and palpitations.   Gastrointestinal:  Negative for abdominal pain, constipation, diarrhea, nausea and vomiting.   Genitourinary:  Negative for difficulty urinating and dysuria.   Musculoskeletal:  Positive for gait problem.        R AKA with prothesis    Skin:  Positive for wound.        LLE   Neurological:  Negative for dizziness and headaches.   Hematological: Negative.    Psychiatric/Behavioral:  Negative for confusion and sleep disturbance.        PHYSICAL EXAM:  VITALS:   Vitals:    08/19/24 1213   BP: 137/73   Pulse: 67   Resp: 18   Temp: (!) 97 °F (36.1 °C)   SpO2: 96%        Physical Exam  Vitals and  nursing note reviewed.   Constitutional:       Appearance: Normal appearance.   HENT:      Head: Normocephalic and atraumatic.   Eyes:      Conjunctiva/sclera: Conjunctivae normal.   Cardiovascular:      Rate and Rhythm: Normal rate and regular rhythm.      Heart sounds: Normal heart sounds.   Pulmonary:      Effort: Pulmonary effort is normal. No respiratory distress.      Breath sounds: No wheezing.   Abdominal:      General: Bowel sounds are normal. There is no distension.      Palpations: Abdomen is soft.      Tenderness: There is no abdominal tenderness.   Musculoskeletal:         General: Deformity present.   Skin:     General: Skin is warm.   Neurological:      Mental Status: He is alert and oriented to person, place, and time.      Motor: Weakness present.      Gait: Gait abnormal.   Psychiatric:         Mood and Affect: Mood normal.         Behavior: Behavior normal.         Admission Diagnoses:   1. Essential hypertension  Assessment & Plan:  BP stable, 137/73  Continue to monitor BP  Continue Metoprolol 50 mg q 12  Continue lisinopril 40 mg daily  2. Paroxysmal atrial fibrillation (HCC)  Assessment & Plan:  HR controlled,  67  Denies palpitations  Continue metoprolol 50 mg q 12  Continue Apixaban 5 mg q 12  Orders:  -     apixaban (ELIQUIS) 5 mg; Take 1 tablet (5 mg total) by mouth 2 (two) times a day  3. Chronic heart failure with preserved ejection fraction (HCC)  Assessment & Plan:  Wt Readings from Last 3 Encounters:   08/19/24 (!) 167 kg (368 lb 3.2 oz)   08/02/24 (!) 168 kg (370 lb)   07/25/24 (!) 171 kg (378 lb)   Recent hospital stay for increased SOB  EF of 50%  Treated with IV diuretics  Chronic LLE edema, ace wrap  Continue Lasix 40 mg BID  Continue dapagliflozin 5 mg daily  Continue metoprolol 50 mg q 12  Continue spironolactone 25 mg daily  4. Chronic venous stasis dermatitis of left lower extremity  Assessment & Plan:  Followed by wound care  Continue local wound care  Continue compression  and elevation  Vascular study done 7/26: normal study  Orders:  -     ammonium lactate (LAC-HYDRIN) 12 % lotion; Apply 1 Application topically daily In evening  -     dapagliflozin 5 MG TABS; Take 1 tablet (5 mg total) by mouth daily  5. Controlled type 2 diabetes mellitus with complication, without long-term current use of insulin (HCC)  Assessment & Plan:    Lab Results   Component Value Date    HGBA1C 5.4 07/16/2024   Morning BS  113  Continue accu-cheks  Continue metformin  mg daily  Continue dapagliflozin 5 mg daily  Avoid hypoglycemia  Encourage diabetic diet  Orders:  -     metFORMIN (GLUCOPHAGE-XR) 500 mg 24 hr tablet; Take 1 tablet (500 mg total) by mouth daily with breakfast  6. Acute systolic heart failure (HCC)  -     furosemide (LASIX) 40 mg tablet; Take 1 tablet (40 mg total) by mouth 2 (two) times a day  -     spironolactone (ALDACTONE) 25 mg tablet; Take 1 tablet (25 mg total) by mouth daily       Follow-up Recommendations:    Outpatient Follow up with PCP in the next 2 weeks  Home Health PT/OT/SN services     Labs and testing performed during stay:  8/15/24   WBC COUNT, AUTOMATED 7.4 K/CU.MM 3.5-11.0  Final              RBC COUNT 4.7 M/CU.MM 3.6-5.6  Final             HEMOGLOBIN 11.3 g/dL 12.1-17.1 L Final             HEMATOCRIT 38 PERCENT 36-51  Final             MCH 24 pg 25-32 L Final             MCV 81 fL   Final             MCHC 29 g/dL 31-36 L Final             RDW 17.8 % 11.6-14.4 H Final             PLATELETS, AUTOMATED 229 K/CU.-400  Final             MPV 12.3 fL 9.4-12.4  Final      CREATININE 1.59 mg/dL 0.60-1.50 H Final              GLUCOSE 73 mg/dL 65-99  Final             UREA NITROGEN 36 mg/dL 8-23 H Final             SODIUM 137 mMOL/L 135-145  Final             POTASSIUM 5.0 mMOL/L 3.4-5.3  Final             CHLORIDE 96 mmol/L  L Final             UREA N / CREAT RATIO 22.6 RATIO 12-20 H Final             CO2 30 mmol/L 22-32  Final             OSMOLALITY 291  mOsm/kG 275-305  Final     The Serum Osmolality Reference Range has been  adjusted based on current information provided  by the [facility].        CALCIUM 9.6 mg/dL 8.4-10.2  Final             eGFR 46 See note >59 L Final      eGFR units are mL/min/1.73 m^2.         Discharge Medications: See discharge medication list which was reviewed and signed.      Current Outpatient Medications:     acetaminophen (TYLENOL) 325 mg tablet, Take 975 mg by mouth every 8 (eight) hours as needed for mild pain, Disp: , Rfl:     ammonium lactate (LAC-HYDRIN) 12 % lotion, Apply 1 Application topically daily In evening, Disp: 222 mL, Rfl: 0    apixaban (ELIQUIS) 5 mg, Take 1 tablet (5 mg total) by mouth 2 (two) times a day, Disp: 60 tablet, Rfl: 0    aspirin (ECOTRIN LOW STRENGTH) 81 mg EC tablet, Take 1 tablet (81 mg total) by mouth daily, Disp: , Rfl:     Cholecalciferol (Vitamin D3) 50 MCG (2000 UT) TABS, Take 1 tablet (2,000 Units total) by mouth in the morning, Disp: , Rfl:     dapagliflozin 5 MG TABS, Take 1 tablet (5 mg total) by mouth daily, Disp: 30 tablet, Rfl: 0    furosemide (LASIX) 40 mg tablet, Take 1 tablet (40 mg total) by mouth 2 (two) times a day, Disp: 60 tablet, Rfl: 0    gabapentin (NEURONTIN) 100 mg capsule, Take 1 capsule (100 mg total) by mouth 2 (two) times a day, Disp: , Rfl:     levothyroxine 25 mcg tablet, Take 1 tablet (25 mcg total) by mouth daily In addition to the 300 mcg dose, Disp: , Rfl:     levothyroxine 300 MCG tablet, Take 1 tablet (300 mcg total) by mouth daily In addition to 25 mcg dose, Disp: , Rfl:     lisinopril (ZESTRIL) 40 mg tablet, Take 1 tablet (40 mg total) by mouth daily, Disp: , Rfl:     metFORMIN (GLUCOPHAGE-XR) 500 mg 24 hr tablet, Take 1 tablet (500 mg total) by mouth daily with breakfast, Disp: 30 tablet, Rfl: 0    metoprolol tartrate (LOPRESSOR) 50 mg tablet, Take 1 tablet (50 mg total) by mouth 2 (two) times a day, Disp: , Rfl:     Multiple Vitamins-Minerals (MULTIVITAMIN MEN  "50+ PO), Take 1 tablet by mouth in the morning, Disp: , Rfl:     pantoprazole (PROTONIX) 40 mg tablet, Take 1 tablet (40 mg total) by mouth daily, Disp: , Rfl:     polyethylene glycol (MIRALAX) 17 g packet, Take 17 g by mouth daily as needed (constipation), Disp: , Rfl:     simethicone (MYLICON) 80 mg chewable tablet, Chew 0.5 tablets (40 mg total) every 6 (six) hours as needed for flatulence, Disp: , Rfl:     simvastatin (ZOCOR) 10 mg tablet, Take 1 tablet (10 mg total) by mouth daily at bedtime, Disp: , Rfl:     spironolactone (ALDACTONE) 25 mg tablet, Take 1 tablet (25 mg total) by mouth daily, Disp: 30 tablet, Rfl: 0     Discussion with patient/family and further instructions:  -Fall precautions  -Aspiration precautions  -Bleeding precautions  -Monitor for signs/symptoms of infection  -Medication list was reviewed and signed  -DME form was completed    Status at time of discharge: Stable     Billing based on time. Time spent on unit, 40 minutes. Time spent counseling pt on debility/condition, 30 minutes.    Please note:  Voice-recognition software may have been used in the preparation of this document.  Occasional wrong word or \"sound-alike\" substitutions may have occurred due to the inherent limitations of voice recognition software.  Interpretation should be guided by context.        HAROON Godoy  8/19/2024   "

## 2024-08-19 NOTE — ASSESSMENT & PLAN NOTE
Lab Results   Component Value Date    HGBA1C 5.4 07/16/2024   Morning BS  113  Continue accu-cheks  Continue metformin  mg daily  Continue dapagliflozin 5 mg daily  Avoid hypoglycemia  Encourage diabetic diet

## 2024-08-19 NOTE — ASSESSMENT & PLAN NOTE
Wt Readings from Last 3 Encounters:   08/19/24 (!) 167 kg (368 lb 3.2 oz)   08/02/24 (!) 168 kg (370 lb)   07/25/24 (!) 171 kg (378 lb)   Recent hospital stay for increased SOB  EF of 50%  Treated with IV diuretics  Chronic LLE edema, ace wrap  Continue Lasix 40 mg BID  Continue dapagliflozin 5 mg daily  Continue metoprolol 50 mg q 12  Continue spironolactone 25 mg daily

## 2024-08-20 ENCOUNTER — TELEPHONE (OUTPATIENT)
Dept: FAMILY MEDICINE CLINIC | Facility: CLINIC | Age: 69
End: 2024-08-20

## 2024-08-20 ENCOUNTER — PATIENT OUTREACH (OUTPATIENT)
Dept: CASE MANAGEMENT | Facility: OTHER | Age: 69
End: 2024-08-20

## 2024-08-20 DIAGNOSIS — Z71.89 COMPLEX CARE COORDINATION: Primary | ICD-10-CM

## 2024-08-20 NOTE — PROGRESS NOTES
Update obtained from PCC the patient discharged today 8/20/24 to Home with [facility]. I have removed myself off of the care team and sent a inbasket to the appropriate care  to notifying them of the SNF discharge and HRR Referal.  Ambulatory referral placed for complex care management.

## 2024-08-20 NOTE — TELEPHONE ENCOUNTER
Josselin from [facility] called to schedule appointment for patient. Spoke with office clerical they advised appointment could not be scheduled yet as patient has not been discharged yet, they advised a family member should call after discharge to schedule appointment.  Advised Josselin of office clerical message, she stated she would let patient and family know. NFA needed at this time.

## 2024-08-21 ENCOUNTER — HOME CARE VISIT (OUTPATIENT)
Dept: HOME HEALTH SERVICES | Facility: HOME HEALTHCARE | Age: 69
End: 2024-08-21
Payer: MEDICARE

## 2024-08-21 VITALS
OXYGEN SATURATION: 98 % | DIASTOLIC BLOOD PRESSURE: 60 MMHG | SYSTOLIC BLOOD PRESSURE: 112 MMHG | TEMPERATURE: 97.7 F | HEART RATE: 60 BPM | RESPIRATION RATE: 20 BRPM

## 2024-08-21 PROCEDURE — 10330081 VN NO-PAY CLAIM PROCEDURE

## 2024-08-21 PROCEDURE — 400013 VN SOC

## 2024-08-21 PROCEDURE — G0299 HHS/HOSPICE OF RN EA 15 MIN: HCPCS

## 2024-08-22 ENCOUNTER — TRANSITIONAL CARE MANAGEMENT (OUTPATIENT)
Dept: FAMILY MEDICINE CLINIC | Facility: CLINIC | Age: 69
End: 2024-08-22

## 2024-08-22 ENCOUNTER — PATIENT OUTREACH (OUTPATIENT)
Dept: CASE MANAGEMENT | Facility: OTHER | Age: 69
End: 2024-08-22

## 2024-08-22 ENCOUNTER — TELEPHONE (OUTPATIENT)
Dept: CARDIOLOGY CLINIC | Facility: CLINIC | Age: 69
End: 2024-08-22

## 2024-08-22 ENCOUNTER — DOCUMENTATION (OUTPATIENT)
Dept: FAMILY MEDICINE CLINIC | Facility: CLINIC | Age: 69
End: 2024-08-22

## 2024-08-22 NOTE — TELEPHONE ENCOUNTER
Called pt, EZEQUIEL to come  samples while Bertha tries to see if he qualifies for Patient asst.

## 2024-08-22 NOTE — TELEPHONE ENCOUNTER
Patient called the RX Refill Line. Message is being forwarded to the office.     Patient is requesting an alternative medication to eliquis. Patient went to the pharmacy to pick it up and they want to charge 500.00 and he cannot pay that.     Please contact patient at [phone]

## 2024-08-22 NOTE — TELEPHONE ENCOUNTER
Called and spoke with staff member from [facility] to obtain d/c summary. They will fax it to our office at [phone].

## 2024-08-22 NOTE — PROGRESS NOTES
"Outpatient Care Management Note:  In basket referral for CM received after discharge from [facility].  Chart review completed.      Hospitalized 7/16/2024-7/22/2024 with acute HF.  Presented with increased SOB and leg swelling. History of DM with recent A1C 5.4, (R) AKA, Afib, HTN and venous stasis.  Discharged to [facility].  Discharged home with [facility] 8/20/2024.  Active with [facility].     Outreach call placed to Mr. Erickson.  Introduced myself and my role.  He feels he is doing well since discharge from [facility].  SADA has started care.  Heart failure is a new diagnosis for him. He knew he had atrial fibrillation but did not really understand what HF was.  Discussed basic meaning of HF.      Mr. Erickson is not weighing himself because he does not have a scale.  He plans on purchasing one, but due to weight he needs to find one that will work for him.     Mr. Erickson is monitoring his salt intake.  Discussed that watching sodium is important as it \"holds\" water and causes fluid build up. Verbalized understanding.  Mr. Erickson generally drinks 2 32 ounce mugs of fluid daily.  Sometime a little but more.  He is not aware of being on any fluid restrictions but will ask SADA to review his discharge paperwork with him to be sure.      Arthur states that [facility] did a medication review.  He was told by his pharmacy that his Eliquis would be over $500/month and he Is unable to afford that.  He did call the provider office regarding this.  Reviewed notes, advised Arthur that he should have a voice mail from cardiology that samples are at the [place] office for he or his family to .  Cardiology will also be looking into the PAP for Eliquis.   Arthur is hoping that he will be able to stop taking Lasix at some point due to frequent urination.  Supportive listening provided.     Arthur lives alone in a one floor apartment.  He had a (R) AKA in 2017.  He has a prosthetic as well as a wheelchair.  He is primarily independent but his daughter assists " with laundry, shopping and transportation.  Denies any needs at this time.  He is aware that RN JULEE, Ericka, will call next week.

## 2024-08-23 ENCOUNTER — HOME CARE VISIT (OUTPATIENT)
Dept: HOME HEALTH SERVICES | Facility: HOME HEALTHCARE | Age: 69
End: 2024-08-23
Payer: MEDICARE

## 2024-08-23 ENCOUNTER — TELEPHONE (OUTPATIENT)
Age: 69
End: 2024-08-23

## 2024-08-23 VITALS
HEART RATE: 64 BPM | SYSTOLIC BLOOD PRESSURE: 100 MMHG | TEMPERATURE: 98.4 F | OXYGEN SATURATION: 98 % | RESPIRATION RATE: 18 BRPM | DIASTOLIC BLOOD PRESSURE: 56 MMHG

## 2024-08-23 PROCEDURE — G0299 HHS/HOSPICE OF RN EA 15 MIN: HCPCS

## 2024-08-23 NOTE — TELEPHONE ENCOUNTER
Ana from  [facility] called to report that patient has new wounds and also a baking sheet fell on his L foot leaving another wound. [facility] is asking PCP if okay to resume the previous wound care (wash with water and soap, wrap with kerlix, algenate, ace wrap). Please advise and can verbally confirm with ana.

## 2024-08-23 NOTE — TELEPHONE ENCOUNTER
He needs an appointment with Dr. Interiano for hospital follow up - he has not been seen in almost a year (long visit)   Would recommend wound care orders come from his podiatrist

## 2024-08-23 NOTE — TELEPHONE ENCOUNTER
Helga returned phone call and discussed message about pt's wounds. Pt does not see a podiatrist. Helga did call the wound center, as they had seen him previously, but said that he had not been there in over a month and they could not advise. Helga states the opened areas are mild and she believes they will close up easily. Let her know that pt has not been here in almost a year. She states she did tell the pt to call this office this morning to schedule, but no phone call has been received. Liliana would like to know if  would consider giving some type of wound care instructions for the time being. Pt's next appt with home health is Monday, 8/26. Please advise.

## 2024-08-23 NOTE — TELEPHONE ENCOUNTER
Phone call placed to number provided by Mary Jo Schwarz RN. Male answered the phone and states we have the wrong number. Called [facility] and spoke with Shahnaz who provided the name Helga Garcia RN and phone number [phone]. Called Helga's number, no answer.  for call back.

## 2024-08-24 ENCOUNTER — HOME CARE VISIT (OUTPATIENT)
Dept: HOME HEALTH SERVICES | Facility: HOME HEALTHCARE | Age: 69
End: 2024-08-24
Payer: MEDICARE

## 2024-08-26 ENCOUNTER — HOME CARE VISIT (OUTPATIENT)
Dept: HOME HEALTH SERVICES | Facility: HOME HEALTHCARE | Age: 69
End: 2024-08-26
Payer: MEDICARE

## 2024-08-26 VITALS
DIASTOLIC BLOOD PRESSURE: 66 MMHG | TEMPERATURE: 98.7 F | OXYGEN SATURATION: 98 % | RESPIRATION RATE: 20 BRPM | SYSTOLIC BLOOD PRESSURE: 114 MMHG | HEART RATE: 60 BPM

## 2024-08-26 PROCEDURE — 10330064 BANDAGE, CNFRM 4X4.1YDS N/S LF (12RL/BG"

## 2024-08-26 PROCEDURE — 10330064 PAD, ABD 5X9 STR LF (1/PK 20PK/BX) MGM1"

## 2024-08-26 PROCEDURE — G0299 HHS/HOSPICE OF RN EA 15 MIN: HCPCS

## 2024-08-26 NOTE — TELEPHONE ENCOUNTER
Spoke with Helga and relayed message from  for wound care. Verbal orders accepted by Helga Garcia RN. No further action needed at this time.

## 2024-08-26 NOTE — TELEPHONE ENCOUNTER
okay to resume the previous wound care (wash with water and soap, wrap with kerlix, algenate, ace wrap)

## 2024-08-27 ENCOUNTER — HOME CARE VISIT (OUTPATIENT)
Dept: HOME HEALTH SERVICES | Facility: HOME HEALTHCARE | Age: 69
End: 2024-08-27
Payer: MEDICARE

## 2024-08-27 VITALS — DIASTOLIC BLOOD PRESSURE: 90 MMHG | SYSTOLIC BLOOD PRESSURE: 152 MMHG | HEART RATE: 61 BPM | OXYGEN SATURATION: 100 %

## 2024-08-27 VITALS — DIASTOLIC BLOOD PRESSURE: 90 MMHG | SYSTOLIC BLOOD PRESSURE: 132 MMHG

## 2024-08-27 PROCEDURE — G0151 HHCP-SERV OF PT,EA 15 MIN: HCPCS

## 2024-08-27 PROCEDURE — G0152 HHCP-SERV OF OT,EA 15 MIN: HCPCS

## 2024-08-27 PROCEDURE — 10330064 DRESSING, CALCIUM ALGINATE AG SHEET 4X4"

## 2024-08-27 PROCEDURE — G0180 MD CERTIFICATION HHA PATIENT: HCPCS

## 2024-08-28 ENCOUNTER — HOME CARE VISIT (OUTPATIENT)
Dept: HOME HEALTH SERVICES | Facility: HOME HEALTHCARE | Age: 69
End: 2024-08-28
Payer: MEDICARE

## 2024-08-28 VITALS
RESPIRATION RATE: 20 BRPM | HEART RATE: 68 BPM | OXYGEN SATURATION: 97 % | DIASTOLIC BLOOD PRESSURE: 68 MMHG | SYSTOLIC BLOOD PRESSURE: 128 MMHG | TEMPERATURE: 99.1 F

## 2024-08-28 PROCEDURE — G0299 HHS/HOSPICE OF RN EA 15 MIN: HCPCS

## 2024-08-29 ENCOUNTER — PATIENT OUTREACH (OUTPATIENT)
Dept: CASE MANAGEMENT | Facility: OTHER | Age: 69
End: 2024-08-29

## 2024-08-29 ENCOUNTER — HOME CARE VISIT (OUTPATIENT)
Dept: HOME HEALTH SERVICES | Facility: HOME HEALTHCARE | Age: 69
End: 2024-08-29
Payer: MEDICARE

## 2024-08-29 VITALS — HEART RATE: 73 BPM | DIASTOLIC BLOOD PRESSURE: 70 MMHG | SYSTOLIC BLOOD PRESSURE: 130 MMHG | OXYGEN SATURATION: 97 %

## 2024-08-29 PROCEDURE — G0151 HHCP-SERV OF PT,EA 15 MIN: HCPCS

## 2024-08-29 NOTE — PROGRESS NOTES
Received inbasket reminder for outreach contact to pt for introduction of role and discussion about care management. Pt in [facility] program and receives [facility] visits at this time. Message left for pt requesting return call back

## 2024-08-30 ENCOUNTER — HOME CARE VISIT (OUTPATIENT)
Dept: HOME HEALTH SERVICES | Facility: HOME HEALTHCARE | Age: 69
End: 2024-08-30
Payer: MEDICARE

## 2024-08-30 VITALS
OXYGEN SATURATION: 99 % | DIASTOLIC BLOOD PRESSURE: 74 MMHG | RESPIRATION RATE: 20 BRPM | SYSTOLIC BLOOD PRESSURE: 138 MMHG | HEART RATE: 64 BPM | TEMPERATURE: 98.3 F

## 2024-08-30 PROCEDURE — G0299 HHS/HOSPICE OF RN EA 15 MIN: HCPCS

## 2024-09-02 ENCOUNTER — HOME CARE VISIT (OUTPATIENT)
Dept: HOME HEALTH SERVICES | Facility: HOME HEALTHCARE | Age: 69
End: 2024-09-02
Payer: MEDICARE

## 2024-09-02 VITALS
HEART RATE: 64 BPM | OXYGEN SATURATION: 96 % | RESPIRATION RATE: 20 BRPM | SYSTOLIC BLOOD PRESSURE: 126 MMHG | TEMPERATURE: 98.3 F | DIASTOLIC BLOOD PRESSURE: 74 MMHG

## 2024-09-02 PROCEDURE — G0299 HHS/HOSPICE OF RN EA 15 MIN: HCPCS

## 2024-09-03 ENCOUNTER — HOME CARE VISIT (OUTPATIENT)
Dept: HOME HEALTH SERVICES | Facility: HOME HEALTHCARE | Age: 69
End: 2024-09-03
Payer: MEDICARE

## 2024-09-03 ENCOUNTER — PATIENT OUTREACH (OUTPATIENT)
Dept: CASE MANAGEMENT | Facility: OTHER | Age: 69
End: 2024-09-03

## 2024-09-03 VITALS — OXYGEN SATURATION: 98 % | SYSTOLIC BLOOD PRESSURE: 148 MMHG | HEART RATE: 85 BPM | DIASTOLIC BLOOD PRESSURE: 62 MMHG

## 2024-09-03 PROCEDURE — G0157 HHC PT ASSISTANT EA 15: HCPCS

## 2024-09-03 NOTE — PROGRESS NOTES
"Called to follow up with pt after he left voice mail returning call. Pt states that he is doing fine with [facility] coming in, Nursing and PT,to build physical stamina and for wound care.Pt reports that he wears compression stocking to his left leg. Pt has history of right bka which he has a prosthesis. Pt states that his wounds are only leaving small dots of drainage on dressings at this time and are healing. Provided  education on wound care as well as importance of daily weights for HF. Reviewed weight gain rule.Pt does not weigh daily at this time but a few times a week, he reports  Pt reports that he is independent with self care as well as medications. He has a medication list and various DME assistive devices in home such as a raised toilet seat, walker, cane, w/c, scale and shower bench.. Discussed folow up with cardiology, PCP and pt states that he has not chitra to an appt for 'some time'. Inquired if it was lack of transportation and pt states that he has used [employer] in the past but just doesn't like it.\"I guess I can use it if I have to\" Pt receives his groceries by [employer] delivery. He cooks his own meals. Encouraged pt to schedule appts necessary for follow up and stressed importance of this. Will follow up in a couple weeks once [facility] is out of home.   "

## 2024-09-04 ENCOUNTER — HOME CARE VISIT (OUTPATIENT)
Dept: HOME HEALTH SERVICES | Facility: HOME HEALTHCARE | Age: 69
End: 2024-09-04
Payer: MEDICARE

## 2024-09-04 VITALS
OXYGEN SATURATION: 100 % | SYSTOLIC BLOOD PRESSURE: 150 MMHG | TEMPERATURE: 98 F | HEART RATE: 84 BPM | DIASTOLIC BLOOD PRESSURE: 72 MMHG | RESPIRATION RATE: 20 BRPM

## 2024-09-04 PROCEDURE — G0299 HHS/HOSPICE OF RN EA 15 MIN: HCPCS

## 2024-09-05 ENCOUNTER — HOME CARE VISIT (OUTPATIENT)
Dept: HOME HEALTH SERVICES | Facility: HOME HEALTHCARE | Age: 69
End: 2024-09-05
Payer: MEDICARE

## 2024-09-06 ENCOUNTER — HOME CARE VISIT (OUTPATIENT)
Dept: HOME HEALTH SERVICES | Facility: HOME HEALTHCARE | Age: 69
End: 2024-09-06
Payer: MEDICARE

## 2024-09-06 ENCOUNTER — TELEMEDICINE (OUTPATIENT)
Dept: GERIATRICS | Facility: OTHER | Age: 69
End: 2024-09-06
Payer: MEDICARE

## 2024-09-06 VITALS — SYSTOLIC BLOOD PRESSURE: 128 MMHG | DIASTOLIC BLOOD PRESSURE: 80 MMHG | HEART RATE: 77 BPM | OXYGEN SATURATION: 98 %

## 2024-09-06 VITALS
RESPIRATION RATE: 20 BRPM | SYSTOLIC BLOOD PRESSURE: 132 MMHG | HEART RATE: 64 BPM | DIASTOLIC BLOOD PRESSURE: 64 MMHG | TEMPERATURE: 98.4 F | OXYGEN SATURATION: 99 %

## 2024-09-06 DIAGNOSIS — E11.42 DIABETIC POLYNEUROPATHY ASSOCIATED WITH TYPE 2 DIABETES MELLITUS (HCC): ICD-10-CM

## 2024-09-06 DIAGNOSIS — E66.01 OBESITY, CLASS III, BMI 40-49.9 (MORBID OBESITY) (HCC): ICD-10-CM

## 2024-09-06 DIAGNOSIS — I10 ESSENTIAL HYPERTENSION: Chronic | ICD-10-CM

## 2024-09-06 DIAGNOSIS — I48.0 PAROXYSMAL ATRIAL FIBRILLATION (HCC): Chronic | ICD-10-CM

## 2024-09-06 DIAGNOSIS — L97.221 NON-PRESSURE CHRONIC ULCER OF LEFT CALF, LIMITED TO BREAKDOWN OF SKIN (HCC): ICD-10-CM

## 2024-09-06 DIAGNOSIS — I50.32 CHRONIC HEART FAILURE WITH PRESERVED EJECTION FRACTION (HCC): Primary | Chronic | ICD-10-CM

## 2024-09-06 PROCEDURE — G0157 HHC PT ASSISTANT EA 15: HCPCS

## 2024-09-06 PROCEDURE — 99344 HOME/RES VST NEW MOD MDM 60: CPT | Performed by: NURSE PRACTITIONER

## 2024-09-06 PROCEDURE — G0299 HHS/HOSPICE OF RN EA 15 MIN: HCPCS

## 2024-09-06 NOTE — ASSESSMENT & PLAN NOTE
Continue local wound care  Continue to monitor for s/s of infection  [facility] nurse reports that wound has not improved and patient will likely need follow up with Wound clinic  Follow up with wound care clinic

## 2024-09-06 NOTE — ASSESSMENT & PLAN NOTE
Patient with Morbid Obesity, wt 368 lb and BMI 41  Encourage lifestyle modification, exercise, and healthy diet  Follow up PCP to discuss further weight loss counseling options

## 2024-09-06 NOTE — ASSESSMENT & PLAN NOTE
BP today 132/64  Continue Metoprolol 50 mg BID, Lisinopril 40 mg QD, and diuretics  Avoid hypotension  Continue to monitor BP and for acute changes in condition  Discussed and educated patient on importance of making follow up appt with Cardiology/PCP

## 2024-09-06 NOTE — ASSESSMENT & PLAN NOTE
Continue Metoprolol for rate control  Continue to monitor HR  Continue Eliquis for anticoag, patient received samples from Cardiology but will need to complete PAP form  Monitor for s/s of bleeding and acute changes in condition  Discussed and educated patient on importance of scheduling follow up appt with Cardiology

## 2024-09-06 NOTE — PROGRESS NOTES
Virtual Regular Visit  Name: Armando Erickson      : 1955      MRN: [id number]  Encounter Provider: HAROON Dobbins  Encounter Date: 2024   Encounter department: [facility] VIRTUAL    Verification of patient location:    Patient is located at Home in the following state in which I hold an active license [place]    Assessment & Plan   1. Chronic heart failure with preserved ejection fraction (HCC)  Assessment & Plan:  Wt Readings from Last 3 Encounters:   24 (!) 167 kg (368 lb 3.2 oz)   24 (!) 168 kg (370 lb)   24 (!) 171 kg (378 lb)   Current wt 366 lbs  Continue to monitor weights  Continue Lasix 40 mg BID and Aldactone 25 mg po QD  Continue Dapagliflozin 5 mg po QD  Continue 2 gram Na diet, 1500 ml FR  Continue to monitor for s/s of fluid overload   Follow up with Cardiology, discussed importance of compliance and f/u appts. F/u has not been made and was to be made upon d/c SNF          2. Essential hypertension  Assessment & Plan:  BP today 132/64  Continue Metoprolol 50 mg BID, Lisinopril 40 mg QD, and diuretics  Avoid hypotension  Continue to monitor BP and for acute changes in condition  Discussed and educated patient on importance of making follow up appt with Cardiology/PCP   3. Paroxysmal atrial fibrillation (HCC)  Assessment & Plan:  Continue Metoprolol for rate control  Continue to monitor HR  Continue Eliquis for anticoag, patient received samples from Cardiology but will need to complete PAP form  Monitor for s/s of bleeding and acute changes in condition  Discussed and educated patient on importance of scheduling follow up appt with Cardiology   4. Diabetic polyneuropathy associated with type 2 diabetes mellitus (HCC)  Assessment & Plan:    Lab Results   Component Value Date    HGBA1C 5.4 2024   Bgs well controlled per A1C  Continue current regimen of Metformin 500 mg po QD and Dapagliflozin 5 mg po QD  Patient does not have glucometer in home  to monitor bgs readings  Continue to monitor for acute changes in condition   Maintain [facility] diet compliance  Discussed importance of DM eye exams, proper shoe attire at all times, foot checks, and podiatry follow up  Educate patient on s/s of hypo/hyperglycemia  Follow up with PCP  5. Obesity, Class III, BMI 40-49.9 (morbid obesity) ([facility])  Assessment & Plan:  Patient with Morbid Obesity, wt 368 lb and BMI 41  Encourage lifestyle modification, exercise, and healthy diet  Follow up PCP to discuss further weight loss counseling options  6. Non-pressure chronic ulcer of left calf, limited to breakdown of skin ([facility])  Assessment & Plan:  Continue local wound care  Continue to monitor for s/s of infection  [facility] nurse reports that wound has not improved and patient will likely need follow up with Wound clinic  Follow up with wound care clinic        Encounter provider HAROON Dobbins    The patient was identified by name and date of birth. Armando Erickson was informed that this is a telemedicine visit and that the visit is being conducted through the [employer] Teams platform. He agrees to proceed.  My office door was closed. No one else was in the room.  He acknowledged consent and understanding of privacy and security of the video platform. The patient has agreed to participate and understands they can discontinue the visit at any time.    Patient is aware this is a billable service.     History of Present Illness     Armando Erickson is a 69 y.o. male who presents being seen for [facility]al at home program in conjuction with A nurse Helga Garcia, who was in the home to perform physical assessment.      The patient is being seen and examined today for follow-up on acute and chronic medical conditions s/p most recent hospitalization.     The patient reports he has not made any follow up appointment since d/c from A. He reports that he does not have transportation to get to appointments.  He states that he has taken  [employer] in the past but does not like doing this.  He states that his PCP has set up Lyft rides for him in the past.  He states for wound care management he was set up with [facility].  I educated and discussed with patient importance of all follow up appointments and that we will have MSW see patient for further resources.  [facility] nurse reports that wound on left lower extremity has not improved and patient will likely need to go out to [facility] and be seen.  Patient reports he has never checked blood sugars and does not have glucometer in home.  Discussed importance of having glucometer and knowing s/s of hypoglycemia/hyperglycemia.  Patient became very irritated. Patient weight today 366. Patient became irritated with recommendations and handed phone over to nurse, stating I am done with this visit. Will discuss patient further in next care meeting.         Review of Systems   Constitutional:  Negative for chills and fever.   HENT:  Negative for ear pain and sore throat.    Eyes:  Negative for pain and visual disturbance.   Respiratory:  Positive for shortness of breath (with exertion). Negative for cough.    Cardiovascular:  Negative for chest pain and palpitations.   Gastrointestinal:  Negative for abdominal pain and vomiting.   Genitourinary:  Negative for dysuria and hematuria.   Musculoskeletal:  Positive for gait problem. Negative for arthralgias and back pain.   Skin:  Positive for wound (LLE). Negative for color change and rash.   Neurological:  Positive for weakness. Negative for seizures and syncope.   All other systems reviewed and are negative.    Pertinent Medical History     The patient originally presented to the hospital on 7/16/2024 due to Shortness of breath. Patient has a past medical history significant for R AKA stump, morbid obesity, paroxysmal Afib, Type 2 DM, and chronic venous status of left lower extremity. Cardiology was consulted as patient reported worsening shortness of breath at rest.  Cardiology had evaluated the patient and suspected that his shortness of breath was likely due to volume overload from heart failure. Echocardiogram was completed which showed preserved LVEF, dilated LA and RA. Patient received IV diuretics to address volume overload- he was then transitioned to PO diuretics. Podiatry was also consulted to chronic venous stasis with chronic dermal disruption. Left lower leg would is clinically stable, recommended WBAT and daily wound changes. PT/OT had been consulted for disposition planning and recommended Level II Rehab. Case management was consulted and placement was found at [facility].     Medical History Reviewed by provider this encounter:  Meds       Past Medical History   Past Medical History:   Diagnosis Date    Abnormal urinalysis 06/10/2021    Atrial fibrillation (HCC)     Cellulitis     Cellulitis of left lower extremity 04/09/2021    Diabetes mellitus (HCC)     Disease of thyroid gland     Duodenal ulcer     perforated- ICU stay    High blood pressure     High cholesterol     History of duodenal ulcer 04/04/2014    Hyperlipidemia     Hypertension     Hypothyroidism     Lymphedema      b/l LE- was followed at wound center    Morbid obesity with BMI of 40.0-44.9, adult (HCC)     Peritonitis (HCC)      Past Surgical History:   Procedure Laterality Date    BELOW KNEE LEG AMPUTATION Right     DIAGNOSTIC LAPAROSCOPY      KNEE ARTHROSCOPY Bilateral     OTHER SURGICAL HISTORY  03/2014     lap repair    OTHER SURGICAL HISTORY      Laparoscopic repair of a perforated duodenal ulcer with Javed omental    OTHER SURGICAL HISTORY      Placement of drains     Family History   Problem Relation Age of Onset    Heart disease Family     Alcohol abuse Family     Heart disease Mother     Hypertension Mother     Breast cancer Mother     Migraines Daughter     Leukemia Brother     Migraines Daughter     Substance Abuse Neg Hx     Mental illness Neg Hx     Depression Neg  Hx      Current Outpatient Medications on File Prior to Visit   Medication Sig Dispense Refill    acetaminophen (TYLENOL) 325 mg tablet Take 975 mg by mouth every 8 (eight) hours as needed for mild pain      ammonium lactate (LAC-HYDRIN) 12 % lotion Apply 1 Application topically daily In evening (Patient taking differently: Apply 1 Application topically daily apply to dry areas on left leg) 222 mL 0    apixaban (ELIQUIS) 5 mg Take 1 tablet (5 mg total) by mouth 2 (two) times a day 60 tablet 0    aspirin (ECOTRIN LOW STRENGTH) 81 mg EC tablet Take 1 tablet (81 mg total) by mouth daily      Cholecalciferol (Vitamin D3) 50 MCG (2000 UT) TABS Take 1 tablet (2,000 Units total) by mouth in the morning      dapagliflozin 5 MG TABS Take 1 tablet (5 mg total) by mouth daily 30 tablet 0    furosemide (LASIX) 40 mg tablet Take 1 tablet (40 mg total) by mouth 2 (two) times a day 60 tablet 0    gabapentin (NEURONTIN) 100 mg capsule Take 1 capsule (100 mg total) by mouth 2 (two) times a day      levothyroxine 25 mcg tablet Take 1 tablet (25 mcg total) by mouth daily In addition to the 300 mcg dose      levothyroxine 300 MCG tablet Take 1 tablet (300 mcg total) by mouth daily In addition to 25 mcg dose      lisinopril (ZESTRIL) 40 mg tablet Take 1 tablet (40 mg total) by mouth daily      metFORMIN (GLUCOPHAGE-XR) 500 mg 24 hr tablet Take 1 tablet (500 mg total) by mouth daily with breakfast 30 tablet 0    metoprolol tartrate (LOPRESSOR) 50 mg tablet Take 1 tablet (50 mg total) by mouth 2 (two) times a day      Multiple Vitamins-Minerals (MULTIVITAMIN MEN 50+ PO) Take 1 tablet by mouth in the morning      pantoprazole (PROTONIX) 40 mg tablet Take 1 tablet (40 mg total) by mouth daily      polyethylene glycol (MIRALAX) 17 g packet Take 17 g by mouth daily as needed (constipation)      simethicone (MYLICON) 80 mg chewable tablet Chew 0.5 tablets (40 mg total) every 6 (six) hours as needed for flatulence      simvastatin (ZOCOR) 10 mg  tablet Take 1 tablet (10 mg total) by mouth daily at bedtime      spironolactone (ALDACTONE) 25 mg tablet Take 1 tablet (25 mg total) by mouth daily 30 tablet 0     No current facility-administered medications on file prior to visit.   No Known Allergies   Current Outpatient Medications on File Prior to Visit   Medication Sig Dispense Refill    acetaminophen (TYLENOL) 325 mg tablet Take 975 mg by mouth every 8 (eight) hours as needed for mild pain      ammonium lactate (LAC-HYDRIN) 12 % lotion Apply 1 Application topically daily In evening (Patient taking differently: Apply 1 Application topically daily apply to dry areas on left leg) 222 mL 0    apixaban (ELIQUIS) 5 mg Take 1 tablet (5 mg total) by mouth 2 (two) times a day 60 tablet 0    aspirin (ECOTRIN LOW STRENGTH) 81 mg EC tablet Take 1 tablet (81 mg total) by mouth daily      Cholecalciferol (Vitamin D3) 50 MCG (2000 UT) TABS Take 1 tablet (2,000 Units total) by mouth in the morning      dapagliflozin 5 MG TABS Take 1 tablet (5 mg total) by mouth daily 30 tablet 0    furosemide (LASIX) 40 mg tablet Take 1 tablet (40 mg total) by mouth 2 (two) times a day 60 tablet 0    gabapentin (NEURONTIN) 100 mg capsule Take 1 capsule (100 mg total) by mouth 2 (two) times a day      levothyroxine 25 mcg tablet Take 1 tablet (25 mcg total) by mouth daily In addition to the 300 mcg dose      levothyroxine 300 MCG tablet Take 1 tablet (300 mcg total) by mouth daily In addition to 25 mcg dose      lisinopril (ZESTRIL) 40 mg tablet Take 1 tablet (40 mg total) by mouth daily      metFORMIN (GLUCOPHAGE-XR) 500 mg 24 hr tablet Take 1 tablet (500 mg total) by mouth daily with breakfast 30 tablet 0    metoprolol tartrate (LOPRESSOR) 50 mg tablet Take 1 tablet (50 mg total) by mouth 2 (two) times a day      Multiple Vitamins-Minerals (MULTIVITAMIN MEN 50+ PO) Take 1 tablet by mouth in the morning      pantoprazole (PROTONIX) 40 mg tablet Take 1 tablet (40 mg total) by mouth daily       polyethylene glycol (MIRALAX) 17 g packet Take 17 g by mouth daily as needed (constipation)      simethicone (MYLICON) 80 mg chewable tablet Chew 0.5 tablets (40 mg total) every 6 (six) hours as needed for flatulence      simvastatin (ZOCOR) 10 mg tablet Take 1 tablet (10 mg total) by mouth daily at bedtime      spironolactone (ALDACTONE) 25 mg tablet Take 1 tablet (25 mg total) by mouth daily 30 tablet 0     No current facility-administered medications on file prior to visit.      Social History     Tobacco Use    Smoking status: Former     Current packs/day: 0.00     Average packs/day: 1.5 packs/day for 25.0 years (37.5 ttl pk-yrs)     Types: Cigarettes     Start date: 1979     Quit date: 2004     Years since quittin.8    Smokeless tobacco: Never   Vaping Use    Vaping status: Never Used   Substance and Sexual Activity    Alcohol use: Not Currently     Alcohol/week: 1.0 standard drink of alcohol     Types: 1 Standard drinks or equivalent per week    Drug use: Never    Sexual activity: Not Currently     Objective   Vitals 2024  8:08 AM   /64   BP Location Right arm   Patient Position Sitting   Resp 20   SpO2 99 %   Pulse 64   Pulse Source Apical   Cardiac Regularity Regular   Temp 98.4 °F (36.9 °C)   Temp src Temporal   Stated Weight    166 kg (366 lb)    Physical Exam  Vitals reviewed.   Constitutional:       Appearance: He is obese.   Eyes:      Comments: Wears glasses   Cardiovascular:      Rate and Rhythm: Normal rate and regular rhythm.      Heart sounds: Normal heart sounds.   Pulmonary:      Breath sounds: Normal breath sounds. No wheezing, rhonchi or rales.   Abdominal:      General: Bowel sounds are normal. There is no distension.      Palpations: Abdomen is soft.      Tenderness: There is no abdominal tenderness.   Musculoskeletal:      Left lower leg: Edema present.   Skin:     Comments: LLE wound   Neurological:      Mental Status: He is alert and oriented to person, place,  and time.      Motor: Weakness present.      Coordination: Coordination abnormal.      Gait: Gait abnormal.      Comments: Right BKA   Psychiatric:         Attention and Perception: Attention normal.         Mood and Affect: Mood normal.         Behavior: Behavior normal.         Visit Time  Total Visit Duration: I have spent a total time of 60 minutes in caring for this patient on the day of the visit/encounter including Diagnostic results, Prognosis, Risks and benefits of tx options, Instructions for management, Patient and family education, Importance of tx compliance, Risk factor reductions, Impressions, Counseling / Coordination of care, Documenting in the medical record, Reviewing / ordering tests, medicine, procedures  , Obtaining or reviewing history  , and Communicating with other healthcare professionals .

## 2024-09-06 NOTE — ASSESSMENT & PLAN NOTE
Wt Readings from Last 3 Encounters:   08/19/24 (!) 167 kg (368 lb 3.2 oz)   08/02/24 (!) 168 kg (370 lb)   07/25/24 (!) 171 kg (378 lb)   Current wt 366 lbs  Continue to monitor weights  Continue Lasix 40 mg BID and Aldactone 25 mg po QD  Continue Dapagliflozin 5 mg po QD  Continue 2 gram Na diet, 1500 ml FR  Continue to monitor for s/s of fluid overload   Follow up with Cardiology, discussed importance of compliance and f/u appts. F/u has not been made and was to be made upon d/c SNF

## 2024-09-06 NOTE — ASSESSMENT & PLAN NOTE
Lab Results   Component Value Date    HGBA1C 5.4 07/16/2024   Bgs well controlled per A1C  Continue current regimen of Metformin 500 mg po QD and Dapagliflozin 5 mg po QD  Patient does not have glucometer in home to monitor bgs readings  Continue to monitor for acute changes in condition   Maintain [facility] diet compliance  Discussed importance of DM eye exams, proper shoe attire at all times, foot checks, and podiatry follow up  Educate patient on s/s of hypo/hyperglycemia  Follow up with PCP

## 2024-09-09 ENCOUNTER — HOME CARE VISIT (OUTPATIENT)
Dept: HOME HEALTH SERVICES | Facility: HOME HEALTHCARE | Age: 69
End: 2024-09-09
Payer: MEDICARE

## 2024-09-09 ENCOUNTER — TELEPHONE (OUTPATIENT)
Dept: CARDIOLOGY CLINIC | Facility: CLINIC | Age: 69
End: 2024-09-09

## 2024-09-09 VITALS — OXYGEN SATURATION: 100 % | TEMPERATURE: 98.1 F

## 2024-09-09 VITALS — SYSTOLIC BLOOD PRESSURE: 132 MMHG | HEART RATE: 86 BPM | OXYGEN SATURATION: 96 % | DIASTOLIC BLOOD PRESSURE: 80 MMHG

## 2024-09-09 PROCEDURE — G0151 HHCP-SERV OF PT,EA 15 MIN: HCPCS

## 2024-09-09 PROCEDURE — 10330064 DRESSING, CALCIUM ALGINATE AG SHEET 4X4"

## 2024-09-09 PROCEDURE — G0299 HHS/HOSPICE OF RN EA 15 MIN: HCPCS

## 2024-09-09 NOTE — TELEPHONE ENCOUNTER
Called [employer] for  Eliquis PAP form. They will fax a copy to the office and I will send it to pt to complete.

## 2024-09-09 NOTE — TELEPHONE ENCOUNTER
Called Arthur to find out if he has spent 3% of his yearly income on medication this year. Asked him to call me back.. The only way he can apply for assistance is if he has spent that much this year. Otherwise since he is on medicare he would not be able to apply.

## 2024-09-11 ENCOUNTER — HOME CARE VISIT (OUTPATIENT)
Dept: HOME HEALTH SERVICES | Facility: HOME HEALTHCARE | Age: 69
End: 2024-09-11
Payer: MEDICARE

## 2024-09-11 VITALS
OXYGEN SATURATION: 99 % | SYSTOLIC BLOOD PRESSURE: 134 MMHG | DIASTOLIC BLOOD PRESSURE: 80 MMHG | HEART RATE: 80 BPM | RESPIRATION RATE: 20 BRPM | TEMPERATURE: 98.7 F

## 2024-09-11 PROCEDURE — G0299 HHS/HOSPICE OF RN EA 15 MIN: HCPCS

## 2024-09-11 NOTE — TELEPHONE ENCOUNTER
Caller: Jessa @ [facility] P[facility]    Provider: Vandana Johnson    Call back #: [phone]    Reason for call: Jessa called and stated that the medication Eliquis is not supported through their PAP.

## 2024-09-12 ENCOUNTER — HOME CARE VISIT (OUTPATIENT)
Dept: HOME HEALTH SERVICES | Facility: HOME HEALTHCARE | Age: 69
End: 2024-09-12
Payer: MEDICARE

## 2024-09-12 VITALS — SYSTOLIC BLOOD PRESSURE: 128 MMHG | HEART RATE: 95 BPM | OXYGEN SATURATION: 100 % | DIASTOLIC BLOOD PRESSURE: 80 MMHG

## 2024-09-12 PROCEDURE — G0151 HHCP-SERV OF PT,EA 15 MIN: HCPCS

## 2024-09-13 ENCOUNTER — HOME CARE VISIT (OUTPATIENT)
Dept: HOME HEALTH SERVICES | Facility: HOME HEALTHCARE | Age: 69
End: 2024-09-13
Payer: MEDICARE

## 2024-09-13 ENCOUNTER — TELEPHONE (OUTPATIENT)
Age: 69
End: 2024-09-13

## 2024-09-13 VITALS
SYSTOLIC BLOOD PRESSURE: 140 MMHG | DIASTOLIC BLOOD PRESSURE: 88 MMHG | TEMPERATURE: 98.4 F | HEART RATE: 64 BPM | RESPIRATION RATE: 20 BRPM | OXYGEN SATURATION: 99 %

## 2024-09-13 DIAGNOSIS — I89.0 LYMPHEDEMA: ICD-10-CM

## 2024-09-13 DIAGNOSIS — Z89.511 S/P BKA (BELOW KNEE AMPUTATION) UNILATERAL, RIGHT (HCC): Primary | ICD-10-CM

## 2024-09-13 PROCEDURE — 10330064 BANDAGE, CNFRM 4X4.1YDS N/S LF (12RL/BG"

## 2024-09-13 PROCEDURE — G0299 HHS/HOSPICE OF RN EA 15 MIN: HCPCS

## 2024-09-13 NOTE — TELEPHONE ENCOUNTER
Received call from Helga, visiting RN to update PCP for new orders for patient:   Need more orders for continuation of left leg wound care for 3 times per week x 5 weeks.   Reevaluate by OT for lymphedema, wound care, and questionable compression tolerance.  [facility]  evaluation for transportation needs.  Helga is aware PCP not in office today for response. Patient does have nursing visit for Monday 9/16 and would appreciate call back Monday to confirm new order.

## 2024-09-16 ENCOUNTER — HOME CARE VISIT (OUTPATIENT)
Dept: HOME HEALTH SERVICES | Facility: HOME HEALTHCARE | Age: 69
End: 2024-09-16
Payer: MEDICARE

## 2024-09-16 ENCOUNTER — TELEPHONE (OUTPATIENT)
Age: 69
End: 2024-09-16

## 2024-09-16 VITALS — HEART RATE: 72 BPM | DIASTOLIC BLOOD PRESSURE: 70 MMHG | SYSTOLIC BLOOD PRESSURE: 130 MMHG | OXYGEN SATURATION: 97 %

## 2024-09-16 VITALS
DIASTOLIC BLOOD PRESSURE: 60 MMHG | OXYGEN SATURATION: 99 % | SYSTOLIC BLOOD PRESSURE: 128 MMHG | HEART RATE: 68 BPM | RESPIRATION RATE: 20 BRPM | TEMPERATURE: 98.7 F

## 2024-09-16 DIAGNOSIS — Z89.511 S/P BKA (BELOW KNEE AMPUTATION) UNILATERAL, RIGHT (HCC): ICD-10-CM

## 2024-09-16 DIAGNOSIS — L97.521 ULCER OF TOE OF LEFT FOOT, LIMITED TO BREAKDOWN OF SKIN (HCC): ICD-10-CM

## 2024-09-16 DIAGNOSIS — E11.42 DIABETIC POLYNEUROPATHY ASSOCIATED WITH TYPE 2 DIABETES MELLITUS (HCC): Primary | ICD-10-CM

## 2024-09-16 PROCEDURE — G0151 HHCP-SERV OF PT,EA 15 MIN: HCPCS

## 2024-09-16 PROCEDURE — G0299 HHS/HOSPICE OF RN EA 15 MIN: HCPCS

## 2024-09-16 NOTE — TELEPHONE ENCOUNTER
Mary Jo with [facility] called states patient just got wheelchair delivered and is not correct, he needs a narrower wheelchair. Sierra needs order to say 24 inch wheelchair with foot rests. And needs to be faxed with cover sheet stating on it Please exchange. Fax # [phone]. Any questions Mary Jo can be reached at [phone]

## 2024-09-17 ENCOUNTER — HOME CARE VISIT (OUTPATIENT)
Dept: HOME HEALTH SERVICES | Facility: HOME HEALTHCARE | Age: 69
End: 2024-09-17
Payer: MEDICARE

## 2024-09-18 ENCOUNTER — PATIENT OUTREACH (OUTPATIENT)
Dept: CASE MANAGEMENT | Facility: OTHER | Age: 69
End: 2024-09-18

## 2024-09-18 ENCOUNTER — HOME CARE VISIT (OUTPATIENT)
Dept: HOME HEALTH SERVICES | Facility: HOME HEALTHCARE | Age: 69
End: 2024-09-18
Payer: MEDICARE

## 2024-09-18 VITALS
RESPIRATION RATE: 20 BRPM | SYSTOLIC BLOOD PRESSURE: 134 MMHG | HEART RATE: 60 BPM | OXYGEN SATURATION: 99 % | TEMPERATURE: 98.3 F | DIASTOLIC BLOOD PRESSURE: 84 MMHG

## 2024-09-18 PROCEDURE — G0299 HHS/HOSPICE OF RN EA 15 MIN: HCPCS

## 2024-09-18 NOTE — PROGRESS NOTES
Called pt in [facility] program . He is still receiving in home PT and SN. Pt states that he received a wheelchair but it was too large for him and is awaiting a call about the exchange.   His states that his wounds on his toes are healing but his shin and ankle has some open areas that are weeping. He reports that his skin is discolored, so he can't tell if there is redness but the nurses are doing regular dressing changes.  Upon chart review, mentioned that pt has not had in office appts and would need to schedule with cardiology, PP, and wound care. He is reluctant to do so and joking that 'its all a conspiracy'. Once pt receives new w/c he is encouraged to go to appts. Will follow up in a few weeks as pt has no new concerns currently for this CM.

## 2024-09-19 ENCOUNTER — HOME CARE VISIT (OUTPATIENT)
Dept: HOME HEALTH SERVICES | Facility: HOME HEALTHCARE | Age: 69
End: 2024-09-19
Payer: MEDICARE

## 2024-09-20 ENCOUNTER — HOME CARE VISIT (OUTPATIENT)
Dept: HOME HEALTH SERVICES | Facility: HOME HEALTHCARE | Age: 69
End: 2024-09-20
Payer: MEDICARE

## 2024-09-20 VITALS
SYSTOLIC BLOOD PRESSURE: 130 MMHG | TEMPERATURE: 98.9 F | HEART RATE: 60 BPM | OXYGEN SATURATION: 96 % | RESPIRATION RATE: 20 BRPM | DIASTOLIC BLOOD PRESSURE: 78 MMHG

## 2024-09-20 VITALS — DIASTOLIC BLOOD PRESSURE: 84 MMHG | SYSTOLIC BLOOD PRESSURE: 140 MMHG | HEART RATE: 62 BPM | OXYGEN SATURATION: 98 %

## 2024-09-20 PROCEDURE — G0299 HHS/HOSPICE OF RN EA 15 MIN: HCPCS

## 2024-09-20 PROCEDURE — G0151 HHCP-SERV OF PT,EA 15 MIN: HCPCS

## 2024-09-20 NOTE — TELEPHONE ENCOUNTER
Called stacie Partida's visiting RN. I LVM informing her that we received her requested orders below. I informed her of the office I was calling from. I provided the office's phone number and requested a call back to provider her with the patient's information.

## 2024-09-23 ENCOUNTER — HOME CARE VISIT (OUTPATIENT)
Dept: HOME HEALTH SERVICES | Facility: HOME HEALTHCARE | Age: 69
End: 2024-09-23
Payer: MEDICARE

## 2024-09-23 VITALS
OXYGEN SATURATION: 100 % | HEART RATE: 60 BPM | SYSTOLIC BLOOD PRESSURE: 120 MMHG | DIASTOLIC BLOOD PRESSURE: 72 MMHG | RESPIRATION RATE: 20 BRPM | TEMPERATURE: 98.3 F

## 2024-09-23 VITALS — BODY MASS INDEX: 41.79 KG/M2 | TEMPERATURE: 97.8 F | OXYGEN SATURATION: 98 % | HEART RATE: 78 BPM | WEIGHT: 315 LBS

## 2024-09-23 PROCEDURE — G0299 HHS/HOSPICE OF RN EA 15 MIN: HCPCS

## 2024-09-23 PROCEDURE — G0152 HHCP-SERV OF OT,EA 15 MIN: HCPCS

## 2024-09-24 ENCOUNTER — HOME CARE VISIT (OUTPATIENT)
Dept: HOME HEALTH SERVICES | Facility: HOME HEALTHCARE | Age: 69
End: 2024-09-24
Payer: MEDICARE

## 2024-09-25 ENCOUNTER — HOME CARE VISIT (OUTPATIENT)
Dept: HOME HEALTH SERVICES | Facility: HOME HEALTHCARE | Age: 69
End: 2024-09-25
Payer: MEDICARE

## 2024-09-25 VITALS — HEART RATE: 78 BPM | TEMPERATURE: 98.2 F | OXYGEN SATURATION: 97 %

## 2024-09-25 DIAGNOSIS — R21 RASH: Primary | ICD-10-CM

## 2024-09-25 PROCEDURE — G0155 HHCP-SVS OF CSW,EA 15 MIN: HCPCS

## 2024-09-25 PROCEDURE — G0152 HHCP-SERV OF OT,EA 15 MIN: HCPCS

## 2024-09-25 RX ORDER — CLOTRIMAZOLE AND BETAMETHASONE DIPROPIONATE 10; .64 MG/G; MG/G
CREAM TOPICAL 2 TIMES DAILY
Qty: 45 G | Refills: 1 | Status: SHIPPED | OUTPATIENT
Start: 2024-09-25

## 2024-09-27 ENCOUNTER — HOME CARE VISIT (OUTPATIENT)
Dept: HOME HEALTH SERVICES | Facility: HOME HEALTHCARE | Age: 69
End: 2024-09-27
Payer: MEDICARE

## 2024-09-27 VITALS
HEART RATE: 72 BPM | TEMPERATURE: 98.3 F | RESPIRATION RATE: 20 BRPM | SYSTOLIC BLOOD PRESSURE: 122 MMHG | DIASTOLIC BLOOD PRESSURE: 68 MMHG | OXYGEN SATURATION: 98 %

## 2024-09-27 PROCEDURE — G0299 HHS/HOSPICE OF RN EA 15 MIN: HCPCS

## 2024-09-30 ENCOUNTER — HOME CARE VISIT (OUTPATIENT)
Dept: HOME HEALTH SERVICES | Facility: HOME HEALTHCARE | Age: 69
End: 2024-09-30
Payer: MEDICARE

## 2024-09-30 VITALS
WEIGHT: 315 LBS | HEART RATE: 63 BPM | OXYGEN SATURATION: 97 % | HEIGHT: 78 IN | TEMPERATURE: 97.6 F | BODY MASS INDEX: 36.45 KG/M2

## 2024-09-30 PROCEDURE — G0152 HHCP-SERV OF OT,EA 15 MIN: HCPCS

## 2024-10-02 ENCOUNTER — HOME CARE VISIT (OUTPATIENT)
Dept: HOME HEALTH SERVICES | Facility: HOME HEALTHCARE | Age: 69
End: 2024-10-02
Payer: MEDICARE

## 2024-10-02 VITALS
WEIGHT: 315 LBS | HEART RATE: 64 BPM | OXYGEN SATURATION: 99 % | TEMPERATURE: 97.7 F | DIASTOLIC BLOOD PRESSURE: 80 MMHG | BODY MASS INDEX: 41.58 KG/M2 | SYSTOLIC BLOOD PRESSURE: 120 MMHG

## 2024-10-02 PROCEDURE — G0152 HHCP-SERV OF OT,EA 15 MIN: HCPCS

## 2024-10-04 ENCOUNTER — HOME CARE VISIT (OUTPATIENT)
Dept: HOME HEALTH SERVICES | Facility: HOME HEALTHCARE | Age: 69
End: 2024-10-04
Payer: MEDICARE

## 2024-10-04 VITALS — TEMPERATURE: 98.2 F | OXYGEN SATURATION: 96 % | HEART RATE: 60 BPM

## 2024-10-04 PROCEDURE — G0152 HHCP-SERV OF OT,EA 15 MIN: HCPCS

## 2024-10-07 ENCOUNTER — HOME CARE VISIT (OUTPATIENT)
Dept: HOME HEALTH SERVICES | Facility: HOME HEALTHCARE | Age: 69
End: 2024-10-07
Payer: MEDICARE

## 2024-10-07 VITALS — HEART RATE: 78 BPM | OXYGEN SATURATION: 94 % | TEMPERATURE: 98.3 F

## 2024-10-07 PROCEDURE — G0152 HHCP-SERV OF OT,EA 15 MIN: HCPCS

## 2024-10-09 ENCOUNTER — HOME CARE VISIT (OUTPATIENT)
Dept: HOME HEALTH SERVICES | Facility: HOME HEALTHCARE | Age: 69
End: 2024-10-09
Payer: MEDICARE

## 2024-10-09 VITALS — WEIGHT: 315 LBS | OXYGEN SATURATION: 96 % | HEART RATE: 81 BPM | BODY MASS INDEX: 41.27 KG/M2 | TEMPERATURE: 97.6 F

## 2024-10-09 PROCEDURE — G0152 HHCP-SERV OF OT,EA 15 MIN: HCPCS

## 2024-10-11 ENCOUNTER — HOME CARE VISIT (OUTPATIENT)
Dept: HOME HEALTH SERVICES | Facility: HOME HEALTHCARE | Age: 69
End: 2024-10-11
Payer: MEDICARE

## 2024-10-11 VITALS
OXYGEN SATURATION: 99 % | SYSTOLIC BLOOD PRESSURE: 110 MMHG | HEART RATE: 64 BPM | TEMPERATURE: 99 F | RESPIRATION RATE: 20 BRPM | DIASTOLIC BLOOD PRESSURE: 72 MMHG

## 2024-10-11 PROCEDURE — G0299 HHS/HOSPICE OF RN EA 15 MIN: HCPCS

## 2024-10-14 ENCOUNTER — HOME CARE VISIT (OUTPATIENT)
Dept: HOME HEALTH SERVICES | Facility: HOME HEALTHCARE | Age: 69
End: 2024-10-14
Payer: MEDICARE

## 2024-10-14 VITALS
RESPIRATION RATE: 20 BRPM | HEART RATE: 76 BPM | DIASTOLIC BLOOD PRESSURE: 76 MMHG | TEMPERATURE: 98 F | OXYGEN SATURATION: 80 % | SYSTOLIC BLOOD PRESSURE: 138 MMHG

## 2024-10-14 PROCEDURE — G0299 HHS/HOSPICE OF RN EA 15 MIN: HCPCS

## 2024-10-16 ENCOUNTER — HOME CARE VISIT (OUTPATIENT)
Dept: HOME HEALTH SERVICES | Facility: HOME HEALTHCARE | Age: 69
End: 2024-10-16
Payer: MEDICARE

## 2024-10-16 VITALS
OXYGEN SATURATION: 98 % | RESPIRATION RATE: 20 BRPM | SYSTOLIC BLOOD PRESSURE: 128 MMHG | DIASTOLIC BLOOD PRESSURE: 66 MMHG | HEART RATE: 80 BPM | TEMPERATURE: 98.4 F

## 2024-10-16 PROCEDURE — G0299 HHS/HOSPICE OF RN EA 15 MIN: HCPCS

## 2024-10-17 ENCOUNTER — HOME CARE VISIT (OUTPATIENT)
Dept: HOME HEALTH SERVICES | Facility: HOME HEALTHCARE | Age: 69
End: 2024-10-17
Payer: MEDICARE

## 2024-10-18 ENCOUNTER — HOME CARE VISIT (OUTPATIENT)
Dept: HOME HEALTH SERVICES | Facility: HOME HEALTHCARE | Age: 69
End: 2024-10-18
Payer: MEDICARE

## 2024-10-18 VITALS
TEMPERATURE: 97.9 F | OXYGEN SATURATION: 100 % | DIASTOLIC BLOOD PRESSURE: 82 MMHG | HEART RATE: 74 BPM | SYSTOLIC BLOOD PRESSURE: 140 MMHG

## 2024-10-18 PROCEDURE — G0152 HHCP-SERV OF OT,EA 15 MIN: HCPCS

## 2024-10-21 DIAGNOSIS — E78.5 HLD (HYPERLIPIDEMIA): ICD-10-CM

## 2024-10-21 DIAGNOSIS — I48.0 PAROXYSMAL ATRIAL FIBRILLATION (HCC): Chronic | ICD-10-CM

## 2024-10-21 DIAGNOSIS — E11.8 CONTROLLED TYPE 2 DIABETES MELLITUS WITH COMPLICATION, WITHOUT LONG-TERM CURRENT USE OF INSULIN (HCC): ICD-10-CM

## 2024-10-21 DIAGNOSIS — I10 ESSENTIAL HYPERTENSION: ICD-10-CM

## 2024-10-21 DIAGNOSIS — I50.21 ACUTE SYSTOLIC HEART FAILURE (HCC): ICD-10-CM

## 2024-10-21 DIAGNOSIS — E11.622 DIABETIC ULCER OF LEFT ANKLE ASSOCIATED WITH TYPE 2 DIABETES MELLITUS, LIMITED TO BREAKDOWN OF SKIN (HCC): ICD-10-CM

## 2024-10-21 DIAGNOSIS — K21.9 GASTROESOPHAGEAL REFLUX DISEASE WITHOUT ESOPHAGITIS: ICD-10-CM

## 2024-10-21 DIAGNOSIS — E03.9 HYPOTHYROIDISM: ICD-10-CM

## 2024-10-21 DIAGNOSIS — L97.321 DIABETIC ULCER OF LEFT ANKLE ASSOCIATED WITH TYPE 2 DIABETES MELLITUS, LIMITED TO BREAKDOWN OF SKIN (HCC): ICD-10-CM

## 2024-10-21 DIAGNOSIS — I87.2 CHRONIC VENOUS STASIS DERMATITIS OF LEFT LOWER EXTREMITY: ICD-10-CM

## 2024-10-21 RX ORDER — SPIRONOLACTONE 25 MG/1
25 TABLET ORAL DAILY
Qty: 90 TABLET | Refills: 1 | Status: CANCELLED | OUTPATIENT
Start: 2024-10-21

## 2024-10-21 RX ORDER — FUROSEMIDE 40 MG/1
40 TABLET ORAL 2 TIMES DAILY
Qty: 180 TABLET | Refills: 1 | Status: CANCELLED | OUTPATIENT
Start: 2024-10-21

## 2024-10-21 RX ORDER — DAPAGLIFLOZIN 5 MG/1
5 TABLET, FILM COATED ORAL DAILY
Qty: 90 TABLET | Refills: 1 | Status: CANCELLED | OUTPATIENT
Start: 2024-10-21

## 2024-10-21 NOTE — TELEPHONE ENCOUNTER
Reason for call:   [x] Refill   [] Prior Auth  [] Other:     Office:   [x] PCP/Provider - Block  [] Specialty/Provider -     Eliquis 5mg  1 tab bid #180    Farxiga 5mg  1 tab daily #90    Furosemide 40mg  1 tab daily #90    Gabapentin 100mg  1 cap bid #180    Levothyroxine 25mcg and 300mcg  1 tab daily total daily dose of 325mcg #90    Lisinopril 40mg  1 tab daily #90    Metformin 500mg XR  1 tab with breakfast #90    Metoprolol tart 50mg   1 tab bid #180    Pantoprazole 40mg  1 tab daily #90    Simvastatin 10mg  1 tab hs #90    Spironalactone 25mg  1 tab daily #90    Pharmacy: [facility] - [address] [phone]     Does the patient have enough for 3 days?   [x] Yes   [] No - Send as HP to POD

## 2024-10-22 ENCOUNTER — HOME CARE VISIT (OUTPATIENT)
Dept: HOME HEALTH SERVICES | Facility: HOME HEALTHCARE | Age: 69
End: 2024-10-22
Payer: MEDICARE

## 2024-10-22 VITALS — TEMPERATURE: 97.8 F | OXYGEN SATURATION: 98 % | HEART RATE: 69 BPM

## 2024-10-22 PROCEDURE — G0152 HHCP-SERV OF OT,EA 15 MIN: HCPCS

## 2024-10-23 RX ORDER — PANTOPRAZOLE SODIUM 40 MG/1
40 TABLET, DELAYED RELEASE ORAL DAILY
Qty: 90 TABLET | Refills: 0 | Status: SHIPPED | OUTPATIENT
Start: 2024-10-23

## 2024-10-23 RX ORDER — GABAPENTIN 100 MG/1
100 CAPSULE ORAL 2 TIMES DAILY
Qty: 180 CAPSULE | Refills: 0 | Status: SHIPPED | OUTPATIENT
Start: 2024-10-23 | End: 2025-04-21

## 2024-10-23 RX ORDER — METFORMIN HYDROCHLORIDE 500 MG/1
500 TABLET, EXTENDED RELEASE ORAL
Qty: 90 TABLET | Refills: 0 | Status: SHIPPED | OUTPATIENT
Start: 2024-10-23

## 2024-10-23 RX ORDER — LEVOTHYROXINE SODIUM 25 UG/1
25 TABLET ORAL DAILY
Qty: 90 TABLET | Refills: 0 | Status: SHIPPED | OUTPATIENT
Start: 2024-10-23

## 2024-10-23 RX ORDER — LISINOPRIL 40 MG/1
40 TABLET ORAL DAILY
Qty: 90 TABLET | Refills: 0 | Status: SHIPPED | OUTPATIENT
Start: 2024-10-23

## 2024-10-23 RX ORDER — METOPROLOL TARTRATE 50 MG
50 TABLET ORAL 2 TIMES DAILY
Qty: 180 TABLET | Refills: 0 | Status: SHIPPED | OUTPATIENT
Start: 2024-10-23

## 2024-10-23 RX ORDER — LEVOTHYROXINE SODIUM 300 UG/1
300 TABLET ORAL DAILY
Qty: 90 TABLET | Refills: 0 | Status: SHIPPED | OUTPATIENT
Start: 2024-10-23

## 2024-10-23 RX ORDER — SIMVASTATIN 10 MG
10 TABLET ORAL
Qty: 90 TABLET | Refills: 0 | Status: SHIPPED | OUTPATIENT
Start: 2024-10-23 | End: 2025-04-21

## 2024-10-23 NOTE — TELEPHONE ENCOUNTER
Reviewed.    Refilled the meds that I have been managing.   Eliquis, lasix, spironolactone, dapagliflozin are through cardiology.  I was previously filling blood pressure medication so I can continue those.   Meds refilled for 90 days.    I haven't seen the patient in over a year.  Please advise patient to schedule his medicare wellness visit with follow up visit.  Needs to be in person.     Also please check- has he had his eye exam done?   Follows w podiatry- get copy of foot exam for chart (has had amputation of one leg)

## 2024-10-24 ENCOUNTER — HOME CARE VISIT (OUTPATIENT)
Dept: HOME HEALTH SERVICES | Facility: HOME HEALTHCARE | Age: 69
End: 2024-10-24
Payer: MEDICARE

## 2024-10-24 VITALS — OXYGEN SATURATION: 99 % | TEMPERATURE: 97.9 F | HEART RATE: 71 BPM

## 2024-10-24 DIAGNOSIS — I87.2 CHRONIC VENOUS STASIS DERMATITIS OF LEFT LOWER EXTREMITY: ICD-10-CM

## 2024-10-24 DIAGNOSIS — I50.32 CHRONIC HEART FAILURE WITH PRESERVED EJECTION FRACTION (HCC): Primary | ICD-10-CM

## 2024-10-24 DIAGNOSIS — I48.0 PAROXYSMAL ATRIAL FIBRILLATION (HCC): Chronic | ICD-10-CM

## 2024-10-24 DIAGNOSIS — I50.21 ACUTE SYSTOLIC HEART FAILURE (HCC): ICD-10-CM

## 2024-10-24 PROCEDURE — G0152 HHCP-SERV OF OT,EA 15 MIN: HCPCS

## 2024-10-24 RX ORDER — DAPAGLIFLOZIN 5 MG/1
5 TABLET, FILM COATED ORAL DAILY
Qty: 30 TABLET | Refills: 0 | Status: SHIPPED | OUTPATIENT
Start: 2024-10-24

## 2024-10-24 RX ORDER — SPIRONOLACTONE 25 MG/1
25 TABLET ORAL DAILY
Qty: 30 TABLET | Refills: 0 | Status: SHIPPED | OUTPATIENT
Start: 2024-10-24

## 2024-10-24 RX ORDER — FUROSEMIDE 40 MG/1
40 TABLET ORAL 2 TIMES DAILY
Qty: 60 TABLET | Refills: 0 | Status: SHIPPED | OUTPATIENT
Start: 2024-10-24

## 2024-10-24 NOTE — TELEPHONE ENCOUNTER
Reason for call:   [x] Refill   [] Prior Auth  [x] Other: Per PCP stated these prescriptions should be filled by cardiology    Office:   [] PCP/Provider -   [x] Specialty/Provider - Cardiology     apixaban (ELIQUIS) 5 mg   5 mg, 2 times daily   180    dapagliflozin 5 MG TABS    5 mg, Daily   90    furosemide (LASIX) 40 mg tablet   40 mg, 2 times daily   180    spironolactone (ALDACTONE) 25 mg tablet   25 mg, Daily   90    Pharmacy: [facility]     Does the patient have enough for 3 days?   [x] Yes   [] No - Send as HP to POD

## 2024-10-24 NOTE — TELEPHONE ENCOUNTER
Requested medication(s) are due for refill today: Yes  Patient has already received a courtesy refill: No  Other reason request has been forwarded to provider:     Pt was discharged from Geriatrics and needs refill.

## 2024-10-25 ENCOUNTER — PATIENT OUTREACH (OUTPATIENT)
Dept: CASE MANAGEMENT | Facility: OTHER | Age: 69
End: 2024-10-25

## 2024-10-25 NOTE — PROGRESS NOTES
"Follow up call made to pt who reports that his wounds on his legs have healed from in home lymphedema therapy. He continues to receive wrapping and advised pt to learn ways to keep swelling down when not receiving therapist visits.He has been discharged form [facility].  Pt still has not scheduled appts with PCP or cardiology in person. We discussed this and he isn't sure if he 'needs to do that' at this time. Encouraged pt to schedule, who then admitted that he has had Lyft called for him in the past. Otherwise he does have access to [employer], but dislikes it even more. Pt does not feel the need to go to dr when 'he isnt sick'.He denies any concerns regarding his health. He admits to compliance with his medications. He dislikes taking diuretic but continues to take it. Pt checks his weight several times a week but not daily.\"My weight has been staying steady\".   Pt denies need for future outreach for care management. He will keep CM's number if needed.  "

## 2024-10-28 ENCOUNTER — HOME CARE VISIT (OUTPATIENT)
Dept: HOME HEALTH SERVICES | Facility: HOME HEALTHCARE | Age: 69
End: 2024-10-28
Payer: MEDICARE

## 2024-10-28 VITALS — WEIGHT: 315 LBS | OXYGEN SATURATION: 98 % | TEMPERATURE: 98 F | BODY MASS INDEX: 41.4 KG/M2 | HEART RATE: 56 BPM

## 2024-10-28 PROCEDURE — G0152 HHCP-SERV OF OT,EA 15 MIN: HCPCS

## 2024-10-28 NOTE — TELEPHONE ENCOUNTER
I spoke with Arthur and he has already contacted cardiology for medications listed.     He is aware he is over due for an eye exam. He sees wound care but does not see podiatry and has not had a DM foot exam.     He spoke with Care Coordinator on 10/25/24 and is going to see if he is still registered with [facility], he will need to use his wheelchair due to the distance.     He will call back to schedule appointment once he has transportation set up. I did not see any appointments with you for 40 minutes until the end of March. Please advise if if you'd like patient seen sooner since it has been so long since he was last seen?

## 2024-10-31 ENCOUNTER — HOME CARE VISIT (OUTPATIENT)
Dept: HOME HEALTH SERVICES | Facility: HOME HEALTHCARE | Age: 69
End: 2024-10-31
Payer: MEDICARE

## 2024-10-31 VITALS — HEART RATE: 67 BPM | WEIGHT: 315 LBS | OXYGEN SATURATION: 96 % | TEMPERATURE: 97.5 F | BODY MASS INDEX: 41.46 KG/M2

## 2024-10-31 PROCEDURE — G0152 HHCP-SERV OF OT,EA 15 MIN: HCPCS

## 2024-11-04 ENCOUNTER — TELEPHONE (OUTPATIENT)
Age: 69
End: 2024-11-04

## 2024-11-04 ENCOUNTER — HOME CARE VISIT (OUTPATIENT)
Dept: HOME HEALTH SERVICES | Facility: HOME HEALTHCARE | Age: 69
End: 2024-11-04
Payer: MEDICARE

## 2024-11-04 VITALS
OXYGEN SATURATION: 98 % | HEART RATE: 66 BPM | DIASTOLIC BLOOD PRESSURE: 78 MMHG | SYSTOLIC BLOOD PRESSURE: 140 MMHG | TEMPERATURE: 98.4 F

## 2024-11-04 PROCEDURE — G0152 HHCP-SERV OF OT,EA 15 MIN: HCPCS

## 2024-11-07 ENCOUNTER — HOME CARE VISIT (OUTPATIENT)
Dept: HOME HEALTH SERVICES | Facility: HOME HEALTHCARE | Age: 69
End: 2024-11-07
Payer: MEDICARE

## 2024-11-07 ENCOUNTER — TELEPHONE (OUTPATIENT)
Dept: FAMILY MEDICINE CLINIC | Facility: CLINIC | Age: 69
End: 2024-11-07

## 2024-11-07 VITALS — HEART RATE: 65 BPM | OXYGEN SATURATION: 97 % | TEMPERATURE: 98.3 F

## 2024-11-07 PROCEDURE — G0152 HHCP-SERV OF OT,EA 15 MIN: HCPCS

## 2024-11-07 NOTE — TELEPHONE ENCOUNTER
Have we rec'd paperwork for homecare?   There is paperwork that needs to be signed off that was sent by regular mail

## 2024-11-07 NOTE — TELEPHONE ENCOUNTER
Paperwork received and signed.  To be picked up tomorrow by Francy Us  Paperwork given to  staff.

## 2024-11-11 ENCOUNTER — HOME CARE VISIT (OUTPATIENT)
Dept: HOME HEALTH SERVICES | Facility: HOME HEALTHCARE | Age: 69
End: 2024-11-11
Payer: MEDICARE

## 2024-11-11 VITALS — HEART RATE: 66 BPM | TEMPERATURE: 97.9 F | OXYGEN SATURATION: 99 %

## 2024-11-11 PROCEDURE — G0152 HHCP-SERV OF OT,EA 15 MIN: HCPCS

## 2024-11-14 ENCOUNTER — HOME CARE VISIT (OUTPATIENT)
Dept: HOME HEALTH SERVICES | Facility: HOME HEALTHCARE | Age: 69
End: 2024-11-14
Payer: MEDICARE

## 2024-11-14 VITALS — HEART RATE: 61 BPM | TEMPERATURE: 98.1 F | OXYGEN SATURATION: 99 %

## 2024-11-14 PROCEDURE — G0152 HHCP-SERV OF OT,EA 15 MIN: HCPCS

## 2024-11-15 ENCOUNTER — TELEPHONE (OUTPATIENT)
Dept: FAMILY MEDICINE CLINIC | Facility: CLINIC | Age: 69
End: 2024-11-15

## 2024-11-15 DIAGNOSIS — E11.8 CONTROLLED TYPE 2 DIABETES MELLITUS WITH COMPLICATION, WITHOUT LONG-TERM CURRENT USE OF INSULIN (HCC): ICD-10-CM

## 2024-11-15 DIAGNOSIS — I10 ESSENTIAL HYPERTENSION: Primary | Chronic | ICD-10-CM

## 2024-11-15 DIAGNOSIS — I89.0 LYMPHEDEMA: ICD-10-CM

## 2024-11-15 DIAGNOSIS — E03.9 ACQUIRED HYPOTHYROIDISM: ICD-10-CM

## 2024-11-15 NOTE — TELEPHONE ENCOUNTER
Patient overdue for AWV. Spoke with patient. Patient is unable to come in office due bladder issues. Patient scheduled for virtual AWV for 12/18/24.     Is this okay?

## 2024-11-18 ENCOUNTER — HOME CARE VISIT (OUTPATIENT)
Dept: HOME HEALTH SERVICES | Facility: HOME HEALTHCARE | Age: 69
End: 2024-11-18
Payer: MEDICARE

## 2024-11-18 VITALS — HEART RATE: 76 BPM | OXYGEN SATURATION: 99 % | TEMPERATURE: 98.2 F

## 2024-11-18 PROCEDURE — G0152 HHCP-SERV OF OT,EA 15 MIN: HCPCS

## 2024-11-21 ENCOUNTER — HOME CARE VISIT (OUTPATIENT)
Dept: HOME HEALTH SERVICES | Facility: HOME HEALTHCARE | Age: 69
End: 2024-11-21
Payer: MEDICARE

## 2024-11-21 VITALS — HEART RATE: 80 BPM | OXYGEN SATURATION: 98 % | TEMPERATURE: 97.7 F

## 2024-11-21 DIAGNOSIS — I50.32 CHRONIC HEART FAILURE WITH PRESERVED EJECTION FRACTION (HCC): ICD-10-CM

## 2024-11-21 DIAGNOSIS — I48.0 PAROXYSMAL ATRIAL FIBRILLATION (HCC): Chronic | ICD-10-CM

## 2024-11-21 PROCEDURE — G0152 HHCP-SERV OF OT,EA 15 MIN: HCPCS

## 2024-11-21 RX ORDER — FUROSEMIDE 40 MG/1
40 TABLET ORAL 2 TIMES DAILY
Qty: 120 TABLET | Refills: 0 | Status: SHIPPED | OUTPATIENT
Start: 2024-11-21

## 2024-11-21 RX ORDER — DAPAGLIFLOZIN 5 MG/1
5 TABLET, FILM COATED ORAL DAILY
Qty: 60 TABLET | Refills: 0 | Status: SHIPPED | OUTPATIENT
Start: 2024-11-21

## 2024-11-21 RX ORDER — SPIRONOLACTONE 25 MG/1
25 TABLET ORAL DAILY
Qty: 60 TABLET | Refills: 0 | Status: SHIPPED | OUTPATIENT
Start: 2024-11-21

## 2024-11-21 NOTE — TELEPHONE ENCOUNTER
Patient verbalized making an appointment following our call. Requesting a 60 day supply to keep all his medications arriving at the same time.     Reason for call:   [x] Refill   [] Prior Auth  [] Other:     Office:   [] PCP/Provider -   [x] Specialty/Provider - Vandana Olguin PA-C / [facility]     Medication: spironolactone (ALDACTONE) 25 mg tablet / Take 1 tablet (25 mg total) by mouth daily    furosemide (LASIX) 40 mg tablet / Take 1 tablet (40 mg total) by mouth 2 (two) times a day      dapagliflozin 5 MG TABS / Take 1 tablet (5 mg total) by mouth daily,     apixaban (ELIQUIS) 5 mg / Take 1 tablet (5 mg total) by mouth 2 (two) times a day     Pharmacy: [facility] - [address]     Does the patient have enough for 3 days?   [x] Yes   [] No - Send as HP to POD

## 2024-11-25 ENCOUNTER — HOME CARE VISIT (OUTPATIENT)
Dept: HOME HEALTH SERVICES | Facility: HOME HEALTHCARE | Age: 69
End: 2024-11-25
Payer: MEDICARE

## 2024-11-25 VITALS
HEART RATE: 78 BPM | DIASTOLIC BLOOD PRESSURE: 80 MMHG | TEMPERATURE: 97.7 F | SYSTOLIC BLOOD PRESSURE: 130 MMHG | OXYGEN SATURATION: 97 %

## 2024-11-25 PROCEDURE — G0152 HHCP-SERV OF OT,EA 15 MIN: HCPCS

## 2024-11-27 ENCOUNTER — HOME CARE VISIT (OUTPATIENT)
Dept: HOME HEALTH SERVICES | Facility: HOME HEALTHCARE | Age: 69
End: 2024-11-27
Payer: MEDICARE

## 2024-11-27 PROCEDURE — G0152 HHCP-SERV OF OT,EA 15 MIN: HCPCS

## 2024-11-28 VITALS
HEART RATE: 64 BPM | DIASTOLIC BLOOD PRESSURE: 72 MMHG | OXYGEN SATURATION: 98 % | SYSTOLIC BLOOD PRESSURE: 122 MMHG | TEMPERATURE: 98.6 F

## 2024-12-02 ENCOUNTER — HOME CARE VISIT (OUTPATIENT)
Dept: HOME HEALTH SERVICES | Facility: HOME HEALTHCARE | Age: 69
End: 2024-12-02
Payer: MEDICARE

## 2024-12-02 VITALS
DIASTOLIC BLOOD PRESSURE: 77 MMHG | OXYGEN SATURATION: 97 % | TEMPERATURE: 98 F | SYSTOLIC BLOOD PRESSURE: 113 MMHG | HEART RATE: 78 BPM

## 2024-12-02 PROCEDURE — G0152 HHCP-SERV OF OT,EA 15 MIN: HCPCS

## 2024-12-05 ENCOUNTER — HOME CARE VISIT (OUTPATIENT)
Dept: HOME HEALTH SERVICES | Facility: HOME HEALTHCARE | Age: 69
End: 2024-12-05
Payer: MEDICARE

## 2024-12-05 VITALS
TEMPERATURE: 97.7 F | DIASTOLIC BLOOD PRESSURE: 78 MMHG | OXYGEN SATURATION: 97 % | SYSTOLIC BLOOD PRESSURE: 120 MMHG | HEART RATE: 69 BPM

## 2024-12-05 PROCEDURE — G0152 HHCP-SERV OF OT,EA 15 MIN: HCPCS

## 2024-12-09 ENCOUNTER — HOME CARE VISIT (OUTPATIENT)
Dept: HOME HEALTH SERVICES | Facility: HOME HEALTHCARE | Age: 69
End: 2024-12-09
Payer: MEDICARE

## 2024-12-09 VITALS
SYSTOLIC BLOOD PRESSURE: 131 MMHG | DIASTOLIC BLOOD PRESSURE: 76 MMHG | TEMPERATURE: 97.7 F | OXYGEN SATURATION: 99 % | HEART RATE: 63 BPM

## 2024-12-09 PROCEDURE — G0152 HHCP-SERV OF OT,EA 15 MIN: HCPCS

## 2024-12-09 NOTE — PROGRESS NOTES
Virtual Regular Visit  Name: Armando Erickson      : 1955      MRN: [id number]  Encounter Provider: HAROON Watkins  Encounter Date: 2024   Encounter department: [facility] [place]      Verification of patient location:  Patient is located at Home in the following state in which I hold an active license [place] :  Assessment & Plan      Encounter provider HAROON Watkins    The patient was identified by name and date of birth. Armando Erickson was informed that this is a telemedicine visit and that the visit is being conducted through the Epic Embedded platform. He agrees to proceed..  My office door was closed. No one else was in the room.  He acknowledged consent and understanding of privacy and security of the video platform. The patient has agreed to participate and understands they can discontinue the visit at any time.    Patient is aware this is a billable service.     Plan:  Chronic HFpEF; LVEF 50%  -reports no s/s volume overload. BP and Weight stable  -continue medications as we are for now  -in person visits, not an option at present  -continue to reassess  -follow up routine labs as per PCP    TTE 2024 (volume up): LVEF 50%. LVIDd 6.4 cm. Mildly dilated RV. Moderate MR. Mild TR.                 Weight of 367 lbs on  (day of discharge)370 lbs at facility, today, 362                 Pharmacotherapies:  --Aldosterone Antagonist: spironolactone 25 mg daily.  --SGLT2 Inhibitor: No.   --Diuretic: Lasix 40 mg BID.      Atrial fibrillation, paroxysmal               [id number] = 5 (age, HF, HTN, DM, PAD).              Anticoagulation on Eliquis.               Rate control: metoprolol tartrate 50 mg q12 hours.              Rhythm control: No.     Hypertension  -Continues on lisinopril 40 mg daily and medications as above.      Peripheral arterial disease  Hyperlipidemia  Diabetes mellitus, type II  Lab Results   Component Value Date    HGBA1C 5.4 2024      Hypothyroidism  S/p right BKA  Celiac disease  ?sleep apnea    History of GI bleed  Lymphedema  -will be seeing lymphedema specialist soon.      HPI:   Armando Erickson is a 69-year-old man with a PMH as above who is being contacted for hospital follow-up and to establish with practice.     Admitted to [facility] from 07/16 to 07/22/2024 after presenting with SOB, PND, and LE swelling. . Started on IV diuretics (IV Lasix 40 mg BID), and cardiology consulted. Transitioned to PO Lasix on 07/20. MRA added during admission. Lost ~7 lbs this admission (bed weights). Discharged to acute rehab.     08/02/2024: Patient contacted for hospital follow-up. Feeling well today.  Denies lightheadedness, chest pain, palpitations, SOB, LE swelling, PND, and orthopnea.  Tolerating physical therapy well but does endorse decreased stamina/endurance. Hoping to be able to return home in the next few weeks.     12/11/24 Being evaluated today for follow up via video visit. He reports doing well since last visit in August. He reports not being able to leave his house due to bladder incontinence, hence in person office visits are not an option at present. Denies any CHF type symptoms. No chest pain or palpitations. On occasion will skip a diuretic dose. Has appointment to be assessed for his chronic lymphedema. BP and weight stable.     Review of Systems   All other systems reviewed and are negative.      Physical Exam    Lab Results   Component Value Date    SODIUM 138 07/22/2024    K 3.9 07/22/2024    CL 99 07/22/2024    CO2 31 07/22/2024    AGAP 8 07/22/2024    BUN 18 07/22/2024    CREATININE 1.06 07/22/2024    GLUC 126 07/22/2024    GLUF 102 (H) 10/06/2023    CALCIUM 9.1 07/22/2024    AST 10 (L) 07/16/2024    ALT 5 (L) 07/16/2024    ALKPHOS 89 07/16/2024    TP 7.7 07/16/2024    TBILI 0.66 07/16/2024    EGFR 70 07/22/2024     Lab Results   Component Value Date    WBC 5.61 07/21/2024    HGB 11.7 (L) 07/21/2024    HCT 39.1  07/21/2024    MCV 82 07/21/2024     07/21/2024     Lab Results   Component Value Date     (H) 07/16/2024      Lab Results   Component Value Date    LDLCALC 54 10/06/2023     Lab Results   Component Value Date    DLS6YBIRCENU 6.492 (H) 07/16/2024    TSH 3.30 01/23/2019     Lab Results   Component Value Date    HGBA1C 5.4 07/16/2024         Visit Time  Total Visit Duration: 20 minutes

## 2024-12-11 ENCOUNTER — TELEMEDICINE (OUTPATIENT)
Dept: CARDIOLOGY CLINIC | Facility: CLINIC | Age: 69
End: 2024-12-11
Payer: MEDICARE

## 2024-12-11 ENCOUNTER — RA CDI HCC (OUTPATIENT)
Dept: OTHER | Facility: HOSPITAL | Age: 69
End: 2024-12-11

## 2024-12-11 ENCOUNTER — HOME CARE VISIT (OUTPATIENT)
Dept: HOME HEALTH SERVICES | Facility: HOME HEALTHCARE | Age: 69
End: 2024-12-11
Payer: MEDICARE

## 2024-12-11 VITALS
WEIGHT: 315 LBS | SYSTOLIC BLOOD PRESSURE: 133 MMHG | DIASTOLIC BLOOD PRESSURE: 72 MMHG | BODY MASS INDEX: 36.45 KG/M2 | HEIGHT: 78 IN

## 2024-12-11 VITALS
DIASTOLIC BLOOD PRESSURE: 71 MMHG | SYSTOLIC BLOOD PRESSURE: 131 MMHG | HEART RATE: 64 BPM | OXYGEN SATURATION: 98 % | TEMPERATURE: 97.4 F

## 2024-12-11 DIAGNOSIS — I50.32 CHRONIC DIASTOLIC HEART FAILURE (HCC): Primary | ICD-10-CM

## 2024-12-11 PROCEDURE — 99214 OFFICE O/P EST MOD 30 MIN: CPT | Performed by: NURSE PRACTITIONER

## 2024-12-11 PROCEDURE — G0152 HHCP-SERV OF OT,EA 15 MIN: HCPCS

## 2024-12-16 ENCOUNTER — HOME CARE VISIT (OUTPATIENT)
Dept: HOME HEALTH SERVICES | Facility: HOME HEALTHCARE | Age: 69
End: 2024-12-16
Payer: MEDICARE

## 2024-12-16 PROCEDURE — G0152 HHCP-SERV OF OT,EA 15 MIN: HCPCS

## 2024-12-17 VITALS
TEMPERATURE: 97.6 F | OXYGEN SATURATION: 98 % | SYSTOLIC BLOOD PRESSURE: 144 MMHG | HEART RATE: 60 BPM | DIASTOLIC BLOOD PRESSURE: 71 MMHG

## 2024-12-18 ENCOUNTER — HOME CARE VISIT (OUTPATIENT)
Dept: HOME HEALTH SERVICES | Facility: HOME HEALTHCARE | Age: 69
End: 2024-12-18
Payer: MEDICARE

## 2024-12-18 ENCOUNTER — TELEMEDICINE (OUTPATIENT)
Dept: FAMILY MEDICINE CLINIC | Facility: CLINIC | Age: 69
End: 2024-12-18
Payer: MEDICARE

## 2024-12-18 ENCOUNTER — TELEPHONE (OUTPATIENT)
Dept: LAB | Facility: HOSPITAL | Age: 69
End: 2024-12-18

## 2024-12-18 VITALS
WEIGHT: 315 LBS | BODY MASS INDEX: 36.45 KG/M2 | DIASTOLIC BLOOD PRESSURE: 71 MMHG | RESPIRATION RATE: 73 BRPM | HEIGHT: 78 IN | OXYGEN SATURATION: 97 % | SYSTOLIC BLOOD PRESSURE: 131 MMHG

## 2024-12-18 DIAGNOSIS — E78.2 MIXED HYPERLIPIDEMIA: Chronic | ICD-10-CM

## 2024-12-18 DIAGNOSIS — Z00.00 MEDICARE ANNUAL WELLNESS VISIT, SUBSEQUENT: Primary | ICD-10-CM

## 2024-12-18 DIAGNOSIS — E66.01 OBESITY, CLASS III, BMI 40-49.9 (MORBID OBESITY) (HCC): ICD-10-CM

## 2024-12-18 DIAGNOSIS — E11.42 DIABETIC POLYNEUROPATHY ASSOCIATED WITH TYPE 2 DIABETES MELLITUS (HCC): ICD-10-CM

## 2024-12-18 DIAGNOSIS — I10 ESSENTIAL HYPERTENSION: Chronic | ICD-10-CM

## 2024-12-18 DIAGNOSIS — E11.8 CONTROLLED TYPE 2 DIABETES MELLITUS WITH COMPLICATION, WITHOUT LONG-TERM CURRENT USE OF INSULIN (HCC): ICD-10-CM

## 2024-12-18 DIAGNOSIS — E03.9 ACQUIRED HYPOTHYROIDISM: ICD-10-CM

## 2024-12-18 DIAGNOSIS — Z12.5 SCREENING PSA (PROSTATE SPECIFIC ANTIGEN): ICD-10-CM

## 2024-12-18 PROCEDURE — 99214 OFFICE O/P EST MOD 30 MIN: CPT | Performed by: FAMILY MEDICINE

## 2024-12-18 PROCEDURE — G0152 HHCP-SERV OF OT,EA 15 MIN: HCPCS

## 2024-12-18 PROCEDURE — G0439 PPPS, SUBSEQ VISIT: HCPCS | Performed by: FAMILY MEDICINE

## 2024-12-18 NOTE — PROGRESS NOTES
Name: Armando Erickson      : 1955      MRN: [id number]  Encounter Provider: Aida Interiano MD  Encounter Date: 2024   Encounter department: [facility]    Pt for virtual visit- he is agreeable to virtual care. [place] present [place] in [place], [place].  I am in exam room, door closed, no one else in room.    Epic Embedded Platform used for televideo visit.    Assessment & Plan  Medicare annual wellness visit, subsequent  Plan as outlined below.  Reviewed preventive care       Controlled type 2 diabetes mellitus with complication, without long-term current use of insulin (HCC)  A1C readings have been consistently <6.  Due for labs. Will set up home draw.  No changes today  No hypoglycemia  Repeat labs in 6 mo  Continue healthy diet  On ace-I    Lab Results   Component Value Date    HGBA1C 5.4 2024     Orders:    CBC and differential; Future    Comprehensive metabolic panel; Future    Hemoglobin A1C; Future    Albumin / creatinine urine ratio; Future    Lipid Panel with Direct LDL reflex; Future    Acquired hypothyroidism  Due for TSH  Pt to schedule home draw      Orders:    TSH, 3rd generation with Free T4 reflex; Future    Mixed hyperlipidemia    Orders:    Comprehensive metabolic panel; Future    Lipid Panel with Direct LDL reflex; Future    Obesity, Class III, BMI 40-49.9 (morbid obesity) (HCC)    Orders:    CBC and differential; Future    Screening PSA (prostate specific antigen)    Orders:    PSA, Total Screen; Future    Essential hypertension  Controlled, monitoring at home  Check labs  No med changes  Follows w cardiology as well    Orders:    CBC and differential; Future    Comprehensive metabolic panel; Future    Diabetic polyneuropathy associated with type 2 diabetes mellitus (HCC)  Continue home wound care  Due for labs  On gabapentin for discomfort with neuropathy, however he isn't sure if it is doing much.    Ok to trial taper off gabapentin (on just  100 mg twice a day) and if no difference in symptoms, ok to stay off.  Lab Results   Component Value Date    HGBA1C 5.4 07/16/2024             Preventive health issues were discussed with patient, and age appropriate screening tests were ordered as noted in patient's After Visit Summary. Personalized health advice and appropriate referrals for health education or preventive services given if needed, as noted in patient's After Visit Summary.    History of Present Illness     HPI   Here for virtual medicare AWV and f/u visit  He doesn't drive anymore.  Daughter Phyllis sometimes can take him to appointments.  Virtual is helpful for him.    Getting VNA care for wounds on LE.  Nurse will be mailing order for compression garment which pt needs mailed back to home.     He has been feeling well in general.  Francy his visiting nurse has cared for him intermittently for years and he feels comfortable having her in the home.      He is tolerating eliquis well.  No nosebleeds, no blood in stool/urine.    No trouble w meds  No side effects    Diet- tries to eat healthfully.     Taking gabapentin 100 mg twice a day (drowsy with a third dose a day)  Limited to no sensation in his foot.   Not sure if the gabapentin makes much difference  Fingers get tingly  Symptoms are tolerable.    Patient Care Team:  Aida Interiano MD as PCP - General (Family Medicine)  Ericka Skinner DPM as PCP - Wound (Podiatry)  Ledy Smart RN as Registered Nurse (Cardiology)    Review of Systems   Respiratory:  Negative for chest tightness and shortness of breath.    Cardiovascular:  Negative for chest pain.   Gastrointestinal:  Negative for abdominal pain, blood in stool, constipation and diarrhea.   Genitourinary:  Negative for difficulty urinating.   Neurological:  Negative for dizziness and light-headedness.   Hematological:  Does not bruise/bleed easily.     Medical History Reviewed by provider this encounter:  Tobacco  Allergies  Meds   Problems  Med Hx  Surg Hx  Fam Hx       Annual Wellness Visit Questionnaire   Armando is here for his Subsequent Wellness visit.     Health Risk Assessment:   Patient rates overall health as fair. Patient feels that their physical health rating is same. Patient is satisfied with their life. Eyesight was rated as same. Hearing was rated as same. Patient feels that their emotional and mental health rating is same. Patients states they are never, rarely angry. Patient states they are never, rarely unusually tired/fatigued. Pain experienced in the last 7 days has been some. Patient's pain rating has been 6/10. Patient states that he has experienced no weight loss or gain in last 6 months.     Depression Screening:   PHQ-2 Score: 0      Fall Risk Screening:   In the past year, patient has experienced: no history of falling in past year      Home Safety:  Patient has trouble with stairs inside or outside of their home. Patient has working smoke alarms and has working carbon monoxide detector. Home safety hazards include: none.     Nutrition:   Current diet is Regular.     Medications:   Patient is currently taking over-the-counter supplements. OTC medications include: see medication list. Patient is able to manage medications.     Activities of Daily Living (ADLs)/Instrumental Activities of Daily Living (IADLs):   Walk and transfer into and out of bed and chair?: Yes  Dress and groom yourself?: Yes    Bathe or shower yourself?: Yes    Feed yourself? Yes  Do your laundry/housekeeping?: Yes  Manage your money, pay your bills and track your expenses?: Yes  Make your own meals?: Yes    Do your own shopping?: No    Previous Hospitalizations:   Any hospitalizations or ED visits within the last 12 months?: No      Advance Care Planning:   Living will: Yes    Durable POA for healthcare: Yes    Advanced directive: Yes      Cognitive Screening:   Provider or family/friend/caregiver concerned regarding cognition?:  No    PREVENTIVE SCREENINGS      Cardiovascular Screening:    General: Screening Not Indicated and History Lipid Disorder      Diabetes Screening:     General: Screening Not Indicated and History Diabetes      Colorectal Cancer Screening:     General: Screening Current      Prostate Cancer Screening:    General: Risks and Benefits Discussed    Due for: PSA      Osteoporosis Screening:    General: Screening Not Indicated      Abdominal Aortic Aneurysm (AAA) Screening:    Risk factors include: age between 65-74 yo and tobacco use        Lung Cancer Screening:     General: Screening Not Indicated      Hepatitis C Screening:    General: Screening Current    Screening, Brief Intervention, and Referral to Treatment (SBIRT)    Screening  Typical number of drinks in a day: 0  Typical number of drinks in a week: 0  Interpretation: Low risk drinking behavior.    AUDIT-C Screenin) How often did you have a drink containing alcohol in the past year? monthly or less  2) How many drinks did you have on a typical day when you were drinking in the past year? 1 to 2  3) How often did you have 6 or more drinks on one occasion in the past year? never    AUDIT-C Score: 1  Interpretation: Score 0-3 (male): Negative screen for alcohol misuse    Single Item Drug Screening:  How often have you used an illegal drug (including marijuana) or a prescription medication for non-medical reasons in the past year? never    Single Item Drug Screen Score: 0  Interpretation: Negative screen for possible drug use disorder    Social Drivers of Health     Financial Resource Strain: Low Risk  (2023)    Overall Financial Resource Strain (CARDIA)     Difficulty of Paying Living Expenses: Not very hard   Food Insecurity: No Food Insecurity (12/15/2024)    Hunger Vital Sign     Worried About Running Out of Food in the Last Year: Never true     Ran Out of Food in the Last Year: Never true   Transportation Needs: No Transportation Needs (12/15/2024)     "PRAPARE - Transportation     Lack of Transportation (Medical): No     Lack of Transportation (Non-Medical): No   Housing Stability: Low Risk  (12/15/2024)    Housing Stability Vital Sign     Unable to Pay for Housing in the Last Year: No     Number of Times Moved in the Last Year: 0     Homeless in the Last Year: No   Utilities: Not At Risk (12/15/2024)    [facility] Utilities     Threatened with loss of utilities: No     No results found.    Objective   /71   Resp (!) 73   Ht 6' 7\" (2.007 m)   Wt (!) 165 kg (364 lb)   SpO2 97%   BMI 41.01 kg/m²     Physical Exam  Vitals and nursing note reviewed.   Constitutional:       Appearance: Normal appearance. He is not ill-appearing.   Pulmonary:      Effort: No respiratory distress.   Neurological:      Mental Status: He is alert.   Psychiatric:         Mood and Affect: Mood normal.         Behavior: Behavior normal.         "

## 2024-12-18 NOTE — PATIENT INSTRUCTIONS
Schedule your eye exam for the diabetes.    For Mobile Lab Now, call [phone]     You can try cutting down the gabapentin to just the nighttime dose and if you feel ok with this, you can stop the morning dose also.  If the neuropathy symptoms are not different without the gabapentin- it's fine to stop it entirely, otherwise just restart.

## 2024-12-18 NOTE — ASSESSMENT & PLAN NOTE
Due for TSH  Pt to schedule home draw      Orders:    TSH, 3rd generation with Free T4 reflex; Future

## 2024-12-18 NOTE — ASSESSMENT & PLAN NOTE
A1C readings have been consistently <6.  Due for labs. Will set up home draw.  No changes today  No hypoglycemia  Repeat labs in 6 mo  Continue healthy diet  On ace-I    Lab Results   Component Value Date    HGBA1C 5.4 07/16/2024     Orders:    CBC and differential; Future    Comprehensive metabolic panel; Future    Hemoglobin A1C; Future    Albumin / creatinine urine ratio; Future    Lipid Panel with Direct LDL reflex; Future

## 2024-12-18 NOTE — ASSESSMENT & PLAN NOTE
Controlled, monitoring at home  Check labs  No med changes  Follows w cardiology as well    Orders:    CBC and differential; Future    Comprehensive metabolic panel; Future

## 2024-12-19 ENCOUNTER — TELEPHONE (OUTPATIENT)
Age: 69
End: 2024-12-19

## 2024-12-19 VITALS
TEMPERATURE: 97.8 F | DIASTOLIC BLOOD PRESSURE: 79 MMHG | OXYGEN SATURATION: 98 % | HEART RATE: 62 BPM | SYSTOLIC BLOOD PRESSURE: 135 MMHG

## 2024-12-19 NOTE — TELEPHONE ENCOUNTER
Patient will upload via my chart orders from his Lymphedema therapist that Dr. Interiano needs to complete and sign .

## 2024-12-23 ENCOUNTER — HOME CARE VISIT (OUTPATIENT)
Dept: HOME HEALTH SERVICES | Facility: HOME HEALTHCARE | Age: 69
End: 2024-12-23
Payer: MEDICARE

## 2024-12-23 PROCEDURE — G0152 HHCP-SERV OF OT,EA 15 MIN: HCPCS

## 2024-12-24 ENCOUNTER — APPOINTMENT (OUTPATIENT)
Dept: LAB | Facility: HOSPITAL | Age: 69
End: 2024-12-24
Payer: MEDICARE

## 2024-12-24 VITALS
DIASTOLIC BLOOD PRESSURE: 76 MMHG | HEART RATE: 64 BPM | OXYGEN SATURATION: 100 % | TEMPERATURE: 98.2 F | SYSTOLIC BLOOD PRESSURE: 133 MMHG

## 2024-12-24 DIAGNOSIS — E11.8 CONTROLLED TYPE 2 DIABETES MELLITUS WITH COMPLICATION, WITHOUT LONG-TERM CURRENT USE OF INSULIN (HCC): ICD-10-CM

## 2024-12-24 DIAGNOSIS — E66.01 OBESITY, CLASS III, BMI 40-49.9 (MORBID OBESITY) (HCC): ICD-10-CM

## 2024-12-24 DIAGNOSIS — Z12.5 SCREENING PSA (PROSTATE SPECIFIC ANTIGEN): ICD-10-CM

## 2024-12-24 DIAGNOSIS — I10 ESSENTIAL HYPERTENSION: Chronic | ICD-10-CM

## 2024-12-24 DIAGNOSIS — E03.9 ACQUIRED HYPOTHYROIDISM: ICD-10-CM

## 2024-12-24 DIAGNOSIS — E78.2 MIXED HYPERLIPIDEMIA: Chronic | ICD-10-CM

## 2024-12-24 LAB
ALBUMIN SERPL BCG-MCNC: 4.1 G/DL (ref 3.5–5)
ALP SERPL-CCNC: 87 U/L (ref 34–104)
ALT SERPL W P-5'-P-CCNC: 9 U/L (ref 7–52)
ANION GAP SERPL CALCULATED.3IONS-SCNC: 6 MMOL/L (ref 4–13)
AST SERPL W P-5'-P-CCNC: 14 U/L (ref 13–39)
BASOPHILS # BLD AUTO: 0.03 THOUSANDS/ÂΜL (ref 0–0.1)
BASOPHILS NFR BLD AUTO: 1 % (ref 0–1)
BILIRUB SERPL-MCNC: 0.44 MG/DL (ref 0.2–1)
BUN SERPL-MCNC: 23 MG/DL (ref 5–25)
CALCIUM SERPL-MCNC: 9.3 MG/DL (ref 8.4–10.2)
CHLORIDE SERPL-SCNC: 103 MMOL/L (ref 96–108)
CHOLEST SERPL-MCNC: 113 MG/DL (ref ?–200)
CO2 SERPL-SCNC: 30 MMOL/L (ref 21–32)
CREAT SERPL-MCNC: 1.06 MG/DL (ref 0.6–1.3)
EOSINOPHIL # BLD AUTO: 0.09 THOUSAND/ÂΜL (ref 0–0.61)
EOSINOPHIL NFR BLD AUTO: 2 % (ref 0–6)
ERYTHROCYTE [DISTWIDTH] IN BLOOD BY AUTOMATED COUNT: 15.7 % (ref 11.6–15.1)
EST. AVERAGE GLUCOSE BLD GHB EST-MCNC: 114 MG/DL
GFR SERPL CREATININE-BSD FRML MDRD: 70 ML/MIN/1.73SQ M
GLUCOSE P FAST SERPL-MCNC: 96 MG/DL (ref 65–99)
HBA1C MFR BLD: 5.6 %
HCT VFR BLD AUTO: 37.6 % (ref 36.5–49.3)
HDLC SERPL-MCNC: 34 MG/DL
HGB BLD-MCNC: 11.4 G/DL (ref 12–17)
IMM GRANULOCYTES # BLD AUTO: 0.01 THOUSAND/UL (ref 0–0.2)
IMM GRANULOCYTES NFR BLD AUTO: 0 % (ref 0–2)
LDLC SERPL CALC-MCNC: 59 MG/DL (ref 0–100)
LYMPHOCYTES # BLD AUTO: 0.94 THOUSANDS/ÂΜL (ref 0.6–4.47)
LYMPHOCYTES NFR BLD AUTO: 15 % (ref 14–44)
MCH RBC QN AUTO: 26.8 PG (ref 26.8–34.3)
MCHC RBC AUTO-ENTMCNC: 30.3 G/DL (ref 31.4–37.4)
MCV RBC AUTO: 89 FL (ref 82–98)
MONOCYTES # BLD AUTO: 0.61 THOUSAND/ÂΜL (ref 0.17–1.22)
MONOCYTES NFR BLD AUTO: 10 % (ref 4–12)
NEUTROPHILS # BLD AUTO: 4.45 THOUSANDS/ÂΜL (ref 1.85–7.62)
NEUTS SEG NFR BLD AUTO: 72 % (ref 43–75)
NRBC BLD AUTO-RTO: 0 /100 WBCS
PLATELET # BLD AUTO: 193 THOUSANDS/UL (ref 149–390)
PMV BLD AUTO: 12.7 FL (ref 8.9–12.7)
POTASSIUM SERPL-SCNC: 4.6 MMOL/L (ref 3.5–5.3)
PROT SERPL-MCNC: 7.7 G/DL (ref 6.4–8.4)
PSA SERPL-MCNC: 0.65 NG/ML (ref 0–4)
RBC # BLD AUTO: 4.25 MILLION/UL (ref 3.88–5.62)
SODIUM SERPL-SCNC: 139 MMOL/L (ref 135–147)
TRIGL SERPL-MCNC: 100 MG/DL (ref ?–150)
TSH SERPL DL<=0.05 MIU/L-ACNC: 1 UIU/ML (ref 0.45–4.5)
WBC # BLD AUTO: 6.13 THOUSAND/UL (ref 4.31–10.16)

## 2024-12-24 PROCEDURE — 83036 HEMOGLOBIN GLYCOSYLATED A1C: CPT

## 2024-12-24 PROCEDURE — 84443 ASSAY THYROID STIM HORMONE: CPT

## 2024-12-24 PROCEDURE — G0103 PSA SCREENING: HCPCS

## 2024-12-24 PROCEDURE — 80053 COMPREHEN METABOLIC PANEL: CPT

## 2024-12-24 PROCEDURE — 36415 COLL VENOUS BLD VENIPUNCTURE: CPT

## 2024-12-24 PROCEDURE — 85025 COMPLETE CBC W/AUTO DIFF WBC: CPT

## 2024-12-24 PROCEDURE — 80061 LIPID PANEL: CPT

## 2024-12-27 ENCOUNTER — HOME CARE VISIT (OUTPATIENT)
Dept: HOME HEALTH SERVICES | Facility: HOME HEALTHCARE | Age: 69
End: 2024-12-27
Payer: MEDICARE

## 2024-12-27 PROCEDURE — G0152 HHCP-SERV OF OT,EA 15 MIN: HCPCS

## 2024-12-28 VITALS
OXYGEN SATURATION: 96 % | HEART RATE: 88 BPM | TEMPERATURE: 97.3 F | DIASTOLIC BLOOD PRESSURE: 88 MMHG | SYSTOLIC BLOOD PRESSURE: 135 MMHG

## 2024-12-31 ENCOUNTER — HOME CARE VISIT (OUTPATIENT)
Dept: HOME HEALTH SERVICES | Facility: HOME HEALTHCARE | Age: 69
End: 2024-12-31
Payer: MEDICARE

## 2024-12-31 ENCOUNTER — TELEPHONE (OUTPATIENT)
Dept: FAMILY MEDICINE CLINIC | Facility: CLINIC | Age: 69
End: 2024-12-31

## 2024-12-31 PROCEDURE — G0152 HHCP-SERV OF OT,EA 15 MIN: HCPCS

## 2024-12-31 NOTE — TELEPHONE ENCOUNTER
I rec'd a message from home health for patient as below.  Can you please make sure we rec'd these forms?  They must be completed today and faxed today before we leave.      Good afternoon, Dr. Interiano. I faxed over on Saturday the prescription forms for [employer] nighttime garment. I also put on the cover sheet the number for [employer], where it will need to be faxed. If we can get this to [employer] by today or tomorrow, then it will count for 2024, which would be great, and then he will have the ability to order new garments in 2025. Any questions or concerns please contact me!! Thanks

## 2025-01-03 VITALS
DIASTOLIC BLOOD PRESSURE: 84 MMHG | OXYGEN SATURATION: 96 % | HEART RATE: 80 BPM | SYSTOLIC BLOOD PRESSURE: 127 MMHG | TEMPERATURE: 97.7 F

## 2025-01-04 ENCOUNTER — HOME CARE VISIT (OUTPATIENT)
Dept: HOME HEALTH SERVICES | Facility: HOME HEALTHCARE | Age: 70
End: 2025-01-04
Payer: MEDICARE

## 2025-01-04 VITALS
TEMPERATURE: 97.7 F | BODY MASS INDEX: 42.02 KG/M2 | DIASTOLIC BLOOD PRESSURE: 72 MMHG | WEIGHT: 315 LBS | SYSTOLIC BLOOD PRESSURE: 118 MMHG | HEART RATE: 88 BPM | OXYGEN SATURATION: 98 %

## 2025-01-04 PROCEDURE — G0152 HHCP-SERV OF OT,EA 15 MIN: HCPCS

## 2025-01-07 ENCOUNTER — HOME CARE VISIT (OUTPATIENT)
Dept: HOME HEALTH SERVICES | Facility: HOME HEALTHCARE | Age: 70
End: 2025-01-07
Payer: MEDICARE

## 2025-01-07 ENCOUNTER — RESULTS FOLLOW-UP (OUTPATIENT)
Dept: FAMILY MEDICINE CLINIC | Facility: CLINIC | Age: 70
End: 2025-01-07

## 2025-01-07 VITALS
HEART RATE: 55 BPM | OXYGEN SATURATION: 99 % | SYSTOLIC BLOOD PRESSURE: 133 MMHG | DIASTOLIC BLOOD PRESSURE: 80 MMHG | TEMPERATURE: 97.3 F

## 2025-01-07 PROCEDURE — G0152 HHCP-SERV OF OT,EA 15 MIN: HCPCS

## 2025-01-07 NOTE — TELEPHONE ENCOUNTER
Renae called from [facility] and stated the nighttime garment form was not received.Refaxed forms to [phone].

## 2025-01-07 NOTE — TELEPHONE ENCOUNTER
Roberta from OT called to say that [employer] never received the form.    She is asking that it be re faxed to: [phone]

## 2025-01-10 ENCOUNTER — HOME CARE VISIT (OUTPATIENT)
Dept: HOME HEALTH SERVICES | Facility: HOME HEALTHCARE | Age: 70
End: 2025-01-10
Payer: MEDICARE

## 2025-01-10 VITALS
OXYGEN SATURATION: 99 % | SYSTOLIC BLOOD PRESSURE: 107 MMHG | TEMPERATURE: 98.3 F | HEART RATE: 56 BPM | DIASTOLIC BLOOD PRESSURE: 75 MMHG

## 2025-01-10 PROCEDURE — G0152 HHCP-SERV OF OT,EA 15 MIN: HCPCS

## 2025-01-12 DIAGNOSIS — I48.0 PAROXYSMAL ATRIAL FIBRILLATION (HCC): Chronic | ICD-10-CM

## 2025-01-12 DIAGNOSIS — E11.8 CONTROLLED TYPE 2 DIABETES MELLITUS WITH COMPLICATION, WITHOUT LONG-TERM CURRENT USE OF INSULIN (HCC): ICD-10-CM

## 2025-01-12 DIAGNOSIS — E11.622 DIABETIC ULCER OF LEFT ANKLE ASSOCIATED WITH TYPE 2 DIABETES MELLITUS, LIMITED TO BREAKDOWN OF SKIN (HCC): ICD-10-CM

## 2025-01-12 DIAGNOSIS — I10 ESSENTIAL HYPERTENSION: ICD-10-CM

## 2025-01-12 DIAGNOSIS — I50.21 ACUTE SYSTOLIC HEART FAILURE (HCC): ICD-10-CM

## 2025-01-12 DIAGNOSIS — I50.32 CHRONIC HEART FAILURE WITH PRESERVED EJECTION FRACTION (HCC): ICD-10-CM

## 2025-01-12 DIAGNOSIS — L97.321 DIABETIC ULCER OF LEFT ANKLE ASSOCIATED WITH TYPE 2 DIABETES MELLITUS, LIMITED TO BREAKDOWN OF SKIN (HCC): ICD-10-CM

## 2025-01-12 DIAGNOSIS — E03.9 HYPOTHYROIDISM: ICD-10-CM

## 2025-01-12 DIAGNOSIS — E78.5 HLD (HYPERLIPIDEMIA): ICD-10-CM

## 2025-01-12 DIAGNOSIS — R21 RASH: ICD-10-CM

## 2025-01-12 DIAGNOSIS — K21.9 GASTROESOPHAGEAL REFLUX DISEASE WITHOUT ESOPHAGITIS: ICD-10-CM

## 2025-01-14 ENCOUNTER — HOME CARE VISIT (OUTPATIENT)
Dept: HOME HEALTH SERVICES | Facility: HOME HEALTHCARE | Age: 70
End: 2025-01-14
Payer: MEDICARE

## 2025-01-14 VITALS — DIASTOLIC BLOOD PRESSURE: 69 MMHG | SYSTOLIC BLOOD PRESSURE: 107 MMHG

## 2025-01-14 DIAGNOSIS — I50.32 CHRONIC HEART FAILURE WITH PRESERVED EJECTION FRACTION (HCC): ICD-10-CM

## 2025-01-14 DIAGNOSIS — I48.0 PAROXYSMAL ATRIAL FIBRILLATION (HCC): Chronic | ICD-10-CM

## 2025-01-14 PROCEDURE — G0152 HHCP-SERV OF OT,EA 15 MIN: HCPCS

## 2025-01-14 RX ORDER — METOPROLOL TARTRATE 50 MG
50 TABLET ORAL 2 TIMES DAILY
Qty: 180 TABLET | Refills: 1 | Status: SHIPPED | OUTPATIENT
Start: 2025-01-14

## 2025-01-14 RX ORDER — LISINOPRIL 40 MG/1
40 TABLET ORAL DAILY
Qty: 90 TABLET | Refills: 0 | Status: SHIPPED | OUTPATIENT
Start: 2025-01-14

## 2025-01-14 RX ORDER — GABAPENTIN 100 MG/1
100 CAPSULE ORAL 2 TIMES DAILY
Qty: 180 CAPSULE | Refills: 1 | Status: SHIPPED | OUTPATIENT
Start: 2025-01-14 | End: 2025-07-13

## 2025-01-14 RX ORDER — PANTOPRAZOLE SODIUM 40 MG/1
40 TABLET, DELAYED RELEASE ORAL DAILY
Qty: 90 TABLET | Refills: 1 | Status: SHIPPED | OUTPATIENT
Start: 2025-01-14

## 2025-01-14 RX ORDER — DAPAGLIFLOZIN 5 MG/1
5 TABLET, FILM COATED ORAL DAILY
Qty: 60 TABLET | Refills: 0 | Status: CANCELLED | OUTPATIENT
Start: 2025-01-14

## 2025-01-14 RX ORDER — FUROSEMIDE 40 MG/1
40 TABLET ORAL 2 TIMES DAILY
Qty: 120 TABLET | Refills: 0 | Status: CANCELLED | OUTPATIENT
Start: 2025-01-14

## 2025-01-14 RX ORDER — LEVOTHYROXINE SODIUM 25 UG/1
25 TABLET ORAL DAILY
Qty: 90 TABLET | Refills: 1 | Status: SHIPPED | OUTPATIENT
Start: 2025-01-14

## 2025-01-14 RX ORDER — SIMVASTATIN 10 MG
10 TABLET ORAL
Qty: 90 TABLET | Refills: 1 | Status: SHIPPED | OUTPATIENT
Start: 2025-01-14 | End: 2025-07-13

## 2025-01-14 RX ORDER — LEVOTHYROXINE SODIUM 300 UG/1
300 TABLET ORAL DAILY
Qty: 90 TABLET | Refills: 0 | Status: SHIPPED | OUTPATIENT
Start: 2025-01-14

## 2025-01-14 RX ORDER — SPIRONOLACTONE 25 MG/1
25 TABLET ORAL DAILY
Qty: 60 TABLET | Refills: 0 | Status: CANCELLED | OUTPATIENT
Start: 2025-01-14

## 2025-01-14 RX ORDER — METFORMIN HYDROCHLORIDE 500 MG/1
500 TABLET, EXTENDED RELEASE ORAL
Qty: 90 TABLET | Refills: 1 | Status: SHIPPED | OUTPATIENT
Start: 2025-01-14

## 2025-01-14 NOTE — TELEPHONE ENCOUNTER
Remove   Furosemide 40 mg   dapagliflozine 5 mg  apixaban 5 mg  Spironolactone 25 mg    Entered new refill request to cardiology for these meds

## 2025-01-14 NOTE — TELEPHONE ENCOUNTER
Reason for call:   [x] Refill   [] Prior Auth  [] Other:     Office:   [] PCP/Provider -   [] Specialty/Provider -     Medication:   Furosemide 40 mg, 1 bid, 180   dapagliflozine 5 mg, 1qd 90  apixaban 5 mg, 1 bid, 180  Spironolactone 25 mg, 1 qd 90      Pharmacy:   [facility]    Does the patient have enough for 3 days?   [x] Yes   [] No - Send as HP to POD

## 2025-01-15 RX ORDER — DAPAGLIFLOZIN 5 MG/1
5 TABLET, FILM COATED ORAL DAILY
Qty: 90 TABLET | Refills: 1 | Status: SHIPPED | OUTPATIENT
Start: 2025-01-15

## 2025-01-15 RX ORDER — SPIRONOLACTONE 25 MG/1
25 TABLET ORAL DAILY
Qty: 90 TABLET | Refills: 1 | Status: SHIPPED | OUTPATIENT
Start: 2025-01-15

## 2025-01-15 RX ORDER — FUROSEMIDE 40 MG/1
40 TABLET ORAL 2 TIMES DAILY
Qty: 180 TABLET | Refills: 1 | Status: SHIPPED | OUTPATIENT
Start: 2025-01-15

## 2025-01-17 ENCOUNTER — HOME CARE VISIT (OUTPATIENT)
Dept: HOME HEALTH SERVICES | Facility: HOME HEALTHCARE | Age: 70
End: 2025-01-17
Payer: MEDICARE

## 2025-01-17 VITALS — OXYGEN SATURATION: 98 % | DIASTOLIC BLOOD PRESSURE: 82 MMHG | HEART RATE: 83 BPM | SYSTOLIC BLOOD PRESSURE: 134 MMHG

## 2025-01-17 PROCEDURE — G0152 HHCP-SERV OF OT,EA 15 MIN: HCPCS

## 2025-01-20 RX ORDER — CLOTRIMAZOLE AND BETAMETHASONE DIPROPIONATE 10; .64 MG/G; MG/G
CREAM TOPICAL 2 TIMES DAILY
Qty: 45 G | Refills: 0 | Status: SHIPPED | OUTPATIENT
Start: 2025-01-20

## 2025-01-21 ENCOUNTER — HOME CARE VISIT (OUTPATIENT)
Dept: HOME HEALTH SERVICES | Facility: HOME HEALTHCARE | Age: 70
End: 2025-01-21
Payer: MEDICARE

## 2025-01-21 VITALS — SYSTOLIC BLOOD PRESSURE: 120 MMHG | HEART RATE: 86 BPM | OXYGEN SATURATION: 99 % | DIASTOLIC BLOOD PRESSURE: 76 MMHG

## 2025-01-21 PROCEDURE — G0152 HHCP-SERV OF OT,EA 15 MIN: HCPCS

## 2025-01-23 ENCOUNTER — HOME CARE VISIT (OUTPATIENT)
Dept: HOME HEALTH SERVICES | Facility: HOME HEALTHCARE | Age: 70
End: 2025-01-23
Payer: MEDICARE

## 2025-01-23 VITALS
BODY MASS INDEX: 41.93 KG/M2 | OXYGEN SATURATION: 99 % | SYSTOLIC BLOOD PRESSURE: 116 MMHG | WEIGHT: 315 LBS | DIASTOLIC BLOOD PRESSURE: 72 MMHG | TEMPERATURE: 97.8 F | HEART RATE: 92 BPM

## 2025-01-23 PROCEDURE — G0152 HHCP-SERV OF OT,EA 15 MIN: HCPCS

## 2025-01-28 ENCOUNTER — HOME CARE VISIT (OUTPATIENT)
Dept: HOME HEALTH SERVICES | Facility: HOME HEALTHCARE | Age: 70
End: 2025-01-28
Payer: MEDICARE

## 2025-01-28 ENCOUNTER — TELEPHONE (OUTPATIENT)
Dept: CARDIOLOGY CLINIC | Facility: CLINIC | Age: 70
End: 2025-01-28

## 2025-01-28 DIAGNOSIS — I50.32 CHRONIC HEART FAILURE WITH PRESERVED EJECTION FRACTION (HCC): ICD-10-CM

## 2025-01-28 PROCEDURE — G0152 HHCP-SERV OF OT,EA 15 MIN: HCPCS

## 2025-01-28 NOTE — TELEPHONE ENCOUNTER
PA for  DAPAGLIFLOZIN SUBMITTED to   [facility]  via    []CMM-KEY:    [x]Surescripts-Case ID #    []Availity-Auth ID #  NDC #    []Faxed to plan   []Other website    []Phone call Case ID #      [x]PA sent as URGENT    All office notes, labs and other pertaining documents and studies sent. Clinical questions answered. Awaiting determination from insurance company.     Turnaround time for your insurance to make a decision on your Prior Authorization can take 7-21 business days.

## 2025-01-28 NOTE — TELEPHONE ENCOUNTER
Forwarding to PA team for review.       dapagliflozin 5 MG TABS 5 mg, Daily     Patient was sent a 30 day supply so after contacting us to see what was sent [facility] called [facility] and they stated they only sent him 30 days as an emergency supply but he needs a PA for this medication.

## 2025-01-28 NOTE — TELEPHONE ENCOUNTER
PA for  APPROVED dapagliflozin 5 MG     Date(s) approved January 1, 2025 to January 28, 2026         Patient advised by          [x]ikaSystemshart Message  []Phone call   []LMOM  []L/M to call office as no active Communication consent on file  []Unable to leave detailed message as VM not approved on Communication consent       Pharmacy advised by    [x]Fax  []Phone call    Approval letter scanned into Media NO

## 2025-01-28 NOTE — TELEPHONE ENCOUNTER
Reason for call:   [x] Refill   [] Prior Auth  [] Other: this was filled for only a 30 day supply as it was not covered but pt got a call today from insurance, was told they approved it. Pt called [facility] was told that their system also shows it was approved but that they need a new script to ship him the rest of the day from the previous script as it's no longer good     Office:   [] PCP/Provider -   [x] Specialty/Provider - cardio    Medication: dapagliflozin     Dose/Frequency: 5 mg take daily     Quantity: 60- just this time    Pharmacy: [facility]     Does the patient have enough for 3 days?   [x] Yes   [] No - Send as HP to POD

## 2025-01-29 VITALS
HEART RATE: 70 BPM | SYSTOLIC BLOOD PRESSURE: 102 MMHG | DIASTOLIC BLOOD PRESSURE: 72 MMHG | OXYGEN SATURATION: 98 % | TEMPERATURE: 97.8 F

## 2025-01-29 RX ORDER — DAPAGLIFLOZIN 5 MG/1
5 TABLET, FILM COATED ORAL DAILY
Qty: 60 TABLET | Refills: 0 | Status: SHIPPED | OUTPATIENT
Start: 2025-01-29

## 2025-01-30 ENCOUNTER — HOME CARE VISIT (OUTPATIENT)
Dept: HOME HEALTH SERVICES | Facility: HOME HEALTHCARE | Age: 70
End: 2025-01-30
Payer: MEDICARE

## 2025-01-30 VITALS
DIASTOLIC BLOOD PRESSURE: 70 MMHG | SYSTOLIC BLOOD PRESSURE: 110 MMHG | OXYGEN SATURATION: 97 % | TEMPERATURE: 97.9 F | HEART RATE: 64 BPM

## 2025-01-30 PROCEDURE — G0152 HHCP-SERV OF OT,EA 15 MIN: HCPCS

## 2025-02-03 ENCOUNTER — HOME CARE VISIT (OUTPATIENT)
Dept: HOME HEALTH SERVICES | Facility: HOME HEALTHCARE | Age: 70
End: 2025-02-03
Payer: MEDICARE

## 2025-02-03 VITALS
HEART RATE: 63 BPM | DIASTOLIC BLOOD PRESSURE: 65 MMHG | TEMPERATURE: 97.6 F | OXYGEN SATURATION: 99 % | SYSTOLIC BLOOD PRESSURE: 114 MMHG

## 2025-02-03 PROCEDURE — G0152 HHCP-SERV OF OT,EA 15 MIN: HCPCS

## 2025-02-06 ENCOUNTER — HOME CARE VISIT (OUTPATIENT)
Dept: HOME HEALTH SERVICES | Facility: HOME HEALTHCARE | Age: 70
End: 2025-02-06
Payer: MEDICARE

## 2025-02-06 VITALS
DIASTOLIC BLOOD PRESSURE: 65 MMHG | TEMPERATURE: 98.1 F | HEART RATE: 73 BPM | SYSTOLIC BLOOD PRESSURE: 121 MMHG | OXYGEN SATURATION: 98 %

## 2025-02-06 PROCEDURE — G0152 HHCP-SERV OF OT,EA 15 MIN: HCPCS

## 2025-02-08 LAB
LEFT EYE DIABETIC RETINOPATHY: NORMAL
RIGHT EYE DIABETIC RETINOPATHY: NORMAL

## 2025-02-11 ENCOUNTER — HOME CARE VISIT (OUTPATIENT)
Dept: HOME HEALTH SERVICES | Facility: HOME HEALTHCARE | Age: 70
End: 2025-02-11
Payer: MEDICARE

## 2025-02-11 VITALS
TEMPERATURE: 98.2 F | OXYGEN SATURATION: 95 % | HEART RATE: 83 BPM | DIASTOLIC BLOOD PRESSURE: 97 MMHG | SYSTOLIC BLOOD PRESSURE: 139 MMHG

## 2025-02-11 PROCEDURE — G0152 HHCP-SERV OF OT,EA 15 MIN: HCPCS

## 2025-02-15 ENCOUNTER — HOME CARE VISIT (OUTPATIENT)
Dept: HOME HEALTH SERVICES | Facility: HOME HEALTHCARE | Age: 70
End: 2025-02-15
Payer: MEDICARE

## 2025-02-15 VITALS
OXYGEN SATURATION: 97 % | SYSTOLIC BLOOD PRESSURE: 133 MMHG | HEART RATE: 79 BPM | TEMPERATURE: 98.2 F | DIASTOLIC BLOOD PRESSURE: 78 MMHG

## 2025-02-15 PROCEDURE — G0152 HHCP-SERV OF OT,EA 15 MIN: HCPCS

## 2025-02-19 ENCOUNTER — TELEPHONE (OUTPATIENT)
Dept: ADMINISTRATIVE | Facility: OTHER | Age: 70
End: 2025-02-19

## 2025-02-19 NOTE — TELEPHONE ENCOUNTER
Upon review of the In Basket request we were able to locate, review, and update the patient chart as requested for Diabetic Eye Exam.    Any additional questions or concerns should be emailed to the Practice Liaisons via the appropriate education email address, please do not reply via In Basket.    Thank you  Brayan Saul MA   [facility]

## 2025-02-19 NOTE — TELEPHONE ENCOUNTER
----- Message from Kayley COFFEY sent at 2/18/2025  2:22 PM EST -----  Regarding: Care Gap Request  02/18/25 2:22 PM    Hello, our patient above has had Diabetic Eye Exam completed/performed. Please assist in updating the patient chart by pulling the document from the Media Tab. The date of service is 2/8/25.     Thank you,  LU Middleton [facility]

## 2025-04-20 DIAGNOSIS — E03.9 HYPOTHYROIDISM: ICD-10-CM

## 2025-04-20 DIAGNOSIS — L97.321 DIABETIC ULCER OF LEFT ANKLE ASSOCIATED WITH TYPE 2 DIABETES MELLITUS, LIMITED TO BREAKDOWN OF SKIN (HCC): ICD-10-CM

## 2025-04-20 DIAGNOSIS — I10 ESSENTIAL HYPERTENSION: ICD-10-CM

## 2025-04-20 DIAGNOSIS — E78.5 HLD (HYPERLIPIDEMIA): ICD-10-CM

## 2025-04-20 DIAGNOSIS — E11.622 DIABETIC ULCER OF LEFT ANKLE ASSOCIATED WITH TYPE 2 DIABETES MELLITUS, LIMITED TO BREAKDOWN OF SKIN (HCC): ICD-10-CM

## 2025-04-20 DIAGNOSIS — I50.32 CHRONIC HEART FAILURE WITH PRESERVED EJECTION FRACTION (HCC): ICD-10-CM

## 2025-04-20 DIAGNOSIS — K21.9 GASTROESOPHAGEAL REFLUX DISEASE WITHOUT ESOPHAGITIS: ICD-10-CM

## 2025-04-20 DIAGNOSIS — I48.0 PAROXYSMAL ATRIAL FIBRILLATION (HCC): Chronic | ICD-10-CM

## 2025-04-20 DIAGNOSIS — E11.8 CONTROLLED TYPE 2 DIABETES MELLITUS WITH COMPLICATION, WITHOUT LONG-TERM CURRENT USE OF INSULIN (HCC): ICD-10-CM

## 2025-04-20 DIAGNOSIS — I50.21 ACUTE SYSTOLIC HEART FAILURE (HCC): ICD-10-CM

## 2025-04-20 RX ORDER — SIMVASTATIN 10 MG
10 TABLET ORAL
Qty: 90 TABLET | Refills: 1 | Status: SHIPPED | OUTPATIENT
Start: 2025-04-20 | End: 2025-10-17

## 2025-04-20 RX ORDER — LISINOPRIL 40 MG/1
40 TABLET ORAL DAILY
Qty: 90 TABLET | Refills: 1 | Status: SHIPPED | OUTPATIENT
Start: 2025-04-20

## 2025-04-20 RX ORDER — METFORMIN HYDROCHLORIDE 500 MG/1
500 TABLET, EXTENDED RELEASE ORAL
Qty: 90 TABLET | Refills: 1 | Status: SHIPPED | OUTPATIENT
Start: 2025-04-20

## 2025-04-20 RX ORDER — METOPROLOL TARTRATE 50 MG
50 TABLET ORAL 2 TIMES DAILY
Qty: 180 TABLET | Refills: 1 | Status: SHIPPED | OUTPATIENT
Start: 2025-04-20

## 2025-04-20 RX ORDER — LEVOTHYROXINE SODIUM 300 UG/1
300 TABLET ORAL DAILY
Qty: 90 TABLET | Refills: 1 | Status: SHIPPED | OUTPATIENT
Start: 2025-04-20

## 2025-04-20 RX ORDER — PANTOPRAZOLE SODIUM 40 MG/1
40 TABLET, DELAYED RELEASE ORAL DAILY
Qty: 90 TABLET | Refills: 1 | Status: SHIPPED | OUTPATIENT
Start: 2025-04-20

## 2025-04-20 RX ORDER — LEVOTHYROXINE SODIUM 25 UG/1
25 TABLET ORAL DAILY
Qty: 90 TABLET | Refills: 1 | Status: SHIPPED | OUTPATIENT
Start: 2025-04-20

## 2025-04-20 RX ORDER — GABAPENTIN 100 MG/1
100 CAPSULE ORAL 2 TIMES DAILY
Qty: 180 CAPSULE | Refills: 1 | Status: SHIPPED | OUTPATIENT
Start: 2025-04-20 | End: 2025-10-17

## 2025-04-21 RX ORDER — SPIRONOLACTONE 25 MG/1
25 TABLET ORAL DAILY
Qty: 90 TABLET | Refills: 1 | Status: SHIPPED | OUTPATIENT
Start: 2025-04-21

## 2025-04-21 RX ORDER — FUROSEMIDE 40 MG/1
40 TABLET ORAL 2 TIMES DAILY
Qty: 180 TABLET | Refills: 1 | Status: SHIPPED | OUTPATIENT
Start: 2025-04-21

## 2025-04-21 RX ORDER — DAPAGLIFLOZIN 5 MG/1
5 TABLET, FILM COATED ORAL DAILY
Qty: 180 TABLET | Refills: 1 | Status: SHIPPED | OUTPATIENT
Start: 2025-04-21

## 2025-05-10 ENCOUNTER — APPOINTMENT (INPATIENT)
Dept: NON INVASIVE DIAGNOSTICS | Facility: HOSPITAL | Age: 70
End: 2025-05-10
Payer: MEDICARE

## 2025-05-10 ENCOUNTER — APPOINTMENT (INPATIENT)
Dept: RADIOLOGY | Facility: HOSPITAL | Age: 70
End: 2025-05-10
Payer: MEDICARE

## 2025-05-10 ENCOUNTER — HOSPITAL ENCOUNTER (INPATIENT)
Facility: HOSPITAL | Age: 70
LOS: 4 days | Discharge: NURSING, REHAB OR LONG-TERM CARE | End: 2025-05-14
Attending: EMERGENCY MEDICINE
Payer: MEDICARE

## 2025-05-10 DIAGNOSIS — I50.32 CHRONIC HEART FAILURE WITH PRESERVED EJECTION FRACTION (HCC): ICD-10-CM

## 2025-05-10 DIAGNOSIS — M79.605 LEFT LEG PAIN: Primary | ICD-10-CM

## 2025-05-10 DIAGNOSIS — I89.0 LYMPHEDEMA: ICD-10-CM

## 2025-05-10 DIAGNOSIS — R26.2 AMBULATORY DYSFUNCTION: ICD-10-CM

## 2025-05-10 DIAGNOSIS — S81.802A WOUND OF LEFT LOWER EXTREMITY, INITIAL ENCOUNTER: ICD-10-CM

## 2025-05-10 DIAGNOSIS — N17.9 AKI (ACUTE KIDNEY INJURY) (HCC): ICD-10-CM

## 2025-05-10 DIAGNOSIS — L03.116 CELLULITIS OF LEFT LOWER EXTREMITY: ICD-10-CM

## 2025-05-10 DIAGNOSIS — M25.562 ACUTE PAIN OF LEFT KNEE: ICD-10-CM

## 2025-05-10 LAB
ANION GAP SERPL CALCULATED.3IONS-SCNC: 6 MMOL/L (ref 4–13)
BASOPHILS # BLD AUTO: 0.02 THOUSANDS/ÂΜL (ref 0–0.1)
BASOPHILS NFR BLD AUTO: 0 % (ref 0–1)
BNP SERPL-MCNC: 79 PG/ML (ref 0–100)
BUN SERPL-MCNC: 37 MG/DL (ref 5–25)
CALCIUM SERPL-MCNC: 9.4 MG/DL (ref 8.4–10.2)
CHLORIDE SERPL-SCNC: 99 MMOL/L (ref 96–108)
CO2 SERPL-SCNC: 29 MMOL/L (ref 21–32)
CREAT SERPL-MCNC: 1.84 MG/DL (ref 0.6–1.3)
EOSINOPHIL # BLD AUTO: 0.09 THOUSAND/ÂΜL (ref 0–0.61)
EOSINOPHIL NFR BLD AUTO: 1 % (ref 0–6)
ERYTHROCYTE [DISTWIDTH] IN BLOOD BY AUTOMATED COUNT: 16.7 % (ref 11.6–15.1)
GFR SERPL CREATININE-BSD FRML MDRD: 35 ML/MIN/1.73SQ M
GLUCOSE SERPL-MCNC: 101 MG/DL (ref 65–140)
GLUCOSE SERPL-MCNC: 92 MG/DL (ref 65–140)
HCT VFR BLD AUTO: 38.4 % (ref 36.5–49.3)
HGB BLD-MCNC: 11.6 G/DL (ref 12–17)
IMM GRANULOCYTES # BLD AUTO: 0.01 THOUSAND/UL (ref 0–0.2)
IMM GRANULOCYTES NFR BLD AUTO: 0 % (ref 0–2)
LYMPHOCYTES # BLD AUTO: 0.67 THOUSANDS/ÂΜL (ref 0.6–4.47)
LYMPHOCYTES NFR BLD AUTO: 9 % (ref 14–44)
MCH RBC QN AUTO: 26.1 PG (ref 26.8–34.3)
MCHC RBC AUTO-ENTMCNC: 30.2 G/DL (ref 31.4–37.4)
MCV RBC AUTO: 86 FL (ref 82–98)
MONOCYTES # BLD AUTO: 0.59 THOUSAND/ÂΜL (ref 0.17–1.22)
MONOCYTES NFR BLD AUTO: 8 % (ref 4–12)
NEUTROPHILS # BLD AUTO: 6.06 THOUSANDS/ÂΜL (ref 1.85–7.62)
NEUTS SEG NFR BLD AUTO: 82 % (ref 43–75)
NRBC BLD AUTO-RTO: 0 /100 WBCS
PLATELET # BLD AUTO: 239 THOUSANDS/UL (ref 149–390)
PMV BLD AUTO: 11.4 FL (ref 8.9–12.7)
POTASSIUM SERPL-SCNC: 5.3 MMOL/L (ref 3.5–5.3)
RBC # BLD AUTO: 4.45 MILLION/UL (ref 3.88–5.62)
SODIUM SERPL-SCNC: 134 MMOL/L (ref 135–147)
WBC # BLD AUTO: 7.44 THOUSAND/UL (ref 4.31–10.16)

## 2025-05-10 PROCEDURE — 93926 LOWER EXTREMITY STUDY: CPT | Performed by: SURGERY

## 2025-05-10 PROCEDURE — 93926 LOWER EXTREMITY STUDY: CPT

## 2025-05-10 PROCEDURE — NC001 PR NO CHARGE: Performed by: PODIATRIST

## 2025-05-10 PROCEDURE — 80048 BASIC METABOLIC PNL TOTAL CA: CPT

## 2025-05-10 PROCEDURE — 73630 X-RAY EXAM OF FOOT: CPT

## 2025-05-10 PROCEDURE — 0JBR0ZZ EXCISION OF LEFT FOOT SUBCUTANEOUS TISSUE AND FASCIA, OPEN APPROACH: ICD-10-PCS | Performed by: PODIATRIST

## 2025-05-10 PROCEDURE — 87070 CULTURE OTHR SPECIMN AEROBIC: CPT

## 2025-05-10 PROCEDURE — 87077 CULTURE AEROBIC IDENTIFY: CPT

## 2025-05-10 PROCEDURE — 87205 SMEAR GRAM STAIN: CPT

## 2025-05-10 PROCEDURE — 99284 EMERGENCY DEPT VISIT MOD MDM: CPT

## 2025-05-10 PROCEDURE — 99285 EMERGENCY DEPT VISIT HI MDM: CPT | Performed by: EMERGENCY MEDICINE

## 2025-05-10 PROCEDURE — 82948 REAGENT STRIP/BLOOD GLUCOSE: CPT

## 2025-05-10 PROCEDURE — 96374 THER/PROPH/DIAG INJ IV PUSH: CPT

## 2025-05-10 PROCEDURE — 85025 COMPLETE CBC W/AUTO DIFF WBC: CPT

## 2025-05-10 PROCEDURE — 99223 1ST HOSP IP/OBS HIGH 75: CPT

## 2025-05-10 PROCEDURE — 83880 ASSAY OF NATRIURETIC PEPTIDE: CPT

## 2025-05-10 PROCEDURE — 87186 SC STD MICRODIL/AGAR DIL: CPT

## 2025-05-10 PROCEDURE — 93922 UPR/L XTREMITY ART 2 LEVELS: CPT | Performed by: SURGERY

## 2025-05-10 PROCEDURE — 93970 EXTREMITY STUDY: CPT

## 2025-05-10 PROCEDURE — 73610 X-RAY EXAM OF ANKLE: CPT

## 2025-05-10 PROCEDURE — 36415 COLL VENOUS BLD VENIPUNCTURE: CPT

## 2025-05-10 PROCEDURE — 87147 CULTURE TYPE IMMUNOLOGIC: CPT

## 2025-05-10 PROCEDURE — 93970 EXTREMITY STUDY: CPT | Performed by: SURGERY

## 2025-05-10 RX ORDER — SODIUM CHLORIDE, SODIUM GLUCONATE, SODIUM ACETATE, POTASSIUM CHLORIDE, MAGNESIUM CHLORIDE, SODIUM PHOSPHATE, DIBASIC, AND POTASSIUM PHOSPHATE .53; .5; .37; .037; .03; .012; .00082 G/100ML; G/100ML; G/100ML; G/100ML; G/100ML; G/100ML; G/100ML
1000 INJECTION, SOLUTION INTRAVENOUS ONCE
Status: COMPLETED | OUTPATIENT
Start: 2025-05-10 | End: 2025-05-10

## 2025-05-10 RX ORDER — SPIRONOLACTONE 25 MG/1
25 TABLET ORAL DAILY
Status: DISCONTINUED | OUTPATIENT
Start: 2025-05-10 | End: 2025-05-14 | Stop reason: HOSPADM

## 2025-05-10 RX ORDER — LEVOTHYROXINE SODIUM 25 UG/1
25 TABLET ORAL
Status: DISCONTINUED | OUTPATIENT
Start: 2025-05-11 | End: 2025-05-14 | Stop reason: HOSPADM

## 2025-05-10 RX ORDER — CEFAZOLIN SODIUM 2 G/50ML
2000 SOLUTION INTRAVENOUS EVERY 8 HOURS
Status: DISCONTINUED | OUTPATIENT
Start: 2025-05-10 | End: 2025-05-14 | Stop reason: HOSPADM

## 2025-05-10 RX ORDER — FUROSEMIDE 40 MG/1
40 TABLET ORAL 2 TIMES DAILY
Status: DISCONTINUED | OUTPATIENT
Start: 2025-05-10 | End: 2025-05-14 | Stop reason: HOSPADM

## 2025-05-10 RX ORDER — ASPIRIN 81 MG/1
81 TABLET ORAL DAILY
Status: DISCONTINUED | OUTPATIENT
Start: 2025-05-10 | End: 2025-05-14 | Stop reason: HOSPADM

## 2025-05-10 RX ORDER — LEVOTHYROXINE SODIUM 100 UG/1
300 TABLET ORAL
Status: DISCONTINUED | OUTPATIENT
Start: 2025-05-11 | End: 2025-05-14 | Stop reason: HOSPADM

## 2025-05-10 RX ORDER — METOPROLOL TARTRATE 50 MG
50 TABLET ORAL 2 TIMES DAILY
Status: DISCONTINUED | OUTPATIENT
Start: 2025-05-10 | End: 2025-05-14 | Stop reason: HOSPADM

## 2025-05-10 RX ORDER — SODIUM CHLORIDE 9 MG/ML
75 INJECTION, SOLUTION INTRAVENOUS CONTINUOUS
Status: DISPENSED | OUTPATIENT
Start: 2025-05-10 | End: 2025-05-11

## 2025-05-10 RX ORDER — INSULIN LISPRO 100 [IU]/ML
1-6 INJECTION, SOLUTION INTRAVENOUS; SUBCUTANEOUS
Status: DISCONTINUED | OUTPATIENT
Start: 2025-05-10 | End: 2025-05-11

## 2025-05-10 RX ORDER — PANTOPRAZOLE SODIUM 40 MG/1
40 TABLET, DELAYED RELEASE ORAL DAILY
Status: DISCONTINUED | OUTPATIENT
Start: 2025-05-11 | End: 2025-05-14 | Stop reason: HOSPADM

## 2025-05-10 RX ORDER — ONDANSETRON 2 MG/ML
4 INJECTION INTRAMUSCULAR; INTRAVENOUS EVERY 6 HOURS PRN
Status: DISCONTINUED | OUTPATIENT
Start: 2025-05-10 | End: 2025-05-14 | Stop reason: HOSPADM

## 2025-05-10 RX ORDER — LISINOPRIL 20 MG/1
40 TABLET ORAL DAILY
Status: DISCONTINUED | OUTPATIENT
Start: 2025-05-10 | End: 2025-05-14 | Stop reason: HOSPADM

## 2025-05-10 RX ADMIN — CEFAZOLIN SODIUM 2000 MG: 2 SOLUTION INTRAVENOUS at 23:45

## 2025-05-10 RX ADMIN — APIXABAN 5 MG: 5 TABLET, FILM COATED ORAL at 17:56

## 2025-05-10 RX ADMIN — SODIUM CHLORIDE, SODIUM GLUCONATE, SODIUM ACETATE, POTASSIUM CHLORIDE, MAGNESIUM CHLORIDE, SODIUM PHOSPHATE, DIBASIC, AND POTASSIUM PHOSPHATE 1000 ML: .53; .5; .37; .037; .03; .012; .00082 INJECTION, SOLUTION INTRAVENOUS at 12:34

## 2025-05-10 RX ADMIN — METOPROLOL TARTRATE 50 MG: 50 TABLET, FILM COATED ORAL at 17:56

## 2025-05-10 RX ADMIN — CEFAZOLIN SODIUM 2000 MG: 2 SOLUTION INTRAVENOUS at 15:46

## 2025-05-10 RX ADMIN — SODIUM CHLORIDE 75 ML/HR: 0.9 INJECTION, SOLUTION INTRAVENOUS at 16:48

## 2025-05-10 NOTE — ASSESSMENT & PLAN NOTE
Patient complains of worsening left ankle wound to left lower extremity with chronic swelling  Patient is 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  Plan:  Will get lower leg Doppler of left leg  Will get lower leg arterial Doppler of left leg  Does have some yellowish pus on it and erythematous  Will start cefazolin  Podiatry consult

## 2025-05-10 NOTE — ASSESSMENT & PLAN NOTE
"Lab Results   Component Value Date    HGBA1C 5.6 12/24/2024       No results for input(s): \"POCGLU\" in the last 72 hours.    Blood Sugar Average: Last 72 hrs:    Sliding scale  Diabetic diet    "

## 2025-05-10 NOTE — ED PROVIDER NOTES
Time reflects when diagnosis was documented in both MDM as applicable and the Disposition within this note       Time User Action Codes Description Comment    5/10/2025 12:30 PM Ely Gupta Add [M79.605] Left leg pain     5/10/2025 12:30 PM Ely Gupta Add [M25.562] Acute pain of left knee     5/10/2025 12:30 PM Ely Gupta Add [R26.2] Ambulatory dysfunction     5/10/2025 12:30 PM Ely Gupta Add [S81.802A] Wound of left lower extremity, initial encounter     5/10/2025 12:30 PM Ely Gupta Add [I89.0] Lymphedema     5/10/2025 12:31 PM Ely Gupta Add [N17.9] TAMIR (acute kidney injury) (HCC)           ED Disposition       ED Disposition   Admit    Condition   Stable    Date/Time   Sat May 10, 2025 12:30 PM    Comment   --             Assessment & Plan       Medical Decision Making  Amount and/or Complexity of Data Reviewed  Labs: ordered. Decision-making details documented in ED Course.    Risk  Prescription drug management.  Decision regarding hospitalization.      Patient is a 70 y.o. male with PMH of right-sided BKA, obesity, HTN, HLD, A-fib who presents to the ED with leg pain, difficulty walking.    Vital signs stable. On exam pain with range of motion of left knee but no evidence of effusion, left lower extremity swelling and drainage.    History and physical exam most consistent with venous stasis, arthritis. However, differential diagnosis included but not limited to electrolyte abnormality.doubt infection, cellulitis.     Plan: CBC, BMP, will admit to the hospital for ambulatory dysfunction    View ED course for further discussion on patient workup.    All labs reviewed and utilized in the medical decision making process  All radiology studies independently viewed by me and interpreted by the radiologist.  I reviewed all testing with the patient.     Upon re-evaluation patient resting comfortably no acute distress.    Disposition: I discussed the case with hospitalist.  We  "reviewed the HPI, pertinent PMH, ED course and workup. Agreed with plan and will admit the patient to the hospital for further evaluation and management of leg pain, amatory dysfunction, chronic wounds, TAMIR. Patient in stable condition at this time.       Portions of the record may have been created with voice recognition software. Occasional wrong word or \"sound a like\" substitutions may have occurred due to the inherent limitations of voice recognition software. Read the chart carefully and recognize, using context, where substitutions have occurred.    ED Course as of 05/14/25 0437   Sat May 10, 2025   1153 Hemoglobin(!): 11.6  Improved from prior, no acute anemia   1227 Creatinine(!): 1.84  TAMIR   1231 Reached out to [facility] for admission        Medications   multi-electrolyte (Plasmalyte-A/Isolyte-S PH 7.4/Normosol-R) IV bolus 1,000 mL (1,000 mL Intravenous New Bag 5/10/25 1234)       ED Risk Strat Scores                    No data recorded        SBIRT 22yo+      Flowsheet Row Most Recent Value   Initial Alcohol Screen: US AUDIT-C     1. How often do you have a drink containing alcohol? 0 Filed at: 05/10/2025 1058   2. How many drinks containing alcohol do you have on a typical day you are drinking?  0 Filed at: 05/10/2025 1058   3b. FEMALE Any Age, or MALE 65+: How often do you have 4 or more drinks on one occassion? 0 Filed at: 05/10/2025 1058   Audit-C Score 0 Filed at: 05/10/2025 1058   KARRIE: How many times in the past year have you...    Used an illegal drug or used a prescription medication for non-medical reasons? Never Filed at: 05/10/2025 1058                            History of Present Illness       Chief Complaint   Patient presents with    Leg Pain     When trying to transfer into recliner patient states left knee gave out, denies fall states he was able to land into his chair but reports ongoing decline with ambulation.       Past Medical History:   Diagnosis Date    Abnormal urinalysis 06/10/2021 "    Atrial fibrillation (HCC)     Cellulitis     Cellulitis of left lower extremity 2021    Diabetes mellitus (HCC)     Disease of thyroid gland     Duodenal ulcer     perforated- ICU stay    High blood pressure     High cholesterol     History of duodenal ulcer 2014    Hyperlipidemia     Hypertension     Hypothyroidism     Lymphedema      b/l LE- was followed at wound center    Morbid obesity with BMI of 40.0-44.9, adult (HCC)     Peritonitis (HCC)       Past Surgical History:   Procedure Laterality Date    BELOW KNEE LEG AMPUTATION Right     DIAGNOSTIC LAPAROSCOPY      KNEE ARTHROSCOPY Bilateral     OTHER SURGICAL HISTORY  2014     lap repair    OTHER SURGICAL HISTORY      Laparoscopic repair of a perforated duodenal ulcer with Javed omental    OTHER SURGICAL HISTORY      Placement of drains      Family History   Problem Relation Age of Onset    Heart disease Mother     Hypertension Mother     Breast cancer Mother     Leukemia Brother     Migraines Daughter     Migraines Daughter     Heart disease Family     Alcohol abuse Family     Substance Abuse Neg Hx     Mental illness Neg Hx     Depression Neg Hx       Social History     Tobacco Use    Smoking status: Former     Current packs/day: 0.00     Average packs/day: 1.5 packs/day for 25.0 years (37.5 ttl pk-yrs)     Types: Cigarettes     Start date: 1979     Quit date: 2004     Years since quittin.5    Smokeless tobacco: Never   Vaping Use    Vaping status: Never Used   Substance Use Topics    Alcohol use: Not Currently     Alcohol/week: 1.0 standard drink of alcohol     Types: 1 Standard drinks or equivalent per week    Drug use: Never      E-Cigarette/Vaping    E-Cigarette Use Never User       E-Cigarette/Vaping Substances      I have reviewed and agree with the history as documented.     HPI  Patient is a 70 y.o. male with history of right-sided BKA, obesity, diabetes, HTN, HLD, A-fib, presenting to the emergency department for leg  pain, difficulty walking. Patient states that he has been having issues with his left knee for a long time.  States that tonight he was getting out of his recliner going into a chair when his left knee gave out.  He states that he fell into the chair, did not hit his head or anything else.  Patient states that he lives in an apartment alone and has been unable to care for the past few days due to his pain and difficulty walking.  He usually walks with a walker or cane, or uses a wheelchair.  He also complains of having chronic wounds to his left leg which have been getting worse, he has been doing wound care at home and has visiting nurses, but states that his legs are worse than they typically are.  Denies any fevers, chills, difficulty breathing, chest pain.       Review of Systems   Constitutional:  Negative for fever.   HENT:  Negative for congestion.    Eyes:  Negative for pain.   Respiratory:  Negative for cough.    Cardiovascular:  Negative for chest pain.   Gastrointestinal:  Negative for diarrhea and vomiting.   Genitourinary:  Negative for dysuria.   Musculoskeletal:  Positive for arthralgias. Negative for back pain.   Skin:  Negative for rash.   Neurological:  Positive for weakness. Negative for dizziness.   All other systems reviewed and are negative.          Objective       ED Triage Vitals [05/10/25 1053]   Temperature Pulse Blood Pressure Respirations SpO2 Patient Position - Orthostatic VS   97.7 °F (36.5 °C) 69 104/58 16 98 % Sitting      Temp Source Heart Rate Source BP Location FiO2 (%) Pain Score    Oral Monitor Right arm -- No Pain      Vitals      Date and Time Temp Pulse SpO2 Resp BP Pain Score FACES Pain Rating User   05/13/25 2246 97.8 °F (36.6 °C) 62 96 % -- 115/47 -- -- DII   05/13/25 2035 -- -- 96 % -- -- No Pain -- IZV   05/13/25 1705 -- 65 96 % -- 126/77 -- -- DII   05/13/25 1513 97.1 °F (36.2 °C) 69 96 % 18 121/60 -- -- DII   05/13/25 0907 -- 80 94 % -- 126/71 -- -- DII   05/13/25 0905  -- -- -- -- -- No Pain -- BF   05/13/25 0721 97.8 °F (36.6 °C) -- -- 18 150/61 -- -- DII   05/13/25 0720 97.8 °F (36.6 °C) 63 97 % 18 150/61 -- -- DII   05/12/25 2356 97.4 °F (36.3 °C) 93 98 % -- -- -- --    05/12/25 2356 -- -- -- 18 139/79 -- -- DII   05/12/25 2351 -- 77 98 % -- 139/79 -- -- TJ   05/12/25 2351 -- 84 98 % -- -- -- -- [facility]   05/12/25 2348 -- 89 99 % -- -- -- -- [facility]   05/12/25 2346 -- 88 97 % ra -- -- -- -- [facility]   05/12/25 2346 -- 92 100 % -- 178/140 -- -- DII   05/12/25 2346 -- -- -- -- 178/140 -- -- TJ   05/12/25 2345 -- 84 98 % -- -- -- -- [facility]   05/12/25 2342 -- 86 96 % -- -- -- -- [facility]   05/12/25 2339 -- 86 97 % -- -- -- -- [facility]   05/12/25 2234 98.2 °F (36.8 °C) 66 95 % -- 126/69 -- -- DII   05/12/25 2044 -- -- -- -- -- No Pain -- BW   05/12/25 1546 97.4 °F (36.3 °C) 77 96 % 16 130/69 -- -- DII   05/12/25 0900 -- -- -- -- -- No Pain -- PE   05/12/25 0727 97.8 °F (36.6 °C) 65 99 % 16 120/64 -- -- DII   05/11/25 2232 98.1 °F (36.7 °C) 77 95 % 16 120/58 -- -- DII   05/11/25 2000 -- -- -- -- -- No Pain -- LJ   05/11/25 1718 -- 88 99 % -- 129/65 -- -- DII   05/11/25 1429 97.4 °F (36.3 °C) 81 99 % -- 102/57 -- -- DII   05/11/25 1003 -- -- -- -- -- No Pain -- TD   05/11/25 1002 -- -- -- -- -- No Pain -- MV   05/11/25 0901 -- 70 99 % -- 105/57 -- -- DII   05/11/25 0737 97.6 °F (36.4 °C) 73 98 % 17 113/56 -- -- DII   05/10/25 2253 97.4 °F (36.3 °C) -- -- 16 108/53 -- -- DII   05/10/25 2001 -- -- -- -- -- No Pain -- LJ   05/10/25 1900 -- 75 95 % -- 90/49 -- -- LJ   05/10/25 1849 97.1 °F (36.2 °C) 78 98 % -- 90/49 -- -- DII   05/10/25 1845 97.1 °F (36.2 °C) 90 99 % -- 89/49 -- -- DII   05/10/25 1524 -- -- -- -- -- No Pain -- QL   05/10/25 1515 -- -- -- -- -- No Pain -- QL   05/10/25 1416 98.1 °F (36.7 °C) 73 99 % 16 132/71 -- -- DII   05/10/25 1100 -- 74 98 % 19 113/58 No Pain -- HB   05/10/25 1053 97.7 °F (36.5 °C) 69 98 % 16 104/58 No Pain -- HB            Physical Exam  Vitals and nursing note reviewed.    Constitutional:       Appearance: Normal appearance. He is obese.   HENT:      Head: Normocephalic and atraumatic.      Mouth/Throat:      Mouth: Mucous membranes are moist.     Eyes:      Conjunctiva/sclera: Conjunctivae normal.       Cardiovascular:      Rate and Rhythm: Normal rate and regular rhythm.      Pulses: Normal pulses.      Heart sounds: Normal heart sounds.   Pulmonary:      Effort: Pulmonary effort is normal.      Breath sounds: Normal breath sounds.   Abdominal:      Palpations: Abdomen is soft.      Tenderness: There is no abdominal tenderness.     Musculoskeletal:      Cervical back: Neck supple.      Comments: Pain with range of motion of left knee however range of motion is intact.  No increased warmth or effusion to the left knee.       Skin:     General: Skin is warm and dry.      Capillary Refill: Capillary refill takes less than 2 seconds.      Comments: Left lower extremity swelling, erythema, drainage, no significant increased warmth     Neurological:      General: No focal deficit present.      Mental Status: He is alert. Mental status is at baseline.     Psychiatric:         Mood and Affect: Mood normal.         Results Reviewed       Procedure Component Value Units Date/Time    Comprehensive metabolic panel [[id number]]  (Abnormal) Collected: 05/11/25 0455    Lab Status: Final result Specimen: Blood from Arm, Left Updated: 05/11/25 0605     Sodium 135 mmol/L      Potassium 5.0 mmol/L      Chloride 99 mmol/L      CO2 29 mmol/L      ANION GAP 7 mmol/L      BUN 34 mg/dL      Creatinine 1.81 mg/dL      Glucose 102 mg/dL      Calcium 9.2 mg/dL      AST 10 U/L      ALT 6 U/L      Alkaline Phosphatase 93 U/L      Total Protein 7.9 g/dL      Albumin 3.9 g/dL      Total Bilirubin 0.56 mg/dL      eGFR 36 ml/min/1.73sq m     Narrative:      National Kidney Disease Foundation guidelines for Chronic Kidney Disease (CKD):     Stage 1 with normal or high GFR (GFR > 90 mL/min/1.73 square meters)     Stage 2 Mild CKD (GFR = 60-89 mL/min/1.73 square meters)    Stage 3A Moderate CKD (GFR = 45-59 mL/min/1.73 square meters)    Stage 3B Moderate CKD (GFR = 30-44 mL/min/1.73 square meters)    Stage 4 Severe CKD (GFR = 15-29 mL/min/1.73 square meters)    Stage 5 End Stage CKD (GFR <15 mL/min/1.73 square meters)  Note: GFR calculation is accurate only with a steady state creatinine    CBC (With Platelets) [[id number]]  (Abnormal) Collected: 05/11/25 0455    Lab Status: Final result Specimen: Blood from Arm, Left Updated: 05/11/25 0544     WBC 8.31 Thousand/uL      RBC 4.29 Million/uL      Hemoglobin 11.4 g/dL      Hematocrit 37.0 %      MCV 86 fL      MCH 26.6 pg      MCHC 30.8 g/dL      RDW 16.8 %      Platelets 241 Thousands/uL      MPV 11.8 fL     B-Type Natriuretic Peptide(BNP) [[id number]]  (Normal) Collected: 05/10/25 1136    Lab Status: Final result Specimen: Blood from Arm, Left Updated: 05/10/25 1705     BNP 79 pg/mL     Basic metabolic panel [[id number]]  (Abnormal) Collected: 05/10/25 1136    Lab Status: Final result Specimen: Blood from Arm, Left Updated: 05/10/25 1204     Sodium 134 mmol/L      Potassium 5.3 mmol/L      Chloride 99 mmol/L      CO2 29 mmol/L      ANION GAP 6 mmol/L      BUN 37 mg/dL      Creatinine 1.84 mg/dL      Glucose 101 mg/dL      Calcium 9.4 mg/dL      eGFR 35 ml/min/1.73sq m     Narrative:      National Kidney Disease Foundation guidelines for Chronic Kidney Disease (CKD):     Stage 1 with normal or high GFR (GFR > 90 mL/min/1.73 square meters)    Stage 2 Mild CKD (GFR = 60-89 mL/min/1.73 square meters)    Stage 3A Moderate CKD (GFR = 45-59 mL/min/1.73 square meters)    Stage 3B Moderate CKD (GFR = 30-44 mL/min/1.73 square meters)    Stage 4 Severe CKD (GFR = 15-29 mL/min/1.73 square meters)    Stage 5 End Stage CKD (GFR <15 mL/min/1.73 square meters)  Note: GFR calculation is accurate only with a steady state creatinine    CBC and differential [[id number]]  (Abnormal) Collected:  05/10/25 1136    Lab Status: Final result Specimen: Blood from Arm, Left Updated: 05/10/25 1142     WBC 7.44 Thousand/uL      RBC 4.45 Million/uL      Hemoglobin 11.6 g/dL      Hematocrit 38.4 %      MCV 86 fL      MCH 26.1 pg      MCHC 30.2 g/dL      RDW 16.7 %      MPV 11.4 fL      Platelets 239 Thousands/uL      nRBC 0 /100 WBCs      Segmented % 82 %      Immature Grans % 0 %      Lymphocytes % 9 %      Monocytes % 8 %      Eosinophils Relative 1 %      Basophils Relative 0 %      Absolute Neutrophils 6.06 Thousands/µL      Absolute Immature Grans 0.01 Thousand/uL      Absolute Lymphocytes 0.67 Thousands/µL      Absolute Monocytes 0.59 Thousand/µL      Eosinophils Absolute 0.09 Thousand/µL      Basophils Absolute 0.02 Thousands/µL             XR knee 1 or 2 vw left   Final Interpretation by Casper Schultz DO (05/12 1549)      Tricompartmental osteoarthritis, most severely affecting the medial compartment.      No acute osseous abnormality.      Resident: ROSA M ROCHA I, the attending radiologist, have reviewed the images and agree with the final report above.      Workstation performed: [id number]         XR ankle 3+ vw left   Final Interpretation by Reyna Alejandro MD (05/12 1032)      No acute osseous abnormality.         Computerized Assisted Algorithm (CAA) may have been used to analyze all applicable images.               Workstation performed: [id number]         XR foot 3+ vw left   Final Interpretation by Reyna Alejandro MD (05/12 1030)      No radiographic findings to suggest osteomyelitis. If there is continued clinical concern for osteomyelitis, consider MRI.         Computerized Assisted Algorithm (CAA) may have been used to analyze all applicable images.         Workstation performed: [id number]         VAS ARTERIAL DUPLEX-LOWER LIMB UNILATERAL   Final Interpretation by Julián Huertas DO (05/10 1947)       VAS VENOUS DUPLEX - LOWER LIMB BILATERAL   Final Interpretation by  Julián Huertas DO (05/10 1947)          Procedures    ED Medication and Procedure Management   Prior to Admission Medications   Prescriptions Last Dose Informant Patient Reported? Taking?   Cholecalciferol (Vitamin D3) 50 MCG (2000 UT) TABS 5/10/2025 Self No Yes   Sig: Take 1 tablet (2,000 Units total) by mouth in the morning   Multiple Vitamins-Minerals (MULTIVITAMIN MEN 50+ PO) 5/10/2025 Self Yes Yes   Sig: Take 1 tablet by mouth in the morning   acetaminophen (TYLENOL) 325 mg tablet Not Taking Self Yes No   Sig: Take 975 mg by mouth every 8 (eight) hours as needed for mild pain   Patient not taking: Reported on 5/10/2025   ammonium lactate (LAC-HYDRIN) 12 % lotion Past Month Self No Yes   Sig: Apply 1 Application topically daily In evening   apixaban (ELIQUIS) 5 mg 5/10/2025  No Yes   Sig: Take 1 tablet (5 mg total) by mouth 2 (two) times a day   aspirin (ECOTRIN LOW STRENGTH) 81 mg EC tablet 5/10/2025 Self No Yes   Sig: Take 1 tablet (81 mg total) by mouth daily   clotrimazole-betamethasone (LOTRISONE) 1-0.05 % cream More than a month  No No   Sig: Apply topically 2 (two) times a day Per wound care   dapagliflozin 5 MG TABS 5/10/2025  No Yes   Sig: Take 1 tablet (5 mg total) by mouth daily   furosemide (LASIX) 40 mg tablet 5/10/2025  No Yes   Sig: Take 1 tablet (40 mg total) by mouth 2 (two) times a day   gabapentin (NEURONTIN) 100 mg capsule 5/10/2025  No Yes   Sig: Take 1 capsule (100 mg total) by mouth 2 (two) times a day   levothyroxine 25 mcg tablet 5/10/2025  No Yes   Sig: Take 1 tablet (25 mcg total) by mouth daily In addition to the 300 mcg dose   levothyroxine 300 MCG tablet 5/10/2025  No Yes   Sig: Take 1 tablet (300 mcg total) by mouth daily In addition to 25 mcg dose   lisinopril (ZESTRIL) 40 mg tablet 5/10/2025  No Yes   Sig: Take 1 tablet (40 mg total) by mouth daily   metFORMIN (GLUCOPHAGE-XR) 500 mg 24 hr tablet 5/10/2025  No Yes   Sig: Take 1 tablet (500 mg total) by mouth daily with  breakfast   metoprolol tartrate (LOPRESSOR) 50 mg tablet 5/10/2025  No Yes   Sig: Take 1 tablet (50 mg total) by mouth 2 (two) times a day   pantoprazole (PROTONIX) 40 mg tablet 5/10/2025  No Yes   Sig: Take 1 tablet (40 mg total) by mouth daily   polyethylene glycol (MIRALAX) 17 g packet Not Taking Self No No   Sig: Take 17 g by mouth daily as needed (constipation)   Patient not taking: Reported on 5/10/2025   simvastatin (ZOCOR) 10 mg tablet 5/10/2025  No Yes   Sig: Take 1 tablet (10 mg total) by mouth daily at bedtime   spironolactone (ALDACTONE) 25 mg tablet 5/10/2025  No Yes   Sig: Take 1 tablet (25 mg total) by mouth daily      Facility-Administered Medications: None     Current Discharge Medication List        CONTINUE these medications which have NOT CHANGED    Details   ammonium lactate (LAC-HYDRIN) 12 % lotion Apply 1 Application topically daily In evening  Qty: 222 mL, Refills: 0    Associated Diagnoses: Chronic venous stasis dermatitis of left lower extremity      apixaban (ELIQUIS) 5 mg Take 1 tablet (5 mg total) by mouth 2 (two) times a day  Qty: 180 tablet, Refills: 1    Associated Diagnoses: Paroxysmal atrial fibrillation (HCC)      aspirin (ECOTRIN LOW STRENGTH) 81 mg EC tablet Take 1 tablet (81 mg total) by mouth daily    Associated Diagnoses: Coronary artery disease      Cholecalciferol (Vitamin D3) 50 MCG (2000 UT) TABS Take 1 tablet (2,000 Units total) by mouth in the morning    Associated Diagnoses: Vitamin D deficiency      dapagliflozin 5 MG TABS Take 1 tablet (5 mg total) by mouth daily  Qty: 180 tablet, Refills: 1    Associated Diagnoses: Chronic heart failure with preserved ejection fraction (HCC)      furosemide (LASIX) 40 mg tablet Take 1 tablet (40 mg total) by mouth 2 (two) times a day  Qty: 180 tablet, Refills: 1    Associated Diagnoses: Chronic heart failure with preserved ejection fraction (HCC)      gabapentin (NEURONTIN) 100 mg capsule Take 1 capsule (100 mg total) by mouth 2  (two) times a day  Qty: 180 capsule, Refills: 1    Associated Diagnoses: Diabetic ulcer of left ankle associated with type 2 diabetes mellitus, limited to breakdown of skin (HCC)      !! levothyroxine 25 mcg tablet Take 1 tablet (25 mcg total) by mouth daily In addition to the 300 mcg dose  Qty: 90 tablet, Refills: 1    Associated Diagnoses: Hypothyroidism      !! levothyroxine 300 MCG tablet Take 1 tablet (300 mcg total) by mouth daily In addition to 25 mcg dose  Qty: 90 tablet, Refills: 1    Associated Diagnoses: Hypothyroidism      lisinopril (ZESTRIL) 40 mg tablet Take 1 tablet (40 mg total) by mouth daily  Qty: 90 tablet, Refills: 1    Associated Diagnoses: Essential hypertension      metFORMIN (GLUCOPHAGE-XR) 500 mg 24 hr tablet Take 1 tablet (500 mg total) by mouth daily with breakfast  Qty: 90 tablet, Refills: 1    Associated Diagnoses: Controlled type 2 diabetes mellitus with complication, without long-term current use of insulin (HCC)      metoprolol tartrate (LOPRESSOR) 50 mg tablet Take 1 tablet (50 mg total) by mouth 2 (two) times a day  Qty: 180 tablet, Refills: 1    Associated Diagnoses: Acute systolic heart failure (HCC)      Multiple Vitamins-Minerals (MULTIVITAMIN MEN 50+ PO) Take 1 tablet by mouth in the morning      pantoprazole (PROTONIX) 40 mg tablet Take 1 tablet (40 mg total) by mouth daily  Qty: 90 tablet, Refills: 1    Associated Diagnoses: Essential hypertension; Gastroesophageal reflux disease without esophagitis      simvastatin (ZOCOR) 10 mg tablet Take 1 tablet (10 mg total) by mouth daily at bedtime  Qty: 90 tablet, Refills: 1    Associated Diagnoses: HLD (hyperlipidemia)      spironolactone (ALDACTONE) 25 mg tablet Take 1 tablet (25 mg total) by mouth daily  Qty: 90 tablet, Refills: 1    Associated Diagnoses: Chronic heart failure with preserved ejection fraction (HCC)      acetaminophen (TYLENOL) 325 mg tablet Take 975 mg by mouth every 8 (eight) hours as needed for mild pain       clotrimazole-betamethasone (LOTRISONE) 1-0.05 % cream Apply topically 2 (two) times a day Per wound care  Qty: 45 g, Refills: 0    Associated Diagnoses: Rash      polyethylene glycol (MIRALAX) 17 g packet Take 17 g by mouth daily as needed (constipation)    Associated Diagnoses: Constipation       !! - Potential duplicate medications found. Please discuss with provider.        No discharge procedures on file.  ED SEPSIS DOCUMENTATION   Time reflects when diagnosis was documented in both MDM as applicable and the Disposition within this note       Time User Action Codes Description Comment    5/10/2025 12:30 PM Ely Gupta [M79.605] Left leg pain     5/10/2025 12:30 PM Ely Gupta [M25.562] Acute pain of left knee     5/10/2025 12:30 PM Ely Gupta [R26.2] Ambulatory dysfunction     5/10/2025 12:30 PM Ely Gupta [S81.802A] Wound of left lower extremity, initial encounter     5/10/2025 12:30 PM Ely Gupta [I89.0] Lymphedema     5/10/2025 12:31 PM Ely Gupta [N17.9] TAMIR (acute kidney injury) ([facility])                  Ely Gupta DO  05/14/25 0437

## 2025-05-10 NOTE — ED ATTENDING ATTESTATION
Final Diagnoses:     1. Left leg pain    2. Acute pain of left knee    3. Ambulatory dysfunction    4. Wound of left lower extremity, initial encounter    5. Lymphedema    6. TAMIR (acute kidney injury) (formerly [facility])      ED Course as of 05/11/25 0936   Sat May 10, 2025   1126 This is a 69 y/o M LEFT knee pain x1 month. LEFT leg gave out and he had t osit out. Can't get around his house or do baseline activity. Chronic wound of the leg. Nothing systemically ill. No f/ch/n/v/cp/sob.     AFib on Eliquis  DM  R BKA  L Lymphedema    General: VSS, NAD, awake, alert.   Well-nourished, well-developed.   Appears stated age.   Head: Normocephalic, atraumatic, nontender.  Eyes: PERRL, EOM-I. No diplopia.   No hyphema.   No subconjunctival hemorrhages.  Symmetrical lids.   ENTAtraumatic external nose and ears.    MMM  No stridor.   Normal phonation.   No drooling.   Base of mouth is soft. No mastoid tenderness.   Neck: Symmetric, trachea midline. No JVD.  CV: Peripheral pulses +2 throughout.   No chest wall tenderness.   Lungs:   Unlabored   No retractions  No crepitus.   No tachypnea.   No paradoxical motion.  Abd: +BS, soft, NT/ND.   MSK:   R BKA with prosthetic attached  L leg has significant venous stasis. No sensation (baseline) significant dry skin / moist distally with drainage.   No foul smell.  No sensation.  Back:   No CVAT  Skin: Dry, intact.   Neuro: AAOx3, GCS 15, CN II-XII grossly intact.   Motor grossly intact.  Psychiatric/Behavioral: Appropriate mood and affect   Exam: deferred       1144 A/P:   - Basic labs  - Admit for ambulatory dysfunction  - supportive measure.s    1216 WBC: 7.44   1216 Hemoglobin(!): 11.6   1216 Platelet Count: 239   1216 Creatinine(!): 1.84  TAMIR.   Could be dehydration from not gettign around.       IJacob MD, saw and evaluated the patient. All available labs and X-rays were ordered by me or the resident / non-physician and have been reviewed by myself. I discussed the patient  with the resident / non-physician and agree with the resident's / non-physician practitioner's findings and plan as documented in the resident's / non-physician practicitioner's note, except where noted.   At this point, I agree with the current assessment done in the ED.   I was present during key portions of all procedures performed unless otherwise stated.     Nursing Triage:     Chief Complaint   Patient presents with    Leg Pain     When trying to transfer into recliner patient states left knee gave out, denies fall states he was able to land into his chair but reports ongoing decline with ambulation.       HPI:   As above     ASSESSMENT + PLAN:   As per ED course     Physical:     Vitals:    05/10/25 1900 05/10/25 2253 05/11/25 0737 05/11/25 0901   BP: (!) 90/49 108/53 113/56 105/57   BP Location:       Pulse: 75  73 70   Resp:  16 17    Temp:  (!) 97.4 °F (36.3 °C) 97.6 °F (36.4 °C)    TempSrc:       SpO2: 95%  98% 99%       - There are no obvious limitations to social determinants of care.   - Nursing note reviewed.   - Vitals reviewed.   - Orders placed by myself and/or advanced practitioner / resident.    - Previous chart was reviewed  - No language barrier.   - History obtained from patient.    - There are no limitations to the history obtained:     Past Medical:    has a past medical history of Abnormal urinalysis (06/10/2021), Atrial fibrillation (HCC), Cellulitis, Cellulitis of left lower extremity (04/09/2021), Diabetes mellitus (HCC), Disease of thyroid gland, Duodenal ulcer, High blood pressure, High cholesterol, History of duodenal ulcer (04/04/2014), Hyperlipidemia, Hypertension, Hypothyroidism, Lymphedema, Morbid obesity with BMI of 40.0-44.9, adult (HCC), and Peritonitis (HCC).    Past Surgical:    has a past surgical history that includes Below knee leg amputation (Right); Knee arthroscopy (Bilateral); Other surgical history (03/2014); Diagnostic laparoscopy; Other surgical history; and Other  surgical history.    Social:     Social History     Substance and Sexual Activity   Alcohol Use Not Currently    Alcohol/week: 1.0 standard drink of alcohol    Types: 1 Standard drinks or equivalent per week     Social History     Tobacco Use   Smoking Status Former    Current packs/day: 0.00    Average packs/day: 1.5 packs/day for 25.0 years (37.5 ttl pk-yrs)    Types: Cigarettes    Start date: 1979    Quit date: 2004    Years since quittin.5   Smokeless Tobacco Never     Social History     Substance and Sexual Activity   Drug Use Never       Echo:   No results found for this or any previous visit.    No results found for this or any previous visit.      Cath:    No results found for this or any previous visit.      Code Status: Level 1 - Full Code  Advance Directive and Living Will:      Power of : Yes  POLST:    Medications   apixaban (ELIQUIS) tablet 5 mg (5 mg Oral Given 25)   aspirin (ECOTRIN LOW STRENGTH) EC tablet 81 mg (81 mg Oral Given 25)   furosemide (LASIX) tablet 40 mg ( Oral Held Dose 25 1800)   levothyroxine tablet 25 mcg (25 mcg Oral Given 25 0509)   levothyroxine tablet 300 mcg (300 mcg Oral Given 25 050)   lisinopril (ZESTRIL) tablet 40 mg ( Oral Held Dose 25)   metoprolol tartrate (LOPRESSOR) tablet 50 mg (0 mg Oral Hold 25 0901)   pantoprazole (PROTONIX) EC tablet 40 mg (40 mg Oral Given 25)   spironolactone (ALDACTONE) tablet 25 mg ( Oral Held Dose 25)   ondansetron (ZOFRAN) injection 4 mg (has no administration in time range)   ceFAZolin (ANCEF) IVPB (premix in dextrose) 2,000 mg 50 mL (2,000 mg Intravenous New Bag 25)   sodium chloride 0.9 % infusion (0 mL/hr Intravenous Stopped 25 0016)   sodium chloride 0.9 % infusion (has no administration in time range)   multi-electrolyte (Plasmalyte-A/Isolyte-S PH 7.4/Normosol-R) IV bolus 1,000 mL (1,000 mL Intravenous New Bag 5/10/25 9672)      VAS ARTERIAL DUPLEX-LOWER LIMB UNILATERAL   Final Result       VAS VENOUS DUPLEX - LOWER LIMB BILATERAL   Final Result      XR ankle 3+ vw left    (Results Pending)   XR foot 3+ vw left    (Results Pending)     Orders Placed This Encounter   Procedures    Wound culture and Gram stain    XR ankle 3+ vw left    XR foot 3+ vw left    CBC and differential    Basic metabolic panel    Comprehensive metabolic panel    CBC (With Platelets)    B-Type Natriuretic Peptide(BNP)    Hemoglobin A1c w/EAG Estimation (Prechecked if no A1C within 90 days)    Basic metabolic panel    Diet Regular; Regular House    Vital Signs per unit routine    I/O    Activity as Tolerated    Out of Bed to Chair    Commode at Bedside    Apply SCD or Foot pumps    Insulin Subcutaneous Notify Physician    Insulin Subcutaneous Instruction    Hypoglycemia Protocol    Prevalon Boot    Wound care    Level 1-Full Code: all life saving measures are indicated    Inpatient consult to Podiatry    OT eval and treat    PT eval and treat    INPATIENT ADMISSION     Labs Reviewed   CBC AND DIFFERENTIAL - Abnormal       Result Value Ref Range Status    WBC 7.44  4.31 - 10.16 Thousand/uL Final    RBC 4.45  3.88 - 5.62 Million/uL Final    Hemoglobin 11.6 (*) 12.0 - 17.0 g/dL Final    Hematocrit 38.4  36.5 - 49.3 % Final    MCV 86  82 - 98 fL Final    MCH 26.1 (*) 26.8 - 34.3 pg Final    MCHC 30.2 (*) 31.4 - 37.4 g/dL Final    RDW 16.7 (*) 11.6 - 15.1 % Final    MPV 11.4  8.9 - 12.7 fL Final    Platelets 239  149 - 390 Thousands/uL Final    nRBC 0  /100 WBCs Final    Segmented % 82 (*) 43 - 75 % Final    Immature Grans % 0  0 - 2 % Final    Lymphocytes % 9 (*) 14 - 44 % Final    Monocytes % 8  4 - 12 % Final    Eosinophils Relative 1  0 - 6 % Final    Basophils Relative 0  0 - 1 % Final    Absolute Neutrophils 6.06  1.85 - 7.62 Thousands/µL Final    Absolute Immature Grans 0.01  0.00 - 0.20 Thousand/uL Final    Absolute Lymphocytes 0.67  0.60 - 4.47 Thousands/µL  Final    Absolute Monocytes 0.59  0.17 - 1.22 Thousand/µL Final    Eosinophils Absolute 0.09  0.00 - 0.61 Thousand/µL Final    Basophils Absolute 0.02  0.00 - 0.10 Thousands/µL Final   BASIC METABOLIC PANEL - Abnormal    Sodium 134 (*) 135 - 147 mmol/L Final    Potassium 5.3  3.5 - 5.3 mmol/L Final    Chloride 99  96 - 108 mmol/L Final    CO2 29  21 - 32 mmol/L Final    ANION GAP 6  4 - 13 mmol/L Final    BUN 37 (*) 5 - 25 mg/dL Final    Creatinine 1.84 (*) 0.60 - 1.30 mg/dL Final    Comment: Standardized to IDMS reference method    Glucose 101  65 - 140 mg/dL Final    Comment: If the patient is fasting, the ADA then defines impaired fasting glucose as > 100 mg/dL and diabetes as > or equal to 123 mg/dL.    Calcium 9.4  8.4 - 10.2 mg/dL Final    eGFR 35  ml/min/1.73sq m Final    Narrative:     National Kidney Disease Foundation guidelines for Chronic Kidney Disease (CKD):     Stage 1 with normal or high GFR (GFR > 90 mL/min/1.73 square meters)    Stage 2 Mild CKD (GFR = 60-89 mL/min/1.73 square meters)    Stage 3A Moderate CKD (GFR = 45-59 mL/min/1.73 square meters)    Stage 3B Moderate CKD (GFR = 30-44 mL/min/1.73 square meters)    Stage 4 Severe CKD (GFR = 15-29 mL/min/1.73 square meters)    Stage 5 End Stage CKD (GFR <15 mL/min/1.73 square meters)  Note: GFR calculation is accurate only with a steady state creatinine     Time reflects when diagnosis was documented in both MDM as applicable and the Disposition within this note       Time User Action Codes Description Comment    5/10/2025 12:30 PM Ely Gupta [M79.605] Left leg pain     5/10/2025 12:30 PM Ely Gupta [M25.562] Acute pain of left knee     5/10/2025 12:30 PM Ely Gupta Add [R26.2] Ambulatory dysfunction     5/10/2025 12:30 PM Ely Gupta Add [S81.802A] Wound of left lower extremity, initial encounter     5/10/2025 12:30 PM Ely Gupta [I89.0] Lymphedema     5/10/2025 12:31 PM Ely Gupta [N17.9] TAMIR  (acute kidney injury) (HCC)           ED Disposition       ED Disposition   Admit    Condition   Stable    Date/Time   Sat May 10, 2025 12:30 PM    Comment   --             Follow-up Information    None       Current Discharge Medication List        CONTINUE these medications which have NOT CHANGED    Details   ammonium lactate (LAC-HYDRIN) 12 % lotion Apply 1 Application topically daily In evening  Qty: 222 mL, Refills: 0    Associated Diagnoses: Chronic venous stasis dermatitis of left lower extremity      apixaban (ELIQUIS) 5 mg Take 1 tablet (5 mg total) by mouth 2 (two) times a day  Qty: 180 tablet, Refills: 1    Associated Diagnoses: Paroxysmal atrial fibrillation (HCC)      aspirin (ECOTRIN LOW STRENGTH) 81 mg EC tablet Take 1 tablet (81 mg total) by mouth daily    Associated Diagnoses: Coronary artery disease      Cholecalciferol (Vitamin D3) 50 MCG (2000 UT) TABS Take 1 tablet (2,000 Units total) by mouth in the morning    Associated Diagnoses: Vitamin D deficiency      dapagliflozin 5 MG TABS Take 1 tablet (5 mg total) by mouth daily  Qty: 180 tablet, Refills: 1    Associated Diagnoses: Chronic heart failure with preserved ejection fraction (HCC)      furosemide (LASIX) 40 mg tablet Take 1 tablet (40 mg total) by mouth 2 (two) times a day  Qty: 180 tablet, Refills: 1    Associated Diagnoses: Chronic heart failure with preserved ejection fraction (HCC)      gabapentin (NEURONTIN) 100 mg capsule Take 1 capsule (100 mg total) by mouth 2 (two) times a day  Qty: 180 capsule, Refills: 1    Associated Diagnoses: Diabetic ulcer of left ankle associated with type 2 diabetes mellitus, limited to breakdown of skin ([facility])      !! levothyroxine 25 mcg tablet Take 1 tablet (25 mcg total) by mouth daily In addition to the 300 mcg dose  Qty: 90 tablet, Refills: 1    Associated Diagnoses: Hypothyroidism      !! levothyroxine 300 MCG tablet Take 1 tablet (300 mcg total) by mouth daily In addition to 25 mcg dose  Qty: 90  tablet, Refills: 1    Associated Diagnoses: Hypothyroidism      lisinopril (ZESTRIL) 40 mg tablet Take 1 tablet (40 mg total) by mouth daily  Qty: 90 tablet, Refills: 1    Associated Diagnoses: Essential hypertension      metFORMIN (GLUCOPHAGE-XR) 500 mg 24 hr tablet Take 1 tablet (500 mg total) by mouth daily with breakfast  Qty: 90 tablet, Refills: 1    Associated Diagnoses: Controlled type 2 diabetes mellitus with complication, without long-term current use of insulin ([facility])      metoprolol tartrate (LOPRESSOR) 50 mg tablet Take 1 tablet (50 mg total) by mouth 2 (two) times a day  Qty: 180 tablet, Refills: 1    Associated Diagnoses: Acute systolic heart failure ([facility])      Multiple Vitamins-Minerals (MULTIVITAMIN MEN 50+ PO) Take 1 tablet by mouth in the morning      pantoprazole (PROTONIX) 40 mg tablet Take 1 tablet (40 mg total) by mouth daily  Qty: 90 tablet, Refills: 1    Associated Diagnoses: Essential hypertension; Gastroesophageal reflux disease without esophagitis      simvastatin (ZOCOR) 10 mg tablet Take 1 tablet (10 mg total) by mouth daily at bedtime  Qty: 90 tablet, Refills: 1    Associated Diagnoses: HLD (hyperlipidemia)      spironolactone (ALDACTONE) 25 mg tablet Take 1 tablet (25 mg total) by mouth daily  Qty: 90 tablet, Refills: 1    Associated Diagnoses: Chronic heart failure with preserved ejection fraction (HCC)      acetaminophen (TYLENOL) 325 mg tablet Take 975 mg by mouth every 8 (eight) hours as needed for mild pain      clotrimazole-betamethasone (LOTRISONE) 1-0.05 % cream Apply topically 2 (two) times a day Per wound care  Qty: 45 g, Refills: 0    Associated Diagnoses: Rash      polyethylene glycol (MIRALAX) 17 g packet Take 17 g by mouth daily as needed (constipation)    Associated Diagnoses: Constipation       !! - Potential duplicate medications found. Please discuss with provider.        No discharge procedures on file.  Prior to Admission Medications   Prescriptions Last Dose  Informant Patient Reported? Taking?   Cholecalciferol (Vitamin D3) 50 MCG (2000 UT) TABS 5/10/2025 Self No Yes   Sig: Take 1 tablet (2,000 Units total) by mouth in the morning   Multiple Vitamins-Minerals (MULTIVITAMIN MEN 50+ PO) 5/10/2025 Self Yes Yes   Sig: Take 1 tablet by mouth in the morning   acetaminophen (TYLENOL) 325 mg tablet Not Taking Self Yes No   Sig: Take 975 mg by mouth every 8 (eight) hours as needed for mild pain   Patient not taking: Reported on 5/10/2025   ammonium lactate (LAC-HYDRIN) 12 % lotion Past Month Self No Yes   Sig: Apply 1 Application topically daily In evening   apixaban (ELIQUIS) 5 mg 5/10/2025  No Yes   Sig: Take 1 tablet (5 mg total) by mouth 2 (two) times a day   aspirin (ECOTRIN LOW STRENGTH) 81 mg EC tablet 5/10/2025 Self No Yes   Sig: Take 1 tablet (81 mg total) by mouth daily   clotrimazole-betamethasone (LOTRISONE) 1-0.05 % cream More than a month  No No   Sig: Apply topically 2 (two) times a day Per wound care   dapagliflozin 5 MG TABS 5/10/2025  No Yes   Sig: Take 1 tablet (5 mg total) by mouth daily   furosemide (LASIX) 40 mg tablet 5/10/2025  No Yes   Sig: Take 1 tablet (40 mg total) by mouth 2 (two) times a day   gabapentin (NEURONTIN) 100 mg capsule 5/10/2025  No Yes   Sig: Take 1 capsule (100 mg total) by mouth 2 (two) times a day   levothyroxine 25 mcg tablet 5/10/2025  No Yes   Sig: Take 1 tablet (25 mcg total) by mouth daily In addition to the 300 mcg dose   levothyroxine 300 MCG tablet 5/10/2025  No Yes   Sig: Take 1 tablet (300 mcg total) by mouth daily In addition to 25 mcg dose   lisinopril (ZESTRIL) 40 mg tablet 5/10/2025  No Yes   Sig: Take 1 tablet (40 mg total) by mouth daily   metFORMIN (GLUCOPHAGE-XR) 500 mg 24 hr tablet 5/10/2025  No Yes   Sig: Take 1 tablet (500 mg total) by mouth daily with breakfast   metoprolol tartrate (LOPRESSOR) 50 mg tablet 5/10/2025  No Yes   Sig: Take 1 tablet (50 mg total) by mouth 2 (two) times a day   pantoprazole  "(PROTONIX) 40 mg tablet 5/10/2025  No Yes   Sig: Take 1 tablet (40 mg total) by mouth daily   polyethylene glycol (MIRALAX) 17 g packet Not Taking Self No No   Sig: Take 17 g by mouth daily as needed (constipation)   Patient not taking: Reported on 5/10/2025   simvastatin (ZOCOR) 10 mg tablet 5/10/2025  No Yes   Sig: Take 1 tablet (10 mg total) by mouth daily at bedtime   spironolactone (ALDACTONE) 25 mg tablet 5/10/2025  No Yes   Sig: Take 1 tablet (25 mg total) by mouth daily      Facility-Administered Medications: None                        Portions of the record may have been created with voice recognition software. Occasional wrong word or \"sound a like\" substitutions may have occurred due to the inherent limitations of voice recognition software. Read the chart carefully and recognize, using context, where substitutions have occurred.    Electronically signed by:  Jacob Hunt  "

## 2025-05-10 NOTE — ASSESSMENT & PLAN NOTE
Lab Results   Component Value Date    CREATININE 1.84 (H) 05/10/2025    CREATININE 1.06 12/24/2024    CREATININE 1.06 07/22/2024       Lab Results   Component Value Date    EGFR 35 05/10/2025    EGFR 70 12/24/2024    EGFR 70 07/22/2024     Patient appears dry on physical exam  Has chronic left lower leg edema    POA 1.84; (baseline 1)  Likely Prerenal in setting of diuretics and decreased oral intake    Plan:  Urinary retention protocol, Bladder scan, Daily weights, I/O  Normal saline 100 ml per hour  Monitor BMP daily and observe for downward trend of creatinine  Avoid hypoperfusion of the kidneys, minimize nephrotoxins  RAAS Blockers/Diuretics held: lasix lisinopril

## 2025-05-10 NOTE — PLAN OF CARE
Problem: PAIN - ADULT  Goal: Verbalizes/displays adequate comfort level or baseline comfort level  Description: Interventions:- Encourage patient to monitor pain and request assistance- Assess pain using appropriate pain scale- Administer analgesics based on type and severity of pain and evaluate response- Implement non-pharmacological measures as appropriate and evaluate response- Consider cultural and social influences on pain and pain management- Notify physician/advanced practitioner if interventions unsuccessful or patient reports new pain  Outcome: Progressing     Problem: INFECTION - ADULT  Goal: Absence or prevention of progression during hospitalization  Description: INTERVENTIONS:- Assess and monitor for signs and symptoms of infection- Monitor lab/diagnostic results- Monitor all insertion sites, i.e. indwelling lines, tubes, and drains- Monitor endotracheal if appropriate and nasal secretions for changes in amount and color- [place] appropriate cooling/warming therapies per order- Administer medications as ordered- Instruct and encourage patient and family to use good hand hygiene technique- Identify and instruct in appropriate isolation precautions for identified infection/condition  Outcome: Progressing     Problem: NEUROSENSORY - ADULT  Goal: Achieves stable or improved neurological status  Description: INTERVENTIONS- Monitor and report changes in neurological status- Monitor vital signs such as temperature, blood pressure, glucose, and any other labs ordered - Initiate measures to prevent increased intracranial pressure- Monitor for seizure activity and implement precautions if appropriate    Outcome: Progressing     Problem: Knowledge Deficit  Goal: Patient/family/caregiver demonstrates understanding of disease process, treatment plan, medications, and discharge instructions  Description: Complete learning assessment and assess knowledge base.Interventions:- Provide teaching at level of  understanding- Provide teaching via preferred learning methods  Outcome: Progressing     Problem: DISCHARGE PLANNING  Goal: Discharge to home or other facility with appropriate resources  Description: INTERVENTIONS:- Identify barriers to discharge w/patient and caregiver- Arrange for needed discharge resources and transportation as appropriate- Identify discharge learning needs (meds, wound care, etc.)- Arrange for interpretive services to assist at discharge as needed- Refer to Case Management Department for coordinating discharge planning if the patient needs post-hospital services based on physician/advanced practitioner order or complex needs related to functional status, cognitive ability, or social support system  Outcome: Progressing

## 2025-05-10 NOTE — CONSULTS
Podiatry - Consultation    Patient Information:   Armando Erickson 70 y.o. male MRN: [id number]  Unit/Bed#: [facility] 629-01 Encounter: [id number]  PCP: Aida Interiano MD  Date of Admission:  5/10/2025  Date of Consultation: 05/10/25  Requesting Physician: Shyam Hobson, *      ASSESSMENT:    Armando Erickson is a 70 y.o. male with:    Left ankle venous stasis ulcer, with cellulitis  Left distal hallux DTI  Type 2 DM with peripheral neuropathy  TAMIR  Class 3 obesity  S/P right BKA    PLAN:    Patient was seen and evaluated at bedside with all questions and concerns addressed. Patient presents with left ankle recurrent venous stasis ulcer with cellulitis. Will perform antibiotic treatment and local wound care in this hospital stay. No surgical intervention planned.  Left lower extremity arterial duplex reviewed.  No evidence of significant lower extremity arterial occlusive disease.  DEVAN unreliable due to poorly compressible vessels, metatarsal pressure 74 mmHg, great toe pressure 89 mmHg, within healing range.  Left lower extremity venous duplex shows no acute or chronic deep vein thrombosis.  Left foot and ankle wound was covered with scaling skin and eschar.  Medial and lateral ankle as well as anterior lower leg shows superficial wounds which were debrided with a 10 blade and a 15 blade to remove any scaling superficial skin, fibrous wound bed secretion as well as eschar.  Patient tolerated the debridement well.  The wounds were covered with Adaptic DSD.  Left foot distal hallux shows a thick callus with deep localized hemorrhage, suspected to be deep tissue injury because of patient's rigid ankle as well as hallux malleus.  The callus was trimmed with a 15 blade.  The distal hallux was painted with Betadine open to air.  Left anterior shin shows multiple small openings with purulence or fibrin drainage.  Wound culture was collected.  Will follow-up with the result.  Patients' lab and vital signs were reviewed.  "Patient is afebrile with no leukocytosis. Patient does not  meet the SIRS criteria. Will keep monitoring.  Continue IV Ancef per choice of primary team  Continue local wound care, appreciate nursing assistance with dressing changes. Wound care order is placed.  Addressed the importance of elevation in controlling venous stasis ulcer with the patient.  Patient is amenable.  Prevalon boot was prescribed to patient's left foot.  Patient is amenable to wear it whenever he is in bed.  Follow-up with  XR  Elevation and offloading on green foam wedges or pillows when non-ambulatory.  Rest of care per primary team.  Will discuss this plan with my attending and update as needed.     Weightbearing status: Weightbearing as tolerated    Wound debridement note: After verbal consent was obtained, wound located at medial lateral left ankle was excisionally debrided with a 10 and a 15 bladeof all nonviable, devitalized,  (necrotic/eschar/gangrenous, slough/fibrin/fibrous, hyperkeratotic) tissue w/ excision of devitalized/peeling dry skin, eschar, fibrous deposition to the wound bed, to depth of subcutaneous tissue. Post debridement wound measurements 6.5x2.5x0.3cm, 3.5x2.5x0.2cm, 2x1.5x0.3cm and 7x3x0.2cm with appearance of wound fresh bleeding, 100% viable wound bed.  Bleeding was controlled by direct compression.  Patient does not report any pain because of severe diabetic polyneuropathy.  Total debridement area is 35 square centimeters.      [> What was the depth of the debridement:This is means the deepest material your removed not the deepest exposed tissue noted. For example, you are debriding necrotic fat in a heel wound that has exposed bone. This is a subcutaneous depth debridement, not a bone debridement. If you debride bone or tendon I suggest you send a piece in formalin for routine specimen pathology so there is \"proof\" documented of that depth of debridement]     SUBJECTIVE:    History of Present Illness:    Armando " Dre is a 70 y.o. male who is originally admitted 5/10/2025 due to Acute injury. Patient has a past medical history of Controlled type 2 diabetes with neuropathy, class 3 obesity, atrial fibrillation.    We are consulted for left ankle wounds and cellulitis. Patient was known to podiatry and has been under wound care by Dr. Skinner until last year.  The patient was discharged by the wound care office because of the complete healing of the left leg and the foot venous stasis ulcer.  Patient was then cared by [facility] edema nurse practitioner and was also discharged a few minutes before because of good outcome.  However, since then patient has been neglecting of his left foot and ankle.  Patient has significant diabetic polyneuropathy to the left lower extremity and barely feels light touch up to the knee.  Patient does not have any pain below the left ankle and has not been taking care of the recurrent wounds.  He has been applying some over-the-counter moisturizing creams to his left lower extremity.  Patient is s/p post right side BKA and has been ambulating minimum with a walker.  Patient relates that the left lower extremity has been seeping liquid from the wounds and small openings of the skin especially when the foot is in dependent position.    Patient denies any other pedal complains. Patient denies any systemic signs of infection, including headache, dizziness, nausea, vomiting, chest pain, short of breath, chills, fever, stomachache, diarrhea.     Review of Systems:    Constitutional: Negative.    HENT: Negative.    Eyes: Negative.    Respiratory: Negative.    Cardiovascular: Negative.    Gastrointestinal: Negative.    Musculoskeletal: s/p right BKA.   Skin: Left ankle wounds, left leg and foot scaling dry skin  Neurological: Diabetic polyneuropathy  Psych: Negative.     Past Medical and Surgical History:     Past Medical History:   Diagnosis Date    Abnormal urinalysis 06/10/2021    Atrial fibrillation  (HCC)     Cellulitis     Cellulitis of left lower extremity 04/09/2021    Diabetes mellitus (HCC)     Disease of thyroid gland     Duodenal ulcer     perforated- ICU stay    High blood pressure     High cholesterol     History of duodenal ulcer 04/04/2014    Hyperlipidemia     Hypertension     Hypothyroidism     Lymphedema      b/l LE- was followed at wound center    Morbid obesity with BMI of 40.0-44.9, adult (HCC)     Peritonitis (HCC)        Past Surgical History:   Procedure Laterality Date    BELOW KNEE LEG AMPUTATION Right     DIAGNOSTIC LAPAROSCOPY      KNEE ARTHROSCOPY Bilateral     OTHER SURGICAL HISTORY  03/2014     lap repair    OTHER SURGICAL HISTORY      Laparoscopic repair of a perforated duodenal ulcer with Javed omental    OTHER SURGICAL HISTORY      Placement of drains       Meds/Allergies:      Medications Prior to Admission:     ammonium lactate (LAC-HYDRIN) 12 % lotion    apixaban (ELIQUIS) 5 mg    aspirin (ECOTRIN LOW STRENGTH) 81 mg EC tablet    Cholecalciferol (Vitamin D3) 50 MCG (2000 UT) TABS    dapagliflozin 5 MG TABS    furosemide (LASIX) 40 mg tablet    gabapentin (NEURONTIN) 100 mg capsule    levothyroxine 25 mcg tablet    levothyroxine 300 MCG tablet    lisinopril (ZESTRIL) 40 mg tablet    metFORMIN (GLUCOPHAGE-XR) 500 mg 24 hr tablet    metoprolol tartrate (LOPRESSOR) 50 mg tablet    Multiple Vitamins-Minerals (MULTIVITAMIN MEN 50+ PO)    pantoprazole (PROTONIX) 40 mg tablet    simvastatin (ZOCOR) 10 mg tablet    spironolactone (ALDACTONE) 25 mg tablet    acetaminophen (TYLENOL) 325 mg tablet    clotrimazole-betamethasone (LOTRISONE) 1-0.05 % cream    polyethylene glycol (MIRALAX) 17 g packet    No Known Allergies    Social History:     Marital Status: Single    Substance Use History:   Social History     Substance and Sexual Activity   Alcohol Use Not Currently    Alcohol/week: 1.0 standard drink of alcohol    Types: 1 Standard drinks or equivalent per week     Social History      Tobacco Use   Smoking Status Former    Current packs/day: 0.00    Average packs/day: 1.5 packs/day for 25.0 years (37.5 ttl pk-yrs)    Types: Cigarettes    Start date: 1979    Quit date: 2004    Years since quittin.5   Smokeless Tobacco Never     Social History     Substance and Sexual Activity   Drug Use Never       Family History:    Family History   Problem Relation Age of Onset    Heart disease Mother     Hypertension Mother     Breast cancer Mother     Leukemia Brother     Migraines Daughter     Migraines Daughter     Heart disease Family     Alcohol abuse Family     Substance Abuse Neg Hx     Mental illness Neg Hx     Depression Neg Hx          OBJECTIVE:    Vitals:   Blood Pressure: 132/71 (05/10/25 1416)  Pulse: 73 (05/10/25 1416)  Temperature: 98.1 °F (36.7 °C) (05/10/25 1416)  Temp Source: Oral (05/10/25 1053)  Respirations: 16 (05/10/25 1416)  SpO2: 99 % (05/10/25 1416)    Physical Exam:    General Appearance: Alert, cooperative, no distress.  HEENT: Head normocephalic, atraumatic, without obvious abnormality.  Heart: Normal rate and rhythm.  Lungs: Non-labored breathing. No respiratory distress.  Abdomen: Without distension.  Psychiatric: AAOx3  Lower Extremity:    Vascular:   DP: Left: 1+  PT: Left: non-palpable  CRT < 3 seconds at the digits. +3/4 edema noted at bilateral lower extremities.   Pedal hair is absent.   Skin temperature is warm .    Musculoskeletal:  Left ankle is in neutral position with minimum range of motion  Left foot first MPJ is in neutral with minimal range of motion.  Left hallux shows rigid hallux malleus.  No Pain on palpation of left hallux distal DTI, medial and lateral ankle wound.   S/p right BKA    Dermatological:  Lower extremity wound(s) as noted below:    Left ankle and dorsal foot shows scaling dry skin with significant cracking and peeling.  Anterior left shin shows multiple small openings from the epidermis with purulent/fibrin drainage.  Left leg  and foot shows erythema, edema and heat.    Before cleaning      After cleaning      Left distal hallux shows thick callus with dry blood.  Deep to the callus shows hemorrhagic blister, consistent with DTI        Wound #: 1  Location: Medial left ankle, superior  Length 6.5cm: Width 2.5cm: Depth 0.3cm:   Deepest Tissue Noted in Base: Subcutaneous tissue  Probe to Bone: No  Peripheral Skin Description: Attached  Granulation: 90% Fibrotic Tissue: 10% Necrotic Tissue: 0%   Drainage Amount: minimal, serous  Signs of Infection: Yes. negative probe to bone, negative crepitus, fluctuance, negative purulence, positive  periwound skin erythema, edema, negative proximal tracking erythema    Wound #: 2  Location: Medial left ankle, inferior  Length 3.5cm: Width 2.5cm: Depth 0.2cm:   Deepest Tissue Noted in Base: Subcutaneous tissue  Probe to Bone: No  Peripheral Skin Description: Attached  Granulation: 20% Fibrotic Tissue: 80% Necrotic Tissue: 0%   Drainage Amount: minimal, serous  Signs of Infection: Yes. negative probe to bone, negative crepitus, fluctuance, negative purulence, positive  periwound skin erythema, edema, negative proximal tracking erythema    Wound #: 3  Location: Medial left ankle, anterior  Length 2cm: Width 1.5cm: Depth 0.3cm:   Deepest Tissue Noted in Base: Subcutaneous tissue  Probe to Bone: No  Peripheral Skin Description: Attached  Granulation: 100% Fibrotic Tissue: 0% Necrotic Tissue: 0%   Drainage Amount: minimal, serosanguinous  Signs of Infection: Yes. negative probe to bone, negative crepitus, fluctuance, negative purulence, positive  periwound skin erythema, edema, negative proximal tracking erythema    Before debridement      After debridement      Wound #: 4  Location: Left lateral ankle  Length 7cm: Width 3cm: Depth 0.2cm:   Deepest Tissue Noted in Base: Subcutaneous tissue  Probe to Bone: No  Peripheral Skin Description: Attached  Granulation: 100% Fibrotic Tissue: 0% Necrotic Tissue: 0%  "  Drainage Amount: minimal, serosanguinous  Signs of Infection: Yes. negative probe to bone, negative crepitus, fluctuance, negative purulence, positive  periwound skin erythema, edema, negative proximal tracking erythema    Before debridement        After debridement       Neurological:  Gross sensation is diminished.   Light touch is diminished.   Protective sensation is absent.        Additional data:     Lab Results: I have personally reviewed pertinent labs including:    Results from last 7 days   Lab Units 05/10/25  1136   WBC Thousand/uL 7.44   HEMOGLOBIN g/dL 11.6*   HEMATOCRIT % 38.4   PLATELETS Thousands/uL 239   SEGS PCT % 82*   LYMPHO PCT % 9*   MONO PCT % 8   EOS PCT % 1     Results from last 7 days   Lab Units 05/10/25  1136   POTASSIUM mmol/L 5.3   CHLORIDE mmol/L 99   CO2 mmol/L 29   BUN mg/dL 37*   CREATININE mg/dL 1.84*   CALCIUM mg/dL 9.4           Cultures: I have personally reviewed pertinent cultures including:              Imaging: I have personally reviewed pertinent reports in PACS.  EKG, Pathology, and Other Studies: I have personally reviewed pertinent reports.    Time Spent for Care: 30 minutes.  More than 50% of total time spent on counseling and coordination of care as described above.      ** Please Note: Portions of the record may have been created with voice recognition software. Occasional wrong word or \"sound a like\" substitutions may have occurred due to the inherent limitations of voice recognition software. Read the chart carefully and recognize, using context, where substitutions have occurred. **    "

## 2025-05-10 NOTE — ASSESSMENT & PLAN NOTE
Wt Readings from Last 3 Encounters:   01/23/25 (!) 169 kg (372 lb 3 oz)   01/04/25 (!) 169 kg (373 lb 0.3 oz)   12/18/24 (!) 165 kg (364 lb)   Patient has lower leg edema however looks dry overall  Will hold Lasix for now resume when TAMIR resolves

## 2025-05-10 NOTE — H&P
H&P - Hospitalist   Name: Armando Erickson 70 y.o. male I MRN: [id number]  Unit/Bed#: ED 24 I Date of Admission: 5/10/2025   Date of Service: 5/10/2025 I Hospital Day: 0     Assessment & Plan  TAMIR (acute kidney injury) (HCC)  Lab Results   Component Value Date    CREATININE 1.84 (H) 05/10/2025    CREATININE 1.06 12/24/2024    CREATININE 1.06 07/22/2024       Lab Results   Component Value Date    EGFR 35 05/10/2025    EGFR 70 12/24/2024    EGFR 70 07/22/2024     Patient appears dry on physical exam  Has chronic left lower leg edema    POA 1.84; (baseline 1)  Likely Prerenal in setting of diuretics and decreased oral intake    Plan:  Urinary retention protocol, Bladder scan, Daily weights, I/O  Normal saline 100 ml per hour  Monitor BMP daily and observe for downward trend of creatinine  Avoid hypoperfusion of the kidneys, minimize nephrotoxins  RAAS Blockers/Diuretics held: lasix lisinopril    Wound of left lower extremity  Patient complains of worsening left ankle wound to left lower extremity with chronic swelling  Patient is 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  Plan:  Will get lower leg Doppler of left leg  Will get lower leg arterial Doppler of left leg  Does have some yellowish pus on it and erythematous  Will start cefazolin  Podiatry consult    Ambulatory dysfunction  Patient has chronic ambulatory dysfunction however upon trying to get up from the recliner today.  Patient shifted his left leg and felt a pop.  Since then he has been really unable to put much weight on his left knee.  On further evaluation it looks like patient also has chronic wounds    Plan:  Plan for left lower ankle wounds noted above  May need an orthopedic consult in the future given the history of buckling of the  "leg/popping and unable to put weight on left knee.  However you think it would be more appropriate if ankle wounds addressed first.  PT OT        Paroxysmal atrial fibrillation (HCC)  Continue apixaban  Continue metoprolol 50 mg twice daily    Obesity, Class III, BMI 40-49.9 (morbid obesity)  Affects all aspects of care    Hypothyroidism  Continue levothyroxine    HLD (hyperlipidemia)  Not currently on statin    Gastroesophageal reflux disease without esophagitis  Continue pantoprazole    Essential hypertension  Will hold spironolactone, lisinopril, Lasix for now.  Due to TAMIR  Continue metoprolol    Controlled type 2 diabetes mellitus with complication, without long-term current use of insulin (HCC)  Lab Results   Component Value Date    HGBA1C 5.6 12/24/2024       No results for input(s): \"POCGLU\" in the last 72 hours.    Blood Sugar Average: Last 72 hrs:    Sliding scale  Diabetic diet    Chronic heart failure with preserved ejection fraction (HCC)  Wt Readings from Last 3 Encounters:   01/23/25 (!) 169 kg (372 lb 3 oz)   01/04/25 (!) 169 kg (373 lb 0.3 oz)   12/18/24 (!) 165 kg (364 lb)   Patient has lower leg edema however looks dry overall  Will hold Lasix for now resume when TAMIR resolves                VTE Pharmacologic Prophylaxis:   Moderate Risk (Score 3-4) - Pharmacological DVT Prophylaxis Ordered: heparin.  Code Status: Level 1 full code    Discussion with family: Patient declined call to .     Anticipated Length of Stay: Patient will be admitted on an inpatient basis with an anticipated length of stay of greater than 2 midnights secondary to lower leg wounds.    History of Present Illness   Chief Complaint: Lower leg wounds    Armando Erickson is a 70 y.o. male with a PMH of past medical history of HFpEF, type 2 diabetes, hypertension, GERD, hypothyroidism, obesity, paroxysmal atrial fibrillation coming in for ambulatory dysfunction.  Patient lives independently and does not live with anyone.  " For the past few months has been having difficulty ambulating.  He also endorses worsening leg wounds.  Particularly on the left leg in the medial ankle area.  There is some erythema and worsening lower leg edema as well.  Denies fevers chills or other infectious symptoms.  No cough.  No abdominal pain no diarrhea.  Oral intake has been decreased        Review of Systems   Constitutional:  Negative for chills and fever.   HENT:  Negative for ear pain and sore throat.    Eyes:  Negative for pain and visual disturbance.   Respiratory:  Negative for cough and shortness of breath.    Cardiovascular:  Negative for chest pain and palpitations.   Gastrointestinal:  Negative for abdominal pain and vomiting.   Genitourinary:  Negative for dysuria and hematuria.   Musculoskeletal:  Negative for arthralgias and back pain.   Skin:  Negative for color change and rash.   Neurological:  Negative for seizures and syncope.   All other systems reviewed and are negative.      Historical Information   Past Medical History:   Diagnosis Date    Abnormal urinalysis 06/10/2021    Atrial fibrillation (HCC)     Cellulitis     Cellulitis of left lower extremity 04/09/2021    Diabetes mellitus (HCC)     Disease of thyroid gland     Duodenal ulcer     perforated- ICU stay    High blood pressure     High cholesterol     History of duodenal ulcer 04/04/2014    Hyperlipidemia     Hypertension     Hypothyroidism     Lymphedema      b/l LE- was followed at wound center    Morbid obesity with BMI of 40.0-44.9, adult (HCC)     Peritonitis (HCC)      Past Surgical History:   Procedure Laterality Date    BELOW KNEE LEG AMPUTATION Right     DIAGNOSTIC LAPAROSCOPY      KNEE ARTHROSCOPY Bilateral     OTHER SURGICAL HISTORY  03/2014     lap repair    OTHER SURGICAL HISTORY      Laparoscopic repair of a perforated duodenal ulcer with Javed omental    OTHER SURGICAL HISTORY      Placement of drains     Social History     Tobacco Use    Smoking status: Former      Current packs/day: 0.00     Average packs/day: 1.5 packs/day for 25.0 years (37.5 ttl pk-yrs)     Types: Cigarettes     Start date: 1979     Quit date: 2004     Years since quittin.5    Smokeless tobacco: Never   Vaping Use    Vaping status: Never Used   Substance and Sexual Activity    Alcohol use: Not Currently     Alcohol/week: 1.0 standard drink of alcohol     Types: 1 Standard drinks or equivalent per week    Drug use: Never    Sexual activity: Not Currently     E-Cigarette/Vaping    E-Cigarette Use Never User      E-Cigarette/Vaping Substances     Family history non-contributory  Social History:  Marital Status: Single   Occupation: None  Patient Pre-hospital Living Situation: Home  Patient Pre-hospital Level of Mobility: walks  Patient Pre-hospital Diet Restrictions: None    Meds/Allergies   I have reviewed home medications with patient personally.  Prior to Admission medications    Medication Sig Start Date End Date Taking? Authorizing Provider   acetaminophen (TYLENOL) 325 mg tablet Take 975 mg by mouth every 8 (eight) hours as needed for mild pain    Historical Provider, MD   ammonium lactate (LAC-HYDRIN) 12 % lotion Apply 1 Application topically daily In evening 24   HAROON Godoy   apixaban (ELIQUIS) 5 mg Take 1 tablet (5 mg total) by mouth 2 (two) times a day 25   HAROON Watkins   aspirin (ECOTRIN LOW STRENGTH) 81 mg EC tablet Take 1 tablet (81 mg total) by mouth daily 24   May Rahman PA-C   Cholecalciferol (Vitamin D3) 50 MCG (2000 UT) TABS Take 1 tablet (2,000 Units total) by mouth in the morning 24   May Rahman PA-C   clotrimazole-betamethasone (LOTRISONE) 1-0.05 % cream Apply topically 2 (two) times a day Per wound care 25   Aida Interiano MD   dapagliflozin 5 MG TABS Take 1 tablet (5 mg total) by mouth daily 25   HAROON Watkins   furosemide (LASIX) 40 mg tablet Take 1 tablet (40 mg total) by  mouth 2 (two) times a day 4/21/25   HAROON Watkins   gabapentin (NEURONTIN) 100 mg capsule Take 1 capsule (100 mg total) by mouth 2 (two) times a day 4/20/25 10/17/25  Aida Interiano MD   levothyroxine 25 mcg tablet Take 1 tablet (25 mcg total) by mouth daily In addition to the 300 mcg dose 4/20/25   Aida Interiano MD   levothyroxine 300 MCG tablet Take 1 tablet (300 mcg total) by mouth daily In addition to 25 mcg dose 4/20/25   Aida Interiano MD   lisinopril (ZESTRIL) 40 mg tablet Take 1 tablet (40 mg total) by mouth daily 4/20/25   Aida Interiano MD   metFORMIN (GLUCOPHAGE-XR) 500 mg 24 hr tablet Take 1 tablet (500 mg total) by mouth daily with breakfast 4/20/25   Aida Interiano MD   metoprolol tartrate (LOPRESSOR) 50 mg tablet Take 1 tablet (50 mg total) by mouth 2 (two) times a day 4/20/25   iAda Interiano MD   Multiple Vitamins-Minerals (MULTIVITAMIN MEN 50+ PO) Take 1 tablet by mouth in the morning    Historical Provider, MD   pantoprazole (PROTONIX) 40 mg tablet Take 1 tablet (40 mg total) by mouth daily 4/20/25   Aida Interiano MD   polyethylene glycol (MIRALAX) 17 g packet Take 17 g by mouth daily as needed (constipation) 7/22/24   May Rahman PA-C   simvastatin (ZOCOR) 10 mg tablet Take 1 tablet (10 mg total) by mouth daily at bedtime 4/20/25 10/17/25  Aida Interiano MD   spironolactone (ALDACTONE) 25 mg tablet Take 1 tablet (25 mg total) by mouth daily 4/21/25   HAROON Watkins     No Known Allergies    Objective :  Temp:  [97.7 °F (36.5 °C)] 97.7 °F (36.5 °C)  HR:  [69-74] 74  BP: (104-113)/(58) 113/58  Resp:  [16-19] 19  SpO2:  [98 %] 98 %  O2 Device: None (Room air)    Physical Exam  Vitals and nursing note reviewed.   Constitutional:       Appearance: He is well-developed. He is obese. He is ill-appearing.   HENT:      Head: Normocephalic and atraumatic.      Nose: Nose normal.      Mouth/Throat:      Mouth: Mucous membranes are dry.   Eyes:       Conjunctiva/sclera: Conjunctivae normal.   Cardiovascular:      Rate and Rhythm: Normal rate and regular rhythm.      Heart sounds: Normal heart sounds. No murmur heard.  Pulmonary:      Effort: Pulmonary effort is normal. No respiratory distress.      Breath sounds: Normal breath sounds.   Abdominal:      General: Abdomen is flat.      Palpations: Abdomen is soft.      Tenderness: There is no abdominal tenderness.   Musculoskeletal:         General: No swelling.      Cervical back: Neck supple.      Left lower leg: Edema (Chronic wounds with erythema and some warmth on the left lower extremity particularly in the ankle) present.      Comments: Status post right BKA   Skin:     General: Skin is warm and dry.      Capillary Refill: Capillary refill takes less than 2 seconds.   Neurological:      General: No focal deficit present.      Mental Status: He is alert and oriented to person, place, and time. Mental status is at baseline.   Psychiatric:         Mood and Affect: Mood normal.          Lines/Drains:            Lab Results: I have reviewed the following results:  Results from last 7 days   Lab Units 05/10/25  1136   WBC Thousand/uL 7.44   HEMOGLOBIN g/dL 11.6*   HEMATOCRIT % 38.4   PLATELETS Thousands/uL 239   SEGS PCT % 82*   LYMPHO PCT % 9*   MONO PCT % 8   EOS PCT % 1     Results from last 7 days   Lab Units 05/10/25  1136   SODIUM mmol/L 134*   POTASSIUM mmol/L 5.3   CHLORIDE mmol/L 99   CO2 mmol/L 29   BUN mg/dL 37*   CREATININE mg/dL 1.84*   ANION GAP mmol/L 6   CALCIUM mg/dL 9.4   GLUCOSE RANDOM mg/dL 101             Lab Results   Component Value Date    HGBA1C 5.6 12/24/2024    HGBA1C 5.4 07/16/2024    HGBA1C 5.8 (H) 10/06/2023           Normal sinus on ekg  Administrative Statements   I have spent a total time of 80 minutes in caring for this patient on the day of the visit/encounter including Diagnostic results, Prognosis, Risks and benefits of tx options, Instructions for management, Patient and  family education, Importance of tx compliance, Risk factor reductions, Impressions, Counseling / Coordination of care, Documenting in the medical record, Reviewing/placing orders in the medical record (including tests, medications, and/or procedures), Obtaining or reviewing history  , and Communicating with other healthcare professionals .    ** Please Note: This note has been constructed using a voice recognition system. **

## 2025-05-10 NOTE — ASSESSMENT & PLAN NOTE
Patient has chronic ambulatory dysfunction however upon trying to get up from the recliner today.  Patient shifted his left leg and felt a pop.  Since then he has been really unable to put much weight on his left knee.  On further evaluation it looks like patient also has chronic wounds    Plan:  Plan for left lower ankle wounds noted above  May need an orthopedic consult in the future given the history of buckling of the leg/popping and unable to put weight on left knee.  However you think it would be more appropriate if ankle wounds addressed first.  PT OT

## 2025-05-11 PROBLEM — I83.023 VENOUS STASIS ULCER OF LEFT ANKLE (HCC): Status: ACTIVE | Noted: 2025-05-10

## 2025-05-11 PROBLEM — L97.329 VENOUS STASIS ULCER OF LEFT ANKLE (HCC): Status: ACTIVE | Noted: 2025-05-10

## 2025-05-11 LAB
ALBUMIN SERPL BCG-MCNC: 3.9 G/DL (ref 3.5–5)
ALP SERPL-CCNC: 93 U/L (ref 34–104)
ALT SERPL W P-5'-P-CCNC: 6 U/L (ref 7–52)
ANION GAP SERPL CALCULATED.3IONS-SCNC: 7 MMOL/L (ref 4–13)
AST SERPL W P-5'-P-CCNC: 10 U/L (ref 13–39)
BILIRUB SERPL-MCNC: 0.56 MG/DL (ref 0.2–1)
BILIRUB UR QL STRIP: NEGATIVE
BUN SERPL-MCNC: 34 MG/DL (ref 5–25)
CALCIUM SERPL-MCNC: 9.2 MG/DL (ref 8.4–10.2)
CHLORIDE SERPL-SCNC: 99 MMOL/L (ref 96–108)
CLARITY UR: CLEAR
CO2 SERPL-SCNC: 29 MMOL/L (ref 21–32)
COLOR UR: ABNORMAL
CREAT SERPL-MCNC: 1.81 MG/DL (ref 0.6–1.3)
ERYTHROCYTE [DISTWIDTH] IN BLOOD BY AUTOMATED COUNT: 16.8 % (ref 11.6–15.1)
EST. AVERAGE GLUCOSE BLD GHB EST-MCNC: 126 MG/DL
GFR SERPL CREATININE-BSD FRML MDRD: 36 ML/MIN/1.73SQ M
GLUCOSE SERPL-MCNC: 102 MG/DL (ref 65–140)
GLUCOSE SERPL-MCNC: 99 MG/DL (ref 65–140)
GLUCOSE UR STRIP-MCNC: ABNORMAL MG/DL
HBA1C MFR BLD: 6 %
HCT VFR BLD AUTO: 37 % (ref 36.5–49.3)
HGB BLD-MCNC: 11.4 G/DL (ref 12–17)
HGB UR QL STRIP.AUTO: NEGATIVE
KETONES UR STRIP-MCNC: NEGATIVE MG/DL
LEUKOCYTE ESTERASE UR QL STRIP: NEGATIVE
MCH RBC QN AUTO: 26.6 PG (ref 26.8–34.3)
MCHC RBC AUTO-ENTMCNC: 30.8 G/DL (ref 31.4–37.4)
MCV RBC AUTO: 86 FL (ref 82–98)
NITRITE UR QL STRIP: NEGATIVE
PH UR STRIP.AUTO: 7 [PH]
PLATELET # BLD AUTO: 241 THOUSANDS/UL (ref 149–390)
PMV BLD AUTO: 11.8 FL (ref 8.9–12.7)
POTASSIUM SERPL-SCNC: 5 MMOL/L (ref 3.5–5.3)
PROT SERPL-MCNC: 7.9 G/DL (ref 6.4–8.4)
PROT UR STRIP-MCNC: NEGATIVE MG/DL
RBC # BLD AUTO: 4.29 MILLION/UL (ref 3.88–5.62)
SODIUM SERPL-SCNC: 135 MMOL/L (ref 135–147)
SP GR UR STRIP.AUTO: 1.01 (ref 1–1.03)
UROBILINOGEN UR STRIP-ACNC: <2 MG/DL
WBC # BLD AUTO: 8.31 THOUSAND/UL (ref 4.31–10.16)

## 2025-05-11 PROCEDURE — 97167 OT EVAL HIGH COMPLEX 60 MIN: CPT

## 2025-05-11 PROCEDURE — 85027 COMPLETE CBC AUTOMATED: CPT

## 2025-05-11 PROCEDURE — 83036 HEMOGLOBIN GLYCOSYLATED A1C: CPT

## 2025-05-11 PROCEDURE — 80053 COMPREHEN METABOLIC PANEL: CPT

## 2025-05-11 PROCEDURE — 99232 SBSQ HOSP IP/OBS MODERATE 35: CPT | Performed by: PHYSICIAN ASSISTANT

## 2025-05-11 PROCEDURE — 82948 REAGENT STRIP/BLOOD GLUCOSE: CPT

## 2025-05-11 PROCEDURE — 99232 SBSQ HOSP IP/OBS MODERATE 35: CPT | Performed by: PODIATRIST

## 2025-05-11 PROCEDURE — 97163 PT EVAL HIGH COMPLEX 45 MIN: CPT

## 2025-05-11 RX ORDER — SODIUM CHLORIDE 9 MG/ML
75 INJECTION, SOLUTION INTRAVENOUS CONTINUOUS
Status: DISPENSED | OUTPATIENT
Start: 2025-05-11 | End: 2025-05-13

## 2025-05-11 RX ADMIN — APIXABAN 5 MG: 5 TABLET, FILM COATED ORAL at 09:05

## 2025-05-11 RX ADMIN — LEVOTHYROXINE SODIUM 25 MCG: 0.03 TABLET ORAL at 05:09

## 2025-05-11 RX ADMIN — CEFAZOLIN SODIUM 2000 MG: 2 SOLUTION INTRAVENOUS at 16:10

## 2025-05-11 RX ADMIN — METOPROLOL TARTRATE 50 MG: 50 TABLET, FILM COATED ORAL at 17:21

## 2025-05-11 RX ADMIN — ASPIRIN 81 MG: 81 TABLET, COATED ORAL at 09:05

## 2025-05-11 RX ADMIN — APIXABAN 5 MG: 5 TABLET, FILM COATED ORAL at 17:21

## 2025-05-11 RX ADMIN — LEVOTHYROXINE SODIUM 300 MCG: 100 TABLET ORAL at 05:09

## 2025-05-11 RX ADMIN — CEFAZOLIN SODIUM 2000 MG: 2 SOLUTION INTRAVENOUS at 09:05

## 2025-05-11 RX ADMIN — CEFAZOLIN SODIUM 2000 MG: 2 SOLUTION INTRAVENOUS at 23:57

## 2025-05-11 RX ADMIN — SODIUM CHLORIDE 75 ML/HR: 0.9 INJECTION, SOLUTION INTRAVENOUS at 10:12

## 2025-05-11 RX ADMIN — PANTOPRAZOLE SODIUM 40 MG: 40 TABLET, DELAYED RELEASE ORAL at 09:05

## 2025-05-11 NOTE — OCCUPATIONAL THERAPY NOTE
Occupational Therapy Evaluation     Patient Name: Armando Erickson  Today's Date: 5/11/2025  Problem List  Principal Problem:    TAMIR (acute kidney injury) (HCC)  Active Problems:    Controlled type 2 diabetes mellitus with complication, without long-term current use of insulin (HCC)    Essential hypertension    HLD (hyperlipidemia)    Hypothyroidism    Obesity, Class III, BMI 40-49.9 (morbid obesity)    Paroxysmal atrial fibrillation (HCC)    Gastroesophageal reflux disease without esophagitis    Cellulitis of left lower extremity    Chronic heart failure with preserved ejection fraction (HCC)    Ambulatory dysfunction    Venous stasis ulcer of left ankle (HCC)    Past Medical History  Past Medical History:   Diagnosis Date    Abnormal urinalysis 06/10/2021    Atrial fibrillation (HCC)     Cellulitis     Cellulitis of left lower extremity 04/09/2021    Diabetes mellitus (HCC)     Disease of thyroid gland     Duodenal ulcer     perforated- ICU stay    High blood pressure     High cholesterol     History of duodenal ulcer 04/04/2014    Hyperlipidemia     Hypertension     Hypothyroidism     Lymphedema      b/l LE- was followed at wound center    Morbid obesity with BMI of 40.0-44.9, adult (HCC)     Peritonitis (HCC)      Past Surgical History  Past Surgical History:   Procedure Laterality Date    BELOW KNEE LEG AMPUTATION Right     DIAGNOSTIC LAPAROSCOPY      KNEE ARTHROSCOPY Bilateral     OTHER SURGICAL HISTORY  03/2014     lap repair    OTHER SURGICAL HISTORY      Laparoscopic repair of a perforated duodenal ulcer with Javed omental    OTHER SURGICAL HISTORY      Placement of drains         05/11/25 1002   OT Last Visit   OT Visit Date 05/11/25   Note Type   Note type Evaluation   Pain Assessment   Pain Assessment Tool 0-10   Pain Score No Pain   Restrictions/Precautions   Weight Bearing Precautions Per Order Yes   LLE Weight Bearing Per Order WBAT   Braces or Orthoses   (R LE prosthetic)   Other Precautions Chair  Alarm;Bed Alarm;WBS;Multiple lines;Fall Risk  (R BKA)   Home Living   Type of Home Apartment   Home Layout One level;Performs ADLs on one level;Able to live on main level with bedroom/bathroom;Elevator;Ramped entrance   Bathroom Shower/Tub Tub/shower unit  (pt reports he has been sponge bathing recently)   Bathroom Toilet Raised   Bathroom Equipment Grab bars in shower;Shower chair;Toilet raiser;Grab bars around toilet   Home Equipment Walker;Wheelchair-manual  (pt reports primarily using w/c vs office chair for functional mobility and rw for transfers)   Prior Function   Level of [place] Independent with ADLs;Independent with functional mobility;Independent with IADLS  (daughter assists with laundry)   Lives With Alone   Receives Help From Family   IADLs Family/Friend/Other provides transportation;Independent with meal prep;Independent with medication management   Falls in the last 6 months 0   Vocational Retired   Lifestyle   Autonomy Pt reports (I) with ADLs, IADLs, and functional mobility primarily using w/c vs office chair to get around the house and rw to complete transfers. Pt -  and retired   Reciprocal Relationships family   Service to Others retired   ADL   Where Assessed Edge of bed   Eating Assistance 6  Modified independent   Grooming Assistance 5  Supervision/Setup   UB Bathing Assistance 5  Supervision/Setup   LB Bathing Assistance 2  Maximal Assistance   UB Dressing Assistance 5  Supervision/Setup   LB Dressing Assistance 2  Maximal Assistance   Toileting Assistance  2  Maximal Assistance   Functional Assistance 2  Maximal Assistance   Bed Mobility   Supine to Sit 4  Minimal assistance   Additional items Assist x 1;Increased time required;Verbal cues;LE management   Transfers   Sit to Stand 2  Maximal assistance   Additional items Assist x 2;Increased time required;Verbal cues   Stand to Sit 2  Maximal assistance   Additional items Assist x 2;Increased time required;Verbal cues    Additional Comments b/l HHA and L knee blocking   Balance   Static Sitting Fair +   Dynamic Sitting Fair   Static Standing Poor -   Dynamic Standing Poor -   Activity Tolerance   Activity Tolerance Patient limited by fatigue   Medical Staff Made Aware PT   Nurse Made Aware RN Cleared   RUE Assessment   RUE Assessment WFL   LUE Assessment   LUE Assessment WFL   Hand Function   Gross Motor Coordination Functional   Fine Motor Coordination Functional   Cognition   Overall Cognitive Status WFL   Arousal/Participation Alert;Responsive;Cooperative   Attention Within functional limits   Orientation Level Oriented X4   Memory Within functional limits   Following Commands Follows all commands and directions without difficulty   Comments Pt agreeable to therapy   Assessment   Limitation Decreased ADL status;Decreased endurance;Decreased self-care trans;Decreased high-level ADLs   Prognosis Good   Assessment Pt is a 71 y/o male that was admitted to [facility] 5/10/2025 with TAMIR and cellulitis of L LE. Pt WBAT L LE. Pt with active OT orders and activity orders. Pt  has a past medical history of Abnormal urinalysis, Atrial fibrillation (HCC), Cellulitis, Cellulitis of left lower extremity, Diabetes mellitus ([facility]), Disease of thyroid gland, Duodenal ulcer, High blood pressure, High cholesterol, History of duodenal ulcer, Hyperlipidemia, Hypertension, Hypothyroidism, Lymphedema, Morbid obesity with BMI of 40.0-44.9, adult (HCC), and Peritonitis (HCC). Pt lives alone in a one level apartment with 0 STACIE, elevator, a raised toilet with grab bars, and tub shower with shower chair. Pt reports using w/c vs office chair to complete functional mobility and rw to complete transfers. Prior to admission pt (I) ADLs, IADLs, and functional mobility. Pt currently requires supervision to complete UB ADLs, MIN A to complete bed mobility, MAX A to complete LB ADLs and toileting, and MAX Ax2 to complete functional transfers with HHA. Pt  limited by decreased ADL status, functional transfers, functional mobility, and activity tolerance. Pt supine in bed at begning of session, pt seated EOB at end of session with alarm set and items within reach. The patient's raw score on the AM-PAC Daily Activity Inpatient Short Form is 16. A raw score of less than 19 suggests the patient may benefit from discharge to post-acute rehabilitation services. Please refer to the recommendation of the Occupational Therapist for safe discharge planning. Recommend Level I max intensity OT services at d/c to maximize pt function.   Goals   Patient Goals to be more independent   LTG Time Frame 10-14   Plan   Treatment Interventions ADL retraining;Functional transfer training;Endurance training;UE strengthening/ROM;Patient/family training;Equipment evaluation/education;Neuromuscular reeducation;Fine motor coordination activities;Compensatory technique education;Continued evaluation;Energy conservation;Activityengagement   Goal Expiration Date 05/25/25   OT Frequency 2-3x/wk   Discharge Recommendation   Rehab Resource Intensity Level, OT I (Maximum Resource Intensity)   AM-PAC Daily Activity Inpatient   Lower Body Dressing 2   Bathing 2   Toileting 2   Upper Body Dressing 3   Grooming 3   Eating 4   Daily Activity Raw Score 16   Daily Activity Standardized Score (Calc for Raw Score >=11) 35.96   AM-PAC Applied Cognition Inpatient   Following a Speech/Presentation 3   Understanding Ordinary Conversation 4   Taking Medications 4   Remembering Where Things Are Placed or Put Away 4   Remembering List of 4-5 Errands 3   Taking Care of Complicated Tasks 3   Applied Cognition Raw Score 21   Applied Cognition Standardized Score 44.3   End of Consult   Education Provided Yes   Patient Position at End of Consult Seated edge of bed;Bed/Chair alarm activated;All needs within reach   Nurse Communication Nurse aware of consult     Goals:    Pt will complete functional transfers with MOD IND  and appropriate AD to maximize pt safety.    Pt will complete bed mobility with MOD IND  to maximize pt safety.    Pt will complete grooming tasks with MOD IND to maximize pt independence.    Pt will complete LB ADLs with MOD IND  to maximize pt independence.    Pt will complete UB ADLs with MOD IND to maximize pt independence.    Pt will complete toileting with MOD IND to maximize pt independence.    Pt will complete functional short household distance mobility with supervision and appropriate AD to maximize pt safety.    Pt will complete simulated IADL tasks with MOD IND to maximize pt independence.     Pt will be able to tolerate 30 minutes of functional activity during therapy session.    Pt will participate in continued cognitive assessment for safe discharge planning.        JAYASHREE George, OTR/L

## 2025-05-11 NOTE — ASSESSMENT & PLAN NOTE
Patient has chronic ambulatory dysfunction however upon trying to get up from the recliner today.  Patient shifted his left leg and felt a pop.  Since then he has been really unable to put much weight on his left knee. On further evaluation it looks like patient also has chronic wounds.  May need an orthopedic consult in the future given the history of buckling of the leg/popping and unable to put weight on left knee; consider referral on discharge   Plan for left lower ankle wounds noted above  PT OT evaluations pending

## 2025-05-11 NOTE — ASSESSMENT & PLAN NOTE
Lab Results   Component Value Date    HGBA1C 6.0 (H) 05/11/2025     Recent Labs     05/10/25  1717 05/11/25  0802   POCGLU 92 99     Blood Sugar Average: Last 72 hrs:  (P) 95.5  Very well controlled outpatient evidenced by A1c; does not appear to be on any mediations   Discontinue Accu-Cheks and SSI given stable blood sugars  Monitor blood glucose with a.m. BMP  Hypoglycemia protocol

## 2025-05-11 NOTE — PLAN OF CARE
Problem: PAIN - ADULT  Goal: Verbalizes/displays adequate comfort level or baseline comfort level  Description: Interventions:- Encourage patient to monitor pain and request assistance- Assess pain using appropriate pain scale- Administer analgesics based on type and severity of pain and evaluate response- Implement non-pharmacological measures as appropriate and evaluate response- Consider cultural and social influences on pain and pain management- Notify physician/advanced practitioner if interventions unsuccessful or patient reports new pain  Outcome: Progressing     Problem: INFECTION - ADULT  Goal: Absence or prevention of progression during hospitalization  Description: INTERVENTIONS:- Assess and monitor for signs and symptoms of infection- Monitor lab/diagnostic results- Monitor all insertion sites, i.e. indwelling lines, tubes, and drains- Monitor endotracheal if appropriate and nasal secretions for changes in amount and color- [place] appropriate cooling/warming therapies per order- Administer medications as ordered- Instruct and encourage patient and family to use good hand hygiene technique- Identify and instruct in appropriate isolation precautions for identified infection/condition  Outcome: Progressing     Problem: Knowledge Deficit  Goal: Patient/family/caregiver demonstrates understanding of disease process, treatment plan, medications, and discharge instructions  Description: Complete learning assessment and assess knowledge base.Interventions:- Provide teaching at level of understanding- Provide teaching via preferred learning methods  Outcome: Progressing     Problem: DISCHARGE PLANNING  Goal: Discharge to home or other facility with appropriate resources  Description: INTERVENTIONS:- Identify barriers to discharge w/patient and caregiver- Arrange for needed discharge resources and transportation as appropriate- Identify discharge learning needs (meds, wound care, etc.)- Arrange for  interpretive services to assist at discharge as needed- Refer to Case Management Department for coordinating discharge planning if the patient needs post-hospital services based on physician/advanced practitioner order or complex needs related to functional status, cognitive ability, or social support system  Outcome: Progressing

## 2025-05-11 NOTE — ASSESSMENT & PLAN NOTE
POA with creatinine of 1.84 increased from baseline ~1. Initially suspected to be prerenal in setting of diuretics and decreased oral intake. Patient appeared dry on physical exam initially and noted to have chronic left lower leg edema.  Status post IV fluids with normal saline at 75 cc/hr x 8 hours overnight   Noted with hypotension overnight, will resume continues IV fluids today  Continue with holding home Lasix and lisinopril and spironolactone   Avoid hypoperfusion of the kidneys, minimize nephrotoxins  Urinary retention protocol, daily weights, I/O  Will check UA for completeness   Monitor BMP daily

## 2025-05-11 NOTE — ASSESSMENT & PLAN NOTE
Noted with hypotension overnight, blood pressure low normal this AM   Holding spironolactone, lisinopril, Lasix for now due to TAMIR  Continue home dose metoprolol

## 2025-05-11 NOTE — PROGRESS NOTES
Progress Note - Hospitalist   Name: Armando Erickson 70 y.o. male I MRN: [id number]  Unit/Bed#: [facility] 629-01 I Date of Admission: 5/10/2025   Date of Service: 5/11/2025 I Hospital Day: 1    Assessment & Plan  TAMIR (acute kidney injury) (HCC)  POA with creatinine of 1.84 increased from baseline ~1. Initially suspected to be prerenal in setting of diuretics and decreased oral intake. Patient appeared dry on physical exam initially and noted to have chronic left lower leg edema.  Status post IV fluids with normal saline at 75 cc/hr x 8 hours overnight   Noted with hypotension overnight, will resume continues IV fluids today  Continue with holding home Lasix and lisinopril and spironolactone   Avoid hypoperfusion of the kidneys, minimize nephrotoxins  Urinary retention protocol, daily weights, I/O  Will check UA for completeness   Monitor BMP daily   Cellulitis of left lower extremity  POA in the setting of chronic venous stasis ulcer of the left ankle  Continue with IV Ancef and monitor for clinical improvement  Local wound care per podiatry recommendations   Symptomatic and supportive cares  Venous stasis ulcer of left ankle (HCC)  Patient complains of worsening left ankle wound to left lower extremity with chronic swelling. 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 He admits that he has neglected his left leg wounds for quite some time and did not feel much pain due to severe neuropathy.  LLE arterial duplex without evidence of significant arterial occlusive disease  Follow-up results of left foot and ankle x-rays  Podiatry consult and recommendations appreciated   No plans for surgical intervention at this time   Continue with local wound care   IV antibiotic for cellulitis as above  Ambulatory dysfunction  Patient has chronic ambulatory dysfunction however upon trying to get up from the recliner today.  Patient shifted his left leg and felt a pop.  Since then he  has been really unable to put much weight on his left knee. On further evaluation it looks like patient also has chronic wounds.  May need an orthopedic consult in the future given the history of buckling of the leg/popping and unable to put weight on left knee; consider referral on discharge   Plan for left lower ankle wounds noted above  PT OT evaluations pending   Chronic heart failure with preserved ejection fraction (HCC)  Wt Readings from Last 3 Encounters:   01/23/25 (!) 169 kg (372 lb 3 oz)   01/04/25 (!) 169 kg (373 lb 0.3 oz)   12/18/24 (!) 165 kg (364 lb)   Chronic left lower extremity edema noted, otherwise examined dry on arrival  Continue with holding home Lasix for now and continue with gentle IV fluids  Continue home dose metoprolol, lisinopril and spironolactone on hold due to TAMIR  Monitor volume status closely with daily weights, intake and output  Controlled type 2 diabetes mellitus with complication, without long-term current use of insulin ([facility])  Lab Results   Component Value Date    HGBA1C 6.0 (H) 05/11/2025     Recent Labs     05/10/25  1717 05/11/25  0802   POCGLU 92 99     Blood Sugar Average: Last 72 hrs:  (P) 95.5  Very well controlled outpatient evidenced by A1c; does not appear to be on any mediations   Discontinue Accu-Cheks and SSI given stable blood sugars  Monitor blood glucose with a.m. BMP  Hypoglycemia protocol  Essential hypertension  Noted with hypotension overnight, blood pressure low normal this AM   Holding spironolactone, lisinopril, Lasix for now due to TAMIR  Continue home dose metoprolol  Paroxysmal atrial fibrillation (HCC)  Continue AC with apixaban  Continue metoprolol 50 mg twice daily  Gastroesophageal reflux disease without esophagitis  Continue pantoprazole  Hypothyroidism  Continue home dose levothyroxine  HLD (hyperlipidemia)  Lipid panel: cholesterol 113, , HDL 34, LDL 59  Not currently on statin  Obesity, Class III, BMI 40-49.9 (morbid obesity)  Affects all  "aspects of care  Encourage diet and lifestyle modifications     VTE Pharmacologic Prophylaxis: VTE Score: 6 High Risk (Score >/= 5) - Pharmacological DVT Prophylaxis Ordered: apixaban (Eliquis). Sequential Compression Devices Ordered.    Mobility:   Basic Mobility Inpatient Raw Score: 18  JH-HLM Goal: 6: Walk 10 steps or more  JH-HLM Achieved: 3: Sit at edge of bed  JH-HLM Goal NOT achieved. Continue with multidisciplinary rounding and encourage appropriate mobility to improve upon JH-HLM goals.    Patient Centered Rounds: I performed bedside rounds with nursing staff today.   Discussions with Specialists or Other Care Team Provider: primary RN, will d/w ortho on call     Education and Discussions with Family / Patient: Patient declined call to .     Current Length of Stay: 1 day(s)  Current Patient Status: Inpatient   Certification Statement: The patient will continue to require additional inpatient hospital stay due to TAMIR, hypotension, cellulitis  Discharge Plan: Anticipate discharge in 48-72 hrs to discharge location to be determined pending rehab evaluations.    Code Status: Level 1 - Full Code    Subjective   Patient offers no specific complaints at this time. Notes his R knee felt \"wobbly\" when working with therapy again today. He tells me it has felt unsteady for the last month, but yesterday his R knee buckled which is what prompted him to present to the hospital. He denies injury/trauma to the knee and denies pain. He is wondering if something may be torn, advised I would discuss with orthopedic surgery. Otherwise states his appetite has been fine and feels as though he has been staying adequately hydrated. Denies nausea, vomiting, diarrhea, urinary symptoms.     Objective :  Temp:  [97.1 °F (36.2 °C)-98.1 °F (36.7 °C)] 97.6 °F (36.4 °C)  HR:  [69-90] 73  BP: ()/(49-71) 113/56  Resp:  [16-19] 17  SpO2:  [95 %-99 %] 98 %  O2 Device: None (Room air)    There is no height or weight on file " to calculate BMI.     Input and Output Summary (last 24 hours):     Intake/Output Summary (Last 24 hours) at 5/11/2025 0846  Last data filed at 5/11/2025 0737  Gross per 24 hour   Intake 942 ml   Output 2000 ml   Net -1058 ml       Physical Exam  Vitals and nursing note reviewed.   Constitutional:       Appearance: He is morbidly obese.   Cardiovascular:      Rate and Rhythm: Normal rate.   Pulmonary:      Effort: Pulmonary effort is normal. No respiratory distress.      Breath sounds: No wheezing, rhonchi or rales.   Musculoskeletal:         General: Deformity (s/p L BKA) present.      Left lower leg: Edema (mild LLE chronic swelling) present.   Skin:     Comments: RLE wound dressing CDI  Mild erythema RLE   Venous stasis dermatitis   Neurological:      General: No focal deficit present.      Mental Status: He is alert and oriented to person, place, and time. Mental status is at baseline.         Lines/Drains:              Lab Results: I have reviewed the following results:   Results from last 7 days   Lab Units 05/11/25  0455 05/10/25  1136   WBC Thousand/uL 8.31 7.44   HEMOGLOBIN g/dL 11.4* 11.6*   HEMATOCRIT % 37.0 38.4   PLATELETS Thousands/uL 241 239   SEGS PCT %  --  82*   LYMPHO PCT %  --  9*   MONO PCT %  --  8   EOS PCT %  --  1     Results from last 7 days   Lab Units 05/11/25  0455   SODIUM mmol/L 135   POTASSIUM mmol/L 5.0   CHLORIDE mmol/L 99   CO2 mmol/L 29   BUN mg/dL 34*   CREATININE mg/dL 1.81*   ANION GAP mmol/L 7   CALCIUM mg/dL 9.2   ALBUMIN g/dL 3.9   TOTAL BILIRUBIN mg/dL 0.56   ALK PHOS U/L 93   ALT U/L 6*   AST U/L 10*   GLUCOSE RANDOM mg/dL 102         Results from last 7 days   Lab Units 05/11/25  0802 05/10/25  1717   POC GLUCOSE mg/dl 99 92     Results from last 7 days   Lab Units 05/11/25  0455   HEMOGLOBIN A1C % 6.0*           Recent Cultures (last 7 days):   Results from last 7 days   Lab Units 05/10/25  1829   GRAM STAIN RESULT  No Polys or Bacteria seen       Imaging Results Review:  No pertinent imaging studies reviewed.  Other Study Results Review: No additional pertinent studies reviewed.    Last 24 Hours Medication List:     Current Facility-Administered Medications:     apixaban (ELIQUIS) tablet 5 mg, BID    aspirin (ECOTRIN LOW STRENGTH) EC tablet 81 mg, Daily    ceFAZolin (ANCEF) IVPB (premix in dextrose) 2,000 mg 50 mL, Q8H, Last Rate: 2,000 mg (05/10/25 0265)    [Held by provider] furosemide (LASIX) tablet 40 mg, BID    insulin lispro (HumALOG/ADMELOG) 100 units/mL subcutaneous injection 1-6 Units, TID AC **AND** Fingerstick Glucose (POCT), TID AC    levothyroxine tablet 25 mcg, Early Morning    levothyroxine tablet 300 mcg, Early Morning    [Held by provider] lisinopril (ZESTRIL) tablet 40 mg, Daily    metoprolol tartrate (LOPRESSOR) tablet 50 mg, BID    ondansetron (ZOFRAN) injection 4 mg, Q6H PRN    pantoprazole (PROTONIX) EC tablet 40 mg, Daily    [Held by provider] spironolactone (ALDACTONE) tablet 25 mg, Daily    Administrative Statements   Today, Patient Was Seen By: Liv Thomas PA-C    **Please Note: This note may have been constructed using a voice recognition system.**

## 2025-05-11 NOTE — ASSESSMENT & PLAN NOTE
Patient has chronic ambulatory dysfunction however upon trying to get up from the recliner on day of admission he shifted his left leg and felt a pop.  Since then he has been really unable to put much weight on his left knee. On further evaluation it looks like patient also has chronic wounds.  Prior provider discussed curbside with orthopedic surgery regarding the R knee buckling - as long as patient able to bear weight, they would not recommend further workup/treatment as an inpatient   Patient would like to be seen inpatient by ortho as he cannot bear much weight on his leg, denies pain  Check xray  Consult ortho   PT OT evaluations recommending rehab

## 2025-05-11 NOTE — PLAN OF CARE
Problem: Potential for Falls  Goal: Patient will remain free of falls  Description: INTERVENTIONS:- Educate patient/family on patient safety including physical limitations- Instruct patient to call for assistance with activity - Consult OT/PT to assist with strengthening/mobility - Keep Call bell within reach- Keep bed low and locked with side rails adjusted as appropriate- Keep care items and personal belongings within reach- Initiate and maintain comfort rounds- Make Fall Risk Sign visible to staff- Offer Toileting every 2 Hours, in advance of need- Initiate/Maintain bed alarm- Obtain necessary fall risk management equipment: bed alarm- Apply yellow socks and bracelet for high fall risk patients- Consider moving patient to room near nurses station  INTERVENTIONS:- Educate patient/family on patient safety including physical limitations- Instruct patient to call for assistance with activity - Consult OT/PT to assist with strengthening/mobility - Keep Call bell within reach- Keep bed low and locked with side rails adjusted as appropriate- Keep care items and personal belongings within reach- Initiate and maintain comfort rounds- Make Fall Risk Sign visible to staff- Offer Toileting every 2 Hours, in advance of need- Initiate/Maintain bed alarm- Obtain necessary fall risk management equipment: bed alarm - Apply yellow socks and bracelet for high fall risk patients- Consider moving patient to room near nurses station  Outcome: Progressing     Problem: PAIN - ADULT  Goal: Verbalizes/displays adequate comfort level or baseline comfort level  Description: Interventions:- Encourage patient to monitor pain and request assistance- Assess pain using appropriate pain scale- Administer analgesics based on type and severity of pain and evaluate response- Implement non-pharmacological measures as appropriate and evaluate response- Consider cultural and social influences on pain and pain management- Notify physician/advanced  practitioner if interventions unsuccessful or patient reports new pain  Outcome: Progressing     Problem: INFECTION - ADULT  Goal: Absence or prevention of progression during hospitalization  Description: INTERVENTIONS:- Assess and monitor for signs and symptoms of infection- Monitor lab/diagnostic results- Monitor all insertion sites, i.e. indwelling lines, tubes, and drains- Monitor endotracheal if appropriate and nasal secretions for changes in amount and color- [place] appropriate cooling/warming therapies per order- Administer medications as ordered- Instruct and encourage patient and family to use good hand hygiene technique- Identify and instruct in appropriate isolation precautions for identified infection/condition  Outcome: Progressing     Problem: SAFETY ADULT  Goal: Patient will remain free of falls  Description: INTERVENTIONS:- Educate patient/family on patient safety including physical limitations- Instruct patient to call for assistance with activity - Consult OT/PT to assist with strengthening/mobility - Keep Call bell within reach- Keep bed low and locked with side rails adjusted as appropriate- Keep care items and personal belongings within reach- Initiate and maintain comfort rounds- Make Fall Risk Sign visible to staff- Offer Toileting every 2 Hours, in advance of need- Initiate/Maintain bed alarm- Obtain necessary fall risk management equipment: bed alarm - Apply yellow socks and bracelet for high fall risk patients- Consider moving patient to room near nurses station  INTERVENTIONS:- Educate patient/family on patient safety including physical limitations- Instruct patient to call for assistance with activity - Consult OT/PT to assist with strengthening/mobility - Keep Call bell within reach- Keep bed low and locked with side rails adjusted as appropriate- Keep care items and personal belongings within reach- Initiate and maintain comfort rounds- Make Fall Risk Sign visible to staff- Offer  Toileting every 2 Hours, in advance of need- Initiate/Maintain bed alarm- Obtain necessary fall risk management equipment: bed alarm- Apply yellow socks and bracelet for high fall risk patients- Consider moving patient to room near nurses station  Outcome: Progressing  Goal: Maintain or return to baseline ADL function  Description: INTERVENTIONS:-  Assess patient's ability to carry out ADLs; assess patient's baseline for ADL function and identify physical deficits which impact ability to perform ADLs (bathing, care of mouth/teeth, toileting, grooming, dressing, etc.)- Assess/evaluate cause of self-care deficits - Assess range of motion- Assess patient's mobility; develop plan if impaired- Assess patient's need for assistive devices and provide as appropriate- Encourage maximum independence but intervene and supervise when necessary- Involve family in performance of ADLs- Assess for home care needs following discharge - Consider OT consult to assist with ADL evaluation and planning for discharge- Provide patient education as appropriate  Outcome: Progressing  Goal: Maintains/Returns to pre admission functional level  Description: INTERVENTIONS:- Perform AM-PAC 6 Click Basic Mobility/ Daily Activity assessment daily.- Set and communicate daily mobility goal to care team and patient/family/caregiver. - Collaborate with rehabilitation services on mobility goals if consulted- Perform Range of Motion 3 times a day.- Reposition patient every 2 hours.- Dangle patient 3 times a day- Stand patient 3 times a day- Ambulate patient 3 times a day- Out of bed to chair 3 times a day - Out of bed for meals 3 times a day- Out of bed for toileting- Record patient progress and toleration of activity level   Outcome: Progressing     Problem: DISCHARGE PLANNING  Goal: Discharge to home or other facility with appropriate resources  Description: INTERVENTIONS:- Identify barriers to discharge w/patient and caregiver- Arrange for needed  discharge resources and transportation as appropriate- Identify discharge learning needs (meds, wound care, etc.)- Arrange for interpretive services to assist at discharge as needed- Refer to Case Management Department for coordinating discharge planning if the patient needs post-hospital services based on physician/advanced practitioner order or complex needs related to functional status, cognitive ability, or social support system  Outcome: Progressing     Problem: Knowledge Deficit  Goal: Patient/family/caregiver demonstrates understanding of disease process, treatment plan, medications, and discharge instructions  Description: Complete learning assessment and assess knowledge base.Interventions:- Provide teaching at level of understanding- Provide teaching via preferred learning methods  Outcome: Progressing     Problem: NEUROSENSORY - ADULT  Goal: Achieves stable or improved neurological status  Description: INTERVENTIONS- Monitor and report changes in neurological status- Monitor vital signs such as temperature, blood pressure, glucose, and any other labs ordered - Initiate measures to prevent increased intracranial pressure- Monitor for seizure activity and implement precautions if appropriate    Outcome: Progressing  Goal: Remains free of injury related to seizures activity  Description: INTERVENTIONS- Maintain airway, patient safety  and administer oxygen as ordered- Monitor patient for seizure activity, document and report duration and description of seizure to physician/advanced practitioner- If seizure occurs,  ensure patient safety during seizure- Reorient patient post seizure- Seizure pads on all 4 side rails- Instruct patient/family to notify RN of any seizure activity including if an aura is experienced- Instruct patient/family to call for assistance with activity based on nursing assessment- Administer anti-seizure medications if ordered  Outcome: Progressing  Goal: Achieves maximal functionality  and self care  Description: INTERVENTIONS- Monitor swallowing and airway patency with patient fatigue and changes in neurological status- Encourage and assist patient to increase activity and self care. - Encourage visually impaired, hearing impaired and aphasic patients to use assistive/communication devices  Outcome: Progressing     Problem: METABOLIC, FLUID AND ELECTROLYTES - ADULT  Goal: Electrolytes maintained within normal limits  Description: INTERVENTIONS:- Monitor labs and assess patient for signs and symptoms of electrolyte imbalances- Administer electrolyte replacement as ordered- Monitor response to electrolyte replacements, including repeat lab results as appropriate- Instruct patient on fluid and nutrition as appropriate  Outcome: Progressing  Goal: Fluid balance maintained  Description: INTERVENTIONS:- Monitor labs - Monitor I/O and WT- Instruct patient on fluid and nutrition as appropriate- Assess for signs & symptoms of volume excess or deficit  Outcome: Progressing  Goal: Glucose maintained within target range  Description: INTERVENTIONS:- Monitor Blood Glucose as ordered- Assess for signs and symptoms of hyperglycemia and hypoglycemia- Administer ordered medications to maintain glucose within target range- Assess nutritional intake and initiate nutrition service referral as needed  Outcome: Progressing     Problem: Prexisting or High Potential for Compromised Skin Integrity  Goal: Skin integrity is maintained or improved  Description: INTERVENTIONS:- Identify patients at risk for skin breakdown- Assess and monitor skin integrity- Assess and monitor nutrition and hydration status- Monitor labs - Assess for incontinence - Turn and reposition patient- Assist with mobility/ambulation- Relieve pressure over bony prominences- Avoid friction and shearing- Provide appropriate hygiene as needed including keeping skin clean and dry- Evaluate need for skin moisturizer/barrier cream- Collaborate with  interdisciplinary team - Patient/family teaching- Consider wound care consult   Outcome: Progressing

## 2025-05-11 NOTE — PLAN OF CARE
Problem: OCCUPATIONAL THERAPY ADULT  Goal: Performs self-care activities at highest level of function for planned discharge setting.  See evaluation for individualized goals.  Description: Treatment Interventions: ADL retraining, Functional transfer training, Endurance training, UE strengthening/ROM, Patient/family training, Equipment evaluation/education, Neuromuscular reeducation, Fine motor coordination activities, Compensatory technique education, Continued evaluation, Energy conservation, Activityengagement          See flowsheet documentation for full assessment, interventions and recommendations.   5/11/2025 1252 by Cami Cochran OT  Outcome: Progressing  Note: Limitation: Decreased ADL status, Decreased endurance, Decreased self-care trans, Decreased high-level ADLs  Prognosis: Good  Assessment: Pt is a 69 y/o male that was admitted to [facility] 5/10/2025 with TAMIR and cellulitis of L LE. Pt WBAT L LE. Pt with active OT orders and activity orders. Pt  has a past medical history of Abnormal urinalysis, Atrial fibrillation (HCC), Cellulitis, Cellulitis of left lower extremity, Diabetes mellitus (HCC), Disease of thyroid gland, Duodenal ulcer, High blood pressure, High cholesterol, History of duodenal ulcer, Hyperlipidemia, Hypertension, Hypothyroidism, Lymphedema, Morbid obesity with BMI of 40.0-44.9, adult (HCC), and Peritonitis (HCC). Pt lives alone in a one level apartment with 0 STACIE, elevator, a raised toilet with grab bars, and tub shower with shower chair. Pt reports using w/c vs office chair to complete functional mobility and rw to complete transfers. Prior to admission pt (I) ADLs, IADLs, and functional mobility. Pt currently requires supervision to complete UB ADLs, MIN A to complete bed mobility, MAX A to complete LB ADLs and toileting, and MAX Ax2 to complete functional transfers with HHA. Pt limited by decreased ADL status, functional transfers, functional mobility, and activity  tolerance. Pt supine in bed at begning of session, pt seated EOB at end of session with alarm set and items within reach. The patient's raw score on the AM-PAC Daily Activity Inpatient Short Form is 16. A raw score of less than 19 suggests the patient may benefit from discharge to post-acute rehabilitation services. Please refer to the recommendation of the Occupational Therapist for safe discharge planning. Recommend Level I max intensity OT services at d/c to maximize pt function.     Rehab Resource Intensity Level, OT: I (Maximum Resource Intensity)       5/11/2025 1252 by Cami Cochran OT  Note: Limitation: Decreased ADL status, Decreased endurance, Decreased self-care trans, Decreased high-level ADLs  Prognosis: Good  Assessment: Pt is a 71 y/o male that was admitted to [facility] 5/10/2025 with TAMIR and cellulitis of L LE. Pt WBAT L LE. Pt with active OT orders and activity orders. Pt  has a past medical history of Abnormal urinalysis, Atrial fibrillation (HCC), Cellulitis, Cellulitis of left lower extremity, Diabetes mellitus (HCC), Disease of thyroid gland, Duodenal ulcer, High blood pressure, High cholesterol, History of duodenal ulcer, Hyperlipidemia, Hypertension, Hypothyroidism, Lymphedema, Morbid obesity with BMI of 40.0-44.9, adult (HCC), and Peritonitis (HCC). Pt lives alone in a one level apartment with 0 STACIE, elevator, a raised toilet with grab bars, and tub shower with shower chair. Pt reports using w/c vs office chair to complete functional mobility and rw to complete transfers. Prior to admission pt (I) ADLs, IADLs, and functional mobility. Pt currently requires supervision to complete UB ADLs, MIN A to complete bed mobility, MAX A to complete LB ADLs and toileting, and MAX Ax2 to complete functional transfers with HHA. Pt limited by decreased ADL status, functional transfers, functional mobility, and activity tolerance. Pt supine in bed at begning of session, pt seated EOB at end of  session with alarm set and items within reach. The patient's raw score on the AM-PAC Daily Activity Inpatient Short Form is 16. A raw score of less than 19 suggests the patient may benefit from discharge to post-acute rehabilitation services. Please refer to the recommendation of the Occupational Therapist for safe discharge planning. Recommend Level I max intensity OT services at d/c to maximize pt function.     Rehab Resource Intensity Level, OT: I (Maximum Resource Intensity)

## 2025-05-11 NOTE — PLAN OF CARE
Problem: PHYSICAL THERAPY ADULT  Goal: Performs mobility at highest level of function for planned discharge setting.  See evaluation for individualized goals.  Description: Treatment/Interventions: ADL retraining, Functional transfer training, LE strengthening/ROM, Therapeutic exercise, Endurance training, Bed mobility, Equipment eval/education, Patient/family training, Compensatory technique education, Continued evaluation, Spoke to nursing, OT  Equipment Recommended: Walker, Wheelchair       See flowsheet documentation for full assessment, interventions and recommendations.  Note: Prognosis: Fair  Problem List: Decreased strength, Decreased endurance, Impaired balance, Decreased mobility, Impaired sensation, Obesity, Decreased skin integrity  Assessment: Pt is an 70 y.o. male presenting to [facility] on 5/10/25 for primary medical dx of TAMIR (acute kidney injury). Pt  has a past medical history of Abnormal urinalysis, Atrial fibrillation (HCC), Cellulitis, Cellulitis of left lower extremity, Diabetes mellitus (HCC), Disease of thyroid gland, Duodenal ulcer, High blood pressure, High cholesterol, History of duodenal ulcer, Hyperlipidemia, Hypertension, Hypothyroidism, Lymphedema, Morbid obesity with BMI of 40.0-44.9, adult (HCC), and Peritonitis (HCC). Pt presents as a high complexity evaluation due to Ongoing medical management for primary dx, Increased reliance on more restrictive AD compared to baseline, Decreased activity tolerance compared to baseline, Fall risk, Increased assistance needed from caregiver at current time, Diagnostic imaging pending, Continuous pulse oximetry monitoring . Pt currently requires min A for bed mobility and max Ax2 for transfers with b/l HHA and L knee block.  Pt is limited by pain and deficits in strength, endurance, balance, mobility and activity tolerance limiting their ability to safely return home alone and be independent. Pt would benefit from continued skilled acute care PT  services to address impairments and promote functional independence. Recommend level 1 resources to improve mobility and promote PLOF. The patient's AM-PAC Basic Mobility Inpatient Short Form Raw Score is 9. A Raw score of less than 16 suggests the patient may benefit from discharge to post-acute rehabilitation services. Please also refer to the recommendation of the Physical Therapist for safe discharge planning. Pt left sitting EOB with bed alarm donned, call bell and personal items within reach and all needs met. RN aware.  Barriers to Discharge: Inaccessible home environment, Decreased caregiver support     Rehab Resource Intensity Level, PT: I (Maximum Resource Intensity)    See flowsheet documentation for full assessment.

## 2025-05-11 NOTE — ASSESSMENT & PLAN NOTE
POA in the setting of chronic venous stasis ulcer of the left ankle  Continue with IV Ancef and monitor for clinical improvement  Local wound care per podiatry recommendations   Symptomatic and supportive cares

## 2025-05-11 NOTE — PROGRESS NOTES
Podiatry - Progress Note  Patient: Armando Erickson 70 y.o. male   MRN: [id number]  PCP: Aida Interiano MD  Unit/Bed#: [facility] 629-01 Encounter: [id number]  Date Of Visit: 05/11/25    ASSESSMENT:    Armando Erickson is a 70 y.o. male with:    Left ankle venous stasis ulcer  Left distal hallux DTI  Type 2 DM with peripheral neuropathy  TAMIR  Class 3 obesity  S/P right BKA      PLAN:    Patient was seen and evaluated at bedside with all questions and concerns addressed.  Will continue local wound care for the left lower extremity venous stasis ulcers and further control of drainage with compression and elevation.  Reviewed left foot and ankle x-ray.  Per my personal read, there is no soft tissue emphysema, abscess or any bony erosion to suggest osteomyelitis.  Will follow-up with the final read  Patient has been having significant amount of serous drainage from the left lower leg/ankle wounds.  The denuded anterior shin skin automatically sloughed off to expose the dermis with clear drainage.  Medial and lateral ankle wounds also shows moderate maceration to the wound edge.  Based of these findings, Maxorb DSD with Ace compression was applied.  Patient was also addressed of elevation to control the drainage.  Patients' lab and vital signs were reviewed. Patient is afebrile with no leukocytosis. Patient does not  meet the SIRS criteria. Will keep monitoring.  Continue local wound care, appreciate nursing assistance with dressing changes. Wound care order is updated.  Follow-up with wound culture  Elevation and offloading on green foam wedges or pillows when non-ambulatory. Patient should in prevalon boot all the time when in bed.  Rest of care per primary team.     Weightbearing status: Weightbearing as tolerated    SUBJECTIVE:     The patient was seen, evaluated, and assessed at bedside today. The patient was awake, alert, and in no acute distress. No acute events overnight. The patient reports no pain or discomfort from the  left lower extremity.  He reports not to notice any leakage from the previous dressing.  However, nurse messaged in the morning that there is a strikethrough and change the dressing according to the instructions. Patient denies N/V/F/chills/SOB/CP.      OBJECTIVE:     Vitals:   /53   Pulse 75   Temp (!) 97.4 °F (36.3 °C)   Resp 16   SpO2 95%     Temp (24hrs), Av.5 °F (36.4 °C), Min:97.1 °F (36.2 °C), Max:98.1 °F (36.7 °C)      Physical Exam:     Lungs: Non labored breathing  Abdomen: Soft, non-tender.  Lower Extremity:  Cardiovascular status at baseline from admission.  Neurological status at baseline from admission.  Musculoskeletal status  at baseline from admission. No calf tenderness noted.     Right lower limb s/p BKA    Wound #: 1  Location: Medial left ankle, superior  Length 6.5 cm: Width 2.5 cm: Depth 0.3 cm:   Deepest Tissue Noted in Base: Subcutaneous tissue  Probe to Bone: No  Peripheral Skin Description: Attached, macerated  Granulation: 90% Fibrotic Tissue: 10% Necrotic Tissue: 0%   Drainage Amount: Moderate, serous  Signs of Infection: No.     Wound #: 2  Location: Medial left ankle, inferior  Length 3.5 cm: Width 2.5 cm: Depth 0.2 cm:   Deepest Tissue Noted in Base: Subcutaneous tissue  Probe to Bone: No  Peripheral Skin Description: Attached, macerated  Granulation: 10% Fibrotic Tissue: 90% Necrotic Tissue: 0%   Drainage Amount: moderate, serous  Signs of Infection: No.     Wound #: 3  Location: Medial left ankle, inferior anterior  Length 2cm: Width 1.5cm: Depth 0.3cm:   Deepest Tissue Noted in Base: Subcutaneous tissue  Probe to Bone: No  Peripheral Skin Description: Attached, macerated  Granulation: 100% Fibrotic Tissue: 0% Necrotic Tissue: 0%   Drainage Amount: moderate, serous  Signs of Infection: No.       Wound #: 4  Location: Left lateral ankle  Length 7cm: Width 3cm: Depth 0.2cm:   Deepest Tissue Noted in Base: Subcutaneous tissue  Probe to Bone: No  Peripheral Skin  "Description: Attached, macerated  Granulation: 100% Fibrotic Tissue: 0% Necrotic Tissue: 0%   Drainage Amount: moderate, serous  Signs of Infection: No.       Left anterior lower shin shows denuded epithelium with exposed dermis with clear drainage.      Left distal hallux shows a callus and deep hemorrhagic blister.      Additional Data:     Labs:    Results from last 7 days   Lab Units 05/11/25  0455 05/10/25  1136   WBC Thousand/uL 8.31 7.44   HEMOGLOBIN g/dL 11.4* 11.6*   HEMATOCRIT % 37.0 38.4   PLATELETS Thousands/uL 241 239   SEGS PCT %  --  82*   LYMPHO PCT %  --  9*   MONO PCT %  --  8   EOS PCT %  --  1     Results from last 7 days   Lab Units 05/11/25  0455   POTASSIUM mmol/L 5.0   CHLORIDE mmol/L 99   CO2 mmol/L 29   BUN mg/dL 34*   CREATININE mg/dL 1.81*   CALCIUM mg/dL 9.2   ALK PHOS U/L 93   ALT U/L 6*   AST U/L 10*           * I Have Reviewed All Lab Data Listed Above.    Recent Cultures (last 7 days):               Imaging: I have personally reviewed pertinent films in PACS  EKG, Pathology, and Other Studies: I have personally reviewed pertinent reports.    ** Please Note: Portions of the record may have been created with voice recognition software. Occasional wrong word or \"sound a like\" substitutions may have occurred due to the inherent limitations of voice recognition software. Read the chart carefully and recognize, using context, where substitutions have occurred. **      "

## 2025-05-11 NOTE — ASSESSMENT & PLAN NOTE
Patient complains of worsening left ankle wound to left lower extremity with chronic swelling. 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 He admits that he has neglected his left leg wounds for quite some time and did not feel much pain due to severe neuropathy.  LLE arterial duplex without evidence of significant arterial occlusive disease  Follow-up results of left foot and ankle x-rays  Podiatry consult and recommendations appreciated   No plans for surgical intervention at this time   Continue with local wound care   IV antibiotic for cellulitis as above

## 2025-05-11 NOTE — PHYSICAL THERAPY NOTE
Physical Therapy Evaluation    Patient Name: Armando Erickson    Today's Date: 5/11/2025     Problem List  Principal Problem:    TAMIR (acute kidney injury) (HCC)  Active Problems:    Controlled type 2 diabetes mellitus with complication, without long-term current use of insulin (HCC)    Essential hypertension    HLD (hyperlipidemia)    Hypothyroidism    Obesity, Class III, BMI 40-49.9 (morbid obesity)    Paroxysmal atrial fibrillation (HCC)    Gastroesophageal reflux disease without esophagitis    Cellulitis of left lower extremity    Chronic heart failure with preserved ejection fraction (HCC)    Ambulatory dysfunction    Venous stasis ulcer of left ankle (HCC)       Past Medical History  Past Medical History:   Diagnosis Date    Abnormal urinalysis 06/10/2021    Atrial fibrillation (HCC)     Cellulitis     Cellulitis of left lower extremity 04/09/2021    Diabetes mellitus (HCC)     Disease of thyroid gland     Duodenal ulcer     perforated- ICU stay    High blood pressure     High cholesterol     History of duodenal ulcer 04/04/2014    Hyperlipidemia     Hypertension     Hypothyroidism     Lymphedema      b/l LE- was followed at wound center    Morbid obesity with BMI of 40.0-44.9, adult (HCC)     Peritonitis (HCC)         Past Surgical History  Past Surgical History:   Procedure Laterality Date    BELOW KNEE LEG AMPUTATION Right     DIAGNOSTIC LAPAROSCOPY      KNEE ARTHROSCOPY Bilateral     OTHER SURGICAL HISTORY  03/2014     lap repair    OTHER SURGICAL HISTORY      Laparoscopic repair of a perforated duodenal ulcer with Javed omental    OTHER SURGICAL HISTORY      Placement of drains      05/11/25 1003   PT Last Visit   PT Visit Date 05/11/25   Note Type   Note type Evaluation   Pain Assessment   Pain Assessment Tool 0-10   Pain Score No Pain   Restrictions/Precautions   Weight Bearing Precautions Per Order Yes   LLE Weight Bearing Per Order WBAT   Braces or  "Orthoses   (prosthetic)   Other Precautions Chair Alarm;Bed Alarm;Multiple lines;WBS;Fall Risk  (R BKA)   Home Living   Type of Home Apartment   Home Layout One level;Performs ADLs on one level;Ramped entrance;Elevator   Bathroom Shower/Tub Tub/shower unit   Bathroom Toilet Raised   Bathroom Equipment Grab bars in shower;Tub transfer bench;Grab bars around toilet;Toilet raiser   Home Equipment Walker;Wheelchair-manual   Additional Comments pt reports living alone in one level apartment   Prior Function   Level of [place] Independent with ADLs;Independent with functional mobility;Independent with IADLS  (daughter and DONATO assist with laundry every other week)   Lives With Alone   Receives Help From Family   IADLs Family/Friend/Other provides transportation;Independent with meal prep;Independent with medication management   Falls in the last 6 months 0   Vocational Retired  ()   Comments pt reports independence PTA. has recently been stand pivoting to \"office chair\" while in the apartment. has not used the RW for a couple months. using WC in the community-able to self propel   General   Family/Caregiver Present No   Cognition   Overall Cognitive Status WFL   Arousal/Participation Cooperative   Attention Within functional limits   Orientation Level Oriented X4   Memory Within functional limits   Following Commands Follows all commands and directions without difficulty   Comments pt pleasant and cooperative   Subjective   Subjective pt agreeable to mobilize   RLE Assessment   RLE Assessment X  (grossly 4-/5 functionally)   LLE Assessment   LLE Assessment X  (grossly 4-/5 functionally)   Light Touch   LLE Light Touch Impaired   LLE Light Touch Comments below the ankle decreased sensation   Proprioception   LLE Proprioception Impaired   Bed Mobility   Supine to Sit 4  Minimal assistance   Additional items Assist x 1;HOB elevated;Increased time required;Verbal cues   Sit to Supine Unable to assess "   Additional Comments pt sitting EOB with S to don prosthetic. pt left sitting EOB   Transfers   Sit to Stand 2  Maximal assistance   Additional items Assist x 2;Increased time required;Verbal cues   Stand to Sit 2  Maximal assistance   Additional items Assist x 2;Increased time required;Verbal cues   Additional Comments elevated bed. b/l HHA and L knee block. L knee buckle noted when attempting to stand.   Ambulation/Elevation   Gait pattern Not appropriate;Not tested   Ambulation/Elevation Additional Comments pt unable to take steps at this time   Balance   Static Sitting Fair +   Dynamic Sitting Fair   Static Standing Poor   Dynamic Standing Poor -   Endurance Deficit   Endurance Deficit Yes   Endurance Deficit Description pt limited by fatigue, LLE weakness and decreased activity tolerance   Activity Tolerance   Activity Tolerance Patient limited by fatigue   Medical Staff Made Aware OT Cami   Nurse Made Aware yes-RN cleared   Assessment   Prognosis Fair   Problem List Decreased strength;Decreased endurance;Impaired balance;Decreased mobility;Impaired sensation;Obesity;Decreased skin integrity   Assessment Pt is an 70 y.o. male presenting to \[facility]\"" on 5/10/25 for primary medical dx of TAMIR (acute kidney injury). Pt  has a past medical history of Abnormal urinalysis, Atrial fibrillation (HCC), Cellulitis, Cellulitis of left lower extremity, Diabetes mellitus (HCC), Disease of thyroid gland, Duodenal ulcer, High blood pressure, High cholesterol, History of duodenal ulcer, Hyperlipidemia, Hypertension, Hypothyroidism, Lymphedema, Morbid obesity with BMI of 40.0-44.9, adult (HCC), and Peritonitis (HCC). Pt presents as a high complexity evaluation due to Ongoing medical management for primary dx, Increased reliance on more restrictive AD compared to baseline, Decreased activity tolerance compared to baseline, Fall risk, Increased assistance needed from caregiver at current time, Diagnostic imaging pending, Continuous  pulse oximetry monitoring . Pt currently requires min A for bed mobility and max Ax2 for transfers with b/l HHA and L knee block.  Pt is limited by pain and deficits in strength, endurance, balance, mobility and activity tolerance limiting their ability to safely return home alone and be independent. Pt would benefit from continued skilled acute care PT services to address impairments and promote functional independence. Recommend level 1 resources to improve mobility and promote PLOF. The patient's AM-PAC Basic Mobility Inpatient Short Form Raw Score is 9. A Raw score of less than 16 suggests the patient may benefit from discharge to post-acute rehabilitation services. Please also refer to the recommendation of the Physical Therapist for safe discharge planning. Pt left sitting EOB with bed alarm donned, call bell and personal items within reach and all needs met. RN aware.   Barriers to Discharge Inaccessible home environment;Decreased caregiver support   Goals   Patient Goals to get stronger and use the RW again to ambulate   STG Expiration Date 05/25/25   Short Term Goal #1 In 14 days pt will complete bed mobility at mod I to increase independence. Pt will complete transfers at mod I to promote independence and safety. Pt will improve LLE strength by 1/2 grade to improve efficiency of transfers and ambulation. Pt will propel himself in ' at mod I with promote safe access to community. Pt will improve dynamic balance by 1/2 grade to decrease falls risk. PT to continue to assess for gait goals.   PT Treatment Day 0   Plan   Treatment/Interventions ADL retraining;Functional transfer training;LE strengthening/ROM;Therapeutic exercise;Endurance training;Bed mobility;Equipment eval/education;Patient/family training;Compensatory technique education;Continued evaluation;Spoke to nursing;OT   PT Frequency 3-5x/wk   Discharge Recommendation   Rehab Resource Intensity Level, PT I (Maximum Resource Intensity)    Equipment Recommended Walker;Wheelchair   Wheelchair Package Recommended Heavy Duty (pt wt 250 lbs+)   Walker Package Recommended HD Bariatric wheeled walker   AM-PAC Basic Mobility Inpatient   Turning in Flat Bed Without Bedrails 3   Lying on Back to Sitting on Edge of Flat Bed Without Bedrails 2   Moving Bed to Chair 1   Standing Up From Chair Using Arms 1   Walk in Room 1   Climb 3-5 Stairs With Railing 1   Basic Mobility Inpatient Raw Score 9   Turning Head Towards Sound 4   Follow Simple Instructions 4   Low Function Basic Mobility Raw Score  17   Low Function Basic Mobility Standardized Score  27.46   [facility] Highest Level Of Mobility   -[facility] Goal 3: Sit at edge of bed   -HL Achieved 3: Sit at edge of bed   Modified Leeann Scale   Modified Leeann Scale 4   RITO BarnhartT

## 2025-05-12 ENCOUNTER — APPOINTMENT (INPATIENT)
Dept: RADIOLOGY | Facility: HOSPITAL | Age: 70
End: 2025-05-12
Payer: MEDICARE

## 2025-05-12 PROBLEM — M25.362 KNEE INSTABILITY, LEFT: Status: ACTIVE | Noted: 2025-05-12

## 2025-05-12 LAB
ANION GAP SERPL CALCULATED.3IONS-SCNC: 4 MMOL/L (ref 4–13)
BUN SERPL-MCNC: 26 MG/DL (ref 5–25)
CALCIUM SERPL-MCNC: 8.9 MG/DL (ref 8.4–10.2)
CHLORIDE SERPL-SCNC: 103 MMOL/L (ref 96–108)
CO2 SERPL-SCNC: 30 MMOL/L (ref 21–32)
CREAT SERPL-MCNC: 1.53 MG/DL (ref 0.6–1.3)
GFR SERPL CREATININE-BSD FRML MDRD: 44 ML/MIN/1.73SQ M
GLUCOSE SERPL-MCNC: 100 MG/DL (ref 65–140)
POTASSIUM SERPL-SCNC: 4.6 MMOL/L (ref 3.5–5.3)
SODIUM SERPL-SCNC: 137 MMOL/L (ref 135–147)

## 2025-05-12 PROCEDURE — 80048 BASIC METABOLIC PNL TOTAL CA: CPT | Performed by: PHYSICIAN ASSISTANT

## 2025-05-12 PROCEDURE — NC001 PR NO CHARGE: Performed by: PODIATRIST

## 2025-05-12 PROCEDURE — 82948 REAGENT STRIP/BLOOD GLUCOSE: CPT

## 2025-05-12 PROCEDURE — 99232 SBSQ HOSP IP/OBS MODERATE 35: CPT | Performed by: INTERNAL MEDICINE

## 2025-05-12 PROCEDURE — 73560 X-RAY EXAM OF KNEE 1 OR 2: CPT

## 2025-05-12 PROCEDURE — 99222 1ST HOSP IP/OBS MODERATE 55: CPT | Performed by: ORTHOPAEDIC SURGERY

## 2025-05-12 PROCEDURE — NC001 PR NO CHARGE: Performed by: NURSE PRACTITIONER

## 2025-05-12 RX ADMIN — CEFAZOLIN SODIUM 2000 MG: 2 SOLUTION INTRAVENOUS at 08:49

## 2025-05-12 RX ADMIN — CEFAZOLIN SODIUM 2000 MG: 2 SOLUTION INTRAVENOUS at 16:15

## 2025-05-12 RX ADMIN — SODIUM CHLORIDE 75 ML/HR: 0.9 INJECTION, SOLUTION INTRAVENOUS at 05:38

## 2025-05-12 RX ADMIN — METOPROLOL TARTRATE 50 MG: 50 TABLET, FILM COATED ORAL at 08:49

## 2025-05-12 RX ADMIN — LEVOTHYROXINE SODIUM 25 MCG: 0.03 TABLET ORAL at 05:28

## 2025-05-12 RX ADMIN — SODIUM CHLORIDE 75 ML/HR: 0.9 INJECTION, SOLUTION INTRAVENOUS at 20:44

## 2025-05-12 RX ADMIN — METOPROLOL TARTRATE 50 MG: 50 TABLET, FILM COATED ORAL at 17:10

## 2025-05-12 RX ADMIN — APIXABAN 5 MG: 5 TABLET, FILM COATED ORAL at 08:49

## 2025-05-12 RX ADMIN — LEVOTHYROXINE SODIUM 300 MCG: 100 TABLET ORAL at 05:28

## 2025-05-12 RX ADMIN — ASPIRIN 81 MG: 81 TABLET, COATED ORAL at 08:49

## 2025-05-12 RX ADMIN — APIXABAN 5 MG: 5 TABLET, FILM COATED ORAL at 17:10

## 2025-05-12 RX ADMIN — PANTOPRAZOLE SODIUM 40 MG: 40 TABLET, DELAYED RELEASE ORAL at 08:49

## 2025-05-12 NOTE — ASSESSMENT & PLAN NOTE
Wt Readings from Last 3 Encounters:   01/23/25 (!) 169 kg (372 lb 3 oz)   01/04/25 (!) 169 kg (373 lb 0.3 oz)   12/18/24 (!) 165 kg (364 lb)     Chronic left lower extremity edema noted, otherwise examined dry on arrival. EF July 2024 50% with mild-mod valvular abnormalities.   Continue with holding home Lasix, lisinopril and aldactone for now and continue with gentle IV fluids  Continue BB  Monitor volume status closely with daily weights, intake and output

## 2025-05-12 NOTE — ASSESSMENT & PLAN NOTE
70 y.o. male with two to three months of subjective left knee instability in setting or prior right BKA and peripheral neuropathy to the ankle in his LLE. Patient with mild medial joint line tenderness, stable Lachman, posterior drawer, in varus, and valgus on exam with XR imaging demonstrating severe left knee OA. Subjective knee instability likely multifactorial from OA, neuropathy, and changes in mechanical alignment. No acute inpatient needed.    Plan:  WBAT LLE  PT/OT for strengthening and balance  Hinged knee brace, unlocked  Outpatient follow up with a Joint Surgeon after resolution of LLE cellulitis  Rest of care per primary

## 2025-05-12 NOTE — PROGRESS NOTES
Progress Note - Hospitalist   Name: Armando Erickson 70 y.o. male I MRN: [id number]  Unit/Bed#: [facility] 629-01 I Date of Admission: 5/10/2025   Date of Service: 5/12/2025 I Hospital Day: 2    Assessment & Plan  TAMIR (acute kidney injury) (HCC)  POA with creatinine of 1.84 increased from baseline ~1. Initially suspected to be prerenal in setting of diuretics and decreased oral intake. Patient appeared dry on physical exam initially and noted to have chronic left lower leg edema.  Status post IV fluids with normal saline at 75 cc/hr x 8 hours overnight   Continue with IVF as creat is improving   Continue with holding home Lasix and lisinopril and spironolactone   Avoid hypoperfusion of the kidneys, minimize nephrotoxins  Urinary retention protocol, daily weights, I/O  UA noted   Monitor BMP daily   Ambulatory dysfunction  Patient has chronic ambulatory dysfunction however upon trying to get up from the recliner on day of admission he shifted his left leg and felt a pop.  Since then he has been really unable to put much weight on his left knee. On further evaluation it looks like patient also has chronic wounds.  Prior provider discussed curbside with orthopedic surgery regarding the R knee buckling - as long as patient able to bear weight, they would not recommend further workup/treatment as an inpatient   Patient would like to be seen inpatient by ortho as he cannot bear much weight on his leg, denies pain  Check xray  Consult ortho   PT OT evaluations recommending rehab   Cellulitis of left lower extremity  POA in the setting of chronic venous stasis ulcer of the left ankle  Continue with IV Ancef and monitor for clinical improvement  Local wound care per podiatry recommendations   Symptomatic and supportive cares  Venous stasis ulcer of left ankle (HCC)  Patient complains of worsening left ankle wound to left lower extremity with chronic swelling. 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 He admits that he has neglected his left leg wounds for quite some time and did not feel much pain due to severe neuropathy.  LLE arterial duplex without evidence of significant arterial occlusive disease  Follow-up results of left foot and ankle x-rays  Podiatry consult and recommendations appreciated   No plans for surgical intervention at this time   Continue with local wound care   IV antibiotic for cellulitis as above  Chronic heart failure with preserved ejection fraction (HCC)  Wt Readings from Last 3 Encounters:   01/23/25 (!) 169 kg (372 lb 3 oz)   01/04/25 (!) 169 kg (373 lb 0.3 oz)   12/18/24 (!) 165 kg (364 lb)     Chronic left lower extremity edema noted, otherwise examined dry on arrival. EF July 2024 50% with mild-mod valvular abnormalities.   Continue with holding home Lasix, lisinopril and aldactone for now and continue with gentle IV fluids  Continue BB  Monitor volume status closely with daily weights, intake and output  Controlled type 2 diabetes mellitus with complication, without long-term current use of insulin ([facility])  Lab Results   Component Value Date    HGBA1C 6.0 (H) 05/11/2025     Recent Labs     05/10/25  1717 05/11/25  0802   POCGLU 92 99     Blood Sugar Average: Last 72 hrs:  (P) 95.5  Very well controlled outpatient evidenced by A1c; does not appear to be on any mediations   Discontinue Accu-Cheks and SSI given stable blood sugars  Monitor blood glucose with a.m. BMP  Hypoglycemia protocol  Essential hypertension  Noted with hypotension overnight, blood pressure low normal this AM   Holding spironolactone, lisinopril, Lasix for now due to TAMIR  Continue home dose metoprolol  Paroxysmal atrial fibrillation (HCC)  Continue AC with apixaban  Continue metoprolol 50 mg twice daily  Gastroesophageal reflux disease without esophagitis  Continue pantoprazole  Hypothyroidism  Continue home dose levothyroxine  HLD (hyperlipidemia)  Lipid panel: cholesterol 113, ,  HDL 34, LDL 59  Not currently on statin  Obesity, Class III, BMI 40-49.9 (morbid obesity)  Affects all aspects of care  Encourage diet and lifestyle modifications     VTE Pharmacologic Prophylaxis: VTE Score: 6 Moderate Risk (Score 3-4) - Pharmacological DVT Prophylaxis Ordered: apixaban (Eliquis).    Mobility:   Basic Mobility Inpatient Raw Score: 9  JH-HLM Goal: 3: Sit at edge of bed  JH-HLM Achieved: 3: Sit at edge of bed  JH-HLM Goal achieved. Continue to encourage appropriate mobility.    Patient Centered Rounds: I performed bedside rounds with nursing staff today.   Discussions with Specialists or Other Care Team Provider: harpal CM, ortho     Education and Discussions with Family / Patient: Patient declined call to .     Current Length of Stay: 2 day(s)  Current Patient Status: Inpatient   Certification Statement: The patient will continue to require additional inpatient hospital stay due to improvement in David, ortho eval, abx, dc planning   Discharge Plan: Anticipate discharge in 48-72 hrs to rehab facility.    Code Status: Level 1 - Full Code    Subjective   Pt upset that no one has addressed his knee concerns. He states he has no pain but cannot bear weight on his knee and does not see the point of going to rehab if he cannot do this.    Objective :  Temp:  [97.4 °F (36.3 °C)-98.1 °F (36.7 °C)] 97.8 °F (36.6 °C)  HR:  [65-88] 65  BP: (102-129)/(57-65) 120/64  Resp:  [16] 16  SpO2:  [95 %-99 %] 99 %  O2 Device: None (Room air)    There is no height or weight on file to calculate BMI.     Input and Output Summary (last 24 hours):     Intake/Output Summary (Last 24 hours) at 5/12/2025 1058  Last data filed at 5/12/2025 0853  Gross per 24 hour   Intake 2231.25 ml   Output 750 ml   Net 1481.25 ml       Physical Exam  Vitals and nursing note reviewed.   Constitutional:       General: He is not in acute distress.     Appearance: He is obese.      Comments: On Ra, sitting on edge of bed     Cardiovascular:      Rate and Rhythm: Normal rate.   Pulmonary:      Effort: No respiratory distress.      Breath sounds: Normal breath sounds.   Abdominal:      General: Bowel sounds are normal. There is no distension.      Palpations: Abdomen is soft.      Tenderness: There is no abdominal tenderness.   Musculoskeletal:         General: No deformity.      Comments: LLE ace wrapped, s/p R BKA. L knee nontender to palpation, no erythema or crepitus, normal ROM   Neurological:      Mental Status: He is oriented to person, place, and time.   Psychiatric:         Mood and Affect: Mood normal.           Lines/Drains:              Lab Results: I have reviewed the following results:   Results from last 7 days   Lab Units 05/11/25  0455 05/10/25  1136   WBC Thousand/uL 8.31 7.44   HEMOGLOBIN g/dL 11.4* 11.6*   HEMATOCRIT % 37.0 38.4   PLATELETS Thousands/uL 241 239   SEGS PCT %  --  82*   LYMPHO PCT %  --  9*   MONO PCT %  --  8   EOS PCT %  --  1     Results from last 7 days   Lab Units 05/12/25  0520 05/11/25  0455   SODIUM mmol/L 137 135   POTASSIUM mmol/L 4.6 5.0   CHLORIDE mmol/L 103 99   CO2 mmol/L 30 29   BUN mg/dL 26* 34*   CREATININE mg/dL 1.53* 1.81*   ANION GAP mmol/L 4 7   CALCIUM mg/dL 8.9 9.2   ALBUMIN g/dL  --  3.9   TOTAL BILIRUBIN mg/dL  --  0.56   ALK PHOS U/L  --  93   ALT U/L  --  6*   AST U/L  --  10*   GLUCOSE RANDOM mg/dL 100 102         Results from last 7 days   Lab Units 05/11/25  0802 05/10/25  1717   POC GLUCOSE mg/dl 99 92     Results from last 7 days   Lab Units 05/11/25  0455   HEMOGLOBIN A1C % 6.0*           Recent Cultures (last 7 days):   Results from last 7 days   Lab Units 05/10/25  1829   GRAM STAIN RESULT  No Polys or Bacteria seen       Imaging Results Review: No pertinent imaging studies reviewed.  Other Study Results Review: No additional pertinent studies reviewed.    Last 24 Hours Medication List:     Current Facility-Administered Medications:     apixaban (ELIQUIS) tablet 5 mg,  BID    aspirin (ECOTRIN LOW STRENGTH) EC tablet 81 mg, Daily    ceFAZolin (ANCEF) IVPB (premix in dextrose) 2,000 mg 50 mL, Q8H, Last Rate: 2,000 mg (05/12/25 0849)    [Held by provider] furosemide (LASIX) tablet 40 mg, BID    levothyroxine tablet 25 mcg, Early Morning    levothyroxine tablet 300 mcg, Early Morning    [Held by provider] lisinopril (ZESTRIL) tablet 40 mg, Daily    metoprolol tartrate (LOPRESSOR) tablet 50 mg, BID    ondansetron (ZOFRAN) injection 4 mg, Q6H PRN    pantoprazole (PROTONIX) EC tablet 40 mg, Daily    sodium chloride 0.9 % infusion, Continuous, Last Rate: 75 mL/hr (05/12/25 0538)    [Held by provider] spironolactone (ALDACTONE) tablet 25 mg, Daily    Administrative Statements   Today, Patient Was Seen By: Sandra Alvarez PA-C      **Please Note: This note may have been constructed using a voice recognition system.**

## 2025-05-12 NOTE — DISCHARGE INSTR - OTHER ORDERS
Skin Care Plan:  1-Preventative Hydraguard to buttocks, sacrum and right BKA site twice a day and as needed.   2-Turn/reposition every 2 hours or when medically stable for pressure re-distribution on skin .  3-Elevate left heel to offload pressure  4-Moisturize skin daily with skin nourishing cream  5-Ehob cushion in chair when out of bed.  5-Left Foot/Heel wounds: per podiatry recommendations       Discharge Instructions - Podiatry    Weight Bearing Status: Weightbearing as tolerated to left lower extremity.                       Pain: Continue analgesics as directed    Follow-up appointment instructions: Please make an appointment within one week of discharge with [facility] wound care center.. Contact sooner if any increase in pain, or signs of infection occur      Nursing Wound Care Instructions: Remove dressings to the left lower extremity.  Wash with soap and water.  Pat dry.  Apply gelling fiber, ABD to the left leg wound superior to the wrap with 4 inch Jaspal.  Secure with tape.  Apply Ace wrap's from just above the toes to below the knee.  Changes on a Monday Wednesday Friday or as needed to basis.

## 2025-05-12 NOTE — PLAN OF CARE
Problem: Potential for Falls  Goal: Patient will remain free of falls  Description: INTERVENTIONS:- Educate patient/family on patient safety including physical limitations- Instruct patient to call for assistance with activity - Consult OT/PT to assist with strengthening/mobility - Keep Call bell within reach- Keep bed low and locked with side rails adjusted as appropriate- Keep care items and personal belongings within reach- Initiate and maintain comfort rounds- Make Fall Risk Sign visible to staff- Offer Toileting every  Hours, in advance of need- Initiate/Maintain alarm- Obtain necessary fall risk management equipment: - Apply yellow socks and bracelet for high fall risk patients- Consider moving patient to room near nurses station  INTERVENTIONS:- Educate patient/family on patient safety including physical limitations- Instruct patient to call for assistance with activity - Consult OT/PT to assist with strengthening/mobility - Keep Call bell within reach- Keep bed low and locked with side rails adjusted as appropriate- Keep care items and personal belongings within reach- Initiate and maintain comfort rounds- Make Fall Risk Sign visible to staff- Offer Toileting every  Hours, in advance of need- Initiate/Maintain alarm- Obtain necessary fall risk management equipment: - Apply yellow socks and bracelet for high fall risk patients- Consider moving patient to room near nurses station  Outcome: Progressing     Problem: PAIN - ADULT  Goal: Verbalizes/displays adequate comfort level or baseline comfort level  Description: Interventions:- Encourage patient to monitor pain and request assistance- Assess pain using appropriate pain scale- Administer analgesics based on type and severity of pain and evaluate response- Implement non-pharmacological measures as appropriate and evaluate response- Consider cultural and social influences on pain and pain management- Notify physician/advanced practitioner if interventions  unsuccessful or patient reports new pain  Outcome: Progressing     Problem: INFECTION - ADULT  Goal: Absence or prevention of progression during hospitalization  Description: INTERVENTIONS:- Assess and monitor for signs and symptoms of infection- Monitor lab/diagnostic results- Monitor all insertion sites, i.e. indwelling lines, tubes, and drains- Monitor endotracheal if appropriate and nasal secretions for changes in amount and color- [place] appropriate cooling/warming therapies per order- Administer medications as ordered- Instruct and encourage patient and family to use good hand hygiene technique- Identify and instruct in appropriate isolation precautions for identified infection/condition  Outcome: Progressing  Goal: Absence of fever/infection during neutropenic period  Description: INTERVENTIONS:- Monitor WBC  Outcome: Progressing     Problem: SAFETY ADULT  Goal: Patient will remain free of falls  Description: INTERVENTIONS:- Educate patient/family on patient safety including physical limitations- Instruct patient to call for assistance with activity - Consult OT/PT to assist with strengthening/mobility - Keep Call bell within reach- Keep bed low and locked with side rails adjusted as appropriate- Keep care items and personal belongings within reach- Initiate and maintain comfort rounds- Make Fall Risk Sign visible to staff- Offer Toileting every  Hours, in advance of need- Initiate/Maintain alarm- Obtain necessary fall risk management equipment: - Apply yellow socks and bracelet for high fall risk patients- Consider moving patient to room near nurses station  INTERVENTIONS:- Educate patient/family on patient safety including physical limitations- Instruct patient to call for assistance with activity - Consult OT/PT to assist with strengthening/mobility - Keep Call bell within reach- Keep bed low and locked with side rails adjusted as appropriate- Keep care items and personal belongings within reach-  Initiate and maintain comfort rounds- Make Fall Risk Sign visible to staff- Offer Toileting every  Hours, in advance of need- Initiate/Maintain alarm- Obtain necessary fall risk management equipment:- Apply yellow socks and bracelet for high fall risk patients- Consider moving patient to room near nurses station  Outcome: Progressing  Goal: Maintain or return to baseline ADL function  Description: INTERVENTIONS:-  Assess patient's ability to carry out ADLs; assess patient's baseline for ADL function and identify physical deficits which impact ability to perform ADLs (bathing, care of mouth/teeth, toileting, grooming, dressing, etc.)- Assess/evaluate cause of self-care deficits - Assess range of motion- Assess patient's mobility; develop plan if impaired- Assess patient's need for assistive devices and provide as appropriate- Encourage maximum independence but intervene and supervise when necessary- Involve family in performance of ADLs- Assess for home care needs following discharge - Consider OT consult to assist with ADL evaluation and planning for discharge- Provide patient education as appropriate  Outcome: Progressing  Goal: Maintains/Returns to pre admission functional level  Description: INTERVENTIONS:- Perform AM-PAC 6 Click Basic Mobility/ Daily Activity assessment daily.- Set and communicate daily mobility goal to care team and patient/family/caregiver. - Collaborate with rehabilitation services on mobility goals if consulted- Perform Range of Motion  times a day.- Reposition patient every  hours.- Dangle patient  times a day- Stand patient  times a day- Ambulate patient  times a day- Out of bed to chair  times a day - Out of bed for meals  times a day- Out of bed for toileting- Record patient progress and toleration of activity level   Outcome: Progressing

## 2025-05-12 NOTE — CASE MANAGEMENT
Case Management Assessment & Discharge Planning Note    Patient name Armando Espinozar  Location [facility] 629/[facility] 629-01 MRN [id number]  : 1955 Date 2025       Current Admission Date: 5/10/2025  Current Admission Diagnosis:TAMIR (acute kidney injury) ([facility])   Patient Active Problem List    Diagnosis Date Noted Date Diagnosed    Ambulatory dysfunction 05/10/2025     TAMIR (acute kidney injury) ([facility]) 05/10/2025     Venous stasis ulcer of left ankle ([facility]) 05/10/2025     Diabetic foot ulcer ([facility]) 2024     Lymphedema 2024     Venous stasis ulcer ([facility]) 2024     Chronic heart failure with preserved ejection fraction ([facility]) 2024     Ulcer of toe of left foot, limited to breakdown of skin ([facility]) 2022     Diabetic ulcer of left ankle associated with type 2 diabetes mellitus, limited to breakdown of skin ([facility]) 2022     Dermatophytosis 2022     Cellulitis of left lower extremity 2021     Diabetic polyneuropathy associated with type 2 diabetes mellitus ([facility]) 2021     Diabetic ulcer of left foot associated with type 2 diabetes mellitus, limited to breakdown of skin ([facility]) 02/10/2021     Idiopathic chronic venous hypertension of left lower extremity with ulcer ([facility]) 02/10/2021     Non-pressure chronic ulcer of left calf, limited to breakdown of skin ([facility]) 02/10/2021     Gastroesophageal reflux disease without esophagitis 2019     Chronic venous stasis dermatitis of left lower extremity 2019     Essential hypertension 2019     HLD (hyperlipidemia) 2019     Hypothyroidism 2019     Obesity, Class III, BMI 40-49.9 (morbid obesity) 2019     Living will on file 2018     S/P BKA (below knee amputation) unilateral, right ([facility]) 2017     Controlled type 2 diabetes mellitus with complication, without long-term current use of insulin ([facility]) 10/10/2016     Iron deficiency anemia due to chronic blood loss 2014     Paroxysmal atrial  fibrillation (HCC) 10/02/2012     Celiac disease 08/30/2012       LOS (days): 2  Geometric Mean LOS (GMLOS) (days):   Days to GMLOS:     OBJECTIVE:    Risk of Unplanned Readmission Score: 12.71         Current admission status: Inpatient       Preferred Pharmacy:   [facility] - [place], [place] - [address]  [address] [facility] [address]  Phone: [phone] Fax: [phone]    [facility] [address] [place] [address] - [address] [place] [address]  Phone: [phone] Fax: [phone]    Primary Care Provider: Aida Interiano MD    Primary Insurance: MEDICARE  Secondary Insurance: BLUE CROSS    ASSESSMENT:  Active Health Care Proxies    There are no active Health Care Proxies on file.       Advance Directives  Does patient have a Health Care POA?: Yes  Does patient have Advance Directives?: Yes  Advance Directives: Living will  Primary Contact: [email or web address]-paperwork not on file              Patient Information  Admitted from:: Home  Mental Status: Alert  During Assessment patient was accompanied by: Not accompanied during assessment  Assessment information provided by:: Patient  Primary Caregiver: Self  Support Systems: Family members, Children  What city do you live in?: [place]  Home entry access options. Select all that apply.: Ramp  Type of Current Residence: Apartment  Floor Level: 3  Upon entering residence, is there a bedroom on the main floor (no further steps)?: Yes  Upon entering residence, is there a bathroom on the main floor (no further steps)?: Yes  Living Arrangements: Lives Alone    Activities of Daily Living Prior to Admission  Functional Status: Independent  Completes ADLs independently?: Yes  Ambulates independently?: Yes  Does patient use assisted devices?: Yes  Assisted Devices (DME) used: Walker  Does patient currently own DME?: Yes  What DME does the patient currently own?: Walker, Wheelchair, Straight  Miguel Angel  Does patient have a history of Outpatient Therapy (PT/OT)?: No  Does the patient have a history of Short-Term Rehab?: Yes ([facility])  Does patient have a history of HHC?: Yes ([facility])  Does patient currently have HHC?: No         Patient Information Continued  Income Source: Pension/assisted  Does patient have prescription coverage?: Yes  Can the patient afford their medications and any related supplies (such as glucometers or test strips)?: N/A  Does patient receive dialysis treatments?: No  Does patient have a history of substance abuse?: No  Does patient have a history of Mental Health Diagnosis?: No         Means of Transportation  Means of Transport to Appts:: Family transport          DISCHARGE DETAILS:    Discharge planning discussed with:: pt  Freedom of Choice: Yes                   Contacts  Patient Contacts: sal Trimble  Relationship to Patient:: Family  Contact Method: Phone  Phone Number: [phone]  Reason/Outcome: Continuity of Care, Emergency Contact, Discharge Planning                   Would you like to participate in our [facility] service program?  : No - Declined                 A post acute care recommendation was made by your care team for STR.  Discussed Freedom of Choice with patient.  Choice is to make a new referral.   Referral [facility] via [facility]    [facility] accepted, pt aware                                Additional Comments: lives alone, I PTA

## 2025-05-12 NOTE — CONSULTS
Consultation - Orthopedics   Name: Armando Erickson 70 y.o. male I MRN: [id number]  [facility] Date of Admission: 5/10/2025   Date of Service: 5/12/2025 I Hospital Day: 2   Inpatient consult to Orthopedic Surgery  Consult performed by: Bryan Fitzpatrick MD  Consult ordered by: Sandra Alvarez PA-C        Physician Requesting Evaluation: Tiffanie Damon MD   Reason for Evaluation / Principal Problem: left knee instability    Assessment & Plan  Knee instability, left  70 y.o. male with two to three months of subjective left knee instability in setting or prior right BKA and peripheral neuropathy to the ankle in his LLE. Patient with mild medial joint line tenderness, stable Lachman, posterior drawer, in varus, and valgus on exam with XR imaging demonstrating severe left knee OA. Subjective knee instability likely multifactorial from OA, neuropathy, and changes in mechanical alignment. No acute inpatient needed.    Plan:  WBAT LLE  PT/OT for strengthening and balance  Hinged knee brace, unlocked  Outpatient follow up with a Joint Surgeon after resolution of LLE cellulitis  Rest of care per primary    Medical co-morbidities are being managed per primary team.    Please contact the SecureChat role [facility] Ortho Floor for any questions/concerns.    History of Present Illness   HPI: 70 y.o. male with pertinent past medical history of ambulatory dysfunction, cellulitis to his left lower extremity, chronic heart failure, type 2 diabetes, right BKA in setting of cellulitis, and remote history of left knee arthroscopy with patient being unsure about the procedure.  Patient is currently admitted for TAMIR and cellulitis of his right left lower extremity.    Patient reports 2 to 3 months of subjective instability in his left knee.  He reports when he is ambulating with his cane or walker, he feels as if his knee is going to give out.  He has been relying more and more on his wheelchair.  Denies pain, paresthesias, knee swelling.   States he has neuropathy from his left lower extremity up to his ankle.    Review of Systems significant for findings described in the HPI.  Historical Information   Past Medical History:   Diagnosis Date    Abnormal urinalysis 06/10/2021    Atrial fibrillation (HCC)     Cellulitis     Cellulitis of left lower extremity 2021    Diabetes mellitus (HCC)     Disease of thyroid gland     Duodenal ulcer     perforated- ICU stay    High blood pressure     High cholesterol     History of duodenal ulcer 2014    Hyperlipidemia     Hypertension     Hypothyroidism     Lymphedema      b/l LE- was followed at wound center    Morbid obesity with BMI of 40.0-44.9, adult (HCC)     Peritonitis (HCC)      Past Surgical History:   Procedure Laterality Date    BELOW KNEE LEG AMPUTATION Right     DIAGNOSTIC LAPAROSCOPY      KNEE ARTHROSCOPY Bilateral     OTHER SURGICAL HISTORY  2014     lap repair    OTHER SURGICAL HISTORY      Laparoscopic repair of a perforated duodenal ulcer with Javed omental    OTHER SURGICAL HISTORY      Placement of drains     Social History     Tobacco Use    Smoking status: Former     Current packs/day: 0.00     Average packs/day: 1.5 packs/day for 25.0 years (37.5 ttl pk-yrs)     Types: Cigarettes     Start date: 1979     Quit date: 2004     Years since quittin.5    Smokeless tobacco: Never   Vaping Use    Vaping status: Never Used   Substance and Sexual Activity    Alcohol use: Not Currently     Alcohol/week: 1.0 standard drink of alcohol     Types: 1 Standard drinks or equivalent per week    Drug use: Never    Sexual activity: Not Currently     E-Cigarette/Vaping    E-Cigarette Use Never User      E-Cigarette/Vaping Substances       Objective :  Temp:  [97.4 °F (36.3 °C)-98.1 °F (36.7 °C)] 97.8 °F (36.6 °C)  HR:  [65-88] 65  BP: (102-129)/(57-65) 120/64  Resp:  [16] 16  SpO2:  [95 %-99 %] 99 %  O2 Device: None (Room air)  Physical ExamOrtho Exam   Musculoskeletal: Left Lower  Extremity  Skin intact, no effusion to the knee, no erythema or warmth.  Mild tenderness to palpation over the medial joint line  Painless AROM of 0-110  Lachman negative, posterior drawer negative  Knee stable to varus and valgus stress  Distally, motor intact to EHL/FHL and DF/PF  No sensation to light touch to saphenous/superficial peroneal/deep peroneal/sural/tibial nerve distributions, no sensation up to ankle   Toes with delayed capillary refill      Lab Results: I have reviewed the following results:   Recent Labs     05/10/25  1136 05/11/25  0455 05/12/25  0520   WBC 7.44 8.31  --    HGB 11.6* 11.4*  --    HCT 38.4 37.0  --     241  --    BUN 37* 34* 26*   CREATININE 1.84* 1.81* 1.53*     Blood Culture:   Lab Results   Component Value Date    BLOODCX No Growth After 5 Days. 06/08/2021     Wound Culture:   Lab Results   Component Value Date    WOUNDCULT 3+ Growth of Staphylococcus aureus (A) 05/10/2025    WOUNDCULT 1+ Growth of Pseudomonas aeruginosa (A) 05/10/2025    WOUNDCULT 3+ Growth of Beta Hemolytic Streptococcus Group B (A) 05/10/2025    WOUNDCULT 3+ Growth of 05/10/2025     Imaging:  AP and lateral of left knee demonstrate no acute fractures, presence of severe tricompartmental osteoarthritis with osteophyte formation, squaring off of condyles, subchondral cyst, subchondral sclerosis.

## 2025-05-12 NOTE — DISCHARGE INSTR - AVS FIRST PAGE
Discharge Instructions - Podiatry    Weight Bearing Status: Weightbearing as tolerated to left lower extremity.                       Pain: Continue analgesics as directed    Follow-up appointment instructions: Please make an appointment within one week of discharge with [facility] wound care center.. Contact sooner if any increase in pain, or signs of infection occur      Nursing Wound Care Instructions: Remove dressings to the left lower extremity.  Wash with soap and water.  Pat dry.  Apply gelling fiber, ABD to the left leg wound superior to the wrap with 4 inch Jaspal.  Secure with tape.  Apply Ace wrap's from just above the toes to below the knee.  Changes on a Monday Wednesday Friday or as needed to basis.

## 2025-05-12 NOTE — PLAN OF CARE
Problem: Potential for Falls  Goal: Patient will remain free of falls  Description: INTERVENTIONS:- Educate patient/family on patient safety including physical limitations- Instruct patient to call for assistance with activity - Consult OT/PT to assist with strengthening/mobility - Keep Call bell within reach- Keep bed low and locked with side rails adjusted as appropriate- Keep care items and personal belongings within reach- Initiate and maintain comfort rounds- Make Fall Risk Sign visible to staff- Offer Toileting every 2 Hours, in advance of need- Initiate/Maintain bed alarm- Obtain necessary fall risk management equipment: bed alarm - Apply yellow socks and bracelet for high fall risk patients- Consider moving patient to room near nurses station  INTERVENTIONS:- Educate patient/family on patient safety including physical limitations- Instruct patient to call for assistance with activity - Consult OT/PT to assist with strengthening/mobility - Keep Call bell within reach- Keep bed low and locked with side rails adjusted as appropriate- Keep care items and personal belongings within reach- Initiate and maintain comfort rounds- Make Fall Risk Sign visible to staff- Offer Toileting every 2 Hours, in advance of need- Initiate/Maintain bed alarm- Obtain necessary fall risk management equipment: bed alarm- Apply yellow socks and bracelet for high fall risk patients- Consider moving patient to room near nurses station  Outcome: Progressing     Problem: PAIN - ADULT  Goal: Verbalizes/displays adequate comfort level or baseline comfort level  Description: Interventions:- Encourage patient to monitor pain and request assistance- Assess pain using appropriate pain scale- Administer analgesics based on type and severity of pain and evaluate response- Implement non-pharmacological measures as appropriate and evaluate response- Consider cultural and social influences on pain and pain management- Notify physician/advanced  practitioner if interventions unsuccessful or patient reports new pain  Outcome: Progressing     Problem: INFECTION - ADULT  Goal: Absence or prevention of progression during hospitalization  Description: INTERVENTIONS:- Assess and monitor for signs and symptoms of infection- Monitor lab/diagnostic results- Monitor all insertion sites, i.e. indwelling lines, tubes, and drains- Monitor endotracheal if appropriate and nasal secretions for changes in amount and color- [place] appropriate cooling/warming therapies per order- Administer medications as ordered- Instruct and encourage patient and family to use good hand hygiene technique- Identify and instruct in appropriate isolation precautions for identified infection/condition  Outcome: Progressing     Problem: SAFETY ADULT  Goal: Patient will remain free of falls  Description: INTERVENTIONS:- Educate patient/family on patient safety including physical limitations- Instruct patient to call for assistance with activity - Consult OT/PT to assist with strengthening/mobility - Keep Call bell within reach- Keep bed low and locked with side rails adjusted as appropriate- Keep care items and personal belongings within reach- Initiate and maintain comfort rounds- Make Fall Risk Sign visible to staff- Offer Toileting every 2 Hours, in advance of need- Initiate/Maintain bed alarm- Obtain necessary fall risk management equipment: bed alarm - Apply yellow socks and bracelet for high fall risk patients- Consider moving patient to room near nurses station  INTERVENTIONS:- Educate patient/family on patient safety including physical limitations- Instruct patient to call for assistance with activity - Consult OT/PT to assist with strengthening/mobility - Keep Call bell within reach- Keep bed low and locked with side rails adjusted as appropriate- Keep care items and personal belongings within reach- Initiate and maintain comfort rounds- Make Fall Risk Sign visible to staff- Offer  Toileting every 2 Hours, in advance of need- Initiate/Maintain bed alarm- Obtain necessary fall risk management equipment: bed alarm- Apply yellow socks and bracelet for high fall risk patients- Consider moving patient to room near nurses station  Outcome: Progressing  Goal: Maintain or return to baseline ADL function  Description: INTERVENTIONS:-  Assess patient's ability to carry out ADLs; assess patient's baseline for ADL function and identify physical deficits which impact ability to perform ADLs (bathing, care of mouth/teeth, toileting, grooming, dressing, etc.)- Assess/evaluate cause of self-care deficits - Assess range of motion- Assess patient's mobility; develop plan if impaired- Assess patient's need for assistive devices and provide as appropriate- Encourage maximum independence but intervene and supervise when necessary- Involve family in performance of ADLs- Assess for home care needs following discharge - Consider OT consult to assist with ADL evaluation and planning for discharge- Provide patient education as appropriate  Outcome: Progressing  Goal: Maintains/Returns to pre admission functional level  Description: INTERVENTIONS:- Perform AM-PAC 6 Click Basic Mobility/ Daily Activity assessment daily.- Set and communicate daily mobility goal to care team and patient/family/caregiver. - Collaborate with rehabilitation services on mobility goals if consulted- Perform Range of Motion 3 times a day.- Reposition patient every 2 hours.- Dangle patient 3 times a day- Stand patient 3 times a day- Ambulate patient 3 times a day- Out of bed to chair 3 times a day - Out of bed for meals 3 times a day- Out of bed for toileting- Record patient progress and toleration of activity level   Outcome: Progressing     Problem: DISCHARGE PLANNING  Goal: Discharge to home or other facility with appropriate resources  Description: INTERVENTIONS:- Identify barriers to discharge w/patient and caregiver- Arrange for needed  discharge resources and transportation as appropriate- Identify discharge learning needs (meds, wound care, etc.)- Arrange for interpretive services to assist at discharge as needed- Refer to Case Management Department for coordinating discharge planning if the patient needs post-hospital services based on physician/advanced practitioner order or complex needs related to functional status, cognitive ability, or social support system  Outcome: Progressing     Problem: Knowledge Deficit  Goal: Patient/family/caregiver demonstrates understanding of disease process, treatment plan, medications, and discharge instructions  Description: Complete learning assessment and assess knowledge base.Interventions:- Provide teaching at level of understanding- Provide teaching via preferred learning methods  Outcome: Progressing     Problem: NEUROSENSORY - ADULT  Goal: Achieves stable or improved neurological status  Description: INTERVENTIONS- Monitor and report changes in neurological status- Monitor vital signs such as temperature, blood pressure, glucose, and any other labs ordered - Initiate measures to prevent increased intracranial pressure- Monitor for seizure activity and implement precautions if appropriate    Outcome: Progressing  Goal: Remains free of injury related to seizures activity  Description: INTERVENTIONS- Maintain airway, patient safety  and administer oxygen as ordered- Monitor patient for seizure activity, document and report duration and description of seizure to physician/advanced practitioner- If seizure occurs,  ensure patient safety during seizure- Reorient patient post seizure- Seizure pads on all 4 side rails- Instruct patient/family to notify RN of any seizure activity including if an aura is experienced- Instruct patient/family to call for assistance with activity based on nursing assessment- Administer anti-seizure medications if ordered  Outcome: Progressing  Goal: Achieves maximal functionality  and self care  Description: INTERVENTIONS- Monitor swallowing and airway patency with patient fatigue and changes in neurological status- Encourage and assist patient to increase activity and self care. - Encourage visually impaired, hearing impaired and aphasic patients to use assistive/communication devices  Outcome: Progressing     Problem: METABOLIC, FLUID AND ELECTROLYTES - ADULT  Goal: Electrolytes maintained within normal limits  Description: INTERVENTIONS:- Monitor labs and assess patient for signs and symptoms of electrolyte imbalances- Administer electrolyte replacement as ordered- Monitor response to electrolyte replacements, including repeat lab results as appropriate- Instruct patient on fluid and nutrition as appropriate  Outcome: Progressing  Goal: Fluid balance maintained  Description: INTERVENTIONS:- Monitor labs - Monitor I/O and WT- Instruct patient on fluid and nutrition as appropriate- Assess for signs & symptoms of volume excess or deficit  Outcome: Progressing  Goal: Glucose maintained within target range  Description: INTERVENTIONS:- Monitor Blood Glucose as ordered- Assess for signs and symptoms of hyperglycemia and hypoglycemia- Administer ordered medications to maintain glucose within target range- Assess nutritional intake and initiate nutrition service referral as needed  Outcome: Progressing     Problem: Prexisting or High Potential for Compromised Skin Integrity  Goal: Skin integrity is maintained or improved  Description: INTERVENTIONS:- Identify patients at risk for skin breakdown- Assess and monitor skin integrity- Assess and monitor nutrition and hydration status- Monitor labs - Assess for incontinence - Turn and reposition patient- Assist with mobility/ambulation- Relieve pressure over bony prominences- Avoid friction and shearing- Provide appropriate hygien  Problem: SKIN/TISSUE INTEGRITY - ADULT  Goal: Pressure injury heals and does not worsen  Description: Interventions:-  Implement low air loss mattress or specialty surface (Criteria met)- Apply silicone foam dressing- Instruct/assist with weight shifting every 30 minutes when in chair - Limit chair time to 2 hour intervals- Use special pressure reducing interventions such as waffle cushion when in chair - Apply fecal or urinary incontinence containment device - Perform passive or active ROM every 2- Turn and reposition patient & offload bony prominences every 2 hours - Utilize friction reducing device or surface for transfers - Consider consults to  interdisciplinary teams such as wound care - Use incontinent care products after each incontinent episode such as barrier cream- Consider nutrition services referral as needed  Outcome: Progressing   e as needed including keeping skin clean and dry- Evaluate need for skin moisturizer/barrier cream- Collaborate with interdisciplinary team - Patient/family teaching- Consider wound care consult   Outcome: Progressing

## 2025-05-12 NOTE — WOUND OSTOMY CARE
Wound Care consulted for sacro-buttocks wound. Patient with no wounds or skin loss noted. Sacro-Buttocks is intact and blanchable with scattered, dry, well adhered scabbing noted-  preventative orders are placed. Mod assist for turning and repositioning. Kreg low air loss mattress ordered for patient. Podiatry is following and managing left foot     - sacro-buttocks     Right Medial BKA Knee with intact dry pink epithelial tissue noted.        Orders listed below and wound care will sign off, call or Secure Chat Message with questions.       Skin Care Plan:  1-Preventative Hydraguard to buttocks, sacrum and right BKA site twice a day and as needed.   2-Turn/reposition every 2 hours or when medically stable for pressure re-distribution on skin .  3-Elevate left heel to offload pressure  4-Moisturize skin daily with skin nourishing cream  5-Ehob cushion in chair when out of bed.  5-Left Foot/Heel wounds: per podiatry recommendations             Sandra Treviño RN, BSN, CWOCN

## 2025-05-12 NOTE — RESTORATIVE TECHNICIAN NOTE
Restorative Technician Note      Patient Name: Armando Erickson     Restorative Tech Visit Date: 05/12/25  Note Type: Mobility & Bracing, Initial consult  Patient Position Upon Consult: Seated edge of bed  Activity Performed: Ambulated (Steps to bedside chair)  Assistive Device: Roller walker  Brace Applied: Long Hinged Knee Brace (Left knee)  Patient Position When Brace Applied: Seated (EOB)  Education Provided: Yes  Patient Position at End of Consult: Bedside chair; All needs within reach; Bed/Chair alarm activated  Nurse Communication: Nurse aware of consult, application of brace    Please contact BE PT Restorative tech on Epic Secure Chat  in regards to bracing instruction and/or adjustment.    Jessica Fuller, BS

## 2025-05-12 NOTE — ASSESSMENT & PLAN NOTE
POA with creatinine of 1.84 increased from baseline ~1. Initially suspected to be prerenal in setting of diuretics and decreased oral intake. Patient appeared dry on physical exam initially and noted to have chronic left lower leg edema.  Status post IV fluids with normal saline at 75 cc/hr x 8 hours overnight   Continue with IVF as creat is improving   Continue with holding home Lasix and lisinopril and spironolactone   Avoid hypoperfusion of the kidneys, minimize nephrotoxins  Urinary retention protocol, daily weights, I/O  UA noted   Monitor BMP daily

## 2025-05-13 LAB
ANION GAP SERPL CALCULATED.3IONS-SCNC: 5 MMOL/L (ref 4–13)
BACTERIA WND AEROBE CULT: ABNORMAL
BASOPHILS # BLD AUTO: 0.02 THOUSANDS/ÂΜL (ref 0–0.1)
BASOPHILS NFR BLD AUTO: 0 % (ref 0–1)
BUN SERPL-MCNC: 21 MG/DL (ref 5–25)
CALCIUM SERPL-MCNC: 9 MG/DL (ref 8.4–10.2)
CHLORIDE SERPL-SCNC: 104 MMOL/L (ref 96–108)
CO2 SERPL-SCNC: 26 MMOL/L (ref 21–32)
CREAT SERPL-MCNC: 1.44 MG/DL (ref 0.6–1.3)
EOSINOPHIL # BLD AUTO: 0.16 THOUSAND/ÂΜL (ref 0–0.61)
EOSINOPHIL NFR BLD AUTO: 3 % (ref 0–6)
ERYTHROCYTE [DISTWIDTH] IN BLOOD BY AUTOMATED COUNT: 16.7 % (ref 11.6–15.1)
GFR SERPL CREATININE-BSD FRML MDRD: 48 ML/MIN/1.73SQ M
GLUCOSE SERPL-MCNC: 106 MG/DL (ref 65–140)
GLUCOSE SERPL-MCNC: 99 MG/DL (ref 65–140)
GRAM STN SPEC: ABNORMAL
HCT VFR BLD AUTO: 34.5 % (ref 36.5–49.3)
HGB BLD-MCNC: 10.6 G/DL (ref 12–17)
IMM GRANULOCYTES # BLD AUTO: 0.03 THOUSAND/UL (ref 0–0.2)
IMM GRANULOCYTES NFR BLD AUTO: 1 % (ref 0–2)
LYMPHOCYTES # BLD AUTO: 0.83 THOUSANDS/ÂΜL (ref 0.6–4.47)
LYMPHOCYTES NFR BLD AUTO: 13 % (ref 14–44)
MCH RBC QN AUTO: 26.3 PG (ref 26.8–34.3)
MCHC RBC AUTO-ENTMCNC: 30.7 G/DL (ref 31.4–37.4)
MCV RBC AUTO: 86 FL (ref 82–98)
MONOCYTES # BLD AUTO: 0.62 THOUSAND/ÂΜL (ref 0.17–1.22)
MONOCYTES NFR BLD AUTO: 10 % (ref 4–12)
NEUTROPHILS # BLD AUTO: 4.58 THOUSANDS/ÂΜL (ref 1.85–7.62)
NEUTS SEG NFR BLD AUTO: 73 % (ref 43–75)
NRBC BLD AUTO-RTO: 0 /100 WBCS
PLATELET # BLD AUTO: 198 THOUSANDS/UL (ref 149–390)
PMV BLD AUTO: 11 FL (ref 8.9–12.7)
POTASSIUM SERPL-SCNC: 4.5 MMOL/L (ref 3.5–5.3)
RBC # BLD AUTO: 4.03 MILLION/UL (ref 3.88–5.62)
SODIUM SERPL-SCNC: 135 MMOL/L (ref 135–147)
WBC # BLD AUTO: 6.24 THOUSAND/UL (ref 4.31–10.16)

## 2025-05-13 PROCEDURE — 99231 SBSQ HOSP IP/OBS SF/LOW 25: CPT | Performed by: PODIATRIST

## 2025-05-13 PROCEDURE — 80048 BASIC METABOLIC PNL TOTAL CA: CPT | Performed by: PHYSICIAN ASSISTANT

## 2025-05-13 PROCEDURE — 99232 SBSQ HOSP IP/OBS MODERATE 35: CPT | Performed by: INTERNAL MEDICINE

## 2025-05-13 PROCEDURE — 99231 SBSQ HOSP IP/OBS SF/LOW 25: CPT | Performed by: ORTHOPAEDIC SURGERY

## 2025-05-13 PROCEDURE — 85025 COMPLETE CBC W/AUTO DIFF WBC: CPT | Performed by: INTERNAL MEDICINE

## 2025-05-13 RX ADMIN — APIXABAN 5 MG: 5 TABLET, FILM COATED ORAL at 09:05

## 2025-05-13 RX ADMIN — ASPIRIN 81 MG: 81 TABLET, COATED ORAL at 09:05

## 2025-05-13 RX ADMIN — METOPROLOL TARTRATE 50 MG: 50 TABLET, FILM COATED ORAL at 17:06

## 2025-05-13 RX ADMIN — LEVOTHYROXINE SODIUM 300 MCG: 100 TABLET ORAL at 05:01

## 2025-05-13 RX ADMIN — CEFAZOLIN SODIUM 2000 MG: 2 SOLUTION INTRAVENOUS at 23:55

## 2025-05-13 RX ADMIN — CEFAZOLIN SODIUM 2000 MG: 2 SOLUTION INTRAVENOUS at 00:08

## 2025-05-13 RX ADMIN — CEFAZOLIN SODIUM 2000 MG: 2 SOLUTION INTRAVENOUS at 16:58

## 2025-05-13 RX ADMIN — PANTOPRAZOLE SODIUM 40 MG: 40 TABLET, DELAYED RELEASE ORAL at 09:04

## 2025-05-13 RX ADMIN — CEFAZOLIN SODIUM 2000 MG: 2 SOLUTION INTRAVENOUS at 09:06

## 2025-05-13 RX ADMIN — METOPROLOL TARTRATE 50 MG: 50 TABLET, FILM COATED ORAL at 09:04

## 2025-05-13 RX ADMIN — SODIUM CHLORIDE 75 ML/HR: 0.9 INJECTION, SOLUTION INTRAVENOUS at 05:02

## 2025-05-13 RX ADMIN — APIXABAN 5 MG: 5 TABLET, FILM COATED ORAL at 17:06

## 2025-05-13 RX ADMIN — LEVOTHYROXINE SODIUM 25 MCG: 0.03 TABLET ORAL at 05:00

## 2025-05-13 NOTE — PROGRESS NOTES
Progress Note - Hospitalist   Name: Armando Erickson 70 y.o. male I MRN: [id number]  Unit/Bed#: [facility] 629-01 I Date of Admission: 5/10/2025   Date of Service: 5/13/2025 I Hospital Day: 3    Assessment & Plan  TAMIR (acute kidney injury) (HCC)  POA with creatinine of 1.84 increased from baseline ~1. Initially suspected to be prerenal in setting of diuretics and decreased oral intake. Patient appeared dry on physical exam initially and noted to have chronic left lower leg edema.  Status post IV fluids with normal saline at 75 cc/hr x 8 hours overnight   Continue with IVF additional 12 hours as creat is improving   Continue with holding home Lasix and lisinopril and spironolactone   Avoid hypoperfusion of the kidneys, minimize nephrotoxins  Urinary retention protocol, daily weights, I/O  UA noted   Monitor BMP daily   Ambulatory dysfunction  Patient has chronic ambulatory dysfunction however upon trying to get up from the recliner on day of admission he shifted his left leg and felt a pop.  Since then he has been really unable to put much weight on his left knee. On further evaluation it looks like patient also has chronic wounds.  Prior provider discussed curbside with orthopedic surgery regarding the R knee buckling, patient requested formal consult on 5/12 as he reported he cannot bear much weight on his leg, denies pain  See plan above  Was RR overnight 5/12-->5/13  PT OT evaluations recommending rehab   Cellulitis of left lower extremity  POA in the setting of chronic venous stasis ulcer of the left ankle  Continue with IV Ancef and monitor for clinical improvement  Local wound care per podiatry recommendations   Symptomatic and supportive cares  Venous stasis ulcer of left ankle (HCC)  Patient complains of worsening left ankle wound to left lower extremity with chronic swelling. 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 He admits that he has neglected his left leg  wounds for quite some time and did not feel much pain due to severe neuropathy.  LLE arterial duplex without evidence of significant arterial occlusive disease  No evidence of osteo on left foot and ankle x-rays  Podiatry consult and recommendations appreciated   No plans for surgical intervention at this time   Continue with local wound care   IV antibiotic for cellulitis as above  Chronic heart failure with preserved ejection fraction (HCC)  Wt Readings from Last 3 Encounters:   01/23/25 (!) 169 kg (372 lb 3 oz)   01/04/25 (!) 169 kg (373 lb 0.3 oz)   12/18/24 (!) 165 kg (364 lb)     Chronic left lower extremity edema noted, otherwise examined dry on arrival. EF July 2024 50% with mild-mod valvular abnormalities.   Continue with holding home Lasix, lisinopril and aldactone for now and continue with gentle IV fluids additional 12 hours  Continue BB  Monitor volume status closely with daily weights, intake and output  Controlled type 2 diabetes mellitus with complication, without long-term current use of insulin ([facility])  Lab Results   Component Value Date    HGBA1C 6.0 (H) 05/11/2025     Recent Labs     05/10/25  1717 05/11/25  0802 05/12/25  2341   POCGLU 92 99 106     Blood Sugar Average: Last 72 hrs:  (P) 99  Very well controlled outpatient evidenced by A1c; does not appear to be on any mediations   Discontinued Accu-Cheks and SSI given stable blood sugars  Monitor blood glucose with BMP PRN  Hypoglycemia protocol  Essential hypertension  Noted with hypotension at one point, blood pressure now improved--high   Holding spironolactone, lisinopril, Lasix for now due to TAMIR  Continue home dose metoprolol  Paroxysmal atrial fibrillation (HCC)  Continue AC with apixaban  Continue metoprolol 50 mg twice daily  Gastroesophageal reflux disease without esophagitis  Continue pantoprazole  Hypothyroidism  Continue home dose levothyroxine  HLD (hyperlipidemia)  Lipid panel: cholesterol 113, , HDL 34, LDL 59  Not currently  "on statin  Obesity, Class III, BMI 40-49.9 (morbid obesity)  Affects all aspects of care  Encourage diet and lifestyle modifications   Knee instability, left  Ongoing complaint since prior to admission  Ortho consulted at request of patient on 5/12  Xray obtained showing \"Tricompartmental osteoarthritis, most severely affecting the medial compartment\"  WBAT LLE  Hinged knee brace, unlocked ordered for support  OP f/u joint surgeon         VTE Pharmacologic Prophylaxis: VTE Score: 6 Moderate Risk (Score 3-4) - Pharmacological DVT Prophylaxis Ordered: apixaban (Eliquis).    Mobility:   Basic Mobility Inpatient Raw Score: 13  JH-HLM Goal: 4: Move to chair/commode  JH-HLM Achieved: 4: Move to chair/commode  JH-HLM Goal achieved. Continue to encourage appropriate mobility.    Patient Centered Rounds: I performed bedside rounds with nursing staff today.   Discussions with Specialists or Other Care Team Provider: harpal LADD     Education and Discussions with Family / Patient: Patient declined call to .     Current Length of Stay: 3 day(s)  Current Patient Status: Inpatient   Certification Statement: The patient will continue to require additional inpatient hospital stay due to improvement in TAMIR   Discharge Plan: Anticipate discharge tomorrow to rehab facility.    Code Status: Level 1 - Full Code    Subjective   Pt states he feels a bit better today. Feels that knee brace helps a bit with the instability that he was experiencing, however has not had the opportunity to work with PT yet to see how it is with walking.  He states that he is urinating appropriately and attempting to increase his p.o. intake.    He remains agreeable to rehab.     Objective :  Temp:  [97.4 °F (36.3 °C)-98.2 °F (36.8 °C)] 97.8 °F (36.6 °C)  HR:  [63-93] 80  BP: (126-178)/() 126/71  Resp:  [16-18] 18  SpO2:  [94 %-100 %] 94 %  O2 Device: None (Room air)    There is no height or weight on file to calculate BMI.     Input and Output " Summary (last 24 hours):     Intake/Output Summary (Last 24 hours) at 5/13/2025 0954  Last data filed at 5/13/2025 0725  Gross per 24 hour   Intake 1383.75 ml   Output 2250 ml   Net -866.25 ml       Physical Exam  Vitals and nursing note reviewed.   Constitutional:       Appearance: He is obese.      Comments: On room air   Cardiovascular:      Rate and Rhythm: Normal rate.   Pulmonary:      Effort: No respiratory distress.      Comments: Decreased  Abdominal:      General: Bowel sounds are normal. There is no distension.      Palpations: Abdomen is soft.   Musculoskeletal:      Comments: Status post right BKA, left lower extremity wrapped   Neurological:      Mental Status: He is alert and oriented to person, place, and time.   Psychiatric:         Mood and Affect: Mood normal.           Lines/Drains:              Lab Results: I have reviewed the following results:   Results from last 7 days   Lab Units 05/13/25  0547   WBC Thousand/uL 6.24   HEMOGLOBIN g/dL 10.6*   HEMATOCRIT % 34.5*   PLATELETS Thousands/uL 198   SEGS PCT % 73   LYMPHO PCT % 13*   MONO PCT % 10   EOS PCT % 3     Results from last 7 days   Lab Units 05/13/25  0547 05/12/25  0520 05/11/25  0455   SODIUM mmol/L 135   < > 135   POTASSIUM mmol/L 4.5   < > 5.0   CHLORIDE mmol/L 104   < > 99   CO2 mmol/L 26   < > 29   BUN mg/dL 21   < > 34*   CREATININE mg/dL 1.44*   < > 1.81*   ANION GAP mmol/L 5   < > 7   CALCIUM mg/dL 9.0   < > 9.2   ALBUMIN g/dL  --   --  3.9   TOTAL BILIRUBIN mg/dL  --   --  0.56   ALK PHOS U/L  --   --  93   ALT U/L  --   --  6*   AST U/L  --   --  10*   GLUCOSE RANDOM mg/dL 99   < > 102    < > = values in this interval not displayed.         Results from last 7 days   Lab Units 05/12/25  2341 05/11/25  0802 05/10/25  1717   POC GLUCOSE mg/dl 106 99 92     Results from last 7 days   Lab Units 05/11/25  0455   HEMOGLOBIN A1C % 6.0*           Recent Cultures (last 7 days):   Results from last 7 days   Lab Units 05/10/25  1829   GRAM  STAIN RESULT  No Polys or Bacteria seen   WOUND CULTURE  3+ Growth of Methicillin Resistant Staphylococcus aureus*  1+ Growth of Pseudomonas aeruginosa*  3+ Growth of Beta Hemolytic Streptococcus Group B*  3+ Growth of       Imaging Results Review: I reviewed radiology reports from this admission including: xray(s).  Other Study Results Review: No additional pertinent studies reviewed.    Last 24 Hours Medication List:     Current Facility-Administered Medications:     apixaban (ELIQUIS) tablet 5 mg, BID    aspirin (ECOTRIN LOW STRENGTH) EC tablet 81 mg, Daily    ceFAZolin (ANCEF) IVPB (premix in dextrose) 2,000 mg 50 mL, Q8H, Last Rate: 2,000 mg (05/13/25 0906)    [Held by provider] furosemide (LASIX) tablet 40 mg, BID    levothyroxine tablet 25 mcg, Early Morning    levothyroxine tablet 300 mcg, Early Morning    [Held by provider] lisinopril (ZESTRIL) tablet 40 mg, Daily    metoprolol tartrate (LOPRESSOR) tablet 50 mg, BID    ondansetron (ZOFRAN) injection 4 mg, Q6H PRN    pantoprazole (PROTONIX) EC tablet 40 mg, Daily    sodium chloride 0.9 % infusion, Continuous, Last Rate: 75 mL/hr (05/13/25 0502)    [Held by provider] spironolactone (ALDACTONE) tablet 25 mg, Daily    Administrative Statements   Today, Patient Was Seen By: Sandra Alvarez PA-C      **Please Note: This note may have been constructed using a voice recognition system.**

## 2025-05-13 NOTE — PROGRESS NOTES
Progress Note - Orthopedics   Name: Armando Erickson 70 y.o. male I MRN: [id number]  [facility] I Date of Admission: 5/10/2025   Date of Service: 5/13/2025 I Hospital Day: 3    Assessment & Plan  Knee instability, left  70 y.o. male with left knee instability and osteoarthritis.  Patient reports improvement with gait and ambulation using brace.  No surgical orthopedic intervention at this time, patient can follow-up outpatient for knee instability and osteoarthritis.    Plan:  WBAT LLE  PT/OT for strengthening and balance  Hinged knee brace, unlocked  Outpatient follow up with a Joint Surgeon as needed  Rest of care per primary    Medical co-morbidities are being managed per primary team.    Please contact the [facility] role BE Ortho Floor for any questions/concerns.    Subjective   70 y.o.male  No acute events, no new complaints. Pain well-controlled. Denies fevers, chills, CP, SOB, N/V.  Patient with follow-up neuropathy up to ankles.  Reports subjective improvement with ambulation and stability with use of brace.    Objective :  Temp:  [97.4 °F (36.3 °C)-98.2 °F (36.8 °C)] 97.4 °F (36.3 °C)  HR:  [65-93] 93  BP: (120-178)/() 139/79  Resp:  [16-18] 18  SpO2:  [95 %-100 %] 98 %  O2 Device: None (Room air)    Physical Exam  Musculoskeletal: Left Lower Extremity  No swelling or effusion around the knee  Mild medial joint line tenderness, no lateral joint line tenderness  Lachman negative, posterior drawer negative, knee stable to varus and valgus stress  No sensation to light touch to saphenous, superficial peroneal, deep peroneal, sural, tibial nerve distributions  Motor intact to EHL/FHL and DF/PF  Digits warm with delayed capillary refill        Lab Results: I have reviewed the following results:  Recent Labs     05/10/25  1136 05/11/25  0455 05/12/25  0520   WBC 7.44 8.31  --    HGB 11.6* 11.4*  --    HCT 38.4 37.0  --     241  --    BUN 37* 34* 26*   CREATININE 1.84* 1.81* 1.53*     Blood  Culture:    Lab Results   Component Value Date    BLOODCX No Growth After 5 Days. 06/08/2021     Wound Culture:   Lab Results   Component Value Date    WOUNDCULT 3+ Growth of Staphylococcus aureus (A) 05/10/2025    WOUNDCULT 1+ Growth of Pseudomonas aeruginosa (A) 05/10/2025    WOUNDCULT 3+ Growth of Beta Hemolytic Streptococcus Group B (A) 05/10/2025    WOUNDCULT 3+ Growth of 05/10/2025

## 2025-05-13 NOTE — ASSESSMENT & PLAN NOTE
Patient has chronic ambulatory dysfunction however upon trying to get up from the recliner on day of admission he shifted his left leg and felt a pop.  Since then he has been really unable to put much weight on his left knee. On further evaluation it looks like patient also has chronic wounds.  Prior provider discussed curbside with orthopedic surgery regarding the R knee buckling, patient requested formal consult on 5/12 as he reported he cannot bear much weight on his leg, denies pain  See plan above  Was RR overnight 5/12-->5/13  PT OT evaluations recommending rehab

## 2025-05-13 NOTE — RAPID RESPONSE
Rapid Response Note  Armando Erickson 70 y.o. male MRN: [id number]  [facility] Encounter: [id number]    Rapid Response Notification(s):   Response called date/time:  5/12/2025 11:40 PM  Response team arrival date/time:  5/12/2025 11:41 PM  Response end date/time:  5/12/2025 11:54 PM  Level of care:  Medsur  [facility]  Primary reason for rapid response call:  Fall    Rapid Response Intervention(s):   Airway:  None  Breathing:  None  Circulation:  None  Fluids administered:  None  Medications administered:  None       Assessment/plan:   Unwitnessed fall--fall precautions, neuro checks       Rapid Response Outcome:   Transfer:  Remain on floor  Primary service notified of transfer: Yes (PA at bedside)    Code Status: Level 1 (Full Code)      Family notified: No, patient declined Family notification     Background/Situation:   Armando Erickson is a 70 y.o. male who presented for LE cellulitis.    Tonight RRT for unwitnessed fall from chair. Per pt he got to chair due to being uncomfortable in bed with RN assistance. He then fell asleep and must have leaned forward and woke up however went from chair to his knees and caught his upper body with his hands. RN heard noise and entered to find pt sitting on his bottom. L arm IV dislodged with bleeding. Denied any knee pain. Oriented x 3 no focal deficits. Strength intact.    Review of Systems    Objective:   Vitals:    05/12/25 2346 05/12/25 2348 05/12/25 2351 05/12/25 2351   BP:    139/79   Pulse: 88 89 84 77   Resp:       Temp:       TempSrc:       SpO2: 97% 99% 98% 98%     Physical Exam

## 2025-05-13 NOTE — ASSESSMENT & PLAN NOTE
POA with creatinine of 1.84 increased from baseline ~1. Initially suspected to be prerenal in setting of diuretics and decreased oral intake. Patient appeared dry on physical exam initially and noted to have chronic left lower leg edema.  Status post IV fluids with normal saline at 75 cc/hr x 8 hours overnight   Continue with IVF additional 12 hours as creat is improving   Continue with holding home Lasix and lisinopril and spironolactone   Avoid hypoperfusion of the kidneys, minimize nephrotoxins  Urinary retention protocol, daily weights, I/O  UA noted   Monitor BMP daily

## 2025-05-13 NOTE — ASSESSMENT & PLAN NOTE
Noted with hypotension at one point, blood pressure now improved--high   Holding spironolactone, lisinopril, Lasix for now due to TAMIR  Continue home dose metoprolol

## 2025-05-13 NOTE — PLAN OF CARE
Problem: Potential for Falls  Goal: Patient will remain free of falls  Description: INTERVENTIONS:- Educate patient/family on patient safety including physical limitations- Instruct patient to call for assistance with activity - Consult OT/PT to assist with strengthening/mobility - Keep Call bell within reach- Keep bed low and locked with side rails adjusted as appropriate- Keep care items and personal belongings within reach- Initiate and maintain comfort rounds- Make Fall Risk Sign visible to staff- Offer Toileting every 2 Hours, in advance of need- Initiate/Maintain bed/chair alarm- Obtain necessary fall risk management equipment: - Apply yellow socks and bracelet for high fall risk patients- Consider moving patient to room near nurses station  INTERVENTIONS:- Educate patient/family on patient safety including physical limitations- Instruct patient to call for assistance with activity - Consult OT/PT to assist with strengthening/mobility - Keep Call bell within reach- Keep bed low and locked with side rails adjusted as appropriate- Keep care items and personal belongings within reach- Initiate and maintain comfort rounds- Make Fall Risk Sign visible to staff- Offer Toileting every 2 Hours, in advance of need- Initiate/Maintain bed/chair alarm- Obtain necessary fall risk management equipment: - Apply yellow socks and bracelet for high fall risk patients- Consider moving patient to room near nurses station  Outcome: Progressing     Problem: PAIN - ADULT  Goal: Verbalizes/displays adequate comfort level or baseline comfort level  Description: Interventions:- Encourage patient to monitor pain and request assistance- Assess pain using appropriate pain scale- Administer analgesics based on type and severity of pain and evaluate response- Implement non-pharmacological measures as appropriate and evaluate response- Consider cultural and social influences on pain and pain management- Notify physician/advanced  practitioner if interventions unsuccessful or patient reports new pain  Outcome: Progressing     Problem: INFECTION - ADULT  Goal: Absence or prevention of progression during hospitalization  Description: INTERVENTIONS:- Assess and monitor for signs and symptoms of infection- Monitor lab/diagnostic results- Monitor all insertion sites, i.e. indwelling lines, tubes, and drains- Monitor endotracheal if appropriate and nasal secretions for changes in amount and color- [place] appropriate cooling/warming therapies per order- Administer medications as ordered- Instruct and encourage patient and family to use good hand hygiene technique- Identify and instruct in appropriate isolation precautions for identified infection/condition  Outcome: Progressing  Goal: Absence of fever/infection during neutropenic period  Description: INTERVENTIONS:- Monitor WBC  Outcome: Progressing     Problem: SAFETY ADULT  Goal: Patient will remain free of falls  Description: INTERVENTIONS:- Educate patient/family on patient safety including physical limitations- Instruct patient to call for assistance with activity - Consult OT/PT to assist with strengthening/mobility - Keep Call bell within reach- Keep bed low and locked with side rails adjusted as appropriate- Keep care items and personal belongings within reach- Initiate and maintain comfort rounds- Make Fall Risk Sign visible to staff- Offer Toileting every 2 Hours, in advance of need- Initiate/Maintain bed alarm- Obtain necessary fall risk management equipment: - Apply yellow socks and bracelet for high fall risk patients- Consider moving patient to room near nurses station  INTERVENTIONS:- Educate patient/family on patient safety including physical limitations- Instruct patient to call for assistance with activity - Consult OT/PT to assist with strengthening/mobility - Keep Call bell within reach- Keep bed low and locked with side rails adjusted as appropriate- Keep care items and  personal belongings within reach- Initiate and maintain comfort rounds- Make Fall Risk Sign visible to staff- Offer Toileting every 2 Hours, in advance of need- Initiate/Maintain bed alarm- Obtain necessary fall risk management equipment: - Apply yellow socks and bracelet for high fall risk patients- Consider moving patient to room near nurses station  Outcome: Progressing  Goal: Maintain or return to baseline ADL function  Description: INTERVENTIONS:-  Assess patient's ability to carry out ADLs; assess patient's baseline for ADL function and identify physical deficits which impact ability to perform ADLs (bathing, care of mouth/teeth, toileting, grooming, dressing, etc.)- Assess/evaluate cause of self-care deficits - Assess range of motion- Assess patient's mobility; develop plan if impaired- Assess patient's need for assistive devices and provide as appropriate- Encourage maximum independence but intervene and supervise when necessary- Involve family in performance of ADLs- Assess for home care needs following discharge - Consider OT consult to assist with ADL evaluation and planning for discharge- Provide patient education as appropriate  Outcome: Progressing  Goal: Maintains/Returns to pre admission functional level  Description: INTERVENTIONS:- Perform AM-PAC 6 Click Basic Mobility/ Daily Activity assessment daily.- Set and communicate daily mobility goal to care team and patient/family/caregiver. - Collaborate with rehabilitation services on mobility goals if consulted- Perform Range of Motion 3 times a day.- Reposition patient every 2 hours.- Dangle patient 3 times a day- Stand patient 3 times a day- Ambulate patient 3 times a day- Out of bed to chair 3 times a day - Out of bed for meals 3 times a day- Out of bed for toileting- Record patient progress and toleration of activity level   Outcome: Progressing     Problem: DISCHARGE PLANNING  Goal: Discharge to home or other facility with appropriate  resources  Description: INTERVENTIONS:- Identify barriers to discharge w/patient and caregiver- Arrange for needed discharge resources and transportation as appropriate- Identify discharge learning needs (meds, wound care, etc.)- Arrange for interpretive services to assist at discharge as needed- Refer to Case Management Department for coordinating discharge planning if the patient needs post-hospital services based on physician/advanced practitioner order or complex needs related to functional status, cognitive ability, or social support system  Outcome: Progressing     Problem: Knowledge Deficit  Goal: Patient/family/caregiver demonstrates understanding of disease process, treatment plan, medications, and discharge instructions  Description: Complete learning assessment and assess knowledge base.Interventions:- Provide teaching at level of understanding- Provide teaching via preferred learning methods  Outcome: Progressing     Problem: NEUROSENSORY - ADULT  Goal: Achieves stable or improved neurological status  Description: INTERVENTIONS- Monitor and report changes in neurological status- Monitor vital signs such as temperature, blood pressure, glucose, and any other labs ordered - Initiate measures to prevent increased intracranial pressure- Monitor for seizure activity and implement precautions if appropriate    Outcome: Progressing  Goal: Remains free of injury related to seizures activity  Description: INTERVENTIONS- Maintain airway, patient safety  and administer oxygen as ordered- Monitor patient for seizure activity, document and report duration and description of seizure to physician/advanced practitioner- If seizure occurs,  ensure patient safety during seizure- Reorient patient post seizure- Seizure pads on all 4 side rails- Instruct patient/family to notify RN of any seizure activity including if an aura is experienced- Instruct patient/family to call for assistance with activity based on nursing  assessment- Administer anti-seizure medications if ordered  Outcome: Progressing  Goal: Achieves maximal functionality and self care  Description: INTERVENTIONS- Monitor swallowing and airway patency with patient fatigue and changes in neurological status- Encourage and assist patient to increase activity and self care. - Encourage visually impaired, hearing impaired and aphasic patients to use assistive/communication devices  Outcome: Progressing     Problem: METABOLIC, FLUID AND ELECTROLYTES - ADULT  Goal: Electrolytes maintained within normal limits  Description: INTERVENTIONS:- Monitor labs and assess patient for signs and symptoms of electrolyte imbalances- Administer electrolyte replacement as ordered- Monitor response to electrolyte replacements, including repeat lab results as appropriate- Instruct patient on fluid and nutrition as appropriate  Outcome: Progressing  Goal: Fluid balance maintained  Description: INTERVENTIONS:- Monitor labs - Monitor I/O and WT- Instruct patient on fluid and nutrition as appropriate- Assess for signs & symptoms of volume excess or deficit  Outcome: Progressing  Goal: Glucose maintained within target range  Description: INTERVENTIONS:- Monitor Blood Glucose as ordered- Assess for signs and symptoms of hyperglycemia and hypoglycemia- Administer ordered medications to maintain glucose within target range- Assess nutritional intake and initiate nutrition service referral as needed  Outcome: Progressing     Problem: SKIN/TISSUE INTEGRITY - ADULT  Goal: Skin Integrity remains intact(Skin Breakdown Prevention)  Description: Assess:-Perform Patrick assessment every shift-Clean and moisturize skin every shift-Inspect skin when repositioning, toileting, and assisting with ADLS-Assess under medical devices such as masimo& SCD every shift-Assess extremities for adequate circulation and sensation Bed Management:-Have minimal linens on bed & keep smooth, unwrinkled-Change linens as needed  when moist or perspiring-Avoid sitting or lying in one position for more than 2 hours while in bed-Keep HOB at 30 degrees Toileting:-Offer bedside commode-Assess for incontinence every 2 hours-Use incontinent care products after each incontinent episode such as Activity:-Mobilize patient 3 times a day-Encourage activity and walks on unit-Encourage or provide ROM exercises -Turn and reposition patient every 2 Hours-Use appropriate equipment to lift or move patient in bed-Instruct/ Assist with weight shifting every 2 hours when out of bed in chair-Consider limitation of chair time 2  hour intervalsSkin Care:-Avoid use of baby powder, tape, friction and shearing, hot water or constrictive clothing-Relieve pressure over bony prominences using cushions-Do not massage red bony areasNext Steps:-Teach patient strategies to minimize risks such as weight shifting -Consider consults to  interdisciplinary teams such as wound  Outcome: Progressing  Goal: Incision(s), wounds(s) or drain site(s) healing without S/S of infection  Description: INTERVENTIONS- Assess and document dressing, incision, wound bed, drain sites and surrounding tissue- Provide patient and family education- Perform skin care/dressing changes every shift  Outcome: Progressing  Goal: Pressure injury heals and does not worsen  Description: Interventions:- Implement low air loss mattress or specialty surface (Criteria met)- Apply silicone foam dressing- Instruct/assist with weight shifting every 30 minutes when in chair - Limit chair time to 2 hour intervals- Use special pressure reducing interventions such as cushions when in chair - Apply fecal or urinary incontinence containment device - Perform passive or active ROM every shift- Turn and reposition patient & offload bony prominences every 2 hours - Utilize friction reducing device or surface for transfers - Consider consults to  interdisciplinary teams such as wound- Use incontinent care products after each  incontinent episode such as wipes/creams- Consider nutrition services referral as needed  Outcome: Progressing     Problem: Prexisting or High Potential for Compromised Skin Integrity  Goal: Skin integrity is maintained or improved  Description: INTERVENTIONS:- Identify patients at risk for skin breakdown- Assess and monitor skin integrity- Assess and monitor nutrition and hydration status- Monitor labs - Assess for incontinence - Turn and reposition patient- Assist with mobility/ambulation- Relieve pressure over bony prominences- Avoid friction and shearing- Provide appropriate hygiene as needed including keeping skin clean and dry- Evaluate need for skin moisturizer/barrier cream- Collaborate with interdisciplinary team - Patient/family teaching- Consider wound care consult   Outcome: Progressing

## 2025-05-13 NOTE — ASSESSMENT & PLAN NOTE
Lab Results   Component Value Date    HGBA1C 6.0 (H) 05/11/2025     Recent Labs     05/10/25  1717 05/11/25  0802 05/12/25  2341   POCGLU 92 99 106     Blood Sugar Average: Last 72 hrs:  (P) 99  Very well controlled outpatient evidenced by A1c; does not appear to be on any mediations   Discontinued Accu-Cheks and SSI given stable blood sugars  Monitor blood glucose with BMP PRN  Hypoglycemia protocol

## 2025-05-13 NOTE — ASSESSMENT & PLAN NOTE
Wt Readings from Last 3 Encounters:   01/23/25 (!) 169 kg (372 lb 3 oz)   01/04/25 (!) 169 kg (373 lb 0.3 oz)   12/18/24 (!) 165 kg (364 lb)     Chronic left lower extremity edema noted, otherwise examined dry on arrival. EF July 2024 50% with mild-mod valvular abnormalities.   Continue with holding home Lasix, lisinopril and aldactone for now and continue with gentle IV fluids additional 12 hours  Continue BB  Monitor volume status closely with daily weights, intake and output

## 2025-05-13 NOTE — ASSESSMENT & PLAN NOTE
"Ongoing complaint since prior to admission  Ortho consulted at request of patient on 5/12  Xray obtained showing \"Tricompartmental osteoarthritis, most severely affecting the medial compartment\"  WBAT LLE  Hinged knee brace, unlocked ordered for support  OP f/u joint surgeon       "

## 2025-05-13 NOTE — PROGRESS NOTES
"This RN responded to patient calling out for help. Upon entry of room patient was on floor at base of chair. Rapid Response was called and critical care team arrived at bedside. Patient stated he \"fell asleep in chair, he must have leaned forward while asleep leading to his fall.\" Patient states he \"went from the chair to his knees, while catching his upper body with his hands, then proceeded to sit on buttocks until staff arrived.\" IV was dislodged during this process. Patient denies head strike, was able to participate in physical assessment, and was Alert and Oriented at this time. Vitals were then obtained. Patient then was moved back into chair by a team lift by nursing staff, followed by patient maneuvering himself with minimal assistance back into [facility] bed.This RN reviewed safety features with patient. Patient remorseful of fall and adamant staff was not at fault, but receptive to safety education. Patient vitals stable, and resting comfortably at this time. No further interventions ordered at this time.   "

## 2025-05-13 NOTE — ASSESSMENT & PLAN NOTE
Patient complains of worsening left ankle wound to left lower extremity with chronic swelling. 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 He admits that he has neglected his left leg wounds for quite some time and did not feel much pain due to severe neuropathy.  LLE arterial duplex without evidence of significant arterial occlusive disease  No evidence of osteo on left foot and ankle x-rays  Podiatry consult and recommendations appreciated   No plans for surgical intervention at this time   Continue with local wound care   IV antibiotic for cellulitis as above

## 2025-05-13 NOTE — ASSESSMENT & PLAN NOTE
70 y.o. male with left knee instability and osteoarthritis.  Patient reports improvement with gait and ambulation using brace.  No surgical orthopedic intervention at this time, patient can follow-up outpatient for knee instability and osteoarthritis.    Plan:  WBAT LLE  PT/OT for strengthening and balance  Hinged knee brace, unlocked  Outpatient follow up with a Joint Surgeon as needed  Rest of care per primary

## 2025-05-13 NOTE — PROGRESS NOTES
[facility] Podiatry - Progress Note  Patient: Armando Erickson 70 y.o. male   MRN: [id number]  PCP: Aida Interiano MD  [facility] Encounter: [id number]  Date Of Visit: 25    ASSESSMENT:    Armando Erickson is a 70 y.o. male with:    Left ankle venous stasis ulcers  Left hallux ulceration, Naidu grade 0   Type 2 diabetes mellitus peripheral neuropathy, A1c 6.0%   Venous stasis  TAMIR  Right below-knee amputation      PLAN:    Patient seen at bedside. Dressings remain clean, dry and intact.   Continue local wound care of adaptic, maxorb, gauze, ABD pads and rolled gauze to the left venous stasis ulcers. Wrap with ACE wrap from toes to tibial tuberosity.   Reinforced with patient importance of elevating left lower extremity to allow for wound healing.   Wound care orders placed - appreciate nursing assistance with dressing changes.  Elevation on green foam wedges or pillows when non-ambulatory.  Rest of care per primary team.    Weight bearing status: Weightbearing as tolerated       SUBJECTIVE:     The patient was seen, evaluated, and assessed at bedside today. The patient was awake, alert, and in no acute distress. No acute events overnight. The patient reports feeling well today. Patient denies N/V/F/chills/SOB/CP.      OBJECTIVE:     Vitals:   /71   Pulse 80   Temp 97.8 °F (36.6 °C)   Resp 18   SpO2 94%     Temp (24hrs), Av.7 °F (36.5 °C), Min:97.4 °F (36.3 °C), Max:98.2 °F (36.8 °C)      Physical Exam:     General:  Alert, cooperative, and in no distress.  Lower extremity exam:  Cardiovascular status at baseline.  Neurological status at baseline.  Musculoskeletal status at baseline. No calf tenderness noted.     LLE: Dressings clean, dry, and intact with no visible strike-through. No ascending erythema.     Clinical Images 25:  None     Additional Data:     Labs:    Results from last 7 days   Lab Units 25  0547   WBC Thousand/uL 6.24   HEMOGLOBIN g/dL 10.6*   HEMATOCRIT %  "34.5*   PLATELETS Thousands/uL 198   SEGS PCT % 73   LYMPHO PCT % 13*   MONO PCT % 10   EOS PCT % 3     Results from last 7 days   Lab Units 05/13/25  0547 05/12/25  0520 05/11/25  0455   POTASSIUM mmol/L 4.5   < > 5.0   CHLORIDE mmol/L 104   < > 99   CO2 mmol/L 26   < > 29   BUN mg/dL 21   < > 34*   CREATININE mg/dL 1.44*   < > 1.81*   CALCIUM mg/dL 9.0   < > 9.2   ALK PHOS U/L  --   --  93   ALT U/L  --   --  6*   AST U/L  --   --  10*    < > = values in this interval not displayed.           * I Have Reviewed All Lab Data Listed Above.    Recent Cultures (last 7 days):     Results from last 7 days   Lab Units 05/10/25  1829   GRAM STAIN RESULT  No Polys or Bacteria seen   WOUND CULTURE  3+ Growth of Methicillin Resistant Staphylococcus aureus*  1+ Growth of Pseudomonas aeruginosa*  3+ Growth of Beta Hemolytic Streptococcus Group B*  3+ Growth of           Imaging: I have personally reviewed pertinent films in PACS  EKG, Pathology, and Other Studies: I have personally reviewed pertinent reports.      ** Please Note: Portions of the record may have been created with voice recognition software. Occasional wrong word or \"sound a like\" substitutions may have occurred due to the inherent limitations of voice recognition software. Read the chart carefully and recognize, using context, where substitutions have occurred. **      "

## 2025-05-14 VITALS
RESPIRATION RATE: 18 BRPM | TEMPERATURE: 97.8 F | SYSTOLIC BLOOD PRESSURE: 146 MMHG | OXYGEN SATURATION: 96 % | DIASTOLIC BLOOD PRESSURE: 81 MMHG | HEART RATE: 91 BPM

## 2025-05-14 PROBLEM — N17.9 AKI (ACUTE KIDNEY INJURY) (HCC): Status: RESOLVED | Noted: 2025-05-10 | Resolved: 2025-05-14

## 2025-05-14 LAB
ANION GAP SERPL CALCULATED.3IONS-SCNC: 5 MMOL/L (ref 4–13)
BUN SERPL-MCNC: 18 MG/DL (ref 5–25)
CALCIUM SERPL-MCNC: 9.2 MG/DL (ref 8.4–10.2)
CHLORIDE SERPL-SCNC: 103 MMOL/L (ref 96–108)
CO2 SERPL-SCNC: 27 MMOL/L (ref 21–32)
CREAT SERPL-MCNC: 1.18 MG/DL (ref 0.6–1.3)
GFR SERPL CREATININE-BSD FRML MDRD: 61 ML/MIN/1.73SQ M
GLUCOSE SERPL-MCNC: 94 MG/DL (ref 65–140)
POTASSIUM SERPL-SCNC: 4.6 MMOL/L (ref 3.5–5.3)
SODIUM SERPL-SCNC: 135 MMOL/L (ref 135–147)

## 2025-05-14 PROCEDURE — 99238 HOSP IP/OBS DSCHRG MGMT 30/<: CPT | Performed by: PHYSICIAN ASSISTANT

## 2025-05-14 PROCEDURE — 80048 BASIC METABOLIC PNL TOTAL CA: CPT | Performed by: PHYSICIAN ASSISTANT

## 2025-05-14 PROCEDURE — 97542 WHEELCHAIR MNGMENT TRAINING: CPT

## 2025-05-14 PROCEDURE — 97530 THERAPEUTIC ACTIVITIES: CPT

## 2025-05-14 RX ORDER — CEPHALEXIN 750 MG/1
750 CAPSULE ORAL 4 TIMES DAILY
Start: 2025-05-14 | End: 2025-05-16

## 2025-05-14 RX ADMIN — METOPROLOL TARTRATE 50 MG: 50 TABLET, FILM COATED ORAL at 08:51

## 2025-05-14 RX ADMIN — PANTOPRAZOLE SODIUM 40 MG: 40 TABLET, DELAYED RELEASE ORAL at 08:50

## 2025-05-14 RX ADMIN — APIXABAN 5 MG: 5 TABLET, FILM COATED ORAL at 08:51

## 2025-05-14 RX ADMIN — LEVOTHYROXINE SODIUM 300 MCG: 100 TABLET ORAL at 05:59

## 2025-05-14 RX ADMIN — LEVOTHYROXINE SODIUM 25 MCG: 0.03 TABLET ORAL at 05:59

## 2025-05-14 RX ADMIN — CEFAZOLIN SODIUM 2000 MG: 2 SOLUTION INTRAVENOUS at 08:50

## 2025-05-14 RX ADMIN — ASPIRIN 81 MG: 81 TABLET, COATED ORAL at 08:50

## 2025-05-14 NOTE — CASE MANAGEMENT
Case Management Discharge Planning Note    Patient name Armando Espinozar  Location [facility] 629/[facility] 629-01 MRN [id number]  : 1955 Date 2025       Current Admission Date: 5/10/2025  Current Admission Diagnosis:TAMIR (acute kidney injury) ([facility])   Patient Active Problem List    Diagnosis Date Noted    Knee instability, left 2025    Ambulatory dysfunction 05/10/2025    TAMIR (acute kidney injury) ([facility]) 05/10/2025    Venous stasis ulcer of left ankle ([facility]) 05/10/2025    Diabetic foot ulcer ([facility]) 2024    Lymphedema 2024    Venous stasis ulcer ([facility]) 2024    Chronic heart failure with preserved ejection fraction ([facility]) 2024    Ulcer of toe of left foot, limited to breakdown of skin ([facility]) 2022    Diabetic ulcer of left ankle associated with type 2 diabetes mellitus, limited to breakdown of skin ([facility]) 2022    Dermatophytosis 2022    Cellulitis of left lower extremity 2021    Diabetic polyneuropathy associated with type 2 diabetes mellitus ([facility]) 2021    Diabetic ulcer of left foot associated with type 2 diabetes mellitus, limited to breakdown of skin ([facility]) 02/10/2021    Idiopathic chronic venous hypertension of left lower extremity with ulcer ([facility]) 02/10/2021    Non-pressure chronic ulcer of left calf, limited to breakdown of skin ([facility]) 02/10/2021    Gastroesophageal reflux disease without esophagitis 2019    Chronic venous stasis dermatitis of left lower extremity 2019    Essential hypertension 2019    HLD (hyperlipidemia) 2019    Hypothyroidism 2019    Obesity, Class III, BMI 40-49.9 (morbid obesity) 2019    Living will on file 2018    S/P BKA (below knee amputation) unilateral, right ([facility]) 2017    Controlled type 2 diabetes mellitus with complication, without long-term current use of insulin ([facility]) 10/10/2016    Iron deficiency anemia due to chronic blood loss 2014    Paroxysmal atrial fibrillation ([facility])  10/02/2012    Celiac disease 08/30/2012      LOS (days): 4  Geometric Mean LOS (GMLOS) (days): 3.1  Days to GMLOS:-0.8     OBJECTIVE:  Risk of Unplanned Readmission Score: 10.78         Current admission status: Inpatient   Preferred Pharmacy:   [facility] - [place], [place] - [address]  [address] [address]  Phone: [phone] Fax: [phone]    [facility] [place] [place] - [address] [place] [id number]  Phone: [phone] Fax: [phone]    Primary Care Provider: Aida Interiano MD    Primary Insurance: MEDICARE  Secondary Insurance: BLUE CROSS    DISCHARGE DETAILS:    Discharge planning discussed with:: patient at bedside  Freedom of Choice: Yes  Comments - [place] of Choice: [facility] contacted family/caregiver?: No- see comments  Were Treatment Team discharge recommendations reviewed with patient/caregiver?: Yes  Did patient/caregiver verbalize understanding of patient care needs?: Yes  Were patient/caregiver advised of the risks associated with not following Treatment Team discharge recommendations?: Yes    Contacts  Reason/Outcome: Discharge Planning     Other Referral/Resources/Interventions Provided:  Interventions: Short Term Rehab     Accepting Facility Name, City & State : [facility] in [place]  Receiving Facility/Agency Phone Number: Phone: [phone]  Facility/Agency Fax Number: Fax: [phone]   Patient made aware that [facility] no longer has the appropriate bed, however, he agreed to the available bed at [facility].  JULEE to set up transport. MAXIMS aware of same.     Update: TC to daughter, Hiliary and left a message that transport is for 1530 today to [facility].

## 2025-05-14 NOTE — DISCHARGE SUMMARY
Discharge Summary - Hospitalist   Name: Armando Erickson 70 y.o. male I MRN: [id number]  Unit/Bed#: [facility] 629-01 I Date of Admission: 5/10/2025   Date of Service: 5/14/2025 I Hospital Day: 4     Assessment & Plan  TAMIR (acute kidney injury) (HCC) (Resolved: 5/14/2025)  POA with creatinine of 1.84 increased from baseline ~1. Initially suspected to be prerenal in setting of diuretics and decreased oral intake. Patient appeared dry on physical exam initially and noted to have chronic left lower leg edema.  Resolved with holding diuretics/ACE and receiving gentle continuous IV fluids, which were stopped yesterday   Continue with holding home Lasix and lisinopril and spironolactone - tentatively resume on Friday 5/16 at rehab  Monitor BMP intermittently while at rehab  Ambulatory dysfunction  Patient has chronic ambulatory dysfunction however upon trying to get up from the recliner on day of admission he shifted his left leg and felt a pop.  Since then he has been really unable to put much weight on his left knee. On further evaluation it looks like patient also has chronic wounds.  Prior provider discussed curbside with orthopedic surgery regarding the R knee buckling, patient requested formal consult on 5/12 as he reported he cannot bear much weight on his leg, denies pain  RR overnight 5/12-5/13 for unwitnessed fall -> no traumatic injury   PT OT recommending rehab; discharged to [place]  Cellulitis of left lower extremity  POA in the setting of chronic venous stasis ulcer of the left ankle  Transition from IV Ancef to Keflex on discharge to complete total 7 day course   Local wound care per podiatry recommendations   Symptomatic and supportive cares  Clinically improved   Venous stasis ulcer of left ankle (HCC)  Patient complains of worsening left ankle wound to left lower extremity with chronic swelling. 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 He admits that he  "has neglected his left leg wounds for quite some time and did not feel much pain due to severe neuropathy.  LLE arterial duplex without evidence of significant arterial occlusive disease  No evidence of osteo on left foot and ankle x-rays  Podiatry consult and recommendations appreciated   No plans for surgical intervention at this time   Continue with local wound care   IV antibiotic for cellulitis as above  Knee instability, left  Ongoing complaint since prior to admission; suspected to be contributing to ambulatory dysfunction   Ortho consulted at request of patient on 5/12, recommended WBAT LLE and hinged knee brace, unlocked ordered for support  Xray obtained showing \"Tricompartmental osteoarthritis, most severely affecting the medial compartment\"  Outpatient follow-up with joint surgeon   Chronic heart failure with preserved ejection fraction (HCC)  Wt Readings from Last 3 Encounters:   01/23/25 (!) 169 kg (372 lb 3 oz)   01/04/25 (!) 169 kg (373 lb 0.3 oz)   12/18/24 (!) 165 kg (364 lb)     Chronic left lower extremity edema noted, otherwise examined dry on arrival. EF July 2024 50% with mild-mod valvular abnormalities.   Admitted with TAMIR which resolved after holding diuretic and receiving gentle IV fluids   Continue with holding home Lasix, lisinopril and aldactone for now - tentatively resume on Friday 5/16   Controlled type 2 diabetes mellitus with complication, without long-term current use of insulin ([facility])  Lab Results   Component Value Date    HGBA1C 6.0 (H) 05/11/2025     Recent Labs     05/12/25  2341   POCGLU 106     Blood Sugar Average: Last 72 hrs:  (P) 102.5  Very well controlled outpatient evidenced by A1c; does not appear to be on any mediations   Outpatient follow-up   Essential hypertension  Noted with hypotension at one point, blood pressure now improved--high   Holding spironolactone, lisinopril, Lasix for now as above   Continue home dose metoprolol  Paroxysmal atrial fibrillation " (HCC)  Continue AC with apixaban  Continue metoprolol 50 mg twice daily  Gastroesophageal reflux disease without esophagitis  Continue pantoprazole  Hypothyroidism  Continue home dose levothyroxine  HLD (hyperlipidemia)  Lipid panel: cholesterol 113, , HDL 34, LDL 59  Not currently on statin  Obesity, Class III, BMI 40-49.9 (morbid obesity)  Affects all aspects of care  Encourage diet and lifestyle modifications      Medical Problems       Resolved Problems  Date Reviewed: 5/13/2025   None       Discharging Physician / Practitioner: Liv Thomas PA-C  PCP: Aida Interiano MD  Admission Date:   Admission Orders (From admission, onward)       Ordered        05/10/25 1245  INPATIENT ADMISSION  Once                          Discharge Date: 05/14/25    Consultations During Hospital Stay:  Podiatry  Orthopedic surgery    Procedures Performed:   None    Significant Findings / Test Results:   Outlined above    Incidental Findings:   None    Test Results Pending at Discharge (will require follow up):   None     Outpatient Tests Requested:  Intermittent BMP monitoring at rehab    Complications:  none     Reason for Admission: TAMIR    Hospital Course:   Armando Erickson is a 70 y.o. male patient who originally presented to the hospital on 5/10/2025 due to ambulatory dysfunction with fall at home.  On arrival was found to have TAMIR which was suspected to be prerenal in the setting of decreased oral intake with concurrent use of ACE/diuretics.  PTA lisinopril, spironolactone and Lasix were placed on hold and patient was started on IV fluids.  Creatinine down trended and eventually normalized.  Recommend resuming lisinopril, spironolactone and Lasix tentatively on Friday 5/16 while at rehab.  Would also recommend intermittent BMP monitoring while at rehab.    Noted to have chronic venous stasis ulcers with associated LLE cellulitis for which she was started on IV Ancef and seen in consultation by podiatry.  No concern for  osteomyelitis and podiatry did not recommend acute surgical intervention at this time.  Patient will continue with local wound care and follow-up closely in the outpatient setting.  He was transitioned from Ancef to Keflex on discharge to complete total 7-day course for cellulitis.    Patient had complained of right knee instability/buckling sensation and was seen in consultation by orthopedic surgery.  X-ray consistent with arthritis.  Orthopedics recommended weightbearing as tolerated with knee brace and outpatient follow-up with joint surgeon.    Please see above list of diagnoses and related plan for additional information.     Condition at Discharge: stable    Discharge Day Visit / Exam:   Subjective: Patient offers no new complaints today, aware of plan for discharge to rehab.   Vitals: Blood Pressure: 146/81 (05/14/25 0853)  Pulse: 91 (05/14/25 0853)  Temperature: 97.8 °F (36.6 °C) (05/14/25 0703)  Temp Source: Oral (05/10/25 1053)  Respirations: 18 (05/14/25 0703)  SpO2: 96 % (05/14/25 0853)  Physical Exam  Constitutional:       General: He is not in acute distress.     Appearance: He is morbidly obese.     Cardiovascular:      Rate and Rhythm: Normal rate.   Pulmonary:      Effort: Pulmonary effort is normal. No respiratory distress.     Musculoskeletal:         General: Deformity (Status post right BKA with prosthesis in place) present.      Left lower leg: Edema (Overall improved) present.     Skin:     Comments: LLE wound dressing CDI     Neurological:      Mental Status: He is alert and oriented to person, place, and time. Mental status is at baseline.          Discussion with Family: Patient declined call to .     Discharge instructions/Information to patient and family:   See after visit summary for information provided to patient and family.      Provisions for Follow-Up Care:  See after visit summary for information related to follow-up care and any pertinent home health orders.       Mobility at time of Discharge:   Basic Mobility Inpatient Raw Score: 14  JH-HLM Goal: 4: Move to chair/commode  JH-HLM Achieved: 4: Move to chair/commode  HLM Goal achieved. Continue to encourage appropriate mobility.     Disposition:   Other Skilled Nursing Facility at [facility]    Planned Readmission: no    Discharge Medications:  See after visit summary for reconciled discharge medications provided to patient and/or family.      Administrative Statements   Discharge Statement:  I have spent a total time of 25 minutes in caring for this patient on the day of the visit/encounter.    **Please Note: This note may have been constructed using a voice recognition system**

## 2025-05-14 NOTE — ASSESSMENT & PLAN NOTE
POA with creatinine of 1.84 increased from baseline ~1. Initially suspected to be prerenal in setting of diuretics and decreased oral intake. Patient appeared dry on physical exam initially and noted to have chronic left lower leg edema.  Resolved with holding diuretics/ACE and receiving gentle continuous IV fluids, which were stopped yesterday   Continue with holding home Lasix and lisinopril and spironolactone - tentatively resume on Friday 5/16 at rehab  Monitor BMP intermittently while at rehab

## 2025-05-14 NOTE — PROGRESS NOTES
Patient:  CORRIE WEBER    MRN:  [id number]    Aidin Request ID:  [id number]    Level of care reserved:  Skilled Nursing Facility    Partner Reserved:  [facility], [place] [address] [phone]    Clinical needs requested:    Geography searched:  10 miles around [id number]    Start of Service:    Request sent:  10:13am EDT on 5/14/2025 by Eryn Karimi (maggie)    Partner reserved:  10:17am EDT on 5/14/2025 by Eryn Karimi (maggie)    Choice list shared:  10:17am EDT on 5/14/2025 by Eryn Karimi (maggie)

## 2025-05-14 NOTE — PHYSICAL THERAPY NOTE
"Physical Therapy Progress Note     05/14/25 1004   PT Last Visit   PT Visit Date 05/14/25   Note Type   Note Type Treatment   Pain Assessment   Pain Score No Pain   Restrictions/Precautions   LLE Weight Bearing Per Order WBAT   Subjective   Subjective pt encountered seated in recliner, pleasant and agreeable to treatment.  Reports no new complaints today.  Was given knee brace which was present in room, but pt reports \"one ortho doc said to wear it, another said it won't do anytthing.\"  Pt now weraing brace during sessio, but offers no new complaints with activity.   Transfers   Sit to Stand 3  Moderate assistance   Additional items Assist x 1;Assist x 2  (x1 from recliner height, x2 from w/c height)   Stand to Sit 3  Moderate assistance   Additional items Assist x 2   Stand pivot 3  Moderate assistance   Additional items Assist x 2  (squat pivot with UE support on armrests)   Wheelchair Activities   Propulsion Yes   Propulsion Type 1 Manual   Level 1 Level tile   Method 1 Right upper extremity;Left upper extremity;Right lower extremity;Left lower extremity   Level of Assistance 1 Close supervision  (in hallway, mod A for repositioning for pivots)   Description/ Details 1 50' x 2   Balance   Static Sitting Fair +   Static Standing Poor   Dynamic Standing Poor   Activity Tolerance   Activity Tolerance Patient tolerated treatment well;Patient limited by fatigue   Nurse Made Aware ALF Franco   Assessment   Prognosis Fair   Problem List Decreased strength;Decreased endurance;Impaired balance;Decreased mobility;Impaired sensation;Obesity;Decreased skin integrity   Assessment Pt demosntrated improved funcitonal mobitly today, perofmring transfers with considerably less assist overall compared to eval, all while wearing RLE prostheteic that he doffed & donned as demonstration during session.  he was able to complete transfer when doing so from elevated seat height with appropriate arm rest, but once presented with lower " seat, pt required considerably more assist to initiate transer, which then improved as he regained in balance in standing, at which point, he completed pivot in similar manner compared to first trial.  pt was able to demosntrate appropriate w.c management in allways with UE support, and likely would have utilized LEs as well if seat height was more appropriate to his comfort level.  Time taken to reposition chair for safety & instruct pt in UE support to complete pivots as well, which will likely requir practice & reinforcement in upcoming sessions.  Pt is making progress towards his baseline mobiilty, but will scertainly benefit from ongoing skilled therapy interventions to improve endurance, strength, balance & general mobilty to reduce long term risk for falls & prevent furter decionditioning that has, in part, prompted this admission.  PT POC & d/c recommendations remain appropriate.   Goals   Patient Goals to get better & go home   STG Expiration Date 05/25/25   PT Treatment Day 1   Plan   Treatment/Interventions Functional transfer training;LE strengthening/ROM;Therapeutic exercise;Endurance training;Patient/family training;Equipment eval/education;Bed mobility       Alonso Amos PTA    An [facility] Basic Mobility Raw Score less than 17 suggests pt would benefit from post acute rehab.  Please also refer to the recommendation of the Physical Therapist for safe discharge planning.

## 2025-05-14 NOTE — ASSESSMENT & PLAN NOTE
POA in the setting of chronic venous stasis ulcer of the left ankle  Transition from IV Ancef to Keflex on discharge to complete total 7 day course   Local wound care per podiatry recommendations   Symptomatic and supportive cares  Clinically improved

## 2025-05-14 NOTE — PLAN OF CARE
Problem: PHYSICAL THERAPY ADULT  Goal: Performs mobility at highest level of function for planned discharge setting.  See evaluation for individualized goals.  Description: Treatment/Interventions: ADL retraining, Functional transfer training, LE strengthening/ROM, Therapeutic exercise, Endurance training, Bed mobility, Equipment eval/education, Patient/family training, Compensatory technique education, Continued evaluation, Spoke to nursing, OT  Equipment Recommended: Walker, Wheelchair       See flowsheet documentation for full assessment, interventions and recommendations.  Outcome: Progressing  Note: Prognosis: Fair  Problem List: Decreased strength, Decreased endurance, Impaired balance, Decreased mobility, Impaired sensation, Obesity, Decreased skin integrity  Assessment: Pt demosntrated improved funcitonal mobitly today, perofmring transfers with considerably less assist overall compared to eval, all while wearing RLE prostheteic that he doffed & donned as demonstration during session.  he was able to complete transfer when doing so from elevated seat height with appropriate arm rest, but once presented with lower seat, pt required considerably more assist to initiate transer, which then improved as he regained in balance in standing, at which point, he completed pivot in similar manner compared to first trial.  pt was able to demosntrate appropriate w.c management in allways with UE support, and likely would have utilized LEs as well if seat height was more appropriate to his comfort level.  Time taken to reposition chair for safety & instruct pt in UE support to complete pivots as well, which will likely requir practice & reinforcement in upcoming sessions.  Pt is making progress towards his baseline mobiilty, but will scertainly benefit from ongoing skilled therapy interventions to improve endurance, strength, balance & general mobilty to reduce long term risk for falls & prevent furter decionditioning  that has, in part, prompted this admission.  PT POC & d/c recommendations remain appropriate.  Barriers to Discharge: Inaccessible home environment, Decreased caregiver support     Rehab Resource Intensity Level, PT: I (Maximum Resource Intensity)    See flowsheet documentation for full assessment.

## 2025-05-14 NOTE — ASSESSMENT & PLAN NOTE
Patient has chronic ambulatory dysfunction however upon trying to get up from the recliner on day of admission he shifted his left leg and felt a pop.  Since then he has been really unable to put much weight on his left knee. On further evaluation it looks like patient also has chronic wounds.  Prior provider discussed curbside with orthopedic surgery regarding the R knee buckling, patient requested formal consult on 5/12 as he reported he cannot bear much weight on his leg, denies pain  RR overnight 5/12-5/13 for unwitnessed fall -> no traumatic injury   PT OT recommending rehab; discharged to [place]

## 2025-05-14 NOTE — ASSESSMENT & PLAN NOTE
Noted with hypotension at one point, blood pressure now improved--high   Holding spironolactone, lisinopril, Lasix for now as above   Continue home dose metoprolol

## 2025-05-14 NOTE — ASSESSMENT & PLAN NOTE
"Ongoing complaint since prior to admission; suspected to be contributing to ambulatory dysfunction   Ortho consulted at request of patient on 5/12, recommended WBAT LLE and hinged knee brace, unlocked ordered for support  Xray obtained showing \"Tricompartmental osteoarthritis, most severely affecting the medial compartment\"  Outpatient follow-up with joint surgeon   "

## 2025-05-14 NOTE — ASSESSMENT & PLAN NOTE
Wt Readings from Last 3 Encounters:   01/23/25 (!) 169 kg (372 lb 3 oz)   01/04/25 (!) 169 kg (373 lb 0.3 oz)   12/18/24 (!) 165 kg (364 lb)     Chronic left lower extremity edema noted, otherwise examined dry on arrival. EF July 2024 50% with mild-mod valvular abnormalities.   Admitted with TAMIR which resolved after holding diuretic and receiving gentle IV fluids   Continue with holding home Lasix, lisinopril and aldactone for now - tentatively resume on Friday 5/16

## 2025-05-14 NOTE — PLAN OF CARE
Problem: Potential for Falls  Goal: Patient will remain free of falls  Description: INTERVENTIONS:- Educate patient/family on patient safety including physical limitations- Instruct patient to call for assistance with activity - Consult OT/PT to assist with strengthening/mobility - Keep Call bell within reach- Keep bed low and locked with side rails adjusted as appropriate- Keep care items and personal belongings within reach- Initiate and maintain comfort rounds- Make Fall Risk Sign visible to staff- Offer Toileting every 2 Hours, in advance of need- Initiate/Maintain bed alarm- Obtain necessary fall risk management equipment: - Apply yellow socks and bracelet for high fall risk patients- Consider moving patient to room near nurses station  INTERVENTIONS:- Educate patient/family on patient safety including physical limitations- Instruct patient to call for assistance with activity - Consult OT/PT to assist with strengthening/mobility - Keep Call bell within reach- Keep bed low and locked with side rails adjusted as appropriate- Keep care items and personal belongings within reach- Initiate and maintain comfort rounds- Make Fall Risk Sign visible to staff- Offer Toileting every 2 Hours, in advance of need- Initiate/Maintain bed alarm- Obtain necessary fall risk management equipment: - Apply yellow socks and bracelet for high fall risk patients- Consider moving patient to room near nurses station  Outcome: Progressing     Problem: PAIN - ADULT  Goal: Verbalizes/displays adequate comfort level or baseline comfort level  Description: Interventions:- Encourage patient to monitor pain and request assistance- Assess pain using appropriate pain scale- Administer analgesics based on type and severity of pain and evaluate response- Implement non-pharmacological measures as appropriate and evaluate response- Consider cultural and social influences on pain and pain management- Notify physician/advanced practitioner if  interventions unsuccessful or patient reports new pain  Outcome: Progressing     Problem: INFECTION - ADULT  Goal: Absence or prevention of progression during hospitalization  Description: INTERVENTIONS:- Assess and monitor for signs and symptoms of infection- Monitor lab/diagnostic results- Monitor all insertion sites, i.e. indwelling lines, tubes, and drains- Monitor endotracheal if appropriate and nasal secretions for changes in amount and color- [place] appropriate cooling/warming therapies per order- Administer medications as ordered- Instruct and encourage patient and family to use good hand hygiene technique- Identify and instruct in appropriate isolation precautions for identified infection/condition  Outcome: Progressing  Goal: Absence of fever/infection during neutropenic period  Description: INTERVENTIONS:- Monitor WBC  Outcome: Progressing     Problem: SAFETY ADULT  Goal: Patient will remain free of falls  Description: INTERVENTIONS:- Educate patient/family on patient safety including physical limitations- Instruct patient to call for assistance with activity - Consult OT/PT to assist with strengthening/mobility - Keep Call bell within reach- Keep bed low and locked with side rails adjusted as appropriate- Keep care items and personal belongings within reach- Initiate and maintain comfort rounds- Make Fall Risk Sign visible to staff- Offer Toileting every 2 Hours, in advance of need- Initiate/Maintain bed alarm- Obtain necessary fall risk management equipment: - Apply yellow socks and bracelet for high fall risk patients- Consider moving patient to room near nurses station  INTERVENTIONS:- Educate patient/family on patient safety including physical limitations- Instruct patient to call for assistance with activity - Consult OT/PT to assist with strengthening/mobility - Keep Call bell within reach- Keep bed low and locked with side rails adjusted as appropriate- Keep care items and personal belongings  within reach- Initiate and maintain comfort rounds- Make Fall Risk Sign visible to staff- Offer Toileting every 2 Hours, in advance of need- Initiate/Maintain bed alarm- Obtain necessary fall risk management equipment: - Apply yellow socks and bracelet for high fall risk patients- Consider moving patient to room near nurses station  Outcome: Progressing  Goal: Maintain or return to baseline ADL function  Description: INTERVENTIONS:-  Assess patient's ability to carry out ADLs; assess patient's baseline for ADL function and identify physical deficits which impact ability to perform ADLs (bathing, care of mouth/teeth, toileting, grooming, dressing, etc.)- Assess/evaluate cause of self-care deficits - Assess range of motion- Assess patient's mobility; develop plan if impaired- Assess patient's need for assistive devices and provide as appropriate- Encourage maximum independence but intervene and supervise when necessary- Involve family in performance of ADLs- Assess for home care needs following discharge - Consider OT consult to assist with ADL evaluation and planning for discharge- Provide patient education as appropriate  Outcome: Progressing  Goal: Maintains/Returns to pre admission functional level  Description: INTERVENTIONS:- Perform AM-PAC 6 Click Basic Mobility/ Daily Activity assessment daily.- Set and communicate daily mobility goal to care team and patient/family/caregiver. - Collaborate with rehabilitation services on mobility goals if consulted- Perform Range of Motion 3 times a day.- Reposition patient every 2 hours.- Dangle patient 3 times a day- Stand patient 3 times a day- Ambulate patient 3 times a day- Out of bed to chair 3 times a day - Out of bed for meals 3 times a day- Out of bed for toileting- Record patient progress and toleration of activity level   Outcome: Progressing     Problem: DISCHARGE PLANNING  Goal: Discharge to home or other facility with appropriate resources  Description:  INTERVENTIONS:- Identify barriers to discharge w/patient and caregiver- Arrange for needed discharge resources and transportation as appropriate- Identify discharge learning needs (meds, wound care, etc.)- Arrange for interpretive services to assist at discharge as needed- Refer to Case Management Department for coordinating discharge planning if the patient needs post-hospital services based on physician/advanced practitioner order or complex needs related to functional status, cognitive ability, or social support system  Outcome: Progressing     Problem: Knowledge Deficit  Goal: Patient/family/caregiver demonstrates understanding of disease process, treatment plan, medications, and discharge instructions  Description: Complete learning assessment and assess knowledge base.Interventions:- Provide teaching at level of understanding- Provide teaching via preferred learning methods  Outcome: Progressing     Problem: NEUROSENSORY - ADULT  Goal: Achieves stable or improved neurological status  Description: INTERVENTIONS- Monitor and report changes in neurological status- Monitor vital signs such as temperature, blood pressure, glucose, and any other labs ordered - Initiate measures to prevent increased intracranial pressure- Monitor for seizure activity and implement precautions if appropriate    Outcome: Progressing  Goal: Remains free of injury related to seizures activity  Description: INTERVENTIONS- Maintain airway, patient safety  and administer oxygen as ordered- Monitor patient for seizure activity, document and report duration and description of seizure to physician/advanced practitioner- If seizure occurs,  ensure patient safety during seizure- Reorient patient post seizure- Seizure pads on all 4 side rails- Instruct patient/family to notify RN of any seizure activity including if an aura is experienced- Instruct patient/family to call for assistance with activity based on nursing assessment- Administer  anti-seizure medications if ordered  Outcome: Progressing  Goal: Achieves maximal functionality and self care  Description: INTERVENTIONS- Monitor swallowing and airway patency with patient fatigue and changes in neurological status- Encourage and assist patient to increase activity and self care. - Encourage visually impaired, hearing impaired and aphasic patients to use assistive/communication devices  Outcome: Progressing     Problem: METABOLIC, FLUID AND ELECTROLYTES - ADULT  Goal: Electrolytes maintained within normal limits  Description: INTERVENTIONS:- Monitor labs and assess patient for signs and symptoms of electrolyte imbalances- Administer electrolyte replacement as ordered- Monitor response to electrolyte replacements, including repeat lab results as appropriate- Instruct patient on fluid and nutrition as appropriate  Outcome: Progressing  Goal: Fluid balance maintained  Description: INTERVENTIONS:- Monitor labs - Monitor I/O and WT- Instruct patient on fluid and nutrition as appropriate- Assess for signs & symptoms of volume excess or deficit  Outcome: Progressing  Goal: Glucose maintained within target range  Description: INTERVENTIONS:- Monitor Blood Glucose as ordered- Assess for signs and symptoms of hyperglycemia and hypoglycemia- Administer ordered medications to maintain glucose within target range- Assess nutritional intake and initiate nutrition service referral as needed  Outcome: Progressing     Problem: SKIN/TISSUE INTEGRITY - ADULT  Goal: Skin Integrity remains intact(Skin Breakdown Prevention)  Description: Assess:-Perform Patrick assessment every shift-Clean and moisturize skin every shift -Inspect skin when repositioning, toileting, and assisting with ADLS-Assess under medical devices such as masimo every shift-Assess extremities for adequate circulation and sensation Bed Management:-Have minimal linens on bed & keep smooth, unwrinkled-Change linens as needed when moist or  perspiring-Avoid sitting or lying in one position for more than 2 hours while in bed-Keep HOB at 30 degrees Toileting:-Offer bedside commode-Assess for incontinence every shift -Use incontinent care products after each incontinent episode such as wipes/creams Activity:-Mobilize patient 3 times a day-Encourage activity and walks on unit-Encourage or provide ROM exercises -Turn and reposition patient every 2 Hours-Use appropriate equipment to lift or move patient in bed-Instruct/ Assist with weight shifting every shift when out of bed in chair-Consider limitation of chair time 2 hour intervalsSkin Care:-Avoid use of baby powder, tape, friction and shearing, hot water or constrictive clothing-Relieve pressure over bony prominences using cushions-Do not massage red bony areasNext Steps:-Teach patient strategies to minimize risks such as weight shifting -Consider consults to  interdisciplinary teams such as wound  Outcome: Progressing  Goal: Incision(s), wounds(s) or drain site(s) healing without S/S of infection  Description: INTERVENTIONS- Assess and document dressing, incision, wound bed, drain sites and surrounding tissue- Provide patient and family education- Perform skin care/dressing changes every shift  Outcome: Progressing  Goal: Pressure injury heals and does not worsen  Description: Interventions:- Implement low air loss mattress or specialty surface (Criteria met)- Apply silicone foam dressing- Instruct/assist with weight shifting every 30 minutes when in chair - Limit chair time to 2 hour intervals- Use special pressure reducing interventions such as cushions when in chair - Apply fecal or urinary incontinence containment device - Perform passive or active ROM every shift- Turn and reposition patient & offload bony prominences every 2 hours - Utilize friction reducing device or surface for transfers - Consider consults to  interdisciplinary teams such as wound- Use incontinent care products after each  incontinent episode such as wipes/creams- Consider nutrition services referral as needed  Outcome: Progressing     Problem: Prexisting or High Potential for Compromised Skin Integrity  Goal: Skin integrity is maintained or improved  Description: INTERVENTIONS:- Identify patients at risk for skin breakdown- Assess and monitor skin integrity- Assess and monitor nutrition and hydration status- Monitor labs - Assess for incontinence - Turn and reposition patient- Assist with mobility/ambulation- Relieve pressure over bony prominences- Avoid friction and shearing- Provide appropriate hygiene as needed including keeping skin clean and dry- Evaluate need for skin moisturizer/barrier cream- Collaborate with interdisciplinary team - Patient/family teaching- Consider wound care consult   Outcome: Progressing

## 2025-05-14 NOTE — ASSESSMENT & PLAN NOTE
Lab Results   Component Value Date    HGBA1C 6.0 (H) 05/11/2025     Recent Labs     05/12/25  2341   POCGLU 106     Blood Sugar Average: Last 72 hrs:  (P) 102.5  Very well controlled outpatient evidenced by A1c; does not appear to be on any mediations   Outpatient follow-up

## 2025-05-15 ENCOUNTER — PATIENT OUTREACH (OUTPATIENT)
Dept: CASE MANAGEMENT | Facility: OTHER | Age: 70
End: 2025-05-15

## 2025-05-15 ENCOUNTER — TRANSITIONAL CARE MANAGEMENT (OUTPATIENT)
Dept: FAMILY MEDICINE CLINIC | Facility: CLINIC | Age: 70
End: 2025-05-15

## 2025-05-15 DIAGNOSIS — L97.929 IDIOPATHIC CHRONIC VENOUS HYPERTENSION OF LEFT LOWER EXTREMITY WITH ULCER (HCC): Primary | ICD-10-CM

## 2025-05-15 DIAGNOSIS — I87.312 IDIOPATHIC CHRONIC VENOUS HYPERTENSION OF LEFT LOWER EXTREMITY WITH ULCER (HCC): Primary | ICD-10-CM

## 2025-05-15 NOTE — PROGRESS NOTES
Outpatient care management referral via HRR report 5/15/25. [facility]charged [facility]. Email sent to facility to inform them I will be following the patient during their skilled stay.  This Admin Coordinator will continue to monitor via chart review.

## 2025-05-20 ENCOUNTER — NURSING HOME VISIT (OUTPATIENT)
Dept: WOUND CARE | Facility: HOSPITAL | Age: 70
End: 2025-05-20
Payer: MEDICARE

## 2025-05-20 DIAGNOSIS — L97.909 VENOUS ULCER (HCC): ICD-10-CM

## 2025-05-20 DIAGNOSIS — I83.009 VENOUS ULCER (HCC): ICD-10-CM

## 2025-05-20 DIAGNOSIS — L97.929 IDIOPATHIC CHRONIC VENOUS HYPERTENSION OF LEFT LOWER EXTREMITY WITH ULCER (HCC): Primary | ICD-10-CM

## 2025-05-20 DIAGNOSIS — I87.312 IDIOPATHIC CHRONIC VENOUS HYPERTENSION OF LEFT LOWER EXTREMITY WITH ULCER (HCC): Primary | ICD-10-CM

## 2025-05-20 DIAGNOSIS — I89.0 LYMPHEDEMA: ICD-10-CM

## 2025-05-20 DIAGNOSIS — R26.2 AMBULATORY DYSFUNCTION: ICD-10-CM

## 2025-05-20 DIAGNOSIS — E11.8 CONTROLLED TYPE 2 DIABETES MELLITUS WITH COMPLICATION, WITHOUT LONG-TERM CURRENT USE OF INSULIN (HCC): ICD-10-CM

## 2025-05-20 PROCEDURE — 99309 SBSQ NF CARE MODERATE MDM 30: CPT | Performed by: NURSE PRACTITIONER

## 2025-05-21 ENCOUNTER — PATIENT OUTREACH (OUTPATIENT)
Dept: CASE MANAGEMENT | Facility: OTHER | Age: 70
End: 2025-05-21

## 2025-05-22 NOTE — ASSESSMENT & PLAN NOTE
A1C results reviewed with the patient today.   Lab Results   Component Value Date    HGBA1C 6.0 (H) 05/11/2025   Under the care of geriatric team  Previous blood sugar ranges from 115-150

## 2025-05-22 NOTE — ASSESSMENT & PLAN NOTE
Left lower leg  This is a chronic wound  Wound is partial-thickness, with small amount of serous drainage  Local wound care with Xeroform  Continue compression with Tubigrip or Ace wrap  Decrease salt in diet  Patient currently on Lasix 40 mg  Follow-up next week

## 2025-05-22 NOTE — PROGRESS NOTES
[facility] WOUND CARE MANAGEMENT   AND HYPERBARIC MEDICINE CENTER       Patient ID: Armando Erickson is a 70 y.o. male Date of Birth 1955     Location of Service: [facility]    Performed wound round with: Wound team     Chief Complaint : Left lower leg    Wound Instructions:  Wound: Left lower leg  Discontinue previous wound order  Cleanse the skin/wound bed with soap and water, pat dry  Apply Xeroform to wound bed and cover with ABD and rolled gauze  Frequency : 3 times a week and prn for soiling  Moisturizing lotion to bilateral lower legs daily  Elevate bilateral lower legs at least 20 minutes, 4 times a day  Apply Ace wrap or Tubigrip to bilateral lower legs, from the base of the toes to proximal calf, on in a.m., off at night  Offload all wounds  Turn and reposition frequently,   Instruct / Assist with weight shifting in chair  Increase protein intake.  Monitor for any sign of infection or worsening, inform PCP or patient's primary physician in your facility.      Allergies  Patient has no known allergies.      Assessment & Plan:  1. Idiopathic chronic venous hypertension of left lower extremity with ulcer (HCC)  Assessment & Plan:  Continue compression and elevation of left lower leg  2. Lymphedema  Assessment & Plan:  Compression and elevation of bilateral lower legs  3. Controlled type 2 diabetes mellitus with complication, without long-term current use of insulin (HCC)  Assessment & Plan:   A1C results reviewed with the patient today.   Lab Results   Component Value Date    HGBA1C 6.0 (H) 05/11/2025   Under the care of geriatric team  Previous blood sugar ranges from 115-150  4. Ambulatory dysfunction  Assessment & Plan:  On short-term rehab  5. Venous ulcer (HCC)  Assessment & Plan:  Left lower leg  This is a chronic wound  Wound is partial-thickness, with small amount of serous drainage  Local wound care with Xeroform  Continue compression with Tubigrip or Ace wrap  Decrease salt in  diet  Patient currently on Lasix 40 mg  Follow-up next week           Subjective:   May 20, 2025.  New consult for wound on the left lower leg.  Patient was referred by geriatric team.  Medical problem includes but not limited to type 2 diabetes, hyperlipidemia, and venous stasis ulcer of the left ankle.  I introduced myself to patient and patient agreed to be seen for wound consult.  Patient was seen with the facility wound team.    Wound history: As per medical record review, patient currently under the care of of podiatry.  He had cellulitis on the left lower extremity that was noted on April 9, 2025.  He completed Keflex.         Review of Systems   Constitutional: Negative.    Respiratory: Negative.     Cardiovascular: Negative.    Musculoskeletal:  Positive for gait problem.   Skin:  Positive for wound.       Objective:    Physical Exam  Constitutional:       Appearance: Normal appearance. He is obese.     Cardiovascular:      Rate and Rhythm: Normal rate.   Pulmonary:      Effort: Pulmonary effort is normal.     Musculoskeletal:      Right lower leg: Edema present.      Left lower leg: Edema present.      Comments: Currently wheelchair-bound  Moderate edema on bilateral lower legs     Skin:     Findings: Lesion present.      Comments: Left lower leg: Wound size is 7 x 14 x 0.1 cm.,  100% epithelial, small amount of serous drainage, periwound is dry, with no obvious sign of infection     Neurological:      Mental Status: He is alert.              Procedures     Results from last 6 Months   Lab Units 05/10/25  1820   WOUND CULTURE  3+ Growth of Methicillin Resistant Staphylococcus aureus*  1+ Growth of Pseudomonas aeruginosa*  3+ Growth of Beta Hemolytic Streptococcus Group B*  3+ Growth of       Patient's care was coordinated with nursing facility staff. Recent vitals, labs and updated medications were reviewed on EMR or chart system of facility. Past Medical, surgical, social, medication and allergy  "history and patient's previous records were reviewed and updated as appropriate: Most up-to date information is available in the facility EMR where the patient is currently admitted.    Problem List[1]  Past Medical History[2]  Past Surgical History[3]  Social History[4]   Current Medications[5]  Family History[6]           Coordination of Care: Wound team aware of the treatment plan. Facility nurse will provide wound treatment and monitor the wound for any changes.     Patient / Staff education : Patient / Staff was given education on sign of infection and pressure ulcer prevention. Patient/ Staff verbalized understanding     Follow up :  Next week    Voice-recognition software may have been used in the preparation of this document. Occasional wrong word or \"sound-alike\" substitutions may have occurred due to the inherent limitations of voice recognition software. Interpretation should be guided by context.      HAROON Rhodes       [1]   Patient Active Problem List  Diagnosis    Controlled type 2 diabetes mellitus with complication, without long-term current use of insulin (HCC)    Essential hypertension    HLD (hyperlipidemia)    Hypothyroidism    Celiac disease    Living will on file    Obesity, Class III, BMI 40-49.9 (morbid obesity)    S/P BKA (below knee amputation) unilateral, right (HCC)    Paroxysmal atrial fibrillation (HCC)    Iron deficiency anemia due to chronic blood loss    Chronic venous stasis dermatitis of left lower extremity    Gastroesophageal reflux disease without esophagitis    Diabetic ulcer of left foot associated with type 2 diabetes mellitus, limited to breakdown of skin (HCC)    Idiopathic chronic venous hypertension of left lower extremity with ulcer (HCC)    Non-pressure chronic ulcer of left calf, limited to breakdown of skin (HCC)    Diabetic polyneuropathy associated with type 2 diabetes mellitus (HCC)    Cellulitis of left lower extremity    Ulcer of toe of left foot, " limited to breakdown of skin (HCC)    Diabetic ulcer of left ankle associated with type 2 diabetes mellitus, limited to breakdown of skin (HCC)    Dermatophytosis    Venous ulcer (HCC)    Chronic heart failure with preserved ejection fraction (HCC)    Diabetic foot ulcer (HCC)    Lymphedema    Ambulatory dysfunction    Venous stasis ulcer of left ankle (HCC)    Knee instability, left   [2]   Past Medical History:  Diagnosis Date    Abnormal urinalysis 06/10/2021    Atrial fibrillation (HCC)     Cellulitis     Cellulitis of left lower extremity 2021    Diabetes mellitus (HCC)     Disease of thyroid gland     Duodenal ulcer     perforated- ICU stay    High blood pressure     High cholesterol     History of duodenal ulcer 2014    Hyperlipidemia     Hypertension     Hypothyroidism     Lymphedema      b/l LE- was followed at wound center    Morbid obesity with BMI of 40.0-44.9, adult (HCC)     Peritonitis (HCC)    [3]   Past Surgical History:  Procedure Laterality Date    BELOW KNEE LEG AMPUTATION Right     DIAGNOSTIC LAPAROSCOPY      KNEE ARTHROSCOPY Bilateral     OTHER SURGICAL HISTORY  2014     lap repair    OTHER SURGICAL HISTORY      Laparoscopic repair of a perforated duodenal ulcer with Javed omental    OTHER SURGICAL HISTORY      Placement of drains   [4]   Social History  Socioeconomic History    Marital status: Single    Number of children: 2    Years of education: 12    Highest education level: High school graduate   Tobacco Use    Smoking status: Former     Current packs/day: 0.00     Average packs/day: 1.5 packs/day for 25.0 years (37.5 ttl pk-yrs)     Types: Cigarettes     Start date: 1979     Quit date: 2004     Years since quittin.5    Smokeless tobacco: Never   Vaping Use    Vaping status: Never Used   Substance and Sexual Activity    Alcohol use: Not Currently     Alcohol/week: 1.0 standard drink of alcohol     Types: 1 Standard drinks or equivalent per week    Drug use:  Never    Sexual activity: Not Currently   Social History Narrative    Former smoker: Quit 3/31/2012 - As per Care Everywhere      Social Drivers of Health     Financial Resource Strain: Low Risk  (2023)    Overall Financial Resource Strain (CARDIA)     Difficulty of Paying Living Expenses: Not very hard   Food Insecurity: No Food Insecurity (5/10/2025)    Nursing - Inadequate Food Risk Classification     Worried About Running Out of Food in the Last Year: Never true     Ran Out of Food in the Last Year: Never true     Ran Out of Food in the Last Year: Never true   Transportation Needs: No Transportation Needs (5/10/2025)    Nursing - Transportation Risk Classification     Lack of Transportation: No   Intimate Partner Violence: Unknown (5/10/2025)    Nursing IPS     Physically Hurt by Someone: No     Hurt or Threatened by Someone: No   Housing Stability: Unknown (5/10/2025)    Nursing: Inadequate Housing Risk Classification     Unable to Pay for Housing in the Last Year: No     Has Housin   [5]   Current Outpatient Medications:     acetaminophen (TYLENOL) 325 mg tablet, Take 975 mg by mouth every 8 (eight) hours as needed for mild pain, Disp: , Rfl:     ammonium lactate (LAC-HYDRIN) 12 % lotion, Apply 1 Application topically daily In evening, Disp: 222 mL, Rfl: 0    apixaban (ELIQUIS) 5 mg, Take 1 tablet (5 mg total) by mouth 2 (two) times a day, Disp: 180 tablet, Rfl: 1    aspirin (ECOTRIN LOW STRENGTH) 81 mg EC tablet, Take 1 tablet (81 mg total) by mouth daily, Disp: , Rfl:     Cholecalciferol (Vitamin D3) 50 MCG (2000 UT) TABS, Take 1 tablet (2,000 Units total) by mouth in the morning, Disp: , Rfl:     clotrimazole-betamethasone (LOTRISONE) 1-0.05 % cream, Apply topically 2 (two) times a day Per wound care, Disp: 45 g, Rfl: 0    dapagliflozin 5 MG TABS, Take 1 tablet (5 mg total) by mouth daily, Disp: 180 tablet, Rfl: 1    furosemide (LASIX) 40 mg tablet, Take 1 tablet (40 mg total) by mouth 2 (two) times  a day, Disp: 180 tablet, Rfl: 1    gabapentin (NEURONTIN) 100 mg capsule, Take 1 capsule (100 mg total) by mouth 2 (two) times a day, Disp: 180 capsule, Rfl: 1    levothyroxine 25 mcg tablet, Take 1 tablet (25 mcg total) by mouth daily In addition to the 300 mcg dose, Disp: 90 tablet, Rfl: 1    levothyroxine 300 MCG tablet, Take 1 tablet (300 mcg total) by mouth daily In addition to 25 mcg dose, Disp: 90 tablet, Rfl: 1    lisinopril (ZESTRIL) 40 mg tablet, Take 1 tablet (40 mg total) by mouth daily, Disp: 90 tablet, Rfl: 1    metFORMIN (GLUCOPHAGE-XR) 500 mg 24 hr tablet, Take 1 tablet (500 mg total) by mouth daily with breakfast, Disp: 90 tablet, Rfl: 1    metoprolol tartrate (LOPRESSOR) 50 mg tablet, Take 1 tablet (50 mg total) by mouth 2 (two) times a day, Disp: 180 tablet, Rfl: 1    Multiple Vitamins-Minerals (MULTIVITAMIN MEN 50+ PO), Take 1 tablet by mouth in the morning, Disp: , Rfl:     pantoprazole (PROTONIX) 40 mg tablet, Take 1 tablet (40 mg total) by mouth daily, Disp: 90 tablet, Rfl: 1    polyethylene glycol (MIRALAX) 17 g packet, Take 17 g by mouth daily as needed (constipation), Disp: , Rfl:     simvastatin (ZOCOR) 10 mg tablet, Take 1 tablet (10 mg total) by mouth daily at bedtime, Disp: 90 tablet, Rfl: 1    spironolactone (ALDACTONE) 25 mg tablet, Take 1 tablet (25 mg total) by mouth daily, Disp: 90 tablet, Rfl: 1  [6]   Family History  Problem Relation Name Age of Onset    Heart disease Mother Jennifer     Hypertension Mother Jennifer     Breast cancer Mother Jennifer     Leukemia Brother      Migraines Daughter      Migraines Daughter      Heart disease Family      Alcohol abuse Family      Substance Abuse Neg Hx      Mental illness Neg Hx      Depression Neg Hx

## 2025-05-28 ENCOUNTER — PATIENT OUTREACH (OUTPATIENT)
Dept: CASE MANAGEMENT | Facility: OTHER | Age: 70
End: 2025-05-28

## 2025-05-29 ENCOUNTER — NURSING HOME VISIT (OUTPATIENT)
Dept: WOUND CARE | Facility: HOSPITAL | Age: 70
End: 2025-05-29
Payer: MEDICARE

## 2025-05-29 DIAGNOSIS — L97.929 IDIOPATHIC CHRONIC VENOUS HYPERTENSION OF LEFT LOWER EXTREMITY WITH ULCER (HCC): Primary | ICD-10-CM

## 2025-05-29 DIAGNOSIS — L97.909 VENOUS ULCER (HCC): ICD-10-CM

## 2025-05-29 DIAGNOSIS — I83.009 VENOUS ULCER (HCC): ICD-10-CM

## 2025-05-29 DIAGNOSIS — I89.0 LYMPHEDEMA: ICD-10-CM

## 2025-05-29 DIAGNOSIS — R26.2 AMBULATORY DYSFUNCTION: ICD-10-CM

## 2025-05-29 DIAGNOSIS — I87.312 IDIOPATHIC CHRONIC VENOUS HYPERTENSION OF LEFT LOWER EXTREMITY WITH ULCER (HCC): Primary | ICD-10-CM

## 2025-05-29 PROCEDURE — 99308 SBSQ NF CARE LOW MDM 20: CPT | Performed by: NURSE PRACTITIONER

## 2025-05-30 NOTE — ASSESSMENT & PLAN NOTE
Left lower leg  Wound is healed  Continue compression with Tubigrip or Ace wrap  Decrease salt in diet  Sign off. Facility staff will continue to provide treatment and monitor the wound. Can reconsult wound nurse practitioner if wound failed to heal or worsened.

## 2025-05-30 NOTE — PROGRESS NOTES
[facility] WOUND CARE MANAGEMENT   AND HYPERBARIC MEDICINE CENTER       Patient ID: Armando Erickson is a 70 y.o. male Date of Birth 1955     Location of Service: [facility]    Performed wound round with: Wound team     Chief Complaint : Left lower leg    Wound Instructions:  Wound: Left lower leg  Discontinue previous wound order  Can cover with ABD and rolled gauze for drainage  Frequency : 3 times a week and prn for soiling  Moisturizing lotion to bilateral lower legs daily  Elevate bilateral lower legs at least 20 minutes, 4 times a day  Apply Ace wrap or Tubigrip to bilateral lower legs, from the base of the toes to proximal calf, on in a.m., off at night  Offload all wounds  Turn and reposition frequently,   Instruct / Assist with weight shifting in chair  Increase protein intake.  Monitor for any sign of infection or worsening, inform PCP or patient's primary physician in your facility.      Allergies  Patient has no known allergies.      Assessment & Plan:  1. Idiopathic chronic venous hypertension of left lower extremity with ulcer (HCC)  Assessment & Plan:  Continue compression and elevation of left lower leg  2. Lymphedema  Assessment & Plan:  Compression and elevation of bilateral lower legs  3. Ambulatory dysfunction  Assessment & Plan:  On short-term rehab  4. Venous ulcer (HCC)  Assessment & Plan:  Left lower leg  Wound is healed  Continue compression with Tubigrip or Ace wrap  Decrease salt in diet  Sign off. Facility staff will continue to provide treatment and monitor the wound. Can reconsult wound nurse practitioner if wound failed to heal or worsened.              Subjective:   May 20, 2025.  New consult for wound on the left lower leg.  Patient was referred by geriatric team.  Medical problem includes but not limited to type 2 diabetes, hyperlipidemia, and venous stasis ulcer of the left ankle.  I introduced myself to patient and patient agreed to be seen for wound consult.   Patient was seen with the facility wound team.    Wound history: As per medical record review, patient currently under the care of of podiatry.  He had cellulitis on the left lower extremity that was noted on April 9, 2025.  He completed Keflex.     5/29/2025 Follow up for wound on the left lower leg . Received patient, not in distress. Facility staff did not report any significant issues related to the wound.           Review of Systems   Constitutional: Negative.    Respiratory: Negative.     Cardiovascular: Negative.    Musculoskeletal:  Positive for gait problem.   Skin:  Negative for wound.       Objective:    Physical Exam  Constitutional:       Appearance: Normal appearance. He is obese.     Cardiovascular:      Rate and Rhythm: Normal rate.   Pulmonary:      Effort: Pulmonary effort is normal.     Musculoskeletal:      Right lower leg: Edema present.      Left lower leg: Edema present.      Comments: Currently wheelchair-bound  Moderate edema on bilateral lower legs     Skin:     Findings: No lesion.      Comments: Left lower leg: Wound is healed     Neurological:      Mental Status: He is alert.              Procedures     Results from last 6 Months   Lab Units 05/10/25  1821   WOUND CULTURE  3+ Growth of Methicillin Resistant Staphylococcus aureus*  1+ Growth of Pseudomonas aeruginosa*  3+ Growth of Beta Hemolytic Streptococcus Group B*  3+ Growth of       Patient's care was coordinated with nursing facility staff. Recent vitals, labs and updated medications were reviewed on EMR or chart system of facility. Past Medical, surgical, social, medication and allergy history and patient's previous records were reviewed and updated as appropriate: Most up-to date information is available in the facility EMR where the patient is currently admitted.    Problem List[1]  Past Medical History[2]  Past Surgical History[3]  Social History[4]   Current Medications[5]  Family History[6]           Coordination of Care:  "Wound team aware of the treatment plan. Facility nurse will provide wound treatment and monitor the wound for any changes.     Patient / Staff education : Patient / Staff was given education on sign of infection and pressure ulcer prevention. Patient/ Staff verbalized understanding     Follow up :  Sign off. Facility staff will continue to provide treatment and monitor the wound. Can reconsult wound nurse practitioner if wound failed to heal or worsened.       Voice-recognition software may have been used in the preparation of this document. Occasional wrong word or \"sound-alike\" substitutions may have occurred due to the inherent limitations of voice recognition software. Interpretation should be guided by context.      HAROON Rhodes       [1]   Patient Active Problem List  Diagnosis    Controlled type 2 diabetes mellitus with complication, without long-term current use of insulin (HCC)    Essential hypertension    HLD (hyperlipidemia)    Hypothyroidism    Celiac disease    Living will on file    Obesity, Class III, BMI 40-49.9 (morbid obesity)    S/P BKA (below knee amputation) unilateral, right (HCC)    Paroxysmal atrial fibrillation (HCC)    Iron deficiency anemia due to chronic blood loss    Chronic venous stasis dermatitis of left lower extremity    Gastroesophageal reflux disease without esophagitis    Diabetic ulcer of left foot associated with type 2 diabetes mellitus, limited to breakdown of skin (HCC)    Idiopathic chronic venous hypertension of left lower extremity with ulcer (HCC)    Non-pressure chronic ulcer of left calf, limited to breakdown of skin (HCC)    Diabetic polyneuropathy associated with type 2 diabetes mellitus (HCC)    Cellulitis of left lower extremity    Ulcer of toe of left foot, limited to breakdown of skin (HCC)    Diabetic ulcer of left ankle associated with type 2 diabetes mellitus, limited to breakdown of skin (HCC)    Dermatophytosis    Venous ulcer (HCC)    Chronic heart " failure with preserved ejection fraction (HCC)    Diabetic foot ulcer (HCC)    Lymphedema    Ambulatory dysfunction    Venous stasis ulcer of left ankle (HCC)    Knee instability, left   [2]   Past Medical History:  Diagnosis Date    Abnormal urinalysis 06/10/2021    Atrial fibrillation (HCC)     Cellulitis     Cellulitis of left lower extremity 2021    Diabetes mellitus (HCC)     Disease of thyroid gland     Duodenal ulcer     perforated- ICU stay    High blood pressure     High cholesterol     History of duodenal ulcer 2014    Hyperlipidemia     Hypertension     Hypothyroidism     Lymphedema      b/l LE- was followed at wound center    Morbid obesity with BMI of 40.0-44.9, adult (HCC)     Peritonitis (HCC)    [3]   Past Surgical History:  Procedure Laterality Date    BELOW KNEE LEG AMPUTATION Right     DIAGNOSTIC LAPAROSCOPY      KNEE ARTHROSCOPY Bilateral     OTHER SURGICAL HISTORY  2014     lap repair    OTHER SURGICAL HISTORY      Laparoscopic repair of a perforated duodenal ulcer with Javed omental    OTHER SURGICAL HISTORY      Placement of drains   [4]   Social History  Socioeconomic History    Marital status: Single    Number of children: 2    Years of education: 12    Highest education level: High school graduate   Tobacco Use    Smoking status: Former     Current packs/day: 0.00     Average packs/day: 1.5 packs/day for 25.0 years (37.5 ttl pk-yrs)     Types: Cigarettes     Start date: 1979     Quit date: 2004     Years since quittin.5    Smokeless tobacco: Never   Vaping Use    Vaping status: Never Used   Substance and Sexual Activity    Alcohol use: Not Currently     Alcohol/week: 1.0 standard drink of alcohol     Types: 1 Standard drinks or equivalent per week    Drug use: Never    Sexual activity: Not Currently   Social History Narrative    Former smoker: Quit 3/31/2012 - As per Care Everywhere      Social Drivers of Health     Financial Resource Strain: Low Risk   (2023)    Overall Financial Resource Strain (CARDIA)     Difficulty of Paying Living Expenses: Not very hard   Food Insecurity: No Food Insecurity (5/10/2025)    Nursing - Inadequate Food Risk Classification     Worried About Running Out of Food in the Last Year: Never true     Ran Out of Food in the Last Year: Never true     Ran Out of Food in the Last Year: Never true   Transportation Needs: No Transportation Needs (5/10/2025)    Nursing - Transportation Risk Classification     Lack of Transportation: No   Intimate Partner Violence: Unknown (5/10/2025)    Nursing IPS     Physically Hurt by Someone: No     Hurt or Threatened by Someone: No   Housing Stability: Unknown (5/10/2025)    Nursing: Inadequate Housing Risk Classification     Unable to Pay for Housing in the Last Year: No     Has Housin   [5]   Current Outpatient Medications:     acetaminophen (TYLENOL) 325 mg tablet, Take 975 mg by mouth every 8 (eight) hours as needed for mild pain, Disp: , Rfl:     ammonium lactate (LAC-HYDRIN) 12 % lotion, Apply 1 Application topically daily In evening, Disp: 222 mL, Rfl: 0    apixaban (ELIQUIS) 5 mg, Take 1 tablet (5 mg total) by mouth 2 (two) times a day, Disp: 180 tablet, Rfl: 1    aspirin (ECOTRIN LOW STRENGTH) 81 mg EC tablet, Take 1 tablet (81 mg total) by mouth daily, Disp: , Rfl:     Cholecalciferol (Vitamin D3) 50 MCG (2000 UT) TABS, Take 1 tablet (2,000 Units total) by mouth in the morning, Disp: , Rfl:     clotrimazole-betamethasone (LOTRISONE) 1-0.05 % cream, Apply topically 2 (two) times a day Per wound care, Disp: 45 g, Rfl: 0    dapagliflozin 5 MG TABS, Take 1 tablet (5 mg total) by mouth daily, Disp: 180 tablet, Rfl: 1    furosemide (LASIX) 40 mg tablet, Take 1 tablet (40 mg total) by mouth 2 (two) times a day, Disp: 180 tablet, Rfl: 1    gabapentin (NEURONTIN) 100 mg capsule, Take 1 capsule (100 mg total) by mouth 2 (two) times a day, Disp: 180 capsule, Rfl: 1    levothyroxine 25 mcg tablet,  Take 1 tablet (25 mcg total) by mouth daily In addition to the 300 mcg dose, Disp: 90 tablet, Rfl: 1    levothyroxine 300 MCG tablet, Take 1 tablet (300 mcg total) by mouth daily In addition to 25 mcg dose, Disp: 90 tablet, Rfl: 1    lisinopril (ZESTRIL) 40 mg tablet, Take 1 tablet (40 mg total) by mouth daily, Disp: 90 tablet, Rfl: 1    metFORMIN (GLUCOPHAGE-XR) 500 mg 24 hr tablet, Take 1 tablet (500 mg total) by mouth daily with breakfast, Disp: 90 tablet, Rfl: 1    metoprolol tartrate (LOPRESSOR) 50 mg tablet, Take 1 tablet (50 mg total) by mouth 2 (two) times a day, Disp: 180 tablet, Rfl: 1    Multiple Vitamins-Minerals (MULTIVITAMIN MEN 50+ PO), Take 1 tablet by mouth in the morning, Disp: , Rfl:     pantoprazole (PROTONIX) 40 mg tablet, Take 1 tablet (40 mg total) by mouth daily, Disp: 90 tablet, Rfl: 1    polyethylene glycol (MIRALAX) 17 g packet, Take 17 g by mouth daily as needed (constipation), Disp: , Rfl:     simvastatin (ZOCOR) 10 mg tablet, Take 1 tablet (10 mg total) by mouth daily at bedtime, Disp: 90 tablet, Rfl: 1    spironolactone (ALDACTONE) 25 mg tablet, Take 1 tablet (25 mg total) by mouth daily, Disp: 90 tablet, Rfl: 1  [6]   Family History  Problem Relation Name Age of Onset    Heart disease Mother Jennifer     Hypertension Mother Jennifer     Breast cancer Mother Jennifer     Leukemia Brother      Migraines Daughter      Migraines Daughter      Heart disease Family      Alcohol abuse Family      Substance Abuse Neg Hx      Mental illness Neg Hx      Depression Neg Hx

## 2025-06-03 ENCOUNTER — NURSING HOME VISIT (OUTPATIENT)
Dept: WOUND CARE | Facility: HOSPITAL | Age: 70
End: 2025-06-03
Payer: MEDICARE

## 2025-06-03 DIAGNOSIS — L97.929 IDIOPATHIC CHRONIC VENOUS HYPERTENSION OF LEFT LOWER EXTREMITY WITH ULCER (HCC): Primary | ICD-10-CM

## 2025-06-03 DIAGNOSIS — I87.312 IDIOPATHIC CHRONIC VENOUS HYPERTENSION OF LEFT LOWER EXTREMITY WITH ULCER (HCC): Primary | ICD-10-CM

## 2025-06-03 DIAGNOSIS — I89.0 LYMPHEDEMA: ICD-10-CM

## 2025-06-03 PROCEDURE — 99308 SBSQ NF CARE LOW MDM 20: CPT | Performed by: NURSE PRACTITIONER

## 2025-06-03 NOTE — PROGRESS NOTES
[facility] WOUND CARE MANAGEMENT   AND HYPERBARIC MEDICINE CENTER       Patient ID: Armando Erickson is a 70 y.o. male Date of Birth 1955     Location of Service: [facility]    Performed wound round with: Wound team     Chief Complaint : Left lower leg    Wound Instructions:  Wound: Left lower leg  Discontinue previous wound order  Cleanse with soap and water, pat dry  Apply Xeroform to wound bed and cover with ABD and rolled gauze  Frequency : Daily and prn for soiling  Moisturizing lotion to bilateral lower legs daily  Elevate bilateral lower legs at least 20 minutes, 4 times a day  Apply Ace wrap or Tubigrip to bilateral lower legs, from the base of the toes to proximal calf, on in a.m., off at night  Offload all wounds  Turn and reposition frequently,   Instruct / Assist with weight shifting in chair  Increase protein intake.  Monitor for any sign of infection or worsening, inform PCP or patient's primary physician in your facility.      Allergies  Patient has no known allergies.      Assessment & Plan:  1. Idiopathic chronic venous hypertension of left lower extremity with ulcer (HCC)  Assessment & Plan:  Left lower leg  Patient had 2 open area, lateral and medial.  Full-thickness, with no obvious sign of infection  Local wound care with Xeroform  Moisturizing lotion on the surrounding skin  Continue compression and elevation  Education provided to patient on elevating lower legs  Currently on Lasix  Follow-up in 2 weeks  2. Lymphedema  Assessment & Plan:  Compression and elevation of bilateral lower legs             Subjective:   May 20, 2025.  New consult for wound on the left lower leg.  Patient was referred by geriatric team.  Medical problem includes but not limited to type 2 diabetes, hyperlipidemia, and venous stasis ulcer of the left ankle.  I introduced myself to patient and patient agreed to be seen for wound consult.  Patient was seen with the facility wound team.    Wound history: As  per medical record review, patient currently under the care of of podiatry.  He had cellulitis on the left lower extremity that was noted on April 9, 2025.  He completed Keflex.     5/29/2025 Follow up for wound on the left lower leg . Received patient, not in distress. Facility staff did not report any significant issues related to the wound.     Magali 3, 2025.  Follow-up for wound on the left lower leg.  As per report, there is an increased drainage and the wound worsened.  Patient spent most of his time on wheelchair, had not been elevating her lower legs.          Review of Systems   Constitutional: Negative.    Respiratory: Negative.     Cardiovascular: Negative.    Musculoskeletal:  Positive for gait problem.   Skin:  Negative for wound.       Objective:    Physical Exam  Constitutional:       Appearance: Normal appearance. He is obese.     Cardiovascular:      Rate and Rhythm: Normal rate.   Pulmonary:      Effort: Pulmonary effort is normal.     Musculoskeletal:      Right lower leg: Edema present.      Left lower leg: Edema present.      Comments: Currently wheelchair-bound  Moderate edema on bilateral lower legs     Skin:     Findings: Lesion present.      Comments: Left lower leg lateral: Wound size is 6 x 3 x 0.1 cm.,  100% granulation, moderate amount of serous drainage, periwound dry, with no obvious sign of infection    Left lower leg medial: Wound size is 4 x 5 x 0.1 cm.,  100% granulation, moderate amount of serous drainage, periwound is dry, with no obvious sign of infection     Neurological:      Mental Status: He is alert.              Procedures     Results from last 6 Months   Lab Units 05/10/25  7803   WOUND CULTURE  3+ Growth of Methicillin Resistant Staphylococcus aureus*  1+ Growth of Pseudomonas aeruginosa*  3+ Growth of Beta Hemolytic Streptococcus Group B*  3+ Growth of       Patient's care was coordinated with nursing facility staff. Recent vitals, labs and updated medications were  "reviewed on EMR or chart system of facility. Past Medical, surgical, social, medication and allergy history and patient's previous records were reviewed and updated as appropriate: Most up-to date information is available in the facility EMR where the patient is currently admitted.    Problem List[1]  Past Medical History[2]  Past Surgical History[3]  Social History[4]   Current Medications[5]  Family History[6]           Coordination of Care: Wound team aware of the treatment plan. Facility nurse will provide wound treatment and monitor the wound for any changes.     Patient / Staff education : Patient / Staff was given education on sign of infection and pressure ulcer prevention. Patient/ Staff verbalized understanding     Follow up :  2 weeks    Voice-recognition software may have been used in the preparation of this document. Occasional wrong word or \"sound-alike\" substitutions may have occurred due to the inherent limitations of voice recognition software. Interpretation should be guided by context.      HAROON Rhodes         [1]   Patient Active Problem List  Diagnosis    Controlled type 2 diabetes mellitus with complication, without long-term current use of insulin (HCC)    Essential hypertension    HLD (hyperlipidemia)    Hypothyroidism    Celiac disease    Living will on file    Obesity, Class III, BMI 40-49.9 (morbid obesity)    S/P BKA (below knee amputation) unilateral, right (HCC)    Paroxysmal atrial fibrillation (HCC)    Iron deficiency anemia due to chronic blood loss    Chronic venous stasis dermatitis of left lower extremity    Gastroesophageal reflux disease without esophagitis    Diabetic ulcer of left foot associated with type 2 diabetes mellitus, limited to breakdown of skin (HCC)    Idiopathic chronic venous hypertension of left lower extremity with ulcer (HCC)    Non-pressure chronic ulcer of left calf, limited to breakdown of skin (HCC)    Diabetic polyneuropathy associated with type " 2 diabetes mellitus (HCC)    Cellulitis of left lower extremity    Ulcer of toe of left foot, limited to breakdown of skin (HCC)    Diabetic ulcer of left ankle associated with type 2 diabetes mellitus, limited to breakdown of skin (HCC)    Dermatophytosis    Venous ulcer (HCC)    Chronic heart failure with preserved ejection fraction (HCC)    Diabetic foot ulcer (HCC)    Lymphedema    Ambulatory dysfunction    Venous stasis ulcer of left ankle (HCC)    Knee instability, left   [2]   Past Medical History:  Diagnosis Date    Abnormal urinalysis 06/10/2021    Atrial fibrillation (HCC)     Cellulitis     Cellulitis of left lower extremity 2021    Diabetes mellitus (HCC)     Disease of thyroid gland     Duodenal ulcer     perforated- ICU stay    High blood pressure     High cholesterol     History of duodenal ulcer 2014    Hyperlipidemia     Hypertension     Hypothyroidism     Lymphedema      b/l LE- was followed at wound center    Morbid obesity with BMI of 40.0-44.9, adult (HCC)     Peritonitis (HCC)    [3]   Past Surgical History:  Procedure Laterality Date    BELOW KNEE LEG AMPUTATION Right     DIAGNOSTIC LAPAROSCOPY      KNEE ARTHROSCOPY Bilateral     OTHER SURGICAL HISTORY  2014     lap repair    OTHER SURGICAL HISTORY      Laparoscopic repair of a perforated duodenal ulcer with Javed omental    OTHER SURGICAL HISTORY      Placement of drains   [4]   Social History  Socioeconomic History    Marital status: Single    Number of children: 2    Years of education: 12    Highest education level: High school graduate   Tobacco Use    Smoking status: Former     Current packs/day: 0.00     Average packs/day: 1.5 packs/day for 25.0 years (37.5 ttl pk-yrs)     Types: Cigarettes     Start date: 1979     Quit date: 2004     Years since quittin.5    Smokeless tobacco: Never   Vaping Use    Vaping status: Never Used   Substance and Sexual Activity    Alcohol use: Not Currently     Alcohol/week:  1.0 standard drink of alcohol     Types: 1 Standard drinks or equivalent per week    Drug use: Never    Sexual activity: Not Currently   Social History Narrative    Former smoker: Quit 3/31/2012 - As per Care Everywhere      Social Drivers of Health     Financial Resource Strain: Low Risk  (2023)    Overall Financial Resource Strain (CARDIA)     Difficulty of Paying Living Expenses: Not very hard   Food Insecurity: No Food Insecurity (5/10/2025)    Nursing - Inadequate Food Risk Classification     Worried About Running Out of Food in the Last Year: Never true     Ran Out of Food in the Last Year: Never true     Ran Out of Food in the Last Year: Never true   Transportation Needs: No Transportation Needs (5/10/2025)    Nursing - Transportation Risk Classification     Lack of Transportation: No   Intimate Partner Violence: Unknown (5/10/2025)    Nursing IPS     Physically Hurt by Someone: No     Hurt or Threatened by Someone: No   Housing Stability: Unknown (5/10/2025)    Nursing: Inadequate Housing Risk Classification     Unable to Pay for Housing in the Last Year: No     Has Housin   [5]   Current Outpatient Medications:     acetaminophen (TYLENOL) 325 mg tablet, Take 975 mg by mouth every 8 (eight) hours as needed for mild pain, Disp: , Rfl:     ammonium lactate (LAC-HYDRIN) 12 % lotion, Apply 1 Application topically daily In evening, Disp: 222 mL, Rfl: 0    apixaban (ELIQUIS) 5 mg, Take 1 tablet (5 mg total) by mouth 2 (two) times a day, Disp: 180 tablet, Rfl: 1    aspirin (ECOTRIN LOW STRENGTH) 81 mg EC tablet, Take 1 tablet (81 mg total) by mouth daily, Disp: , Rfl:     Cholecalciferol (Vitamin D3) 50 MCG (2000 UT) TABS, Take 1 tablet (2,000 Units total) by mouth in the morning, Disp: , Rfl:     clotrimazole-betamethasone (LOTRISONE) 1-0.05 % cream, Apply topically 2 (two) times a day Per wound care, Disp: 45 g, Rfl: 0    dapagliflozin 5 MG TABS, Take 1 tablet (5 mg total) by mouth daily, Disp: 180  tablet, Rfl: 1    furosemide (LASIX) 40 mg tablet, Take 1 tablet (40 mg total) by mouth 2 (two) times a day, Disp: 180 tablet, Rfl: 1    gabapentin (NEURONTIN) 100 mg capsule, Take 1 capsule (100 mg total) by mouth 2 (two) times a day, Disp: 180 capsule, Rfl: 1    levothyroxine 25 mcg tablet, Take 1 tablet (25 mcg total) by mouth daily In addition to the 300 mcg dose, Disp: 90 tablet, Rfl: 1    levothyroxine 300 MCG tablet, Take 1 tablet (300 mcg total) by mouth daily In addition to 25 mcg dose, Disp: 90 tablet, Rfl: 1    lisinopril (ZESTRIL) 40 mg tablet, Take 1 tablet (40 mg total) by mouth daily, Disp: 90 tablet, Rfl: 1    metFORMIN (GLUCOPHAGE-XR) 500 mg 24 hr tablet, Take 1 tablet (500 mg total) by mouth daily with breakfast, Disp: 90 tablet, Rfl: 1    metoprolol tartrate (LOPRESSOR) 50 mg tablet, Take 1 tablet (50 mg total) by mouth 2 (two) times a day, Disp: 180 tablet, Rfl: 1    Multiple Vitamins-Minerals (MULTIVITAMIN MEN 50+ PO), Take 1 tablet by mouth in the morning, Disp: , Rfl:     pantoprazole (PROTONIX) 40 mg tablet, Take 1 tablet (40 mg total) by mouth daily, Disp: 90 tablet, Rfl: 1    polyethylene glycol (MIRALAX) 17 g packet, Take 17 g by mouth daily as needed (constipation), Disp: , Rfl:     simvastatin (ZOCOR) 10 mg tablet, Take 1 tablet (10 mg total) by mouth daily at bedtime, Disp: 90 tablet, Rfl: 1    spironolactone (ALDACTONE) 25 mg tablet, Take 1 tablet (25 mg total) by mouth daily, Disp: 90 tablet, Rfl: 1  [6]   Family History  Problem Relation Name Age of Onset    Heart disease Mother Jennifer     Hypertension Mother Jennifer     Breast cancer Mother Jennifer     Leukemia Brother      Migraines Daughter      Migraines Daughter      Heart disease Family      Alcohol abuse Family      Substance Abuse Neg Hx      Mental illness Neg Hx      Depression Neg Hx

## 2025-06-04 ENCOUNTER — PATIENT OUTREACH (OUTPATIENT)
Dept: CASE MANAGEMENT | Facility: OTHER | Age: 70
End: 2025-06-04

## 2025-06-05 NOTE — ASSESSMENT & PLAN NOTE
Left lower leg  Patient had 2 open area, lateral and medial.  Full-thickness, with no obvious sign of infection  Local wound care with Xeroform  Moisturizing lotion on the surrounding skin  Continue compression and elevation  Education provided to patient on elevating lower legs  Currently on Lasix  Follow-up in 2 weeks

## 2025-06-10 ENCOUNTER — HOME HEALTH ADMISSION (OUTPATIENT)
Dept: HOME HEALTH SERVICES | Facility: HOME HEALTHCARE | Age: 70
End: 2025-06-10
Payer: MEDICARE

## 2025-06-12 ENCOUNTER — PATIENT OUTREACH (OUTPATIENT)
Dept: CASE MANAGEMENT | Facility: OTHER | Age: 70
End: 2025-06-12

## 2025-06-12 ENCOUNTER — HOME CARE VISIT (OUTPATIENT)
Dept: HOME HEALTH SERVICES | Facility: HOME HEALTHCARE | Age: 70
End: 2025-06-12
Payer: MEDICARE

## 2025-06-12 ENCOUNTER — TELEPHONE (OUTPATIENT)
Dept: FAMILY MEDICINE CLINIC | Facility: CLINIC | Age: 70
End: 2025-06-12

## 2025-06-12 ENCOUNTER — HOME CARE VISIT (OUTPATIENT)
Dept: HOME HEALTH SERVICES | Facility: HOME HEALTHCARE | Age: 70
End: 2025-06-12
Attending: INTERNAL MEDICINE
Payer: MEDICARE

## 2025-06-12 VITALS
OXYGEN SATURATION: 98 % | TEMPERATURE: 98.7 F | DIASTOLIC BLOOD PRESSURE: 56 MMHG | HEART RATE: 60 BPM | SYSTOLIC BLOOD PRESSURE: 100 MMHG

## 2025-06-12 DIAGNOSIS — I10 ESSENTIAL HYPERTENSION: ICD-10-CM

## 2025-06-12 PROCEDURE — 400013 VN SOC

## 2025-06-12 PROCEDURE — G0299 HHS/HOSPICE OF RN EA 15 MIN: HCPCS

## 2025-06-12 PROCEDURE — 10330081 VN NO-PAY CLAIM PROCEDURE

## 2025-06-12 RX ORDER — LISINOPRIL 40 MG/1
20 TABLET ORAL DAILY
Start: 2025-06-12

## 2025-06-12 NOTE — PROGRESS NOTES
Update obtained from [facility] the patient discharged 6/11/25 to Home with [facility]. I updated the care coordination note, removed myself as the responsible staff, added the appropriate responsible staff.     [facility] (Point click care) discharge form attached to this encounter.

## 2025-06-12 NOTE — TELEPHONE ENCOUNTER
Notification from home health that pt's blood pressure was 100s/50s bilaterally at home.   Ok to decrease lisinopril to half tab (20 mg) daily for now, but increase back to 40 mg if BP increases.

## 2025-06-13 ENCOUNTER — PATIENT OUTREACH (OUTPATIENT)
Dept: CASE MANAGEMENT | Facility: OTHER | Age: 70
End: 2025-06-13

## 2025-06-13 PROCEDURE — 10330064

## 2025-06-13 PROCEDURE — 10330064 PAD, ABD 5X9 STR LF (1/PK 20PK/BX) MGM1"

## 2025-06-13 PROCEDURE — 10330064 CLEANSER, WND SEA-CLEANS 6OZ  COLPLT

## 2025-06-13 PROCEDURE — 10330064 BANDAGE, CNFRM 4X4.1YDS N/S LF (12RL/BG"

## 2025-06-13 NOTE — PROGRESS NOTES
Spoke with Arthur post discharge from rehab on 6/11/25.  Arthur is working with [facility] for his wound care for his legs. They have reviewed his medications he does not have any questions about them. Uses pillbox to manage medications  Tries to weigh 3-4 times a week.Weight at home ranges from 360-370 pounds Knows to look for water weight gain.  Hopes to do virtual appointment with PCP. He has been doing them in that past  Food and medications are delivered  Has cane , walker and wheelchair in the house  Does not check blood sugars  Open to call back I will check back in two weeks

## 2025-06-14 ENCOUNTER — LAB REQUISITION (OUTPATIENT)
Dept: LAB | Facility: HOSPITAL | Age: 70
End: 2025-06-14
Payer: MEDICARE

## 2025-06-14 ENCOUNTER — HOME CARE VISIT (OUTPATIENT)
Dept: HOME HEALTH SERVICES | Facility: HOME HEALTHCARE | Age: 70
End: 2025-06-14
Payer: MEDICARE

## 2025-06-14 VITALS
DIASTOLIC BLOOD PRESSURE: 58 MMHG | RESPIRATION RATE: 20 BRPM | TEMPERATURE: 98.4 F | OXYGEN SATURATION: 98 % | HEART RATE: 80 BPM | SYSTOLIC BLOOD PRESSURE: 102 MMHG

## 2025-06-14 DIAGNOSIS — N17.9 ACUTE KIDNEY FAILURE, UNSPECIFIED (HCC): ICD-10-CM

## 2025-06-14 LAB
ANION GAP SERPL CALCULATED.3IONS-SCNC: 9 MMOL/L (ref 4–13)
BUN SERPL-MCNC: 23 MG/DL (ref 5–25)
CALCIUM SERPL-MCNC: 8.7 MG/DL (ref 8.4–10.2)
CHLORIDE SERPL-SCNC: 103 MMOL/L (ref 96–108)
CO2 SERPL-SCNC: 27 MMOL/L (ref 21–32)
CREAT SERPL-MCNC: 1.32 MG/DL (ref 0.6–1.3)
GFR SERPL CREATININE-BSD FRML MDRD: 53 ML/MIN/1.73SQ M
GLUCOSE P FAST SERPL-MCNC: 96 MG/DL (ref 65–99)
POTASSIUM SERPL-SCNC: 4.6 MMOL/L (ref 3.5–5.3)
SODIUM SERPL-SCNC: 139 MMOL/L (ref 135–147)

## 2025-06-14 PROCEDURE — G0299 HHS/HOSPICE OF RN EA 15 MIN: HCPCS

## 2025-06-14 PROCEDURE — 80048 BASIC METABOLIC PNL TOTAL CA: CPT | Performed by: FAMILY MEDICINE

## 2025-06-16 ENCOUNTER — HOME CARE VISIT (OUTPATIENT)
Dept: HOME HEALTH SERVICES | Facility: HOME HEALTHCARE | Age: 70
End: 2025-06-16
Payer: MEDICARE

## 2025-06-16 VITALS
RESPIRATION RATE: 20 BRPM | DIASTOLIC BLOOD PRESSURE: 68 MMHG | OXYGEN SATURATION: 96 % | SYSTOLIC BLOOD PRESSURE: 106 MMHG | TEMPERATURE: 98.2 F | HEART RATE: 72 BPM

## 2025-06-16 VITALS
DIASTOLIC BLOOD PRESSURE: 70 MMHG | OXYGEN SATURATION: 98 % | SYSTOLIC BLOOD PRESSURE: 132 MMHG | BODY MASS INDEX: 42.58 KG/M2 | HEART RATE: 86 BPM | WEIGHT: 315 LBS

## 2025-06-16 PROCEDURE — G0299 HHS/HOSPICE OF RN EA 15 MIN: HCPCS

## 2025-06-16 PROCEDURE — G0151 HHCP-SERV OF PT,EA 15 MIN: HCPCS

## 2025-06-17 ENCOUNTER — HOME CARE VISIT (OUTPATIENT)
Dept: HOME HEALTH SERVICES | Facility: HOME HEALTHCARE | Age: 70
End: 2025-06-17
Payer: MEDICARE

## 2025-06-17 VITALS — SYSTOLIC BLOOD PRESSURE: 110 MMHG | DIASTOLIC BLOOD PRESSURE: 60 MMHG | HEART RATE: 82 BPM | OXYGEN SATURATION: 96 %

## 2025-06-17 VITALS — DIASTOLIC BLOOD PRESSURE: 80 MMHG | SYSTOLIC BLOOD PRESSURE: 148 MMHG | HEART RATE: 93 BPM | OXYGEN SATURATION: 99 %

## 2025-06-17 PROCEDURE — G0152 HHCP-SERV OF OT,EA 15 MIN: HCPCS

## 2025-06-17 PROCEDURE — G0157 HHC PT ASSISTANT EA 15: HCPCS

## 2025-06-17 NOTE — CASE COMMUNICATION
Changed scale location from carpeting to tile in kitchen for more reliable and consistent weight checks for CHF managment.   3w1, 2w3 with goals to improve his posture, standing tolerance for 2min, improve his gait pattern and balance. I added some chin tuck exercises and scapular retraction exercises.

## 2025-06-18 ENCOUNTER — HOME CARE VISIT (OUTPATIENT)
Dept: HOME HEALTH SERVICES | Facility: HOME HEALTHCARE | Age: 70
End: 2025-06-18
Payer: MEDICARE

## 2025-06-18 ENCOUNTER — RESULTS FOLLOW-UP (OUTPATIENT)
Dept: FAMILY MEDICINE CLINIC | Facility: CLINIC | Age: 70
End: 2025-06-18

## 2025-06-18 VITALS
TEMPERATURE: 98 F | RESPIRATION RATE: 20 BRPM | DIASTOLIC BLOOD PRESSURE: 82 MMHG | HEART RATE: 64 BPM | SYSTOLIC BLOOD PRESSURE: 128 MMHG | OXYGEN SATURATION: 96 %

## 2025-06-18 DIAGNOSIS — N17.9 ACUTE RENAL INJURY (HCC): Primary | ICD-10-CM

## 2025-06-18 PROCEDURE — G0299 HHS/HOSPICE OF RN EA 15 MIN: HCPCS

## 2025-06-18 NOTE — ED ATTENDING ATTESTATION
4/9/2021  William FIERRO DO, saw and evaluated the patient  I have discussed the patient with the resident/non-physician practitioner and agree with the resident's/non-physician practitioner's findings, Plan of Care, and MDM as documented in the resident's/non-physician practitioner's note, except where noted  All available labs and Radiology studies were reviewed  I was present for key portions of any procedure(s) performed by the resident/non-physician practitioner and I was immediately available to provide assistance  At this point I agree with the current assessment done in the Emergency Department  I have conducted an independent evaluation of this patient a history and physical is as follows:    70-year-old male presents with erythema on his left leg  Patient has history redness on his lower leg for which he is followed by wound care  Visiting nurse came to see him today and said he had worsening redness that is extending up his leg as well as an elevated temperature  Patient states he has been feeling tired all day  Does have some shortness of breath with exertion but not at rest   Denies pain in his leg, no chest pain, no abdominal pain  On exam-no acute distress, heart tachycardic, no respiratory distress, abdomen soft nontender, left lower extremity with erythema of the lower extremity which patient states looks the same from his knee down however from knee and extending upward both on medial and lateral thigh has erythema that is warm to touch, not tender and new according to the patient    Plan-septic workup, IV antibiotics, plan for admission    ED Course         Critical Care Time  Procedures General

## 2025-06-19 ENCOUNTER — HOME CARE VISIT (OUTPATIENT)
Dept: HOME HEALTH SERVICES | Facility: HOME HEALTHCARE | Age: 70
End: 2025-06-19
Payer: MEDICARE

## 2025-06-19 VITALS — HEART RATE: 93 BPM | DIASTOLIC BLOOD PRESSURE: 80 MMHG | OXYGEN SATURATION: 98 % | SYSTOLIC BLOOD PRESSURE: 138 MMHG

## 2025-06-19 PROCEDURE — G0157 HHC PT ASSISTANT EA 15: HCPCS

## 2025-06-20 ENCOUNTER — HOME CARE VISIT (OUTPATIENT)
Dept: HOME HEALTH SERVICES | Facility: HOME HEALTHCARE | Age: 70
End: 2025-06-20
Payer: MEDICARE

## 2025-06-20 VITALS
RESPIRATION RATE: 20 BRPM | SYSTOLIC BLOOD PRESSURE: 122 MMHG | OXYGEN SATURATION: 98 % | TEMPERATURE: 98.5 F | DIASTOLIC BLOOD PRESSURE: 82 MMHG | HEART RATE: 72 BPM

## 2025-06-20 PROCEDURE — G0299 HHS/HOSPICE OF RN EA 15 MIN: HCPCS

## 2025-06-23 ENCOUNTER — HOME CARE VISIT (OUTPATIENT)
Dept: HOME HEALTH SERVICES | Facility: HOME HEALTHCARE | Age: 70
End: 2025-06-23
Payer: MEDICARE

## 2025-06-23 ENCOUNTER — RESULTS FOLLOW-UP (OUTPATIENT)
Dept: FAMILY MEDICINE CLINIC | Facility: CLINIC | Age: 70
End: 2025-06-23

## 2025-06-23 ENCOUNTER — LAB REQUISITION (OUTPATIENT)
Dept: LAB | Facility: HOSPITAL | Age: 70
End: 2025-06-23
Payer: MEDICARE

## 2025-06-23 VITALS
RESPIRATION RATE: 20 BRPM | HEART RATE: 56 BPM | TEMPERATURE: 99.1 F | DIASTOLIC BLOOD PRESSURE: 68 MMHG | SYSTOLIC BLOOD PRESSURE: 108 MMHG | OXYGEN SATURATION: 96 %

## 2025-06-23 DIAGNOSIS — N17.9 ACUTE KIDNEY FAILURE, UNSPECIFIED (HCC): ICD-10-CM

## 2025-06-23 LAB
ANION GAP SERPL CALCULATED.3IONS-SCNC: 11 MMOL/L (ref 4–13)
BUN SERPL-MCNC: 19 MG/DL (ref 5–25)
CALCIUM SERPL-MCNC: 8.8 MG/DL (ref 8.4–10.2)
CHLORIDE SERPL-SCNC: 98 MMOL/L (ref 96–108)
CO2 SERPL-SCNC: 28 MMOL/L (ref 21–32)
CREAT SERPL-MCNC: 1.24 MG/DL (ref 0.6–1.3)
GFR SERPL CREATININE-BSD FRML MDRD: 57 ML/MIN/1.73SQ M
GLUCOSE SERPL-MCNC: 92 MG/DL (ref 65–140)
POTASSIUM SERPL-SCNC: 4.7 MMOL/L (ref 3.5–5.3)
SODIUM SERPL-SCNC: 137 MMOL/L (ref 135–147)

## 2025-06-23 PROCEDURE — G0299 HHS/HOSPICE OF RN EA 15 MIN: HCPCS

## 2025-06-23 PROCEDURE — 80048 BASIC METABOLIC PNL TOTAL CA: CPT | Performed by: FAMILY MEDICINE

## 2025-06-24 ENCOUNTER — HOME CARE VISIT (OUTPATIENT)
Dept: HOME HEALTH SERVICES | Facility: HOME HEALTHCARE | Age: 70
End: 2025-06-24
Payer: MEDICARE

## 2025-06-24 VITALS — OXYGEN SATURATION: 99 % | DIASTOLIC BLOOD PRESSURE: 70 MMHG | SYSTOLIC BLOOD PRESSURE: 122 MMHG | HEART RATE: 95 BPM

## 2025-06-24 PROCEDURE — G0157 HHC PT ASSISTANT EA 15: HCPCS

## 2025-06-25 ENCOUNTER — HOME CARE VISIT (OUTPATIENT)
Dept: HOME HEALTH SERVICES | Facility: HOME HEALTHCARE | Age: 70
End: 2025-06-25
Payer: MEDICARE

## 2025-06-25 VITALS
RESPIRATION RATE: 20 BRPM | SYSTOLIC BLOOD PRESSURE: 104 MMHG | OXYGEN SATURATION: 98 % | DIASTOLIC BLOOD PRESSURE: 62 MMHG | HEART RATE: 64 BPM | TEMPERATURE: 98.1 F

## 2025-06-25 PROCEDURE — G0299 HHS/HOSPICE OF RN EA 15 MIN: HCPCS

## 2025-06-26 ENCOUNTER — HOME CARE VISIT (OUTPATIENT)
Dept: HOME HEALTH SERVICES | Facility: HOME HEALTHCARE | Age: 70
End: 2025-06-26
Payer: MEDICARE

## 2025-06-26 VITALS — OXYGEN SATURATION: 95 % | DIASTOLIC BLOOD PRESSURE: 60 MMHG | SYSTOLIC BLOOD PRESSURE: 112 MMHG | HEART RATE: 89 BPM

## 2025-06-26 PROCEDURE — G0157 HHC PT ASSISTANT EA 15: HCPCS

## 2025-06-27 ENCOUNTER — HOME CARE VISIT (OUTPATIENT)
Dept: HOME HEALTH SERVICES | Facility: HOME HEALTHCARE | Age: 70
End: 2025-06-27
Payer: MEDICARE

## 2025-06-27 ENCOUNTER — PATIENT OUTREACH (OUTPATIENT)
Dept: CASE MANAGEMENT | Facility: OTHER | Age: 70
End: 2025-06-27

## 2025-06-27 VITALS
DIASTOLIC BLOOD PRESSURE: 58 MMHG | TEMPERATURE: 98.7 F | HEART RATE: 64 BPM | OXYGEN SATURATION: 93 % | SYSTOLIC BLOOD PRESSURE: 104 MMHG | RESPIRATION RATE: 20 BRPM

## 2025-06-27 PROCEDURE — G0299 HHS/HOSPICE OF RN EA 15 MIN: HCPCS

## 2025-06-27 NOTE — PROGRESS NOTES
Spoke with Arthur. Continues to work with [facility]. Arthur states the nurse thinks his leg is looking better. But he is not so sure. He is waiting for lymphedema nurse to come check him out. No care management needs at this time. Will close from care management.

## 2025-06-30 ENCOUNTER — HOME CARE VISIT (OUTPATIENT)
Dept: HOME HEALTH SERVICES | Facility: HOME HEALTHCARE | Age: 70
End: 2025-06-30
Payer: MEDICARE

## 2025-06-30 VITALS
SYSTOLIC BLOOD PRESSURE: 106 MMHG | HEART RATE: 70 BPM | RESPIRATION RATE: 18 BRPM | DIASTOLIC BLOOD PRESSURE: 60 MMHG | BODY MASS INDEX: 42.13 KG/M2 | TEMPERATURE: 97.7 F | WEIGHT: 315 LBS | OXYGEN SATURATION: 96 %

## 2025-06-30 PROCEDURE — G0299 HHS/HOSPICE OF RN EA 15 MIN: HCPCS

## 2025-06-30 NOTE — CASE COMMUNICATION
No answer upon arrival at patient's call box. Left voicemail on patient's phone and called a nd left voicemail on daughter's phone. Requesting call back to reschedule.

## 2025-07-01 ENCOUNTER — HOME CARE VISIT (OUTPATIENT)
Dept: HOME HEALTH SERVICES | Facility: HOME HEALTHCARE | Age: 70
End: 2025-07-01
Payer: MEDICARE

## 2025-07-01 VITALS — BODY MASS INDEX: 42.38 KG/M2 | WEIGHT: 315 LBS

## 2025-07-01 PROCEDURE — G0151 HHCP-SERV OF PT,EA 15 MIN: HCPCS

## 2025-07-02 ENCOUNTER — HOME CARE VISIT (OUTPATIENT)
Dept: HOME HEALTH SERVICES | Facility: HOME HEALTHCARE | Age: 70
End: 2025-07-02
Payer: MEDICARE

## 2025-07-02 VITALS
SYSTOLIC BLOOD PRESSURE: 114 MMHG | OXYGEN SATURATION: 97 % | HEART RATE: 64 BPM | TEMPERATURE: 98.1 F | DIASTOLIC BLOOD PRESSURE: 62 MMHG | RESPIRATION RATE: 18 BRPM

## 2025-07-02 PROCEDURE — G0299 HHS/HOSPICE OF RN EA 15 MIN: HCPCS

## 2025-07-03 ENCOUNTER — HOME CARE VISIT (OUTPATIENT)
Dept: HOME HEALTH SERVICES | Facility: HOME HEALTHCARE | Age: 70
End: 2025-07-03
Payer: MEDICARE

## 2025-07-03 VITALS — SYSTOLIC BLOOD PRESSURE: 114 MMHG | OXYGEN SATURATION: 97 % | DIASTOLIC BLOOD PRESSURE: 70 MMHG | HEART RATE: 87 BPM

## 2025-07-03 PROCEDURE — G0157 HHC PT ASSISTANT EA 15: HCPCS

## 2025-07-04 ENCOUNTER — HOME CARE VISIT (OUTPATIENT)
Dept: HOME HEALTH SERVICES | Facility: HOME HEALTHCARE | Age: 70
End: 2025-07-04
Payer: MEDICARE

## 2025-07-04 VITALS
OXYGEN SATURATION: 98 % | RESPIRATION RATE: 20 BRPM | DIASTOLIC BLOOD PRESSURE: 74 MMHG | HEART RATE: 60 BPM | SYSTOLIC BLOOD PRESSURE: 118 MMHG | TEMPERATURE: 98.3 F

## 2025-07-04 PROCEDURE — G0299 HHS/HOSPICE OF RN EA 15 MIN: HCPCS

## 2025-07-06 PROCEDURE — 10330064 PAD, ABD 5X9 STR LF (1/PK 20PK/BX) MGM1"

## 2025-07-07 ENCOUNTER — HOME CARE VISIT (OUTPATIENT)
Dept: HOME HEALTH SERVICES | Facility: HOME HEALTHCARE | Age: 70
End: 2025-07-07
Payer: MEDICARE

## 2025-07-07 VITALS
OXYGEN SATURATION: 97 % | DIASTOLIC BLOOD PRESSURE: 72 MMHG | RESPIRATION RATE: 16 BRPM | SYSTOLIC BLOOD PRESSURE: 128 MMHG | TEMPERATURE: 97.5 F | HEART RATE: 94 BPM

## 2025-07-07 PROCEDURE — G0299 HHS/HOSPICE OF RN EA 15 MIN: HCPCS

## 2025-07-08 ENCOUNTER — HOME CARE VISIT (OUTPATIENT)
Dept: HOME HEALTH SERVICES | Facility: HOME HEALTHCARE | Age: 70
End: 2025-07-08
Payer: MEDICARE

## 2025-07-08 VITALS — DIASTOLIC BLOOD PRESSURE: 80 MMHG | HEART RATE: 70 BPM | SYSTOLIC BLOOD PRESSURE: 138 MMHG | OXYGEN SATURATION: 97 %

## 2025-07-08 PROCEDURE — G0157 HHC PT ASSISTANT EA 15: HCPCS

## 2025-07-09 ENCOUNTER — HOME CARE VISIT (OUTPATIENT)
Dept: HOME HEALTH SERVICES | Facility: HOME HEALTHCARE | Age: 70
End: 2025-07-09
Payer: MEDICARE

## 2025-07-09 VITALS
HEART RATE: 60 BPM | SYSTOLIC BLOOD PRESSURE: 126 MMHG | OXYGEN SATURATION: 97 % | RESPIRATION RATE: 16 BRPM | DIASTOLIC BLOOD PRESSURE: 93 MMHG | TEMPERATURE: 97.7 F

## 2025-07-09 PROCEDURE — G0299 HHS/HOSPICE OF RN EA 15 MIN: HCPCS

## 2025-07-10 ENCOUNTER — HOME CARE VISIT (OUTPATIENT)
Dept: HOME HEALTH SERVICES | Facility: HOME HEALTHCARE | Age: 70
End: 2025-07-10
Payer: MEDICARE

## 2025-07-10 VITALS
SYSTOLIC BLOOD PRESSURE: 132 MMHG | DIASTOLIC BLOOD PRESSURE: 80 MMHG | HEART RATE: 64 BPM | OXYGEN SATURATION: 96 % | BODY MASS INDEX: 42.09 KG/M2 | WEIGHT: 315 LBS

## 2025-07-10 PROCEDURE — G0151 HHCP-SERV OF PT,EA 15 MIN: HCPCS

## 2025-07-11 ENCOUNTER — HOME CARE VISIT (OUTPATIENT)
Dept: HOME HEALTH SERVICES | Facility: HOME HEALTHCARE | Age: 70
End: 2025-07-11
Payer: MEDICARE

## 2025-07-11 VITALS
SYSTOLIC BLOOD PRESSURE: 124 MMHG | TEMPERATURE: 98.2 F | OXYGEN SATURATION: 98 % | HEART RATE: 88 BPM | DIASTOLIC BLOOD PRESSURE: 72 MMHG | RESPIRATION RATE: 20 BRPM

## 2025-07-11 PROCEDURE — G0299 HHS/HOSPICE OF RN EA 15 MIN: HCPCS

## 2025-07-14 ENCOUNTER — HOME CARE VISIT (OUTPATIENT)
Dept: HOME HEALTH SERVICES | Facility: HOME HEALTHCARE | Age: 70
End: 2025-07-14
Payer: MEDICARE

## 2025-07-14 VITALS
HEART RATE: 64 BPM | DIASTOLIC BLOOD PRESSURE: 66 MMHG | TEMPERATURE: 99.4 F | OXYGEN SATURATION: 98 % | SYSTOLIC BLOOD PRESSURE: 118 MMHG | RESPIRATION RATE: 20 BRPM

## 2025-07-14 DIAGNOSIS — I89.0 LYMPHEDEMA: Primary | ICD-10-CM

## 2025-07-14 PROCEDURE — G0299 HHS/HOSPICE OF RN EA 15 MIN: HCPCS

## 2025-07-15 ENCOUNTER — HOME CARE VISIT (OUTPATIENT)
Dept: HOME HEALTH SERVICES | Facility: HOME HEALTHCARE | Age: 70
End: 2025-07-15
Payer: MEDICARE

## 2025-07-15 VITALS
DIASTOLIC BLOOD PRESSURE: 74 MMHG | TEMPERATURE: 97.2 F | OXYGEN SATURATION: 97 % | SYSTOLIC BLOOD PRESSURE: 131 MMHG | HEART RATE: 85 BPM

## 2025-07-15 PROCEDURE — G0152 HHCP-SERV OF OT,EA 15 MIN: HCPCS

## 2025-07-16 ENCOUNTER — HOME CARE VISIT (OUTPATIENT)
Dept: HOME HEALTH SERVICES | Facility: HOME HEALTHCARE | Age: 70
End: 2025-07-16
Payer: MEDICARE

## 2025-07-18 ENCOUNTER — HOSPITAL ENCOUNTER (INPATIENT)
Facility: HOSPITAL | Age: 70
LOS: 11 days | Discharge: NURSING, REHAB OR LONG-TERM CARE | DRG: 854 | End: 2025-07-29
Attending: EMERGENCY MEDICINE | Admitting: INTERNAL MEDICINE
Payer: MEDICARE

## 2025-07-18 ENCOUNTER — APPOINTMENT (INPATIENT)
Dept: RADIOLOGY | Facility: HOSPITAL | Age: 70
DRG: 854 | End: 2025-07-18
Payer: MEDICARE

## 2025-07-18 ENCOUNTER — APPOINTMENT (EMERGENCY)
Dept: RADIOLOGY | Facility: HOSPITAL | Age: 70
DRG: 854 | End: 2025-07-18
Payer: MEDICARE

## 2025-07-18 ENCOUNTER — HOME CARE VISIT (OUTPATIENT)
Dept: HOME HEALTH SERVICES | Facility: HOME HEALTHCARE | Age: 70
End: 2025-07-18
Payer: MEDICARE

## 2025-07-18 VITALS
HEART RATE: 100 BPM | TEMPERATURE: 98.5 F | SYSTOLIC BLOOD PRESSURE: 146 MMHG | DIASTOLIC BLOOD PRESSURE: 103 MMHG | OXYGEN SATURATION: 98 %

## 2025-07-18 DIAGNOSIS — E11.621 DIABETIC ULCER OF TOE OF LEFT FOOT ASSOCIATED WITH TYPE 2 DIABETES MELLITUS, WITH BONE INVOLVEMENT WITHOUT EVIDENCE OF NECROSIS (HCC): ICD-10-CM

## 2025-07-18 DIAGNOSIS — L97.526 DIABETIC ULCER OF TOE OF LEFT FOOT ASSOCIATED WITH TYPE 2 DIABETES MELLITUS, WITH BONE INVOLVEMENT WITHOUT EVIDENCE OF NECROSIS (HCC): ICD-10-CM

## 2025-07-18 DIAGNOSIS — L03.116 CELLULITIS OF LEFT LOWER EXTREMITY: Primary | ICD-10-CM

## 2025-07-18 DIAGNOSIS — R78.81 BACTEREMIA: ICD-10-CM

## 2025-07-18 DIAGNOSIS — E11.622 DIABETIC ULCER OF LEFT ANKLE ASSOCIATED WITH TYPE 2 DIABETES MELLITUS, LIMITED TO BREAKDOWN OF SKIN (HCC): ICD-10-CM

## 2025-07-18 DIAGNOSIS — L97.321 DIABETIC ULCER OF LEFT ANKLE ASSOCIATED WITH TYPE 2 DIABETES MELLITUS, LIMITED TO BREAKDOWN OF SKIN (HCC): ICD-10-CM

## 2025-07-18 DIAGNOSIS — L97.521 ULCER OF TOE OF LEFT FOOT, LIMITED TO BREAKDOWN OF SKIN (HCC): ICD-10-CM

## 2025-07-18 PROBLEM — N18.2 CKD (CHRONIC KIDNEY DISEASE) STAGE 2, GFR 60-89 ML/MIN: Status: ACTIVE | Noted: 2025-07-18

## 2025-07-18 PROBLEM — L03.90 SEPSIS DUE TO CELLULITIS (HCC): Status: ACTIVE | Noted: 2021-06-08

## 2025-07-18 LAB
ALBUMIN SERPL BCG-MCNC: 3.7 G/DL (ref 3.5–5)
ALP SERPL-CCNC: 88 U/L (ref 34–104)
ALT SERPL W P-5'-P-CCNC: 7 U/L (ref 7–52)
ANION GAP SERPL CALCULATED.3IONS-SCNC: 7 MMOL/L (ref 4–13)
AST SERPL W P-5'-P-CCNC: 11 U/L (ref 13–39)
BASOPHILS # BLD MANUAL: 0 THOUSAND/UL (ref 0–0.1)
BASOPHILS NFR MAR MANUAL: 0 % (ref 0–1)
BILIRUB SERPL-MCNC: 0.56 MG/DL (ref 0.2–1)
BUN SERPL-MCNC: 16 MG/DL (ref 5–25)
CALCIUM SERPL-MCNC: 9.1 MG/DL (ref 8.4–10.2)
CHLORIDE SERPL-SCNC: 96 MMOL/L (ref 96–108)
CO2 SERPL-SCNC: 30 MMOL/L (ref 21–32)
CREAT SERPL-MCNC: 1.24 MG/DL (ref 0.6–1.3)
CRP SERPL QL: 28.5 MG/L
EOSINOPHIL # BLD MANUAL: 0 THOUSAND/UL (ref 0–0.4)
EOSINOPHIL NFR BLD MANUAL: 0 % (ref 0–6)
ERYTHROCYTE [DISTWIDTH] IN BLOOD BY AUTOMATED COUNT: 16.6 % (ref 11.6–15.1)
ERYTHROCYTE [SEDIMENTATION RATE] IN BLOOD: 86 MM/HOUR (ref 0–19)
GFR SERPL CREATININE-BSD FRML MDRD: 57 ML/MIN/1.73SQ M
GIANT PLATELETS BLD QL SMEAR: PRESENT
GLUCOSE SERPL-MCNC: 100 MG/DL (ref 65–140)
GLUCOSE SERPL-MCNC: 109 MG/DL (ref 65–140)
HCT VFR BLD AUTO: 36.1 % (ref 36.5–49.3)
HGB BLD-MCNC: 11.1 G/DL (ref 12–17)
LACTATE SERPL-SCNC: 1.7 MMOL/L (ref 0.5–2)
LYMPHOCYTES # BLD AUTO: 0.23 THOUSAND/UL (ref 0.6–4.47)
LYMPHOCYTES # BLD AUTO: 1 % (ref 14–44)
MCH RBC QN AUTO: 26.7 PG (ref 26.8–34.3)
MCHC RBC AUTO-ENTMCNC: 30.7 G/DL (ref 31.4–37.4)
MCV RBC AUTO: 87 FL (ref 82–98)
MONOCYTES # BLD AUTO: 0.92 THOUSAND/UL (ref 0–1.22)
MONOCYTES NFR BLD: 4 % (ref 4–12)
NEUTROPHILS # BLD MANUAL: 21.75 THOUSAND/UL (ref 1.85–7.62)
NEUTS BAND NFR BLD MANUAL: 1 % (ref 0–8)
NEUTS SEG NFR BLD AUTO: 94 % (ref 43–75)
OVALOCYTES BLD QL SMEAR: PRESENT
PLATELET # BLD AUTO: 274 THOUSANDS/UL (ref 149–390)
PLATELET BLD QL SMEAR: ADEQUATE
PMV BLD AUTO: 10.4 FL (ref 8.9–12.7)
POIKILOCYTOSIS BLD QL SMEAR: PRESENT
POLYCHROMASIA BLD QL SMEAR: PRESENT
POTASSIUM SERPL-SCNC: 4.7 MMOL/L (ref 3.5–5.3)
PROCALCITONIN SERPL-MCNC: 0.26 NG/ML
PROT SERPL-MCNC: 7.9 G/DL (ref 6.4–8.4)
RBC # BLD AUTO: 4.15 MILLION/UL (ref 3.88–5.62)
RBC MORPH BLD: PRESENT
SODIUM SERPL-SCNC: 133 MMOL/L (ref 135–147)
WBC # BLD AUTO: 22.89 THOUSAND/UL (ref 4.31–10.16)

## 2025-07-18 PROCEDURE — G0152 HHCP-SERV OF OT,EA 15 MIN: HCPCS

## 2025-07-18 PROCEDURE — 86140 C-REACTIVE PROTEIN: CPT

## 2025-07-18 PROCEDURE — 87040 BLOOD CULTURE FOR BACTERIA: CPT

## 2025-07-18 PROCEDURE — 82948 REAGENT STRIP/BLOOD GLUCOSE: CPT

## 2025-07-18 PROCEDURE — 73590 X-RAY EXAM OF LOWER LEG: CPT

## 2025-07-18 PROCEDURE — 87147 CULTURE TYPE IMMUNOLOGIC: CPT

## 2025-07-18 PROCEDURE — 73620 X-RAY EXAM OF FOOT: CPT

## 2025-07-18 PROCEDURE — 99223 1ST HOSP IP/OBS HIGH 75: CPT | Performed by: INTERNAL MEDICINE

## 2025-07-18 PROCEDURE — 99285 EMERGENCY DEPT VISIT HI MDM: CPT | Performed by: EMERGENCY MEDICINE

## 2025-07-18 PROCEDURE — 85007 BL SMEAR W/DIFF WBC COUNT: CPT

## 2025-07-18 PROCEDURE — 99284 EMERGENCY DEPT VISIT MOD MDM: CPT

## 2025-07-18 PROCEDURE — 36415 COLL VENOUS BLD VENIPUNCTURE: CPT

## 2025-07-18 PROCEDURE — 84145 PROCALCITONIN (PCT): CPT

## 2025-07-18 PROCEDURE — 80053 COMPREHEN METABOLIC PANEL: CPT

## 2025-07-18 PROCEDURE — 87077 CULTURE AEROBIC IDENTIFY: CPT

## 2025-07-18 PROCEDURE — 85652 RBC SED RATE AUTOMATED: CPT

## 2025-07-18 PROCEDURE — 93005 ELECTROCARDIOGRAM TRACING: CPT

## 2025-07-18 PROCEDURE — 96365 THER/PROPH/DIAG IV INF INIT: CPT

## 2025-07-18 PROCEDURE — 83605 ASSAY OF LACTIC ACID: CPT | Performed by: INTERNAL MEDICINE

## 2025-07-18 PROCEDURE — 85027 COMPLETE CBC AUTOMATED: CPT

## 2025-07-18 PROCEDURE — 96367 TX/PROPH/DG ADDL SEQ IV INF: CPT

## 2025-07-18 PROCEDURE — 87154 CUL TYP ID BLD PTHGN 6+ TRGT: CPT

## 2025-07-18 RX ORDER — METRONIDAZOLE 500 MG/100ML
500 INJECTION, SOLUTION INTRAVENOUS EVERY 12 HOURS
Status: DISCONTINUED | OUTPATIENT
Start: 2025-07-19 | End: 2025-07-18

## 2025-07-18 RX ORDER — METOPROLOL TARTRATE 1 MG/ML
2.5 INJECTION, SOLUTION INTRAVENOUS EVERY 6 HOURS PRN
Status: DISCONTINUED | OUTPATIENT
Start: 2025-07-18 | End: 2025-07-29 | Stop reason: HOSPADM

## 2025-07-18 RX ORDER — ASPIRIN 81 MG/1
81 TABLET ORAL DAILY
Status: DISCONTINUED | OUTPATIENT
Start: 2025-07-19 | End: 2025-07-29 | Stop reason: HOSPADM

## 2025-07-18 RX ORDER — INSULIN LISPRO 100 [IU]/ML
1-5 INJECTION, SOLUTION INTRAVENOUS; SUBCUTANEOUS
Status: DISCONTINUED | OUTPATIENT
Start: 2025-07-18 | End: 2025-07-29 | Stop reason: HOSPADM

## 2025-07-18 RX ORDER — MAGNESIUM SULFATE 1 G/100ML
1 INJECTION INTRAVENOUS ONCE
Status: COMPLETED | OUTPATIENT
Start: 2025-07-18 | End: 2025-07-18

## 2025-07-18 RX ORDER — ACETAMINOPHEN 325 MG/1
650 TABLET ORAL EVERY 6 HOURS PRN
Status: DISCONTINUED | OUTPATIENT
Start: 2025-07-18 | End: 2025-07-22

## 2025-07-18 RX ORDER — PRAVASTATIN SODIUM 20 MG
20 TABLET ORAL
Status: DISCONTINUED | OUTPATIENT
Start: 2025-07-19 | End: 2025-07-29 | Stop reason: HOSPADM

## 2025-07-18 RX ORDER — VANCOMYCIN HYDROCHLORIDE 1 G/200ML
1000 INJECTION, SOLUTION INTRAVENOUS EVERY 12 HOURS
Status: DISCONTINUED | OUTPATIENT
Start: 2025-07-19 | End: 2025-07-19

## 2025-07-18 RX ORDER — SODIUM CHLORIDE, SODIUM GLUCONATE, SODIUM ACETATE, POTASSIUM CHLORIDE, MAGNESIUM CHLORIDE, SODIUM PHOSPHATE, DIBASIC, AND POTASSIUM PHOSPHATE .53; .5; .37; .037; .03; .012; .00082 G/100ML; G/100ML; G/100ML; G/100ML; G/100ML; G/100ML; G/100ML
1000 INJECTION, SOLUTION INTRAVENOUS ONCE
Status: COMPLETED | OUTPATIENT
Start: 2025-07-18 | End: 2025-07-18

## 2025-07-18 RX ORDER — METOPROLOL TARTRATE 1 MG/ML
5 INJECTION, SOLUTION INTRAVENOUS ONCE
Status: COMPLETED | OUTPATIENT
Start: 2025-07-18 | End: 2025-07-18

## 2025-07-18 RX ORDER — PANTOPRAZOLE SODIUM 40 MG/1
40 TABLET, DELAYED RELEASE ORAL DAILY
Status: DISCONTINUED | OUTPATIENT
Start: 2025-07-19 | End: 2025-07-29 | Stop reason: HOSPADM

## 2025-07-18 RX ORDER — METRONIDAZOLE 500 MG/100ML
500 INJECTION, SOLUTION INTRAVENOUS EVERY 12 HOURS
Status: DISCONTINUED | OUTPATIENT
Start: 2025-07-18 | End: 2025-07-23

## 2025-07-18 RX ORDER — LEVOTHYROXINE SODIUM 100 UG/1
300 TABLET ORAL
Status: DISCONTINUED | OUTPATIENT
Start: 2025-07-19 | End: 2025-07-29 | Stop reason: HOSPADM

## 2025-07-18 RX ORDER — INSULIN LISPRO 100 [IU]/ML
1-5 INJECTION, SOLUTION INTRAVENOUS; SUBCUTANEOUS
Status: DISCONTINUED | OUTPATIENT
Start: 2025-07-19 | End: 2025-07-22

## 2025-07-18 RX ORDER — METOPROLOL TARTRATE 50 MG
50 TABLET ORAL 2 TIMES DAILY
Status: DISCONTINUED | OUTPATIENT
Start: 2025-07-19 | End: 2025-07-29 | Stop reason: HOSPADM

## 2025-07-18 RX ORDER — CLINDAMYCIN PHOSPHATE 900 MG/50ML
900 INJECTION, SOLUTION INTRAVENOUS ONCE
Status: DISCONTINUED | OUTPATIENT
Start: 2025-07-18 | End: 2025-07-18

## 2025-07-18 RX ORDER — GABAPENTIN 100 MG/1
100 CAPSULE ORAL 2 TIMES DAILY
Status: DISCONTINUED | OUTPATIENT
Start: 2025-07-19 | End: 2025-07-29 | Stop reason: HOSPADM

## 2025-07-18 RX ORDER — LEVOTHYROXINE SODIUM 25 UG/1
25 TABLET ORAL
Status: DISCONTINUED | OUTPATIENT
Start: 2025-07-19 | End: 2025-07-29 | Stop reason: HOSPADM

## 2025-07-18 RX ADMIN — METRONIDAZOLE 500 MG: 500 INJECTION, SOLUTION INTRAVENOUS at 22:56

## 2025-07-18 RX ADMIN — MAGNESIUM SULFATE HEPTAHYDRATE 1 G: 1 INJECTION, SOLUTION INTRAVENOUS at 21:14

## 2025-07-18 RX ADMIN — CEFEPIME 2000 MG: 2 INJECTION, POWDER, FOR SOLUTION INTRAVENOUS at 19:37

## 2025-07-18 RX ADMIN — VANCOMYCIN HYDROCHLORIDE 2000 MG: 5 INJECTION, POWDER, LYOPHILIZED, FOR SOLUTION INTRAVENOUS at 20:07

## 2025-07-18 RX ADMIN — SODIUM CHLORIDE, SODIUM GLUCONATE, SODIUM ACETATE, POTASSIUM CHLORIDE, MAGNESIUM CHLORIDE, SODIUM PHOSPHATE, DIBASIC, AND POTASSIUM PHOSPHATE 1000 ML: .53; .5; .37; .037; .03; .012; .00082 INJECTION, SOLUTION INTRAVENOUS at 21:20

## 2025-07-18 RX ADMIN — METOPROLOL TARTRATE 5 MG: 1 INJECTION, SOLUTION INTRAVENOUS at 21:10

## 2025-07-19 LAB
ALBUMIN SERPL BCG-MCNC: 3.2 G/DL (ref 3.5–5)
ALP SERPL-CCNC: 69 U/L (ref 34–104)
ALT SERPL W P-5'-P-CCNC: 6 U/L (ref 7–52)
ANION GAP SERPL CALCULATED.3IONS-SCNC: 8 MMOL/L (ref 4–13)
APTT PPP: 34 SECONDS (ref 23–34)
AST SERPL W P-5'-P-CCNC: 10 U/L (ref 13–39)
ATRIAL RATE: 122 BPM
BACTERIA BLD CULT: NORMAL
BILIRUB SERPL-MCNC: 0.89 MG/DL (ref 0.2–1)
BUN SERPL-MCNC: 20 MG/DL (ref 5–25)
CALCIUM ALBUM COR SERPL-MCNC: 8.5 MG/DL (ref 8.3–10.1)
CALCIUM SERPL-MCNC: 7.9 MG/DL (ref 8.4–10.2)
CHLORIDE SERPL-SCNC: 98 MMOL/L (ref 96–108)
CO2 SERPL-SCNC: 27 MMOL/L (ref 21–32)
CREAT SERPL-MCNC: 1.39 MG/DL (ref 0.6–1.3)
ERYTHROCYTE [DISTWIDTH] IN BLOOD BY AUTOMATED COUNT: 16.8 % (ref 11.6–15.1)
GFR SERPL CREATININE-BSD FRML MDRD: 50 ML/MIN/1.73SQ M
GLUCOSE SERPL-MCNC: 100 MG/DL (ref 65–140)
GLUCOSE SERPL-MCNC: 107 MG/DL (ref 65–140)
GLUCOSE SERPL-MCNC: 89 MG/DL (ref 65–140)
GLUCOSE SERPL-MCNC: 91 MG/DL (ref 65–140)
HCT VFR BLD AUTO: 32.4 % (ref 36.5–49.3)
HGB BLD-MCNC: 10 G/DL (ref 12–17)
INR PPP: 1.74 (ref 0.85–1.19)
MAGNESIUM SERPL-MCNC: 2 MG/DL (ref 1.9–2.7)
MCH RBC QN AUTO: 26.7 PG (ref 26.8–34.3)
MCHC RBC AUTO-ENTMCNC: 30.9 G/DL (ref 31.4–37.4)
MCV RBC AUTO: 86 FL (ref 82–98)
PLATELET # BLD AUTO: 240 THOUSANDS/UL (ref 149–390)
PMV BLD AUTO: 10.9 FL (ref 8.9–12.7)
POTASSIUM SERPL-SCNC: 4.2 MMOL/L (ref 3.5–5.3)
PROCALCITONIN SERPL-MCNC: 7.6 NG/ML
PROT SERPL-MCNC: 6.9 G/DL (ref 6.4–8.4)
PROTHROMBIN TIME: 20.5 SECONDS (ref 12.3–15)
QRS AXIS: -51 DEGREES
QRSD INTERVAL: 98 MS
QT INTERVAL: 312 MS
QTC INTERVAL: 402 MS
RBC # BLD AUTO: 3.75 MILLION/UL (ref 3.88–5.62)
SODIUM SERPL-SCNC: 133 MMOL/L (ref 135–147)
T WAVE AXIS: 59 DEGREES
VENTRICULAR RATE: 100 BPM
WBC # BLD AUTO: 26.78 THOUSAND/UL (ref 4.31–10.16)

## 2025-07-19 PROCEDURE — 85027 COMPLETE CBC AUTOMATED: CPT | Performed by: INTERNAL MEDICINE

## 2025-07-19 PROCEDURE — 80053 COMPREHEN METABOLIC PANEL: CPT | Performed by: INTERNAL MEDICINE

## 2025-07-19 PROCEDURE — 85730 THROMBOPLASTIN TIME PARTIAL: CPT | Performed by: INTERNAL MEDICINE

## 2025-07-19 PROCEDURE — 36415 COLL VENOUS BLD VENIPUNCTURE: CPT | Performed by: INTERNAL MEDICINE

## 2025-07-19 PROCEDURE — 84145 PROCALCITONIN (PCT): CPT | Performed by: INTERNAL MEDICINE

## 2025-07-19 PROCEDURE — 85610 PROTHROMBIN TIME: CPT | Performed by: INTERNAL MEDICINE

## 2025-07-19 PROCEDURE — 87081 CULTURE SCREEN ONLY: CPT | Performed by: INTERNAL MEDICINE

## 2025-07-19 PROCEDURE — 99233 SBSQ HOSP IP/OBS HIGH 50: CPT | Performed by: STUDENT IN AN ORGANIZED HEALTH CARE EDUCATION/TRAINING PROGRAM

## 2025-07-19 PROCEDURE — 83735 ASSAY OF MAGNESIUM: CPT | Performed by: INTERNAL MEDICINE

## 2025-07-19 PROCEDURE — 99223 1ST HOSP IP/OBS HIGH 75: CPT | Performed by: PODIATRIST

## 2025-07-19 PROCEDURE — 82948 REAGENT STRIP/BLOOD GLUCOSE: CPT

## 2025-07-19 PROCEDURE — 87147 CULTURE TYPE IMMUNOLOGIC: CPT | Performed by: INTERNAL MEDICINE

## 2025-07-19 PROCEDURE — 93010 ELECTROCARDIOGRAM REPORT: CPT | Performed by: INTERNAL MEDICINE

## 2025-07-19 RX ORDER — ALBUMIN (HUMAN) 12.5 G/50ML
50 SOLUTION INTRAVENOUS ONCE
Status: COMPLETED | OUTPATIENT
Start: 2025-07-19 | End: 2025-07-19

## 2025-07-19 RX ORDER — SODIUM CHLORIDE, SODIUM GLUCONATE, SODIUM ACETATE, POTASSIUM CHLORIDE, MAGNESIUM CHLORIDE, SODIUM PHOSPHATE, DIBASIC, AND POTASSIUM PHOSPHATE .53; .5; .37; .037; .03; .012; .00082 G/100ML; G/100ML; G/100ML; G/100ML; G/100ML; G/100ML; G/100ML
1000 INJECTION, SOLUTION INTRAVENOUS ONCE
Status: COMPLETED | OUTPATIENT
Start: 2025-07-19 | End: 2025-07-19

## 2025-07-19 RX ORDER — LORAZEPAM 2 MG/ML
1 INJECTION INTRAMUSCULAR
Status: DISCONTINUED | OUTPATIENT
Start: 2025-07-19 | End: 2025-07-29 | Stop reason: HOSPADM

## 2025-07-19 RX ADMIN — SODIUM CHLORIDE, SODIUM GLUCONATE, SODIUM ACETATE, POTASSIUM CHLORIDE, MAGNESIUM CHLORIDE, SODIUM PHOSPHATE, DIBASIC, AND POTASSIUM PHOSPHATE 1000 ML: .53; .5; .37; .037; .03; .012; .00082 INJECTION, SOLUTION INTRAVENOUS at 03:04

## 2025-07-19 RX ADMIN — VANCOMYCIN HYDROCHLORIDE 750 MG: 10 INJECTION, POWDER, LYOPHILIZED, FOR SOLUTION INTRAVENOUS at 11:00

## 2025-07-19 RX ADMIN — CEFEPIME 2000 MG: 2 INJECTION, POWDER, FOR SOLUTION INTRAVENOUS at 05:43

## 2025-07-19 RX ADMIN — METRONIDAZOLE 500 MG: 500 INJECTION, SOLUTION INTRAVENOUS at 21:38

## 2025-07-19 RX ADMIN — LORAZEPAM 1 MG: 2 INJECTION INTRAMUSCULAR; INTRAVENOUS at 12:43

## 2025-07-19 RX ADMIN — METRONIDAZOLE 500 MG: 500 INJECTION, SOLUTION INTRAVENOUS at 14:02

## 2025-07-19 RX ADMIN — CEFEPIME 2000 MG: 2 INJECTION, POWDER, FOR SOLUTION INTRAVENOUS at 17:03

## 2025-07-19 RX ADMIN — LEVOTHYROXINE SODIUM 25 MCG: 0.1 TABLET ORAL at 05:48

## 2025-07-19 RX ADMIN — CEFEPIME 2000 MG: 2 INJECTION, POWDER, FOR SOLUTION INTRAVENOUS at 22:21

## 2025-07-19 RX ADMIN — VANCOMYCIN HYDROCHLORIDE 750 MG: 10 INJECTION, POWDER, LYOPHILIZED, FOR SOLUTION INTRAVENOUS at 20:21

## 2025-07-19 RX ADMIN — LEVOTHYROXINE SODIUM 300 MCG: 0.1 TABLET ORAL at 05:48

## 2025-07-19 RX ADMIN — PRAVASTATIN SODIUM 20 MG: 20 TABLET ORAL at 17:03

## 2025-07-19 RX ADMIN — SODIUM CHLORIDE, SODIUM GLUCONATE, SODIUM ACETATE, POTASSIUM CHLORIDE, MAGNESIUM CHLORIDE, SODIUM PHOSPHATE, DIBASIC, AND POTASSIUM PHOSPHATE 1000 ML: .53; .5; .37; .037; .03; .012; .00082 INJECTION, SOLUTION INTRAVENOUS at 01:16

## 2025-07-19 RX ADMIN — ASPIRIN 81 MG: 81 TABLET ORAL at 08:37

## 2025-07-19 RX ADMIN — GABAPENTIN 100 MG: 100 CAPSULE ORAL at 17:03

## 2025-07-19 RX ADMIN — PANTOPRAZOLE SODIUM 40 MG: 40 TABLET, DELAYED RELEASE ORAL at 08:37

## 2025-07-19 RX ADMIN — ALBUMIN (HUMAN) 50 G: 0.25 INJECTION, SOLUTION INTRAVENOUS at 14:02

## 2025-07-19 RX ADMIN — APIXABAN 5 MG: 5 TABLET, FILM COATED ORAL at 17:03

## 2025-07-19 RX ADMIN — APIXABAN 5 MG: 5 TABLET, FILM COATED ORAL at 08:37

## 2025-07-19 RX ADMIN — GABAPENTIN 100 MG: 100 CAPSULE ORAL at 08:37

## 2025-07-20 ENCOUNTER — HOME CARE VISIT (OUTPATIENT)
Dept: HOME HEALTH SERVICES | Facility: HOME HEALTHCARE | Age: 70
End: 2025-07-20
Payer: MEDICARE

## 2025-07-20 LAB
ALBUMIN SERPL BCG-MCNC: 3.3 G/DL (ref 3.5–5)
ALP SERPL-CCNC: 72 U/L (ref 34–104)
ALT SERPL W P-5'-P-CCNC: 5 U/L (ref 7–52)
ANION GAP SERPL CALCULATED.3IONS-SCNC: 8 MMOL/L (ref 4–13)
AST SERPL W P-5'-P-CCNC: 11 U/L (ref 13–39)
BILIRUB SERPL-MCNC: 0.67 MG/DL (ref 0.2–1)
BUN SERPL-MCNC: 22 MG/DL (ref 5–25)
CALCIUM ALBUM COR SERPL-MCNC: 9.4 MG/DL (ref 8.3–10.1)
CALCIUM SERPL-MCNC: 8.8 MG/DL (ref 8.4–10.2)
CHLORIDE SERPL-SCNC: 100 MMOL/L (ref 96–108)
CO2 SERPL-SCNC: 28 MMOL/L (ref 21–32)
CREAT SERPL-MCNC: 1.35 MG/DL (ref 0.6–1.3)
ERYTHROCYTE [DISTWIDTH] IN BLOOD BY AUTOMATED COUNT: 17.1 % (ref 11.6–15.1)
GFR SERPL CREATININE-BSD FRML MDRD: 52 ML/MIN/1.73SQ M
GLUCOSE SERPL-MCNC: 102 MG/DL (ref 65–140)
GLUCOSE SERPL-MCNC: 104 MG/DL (ref 65–140)
GLUCOSE SERPL-MCNC: 114 MG/DL (ref 65–140)
GLUCOSE SERPL-MCNC: 90 MG/DL (ref 65–140)
GLUCOSE SERPL-MCNC: 95 MG/DL (ref 65–140)
HCT VFR BLD AUTO: 30.3 % (ref 36.5–49.3)
HGB BLD-MCNC: 9.3 G/DL (ref 12–17)
MCH RBC QN AUTO: 26.6 PG (ref 26.8–34.3)
MCHC RBC AUTO-ENTMCNC: 30.7 G/DL (ref 31.4–37.4)
MCV RBC AUTO: 87 FL (ref 82–98)
MRSA NOSE QL CULT: ABNORMAL
MRSA NOSE QL CULT: ABNORMAL
PLATELET # BLD AUTO: 184 THOUSANDS/UL (ref 149–390)
PMV BLD AUTO: 11.5 FL (ref 8.9–12.7)
POTASSIUM SERPL-SCNC: 3.9 MMOL/L (ref 3.5–5.3)
PROT SERPL-MCNC: 7.2 G/DL (ref 6.4–8.4)
RBC # BLD AUTO: 3.49 MILLION/UL (ref 3.88–5.62)
SODIUM SERPL-SCNC: 136 MMOL/L (ref 135–147)
VANCOMYCIN SERPL-MCNC: 14.5 UG/ML (ref 10–20)
WBC # BLD AUTO: 13.2 THOUSAND/UL (ref 4.31–10.16)

## 2025-07-20 PROCEDURE — 85027 COMPLETE CBC AUTOMATED: CPT | Performed by: STUDENT IN AN ORGANIZED HEALTH CARE EDUCATION/TRAINING PROGRAM

## 2025-07-20 PROCEDURE — 82948 REAGENT STRIP/BLOOD GLUCOSE: CPT

## 2025-07-20 PROCEDURE — 80053 COMPREHEN METABOLIC PANEL: CPT | Performed by: STUDENT IN AN ORGANIZED HEALTH CARE EDUCATION/TRAINING PROGRAM

## 2025-07-20 PROCEDURE — 80202 ASSAY OF VANCOMYCIN: CPT | Performed by: INTERNAL MEDICINE

## 2025-07-20 PROCEDURE — 99232 SBSQ HOSP IP/OBS MODERATE 35: CPT | Performed by: FAMILY MEDICINE

## 2025-07-20 RX ADMIN — METOPROLOL TARTRATE 50 MG: 50 TABLET, FILM COATED ORAL at 17:50

## 2025-07-20 RX ADMIN — GABAPENTIN 100 MG: 100 CAPSULE ORAL at 08:44

## 2025-07-20 RX ADMIN — LEVOTHYROXINE SODIUM 25 MCG: 0.1 TABLET ORAL at 05:25

## 2025-07-20 RX ADMIN — VANCOMYCIN HYDROCHLORIDE 750 MG: 10 INJECTION, POWDER, LYOPHILIZED, FOR SOLUTION INTRAVENOUS at 08:44

## 2025-07-20 RX ADMIN — CEFEPIME 2000 MG: 2 INJECTION, POWDER, FOR SOLUTION INTRAVENOUS at 05:24

## 2025-07-20 RX ADMIN — CEFEPIME 2000 MG: 2 INJECTION, POWDER, FOR SOLUTION INTRAVENOUS at 14:00

## 2025-07-20 RX ADMIN — METRONIDAZOLE 500 MG: 500 INJECTION, SOLUTION INTRAVENOUS at 21:16

## 2025-07-20 RX ADMIN — PANTOPRAZOLE SODIUM 40 MG: 40 TABLET, DELAYED RELEASE ORAL at 08:47

## 2025-07-20 RX ADMIN — PRAVASTATIN SODIUM 20 MG: 20 TABLET ORAL at 17:50

## 2025-07-20 RX ADMIN — METRONIDAZOLE 500 MG: 500 INJECTION, SOLUTION INTRAVENOUS at 07:49

## 2025-07-20 RX ADMIN — LEVOTHYROXINE SODIUM 300 MCG: 0.1 TABLET ORAL at 05:24

## 2025-07-20 RX ADMIN — APIXABAN 5 MG: 5 TABLET, FILM COATED ORAL at 08:44

## 2025-07-20 RX ADMIN — VANCOMYCIN HYDROCHLORIDE 750 MG: 10 INJECTION, POWDER, LYOPHILIZED, FOR SOLUTION INTRAVENOUS at 19:36

## 2025-07-20 RX ADMIN — ASPIRIN 81 MG: 81 TABLET ORAL at 08:44

## 2025-07-20 RX ADMIN — GABAPENTIN 100 MG: 100 CAPSULE ORAL at 17:50

## 2025-07-20 RX ADMIN — APIXABAN 5 MG: 5 TABLET, FILM COATED ORAL at 17:50

## 2025-07-20 RX ADMIN — CEFEPIME 2000 MG: 2 INJECTION, POWDER, FOR SOLUTION INTRAVENOUS at 22:09

## 2025-07-21 LAB
ANION GAP SERPL CALCULATED.3IONS-SCNC: 4 MMOL/L (ref 4–13)
BASOPHILS # BLD AUTO: 0.03 THOUSANDS/ÂΜL (ref 0–0.1)
BASOPHILS NFR BLD AUTO: 0 % (ref 0–1)
BUN SERPL-MCNC: 18 MG/DL (ref 5–25)
CALCIUM SERPL-MCNC: 8.7 MG/DL (ref 8.4–10.2)
CHLORIDE SERPL-SCNC: 103 MMOL/L (ref 96–108)
CO2 SERPL-SCNC: 28 MMOL/L (ref 21–32)
CREAT SERPL-MCNC: 1.07 MG/DL (ref 0.6–1.3)
EOSINOPHIL # BLD AUTO: 0.15 THOUSAND/ÂΜL (ref 0–0.61)
EOSINOPHIL NFR BLD AUTO: 2 % (ref 0–6)
ERYTHROCYTE [DISTWIDTH] IN BLOOD BY AUTOMATED COUNT: 16.7 % (ref 11.6–15.1)
GFR SERPL CREATININE-BSD FRML MDRD: 68 ML/MIN/1.73SQ M
GLUCOSE SERPL-MCNC: 106 MG/DL (ref 65–140)
GLUCOSE SERPL-MCNC: 107 MG/DL (ref 65–140)
GLUCOSE SERPL-MCNC: 109 MG/DL (ref 65–140)
GLUCOSE SERPL-MCNC: 84 MG/DL (ref 65–140)
GLUCOSE SERPL-MCNC: 96 MG/DL (ref 65–140)
HCT VFR BLD AUTO: 30.6 % (ref 36.5–49.3)
HGB BLD-MCNC: 9.3 G/DL (ref 12–17)
IMM GRANULOCYTES # BLD AUTO: 0.04 THOUSAND/UL (ref 0–0.2)
IMM GRANULOCYTES NFR BLD AUTO: 1 % (ref 0–2)
LYMPHOCYTES # BLD AUTO: 0.49 THOUSANDS/ÂΜL (ref 0.6–4.47)
LYMPHOCYTES NFR BLD AUTO: 7 % (ref 14–44)
MAGNESIUM SERPL-MCNC: 2.2 MG/DL (ref 1.9–2.7)
MCH RBC QN AUTO: 26.3 PG (ref 26.8–34.3)
MCHC RBC AUTO-ENTMCNC: 30.4 G/DL (ref 31.4–37.4)
MCV RBC AUTO: 86 FL (ref 82–98)
MONOCYTES # BLD AUTO: 0.62 THOUSAND/ÂΜL (ref 0.17–1.22)
MONOCYTES NFR BLD AUTO: 9 % (ref 4–12)
NEUTROPHILS # BLD AUTO: 5.76 THOUSANDS/ÂΜL (ref 1.85–7.62)
NEUTS SEG NFR BLD AUTO: 81 % (ref 43–75)
NRBC BLD AUTO-RTO: 0 /100 WBCS
PLATELET # BLD AUTO: 189 THOUSANDS/UL (ref 149–390)
PMV BLD AUTO: 10.8 FL (ref 8.9–12.7)
POTASSIUM SERPL-SCNC: 4.3 MMOL/L (ref 3.5–5.3)
RBC # BLD AUTO: 3.54 MILLION/UL (ref 3.88–5.62)
SODIUM SERPL-SCNC: 135 MMOL/L (ref 135–147)
WBC # BLD AUTO: 7.09 THOUSAND/UL (ref 4.31–10.16)

## 2025-07-21 PROCEDURE — 85025 COMPLETE CBC W/AUTO DIFF WBC: CPT | Performed by: FAMILY MEDICINE

## 2025-07-21 PROCEDURE — 82948 REAGENT STRIP/BLOOD GLUCOSE: CPT

## 2025-07-21 PROCEDURE — 99232 SBSQ HOSP IP/OBS MODERATE 35: CPT | Performed by: FAMILY MEDICINE

## 2025-07-21 PROCEDURE — 80048 BASIC METABOLIC PNL TOTAL CA: CPT | Performed by: FAMILY MEDICINE

## 2025-07-21 PROCEDURE — 97163 PT EVAL HIGH COMPLEX 45 MIN: CPT

## 2025-07-21 PROCEDURE — 97167 OT EVAL HIGH COMPLEX 60 MIN: CPT

## 2025-07-21 PROCEDURE — 83735 ASSAY OF MAGNESIUM: CPT | Performed by: FAMILY MEDICINE

## 2025-07-21 RX ORDER — VANCOMYCIN HYDROCHLORIDE 1 G/200ML
1000 INJECTION, SOLUTION INTRAVENOUS EVERY 12 HOURS
Status: DISPENSED | OUTPATIENT
Start: 2025-07-21 | End: 2025-07-26

## 2025-07-21 RX ADMIN — METOPROLOL TARTRATE 50 MG: 50 TABLET, FILM COATED ORAL at 08:03

## 2025-07-21 RX ADMIN — CEFEPIME 2000 MG: 2 INJECTION, POWDER, FOR SOLUTION INTRAVENOUS at 23:04

## 2025-07-21 RX ADMIN — METRONIDAZOLE 500 MG: 500 INJECTION, SOLUTION INTRAVENOUS at 20:54

## 2025-07-21 RX ADMIN — METRONIDAZOLE 500 MG: 500 INJECTION, SOLUTION INTRAVENOUS at 07:59

## 2025-07-21 RX ADMIN — METOPROLOL TARTRATE 50 MG: 50 TABLET, FILM COATED ORAL at 18:01

## 2025-07-21 RX ADMIN — CEFEPIME 2000 MG: 2 INJECTION, POWDER, FOR SOLUTION INTRAVENOUS at 05:08

## 2025-07-21 RX ADMIN — APIXABAN 5 MG: 5 TABLET, FILM COATED ORAL at 18:00

## 2025-07-21 RX ADMIN — LORAZEPAM 1 MG: 2 INJECTION INTRAMUSCULAR; INTRAVENOUS at 23:09

## 2025-07-21 RX ADMIN — VANCOMYCIN HYDROCHLORIDE 750 MG: 10 INJECTION, POWDER, LYOPHILIZED, FOR SOLUTION INTRAVENOUS at 09:44

## 2025-07-21 RX ADMIN — LEVOTHYROXINE SODIUM 300 MCG: 0.1 TABLET ORAL at 05:09

## 2025-07-21 RX ADMIN — GABAPENTIN 100 MG: 100 CAPSULE ORAL at 18:00

## 2025-07-21 RX ADMIN — GABAPENTIN 100 MG: 100 CAPSULE ORAL at 08:03

## 2025-07-21 RX ADMIN — LEVOTHYROXINE SODIUM 25 MCG: 0.1 TABLET ORAL at 05:08

## 2025-07-21 RX ADMIN — CEFEPIME 2000 MG: 2 INJECTION, POWDER, FOR SOLUTION INTRAVENOUS at 12:53

## 2025-07-21 RX ADMIN — ASPIRIN 81 MG: 81 TABLET ORAL at 08:03

## 2025-07-21 RX ADMIN — APIXABAN 5 MG: 5 TABLET, FILM COATED ORAL at 08:03

## 2025-07-21 RX ADMIN — PANTOPRAZOLE SODIUM 40 MG: 40 TABLET, DELAYED RELEASE ORAL at 08:03

## 2025-07-21 RX ADMIN — PRAVASTATIN SODIUM 20 MG: 20 TABLET ORAL at 18:00

## 2025-07-21 RX ADMIN — VANCOMYCIN HYDROCHLORIDE 1000 MG: 1 INJECTION, SOLUTION INTRAVENOUS at 21:50

## 2025-07-22 ENCOUNTER — APPOINTMENT (INPATIENT)
Dept: RADIOLOGY | Facility: HOSPITAL | Age: 70
DRG: 854 | End: 2025-07-22
Payer: MEDICARE

## 2025-07-22 PROBLEM — R78.81 GRAM-POSITIVE BACTEREMIA: Status: ACTIVE | Noted: 2025-07-22

## 2025-07-22 PROBLEM — M86.9 OSTEOMYELITIS (HCC): Status: ACTIVE | Noted: 2025-07-22
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
## 2025-07-22 PROBLEM — E66.01 MORBID OBESITY (HCC): Status: ACTIVE | Noted: 2025-07-22

## 2025-07-22 PROBLEM — D64.9 ANEMIA: Status: ACTIVE | Noted: 2025-07-22
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
## 2025-07-22 LAB
ALL TARGETS: NOT DETECTED
ANION GAP SERPL CALCULATED.3IONS-SCNC: 8 MMOL/L (ref 4–13)
BACTERIA BLD CULT: ABNORMAL
BASOPHILS # BLD AUTO: 0.03 THOUSANDS/ÂΜL (ref 0–0.1)
BASOPHILS NFR BLD AUTO: 0 % (ref 0–1)
BUN SERPL-MCNC: 21 MG/DL (ref 5–25)
CALCIUM SERPL-MCNC: 8.9 MG/DL (ref 8.4–10.2)
CHLORIDE SERPL-SCNC: 102 MMOL/L (ref 96–108)
CO2 SERPL-SCNC: 26 MMOL/L (ref 21–32)
CREAT SERPL-MCNC: 1.03 MG/DL (ref 0.6–1.3)
EOSINOPHIL # BLD AUTO: 0.19 THOUSAND/ÂΜL (ref 0–0.61)
EOSINOPHIL NFR BLD AUTO: 3 % (ref 0–6)
ERYTHROCYTE [DISTWIDTH] IN BLOOD BY AUTOMATED COUNT: 16.4 % (ref 11.6–15.1)
GFR SERPL CREATININE-BSD FRML MDRD: 72 ML/MIN/1.73SQ M
GLUCOSE SERPL-MCNC: 100 MG/DL (ref 65–140)
GLUCOSE SERPL-MCNC: 108 MG/DL (ref 65–140)
GLUCOSE SERPL-MCNC: 122 MG/DL (ref 65–140)
GLUCOSE SERPL-MCNC: 122 MG/DL (ref 65–140)
GLUCOSE SERPL-MCNC: 92 MG/DL (ref 65–140)
GRAM STN SPEC: ABNORMAL
HCT VFR BLD AUTO: 31.4 % (ref 36.5–49.3)
HGB BLD-MCNC: 9.6 G/DL (ref 12–17)
IMM GRANULOCYTES # BLD AUTO: 0.03 THOUSAND/UL (ref 0–0.2)
IMM GRANULOCYTES NFR BLD AUTO: 0 % (ref 0–2)
LYMPHOCYTES # BLD AUTO: 0.5 THOUSANDS/ÂΜL (ref 0.6–4.47)
LYMPHOCYTES NFR BLD AUTO: 7 % (ref 14–44)
MAGNESIUM SERPL-MCNC: 2.1 MG/DL (ref 1.9–2.7)
MCH RBC QN AUTO: 26.2 PG (ref 26.8–34.3)
MCHC RBC AUTO-ENTMCNC: 30.6 G/DL (ref 31.4–37.4)
MCV RBC AUTO: 86 FL (ref 82–98)
MONOCYTES # BLD AUTO: 0.51 THOUSAND/ÂΜL (ref 0.17–1.22)
MONOCYTES NFR BLD AUTO: 8 % (ref 4–12)
NEUTROPHILS # BLD AUTO: 5.52 THOUSANDS/ÂΜL (ref 1.85–7.62)
NEUTS SEG NFR BLD AUTO: 82 % (ref 43–75)
NRBC BLD AUTO-RTO: 0 /100 WBCS
PLATELET # BLD AUTO: 200 THOUSANDS/UL (ref 149–390)
PMV BLD AUTO: 10.8 FL (ref 8.9–12.7)
POTASSIUM SERPL-SCNC: 4.3 MMOL/L (ref 3.5–5.3)
QRS AXIS: -42 DEGREES
QRSD INTERVAL: 104 MS
QT INTERVAL: 430 MS
QTC INTERVAL: 444 MS
RBC # BLD AUTO: 3.66 MILLION/UL (ref 3.88–5.62)
SODIUM SERPL-SCNC: 136 MMOL/L (ref 135–147)
T WAVE AXIS: -19 DEGREES
VANCOMYCIN SERPL-MCNC: 15.4 UG/ML (ref 10–20)
VENTRICULAR RATE: 64 BPM
WBC # BLD AUTO: 6.78 THOUSAND/UL (ref 4.31–10.16)

## 2025-07-22 PROCEDURE — 80048 BASIC METABOLIC PNL TOTAL CA: CPT

## 2025-07-22 PROCEDURE — 82948 REAGENT STRIP/BLOOD GLUCOSE: CPT

## 2025-07-22 PROCEDURE — 80202 ASSAY OF VANCOMYCIN: CPT

## 2025-07-22 PROCEDURE — 72125 CT NECK SPINE W/O DYE: CPT

## 2025-07-22 PROCEDURE — 85025 COMPLETE CBC W/AUTO DIFF WBC: CPT

## 2025-07-22 PROCEDURE — 93010 ELECTROCARDIOGRAM REPORT: CPT | Performed by: INTERNAL MEDICINE

## 2025-07-22 PROCEDURE — 93005 ELECTROCARDIOGRAM TRACING: CPT

## 2025-07-22 PROCEDURE — 99222 1ST HOSP IP/OBS MODERATE 55: CPT | Performed by: INTERNAL MEDICINE

## 2025-07-22 PROCEDURE — 83735 ASSAY OF MAGNESIUM: CPT

## 2025-07-22 PROCEDURE — G0545 PR INHERENT VISIT TO INPT: HCPCS | Performed by: INTERNAL MEDICINE

## 2025-07-22 PROCEDURE — NC001 PR NO CHARGE: Performed by: EMERGENCY MEDICINE

## 2025-07-22 PROCEDURE — 70450 CT HEAD/BRAIN W/O DYE: CPT

## 2025-07-22 PROCEDURE — 99232 SBSQ HOSP IP/OBS MODERATE 35: CPT | Performed by: INTERNAL MEDICINE

## 2025-07-22 RX ORDER — LISINOPRIL 20 MG/1
20 TABLET ORAL DAILY
Status: DISCONTINUED | OUTPATIENT
Start: 2025-07-23 | End: 2025-07-29 | Stop reason: HOSPADM

## 2025-07-22 RX ORDER — FUROSEMIDE 40 MG/1
40 TABLET ORAL
Status: DISCONTINUED | OUTPATIENT
Start: 2025-07-22 | End: 2025-07-29 | Stop reason: HOSPADM

## 2025-07-22 RX ORDER — ACETAMINOPHEN 325 MG/1
650 TABLET ORAL EVERY 6 HOURS PRN
Status: DISCONTINUED | OUTPATIENT
Start: 2025-07-22 | End: 2025-07-29 | Stop reason: HOSPADM

## 2025-07-22 RX ORDER — SPIRONOLACTONE 25 MG/1
25 TABLET ORAL DAILY
Status: DISCONTINUED | OUTPATIENT
Start: 2025-07-23 | End: 2025-07-29 | Stop reason: HOSPADM

## 2025-07-22 RX ORDER — HYDROMORPHONE HCL/PF 1 MG/ML
1 SYRINGE (ML) INJECTION ONCE
Status: COMPLETED | OUTPATIENT
Start: 2025-07-22 | End: 2025-07-22

## 2025-07-22 RX ORDER — OXYCODONE HYDROCHLORIDE 5 MG/1
5 TABLET ORAL EVERY 4 HOURS PRN
Refills: 0 | Status: COMPLETED | OUTPATIENT
Start: 2025-07-22 | End: 2025-07-22

## 2025-07-22 RX ORDER — INSULIN LISPRO 100 [IU]/ML
1-5 INJECTION, SOLUTION INTRAVENOUS; SUBCUTANEOUS
Status: DISCONTINUED | OUTPATIENT
Start: 2025-07-23 | End: 2025-07-29 | Stop reason: HOSPADM

## 2025-07-22 RX ADMIN — VANCOMYCIN HYDROCHLORIDE 1000 MG: 1 INJECTION, SOLUTION INTRAVENOUS at 20:54

## 2025-07-22 RX ADMIN — CEFEPIME 2000 MG: 2 INJECTION, POWDER, FOR SOLUTION INTRAVENOUS at 06:28

## 2025-07-22 RX ADMIN — GABAPENTIN 100 MG: 100 CAPSULE ORAL at 17:00

## 2025-07-22 RX ADMIN — VANCOMYCIN HYDROCHLORIDE 1000 MG: 1 INJECTION, SOLUTION INTRAVENOUS at 09:25

## 2025-07-22 RX ADMIN — CEFEPIME 2000 MG: 2 INJECTION, POWDER, FOR SOLUTION INTRAVENOUS at 15:50

## 2025-07-22 RX ADMIN — PANTOPRAZOLE SODIUM 40 MG: 40 TABLET, DELAYED RELEASE ORAL at 08:36

## 2025-07-22 RX ADMIN — OXYCODONE HYDROCHLORIDE 5 MG: 5 TABLET ORAL at 02:28

## 2025-07-22 RX ADMIN — LEVOTHYROXINE SODIUM 25 MCG: 0.1 TABLET ORAL at 05:05

## 2025-07-22 RX ADMIN — PRAVASTATIN SODIUM 20 MG: 20 TABLET ORAL at 16:59

## 2025-07-22 RX ADMIN — LEVOTHYROXINE SODIUM 300 MCG: 0.1 TABLET ORAL at 05:05

## 2025-07-22 RX ADMIN — APIXABAN 5 MG: 5 TABLET, FILM COATED ORAL at 17:00

## 2025-07-22 RX ADMIN — APIXABAN 5 MG: 5 TABLET, FILM COATED ORAL at 08:36

## 2025-07-22 RX ADMIN — FUROSEMIDE 40 MG: 40 TABLET ORAL at 16:59

## 2025-07-22 RX ADMIN — GABAPENTIN 100 MG: 100 CAPSULE ORAL at 08:36

## 2025-07-22 RX ADMIN — CEFEPIME 2000 MG: 2 INJECTION, POWDER, FOR SOLUTION INTRAVENOUS at 23:38

## 2025-07-22 RX ADMIN — METRONIDAZOLE 500 MG: 500 INJECTION, SOLUTION INTRAVENOUS at 22:25

## 2025-07-22 RX ADMIN — ASPIRIN 81 MG: 81 TABLET ORAL at 08:36

## 2025-07-22 RX ADMIN — HYDROMORPHONE HYDROCHLORIDE 1 MG: 1 INJECTION, SOLUTION INTRAMUSCULAR; INTRAVENOUS; SUBCUTANEOUS at 00:20

## 2025-07-22 RX ADMIN — METOPROLOL TARTRATE 50 MG: 50 TABLET, FILM COATED ORAL at 17:00

## 2025-07-22 RX ADMIN — OXYCODONE HYDROCHLORIDE 5 MG: 5 TABLET ORAL at 08:36

## 2025-07-22 RX ADMIN — METRONIDAZOLE 500 MG: 500 INJECTION, SOLUTION INTRAVENOUS at 09:34

## 2025-07-22 RX ADMIN — METOPROLOL TARTRATE 50 MG: 50 TABLET, FILM COATED ORAL at 08:36

## 2025-07-23 LAB
BACTERIA BLD CULT: NORMAL
FERRITIN SERPL-MCNC: 65 NG/ML (ref 30–336)
FOLATE SERPL-MCNC: 8 NG/ML
GLUCOSE SERPL-MCNC: 100 MG/DL (ref 65–140)
GLUCOSE SERPL-MCNC: 104 MG/DL (ref 65–140)
GLUCOSE SERPL-MCNC: 113 MG/DL (ref 65–140)
GLUCOSE SERPL-MCNC: 95 MG/DL (ref 65–140)
IRON SATN MFR SERPL: 14 % (ref 15–50)
IRON SERPL-MCNC: 34 UG/DL (ref 50–212)
TIBC SERPL-MCNC: 235.2 UG/DL (ref 250–450)
TRANSFERRIN SERPL-MCNC: 168 MG/DL (ref 203–362)
TSH SERPL DL<=0.05 MIU/L-ACNC: 1.52 UIU/ML (ref 0.45–4.5)
UIBC SERPL-MCNC: 201 UG/DL (ref 155–355)
VIT B12 SERPL-MCNC: 159 PG/ML (ref 180–914)

## 2025-07-23 PROCEDURE — 82746 ASSAY OF FOLIC ACID SERUM: CPT | Performed by: INTERNAL MEDICINE

## 2025-07-23 PROCEDURE — 84443 ASSAY THYROID STIM HORMONE: CPT | Performed by: INTERNAL MEDICINE

## 2025-07-23 PROCEDURE — G0545 PR INHERENT VISIT TO INPT: HCPCS | Performed by: INTERNAL MEDICINE

## 2025-07-23 PROCEDURE — 99232 SBSQ HOSP IP/OBS MODERATE 35: CPT | Performed by: INTERNAL MEDICINE

## 2025-07-23 PROCEDURE — 82948 REAGENT STRIP/BLOOD GLUCOSE: CPT

## 2025-07-23 PROCEDURE — 83540 ASSAY OF IRON: CPT | Performed by: INTERNAL MEDICINE

## 2025-07-23 PROCEDURE — 83550 IRON BINDING TEST: CPT | Performed by: INTERNAL MEDICINE

## 2025-07-23 PROCEDURE — 82607 VITAMIN B-12: CPT | Performed by: INTERNAL MEDICINE

## 2025-07-23 PROCEDURE — 82728 ASSAY OF FERRITIN: CPT | Performed by: INTERNAL MEDICINE

## 2025-07-23 RX ORDER — METRONIDAZOLE 500 MG/1
500 TABLET ORAL EVERY 12 HOURS SCHEDULED
Status: DISPENSED | OUTPATIENT
Start: 2025-07-23 | End: 2025-07-26

## 2025-07-23 RX ORDER — CYANOCOBALAMIN 1000 UG/ML
1000 INJECTION, SOLUTION INTRAMUSCULAR; SUBCUTANEOUS
Status: DISCONTINUED | OUTPATIENT
Start: 2025-07-23 | End: 2025-07-24

## 2025-07-23 RX ADMIN — LISINOPRIL 20 MG: 20 TABLET ORAL at 08:07

## 2025-07-23 RX ADMIN — GABAPENTIN 100 MG: 100 CAPSULE ORAL at 18:05

## 2025-07-23 RX ADMIN — GABAPENTIN 100 MG: 100 CAPSULE ORAL at 08:07

## 2025-07-23 RX ADMIN — CEFEPIME 2000 MG: 2 INJECTION, POWDER, FOR SOLUTION INTRAVENOUS at 06:56

## 2025-07-23 RX ADMIN — PRAVASTATIN SODIUM 20 MG: 20 TABLET ORAL at 16:08

## 2025-07-23 RX ADMIN — PANTOPRAZOLE SODIUM 40 MG: 40 TABLET, DELAYED RELEASE ORAL at 08:07

## 2025-07-23 RX ADMIN — METRONIDAZOLE 500 MG: 500 INJECTION, SOLUTION INTRAVENOUS at 08:19

## 2025-07-23 RX ADMIN — METRONIDAZOLE 500 MG: 500 TABLET ORAL at 21:27

## 2025-07-23 RX ADMIN — FUROSEMIDE 40 MG: 40 TABLET ORAL at 08:07

## 2025-07-23 RX ADMIN — APIXABAN 5 MG: 5 TABLET, FILM COATED ORAL at 08:07

## 2025-07-23 RX ADMIN — LEVOTHYROXINE SODIUM 300 MCG: 0.1 TABLET ORAL at 06:56

## 2025-07-23 RX ADMIN — FUROSEMIDE 40 MG: 40 TABLET ORAL at 16:08

## 2025-07-23 RX ADMIN — LEVOTHYROXINE SODIUM 25 MCG: 0.1 TABLET ORAL at 06:56

## 2025-07-23 RX ADMIN — VANCOMYCIN HYDROCHLORIDE 1000 MG: 1 INJECTION, SOLUTION INTRAVENOUS at 20:13

## 2025-07-23 RX ADMIN — ASPIRIN 81 MG: 81 TABLET ORAL at 08:07

## 2025-07-23 RX ADMIN — METOPROLOL TARTRATE 50 MG: 50 TABLET, FILM COATED ORAL at 18:05

## 2025-07-23 RX ADMIN — APIXABAN 5 MG: 5 TABLET, FILM COATED ORAL at 18:05

## 2025-07-23 RX ADMIN — VANCOMYCIN HYDROCHLORIDE 1000 MG: 1 INJECTION, SOLUTION INTRAVENOUS at 08:07

## 2025-07-23 RX ADMIN — CEFEPIME 2000 MG: 2 INJECTION, POWDER, FOR SOLUTION INTRAVENOUS at 21:27

## 2025-07-23 RX ADMIN — METOPROLOL TARTRATE 50 MG: 50 TABLET, FILM COATED ORAL at 08:07

## 2025-07-23 RX ADMIN — CYANOCOBALAMIN 1000 MCG: 1000 INJECTION, SOLUTION INTRAMUSCULAR; SUBCUTANEOUS at 11:54

## 2025-07-23 RX ADMIN — SPIRONOLACTONE 25 MG: 25 TABLET ORAL at 08:07

## 2025-07-23 RX ADMIN — CEFEPIME 2000 MG: 2 INJECTION, POWDER, FOR SOLUTION INTRAVENOUS at 13:50

## 2025-07-24 ENCOUNTER — APPOINTMENT (INPATIENT)
Dept: RADIOLOGY | Facility: HOSPITAL | Age: 70
DRG: 854 | End: 2025-07-24
Payer: MEDICARE

## 2025-07-24 LAB
GLUCOSE SERPL-MCNC: 102 MG/DL (ref 65–140)
GLUCOSE SERPL-MCNC: 119 MG/DL (ref 65–140)
GLUCOSE SERPL-MCNC: 88 MG/DL (ref 65–140)
GLUCOSE SERPL-MCNC: 97 MG/DL (ref 65–140)

## 2025-07-24 PROCEDURE — 73720 MRI LWR EXTREMITY W/O&W/DYE: CPT

## 2025-07-24 PROCEDURE — 99232 SBSQ HOSP IP/OBS MODERATE 35: CPT | Performed by: INTERNAL MEDICINE

## 2025-07-24 PROCEDURE — G0545 PR INHERENT VISIT TO INPT: HCPCS | Performed by: INTERNAL MEDICINE

## 2025-07-24 PROCEDURE — A9585 GADOBUTROL INJECTION: HCPCS

## 2025-07-24 PROCEDURE — 82948 REAGENT STRIP/BLOOD GLUCOSE: CPT

## 2025-07-24 RX ORDER — CYANOCOBALAMIN 1000 UG/ML
1000 INJECTION, SOLUTION INTRAMUSCULAR; SUBCUTANEOUS DAILY
Status: COMPLETED | OUTPATIENT
Start: 2025-07-24 | End: 2025-07-25

## 2025-07-24 RX ORDER — GADOBUTROL 604.72 MG/ML
17 INJECTION INTRAVENOUS
Status: COMPLETED | OUTPATIENT
Start: 2025-07-24 | End: 2025-07-24

## 2025-07-24 RX ADMIN — METRONIDAZOLE 500 MG: 500 TABLET ORAL at 20:21

## 2025-07-24 RX ADMIN — GABAPENTIN 100 MG: 100 CAPSULE ORAL at 08:54

## 2025-07-24 RX ADMIN — FUROSEMIDE 40 MG: 40 TABLET ORAL at 18:19

## 2025-07-24 RX ADMIN — CEFEPIME 2000 MG: 2 INJECTION, POWDER, FOR SOLUTION INTRAVENOUS at 05:15

## 2025-07-24 RX ADMIN — VANCOMYCIN HYDROCHLORIDE 1000 MG: 1 INJECTION, SOLUTION INTRAVENOUS at 19:35

## 2025-07-24 RX ADMIN — LEVOTHYROXINE SODIUM 300 MCG: 0.1 TABLET ORAL at 05:15

## 2025-07-24 RX ADMIN — PANTOPRAZOLE SODIUM 40 MG: 40 TABLET, DELAYED RELEASE ORAL at 08:54

## 2025-07-24 RX ADMIN — CEFEPIME 2000 MG: 2 INJECTION, POWDER, FOR SOLUTION INTRAVENOUS at 13:07

## 2025-07-24 RX ADMIN — ASPIRIN 81 MG: 81 TABLET ORAL at 08:54

## 2025-07-24 RX ADMIN — LEVOTHYROXINE SODIUM 25 MCG: 0.1 TABLET ORAL at 05:16

## 2025-07-24 RX ADMIN — LISINOPRIL 20 MG: 20 TABLET ORAL at 08:54

## 2025-07-24 RX ADMIN — SPIRONOLACTONE 25 MG: 25 TABLET ORAL at 08:54

## 2025-07-24 RX ADMIN — METRONIDAZOLE 500 MG: 500 TABLET ORAL at 08:54

## 2025-07-24 RX ADMIN — GADOBUTROL 17 ML: 604.72 INJECTION INTRAVENOUS at 04:55

## 2025-07-24 RX ADMIN — CYANOCOBALAMIN 1000 MCG: 1000 INJECTION, SOLUTION INTRAMUSCULAR; SUBCUTANEOUS at 13:10

## 2025-07-24 RX ADMIN — APIXABAN 5 MG: 5 TABLET, FILM COATED ORAL at 08:54

## 2025-07-24 RX ADMIN — VANCOMYCIN HYDROCHLORIDE 1000 MG: 1 INJECTION, SOLUTION INTRAVENOUS at 08:53

## 2025-07-24 RX ADMIN — CEFEPIME 2000 MG: 2 INJECTION, POWDER, FOR SOLUTION INTRAVENOUS at 21:05

## 2025-07-24 RX ADMIN — FUROSEMIDE 40 MG: 40 TABLET ORAL at 08:54

## 2025-07-24 RX ADMIN — APIXABAN 5 MG: 5 TABLET, FILM COATED ORAL at 18:19

## 2025-07-24 RX ADMIN — GABAPENTIN 100 MG: 100 CAPSULE ORAL at 18:19

## 2025-07-24 RX ADMIN — METOPROLOL TARTRATE 50 MG: 50 TABLET, FILM COATED ORAL at 08:54

## 2025-07-24 RX ADMIN — METOPROLOL TARTRATE 50 MG: 50 TABLET, FILM COATED ORAL at 18:20

## 2025-07-24 RX ADMIN — PRAVASTATIN SODIUM 20 MG: 20 TABLET ORAL at 18:20

## 2025-07-25 ENCOUNTER — ANESTHESIA (INPATIENT)
Dept: PERIOP | Facility: HOSPITAL | Age: 70
DRG: 854 | End: 2025-07-25
Payer: MEDICARE

## 2025-07-25 ENCOUNTER — APPOINTMENT (INPATIENT)
Dept: RADIOLOGY | Facility: HOSPITAL | Age: 70
DRG: 854 | End: 2025-07-25
Payer: MEDICARE

## 2025-07-25 ENCOUNTER — ANESTHESIA EVENT (INPATIENT)
Dept: PERIOP | Facility: HOSPITAL | Age: 70
DRG: 854 | End: 2025-07-25
Payer: MEDICARE

## 2025-07-25 PROBLEM — L97.526 DIABETIC ULCER OF TOE OF LEFT FOOT ASSOCIATED WITH TYPE 2 DIABETES MELLITUS, WITH BONE INVOLVEMENT WITHOUT EVIDENCE OF NECROSIS (HCC): Status: ACTIVE | Noted: 2024-07-25

## 2025-07-25 LAB
ANION GAP SERPL CALCULATED.3IONS-SCNC: 4 MMOL/L (ref 4–13)
BASOPHILS # BLD AUTO: 0.03 THOUSANDS/ÂΜL (ref 0–0.1)
BASOPHILS NFR BLD AUTO: 1 % (ref 0–1)
BUN SERPL-MCNC: 23 MG/DL (ref 5–25)
CALCIUM SERPL-MCNC: 9 MG/DL (ref 8.4–10.2)
CHLORIDE SERPL-SCNC: 100 MMOL/L (ref 96–108)
CO2 SERPL-SCNC: 31 MMOL/L (ref 21–32)
CREAT SERPL-MCNC: 1.13 MG/DL (ref 0.6–1.3)
EOSINOPHIL # BLD AUTO: 0.13 THOUSAND/ÂΜL (ref 0–0.61)
EOSINOPHIL NFR BLD AUTO: 2 % (ref 0–6)
ERYTHROCYTE [DISTWIDTH] IN BLOOD BY AUTOMATED COUNT: 16.8 % (ref 11.6–15.1)
GFR SERPL CREATININE-BSD FRML MDRD: 64 ML/MIN/1.73SQ M
GLUCOSE SERPL-MCNC: 100 MG/DL (ref 65–140)
GLUCOSE SERPL-MCNC: 118 MG/DL (ref 65–140)
GLUCOSE SERPL-MCNC: 95 MG/DL (ref 65–140)
GLUCOSE SERPL-MCNC: 97 MG/DL (ref 65–140)
GLUCOSE SERPL-MCNC: 98 MG/DL (ref 65–140)
HCT VFR BLD AUTO: 26.4 % (ref 36.5–49.3)
HGB BLD-MCNC: 7.7 G/DL (ref 12–17)
IMM GRANULOCYTES # BLD AUTO: 0.05 THOUSAND/UL (ref 0–0.2)
IMM GRANULOCYTES NFR BLD AUTO: 1 % (ref 0–2)
LYMPHOCYTES # BLD AUTO: 0.82 THOUSANDS/ÂΜL (ref 0.6–4.47)
LYMPHOCYTES NFR BLD AUTO: 14 % (ref 14–44)
MAGNESIUM SERPL-MCNC: 2 MG/DL (ref 1.9–2.7)
MCH RBC QN AUTO: 25.8 PG (ref 26.8–34.3)
MCHC RBC AUTO-ENTMCNC: 29.2 G/DL (ref 31.4–37.4)
MCV RBC AUTO: 89 FL (ref 82–98)
MONOCYTES # BLD AUTO: 0.47 THOUSAND/ÂΜL (ref 0.17–1.22)
MONOCYTES NFR BLD AUTO: 8 % (ref 4–12)
NEUTROPHILS # BLD AUTO: 4.24 THOUSANDS/ÂΜL (ref 1.85–7.62)
NEUTS SEG NFR BLD AUTO: 74 % (ref 43–75)
NRBC BLD AUTO-RTO: 0 /100 WBCS
PLATELET # BLD AUTO: 195 THOUSANDS/UL (ref 149–390)
PMV BLD AUTO: 10.9 FL (ref 8.9–12.7)
POTASSIUM SERPL-SCNC: 4.4 MMOL/L (ref 3.5–5.3)
RBC # BLD AUTO: 2.98 MILLION/UL (ref 3.88–5.62)
SODIUM SERPL-SCNC: 135 MMOL/L (ref 135–147)
WBC # BLD AUTO: 5.74 THOUSAND/UL (ref 4.31–10.16)

## 2025-07-25 PROCEDURE — 99232 SBSQ HOSP IP/OBS MODERATE 35: CPT | Performed by: INTERNAL MEDICINE

## 2025-07-25 PROCEDURE — 88311 DECALCIFY TISSUE: CPT | Performed by: PATHOLOGY

## 2025-07-25 PROCEDURE — G0545 PR INHERENT VISIT TO INPT: HCPCS | Performed by: INTERNAL MEDICINE

## 2025-07-25 PROCEDURE — 0Y6Q0Z1 DETACHMENT AT LEFT 1ST TOE, HIGH, OPEN APPROACH: ICD-10-PCS | Performed by: PODIATRIST

## 2025-07-25 PROCEDURE — 80048 BASIC METABOLIC PNL TOTAL CA: CPT | Performed by: INTERNAL MEDICINE

## 2025-07-25 PROCEDURE — 82948 REAGENT STRIP/BLOOD GLUCOSE: CPT

## 2025-07-25 PROCEDURE — 85025 COMPLETE CBC W/AUTO DIFF WBC: CPT | Performed by: INTERNAL MEDICINE

## 2025-07-25 PROCEDURE — 0Y6Y0Z0 DETACHMENT AT LEFT 5TH TOE, COMPLETE, OPEN APPROACH: ICD-10-PCS | Performed by: PODIATRIST

## 2025-07-25 PROCEDURE — 88305 TISSUE EXAM BY PATHOLOGIST: CPT | Performed by: PATHOLOGY

## 2025-07-25 PROCEDURE — 83735 ASSAY OF MAGNESIUM: CPT | Performed by: INTERNAL MEDICINE

## 2025-07-25 PROCEDURE — 73630 X-RAY EXAM OF FOOT: CPT

## 2025-07-25 RX ORDER — MAGNESIUM HYDROXIDE 1200 MG/15ML
LIQUID ORAL AS NEEDED
Status: DISCONTINUED | OUTPATIENT
Start: 2025-07-25 | End: 2025-07-25 | Stop reason: HOSPADM

## 2025-07-25 RX ORDER — FENTANYL CITRATE 50 UG/ML
INJECTION, SOLUTION INTRAMUSCULAR; INTRAVENOUS AS NEEDED
Status: DISCONTINUED | OUTPATIENT
Start: 2025-07-25 | End: 2025-07-25

## 2025-07-25 RX ORDER — MIDAZOLAM HYDROCHLORIDE 2 MG/2ML
INJECTION, SOLUTION INTRAMUSCULAR; INTRAVENOUS AS NEEDED
Status: DISCONTINUED | OUTPATIENT
Start: 2025-07-25 | End: 2025-07-25

## 2025-07-25 RX ORDER — SODIUM CHLORIDE, SODIUM LACTATE, POTASSIUM CHLORIDE, CALCIUM CHLORIDE 600; 310; 30; 20 MG/100ML; MG/100ML; MG/100ML; MG/100ML
INJECTION, SOLUTION INTRAVENOUS CONTINUOUS PRN
Status: DISCONTINUED | OUTPATIENT
Start: 2025-07-25 | End: 2025-07-25

## 2025-07-25 RX ORDER — PROPOFOL 10 MG/ML
INJECTION, EMULSION INTRAVENOUS CONTINUOUS PRN
Status: DISCONTINUED | OUTPATIENT
Start: 2025-07-25 | End: 2025-07-25

## 2025-07-25 RX ORDER — PHENYLEPHRINE HCL IN 0.9% NACL 1 MG/10 ML
SYRINGE (ML) INTRAVENOUS AS NEEDED
Status: DISCONTINUED | OUTPATIENT
Start: 2025-07-25 | End: 2025-07-25

## 2025-07-25 RX ORDER — ONDANSETRON 2 MG/ML
4 INJECTION INTRAMUSCULAR; INTRAVENOUS ONCE AS NEEDED
Status: DISCONTINUED | OUTPATIENT
Start: 2025-07-25 | End: 2025-07-25 | Stop reason: HOSPADM

## 2025-07-25 RX ORDER — LIDOCAINE HYDROCHLORIDE 10 MG/ML
INJECTION, SOLUTION EPIDURAL; INFILTRATION; INTRACAUDAL; PERINEURAL AS NEEDED
Status: DISCONTINUED | OUTPATIENT
Start: 2025-07-25 | End: 2025-07-25

## 2025-07-25 RX ORDER — FENTANYL CITRATE/PF 50 MCG/ML
50 SYRINGE (ML) INJECTION
Status: DISCONTINUED | OUTPATIENT
Start: 2025-07-25 | End: 2025-07-25 | Stop reason: HOSPADM

## 2025-07-25 RX ADMIN — SODIUM CHLORIDE, SODIUM LACTATE, POTASSIUM CHLORIDE, AND CALCIUM CHLORIDE: .6; .31; .03; .02 INJECTION, SOLUTION INTRAVENOUS at 18:43

## 2025-07-25 RX ADMIN — LEVOTHYROXINE SODIUM 25 MCG: 0.1 TABLET ORAL at 05:09

## 2025-07-25 RX ADMIN — FUROSEMIDE 40 MG: 40 TABLET ORAL at 17:15

## 2025-07-25 RX ADMIN — DEXMEDETOMIDINE HYDROCHLORIDE 8 MCG: 100 INJECTION, SOLUTION INTRAVENOUS at 18:44

## 2025-07-25 RX ADMIN — VANCOMYCIN HYDROCHLORIDE 1000 MG: 1 INJECTION, SOLUTION INTRAVENOUS at 08:45

## 2025-07-25 RX ADMIN — CEFEPIME 2000 MG: 2 INJECTION, POWDER, FOR SOLUTION INTRAVENOUS at 14:13

## 2025-07-25 RX ADMIN — LIDOCAINE HYDROCHLORIDE 50 MG: 10 INJECTION, SOLUTION EPIDURAL; INFILTRATION; INTRACAUDAL; PERINEURAL at 18:45

## 2025-07-25 RX ADMIN — PROPOFOL 70 MCG/KG/MIN: 10 INJECTION, EMULSION INTRAVENOUS at 18:44

## 2025-07-25 RX ADMIN — ASPIRIN 81 MG: 81 TABLET ORAL at 08:45

## 2025-07-25 RX ADMIN — GABAPENTIN 100 MG: 100 CAPSULE ORAL at 17:16

## 2025-07-25 RX ADMIN — PANTOPRAZOLE SODIUM 40 MG: 40 TABLET, DELAYED RELEASE ORAL at 08:45

## 2025-07-25 RX ADMIN — LEVOTHYROXINE SODIUM 300 MCG: 0.1 TABLET ORAL at 05:10

## 2025-07-25 RX ADMIN — FENTANYL CITRATE 50 MCG: 50 INJECTION INTRAMUSCULAR; INTRAVENOUS at 18:45

## 2025-07-25 RX ADMIN — CYANOCOBALAMIN 1000 MCG: 1000 INJECTION, SOLUTION INTRAMUSCULAR; SUBCUTANEOUS at 08:45

## 2025-07-25 RX ADMIN — LISINOPRIL 20 MG: 20 TABLET ORAL at 08:45

## 2025-07-25 RX ADMIN — CEFEPIME 2000 MG: 2 INJECTION, POWDER, FOR SOLUTION INTRAVENOUS at 05:09

## 2025-07-25 RX ADMIN — SPIRONOLACTONE 25 MG: 25 TABLET ORAL at 08:45

## 2025-07-25 RX ADMIN — METOPROLOL TARTRATE 50 MG: 50 TABLET, FILM COATED ORAL at 17:15

## 2025-07-25 RX ADMIN — Medication 50 MCG: at 19:32

## 2025-07-25 RX ADMIN — METRONIDAZOLE 500 MG: 500 TABLET ORAL at 08:45

## 2025-07-25 RX ADMIN — GABAPENTIN 100 MG: 100 CAPSULE ORAL at 08:45

## 2025-07-25 RX ADMIN — METOPROLOL TARTRATE 50 MG: 50 TABLET, FILM COATED ORAL at 08:45

## 2025-07-25 RX ADMIN — MIDAZOLAM 1 MG: 1 INJECTION INTRAMUSCULAR; INTRAVENOUS at 18:43

## 2025-07-25 RX ADMIN — FUROSEMIDE 40 MG: 40 TABLET ORAL at 08:45

## 2025-07-25 RX ADMIN — Medication 50 MCG: at 19:53

## 2025-07-25 RX ADMIN — CEFEPIME 2000 MG: 2 INJECTION, POWDER, FOR SOLUTION INTRAVENOUS at 22:26

## 2025-07-25 RX ADMIN — PRAVASTATIN SODIUM 20 MG: 20 TABLET ORAL at 17:15

## 2025-07-25 RX ADMIN — Medication 50 MCG: at 19:22

## 2025-07-25 RX ADMIN — Medication 100 MCG: at 19:10

## 2025-07-25 NOTE — ANESTHESIA PREPROCEDURE EVALUATION
Procedure:  Left partial hallux amputation and partial 5th ray amputation (Left: Foot)    Relevant Problems   CARDIO   (+) Chronic venous stasis dermatitis of left lower extremity   (+) Essential hypertension   (+) HLD (hyperlipidemia)   (+) Idiopathic chronic venous hypertension of left lower extremity with ulcer (HCC)   (+) Paroxysmal atrial fibrillation (HCC)      ENDO   (+) Controlled type 2 diabetes mellitus with complication, without long-term current use of insulin (HCC)   (+) Hypothyroidism      GI/HEPATIC   (+) Gastroesophageal reflux disease without esophagitis      /RENAL   (+) CKD (chronic kidney disease) stage 2, GFR 60-89 ml/min      HEMATOLOGY   (+) Anemia   (+) Iron deficiency anemia due to chronic blood loss      NEURO/PSYCH   (+) Diabetic polyneuropathy associated with type 2 diabetes mellitus (HCC)      Other   (+) Osteomyelitis (HCC)      7/2024: LV EF 50, normal RV systolic function, mod MR, mild TR  Physical Exam    Airway     Mallampati score: II          Cardiovascular      Dental       Pulmonary      Neurological      Other Findings        Anesthesia Plan  ASA Score- 3     Anesthesia Type- IV sedation with anesthesia with ASA Monitors.         Additional Monitors:     Airway Plan:     Comment: IV sedation, GA back up; standard ASA monitors. Risks and benefits discussed with patient; patient consented and agrees to proceed.    I saw and evaluated the patient. If seen with CRNA, we have discussed the anesthetic plan and I am in agreement that the plan is appropriate for the patient.  .       Plan Factors-    Chart reviewed.   Existing labs reviewed.                   Induction-     Postoperative Plan- Plan for postoperative opioid use.   Monitoring Plan - Monitoring plan - standard ASA monitoring  Post Operative Pain Plan - non-opiod analgesics and plan for postoperative opioid use    Perioperative Resuscitation Plan - Level 1 - Full Code.       Informed Consent- Anesthetic plan and risks  discussed with patient.  I personally reviewed this patient with the CRNA. Discussed and agreed on the Anesthesia Plan with the CRNA..      NPO Status:  No vitals data found for the desired time range.

## 2025-07-26 PROBLEM — Z89.9 STATUS POST AMPUTATION: Status: ACTIVE | Noted: 2025-07-26

## 2025-07-26 LAB
ABO GROUP BLD: NORMAL
ANION GAP SERPL CALCULATED.3IONS-SCNC: 8 MMOL/L (ref 4–13)
BASOPHILS # BLD AUTO: 0.03 THOUSANDS/ÂΜL (ref 0–0.1)
BASOPHILS NFR BLD AUTO: 0 % (ref 0–1)
BLD GP AB SCN SERPL QL: NEGATIVE
BUN SERPL-MCNC: 25 MG/DL (ref 5–25)
CALCIUM SERPL-MCNC: 9.2 MG/DL (ref 8.4–10.2)
CHLORIDE SERPL-SCNC: 99 MMOL/L (ref 96–108)
CO2 SERPL-SCNC: 29 MMOL/L (ref 21–32)
CREAT SERPL-MCNC: 1.2 MG/DL (ref 0.6–1.3)
EOSINOPHIL # BLD AUTO: 0.14 THOUSAND/ÂΜL (ref 0–0.61)
EOSINOPHIL NFR BLD AUTO: 2 % (ref 0–6)
ERYTHROCYTE [DISTWIDTH] IN BLOOD BY AUTOMATED COUNT: 16.9 % (ref 11.6–15.1)
GFR SERPL CREATININE-BSD FRML MDRD: 60 ML/MIN/1.73SQ M
GLUCOSE SERPL-MCNC: 102 MG/DL (ref 65–140)
GLUCOSE SERPL-MCNC: 104 MG/DL (ref 65–140)
GLUCOSE SERPL-MCNC: 107 MG/DL (ref 65–140)
GLUCOSE SERPL-MCNC: 81 MG/DL (ref 65–140)
GLUCOSE SERPL-MCNC: 98 MG/DL (ref 65–140)
HCT VFR BLD AUTO: 33.2 % (ref 36.5–49.3)
HGB BLD-MCNC: 10.1 G/DL (ref 12–17)
IMM GRANULOCYTES # BLD AUTO: 0.07 THOUSAND/UL (ref 0–0.2)
IMM GRANULOCYTES NFR BLD AUTO: 1 % (ref 0–2)
LYMPHOCYTES # BLD AUTO: 0.98 THOUSANDS/ÂΜL (ref 0.6–4.47)
LYMPHOCYTES NFR BLD AUTO: 11 % (ref 14–44)
MCH RBC QN AUTO: 26.2 PG (ref 26.8–34.3)
MCHC RBC AUTO-ENTMCNC: 30.4 G/DL (ref 31.4–37.4)
MCV RBC AUTO: 86 FL (ref 82–98)
MONOCYTES # BLD AUTO: 0.92 THOUSAND/ÂΜL (ref 0.17–1.22)
MONOCYTES NFR BLD AUTO: 10 % (ref 4–12)
NEUTROPHILS # BLD AUTO: 6.89 THOUSANDS/ÂΜL (ref 1.85–7.62)
NEUTS SEG NFR BLD AUTO: 76 % (ref 43–75)
NRBC BLD AUTO-RTO: 0 /100 WBCS
PLATELET # BLD AUTO: 307 THOUSANDS/UL (ref 149–390)
PMV BLD AUTO: 11.6 FL (ref 8.9–12.7)
POTASSIUM SERPL-SCNC: 3.8 MMOL/L (ref 3.5–5.3)
RBC # BLD AUTO: 3.85 MILLION/UL (ref 3.88–5.62)
RH BLD: POSITIVE
SODIUM SERPL-SCNC: 136 MMOL/L (ref 135–147)
SPECIMEN EXPIRATION DATE: NORMAL
VANCOMYCIN SERPL-MCNC: 13.6 UG/ML (ref 10–20)
WBC # BLD AUTO: 9.03 THOUSAND/UL (ref 4.31–10.16)

## 2025-07-26 PROCEDURE — 99232 SBSQ HOSP IP/OBS MODERATE 35: CPT | Performed by: INTERNAL MEDICINE

## 2025-07-26 PROCEDURE — 80202 ASSAY OF VANCOMYCIN: CPT | Performed by: NURSE PRACTITIONER

## 2025-07-26 PROCEDURE — G0545 PR INHERENT VISIT TO INPT: HCPCS | Performed by: INTERNAL MEDICINE

## 2025-07-26 PROCEDURE — 86901 BLOOD TYPING SEROLOGIC RH(D): CPT | Performed by: INTERNAL MEDICINE

## 2025-07-26 PROCEDURE — 86850 RBC ANTIBODY SCREEN: CPT | Performed by: INTERNAL MEDICINE

## 2025-07-26 PROCEDURE — 86900 BLOOD TYPING SEROLOGIC ABO: CPT | Performed by: INTERNAL MEDICINE

## 2025-07-26 PROCEDURE — 85025 COMPLETE CBC W/AUTO DIFF WBC: CPT | Performed by: NURSE PRACTITIONER

## 2025-07-26 PROCEDURE — 82948 REAGENT STRIP/BLOOD GLUCOSE: CPT

## 2025-07-26 PROCEDURE — 80048 BASIC METABOLIC PNL TOTAL CA: CPT | Performed by: NURSE PRACTITIONER

## 2025-07-26 RX ORDER — ALBUMIN (HUMAN) 12.5 G/50ML
25 SOLUTION INTRAVENOUS ONCE
Status: COMPLETED | OUTPATIENT
Start: 2025-07-26 | End: 2025-07-26

## 2025-07-26 RX ORDER — DOCUSATE SODIUM 100 MG/1
100 CAPSULE, LIQUID FILLED ORAL 2 TIMES DAILY
Status: DISCONTINUED | OUTPATIENT
Start: 2025-07-26 | End: 2025-07-26

## 2025-07-26 RX ORDER — POLYETHYLENE GLYCOL 3350 17 G/17G
17 POWDER, FOR SOLUTION ORAL DAILY
Status: DISCONTINUED | OUTPATIENT
Start: 2025-07-26 | End: 2025-07-29 | Stop reason: HOSPADM

## 2025-07-26 RX ORDER — AMOXICILLIN 250 MG
2 CAPSULE ORAL 2 TIMES DAILY
Status: DISCONTINUED | OUTPATIENT
Start: 2025-07-26 | End: 2025-07-29 | Stop reason: HOSPADM

## 2025-07-26 RX ORDER — LACTULOSE 10 G/15ML
30 SOLUTION ORAL ONCE AS NEEDED
Status: COMPLETED | OUTPATIENT
Start: 2025-07-26 | End: 2025-07-28

## 2025-07-26 RX ADMIN — SPIRONOLACTONE 25 MG: 25 TABLET ORAL at 09:59

## 2025-07-26 RX ADMIN — APIXABAN 5 MG: 5 TABLET, FILM COATED ORAL at 17:33

## 2025-07-26 RX ADMIN — FUROSEMIDE 40 MG: 40 TABLET ORAL at 09:58

## 2025-07-26 RX ADMIN — LEVOTHYROXINE SODIUM 25 MCG: 0.1 TABLET ORAL at 05:49

## 2025-07-26 RX ADMIN — METOPROLOL TARTRATE 50 MG: 50 TABLET, FILM COATED ORAL at 17:33

## 2025-07-26 RX ADMIN — GABAPENTIN 100 MG: 100 CAPSULE ORAL at 17:34

## 2025-07-26 RX ADMIN — PRAVASTATIN SODIUM 20 MG: 20 TABLET ORAL at 17:33

## 2025-07-26 RX ADMIN — ASPIRIN 81 MG: 81 TABLET ORAL at 09:58

## 2025-07-26 RX ADMIN — LEVOTHYROXINE SODIUM 300 MCG: 0.1 TABLET ORAL at 05:49

## 2025-07-26 RX ADMIN — VANCOMYCIN HYDROCHLORIDE 1000 MG: 1 INJECTION, SOLUTION INTRAVENOUS at 21:18

## 2025-07-26 RX ADMIN — METRONIDAZOLE 500 MG: 500 TABLET ORAL at 09:58

## 2025-07-26 RX ADMIN — CEFEPIME 2000 MG: 2 INJECTION, POWDER, FOR SOLUTION INTRAVENOUS at 14:10

## 2025-07-26 RX ADMIN — ALBUMIN (HUMAN) 25 G: 0.25 INJECTION, SOLUTION INTRAVENOUS at 04:18

## 2025-07-26 RX ADMIN — CEFEPIME 2000 MG: 2 INJECTION, POWDER, FOR SOLUTION INTRAVENOUS at 05:49

## 2025-07-26 RX ADMIN — VANCOMYCIN HYDROCHLORIDE 1000 MG: 1 INJECTION, SOLUTION INTRAVENOUS at 10:00

## 2025-07-26 RX ADMIN — PANTOPRAZOLE SODIUM 40 MG: 40 TABLET, DELAYED RELEASE ORAL at 09:59

## 2025-07-26 RX ADMIN — CEFEPIME 2000 MG: 2 INJECTION, POWDER, FOR SOLUTION INTRAVENOUS at 23:31

## 2025-07-26 RX ADMIN — DOCUSATE SODIUM 100 MG: 100 CAPSULE, LIQUID FILLED ORAL at 10:02

## 2025-07-26 RX ADMIN — APIXABAN 5 MG: 5 TABLET, FILM COATED ORAL at 09:58

## 2025-07-26 RX ADMIN — METOPROLOL TARTRATE 50 MG: 50 TABLET, FILM COATED ORAL at 09:58

## 2025-07-26 RX ADMIN — CYANOCOBALAMIN TAB 500 MCG 1000 MCG: 500 TAB at 09:58

## 2025-07-26 RX ADMIN — LISINOPRIL 20 MG: 20 TABLET ORAL at 09:59

## 2025-07-26 RX ADMIN — POLYETHYLENE GLYCOL 3350 17 G: 17 POWDER, FOR SOLUTION ORAL at 10:02

## 2025-07-26 RX ADMIN — METRONIDAZOLE 500 MG: 500 TABLET ORAL at 21:17

## 2025-07-26 RX ADMIN — GABAPENTIN 100 MG: 100 CAPSULE ORAL at 09:58

## 2025-07-26 RX ADMIN — FUROSEMIDE 40 MG: 40 TABLET ORAL at 17:34

## 2025-07-26 NOTE — ANESTHESIA POSTPROCEDURE EVALUATION
Post-Op Assessment Note            No anethesia notable event occurred.    Comments: uneventful PACU course        Last Filed PACU Vitals:  Vitals Value Taken Time   Temp 98.5 °F (36.9 °C) 07/25/25 20:02   Pulse 70 07/25/25 20:37   /55 07/25/25 20:30   Resp 15 07/25/25 20:37   SpO2 95 % 07/25/25 20:37   Vitals shown include unfiled device data.    Modified Iraida:     Vitals Value Taken Time   Activity 2 07/25/25 20:30   Respiration 2 07/25/25 20:30   Circulation 2 07/25/25 20:30   Consciousness 2 07/25/25 20:30   Oxygen Saturation 1 07/25/25 20:30     Modified Iraida Score: 9

## 2025-07-26 NOTE — ANESTHESIA POSTPROCEDURE EVALUATION
Post-Op Assessment Note    CV Status:  Stable  Pain Score: 0    Pain management: adequate       Mental Status:  Alert and awake   Hydration Status:  Euvolemic   PONV Controlled:  Controlled   Airway Patency:  Patent     Post Op Vitals Reviewed: Yes    No anethesia notable event occurred.    Staff: CRNA           Last Filed PACU Vitals:  Vitals Value Taken Time   Temp 98.2    Pulse 82 07/25/25 20:04   /54 07/25/25 20:03   Resp 16 07/25/25 20:04   SpO2 96 % 07/25/25 20:04   Vitals shown include unfiled device data.

## 2025-07-27 LAB
GLUCOSE SERPL-MCNC: 107 MG/DL (ref 65–140)
GLUCOSE SERPL-MCNC: 92 MG/DL (ref 65–140)
GLUCOSE SERPL-MCNC: 93 MG/DL (ref 65–140)
GLUCOSE SERPL-MCNC: 98 MG/DL (ref 65–140)

## 2025-07-27 PROCEDURE — 82948 REAGENT STRIP/BLOOD GLUCOSE: CPT

## 2025-07-27 PROCEDURE — 99232 SBSQ HOSP IP/OBS MODERATE 35: CPT | Performed by: INTERNAL MEDICINE

## 2025-07-27 PROCEDURE — G0545 PR INHERENT VISIT TO INPT: HCPCS | Performed by: INTERNAL MEDICINE

## 2025-07-27 RX ADMIN — METOPROLOL TARTRATE 50 MG: 50 TABLET, FILM COATED ORAL at 17:32

## 2025-07-27 RX ADMIN — SENNOSIDES, DOCUSATE SODIUM 2 TABLET: 50; 8.6 TABLET, FILM COATED ORAL at 08:05

## 2025-07-27 RX ADMIN — APIXABAN 5 MG: 5 TABLET, FILM COATED ORAL at 08:05

## 2025-07-27 RX ADMIN — FUROSEMIDE 40 MG: 40 TABLET ORAL at 17:32

## 2025-07-27 RX ADMIN — FUROSEMIDE 40 MG: 40 TABLET ORAL at 08:05

## 2025-07-27 RX ADMIN — CYANOCOBALAMIN TAB 500 MCG 1000 MCG: 500 TAB at 08:05

## 2025-07-27 RX ADMIN — GABAPENTIN 100 MG: 100 CAPSULE ORAL at 08:06

## 2025-07-27 RX ADMIN — METOPROLOL TARTRATE 50 MG: 50 TABLET, FILM COATED ORAL at 08:05

## 2025-07-27 RX ADMIN — POLYETHYLENE GLYCOL 3350 17 G: 17 POWDER, FOR SOLUTION ORAL at 08:05

## 2025-07-27 RX ADMIN — PANTOPRAZOLE SODIUM 40 MG: 40 TABLET, DELAYED RELEASE ORAL at 08:05

## 2025-07-27 RX ADMIN — ASPIRIN 81 MG: 81 TABLET ORAL at 08:05

## 2025-07-27 RX ADMIN — APIXABAN 5 MG: 5 TABLET, FILM COATED ORAL at 17:32

## 2025-07-27 RX ADMIN — LEVOTHYROXINE SODIUM 25 MCG: 0.1 TABLET ORAL at 06:03

## 2025-07-27 RX ADMIN — PRAVASTATIN SODIUM 20 MG: 20 TABLET ORAL at 17:32

## 2025-07-27 RX ADMIN — SPIRONOLACTONE 25 MG: 25 TABLET ORAL at 08:05

## 2025-07-27 RX ADMIN — GABAPENTIN 100 MG: 100 CAPSULE ORAL at 17:32

## 2025-07-27 RX ADMIN — LISINOPRIL 20 MG: 20 TABLET ORAL at 08:05

## 2025-07-27 RX ADMIN — LEVOTHYROXINE SODIUM 300 MCG: 0.1 TABLET ORAL at 06:04

## 2025-07-28 LAB
ERYTHROCYTE [DISTWIDTH] IN BLOOD BY AUTOMATED COUNT: 16.5 % (ref 11.6–15.1)
GLUCOSE SERPL-MCNC: 101 MG/DL (ref 65–140)
GLUCOSE SERPL-MCNC: 107 MG/DL (ref 65–140)
GLUCOSE SERPL-MCNC: 115 MG/DL (ref 65–140)
GLUCOSE SERPL-MCNC: 98 MG/DL (ref 65–140)
HCT VFR BLD AUTO: 30.9 % (ref 36.5–49.3)
HGB BLD-MCNC: 9.2 G/DL (ref 12–17)
MCH RBC QN AUTO: 25.6 PG (ref 26.8–34.3)
MCHC RBC AUTO-ENTMCNC: 29.8 G/DL (ref 31.4–37.4)
MCV RBC AUTO: 86 FL (ref 82–98)
PLATELET # BLD AUTO: 271 THOUSANDS/UL (ref 149–390)
PMV BLD AUTO: 11 FL (ref 8.9–12.7)
RBC # BLD AUTO: 3.59 MILLION/UL (ref 3.88–5.62)
WBC # BLD AUTO: 8.69 THOUSAND/UL (ref 4.31–10.16)

## 2025-07-28 PROCEDURE — 99232 SBSQ HOSP IP/OBS MODERATE 35: CPT | Performed by: INTERNAL MEDICINE

## 2025-07-28 PROCEDURE — NC001 PR NO CHARGE: Performed by: PODIATRIST

## 2025-07-28 PROCEDURE — G0545 PR INHERENT VISIT TO INPT: HCPCS | Performed by: INTERNAL MEDICINE

## 2025-07-28 PROCEDURE — 85027 COMPLETE CBC AUTOMATED: CPT | Performed by: INTERNAL MEDICINE

## 2025-07-28 PROCEDURE — 82948 REAGENT STRIP/BLOOD GLUCOSE: CPT

## 2025-07-28 RX ADMIN — POLYETHYLENE GLYCOL 3350 17 G: 17 POWDER, FOR SOLUTION ORAL at 08:46

## 2025-07-28 RX ADMIN — METOPROLOL TARTRATE 50 MG: 50 TABLET, FILM COATED ORAL at 17:31

## 2025-07-28 RX ADMIN — PRAVASTATIN SODIUM 20 MG: 20 TABLET ORAL at 17:31

## 2025-07-28 RX ADMIN — SENNOSIDES, DOCUSATE SODIUM 2 TABLET: 50; 8.6 TABLET, FILM COATED ORAL at 08:46

## 2025-07-28 RX ADMIN — APIXABAN 5 MG: 5 TABLET, FILM COATED ORAL at 17:31

## 2025-07-28 RX ADMIN — LEVOTHYROXINE SODIUM 300 MCG: 0.1 TABLET ORAL at 05:51

## 2025-07-28 RX ADMIN — GABAPENTIN 100 MG: 100 CAPSULE ORAL at 17:31

## 2025-07-28 RX ADMIN — SENNOSIDES, DOCUSATE SODIUM 2 TABLET: 50; 8.6 TABLET, FILM COATED ORAL at 17:31

## 2025-07-28 RX ADMIN — LEVOTHYROXINE SODIUM 25 MCG: 0.1 TABLET ORAL at 05:52

## 2025-07-28 RX ADMIN — APIXABAN 5 MG: 5 TABLET, FILM COATED ORAL at 08:46

## 2025-07-28 RX ADMIN — CYANOCOBALAMIN TAB 500 MCG 1000 MCG: 500 TAB at 08:46

## 2025-07-28 RX ADMIN — GABAPENTIN 100 MG: 100 CAPSULE ORAL at 08:46

## 2025-07-28 RX ADMIN — LACTULOSE 30 G: 20 SOLUTION ORAL at 08:55

## 2025-07-28 RX ADMIN — ASPIRIN 81 MG: 81 TABLET ORAL at 08:46

## 2025-07-28 RX ADMIN — FUROSEMIDE 40 MG: 40 TABLET ORAL at 08:46

## 2025-07-28 RX ADMIN — SPIRONOLACTONE 25 MG: 25 TABLET ORAL at 08:46

## 2025-07-28 RX ADMIN — LISINOPRIL 20 MG: 20 TABLET ORAL at 08:46

## 2025-07-28 RX ADMIN — FUROSEMIDE 40 MG: 40 TABLET ORAL at 17:31

## 2025-07-28 RX ADMIN — METOPROLOL TARTRATE 50 MG: 50 TABLET, FILM COATED ORAL at 08:46

## 2025-07-28 RX ADMIN — PANTOPRAZOLE SODIUM 40 MG: 40 TABLET, DELAYED RELEASE ORAL at 08:46

## 2025-07-29 ENCOUNTER — TELEPHONE (OUTPATIENT)
Dept: OTHER | Facility: OTHER | Age: 70
End: 2025-07-29

## 2025-07-29 VITALS
TEMPERATURE: 98.1 F | HEART RATE: 76 BPM | BODY MASS INDEX: 36.45 KG/M2 | SYSTOLIC BLOOD PRESSURE: 107 MMHG | RESPIRATION RATE: 16 BRPM | WEIGHT: 315 LBS | HEIGHT: 78 IN | OXYGEN SATURATION: 95 % | DIASTOLIC BLOOD PRESSURE: 53 MMHG

## 2025-07-29 LAB
GLUCOSE SERPL-MCNC: 101 MG/DL (ref 65–140)
GLUCOSE SERPL-MCNC: 97 MG/DL (ref 65–140)

## 2025-07-29 PROCEDURE — 99239 HOSP IP/OBS DSCHRG MGMT >30: CPT

## 2025-07-29 PROCEDURE — 97530 THERAPEUTIC ACTIVITIES: CPT

## 2025-07-29 PROCEDURE — 82948 REAGENT STRIP/BLOOD GLUCOSE: CPT

## 2025-07-29 PROCEDURE — 97116 GAIT TRAINING THERAPY: CPT

## 2025-07-29 PROCEDURE — 97535 SELF CARE MNGMENT TRAINING: CPT

## 2025-07-29 RX ADMIN — CYANOCOBALAMIN TAB 500 MCG 1000 MCG: 500 TAB at 08:53

## 2025-07-29 RX ADMIN — LISINOPRIL 20 MG: 20 TABLET ORAL at 08:53

## 2025-07-29 RX ADMIN — PANTOPRAZOLE SODIUM 40 MG: 40 TABLET, DELAYED RELEASE ORAL at 08:55

## 2025-07-29 RX ADMIN — LEVOTHYROXINE SODIUM 25 MCG: 0.1 TABLET ORAL at 06:59

## 2025-07-29 RX ADMIN — APIXABAN 5 MG: 5 TABLET, FILM COATED ORAL at 08:53

## 2025-07-29 RX ADMIN — FUROSEMIDE 40 MG: 40 TABLET ORAL at 08:55

## 2025-07-29 RX ADMIN — SPIRONOLACTONE 25 MG: 25 TABLET ORAL at 08:54

## 2025-07-29 RX ADMIN — METOPROLOL TARTRATE 50 MG: 50 TABLET, FILM COATED ORAL at 08:54

## 2025-07-29 RX ADMIN — ASPIRIN 81 MG: 81 TABLET ORAL at 08:54

## 2025-07-29 RX ADMIN — LEVOTHYROXINE SODIUM 300 MCG: 0.1 TABLET ORAL at 06:59

## 2025-07-29 RX ADMIN — GABAPENTIN 100 MG: 100 CAPSULE ORAL at 08:53

## 2025-07-30 ENCOUNTER — NURSING HOME VISIT (OUTPATIENT)
Dept: GERIATRICS | Facility: OTHER | Age: 70
End: 2025-07-30
Payer: MEDICARE

## 2025-07-30 ENCOUNTER — NURSING HOME VISIT (OUTPATIENT)
Dept: WOUND CARE | Facility: HOSPITAL | Age: 70
End: 2025-07-30
Payer: MEDICARE

## 2025-07-30 VITALS
WEIGHT: 315 LBS | RESPIRATION RATE: 18 BRPM | OXYGEN SATURATION: 95 % | TEMPERATURE: 98 F | BODY MASS INDEX: 41.91 KG/M2 | DIASTOLIC BLOOD PRESSURE: 67 MMHG | HEART RATE: 75 BPM | SYSTOLIC BLOOD PRESSURE: 138 MMHG

## 2025-07-30 DIAGNOSIS — R26.2 AMBULATORY DYSFUNCTION: ICD-10-CM

## 2025-07-30 DIAGNOSIS — L03.116 CELLULITIS OF LEFT LOWER EXTREMITY: Primary | ICD-10-CM

## 2025-07-30 DIAGNOSIS — A41.9 SEPSIS DUE TO CELLULITIS (HCC): ICD-10-CM

## 2025-07-30 DIAGNOSIS — E03.9 ACQUIRED HYPOTHYROIDISM: ICD-10-CM

## 2025-07-30 DIAGNOSIS — Z89.511 S/P BKA (BELOW KNEE AMPUTATION) UNILATERAL, RIGHT (HCC): ICD-10-CM

## 2025-07-30 DIAGNOSIS — L97.909 VENOUS ULCER (HCC): Primary | ICD-10-CM

## 2025-07-30 DIAGNOSIS — L03.90 SEPSIS DUE TO CELLULITIS (HCC): ICD-10-CM

## 2025-07-30 DIAGNOSIS — I83.009 VENOUS ULCER (HCC): Primary | ICD-10-CM

## 2025-07-30 DIAGNOSIS — I89.0 LYMPHEDEMA: ICD-10-CM

## 2025-07-30 DIAGNOSIS — T14.8XXA SURGICAL WOUND PRESENT: ICD-10-CM

## 2025-07-30 DIAGNOSIS — D51.9 ANEMIA DUE TO VITAMIN B12 DEFICIENCY, UNSPECIFIED B12 DEFICIENCY TYPE: ICD-10-CM

## 2025-07-30 DIAGNOSIS — N18.2 CKD (CHRONIC KIDNEY DISEASE) STAGE 2, GFR 60-89 ML/MIN: ICD-10-CM

## 2025-07-30 DIAGNOSIS — E78.2 MIXED HYPERLIPIDEMIA: Chronic | ICD-10-CM

## 2025-07-30 DIAGNOSIS — I10 ESSENTIAL HYPERTENSION: Chronic | ICD-10-CM

## 2025-07-30 DIAGNOSIS — I50.32 CHRONIC HEART FAILURE WITH PRESERVED EJECTION FRACTION (HCC): Chronic | ICD-10-CM

## 2025-07-30 DIAGNOSIS — R78.81 GRAM-POSITIVE BACTEREMIA: ICD-10-CM

## 2025-07-30 DIAGNOSIS — E11.621 DIABETIC ULCER OF TOE OF LEFT FOOT ASSOCIATED WITH TYPE 2 DIABETES MELLITUS, WITH BONE INVOLVEMENT WITHOUT EVIDENCE OF NECROSIS (HCC): ICD-10-CM

## 2025-07-30 DIAGNOSIS — I48.0 PAROXYSMAL ATRIAL FIBRILLATION (HCC): Chronic | ICD-10-CM

## 2025-07-30 DIAGNOSIS — E11.8 CONTROLLED TYPE 2 DIABETES MELLITUS WITH COMPLICATION, WITHOUT LONG-TERM CURRENT USE OF INSULIN (HCC): ICD-10-CM

## 2025-07-30 DIAGNOSIS — L97.526 DIABETIC ULCER OF TOE OF LEFT FOOT ASSOCIATED WITH TYPE 2 DIABETES MELLITUS, WITH BONE INVOLVEMENT WITHOUT EVIDENCE OF NECROSIS (HCC): ICD-10-CM

## 2025-07-30 PROCEDURE — 99306 1ST NF CARE HIGH MDM 50: CPT | Performed by: INTERNAL MEDICINE

## 2025-07-30 PROCEDURE — 99309 SBSQ NF CARE MODERATE MDM 30: CPT | Performed by: NURSE PRACTITIONER

## 2025-07-30 RX ORDER — LANOLIN ALCOHOL/MO/W.PET/CERES
1000 CREAM (GRAM) TOPICAL DAILY
COMMUNITY

## 2025-07-31 ENCOUNTER — TELEPHONE (OUTPATIENT)
Age: 70
End: 2025-07-31

## 2025-07-31 PROBLEM — T14.8XXA SURGICAL WOUND PRESENT: Status: ACTIVE | Noted: 2025-07-31

## 2025-07-31 PROCEDURE — 88305 TISSUE EXAM BY PATHOLOGIST: CPT | Performed by: PATHOLOGY

## 2025-07-31 PROCEDURE — 88311 DECALCIFY TISSUE: CPT | Performed by: PATHOLOGY

## 2025-08-01 ENCOUNTER — NURSING HOME VISIT (OUTPATIENT)
Dept: GERIATRICS | Facility: OTHER | Age: 70
End: 2025-08-01
Payer: MEDICARE

## 2025-08-01 VITALS
TEMPERATURE: 97.6 F | DIASTOLIC BLOOD PRESSURE: 60 MMHG | WEIGHT: 315 LBS | OXYGEN SATURATION: 95 % | SYSTOLIC BLOOD PRESSURE: 127 MMHG | BODY MASS INDEX: 41.91 KG/M2 | RESPIRATION RATE: 18 BRPM | HEART RATE: 77 BPM

## 2025-08-01 DIAGNOSIS — M86.9 OSTEOMYELITIS OF LEFT FOOT, UNSPECIFIED TYPE (HCC): Primary | ICD-10-CM

## 2025-08-01 DIAGNOSIS — I83.009 VENOUS ULCER (HCC): ICD-10-CM

## 2025-08-01 DIAGNOSIS — E03.9 ACQUIRED HYPOTHYROIDISM: ICD-10-CM

## 2025-08-01 DIAGNOSIS — I10 ESSENTIAL HYPERTENSION: Chronic | ICD-10-CM

## 2025-08-01 DIAGNOSIS — Z89.511 S/P BKA (BELOW KNEE AMPUTATION) UNILATERAL, RIGHT (HCC): ICD-10-CM

## 2025-08-01 DIAGNOSIS — I50.32 CHRONIC HEART FAILURE WITH PRESERVED EJECTION FRACTION (HCC): Chronic | ICD-10-CM

## 2025-08-01 DIAGNOSIS — L97.909 VENOUS ULCER (HCC): ICD-10-CM

## 2025-08-01 DIAGNOSIS — E11.8 CONTROLLED TYPE 2 DIABETES MELLITUS WITH COMPLICATION, WITHOUT LONG-TERM CURRENT USE OF INSULIN (HCC): ICD-10-CM

## 2025-08-01 DIAGNOSIS — I48.0 PAROXYSMAL ATRIAL FIBRILLATION (HCC): Chronic | ICD-10-CM

## 2025-08-01 DIAGNOSIS — E11.42 DIABETIC POLYNEUROPATHY ASSOCIATED WITH TYPE 2 DIABETES MELLITUS (HCC): ICD-10-CM

## 2025-08-01 DIAGNOSIS — I87.2 CHRONIC VENOUS STASIS DERMATITIS OF LEFT LOWER EXTREMITY: ICD-10-CM

## 2025-08-01 PROCEDURE — 99309 SBSQ NF CARE MODERATE MDM 30: CPT | Performed by: NURSE PRACTITIONER

## 2025-08-01 RX ORDER — LISINOPRIL 20 MG/1
20 TABLET ORAL DAILY
Start: 2025-08-01

## 2025-08-05 ENCOUNTER — NURSING HOME VISIT (OUTPATIENT)
Dept: GERIATRICS | Facility: OTHER | Age: 70
End: 2025-08-05
Payer: MEDICARE

## 2025-08-05 VITALS
WEIGHT: 315 LBS | OXYGEN SATURATION: 96 % | HEART RATE: 78 BPM | RESPIRATION RATE: 18 BRPM | SYSTOLIC BLOOD PRESSURE: 132 MMHG | TEMPERATURE: 97.9 F | DIASTOLIC BLOOD PRESSURE: 67 MMHG | BODY MASS INDEX: 41.91 KG/M2

## 2025-08-05 DIAGNOSIS — Z89.511 S/P BKA (BELOW KNEE AMPUTATION) UNILATERAL, RIGHT (HCC): ICD-10-CM

## 2025-08-05 DIAGNOSIS — E11.8 CONTROLLED TYPE 2 DIABETES MELLITUS WITH COMPLICATION, WITHOUT LONG-TERM CURRENT USE OF INSULIN (HCC): ICD-10-CM

## 2025-08-05 DIAGNOSIS — R26.2 AMBULATORY DYSFUNCTION: ICD-10-CM

## 2025-08-05 DIAGNOSIS — M86.9 OSTEOMYELITIS OF LEFT FOOT, UNSPECIFIED TYPE (HCC): ICD-10-CM

## 2025-08-05 DIAGNOSIS — I50.32 CHRONIC HEART FAILURE WITH PRESERVED EJECTION FRACTION (HCC): Primary | Chronic | ICD-10-CM

## 2025-08-05 DIAGNOSIS — I48.0 PAROXYSMAL ATRIAL FIBRILLATION (HCC): Chronic | ICD-10-CM

## 2025-08-05 PROCEDURE — 99309 SBSQ NF CARE MODERATE MDM 30: CPT | Performed by: NURSE PRACTITIONER

## 2025-08-06 ENCOUNTER — NURSING HOME VISIT (OUTPATIENT)
Dept: WOUND CARE | Facility: HOSPITAL | Age: 70
End: 2025-08-06
Payer: MEDICARE

## 2025-08-06 DIAGNOSIS — L97.909 VENOUS ULCER (HCC): Primary | ICD-10-CM

## 2025-08-06 DIAGNOSIS — T14.8XXA SURGICAL WOUND PRESENT: ICD-10-CM

## 2025-08-06 DIAGNOSIS — R26.2 AMBULATORY DYSFUNCTION: ICD-10-CM

## 2025-08-06 DIAGNOSIS — I83.009 VENOUS ULCER (HCC): Primary | ICD-10-CM

## 2025-08-06 DIAGNOSIS — L97.929 IDIOPATHIC CHRONIC VENOUS HYPERTENSION OF LEFT LOWER EXTREMITY WITH ULCER (HCC): ICD-10-CM

## 2025-08-06 DIAGNOSIS — I87.312 IDIOPATHIC CHRONIC VENOUS HYPERTENSION OF LEFT LOWER EXTREMITY WITH ULCER (HCC): ICD-10-CM

## 2025-08-06 PROCEDURE — 99308 SBSQ NF CARE LOW MDM 20: CPT | Performed by: NURSE PRACTITIONER

## 2025-08-07 ENCOUNTER — OFFICE VISIT (OUTPATIENT)
Dept: PODIATRY | Facility: CLINIC | Age: 70
End: 2025-08-07

## 2025-08-07 ENCOUNTER — NURSING HOME VISIT (OUTPATIENT)
Dept: GERIATRICS | Facility: OTHER | Age: 70
End: 2025-08-07
Payer: MEDICARE

## 2025-08-07 VITALS
SYSTOLIC BLOOD PRESSURE: 124 MMHG | RESPIRATION RATE: 18 BRPM | BODY MASS INDEX: 42.04 KG/M2 | TEMPERATURE: 97.5 F | HEART RATE: 68 BPM | WEIGHT: 315 LBS | OXYGEN SATURATION: 97 % | DIASTOLIC BLOOD PRESSURE: 65 MMHG

## 2025-08-07 VITALS — BODY MASS INDEX: 41.91 KG/M2 | HEART RATE: 74 BPM | HEIGHT: 78 IN | OXYGEN SATURATION: 99 %

## 2025-08-07 DIAGNOSIS — E03.9 ACQUIRED HYPOTHYROIDISM: ICD-10-CM

## 2025-08-07 DIAGNOSIS — M86.9 OSTEOMYELITIS OF LEFT FOOT, UNSPECIFIED TYPE (HCC): Primary | ICD-10-CM

## 2025-08-07 DIAGNOSIS — Z89.511 S/P BKA (BELOW KNEE AMPUTATION) UNILATERAL, RIGHT (HCC): ICD-10-CM

## 2025-08-07 DIAGNOSIS — I10 ESSENTIAL HYPERTENSION: Chronic | ICD-10-CM

## 2025-08-07 DIAGNOSIS — Z98.890 POSTOPERATIVE STATE: Primary | ICD-10-CM

## 2025-08-07 DIAGNOSIS — Z89.422 HISTORY OF PARTIAL RAY AMPUTATION OF FIFTH TOE OF LEFT FOOT (HCC): ICD-10-CM

## 2025-08-07 DIAGNOSIS — I50.32 CHRONIC HEART FAILURE WITH PRESERVED EJECTION FRACTION (HCC): Chronic | ICD-10-CM

## 2025-08-07 DIAGNOSIS — Z89.412 HISTORY OF AMPUTATION OF LEFT GREAT TOE (HCC): ICD-10-CM

## 2025-08-07 DIAGNOSIS — I48.0 PAROXYSMAL ATRIAL FIBRILLATION (HCC): Chronic | ICD-10-CM

## 2025-08-07 DIAGNOSIS — I87.2 CHRONIC VENOUS STASIS DERMATITIS OF LEFT LOWER EXTREMITY: ICD-10-CM

## 2025-08-07 DIAGNOSIS — R26.2 AMBULATORY DYSFUNCTION: ICD-10-CM

## 2025-08-07 DIAGNOSIS — E11.8 CONTROLLED TYPE 2 DIABETES MELLITUS WITH COMPLICATION, WITHOUT LONG-TERM CURRENT USE OF INSULIN (HCC): ICD-10-CM

## 2025-08-07 PROCEDURE — 99024 POSTOP FOLLOW-UP VISIT: CPT | Performed by: PODIATRIST

## 2025-08-07 PROCEDURE — 99309 SBSQ NF CARE MODERATE MDM 30: CPT | Performed by: NURSE PRACTITIONER

## 2025-08-08 ENCOUNTER — TELEPHONE (OUTPATIENT)
Age: 70
End: 2025-08-08

## 2025-08-08 PROBLEM — L97.521 ULCER OF TOE OF LEFT FOOT, LIMITED TO BREAKDOWN OF SKIN (HCC): Status: RESOLVED | Noted: 2022-03-16 | Resolved: 2025-08-08

## 2025-08-08 PROBLEM — I83.023 VENOUS STASIS ULCER OF LEFT ANKLE (HCC): Status: RESOLVED | Noted: 2025-05-10 | Resolved: 2025-08-08

## 2025-08-08 PROBLEM — L97.329 VENOUS STASIS ULCER OF LEFT ANKLE (HCC): Status: RESOLVED | Noted: 2025-05-10 | Resolved: 2025-08-08

## 2025-08-11 ENCOUNTER — NURSING HOME VISIT (OUTPATIENT)
Dept: GERIATRICS | Facility: OTHER | Age: 70
End: 2025-08-11
Payer: MEDICARE

## 2025-08-13 ENCOUNTER — NURSING HOME VISIT (OUTPATIENT)
Dept: WOUND CARE | Facility: HOSPITAL | Age: 70
End: 2025-08-13
Payer: MEDICARE

## 2025-08-14 ENCOUNTER — TELEPHONE (OUTPATIENT)
Age: 70
End: 2025-08-14

## 2025-08-14 ENCOUNTER — NURSING HOME VISIT (OUTPATIENT)
Dept: GERIATRICS | Facility: OTHER | Age: 70
End: 2025-08-14
Payer: MEDICARE

## 2025-08-14 PROBLEM — Z98.890 POSTOPERATIVE STATE: Status: ACTIVE | Noted: 2025-08-14

## 2025-08-14 PROBLEM — Z89.412 HISTORY OF AMPUTATION OF LEFT GREAT TOE (HCC): Status: ACTIVE | Noted: 2025-08-14

## 2025-08-20 ENCOUNTER — NURSING HOME VISIT (OUTPATIENT)
Dept: WOUND CARE | Facility: HOSPITAL | Age: 70
End: 2025-08-20

## 2025-08-20 ENCOUNTER — NURSING HOME VISIT (OUTPATIENT)
Dept: GERIATRICS | Facility: OTHER | Age: 70
End: 2025-08-20
Payer: MEDICARE

## 2025-08-20 VITALS
SYSTOLIC BLOOD PRESSURE: 142 MMHG | RESPIRATION RATE: 20 BRPM | OXYGEN SATURATION: 96 % | DIASTOLIC BLOOD PRESSURE: 68 MMHG | WEIGHT: 315 LBS | BODY MASS INDEX: 42.2 KG/M2 | TEMPERATURE: 97.6 F | HEART RATE: 81 BPM

## 2025-08-20 DIAGNOSIS — I89.0 LYMPHEDEMA: ICD-10-CM

## 2025-08-20 DIAGNOSIS — M86.272 SUBACUTE OSTEOMYELITIS OF LEFT FOOT (HCC): Primary | ICD-10-CM

## 2025-08-20 DIAGNOSIS — E03.9 ACQUIRED HYPOTHYROIDISM: ICD-10-CM

## 2025-08-20 DIAGNOSIS — I50.32 CHRONIC HEART FAILURE WITH PRESERVED EJECTION FRACTION (HCC): Chronic | ICD-10-CM

## 2025-08-20 DIAGNOSIS — I83.009 VENOUS ULCER (HCC): Primary | ICD-10-CM

## 2025-08-20 DIAGNOSIS — I10 ESSENTIAL HYPERTENSION: Chronic | ICD-10-CM

## 2025-08-20 DIAGNOSIS — Z89.511 S/P BKA (BELOW KNEE AMPUTATION) UNILATERAL, RIGHT (HCC): ICD-10-CM

## 2025-08-20 DIAGNOSIS — T14.8XXA SURGICAL WOUND PRESENT: ICD-10-CM

## 2025-08-20 DIAGNOSIS — E11.8 CONTROLLED TYPE 2 DIABETES MELLITUS WITH COMPLICATION, WITHOUT LONG-TERM CURRENT USE OF INSULIN (HCC): ICD-10-CM

## 2025-08-20 DIAGNOSIS — L97.909 VENOUS ULCER (HCC): Primary | ICD-10-CM

## 2025-08-20 DIAGNOSIS — R26.2 AMBULATORY DYSFUNCTION: ICD-10-CM

## 2025-08-20 DIAGNOSIS — I48.0 PAROXYSMAL ATRIAL FIBRILLATION (HCC): Chronic | ICD-10-CM

## 2025-08-20 DIAGNOSIS — I87.2 CHRONIC VENOUS STASIS DERMATITIS OF LEFT LOWER EXTREMITY: ICD-10-CM

## 2025-08-20 DIAGNOSIS — E11.42 DIABETIC POLYNEUROPATHY ASSOCIATED WITH TYPE 2 DIABETES MELLITUS (HCC): ICD-10-CM

## 2025-08-20 PROCEDURE — 99309 SBSQ NF CARE MODERATE MDM 30: CPT

## 2025-08-22 PROBLEM — A41.9 SEPSIS DUE TO CELLULITIS (HCC): Status: RESOLVED | Noted: 2021-06-08 | Resolved: 2025-08-22

## 2025-08-22 PROBLEM — L03.90 SEPSIS DUE TO CELLULITIS (HCC): Status: RESOLVED | Noted: 2021-06-08 | Resolved: 2025-08-22

## (undated) DEVICE — Device

## (undated) DEVICE — PACK PBDS SMALL EXTREMITY RF

## (undated) DEVICE — POV-IOD SOLUTION 4OZ BT